# Patient Record
Sex: FEMALE | Race: WHITE | NOT HISPANIC OR LATINO | Employment: OTHER | URBAN - METROPOLITAN AREA
[De-identification: names, ages, dates, MRNs, and addresses within clinical notes are randomized per-mention and may not be internally consistent; named-entity substitution may affect disease eponyms.]

---

## 2017-11-02 ENCOUNTER — CONVERSION ENCOUNTER (OUTPATIENT)
Dept: MAMMOGRAPHY | Facility: HOSPITAL | Age: 82
End: 2017-11-02

## 2018-08-20 ENCOUNTER — CONVERSION ENCOUNTER (OUTPATIENT)
Dept: OTHER | Facility: HOSPITAL | Age: 83
End: 2018-08-20

## 2018-08-20 ENCOUNTER — OFFICE VISIT CONVERTED (OUTPATIENT)
Dept: CARDIOLOGY | Facility: CLINIC | Age: 83
End: 2018-08-20
Attending: INTERNAL MEDICINE

## 2018-08-22 ENCOUNTER — CONVERSION ENCOUNTER (OUTPATIENT)
Dept: CARDIOLOGY | Facility: CLINIC | Age: 83
End: 2018-08-22
Attending: INTERNAL MEDICINE

## 2019-01-24 ENCOUNTER — HOSPITAL ENCOUNTER (OUTPATIENT)
Dept: LAB | Facility: HOSPITAL | Age: 84
Discharge: HOME | End: 2019-01-24
Attending: PHYSICIAN ASSISTANT

## 2019-01-26 LAB
AMOXICILLIN+CLAV SUSC ISLT: >=32
AMPICILLIN SUSC ISLT: >=32
AMPICILLIN+SULBAC SUSC ISLT: >=32
BACTERIA UR CULT: ABNORMAL
CEFAZOLIN SUSC ISLT: 32
CEFEPIME SUSC ISLT: <=1
CEFTAZIDIME SUSC ISLT: <=1
CEFTRIAXONE SUSC ISLT: <=1
CEFUROXIME ORAL SUSC ISLT: 8
CEFUROXIME PARENTER SUSC ISLT: 8
CIPROFLOXACIN SUSC ISLT: >=4
ERTAPENEM SUSC ISLT: <=0.5
GENTAMICIN SUSC ISLT: >=16
LEVOFLOXACIN SUSC ISLT: >=8
NITROFURANTOIN SUSC ISLT: <=16
TETRACYCLINE SUSC ISLT: >=16
TMP SMX SUSC ISLT: >=320
TOBRAMYCIN SUSC ISLT: 8

## 2019-02-20 ENCOUNTER — HOSPITAL ENCOUNTER (OUTPATIENT)
Dept: LAB | Facility: HOSPITAL | Age: 84
Discharge: HOME | End: 2019-02-20
Attending: PHYSICIAN ASSISTANT

## 2019-02-20 LAB
ALBUMIN SERPL-MCNC: 3.9 G/DL (ref 3.5–5)
ALBUMIN/GLOB SERPL: 1 {RATIO} (ref 1.4–2.6)
ALP SERPL-CCNC: 155 U/L (ref 43–160)
ALT SERPL-CCNC: 32 U/L (ref 10–40)
ANION GAP SERPL CALC-SCNC: 15 MMOL/L (ref 8–19)
APPEARANCE UR: CLEAR
AST SERPL-CCNC: 22 U/L (ref 15–50)
BASOPHILS # BLD AUTO: 0.05 10*3/UL (ref 0–0.2)
BASOPHILS NFR BLD AUTO: 0.7 % (ref 0–3)
BILIRUB SERPL-MCNC: 0.71 MG/DL (ref 0.2–1.3)
BILIRUB UR QL: NEGATIVE
BUN SERPL-MCNC: 15 MG/DL (ref 5–25)
BUN/CREAT SERPL: 17 {RATIO} (ref 6–20)
CALCIUM SERPL-MCNC: 9.2 MG/DL (ref 8.7–10.4)
CHLORIDE SERPL-SCNC: 101 MMOL/L (ref 99–111)
COLOR UR: ABNORMAL
CONV ABS IMM GRAN: 0.02 10*3/UL (ref 0–0.2)
CONV BACTERIA: NEGATIVE
CONV CO2: 24 MMOL/L (ref 22–32)
CONV COLLECTION SOURCE (UA): ABNORMAL
CONV HYALINE CASTS IN URINE MICRO: ABNORMAL /[LPF]
CONV IMMATURE GRAN: 0.3 % (ref 0–1.8)
CONV TOTAL PROTEIN: 7.9 G/DL (ref 6.3–8.2)
CONV UROBILINOGEN IN URINE BY AUTOMATED TEST STRIP: 1 {EHRLICHU}/DL (ref 0.1–1)
CREAT UR-MCNC: 0.9 MG/DL (ref 0.5–0.9)
DEPRECATED RDW RBC AUTO: 51.7 FL (ref 36.4–46.3)
EOSINOPHIL # BLD AUTO: 0.09 10*3/UL (ref 0–0.7)
EOSINOPHIL # BLD AUTO: 1.3 % (ref 0–7)
ERYTHROCYTE [DISTWIDTH] IN BLOOD BY AUTOMATED COUNT: 15 % (ref 11.7–14.4)
GFR SERPLBLD BASED ON 1.73 SQ M-ARVRAT: 57 ML/MIN/{1.73_M2}
GLOBULIN UR ELPH-MCNC: 4 G/DL (ref 2–3.5)
GLUCOSE SERPL-MCNC: 88 MG/DL (ref 65–99)
GLUCOSE UR QL: NEGATIVE MG/DL
HBA1C MFR BLD: 12.8 G/DL (ref 12–16)
HCT VFR BLD AUTO: 40.6 % (ref 37–47)
HGB UR QL STRIP: NEGATIVE
KETONES UR QL STRIP: NEGATIVE MG/DL
LEUKOCYTE ESTERASE UR QL STRIP: ABNORMAL
LYMPHOCYTES # BLD AUTO: 1.96 10*3/UL (ref 1–5)
MCH RBC QN AUTO: 29.6 PG (ref 27–31)
MCHC RBC AUTO-ENTMCNC: 31.5 G/DL (ref 33–37)
MCV RBC AUTO: 94 FL (ref 81–99)
MONOCYTES # BLD AUTO: 0.51 10*3/UL (ref 0.2–1.2)
MONOCYTES NFR BLD AUTO: 7.1 % (ref 3–10)
NEUTROPHILS # BLD AUTO: 4.52 10*3/UL (ref 2–8)
NEUTROPHILS NFR BLD AUTO: 63.2 % (ref 30–85)
NITRITE UR QL STRIP: NEGATIVE
NRBC CBCN: 0 % (ref 0–0.7)
OSMOLALITY SERPL CALC.SUM OF ELEC: 284 MOSM/KG (ref 273–304)
PH UR STRIP.AUTO: 6.5 [PH] (ref 5–8)
PLATELET # BLD AUTO: 252 10*3/UL (ref 130–400)
PMV BLD AUTO: 12.9 FL (ref 9.4–12.3)
POTASSIUM SERPL-SCNC: 3.4 MMOL/L (ref 3.5–5.3)
PROT UR QL: NEGATIVE MG/DL
RBC # BLD AUTO: 4.32 10*6/UL (ref 4.2–5.4)
RBC #/AREA URNS HPF: ABNORMAL /[HPF]
SODIUM SERPL-SCNC: 137 MMOL/L (ref 135–147)
SP GR UR: 1.01 (ref 1–1.03)
SQUAMOUS SPT QL MICRO: ABNORMAL /[HPF]
T4 FREE SERPL-MCNC: 1.2 NG/DL (ref 0.9–1.8)
TSH SERPL-ACNC: 4.05 M[IU]/L (ref 0.27–4.2)
VARIANT LYMPHS NFR BLD MANUAL: 27.4 % (ref 20–45)
WBC # BLD AUTO: 7.15 10*3/UL (ref 4.8–10.8)
WBC #/AREA URNS HPF: ABNORMAL /[HPF]

## 2019-02-22 ENCOUNTER — HOSPITAL ENCOUNTER (OUTPATIENT)
Dept: LAB | Facility: HOSPITAL | Age: 84
Discharge: HOME | End: 2019-02-22
Attending: INTERNAL MEDICINE

## 2019-02-22 LAB
ALBUMIN SERPL-MCNC: 3.8 G/DL (ref 3.5–5)
ALBUMIN/GLOB SERPL: 1 {RATIO} (ref 1.4–2.6)
ALP SERPL-CCNC: 142 U/L (ref 43–160)
ALT SERPL-CCNC: 22 U/L (ref 10–40)
ANION GAP SERPL CALC-SCNC: 16 MMOL/L (ref 8–19)
AST SERPL-CCNC: 19 U/L (ref 15–50)
BACTERIA UR CULT: NORMAL
BILIRUB SERPL-MCNC: 0.66 MG/DL (ref 0.2–1.3)
BUN SERPL-MCNC: 14 MG/DL (ref 5–25)
BUN/CREAT SERPL: 16 {RATIO} (ref 6–20)
CALCIUM SERPL-MCNC: 9.4 MG/DL (ref 8.7–10.4)
CHLORIDE SERPL-SCNC: 103 MMOL/L (ref 99–111)
CHOLEST SERPL-MCNC: 190 MG/DL (ref 107–200)
CHOLEST/HDLC SERPL: 2.6 {RATIO} (ref 3–6)
CONV CO2: 26 MMOL/L (ref 22–32)
CONV TOTAL PROTEIN: 7.7 G/DL (ref 6.3–8.2)
CREAT UR-MCNC: 0.89 MG/DL (ref 0.5–0.9)
GFR SERPLBLD BASED ON 1.73 SQ M-ARVRAT: 58 ML/MIN/{1.73_M2}
GLOBULIN UR ELPH-MCNC: 3.9 G/DL (ref 2–3.5)
GLUCOSE SERPL-MCNC: 95 MG/DL (ref 65–99)
HDLC SERPL-MCNC: 73 MG/DL (ref 40–60)
LDLC SERPL CALC-MCNC: 65 MG/DL (ref 70–100)
OSMOLALITY SERPL CALC.SUM OF ELEC: 292 MOSM/KG (ref 273–304)
POTASSIUM SERPL-SCNC: 3.8 MMOL/L (ref 3.5–5.3)
SODIUM SERPL-SCNC: 141 MMOL/L (ref 135–147)
TRIGL SERPL-MCNC: 262 MG/DL (ref 40–150)
VLDLC SERPL-MCNC: 52 MG/DL (ref 5–37)

## 2019-02-27 ENCOUNTER — OFFICE VISIT CONVERTED (OUTPATIENT)
Dept: CARDIOLOGY | Facility: CLINIC | Age: 84
End: 2019-02-27
Attending: INTERNAL MEDICINE

## 2019-02-27 ENCOUNTER — CONVERSION ENCOUNTER (OUTPATIENT)
Dept: CARDIOLOGY | Facility: CLINIC | Age: 84
End: 2019-02-27

## 2019-03-11 ENCOUNTER — HOSPITAL ENCOUNTER (OUTPATIENT)
Dept: LAB | Facility: HOSPITAL | Age: 84
Discharge: HOME | End: 2019-03-11
Attending: INTERNAL MEDICINE

## 2019-03-11 LAB
ANION GAP SERPL CALC-SCNC: 16 MMOL/L (ref 8–19)
BUN SERPL-MCNC: 14 MG/DL (ref 5–25)
BUN/CREAT SERPL: 15 {RATIO} (ref 6–20)
CALCIUM SERPL-MCNC: 8.9 MG/DL (ref 8.7–10.4)
CHLORIDE SERPL-SCNC: 105 MMOL/L (ref 99–111)
CONV CO2: 24 MMOL/L (ref 22–32)
CREAT UR-MCNC: 0.92 MG/DL (ref 0.5–0.9)
GFR SERPLBLD BASED ON 1.73 SQ M-ARVRAT: 56 ML/MIN/{1.73_M2}
GLUCOSE SERPL-MCNC: 100 MG/DL (ref 65–99)
MAGNESIUM SERPL-MCNC: 1.81 MG/DL (ref 1.6–2.3)
OSMOLALITY SERPL CALC.SUM OF ELEC: 293 MOSM/KG (ref 273–304)
POTASSIUM SERPL-SCNC: 3.8 MMOL/L (ref 3.5–5.3)
SODIUM SERPL-SCNC: 141 MMOL/L (ref 135–147)
T4 FREE SERPL-MCNC: 1.1 NG/DL (ref 0.9–1.8)
TSH SERPL-ACNC: 2.9 M[IU]/L (ref 0.27–4.2)

## 2019-03-25 ENCOUNTER — OFFICE VISIT CONVERTED (OUTPATIENT)
Dept: CARDIOLOGY | Facility: CLINIC | Age: 84
End: 2019-03-25
Attending: INTERNAL MEDICINE

## 2019-04-11 ENCOUNTER — HOSPITAL ENCOUNTER (OUTPATIENT)
Dept: LAB | Facility: HOSPITAL | Age: 84
Discharge: HOME | End: 2019-04-11
Attending: PHYSICIAN ASSISTANT

## 2019-04-13 LAB
AMOXICILLIN+CLAV SUSC ISLT: >=32
AMPICILLIN SUSC ISLT: >=32
AMPICILLIN+SULBAC SUSC ISLT: >=32
BACTERIA UR CULT: ABNORMAL
CEFAZOLIN SUSC ISLT: 32
CEFEPIME SUSC ISLT: <=1
CEFTAZIDIME SUSC ISLT: <=1
CEFTRIAXONE SUSC ISLT: <=1
CEFUROXIME ORAL SUSC ISLT: 4
CEFUROXIME PARENTER SUSC ISLT: 4
CIPROFLOXACIN SUSC ISLT: >=4
ERTAPENEM SUSC ISLT: <=0.5
GENTAMICIN SUSC ISLT: >=16
LEVOFLOXACIN SUSC ISLT: >=8
NITROFURANTOIN SUSC ISLT: <=16
TETRACYCLINE SUSC ISLT: >=16
TMP SMX SUSC ISLT: >=320
TOBRAMYCIN SUSC ISLT: 8

## 2019-04-16 ENCOUNTER — HOSPITAL ENCOUNTER (OUTPATIENT)
Dept: PERIOP | Facility: HOSPITAL | Age: 84
Setting detail: HOSPITAL OUTPATIENT SURGERY
Discharge: HOME | End: 2019-04-16
Attending: INTERNAL MEDICINE

## 2019-04-16 LAB
ALBUMIN SERPL-MCNC: 3.9 G/DL (ref 3.5–5)
ALBUMIN/GLOB SERPL: 1.1 {RATIO} (ref 1.4–2.6)
ALP SERPL-CCNC: 74 U/L (ref 43–160)
ALT SERPL-CCNC: 6 U/L (ref 10–40)
ANION GAP SERPL CALC-SCNC: 16 MMOL/L (ref 8–19)
AST SERPL-CCNC: 17 U/L (ref 15–50)
BASOPHILS # BLD AUTO: 0.05 10*3/UL (ref 0–0.2)
BASOPHILS NFR BLD AUTO: 0.7 % (ref 0–3)
BILIRUB SERPL-MCNC: 0.72 MG/DL (ref 0.2–1.3)
BUN SERPL-MCNC: 19 MG/DL (ref 5–25)
BUN/CREAT SERPL: 21 {RATIO} (ref 6–20)
CALCIUM SERPL-MCNC: 9 MG/DL (ref 8.7–10.4)
CHLORIDE SERPL-SCNC: 104 MMOL/L (ref 99–111)
CONV ABS IMM GRAN: 0.01 10*3/UL (ref 0–0.2)
CONV CO2: 25 MMOL/L (ref 22–32)
CONV IMMATURE GRAN: 0.1 % (ref 0–1.8)
CONV TOTAL PROTEIN: 7.4 G/DL (ref 6.3–8.2)
CREAT UR-MCNC: 0.91 MG/DL (ref 0.5–0.9)
DEPRECATED RDW RBC AUTO: 51 FL (ref 36.4–46.3)
EOSINOPHIL # BLD AUTO: 0.1 10*3/UL (ref 0–0.7)
EOSINOPHIL # BLD AUTO: 1.5 % (ref 0–7)
ERYTHROCYTE [DISTWIDTH] IN BLOOD BY AUTOMATED COUNT: 15.3 % (ref 11.7–14.4)
GFR SERPLBLD BASED ON 1.73 SQ M-ARVRAT: 56 ML/MIN/{1.73_M2}
GLOBULIN UR ELPH-MCNC: 3.5 G/DL (ref 2–3.5)
GLUCOSE SERPL-MCNC: 101 MG/DL (ref 65–99)
HBA1C MFR BLD: 11.8 G/DL (ref 12–16)
HCT VFR BLD AUTO: 36.4 % (ref 37–47)
LYMPHOCYTES # BLD AUTO: 1.99 10*3/UL (ref 1–5)
MAGNESIUM SERPL-MCNC: 1.91 MG/DL (ref 1.6–2.3)
MCH RBC QN AUTO: 29.7 PG (ref 27–31)
MCHC RBC AUTO-ENTMCNC: 32.4 G/DL (ref 33–37)
MCV RBC AUTO: 91.7 FL (ref 81–99)
MONOCYTES # BLD AUTO: 0.42 10*3/UL (ref 0.2–1.2)
MONOCYTES NFR BLD AUTO: 6.2 % (ref 3–10)
NEUTROPHILS # BLD AUTO: 4.18 10*3/UL (ref 2–8)
NEUTROPHILS NFR BLD AUTO: 62 % (ref 30–85)
NRBC CBCN: 0 % (ref 0–0.7)
OSMOLALITY SERPL CALC.SUM OF ELEC: 294 MOSM/KG (ref 273–304)
PLATELET # BLD AUTO: 211 10*3/UL (ref 130–400)
PMV BLD AUTO: 11.1 FL (ref 9.4–12.3)
POTASSIUM SERPL-SCNC: 3.8 MMOL/L (ref 3.5–5.3)
RBC # BLD AUTO: 3.97 10*6/UL (ref 4.2–5.4)
SODIUM SERPL-SCNC: 141 MMOL/L (ref 135–147)
VARIANT LYMPHS NFR BLD MANUAL: 29.5 % (ref 20–45)
WBC # BLD AUTO: 6.75 10*3/UL (ref 4.8–10.8)

## 2019-05-09 ENCOUNTER — OFFICE VISIT CONVERTED (OUTPATIENT)
Dept: CARDIOLOGY | Facility: CLINIC | Age: 84
End: 2019-05-09
Attending: INTERNAL MEDICINE

## 2019-05-30 ENCOUNTER — HOSPITAL ENCOUNTER (OUTPATIENT)
Dept: LAB | Facility: HOSPITAL | Age: 84
Discharge: HOME | End: 2019-05-30
Attending: PHYSICIAN ASSISTANT

## 2019-06-01 LAB
AMOXICILLIN+CLAV SUSC ISLT: >=32
AMPICILLIN SUSC ISLT: >=32
AMPICILLIN+SULBAC SUSC ISLT: >=32
BACTERIA UR CULT: ABNORMAL
CEFAZOLIN SUSC ISLT: >=64
CEFEPIME SUSC ISLT: <=1
CEFTAZIDIME SUSC ISLT: <=1
CEFTRIAXONE SUSC ISLT: <=1
CEFUROXIME ORAL SUSC ISLT: 4
CEFUROXIME PARENTER SUSC ISLT: 4
CIPROFLOXACIN SUSC ISLT: >=4
ERTAPENEM SUSC ISLT: <=0.5
GENTAMICIN SUSC ISLT: >=16
LEVOFLOXACIN SUSC ISLT: >=8
NITROFURANTOIN SUSC ISLT: <=16
TETRACYCLINE SUSC ISLT: >=16
TMP SMX SUSC ISLT: >=320
TOBRAMYCIN SUSC ISLT: 8

## 2019-06-03 ENCOUNTER — HOSPITAL ENCOUNTER (OUTPATIENT)
Dept: SURGERY | Facility: CLINIC | Age: 84
Discharge: HOME | End: 2019-06-03
Attending: PHYSICIAN ASSISTANT

## 2019-06-03 ENCOUNTER — OFFICE VISIT CONVERTED (OUTPATIENT)
Dept: SURGERY | Facility: CLINIC | Age: 84
End: 2019-06-03
Attending: PHYSICIAN ASSISTANT

## 2019-06-05 ENCOUNTER — CONVERSION ENCOUNTER (OUTPATIENT)
Dept: SURGERY | Facility: CLINIC | Age: 84
End: 2019-06-05

## 2019-06-07 ENCOUNTER — HOSPITAL ENCOUNTER (OUTPATIENT)
Dept: SURGERY | Facility: CLINIC | Age: 84
Discharge: HOME | End: 2019-06-07
Attending: PHYSICIAN ASSISTANT

## 2019-06-09 LAB — BACTERIA UR CULT: NORMAL

## 2019-07-02 ENCOUNTER — CONVERSION ENCOUNTER (OUTPATIENT)
Dept: SURGERY | Facility: CLINIC | Age: 84
End: 2019-07-02

## 2019-07-02 ENCOUNTER — HOSPITAL ENCOUNTER (OUTPATIENT)
Dept: SURGERY | Facility: CLINIC | Age: 84
Discharge: HOME | End: 2019-07-02
Attending: PHYSICIAN ASSISTANT

## 2019-07-02 ENCOUNTER — OFFICE VISIT CONVERTED (OUTPATIENT)
Dept: SURGERY | Facility: CLINIC | Age: 84
End: 2019-07-02
Attending: PHYSICIAN ASSISTANT

## 2019-07-04 LAB
AMOXICILLIN+CLAV SUSC ISLT: >=32
AMPICILLIN SUSC ISLT: >=32
AMPICILLIN+SULBAC SUSC ISLT: >=32
BACTERIA UR CULT: ABNORMAL
CEFAZOLIN SUSC ISLT: 32
CEFEPIME SUSC ISLT: <=1
CEFTAZIDIME SUSC ISLT: <=1
CEFTRIAXONE SUSC ISLT: <=1
CEFUROXIME ORAL SUSC ISLT: 16
CEFUROXIME PARENTER SUSC ISLT: 16
CIPROFLOXACIN SUSC ISLT: >=4
ERTAPENEM SUSC ISLT: <=0.5
GENTAMICIN SUSC ISLT: >=16
LEVOFLOXACIN SUSC ISLT: >=8
NITROFURANTOIN SUSC ISLT: <=16
TETRACYCLINE SUSC ISLT: >=16
TMP SMX SUSC ISLT: >=320
TOBRAMYCIN SUSC ISLT: 8

## 2019-07-10 ENCOUNTER — HOSPITAL ENCOUNTER (OUTPATIENT)
Dept: LAB | Facility: HOSPITAL | Age: 84
Discharge: HOME | End: 2019-07-10
Attending: INTERNAL MEDICINE

## 2019-07-10 LAB
ALBUMIN SERPL-MCNC: 3.9 G/DL (ref 3.5–5)
ALBUMIN/GLOB SERPL: 1.2 {RATIO} (ref 1.4–2.6)
ALP SERPL-CCNC: 78 U/L (ref 43–160)
ALT SERPL-CCNC: 7 U/L (ref 10–40)
ANION GAP SERPL CALC-SCNC: 17 MMOL/L (ref 8–19)
AST SERPL-CCNC: 15 U/L (ref 15–50)
BASOPHILS # BLD AUTO: 0.08 10*3/UL (ref 0–0.2)
BASOPHILS NFR BLD AUTO: 1.2 % (ref 0–3)
BILIRUB SERPL-MCNC: 0.58 MG/DL (ref 0.2–1.3)
BUN SERPL-MCNC: 15 MG/DL (ref 5–25)
BUN/CREAT SERPL: 19 {RATIO} (ref 6–20)
CALCIUM SERPL-MCNC: 8.8 MG/DL (ref 8.7–10.4)
CHLORIDE SERPL-SCNC: 104 MMOL/L (ref 99–111)
CONV ABS IMM GRAN: 0.01 10*3/UL (ref 0–0.2)
CONV CO2: 24 MMOL/L (ref 22–32)
CONV IMMATURE GRAN: 0.1 % (ref 0–1.8)
CONV TOTAL PROTEIN: 7.2 G/DL (ref 6.3–8.2)
CREAT UR-MCNC: 0.77 MG/DL (ref 0.5–0.9)
DEPRECATED RDW RBC AUTO: 53.8 FL (ref 36.4–46.3)
EOSINOPHIL # BLD AUTO: 0.14 10*3/UL (ref 0–0.7)
EOSINOPHIL # BLD AUTO: 2.1 % (ref 0–7)
ERYTHROCYTE [DISTWIDTH] IN BLOOD BY AUTOMATED COUNT: 15 % (ref 11.7–14.4)
GFR SERPLBLD BASED ON 1.73 SQ M-ARVRAT: >60 ML/MIN/{1.73_M2}
GLOBULIN UR ELPH-MCNC: 3.3 G/DL (ref 2–3.5)
GLUCOSE SERPL-MCNC: 88 MG/DL (ref 65–99)
HBA1C MFR BLD: 11.7 G/DL (ref 12–16)
HCT VFR BLD AUTO: 36.9 % (ref 37–47)
LYMPHOCYTES # BLD AUTO: 2.37 10*3/UL (ref 1–5)
MCH RBC QN AUTO: 30.5 PG (ref 27–31)
MCHC RBC AUTO-ENTMCNC: 31.7 G/DL (ref 33–37)
MCV RBC AUTO: 96.3 FL (ref 81–99)
MONOCYTES # BLD AUTO: 0.51 10*3/UL (ref 0.2–1.2)
MONOCYTES NFR BLD AUTO: 7.6 % (ref 3–10)
NEUTROPHILS # BLD AUTO: 3.64 10*3/UL (ref 2–8)
NEUTROPHILS NFR BLD AUTO: 53.9 % (ref 30–85)
NRBC CBCN: 0 % (ref 0–0.7)
OSMOLALITY SERPL CALC.SUM OF ELEC: 292 MOSM/KG (ref 273–304)
PLATELET # BLD AUTO: 273 10*3/UL (ref 130–400)
PMV BLD AUTO: 12.6 FL (ref 9.4–12.3)
POTASSIUM SERPL-SCNC: 3.7 MMOL/L (ref 3.5–5.3)
RBC # BLD AUTO: 3.83 10*6/UL (ref 4.2–5.4)
SODIUM SERPL-SCNC: 141 MMOL/L (ref 135–147)
VARIANT LYMPHS NFR BLD MANUAL: 35.1 % (ref 20–45)
WBC # BLD AUTO: 6.75 10*3/UL (ref 4.8–10.8)

## 2019-07-18 ENCOUNTER — HOSPITAL ENCOUNTER (OUTPATIENT)
Dept: CT IMAGING | Facility: HOSPITAL | Age: 84
Discharge: HOME | End: 2019-07-18
Attending: PHYSICIAN ASSISTANT

## 2019-07-29 ENCOUNTER — OFFICE VISIT CONVERTED (OUTPATIENT)
Dept: SURGERY | Facility: CLINIC | Age: 84
End: 2019-07-29
Attending: PHYSICIAN ASSISTANT

## 2019-07-29 ENCOUNTER — CONVERSION ENCOUNTER (OUTPATIENT)
Dept: SURGERY | Facility: CLINIC | Age: 84
End: 2019-07-29

## 2019-07-29 ENCOUNTER — HOSPITAL ENCOUNTER (OUTPATIENT)
Dept: SURGERY | Facility: CLINIC | Age: 84
Discharge: HOME | End: 2019-07-29
Attending: PHYSICIAN ASSISTANT

## 2019-07-31 LAB — BACTERIA UR CULT: NORMAL

## 2019-08-08 ENCOUNTER — HOSPITAL ENCOUNTER (OUTPATIENT)
Dept: LAB | Facility: HOSPITAL | Age: 84
Discharge: HOME | End: 2019-08-08
Attending: INTERNAL MEDICINE

## 2019-08-08 LAB
ALBUMIN SERPL-MCNC: 4 G/DL (ref 3.5–5)
ALBUMIN/GLOB SERPL: 1.1 {RATIO} (ref 1.4–2.6)
ALP SERPL-CCNC: 74 U/L (ref 43–160)
ALT SERPL-CCNC: 7 U/L (ref 10–40)
ANION GAP SERPL CALC-SCNC: 23 MMOL/L (ref 8–19)
AST SERPL-CCNC: 17 U/L (ref 15–50)
BILIRUB SERPL-MCNC: 0.59 MG/DL (ref 0.2–1.3)
BUN SERPL-MCNC: 14 MG/DL (ref 5–25)
BUN/CREAT SERPL: 15 {RATIO} (ref 6–20)
CALCIUM SERPL-MCNC: 9.5 MG/DL (ref 8.7–10.4)
CHLORIDE SERPL-SCNC: 101 MMOL/L (ref 99–111)
CONV CO2: 20 MMOL/L (ref 22–32)
CONV TOTAL PROTEIN: 7.6 G/DL (ref 6.3–8.2)
CREAT UR-MCNC: 0.94 MG/DL (ref 0.5–0.9)
GFR SERPLBLD BASED ON 1.73 SQ M-ARVRAT: 54 ML/MIN/{1.73_M2}
GLOBULIN UR ELPH-MCNC: 3.6 G/DL (ref 2–3.5)
GLUCOSE SERPL-MCNC: 101 MG/DL (ref 65–99)
MAGNESIUM SERPL-MCNC: 1.9 MG/DL (ref 1.6–2.3)
OSMOLALITY SERPL CALC.SUM OF ELEC: 291 MOSM/KG (ref 273–304)
POTASSIUM SERPL-SCNC: 4 MMOL/L (ref 3.5–5.3)
SODIUM SERPL-SCNC: 140 MMOL/L (ref 135–147)

## 2019-08-15 ENCOUNTER — OFFICE VISIT CONVERTED (OUTPATIENT)
Dept: CARDIOLOGY | Facility: CLINIC | Age: 84
End: 2019-08-15
Attending: INTERNAL MEDICINE

## 2019-10-14 ENCOUNTER — HOSPITAL ENCOUNTER (OUTPATIENT)
Dept: SURGERY | Facility: CLINIC | Age: 84
Discharge: HOME | End: 2019-10-14
Attending: PHYSICIAN ASSISTANT

## 2019-10-15 ENCOUNTER — CONVERSION ENCOUNTER (OUTPATIENT)
Dept: SURGERY | Facility: CLINIC | Age: 84
End: 2019-10-15

## 2019-10-16 ENCOUNTER — CONVERSION ENCOUNTER (OUTPATIENT)
Dept: SURGERY | Facility: CLINIC | Age: 84
End: 2019-10-16

## 2019-10-16 LAB
AMOXICILLIN+CLAV SUSC ISLT: 16
AMPICILLIN SUSC ISLT: >=32
AMPICILLIN+SULBAC SUSC ISLT: >=32
BACTERIA UR CULT: ABNORMAL
CEFAZOLIN SUSC ISLT: >=64
CEFEPIME SUSC ISLT: <=1
CEFTAZIDIME SUSC ISLT: <=1
CEFTRIAXONE SUSC ISLT: <=1
CEFUROXIME ORAL SUSC ISLT: 4
CEFUROXIME PARENTER SUSC ISLT: 4
CIPROFLOXACIN SUSC ISLT: >=4
ERTAPENEM SUSC ISLT: <=0.5
GENTAMICIN SUSC ISLT: >=16
LEVOFLOXACIN SUSC ISLT: >=8
NITROFURANTOIN SUSC ISLT: <=16
TETRACYCLINE SUSC ISLT: >=16
TMP SMX SUSC ISLT: >=320
TOBRAMYCIN SUSC ISLT: 8

## 2019-10-17 ENCOUNTER — CONVERSION ENCOUNTER (OUTPATIENT)
Dept: SURGERY | Facility: CLINIC | Age: 84
End: 2019-10-17

## 2019-11-11 ENCOUNTER — OFFICE VISIT CONVERTED (OUTPATIENT)
Dept: SURGERY | Facility: CLINIC | Age: 84
End: 2019-11-11
Attending: PHYSICIAN ASSISTANT

## 2019-11-11 ENCOUNTER — HOSPITAL ENCOUNTER (OUTPATIENT)
Dept: LAB | Facility: HOSPITAL | Age: 84
Discharge: HOME | End: 2019-11-11
Attending: PHYSICIAN ASSISTANT

## 2019-11-11 ENCOUNTER — CONVERSION ENCOUNTER (OUTPATIENT)
Dept: SURGERY | Facility: CLINIC | Age: 84
End: 2019-11-11

## 2019-11-11 LAB
ALBUMIN SERPL-MCNC: 3.9 G/DL (ref 3.5–5)
ALBUMIN/GLOB SERPL: 1.1 {RATIO} (ref 1.4–2.6)
ALP SERPL-CCNC: 64 U/L (ref 43–160)
ALT SERPL-CCNC: 7 U/L (ref 10–40)
ANION GAP SERPL CALC-SCNC: 18 MMOL/L (ref 8–19)
AST SERPL-CCNC: 18 U/L (ref 15–50)
BASOPHILS # BLD AUTO: 0.05 10*3/UL (ref 0–0.2)
BASOPHILS # BLD: 0 % (ref 0–3)
BASOPHILS NFR BLD AUTO: 0.6 % (ref 0–3)
BILIRUB SERPL-MCNC: 0.71 MG/DL (ref 0.2–1.3)
BUN SERPL-MCNC: 14 MG/DL (ref 5–25)
BUN/CREAT SERPL: 16 {RATIO} (ref 6–20)
CALCIUM SERPL-MCNC: 9.7 MG/DL (ref 8.7–10.4)
CHLORIDE SERPL-SCNC: 100 MMOL/L (ref 99–111)
CONV ABS BANDS: 0 % (ref 1–5)
CONV ABS IMM GRAN: 0.02 10*3/UL (ref 0–0.2)
CONV CO2: 25 MMOL/L (ref 22–32)
CONV IMMATURE GRAN: 0.3 % (ref 0–1.8)
CONV SEGMENTED NEUTROPHILS: 64 % (ref 45–70)
CONV TOTAL PROTEIN: 7.4 G/DL (ref 6.3–8.2)
CREAT UR-MCNC: 0.89 MG/DL (ref 0.5–0.9)
DEPRECATED RDW RBC AUTO: 48.3 FL (ref 36.4–46.3)
EOSINOPHIL # BLD AUTO: 0.11 10*3/UL (ref 0–0.7)
EOSINOPHIL # BLD AUTO: 1.4 % (ref 0–7)
EOSINOPHIL NFR BLD AUTO: 1 % (ref 0–7)
ERYTHROCYTE [DISTWIDTH] IN BLOOD BY AUTOMATED COUNT: 14.3 % (ref 11.7–14.4)
GFR SERPLBLD BASED ON 1.73 SQ M-ARVRAT: 58 ML/MIN/{1.73_M2}
GLOBULIN UR ELPH-MCNC: 3.5 G/DL (ref 2–3.5)
GLUCOSE SERPL-MCNC: 86 MG/DL (ref 65–99)
HCT VFR BLD AUTO: 35.2 % (ref 37–47)
HGB BLD-MCNC: 11.5 G/DL (ref 12–16)
LYMPHOCYTES # BLD AUTO: 2.78 10*3/UL (ref 1–5)
LYMPHOCYTES NFR BLD AUTO: 35.8 % (ref 20–45)
MAGNESIUM SERPL-MCNC: 1.87 MG/DL (ref 1.6–2.3)
MCH RBC QN AUTO: 30.2 PG (ref 27–31)
MCHC RBC AUTO-ENTMCNC: 32.7 G/DL (ref 33–37)
MCV RBC AUTO: 92.4 FL (ref 81–99)
MONOCYTES # BLD AUTO: 0.61 10*3/UL (ref 0.2–1.2)
MONOCYTES NFR BLD AUTO: 7.9 % (ref 3–10)
MONOCYTES NFR BLD MANUAL: 3 % (ref 3–10)
NEUTROPHILS # BLD AUTO: 4.2 10*3/UL (ref 2–8)
NEUTROPHILS NFR BLD AUTO: 54 % (ref 30–85)
NRBC CBCN: 0 % (ref 0–0.7)
NUC CELL # PRT MANUAL: 0 /100{WBCS}
OSMOLALITY SERPL CALC.SUM OF ELEC: 288 MOSM/KG (ref 273–304)
PLAT MORPH BLD: NORMAL
PLATELET # BLD AUTO: 257 10*3/UL (ref 130–400)
PMV BLD AUTO: 12.6 FL (ref 9.4–12.3)
POTASSIUM SERPL-SCNC: 3.6 MMOL/L (ref 3.5–5.3)
RBC # BLD AUTO: 3.81 10*6/UL (ref 4.2–5.4)
SMALL PLATELETS BLD QL SMEAR: ADEQUATE
SODIUM SERPL-SCNC: 139 MMOL/L (ref 135–147)
T4 FREE SERPL-MCNC: 1.2 NG/DL (ref 0.9–1.8)
TSH SERPL-ACNC: 3.03 M[IU]/L (ref 0.27–4.2)
VARIANT LYMPHS NFR BLD MANUAL: 32 % (ref 20–45)
WBC # BLD AUTO: 7.77 10*3/UL (ref 4.8–10.8)

## 2019-11-18 ENCOUNTER — HOSPITAL ENCOUNTER (OUTPATIENT)
Dept: LAB | Facility: HOSPITAL | Age: 84
Discharge: HOME | End: 2019-11-18
Attending: INTERNAL MEDICINE

## 2019-11-18 LAB
ALBUMIN SERPL-MCNC: 3.9 G/DL (ref 3.5–5)
ALBUMIN/GLOB SERPL: 1 {RATIO} (ref 1.4–2.6)
ALP SERPL-CCNC: 79 U/L (ref 43–160)
ALT SERPL-CCNC: 9 U/L (ref 10–40)
ANION GAP SERPL CALC-SCNC: 17 MMOL/L (ref 8–19)
AST SERPL-CCNC: 21 U/L (ref 15–50)
BASOPHILS # BLD AUTO: 0.05 10*3/UL (ref 0–0.2)
BASOPHILS NFR BLD AUTO: 0.7 % (ref 0–3)
BILIRUB SERPL-MCNC: 0.65 MG/DL (ref 0.2–1.3)
BUN SERPL-MCNC: 12 MG/DL (ref 5–25)
BUN/CREAT SERPL: 10 {RATIO} (ref 6–20)
CALCIUM SERPL-MCNC: 9.4 MG/DL (ref 8.7–10.4)
CHLORIDE SERPL-SCNC: 101 MMOL/L (ref 99–111)
CONV ABS IMM GRAN: 0.03 10*3/UL (ref 0–0.2)
CONV CO2: 21 MMOL/L (ref 22–32)
CONV IMMATURE GRAN: 0.4 % (ref 0–1.8)
CONV TOTAL PROTEIN: 7.7 G/DL (ref 6.3–8.2)
CREAT UR-MCNC: 1.24 MG/DL (ref 0.5–0.9)
DEPRECATED RDW RBC AUTO: 49.5 FL (ref 36.4–46.3)
EOSINOPHIL # BLD AUTO: 0.19 10*3/UL (ref 0–0.7)
EOSINOPHIL # BLD AUTO: 2.7 % (ref 0–7)
ERYTHROCYTE [DISTWIDTH] IN BLOOD BY AUTOMATED COUNT: 14.6 % (ref 11.7–14.4)
FOLATE SERPL-MCNC: 8 NG/ML (ref 4.8–20)
GFR SERPLBLD BASED ON 1.73 SQ M-ARVRAT: 39 ML/MIN/{1.73_M2}
GLOBULIN UR ELPH-MCNC: 3.8 G/DL (ref 2–3.5)
GLUCOSE SERPL-MCNC: 84 MG/DL (ref 65–99)
HCT VFR BLD AUTO: 35.7 % (ref 37–47)
HGB BLD-MCNC: 11.7 G/DL (ref 12–16)
IRON SERPL-MCNC: 71 UG/DL (ref 60–170)
LYMPHOCYTES # BLD AUTO: 1.75 10*3/UL (ref 1–5)
LYMPHOCYTES NFR BLD AUTO: 25.1 % (ref 20–45)
MCH RBC QN AUTO: 30.6 PG (ref 27–31)
MCHC RBC AUTO-ENTMCNC: 32.8 G/DL (ref 33–37)
MCV RBC AUTO: 93.5 FL (ref 81–99)
MONOCYTES # BLD AUTO: 0.77 10*3/UL (ref 0.2–1.2)
MONOCYTES NFR BLD AUTO: 11 % (ref 3–10)
NEUTROPHILS # BLD AUTO: 4.18 10*3/UL (ref 2–8)
NEUTROPHILS NFR BLD AUTO: 60.1 % (ref 30–85)
NRBC CBCN: 0 % (ref 0–0.7)
OSMOLALITY SERPL CALC.SUM OF ELEC: 279 MOSM/KG (ref 273–304)
PLATELET # BLD AUTO: 246 10*3/UL (ref 130–400)
PMV BLD AUTO: 13 FL (ref 9.4–12.3)
POTASSIUM SERPL-SCNC: 4 MMOL/L (ref 3.5–5.3)
RBC # BLD AUTO: 3.82 10*6/UL (ref 4.2–5.4)
SODIUM SERPL-SCNC: 135 MMOL/L (ref 135–147)
T4 FREE SERPL-MCNC: 1.2 NG/DL (ref 0.9–1.8)
TSH SERPL-ACNC: 3.43 M[IU]/L (ref 0.27–4.2)
VIT B12 SERPL-MCNC: 341 PG/ML (ref 211–911)
WBC # BLD AUTO: 6.97 10*3/UL (ref 4.8–10.8)

## 2019-12-02 ENCOUNTER — OFFICE VISIT CONVERTED (OUTPATIENT)
Dept: SURGERY | Facility: CLINIC | Age: 84
End: 2019-12-02
Attending: PHYSICIAN ASSISTANT

## 2019-12-02 ENCOUNTER — HOSPITAL ENCOUNTER (OUTPATIENT)
Dept: SURGERY | Facility: CLINIC | Age: 84
Discharge: HOME | End: 2019-12-02
Attending: PHYSICIAN ASSISTANT

## 2019-12-02 ENCOUNTER — CONVERSION ENCOUNTER (OUTPATIENT)
Dept: SURGERY | Facility: CLINIC | Age: 84
End: 2019-12-02

## 2019-12-04 LAB — BACTERIA UR CULT: NORMAL

## 2019-12-09 ENCOUNTER — HOSPITAL ENCOUNTER (OUTPATIENT)
Dept: LAB | Facility: HOSPITAL | Age: 84
Discharge: HOME | End: 2019-12-09
Attending: PHYSICIAN ASSISTANT

## 2019-12-09 LAB
ANION GAP SERPL CALC-SCNC: 21 MMOL/L (ref 8–19)
BUN SERPL-MCNC: 14 MG/DL (ref 5–25)
BUN/CREAT SERPL: 16 {RATIO} (ref 6–20)
CALCIUM SERPL-MCNC: 9.5 MG/DL (ref 8.7–10.4)
CHLORIDE SERPL-SCNC: 99 MMOL/L (ref 99–111)
CONV CO2: 21 MMOL/L (ref 22–32)
CREAT UR-MCNC: 0.89 MG/DL (ref 0.5–0.9)
GFR SERPLBLD BASED ON 1.73 SQ M-ARVRAT: 58 ML/MIN/{1.73_M2}
GLUCOSE SERPL-MCNC: 102 MG/DL (ref 65–99)
OSMOLALITY SERPL CALC.SUM OF ELEC: 285 MOSM/KG (ref 273–304)
POTASSIUM SERPL-SCNC: 3.9 MMOL/L (ref 3.5–5.3)
SODIUM SERPL-SCNC: 137 MMOL/L (ref 135–147)

## 2019-12-11 LAB — BACTERIA UR CULT: NORMAL

## 2020-03-11 ENCOUNTER — HOSPITAL ENCOUNTER (OUTPATIENT)
Dept: LAB | Facility: HOSPITAL | Age: 85
Discharge: HOME | End: 2020-03-11
Attending: NURSE PRACTITIONER

## 2020-03-11 LAB
ANION GAP SERPL CALC-SCNC: 16 MMOL/L (ref 8–19)
BASOPHILS # BLD AUTO: 0.07 10*3/UL (ref 0–0.2)
BASOPHILS NFR BLD AUTO: 1 % (ref 0–3)
BUN SERPL-MCNC: 13 MG/DL (ref 5–25)
BUN/CREAT SERPL: 17 {RATIO} (ref 6–20)
CALCIUM SERPL-MCNC: 9.1 MG/DL (ref 8.7–10.4)
CHLORIDE SERPL-SCNC: 103 MMOL/L (ref 99–111)
CONV ABS IMM GRAN: 0.02 10*3/UL (ref 0–0.2)
CONV CO2: 24 MMOL/L (ref 22–32)
CONV IMMATURE GRAN: 0.3 % (ref 0–1.8)
CREAT UR-MCNC: 0.77 MG/DL (ref 0.5–0.9)
DEPRECATED RDW RBC AUTO: 46.6 FL (ref 36.4–46.3)
EOSINOPHIL # BLD AUTO: 0.1 10*3/UL (ref 0–0.7)
EOSINOPHIL # BLD AUTO: 1.4 % (ref 0–7)
ERYTHROCYTE [DISTWIDTH] IN BLOOD BY AUTOMATED COUNT: 14.4 % (ref 11.7–14.4)
GFR SERPLBLD BASED ON 1.73 SQ M-ARVRAT: >60 ML/MIN/{1.73_M2}
GLUCOSE SERPL-MCNC: 94 MG/DL (ref 65–99)
HCT VFR BLD AUTO: 31.5 % (ref 37–47)
HGB BLD-MCNC: 9.4 G/DL (ref 12–16)
LYMPHOCYTES # BLD AUTO: 1.97 10*3/UL (ref 1–5)
LYMPHOCYTES NFR BLD AUTO: 27.6 % (ref 20–45)
MCH RBC QN AUTO: 26.9 PG (ref 27–31)
MCHC RBC AUTO-ENTMCNC: 29.8 G/DL (ref 33–37)
MCV RBC AUTO: 90.3 FL (ref 81–99)
MONOCYTES # BLD AUTO: 0.52 10*3/UL (ref 0.2–1.2)
MONOCYTES NFR BLD AUTO: 7.3 % (ref 3–10)
NEUTROPHILS # BLD AUTO: 4.47 10*3/UL (ref 2–8)
NEUTROPHILS NFR BLD AUTO: 62.4 % (ref 30–85)
NRBC CBCN: 0 % (ref 0–0.7)
OSMOLALITY SERPL CALC.SUM OF ELEC: 288 MOSM/KG (ref 273–304)
PLATELET # BLD AUTO: 317 10*3/UL (ref 130–400)
PMV BLD AUTO: 12.3 FL (ref 9.4–12.3)
POTASSIUM SERPL-SCNC: 3.6 MMOL/L (ref 3.5–5.3)
RBC # BLD AUTO: 3.49 10*6/UL (ref 4.2–5.4)
SODIUM SERPL-SCNC: 139 MMOL/L (ref 135–147)
WBC # BLD AUTO: 7.15 10*3/UL (ref 4.8–10.8)

## 2020-03-20 ENCOUNTER — CONVERSION ENCOUNTER (OUTPATIENT)
Dept: CARDIOLOGY | Facility: CLINIC | Age: 85
End: 2020-03-20

## 2020-03-20 ENCOUNTER — OFFICE VISIT CONVERTED (OUTPATIENT)
Dept: CARDIOLOGY | Facility: CLINIC | Age: 85
End: 2020-03-20
Attending: NURSE PRACTITIONER

## 2020-04-07 ENCOUNTER — HOSPITAL ENCOUNTER (OUTPATIENT)
Dept: LAB | Facility: HOSPITAL | Age: 85
Discharge: HOME | End: 2020-04-07
Attending: INTERNAL MEDICINE

## 2020-04-07 LAB
ALBUMIN SERPL-MCNC: 4 G/DL (ref 3.5–5)
ALBUMIN/GLOB SERPL: 1.1 {RATIO} (ref 1.4–2.6)
ALP SERPL-CCNC: 73 U/L (ref 43–160)
ALT SERPL-CCNC: 7 U/L (ref 10–40)
AMPHETAMINES UR QL SCN: NEGATIVE
ANION GAP SERPL CALC-SCNC: 18 MMOL/L (ref 8–19)
AST SERPL-CCNC: 18 U/L (ref 15–50)
BARBITURATES UR QL SCN: NEGATIVE
BASOPHILS # BLD AUTO: 0.06 10*3/UL (ref 0–0.2)
BASOPHILS NFR BLD AUTO: 0.8 % (ref 0–3)
BENZODIAZ UR QL SCN: NEGATIVE
BILIRUB SERPL-MCNC: 0.66 MG/DL (ref 0.2–1.3)
BUN SERPL-MCNC: 18 MG/DL (ref 5–25)
BUN/CREAT SERPL: 19 {RATIO} (ref 6–20)
CALCIUM SERPL-MCNC: 9.5 MG/DL (ref 8.7–10.4)
CHLORIDE SERPL-SCNC: 101 MMOL/L (ref 99–111)
CONV ABS IMM GRAN: 0.03 10*3/UL (ref 0–0.2)
CONV CO2: 23 MMOL/L (ref 22–32)
CONV COCAINE, UR: NEGATIVE
CONV IMMATURE GRAN: 0.4 % (ref 0–1.8)
CONV TOTAL PROTEIN: 7.8 G/DL (ref 6.3–8.2)
CREAT UR-MCNC: 0.94 MG/DL (ref 0.5–0.9)
DEPRECATED RDW RBC AUTO: 56 FL (ref 36.4–46.3)
EOSINOPHIL # BLD AUTO: 0.36 10*3/UL (ref 0–0.7)
EOSINOPHIL # BLD AUTO: 4.6 % (ref 0–7)
ERYTHROCYTE [DISTWIDTH] IN BLOOD BY AUTOMATED COUNT: 16.9 % (ref 11.7–14.4)
GFR SERPLBLD BASED ON 1.73 SQ M-ARVRAT: 54 ML/MIN/{1.73_M2}
GLOBULIN UR ELPH-MCNC: 3.8 G/DL (ref 2–3.5)
GLUCOSE SERPL-MCNC: 104 MG/DL (ref 65–99)
HCT VFR BLD AUTO: 35.2 % (ref 37–47)
HGB BLD-MCNC: 10.7 G/DL (ref 12–16)
LYMPHOCYTES # BLD AUTO: 1.97 10*3/UL (ref 1–5)
LYMPHOCYTES NFR BLD AUTO: 25.4 % (ref 20–45)
MCH RBC QN AUTO: 27.5 PG (ref 27–31)
MCHC RBC AUTO-ENTMCNC: 30.4 G/DL (ref 33–37)
MCV RBC AUTO: 90.5 FL (ref 81–99)
METHADONE UR QL SCN: NEGATIVE
MONOCYTES # BLD AUTO: 0.55 10*3/UL (ref 0.2–1.2)
MONOCYTES NFR BLD AUTO: 7.1 % (ref 3–10)
NEUTROPHILS # BLD AUTO: 4.79 10*3/UL (ref 2–8)
NEUTROPHILS NFR BLD AUTO: 61.7 % (ref 30–85)
NRBC CBCN: 0 % (ref 0–0.7)
OPIATES TESTED UR SCN: NEGATIVE
OSMOLALITY SERPL CALC.SUM OF ELEC: 288 MOSM/KG (ref 273–304)
OXYCODONE UR QL SCN: NEGATIVE
PCP UR QL: NEGATIVE
PLATELET # BLD AUTO: 281 10*3/UL (ref 130–400)
PMV BLD AUTO: 12.3 FL (ref 9.4–12.3)
POTASSIUM SERPL-SCNC: 3.5 MMOL/L (ref 3.5–5.3)
RBC # BLD AUTO: 3.89 10*6/UL (ref 4.2–5.4)
SODIUM SERPL-SCNC: 138 MMOL/L (ref 135–147)
THC SERPLBLD CFM-MCNC: NEGATIVE NG/ML
WBC # BLD AUTO: 7.76 10*3/UL (ref 4.8–10.8)

## 2020-05-16 ENCOUNTER — HOSPITAL ENCOUNTER (OUTPATIENT)
Dept: URGENT CARE | Facility: CLINIC | Age: 85
Discharge: HOME | End: 2020-05-16

## 2020-05-19 ENCOUNTER — HOSPITAL ENCOUNTER (OUTPATIENT)
Dept: LAB | Facility: HOSPITAL | Age: 85
Discharge: HOME | End: 2020-05-19
Attending: PHYSICIAN ASSISTANT

## 2020-05-19 LAB
ANION GAP SERPL CALC-SCNC: 22 MMOL/L (ref 8–19)
BUN SERPL-MCNC: 15 MG/DL (ref 5–25)
BUN/CREAT SERPL: 17 {RATIO} (ref 6–20)
CALCIUM SERPL-MCNC: 9.9 MG/DL (ref 8.7–10.4)
CHLORIDE SERPL-SCNC: 98 MMOL/L (ref 99–111)
CONV CO2: 22 MMOL/L (ref 22–32)
CREAT UR-MCNC: 0.87 MG/DL (ref 0.5–0.9)
GFR SERPLBLD BASED ON 1.73 SQ M-ARVRAT: 59 ML/MIN/{1.73_M2}
GLUCOSE SERPL-MCNC: 107 MG/DL (ref 65–99)
OSMOLALITY SERPL CALC.SUM OF ELEC: 287 MOSM/KG (ref 273–304)
POTASSIUM SERPL-SCNC: 3.5 MMOL/L (ref 3.5–5.3)
SODIUM SERPL-SCNC: 138 MMOL/L (ref 135–147)

## 2020-05-28 ENCOUNTER — TELEPHONE CONVERTED (OUTPATIENT)
Dept: SURGERY | Facility: CLINIC | Age: 85
End: 2020-05-28
Attending: NURSE PRACTITIONER

## 2020-06-01 ENCOUNTER — OFFICE VISIT CONVERTED (OUTPATIENT)
Dept: ONCOLOGY | Facility: HOSPITAL | Age: 85
End: 2020-06-01
Attending: INTERNAL MEDICINE

## 2020-06-01 ENCOUNTER — HOSPITAL ENCOUNTER (OUTPATIENT)
Dept: ONCOLOGY | Facility: HOSPITAL | Age: 85
Discharge: HOME | End: 2020-06-01
Attending: INTERNAL MEDICINE

## 2020-06-01 LAB
ALBUMIN SERPL-MCNC: 4.2 G/DL (ref 3.5–5)
ALBUMIN/GLOB SERPL: 1.1 {RATIO} (ref 1.4–2.6)
ALP SERPL-CCNC: 71 U/L (ref 43–160)
ALT SERPL-CCNC: 7 U/L (ref 10–40)
ANION GAP SERPL CALC-SCNC: 20 MMOL/L (ref 8–19)
APPEARANCE UR: ABNORMAL
AST SERPL-CCNC: 18 U/L (ref 15–50)
BASOPHILS # BLD AUTO: 0.03 10*3/UL (ref 0–0.2)
BASOPHILS # BLD: 1 % (ref 0–3)
BASOPHILS NFR BLD AUTO: 0.3 % (ref 0–3)
BILIRUB SERPL-MCNC: 0.62 MG/DL (ref 0.2–1.3)
BILIRUB UR QL: ABNORMAL
BUN SERPL-MCNC: 17 MG/DL (ref 5–25)
BUN/CREAT SERPL: 20 {RATIO} (ref 6–20)
CALCIUM SERPL-MCNC: 9.7 MG/DL (ref 8.7–10.4)
CHLORIDE SERPL-SCNC: 97 MMOL/L (ref 99–111)
CLUMPED PLATELETS: ABNORMAL
COLOR UR: ABNORMAL
CONV ABS BANDS: 0 % (ref 1–5)
CONV ABS IMM GRAN: 0.02 10*3/UL (ref 0–0.2)
CONV ANISOCYTES: ABNORMAL
CONV ATYPICAL LYMPHOCYTES: 1 % (ref 0–5)
CONV BACTERIA: NEGATIVE
CONV CO2: 20 MMOL/L (ref 22–32)
CONV COLLECTION SOURCE (UA): ABNORMAL
CONV IMMATURE GRAN: 0.2 % (ref 0–1.8)
CONV SEGMENTED NEUTROPHILS: 74 % (ref 45–70)
CONV TOTAL PROTEIN: 7.9 G/DL (ref 6.3–8.2)
CONV UROBILINOGEN IN URINE BY AUTOMATED TEST STRIP: 1 {EHRLICHU}/DL (ref 0.1–1)
CREAT UR-MCNC: 0.86 MG/DL (ref 0.5–0.9)
DEPRECATED RDW RBC AUTO: 58.9 FL (ref 36.4–46.3)
EOSINOPHIL # BLD AUTO: 0.13 10*3/UL (ref 0–0.7)
EOSINOPHIL # BLD AUTO: 1.4 % (ref 0–7)
EOSINOPHIL NFR BLD AUTO: 3 % (ref 0–7)
ERYTHROCYTE [DISTWIDTH] IN BLOOD BY AUTOMATED COUNT: 17.7 % (ref 11.7–14.4)
FERRITIN SERPL-MCNC: 54 NG/ML (ref 10–200)
FOLATE SERPL-MCNC: 12.1 NG/ML (ref 4.8–20)
GFR SERPLBLD BASED ON 1.73 SQ M-ARVRAT: 60 ML/MIN/{1.73_M2}
GLOBULIN UR ELPH-MCNC: 3.7 G/DL (ref 2–3.5)
GLUCOSE SERPL-MCNC: 100 MG/DL (ref 65–99)
GLUCOSE UR QL: NEGATIVE MG/DL
HCT VFR BLD AUTO: 36.1 % (ref 37–47)
HGB BLD-MCNC: 11.7 G/DL (ref 12–16)
HGB UR QL STRIP: NEGATIVE
IRON SATN MFR SERPL: 13 % (ref 20–55)
IRON SERPL-MCNC: 44 UG/DL (ref 60–170)
KETONES UR QL STRIP: 15 MG/DL
LARGE PLATELETS: SLIGHT
LEUKOCYTE ESTERASE UR QL STRIP: ABNORMAL
LYMPHOCYTES # BLD AUTO: 1.61 10*3/UL (ref 1–5)
LYMPHOCYTES NFR BLD AUTO: 17.1 % (ref 20–45)
MCH RBC QN AUTO: 29.1 PG (ref 27–31)
MCHC RBC AUTO-ENTMCNC: 32.4 G/DL (ref 33–37)
MCV RBC AUTO: 89.8 FL (ref 81–99)
MICROCYTES BLD QL: SLIGHT
MONOCYTES # BLD AUTO: 0.7 10*3/UL (ref 0.2–1.2)
MONOCYTES NFR BLD AUTO: 7.5 % (ref 3–10)
MONOCYTES NFR BLD MANUAL: 5 % (ref 3–10)
NEUTROPHILS # BLD AUTO: 6.9 10*3/UL (ref 2–8)
NEUTROPHILS NFR BLD AUTO: 73.5 % (ref 30–85)
NITRITE UR QL STRIP: NEGATIVE
NRBC CBCN: 0 % (ref 0–0.7)
NUC CELL # PRT MANUAL: 0 /100{WBCS}
OSMOLALITY SERPL CALC.SUM OF ELEC: 280 MOSM/KG (ref 273–304)
OVALOCYTES BLD QL SMEAR: SLIGHT
PH UR STRIP.AUTO: 5.5 [PH] (ref 5–8)
PLAT MORPH BLD: NORMAL
PLATELET # BLD AUTO: 313 10*3/UL (ref 130–400)
PMV BLD AUTO: 12.2 FL (ref 9.4–12.3)
POIKILOCYTOSIS BLD QL SMEAR: ABNORMAL
POLYCHROMASIA BLD QL SMEAR: SLIGHT
POTASSIUM SERPL-SCNC: 3.4 MMOL/L (ref 3.5–5.3)
PROT UR QL: 30 MG/DL
RBC # BLD AUTO: 4.02 10*6/UL (ref 4.2–5.4)
RBC #/AREA URNS HPF: ABNORMAL /[HPF]
RETICS # AUTO: 0.05 10*6/UL (ref 0.02–0.08)
RETICS/RBC NFR AUTO: 1.31 % (ref 0.5–1.7)
SMALL PLATELETS BLD QL SMEAR: ADEQUATE
SMUDGE CELLS IN BLOOD BY LIGHT MICROSCOPY: ABNORMAL
SODIUM SERPL-SCNC: 134 MMOL/L (ref 135–147)
SP GR UR: 1.02 (ref 1–1.03)
SQUAMOUS SPT QL MICRO: ABNORMAL /[HPF]
TIBC SERPL-MCNC: 346 UG/DL (ref 245–450)
TRANSFERRIN SERPL-MCNC: 242 MG/DL (ref 250–380)
VARIANT LYMPHS NFR BLD MANUAL: 16 % (ref 20–45)
VIT B12 SERPL-MCNC: 395 PG/ML (ref 211–911)
WBC # BLD AUTO: 9.39 10*3/UL (ref 4.8–10.8)
WBC #/AREA URNS HPF: ABNORMAL /[HPF]

## 2020-06-02 ENCOUNTER — HOSPITAL ENCOUNTER (OUTPATIENT)
Dept: ONCOLOGY | Facility: HOSPITAL | Age: 85
Discharge: HOME | End: 2020-06-02
Attending: INTERNAL MEDICINE

## 2020-06-06 LAB — METHYLMALONATE SERPL-SCNC: 200 NMOL/L (ref 0–378)

## 2020-06-10 ENCOUNTER — TELEMEDICINE CONVERTED (OUTPATIENT)
Dept: GASTROENTEROLOGY | Facility: CLINIC | Age: 85
End: 2020-06-10
Attending: NURSE PRACTITIONER

## 2020-06-18 ENCOUNTER — OFFICE VISIT CONVERTED (OUTPATIENT)
Dept: ONCOLOGY | Facility: HOSPITAL | Age: 85
End: 2020-06-18
Attending: INTERNAL MEDICINE

## 2020-06-18 ENCOUNTER — HOSPITAL ENCOUNTER (OUTPATIENT)
Dept: ONCOLOGY | Facility: HOSPITAL | Age: 85
Discharge: HOME | End: 2020-06-18
Attending: INTERNAL MEDICINE

## 2020-06-19 ENCOUNTER — HOSPITAL ENCOUNTER (OUTPATIENT)
Dept: PREADMISSION TESTING | Facility: HOSPITAL | Age: 85
Discharge: HOME | End: 2020-06-19
Attending: INTERNAL MEDICINE

## 2020-06-21 LAB — SARS-COV-2 RNA SPEC QL NAA+PROBE: NOT DETECTED

## 2020-06-23 ENCOUNTER — HOSPITAL ENCOUNTER (OUTPATIENT)
Dept: GASTROENTEROLOGY | Facility: HOSPITAL | Age: 85
Setting detail: HOSPITAL OUTPATIENT SURGERY
Discharge: HOME | End: 2020-06-23
Attending: INTERNAL MEDICINE

## 2020-06-29 ENCOUNTER — CONVERSION ENCOUNTER (OUTPATIENT)
Dept: SURGERY | Facility: CLINIC | Age: 85
End: 2020-06-29

## 2020-06-29 ENCOUNTER — OFFICE VISIT CONVERTED (OUTPATIENT)
Dept: SURGERY | Facility: CLINIC | Age: 85
End: 2020-06-29
Attending: SURGERY

## 2020-06-30 ENCOUNTER — HOSPITAL ENCOUNTER (OUTPATIENT)
Dept: CT IMAGING | Facility: HOSPITAL | Age: 85
Discharge: HOME | End: 2020-06-30
Attending: INTERNAL MEDICINE

## 2020-06-30 LAB — CEA SERPL-MCNC: 1.4 NG/ML (ref 0–5)

## 2020-07-01 ENCOUNTER — OFFICE VISIT CONVERTED (OUTPATIENT)
Dept: ONCOLOGY | Facility: HOSPITAL | Age: 85
End: 2020-07-01
Attending: INTERNAL MEDICINE

## 2020-07-01 ENCOUNTER — HOSPITAL ENCOUNTER (OUTPATIENT)
Dept: ONCOLOGY | Facility: HOSPITAL | Age: 85
Discharge: HOME | End: 2020-07-01
Attending: INTERNAL MEDICINE

## 2020-07-06 ENCOUNTER — HOSPITAL ENCOUNTER (OUTPATIENT)
Dept: PREADMISSION TESTING | Facility: HOSPITAL | Age: 85
Discharge: HOME | End: 2020-07-06
Attending: SURGERY

## 2020-07-06 LAB
ALBUMIN SERPL-MCNC: 4 G/DL (ref 3.5–5)
ALBUMIN/GLOB SERPL: 1.3 {RATIO} (ref 1.4–2.6)
ALP SERPL-CCNC: 61 U/L (ref 43–160)
ALT SERPL-CCNC: 11 U/L (ref 10–40)
ANION GAP SERPL CALC-SCNC: 19 MMOL/L (ref 8–19)
AST SERPL-CCNC: 19 U/L (ref 15–50)
BASOPHILS # BLD AUTO: 0.05 10*3/UL (ref 0–0.2)
BASOPHILS NFR BLD AUTO: 0.7 % (ref 0–3)
BILIRUB SERPL-MCNC: 0.48 MG/DL (ref 0.2–1.3)
BUN SERPL-MCNC: 16 MG/DL (ref 5–25)
BUN/CREAT SERPL: 20 {RATIO} (ref 6–20)
CALCIUM SERPL-MCNC: 9.3 MG/DL (ref 8.7–10.4)
CHLORIDE SERPL-SCNC: 102 MMOL/L (ref 99–111)
CONV ABS IMM GRAN: 0.02 10*3/UL (ref 0–0.2)
CONV CO2: 20 MMOL/L (ref 22–32)
CONV IMMATURE GRAN: 0.3 % (ref 0–1.8)
CONV TOTAL PROTEIN: 7.1 G/DL (ref 6.3–8.2)
CREAT UR-MCNC: 0.8 MG/DL (ref 0.5–0.9)
DEPRECATED RDW RBC AUTO: 53.8 FL (ref 36.4–46.3)
EOSINOPHIL # BLD AUTO: 0.14 10*3/UL (ref 0–0.7)
EOSINOPHIL # BLD AUTO: 1.9 % (ref 0–7)
ERYTHROCYTE [DISTWIDTH] IN BLOOD BY AUTOMATED COUNT: 15.7 % (ref 11.7–14.4)
GFR SERPLBLD BASED ON 1.73 SQ M-ARVRAT: >60 ML/MIN/{1.73_M2}
GLOBULIN UR ELPH-MCNC: 3.1 G/DL (ref 2–3.5)
GLUCOSE SERPL-MCNC: 91 MG/DL (ref 65–99)
HCT VFR BLD AUTO: 31 % (ref 37–47)
HGB BLD-MCNC: 10 G/DL (ref 12–16)
LYMPHOCYTES # BLD AUTO: 2.46 10*3/UL (ref 1–5)
LYMPHOCYTES NFR BLD AUTO: 33.4 % (ref 20–45)
MCH RBC QN AUTO: 29.9 PG (ref 27–31)
MCHC RBC AUTO-ENTMCNC: 32.3 G/DL (ref 33–37)
MCV RBC AUTO: 92.8 FL (ref 81–99)
MONOCYTES # BLD AUTO: 0.6 10*3/UL (ref 0.2–1.2)
MONOCYTES NFR BLD AUTO: 8.1 % (ref 3–10)
NEUTROPHILS # BLD AUTO: 4.1 10*3/UL (ref 2–8)
NEUTROPHILS NFR BLD AUTO: 55.6 % (ref 30–85)
NRBC CBCN: 0 % (ref 0–0.7)
OSMOLALITY SERPL CALC.SUM OF ELEC: 285 MOSM/KG (ref 273–304)
PLATELET # BLD AUTO: 279 10*3/UL (ref 130–400)
PMV BLD AUTO: 11.8 FL (ref 9.4–12.3)
POTASSIUM SERPL-SCNC: 4.2 MMOL/L (ref 3.5–5.3)
RBC # BLD AUTO: 3.34 10*6/UL (ref 4.2–5.4)
SODIUM SERPL-SCNC: 137 MMOL/L (ref 135–147)
WBC # BLD AUTO: 7.37 10*3/UL (ref 4.8–10.8)

## 2020-07-07 LAB — SARS-COV-2 RNA SPEC QL NAA+PROBE: NOT DETECTED

## 2020-08-11 ENCOUNTER — HOSPITAL ENCOUNTER (OUTPATIENT)
Dept: OTHER | Facility: HOSPITAL | Age: 85
Discharge: HOME | End: 2020-08-11
Attending: INTERNAL MEDICINE

## 2020-08-11 ENCOUNTER — OFFICE VISIT CONVERTED (OUTPATIENT)
Dept: ONCOLOGY | Facility: HOSPITAL | Age: 85
End: 2020-08-11
Attending: INTERNAL MEDICINE

## 2020-08-18 ENCOUNTER — OFFICE VISIT CONVERTED (OUTPATIENT)
Dept: SURGERY | Facility: CLINIC | Age: 85
End: 2020-08-18
Attending: SURGERY

## 2020-08-18 ENCOUNTER — CONVERSION ENCOUNTER (OUTPATIENT)
Dept: SURGERY | Facility: CLINIC | Age: 85
End: 2020-08-18

## 2020-08-19 ENCOUNTER — HOSPITAL ENCOUNTER (OUTPATIENT)
Dept: LAB | Facility: HOSPITAL | Age: 85
Discharge: HOME | End: 2020-08-19
Attending: INTERNAL MEDICINE

## 2020-08-19 LAB
BASOPHILS # BLD AUTO: 0.04 10*3/UL (ref 0–0.2)
BASOPHILS NFR BLD AUTO: 0.6 % (ref 0–3)
CONV ABS IMM GRAN: 0.01 10*3/UL (ref 0–0.2)
CONV ANISOCYTES: SLIGHT
CONV IMMATURE GRAN: 0.1 % (ref 0–1.8)
DEPRECATED RDW RBC AUTO: 65.6 FL (ref 36.4–46.3)
EOSINOPHIL # BLD AUTO: 0.09 10*3/UL (ref 0–0.7)
EOSINOPHIL # BLD AUTO: 1.3 % (ref 0–7)
ERYTHROCYTE [DISTWIDTH] IN BLOOD BY AUTOMATED COUNT: 17.7 % (ref 11.7–14.4)
HCT VFR BLD AUTO: 40.2 % (ref 37–47)
HGB BLD-MCNC: 12.7 G/DL (ref 12–16)
LYMPHOCYTES # BLD AUTO: 2.01 10*3/UL (ref 1–5)
LYMPHOCYTES NFR BLD AUTO: 30.1 % (ref 20–45)
MACROCYTES BLD QL SMEAR: SLIGHT
MCH RBC QN AUTO: 31.8 PG (ref 27–31)
MCHC RBC AUTO-ENTMCNC: 31.6 G/DL (ref 33–37)
MCV RBC AUTO: 100.5 FL (ref 81–99)
MONOCYTES # BLD AUTO: 0.37 10*3/UL (ref 0.2–1.2)
MONOCYTES NFR BLD AUTO: 5.5 % (ref 3–10)
NEUTROPHILS # BLD AUTO: 4.15 10*3/UL (ref 2–8)
NEUTROPHILS NFR BLD AUTO: 62.4 % (ref 30–85)
NRBC CBCN: 0 % (ref 0–0.7)
OVALOCYTES BLD QL SMEAR: SLIGHT
PLATELET # BLD AUTO: 250 10*3/UL (ref 130–400)
PMV BLD AUTO: 13.6 FL (ref 9.4–12.3)
RBC # BLD AUTO: 4 10*6/UL (ref 4.2–5.4)
WBC # BLD AUTO: 6.67 10*3/UL (ref 4.8–10.8)

## 2020-08-20 LAB
ALBUMIN SERPL-MCNC: 4 G/DL (ref 3.5–5)
ALBUMIN/GLOB SERPL: 1.2 {RATIO} (ref 1.4–2.6)
ALP SERPL-CCNC: 64 U/L (ref 43–160)
ALT SERPL-CCNC: 8 U/L (ref 10–40)
ANION GAP SERPL CALC-SCNC: 17 MMOL/L (ref 8–19)
AST SERPL-CCNC: 20 U/L (ref 15–50)
BILIRUB SERPL-MCNC: 0.59 MG/DL (ref 0.2–1.3)
BUN SERPL-MCNC: 10 MG/DL (ref 5–25)
BUN/CREAT SERPL: 13 {RATIO} (ref 6–20)
CALCIUM SERPL-MCNC: 9.8 MG/DL (ref 8.7–10.4)
CHLORIDE SERPL-SCNC: 101 MMOL/L (ref 99–111)
CONV CO2: 25 MMOL/L (ref 22–32)
CONV TOTAL PROTEIN: 7.3 G/DL (ref 6.3–8.2)
CREAT UR-MCNC: 0.75 MG/DL (ref 0.5–0.9)
GFR SERPLBLD BASED ON 1.73 SQ M-ARVRAT: >60 ML/MIN/{1.73_M2}
GLOBULIN UR ELPH-MCNC: 3.3 G/DL (ref 2–3.5)
GLUCOSE SERPL-MCNC: 102 MG/DL (ref 65–99)
IRON SERPL-MCNC: 78 UG/DL (ref 60–170)
MAGNESIUM SERPL-MCNC: 1.89 MG/DL (ref 1.6–2.3)
OSMOLALITY SERPL CALC.SUM OF ELEC: 287 MOSM/KG (ref 273–304)
POTASSIUM SERPL-SCNC: 4 MMOL/L (ref 3.5–5.3)
SODIUM SERPL-SCNC: 139 MMOL/L (ref 135–147)

## 2020-08-24 ENCOUNTER — HOSPITAL ENCOUNTER (OUTPATIENT)
Dept: LAB | Facility: HOSPITAL | Age: 85
Discharge: HOME | End: 2020-08-24
Attending: NURSE PRACTITIONER

## 2020-08-24 LAB
ALBUMIN SERPL-MCNC: 3.9 G/DL (ref 3.5–5)
ANION GAP SERPL CALC-SCNC: 19 MMOL/L (ref 8–19)
BUN SERPL-MCNC: 16 MG/DL (ref 5–25)
BUN/CREAT SERPL: 20 {RATIO} (ref 6–20)
CALCIUM SERPL-MCNC: 9.9 MG/DL (ref 8.7–10.4)
CHLORIDE SERPL-SCNC: 102 MMOL/L (ref 99–111)
CONV CO2: 23 MMOL/L (ref 22–32)
CREAT UR-MCNC: 0.82 MG/DL (ref 0.5–0.9)
GFR SERPLBLD BASED ON 1.73 SQ M-ARVRAT: >60 ML/MIN/{1.73_M2}
GLUCOSE SERPL-MCNC: 88 MG/DL (ref 65–99)
PHOSPHATE SERPL-MCNC: 3.3 MG/DL (ref 2.4–4.5)
POTASSIUM SERPL-SCNC: 4.1 MMOL/L (ref 3.5–5.3)
SODIUM SERPL-SCNC: 140 MMOL/L (ref 135–147)

## 2020-09-09 ENCOUNTER — HOSPITAL ENCOUNTER (OUTPATIENT)
Dept: LAB | Facility: HOSPITAL | Age: 85
Discharge: HOME | End: 2020-09-09
Attending: INTERNAL MEDICINE

## 2020-09-09 LAB
ALBUMIN SERPL-MCNC: 4 G/DL (ref 3.5–5)
ALBUMIN SERPL-MCNC: 4 G/DL (ref 3.5–5)
ALBUMIN/GLOB SERPL: 1.3 {RATIO} (ref 1.4–2.6)
ALP SERPL-CCNC: 73 U/L (ref 43–160)
ALT SERPL-CCNC: 8 U/L (ref 10–40)
ANION GAP SERPL CALC-SCNC: 16 MMOL/L (ref 8–19)
ANION GAP SERPL CALC-SCNC: 17 MMOL/L (ref 8–19)
AST SERPL-CCNC: 17 U/L (ref 15–50)
BASOPHILS # BLD AUTO: 0.04 10*3/UL (ref 0–0.2)
BASOPHILS NFR BLD AUTO: 0.6 % (ref 0–3)
BILIRUB SERPL-MCNC: 0.72 MG/DL (ref 0.2–1.3)
BNP SERPL-MCNC: 5163 PG/ML (ref 0–1800)
BUN SERPL-MCNC: 19 MG/DL (ref 5–25)
BUN SERPL-MCNC: 20 MG/DL (ref 5–25)
BUN/CREAT SERPL: 24 {RATIO} (ref 6–20)
BUN/CREAT SERPL: 28 {RATIO} (ref 6–20)
CALCIUM SERPL-MCNC: 9.1 MG/DL (ref 8.7–10.4)
CALCIUM SERPL-MCNC: 9.2 MG/DL (ref 8.7–10.4)
CHLORIDE SERPL-SCNC: 104 MMOL/L (ref 99–111)
CHLORIDE SERPL-SCNC: 104 MMOL/L (ref 99–111)
CHOLEST SERPL-MCNC: 153 MG/DL (ref 107–200)
CHOLEST/HDLC SERPL: 2.3 {RATIO} (ref 3–6)
CONV ABS IMM GRAN: 0.02 10*3/UL (ref 0–0.2)
CONV CO2: 24 MMOL/L (ref 22–32)
CONV CO2: 24 MMOL/L (ref 22–32)
CONV IMMATURE GRAN: 0.3 % (ref 0–1.8)
CONV TOTAL PROTEIN: 7.2 G/DL (ref 6.3–8.2)
CREAT UR-MCNC: 0.72 MG/DL (ref 0.5–0.9)
CREAT UR-MCNC: 0.8 MG/DL (ref 0.5–0.9)
DEPRECATED RDW RBC AUTO: 58.9 FL (ref 36.4–46.3)
EOSINOPHIL # BLD AUTO: 0.1 10*3/UL (ref 0–0.7)
EOSINOPHIL # BLD AUTO: 1.6 % (ref 0–7)
ERYTHROCYTE [DISTWIDTH] IN BLOOD BY AUTOMATED COUNT: 16.1 % (ref 11.7–14.4)
GFR SERPLBLD BASED ON 1.73 SQ M-ARVRAT: >60 ML/MIN/{1.73_M2}
GFR SERPLBLD BASED ON 1.73 SQ M-ARVRAT: >60 ML/MIN/{1.73_M2}
GLOBULIN UR ELPH-MCNC: 3.2 G/DL (ref 2–3.5)
GLUCOSE SERPL-MCNC: 102 MG/DL (ref 65–99)
GLUCOSE SERPL-MCNC: 102 MG/DL (ref 65–99)
HCT VFR BLD AUTO: 40.7 % (ref 37–47)
HDLC SERPL-MCNC: 66 MG/DL (ref 40–60)
HGB BLD-MCNC: 13 G/DL (ref 12–16)
IRON SERPL-MCNC: 84 UG/DL (ref 60–170)
LDLC SERPL CALC-MCNC: 41 MG/DL (ref 70–100)
LYMPHOCYTES # BLD AUTO: 2.24 10*3/UL (ref 1–5)
LYMPHOCYTES NFR BLD AUTO: 34.9 % (ref 20–45)
MCH RBC QN AUTO: 31.7 PG (ref 27–31)
MCHC RBC AUTO-ENTMCNC: 31.9 G/DL (ref 33–37)
MCV RBC AUTO: 99.3 FL (ref 81–99)
MONOCYTES # BLD AUTO: 0.48 10*3/UL (ref 0.2–1.2)
MONOCYTES NFR BLD AUTO: 7.5 % (ref 3–10)
NEUTROPHILS # BLD AUTO: 3.54 10*3/UL (ref 2–8)
NEUTROPHILS NFR BLD AUTO: 55.1 % (ref 30–85)
NRBC CBCN: 0 % (ref 0–0.7)
OSMOLALITY SERPL CALC.SUM OF ELEC: 292 MOSM/KG (ref 273–304)
PHOSPHATE SERPL-MCNC: 3.5 MG/DL (ref 2.4–4.5)
PLATELET # BLD AUTO: 225 10*3/UL (ref 130–400)
PMV BLD AUTO: 12.4 FL (ref 9.4–12.3)
POTASSIUM SERPL-SCNC: 3.7 MMOL/L (ref 3.5–5.3)
POTASSIUM SERPL-SCNC: 3.8 MMOL/L (ref 3.5–5.3)
RBC # BLD AUTO: 4.1 10*6/UL (ref 4.2–5.4)
SODIUM SERPL-SCNC: 140 MMOL/L (ref 135–147)
SODIUM SERPL-SCNC: 141 MMOL/L (ref 135–147)
TRIGL SERPL-MCNC: 232 MG/DL (ref 40–150)
VLDLC SERPL-MCNC: 46 MG/DL (ref 5–37)
WBC # BLD AUTO: 6.42 10*3/UL (ref 4.8–10.8)

## 2020-10-12 ENCOUNTER — HOSPITAL ENCOUNTER (OUTPATIENT)
Dept: LAB | Facility: HOSPITAL | Age: 85
Discharge: HOME | End: 2020-10-12
Attending: INTERNAL MEDICINE

## 2020-10-12 ENCOUNTER — HOSPITAL ENCOUNTER (OUTPATIENT)
Dept: GENERAL RADIOLOGY | Facility: HOSPITAL | Age: 85
Discharge: HOME | End: 2020-10-12
Attending: PHYSICIAN ASSISTANT

## 2020-10-12 LAB
ANION GAP SERPL CALC-SCNC: 16 MMOL/L (ref 8–19)
BNP SERPL-MCNC: 4183 PG/ML (ref 0–1800)
BUN SERPL-MCNC: 17 MG/DL (ref 5–25)
BUN/CREAT SERPL: 20 {RATIO} (ref 6–20)
CALCIUM SERPL-MCNC: 9.5 MG/DL (ref 8.7–10.4)
CHLORIDE SERPL-SCNC: 103 MMOL/L (ref 99–111)
CONV CO2: 24 MMOL/L (ref 22–32)
CREAT UR-MCNC: 0.87 MG/DL (ref 0.5–0.9)
GFR SERPLBLD BASED ON 1.73 SQ M-ARVRAT: 59 ML/MIN/{1.73_M2}
GLUCOSE SERPL-MCNC: 105 MG/DL (ref 65–99)
OSMOLALITY SERPL CALC.SUM OF ELEC: 290 MOSM/KG (ref 273–304)
POTASSIUM SERPL-SCNC: 4.2 MMOL/L (ref 3.5–5.3)
SODIUM SERPL-SCNC: 139 MMOL/L (ref 135–147)

## 2020-10-22 ENCOUNTER — OFFICE VISIT CONVERTED (OUTPATIENT)
Dept: CARDIOLOGY | Facility: CLINIC | Age: 85
End: 2020-10-22
Attending: INTERNAL MEDICINE

## 2020-10-22 ENCOUNTER — CONVERSION ENCOUNTER (OUTPATIENT)
Dept: CARDIOLOGY | Facility: CLINIC | Age: 85
End: 2020-10-22

## 2020-10-30 ENCOUNTER — HOSPITAL ENCOUNTER (OUTPATIENT)
Dept: PREADMISSION TESTING | Facility: HOSPITAL | Age: 85
Discharge: HOME | End: 2020-10-30
Attending: PHYSICIAN ASSISTANT

## 2020-11-01 LAB — SARS-COV-2 RNA SPEC QL NAA+PROBE: NOT DETECTED

## 2020-11-03 ENCOUNTER — HOSPITAL ENCOUNTER (OUTPATIENT)
Dept: GENERAL RADIOLOGY | Facility: HOSPITAL | Age: 85
Discharge: HOME | End: 2020-11-03
Attending: PHYSICIAN ASSISTANT

## 2020-11-11 ENCOUNTER — HOSPITAL ENCOUNTER (OUTPATIENT)
Dept: ONCOLOGY | Facility: HOSPITAL | Age: 85
Discharge: HOME | End: 2020-11-11
Attending: INTERNAL MEDICINE

## 2020-11-11 LAB
BASOPHILS # BLD AUTO: 0.06 10*3/UL (ref 0–0.2)
BASOPHILS NFR BLD AUTO: 0.9 % (ref 0–3)
CONV ABS IMM GRAN: 0.02 10*3/UL (ref 0–0.2)
CONV IMMATURE GRAN: 0.3 % (ref 0–1.8)
DEPRECATED RDW RBC AUTO: 47 FL (ref 36.4–46.3)
EOSINOPHIL # BLD AUTO: 0.13 10*3/UL (ref 0–0.7)
EOSINOPHIL # BLD AUTO: 1.9 % (ref 0–7)
ERYTHROCYTE [DISTWIDTH] IN BLOOD BY AUTOMATED COUNT: 13.2 % (ref 11.7–14.4)
FERRITIN SERPL-MCNC: 406 NG/ML (ref 10–200)
HCT VFR BLD AUTO: 41.8 % (ref 37–47)
HGB BLD-MCNC: 13.8 G/DL (ref 12–16)
IRON SATN MFR SERPL: 37 % (ref 20–55)
IRON SERPL-MCNC: 95 UG/DL (ref 60–170)
LYMPHOCYTES # BLD AUTO: 2.21 10*3/UL (ref 1–5)
LYMPHOCYTES NFR BLD AUTO: 32.5 % (ref 20–45)
MCH RBC QN AUTO: 32.1 PG (ref 27–31)
MCHC RBC AUTO-ENTMCNC: 33 G/DL (ref 33–37)
MCV RBC AUTO: 97.2 FL (ref 81–99)
MONOCYTES # BLD AUTO: 0.44 10*3/UL (ref 0.2–1.2)
MONOCYTES NFR BLD AUTO: 6.5 % (ref 3–10)
NEUTROPHILS # BLD AUTO: 3.94 10*3/UL (ref 2–8)
NEUTROPHILS NFR BLD AUTO: 57.9 % (ref 30–85)
NRBC CBCN: 0 % (ref 0–0.7)
PLATELET # BLD AUTO: 202 10*3/UL (ref 130–400)
PMV BLD AUTO: 11.8 FL (ref 9.4–12.3)
RBC # BLD AUTO: 4.3 10*6/UL (ref 4.2–5.4)
TIBC SERPL-MCNC: 259 UG/DL (ref 245–450)
TRANSFERRIN SERPL-MCNC: 181 MG/DL (ref 250–380)
WBC # BLD AUTO: 6.8 10*3/UL (ref 4.8–10.8)

## 2020-11-12 ENCOUNTER — OFFICE VISIT CONVERTED (OUTPATIENT)
Dept: ONCOLOGY | Facility: HOSPITAL | Age: 85
End: 2020-11-12
Attending: INTERNAL MEDICINE

## 2020-11-12 ENCOUNTER — HOSPITAL ENCOUNTER (OUTPATIENT)
Dept: ONCOLOGY | Facility: HOSPITAL | Age: 85
Discharge: HOME | End: 2020-11-12
Attending: INTERNAL MEDICINE

## 2020-12-03 ENCOUNTER — OFFICE VISIT CONVERTED (OUTPATIENT)
Dept: GASTROENTEROLOGY | Facility: CLINIC | Age: 85
End: 2020-12-03
Attending: NURSE PRACTITIONER

## 2020-12-05 ENCOUNTER — HOSPITAL ENCOUNTER (OUTPATIENT)
Dept: OTHER | Facility: HOSPITAL | Age: 85
Discharge: HOME | End: 2020-12-05
Attending: INTERNAL MEDICINE

## 2020-12-05 LAB
ALBUMIN SERPL-MCNC: 4 G/DL (ref 3.5–5)
ALBUMIN/GLOB SERPL: 1.1 {RATIO} (ref 1.4–2.6)
ALP SERPL-CCNC: 81 U/L (ref 43–160)
ALT SERPL-CCNC: 11 U/L (ref 10–40)
ANION GAP SERPL CALC-SCNC: 12 MMOL/L (ref 8–19)
AST SERPL-CCNC: 20 U/L (ref 15–50)
BILIRUB SERPL-MCNC: 0.68 MG/DL (ref 0.2–1.3)
BNP SERPL-MCNC: 1385 PG/ML (ref 0–1800)
BUN SERPL-MCNC: 23 MG/DL (ref 5–25)
BUN/CREAT SERPL: 28 {RATIO} (ref 6–20)
CALCIUM SERPL-MCNC: 9.4 MG/DL (ref 8.7–10.4)
CHLORIDE SERPL-SCNC: 104 MMOL/L (ref 99–111)
CHOLEST SERPL-MCNC: 162 MG/DL (ref 107–200)
CHOLEST/HDLC SERPL: 2.3 {RATIO} (ref 3–6)
CONV CO2: 28 MMOL/L (ref 22–32)
CONV TOTAL PROTEIN: 7.6 G/DL (ref 6.3–8.2)
CREAT UR-MCNC: 0.82 MG/DL (ref 0.5–0.9)
GFR SERPLBLD BASED ON 1.73 SQ M-ARVRAT: >60 ML/MIN/{1.73_M2}
GLOBULIN UR ELPH-MCNC: 3.6 G/DL (ref 2–3.5)
GLUCOSE SERPL-MCNC: 92 MG/DL (ref 65–99)
HDLC SERPL-MCNC: 71 MG/DL (ref 40–60)
LDLC SERPL CALC-MCNC: 34 MG/DL (ref 70–100)
OSMOLALITY SERPL CALC.SUM OF ELEC: 293 MOSM/KG (ref 273–304)
POTASSIUM SERPL-SCNC: 3.9 MMOL/L (ref 3.5–5.3)
SODIUM SERPL-SCNC: 140 MMOL/L (ref 135–147)
TRIGL SERPL-MCNC: 285 MG/DL (ref 40–150)
VLDLC SERPL-MCNC: 57 MG/DL (ref 5–37)

## 2020-12-09 ENCOUNTER — CONVERSION ENCOUNTER (OUTPATIENT)
Dept: CARDIOLOGY | Facility: CLINIC | Age: 85
End: 2020-12-09

## 2020-12-09 ENCOUNTER — OFFICE VISIT CONVERTED (OUTPATIENT)
Dept: CARDIOLOGY | Facility: CLINIC | Age: 85
End: 2020-12-09
Attending: INTERNAL MEDICINE

## 2021-01-18 ENCOUNTER — HOSPITAL ENCOUNTER (OUTPATIENT)
Dept: OTHER | Facility: HOSPITAL | Age: 86
Discharge: HOME | End: 2021-01-18
Attending: INTERNAL MEDICINE

## 2021-01-18 LAB
ALBUMIN SERPL-MCNC: 3.9 G/DL (ref 3.5–5)
ALBUMIN/GLOB SERPL: 1.1 {RATIO} (ref 1.4–2.6)
ALP SERPL-CCNC: 77 U/L (ref 43–160)
ALT SERPL-CCNC: 10 U/L (ref 10–40)
ANION GAP SERPL CALC-SCNC: 13 MMOL/L (ref 8–19)
AST SERPL-CCNC: 19 U/L (ref 15–50)
BASOPHILS # BLD AUTO: 0.05 10*3/UL (ref 0–0.2)
BASOPHILS NFR BLD AUTO: 0.5 % (ref 0–3)
BILIRUB SERPL-MCNC: 0.72 MG/DL (ref 0.2–1.3)
BUN SERPL-MCNC: 24 MG/DL (ref 5–25)
BUN/CREAT SERPL: 25 {RATIO} (ref 6–20)
CALCIUM SERPL-MCNC: 9.4 MG/DL (ref 8.7–10.4)
CHLORIDE SERPL-SCNC: 103 MMOL/L (ref 99–111)
CONV ABS IMM GRAN: 0.03 10*3/UL (ref 0–0.2)
CONV CO2: 27 MMOL/L (ref 22–32)
CONV IMMATURE GRAN: 0.3 % (ref 0–1.8)
CONV TOTAL PROTEIN: 7.4 G/DL (ref 6.3–8.2)
CREAT UR-MCNC: 0.95 MG/DL (ref 0.5–0.9)
DEPRECATED RDW RBC AUTO: 46 FL (ref 36.4–46.3)
EOSINOPHIL # BLD AUTO: 0.11 10*3/UL (ref 0–0.7)
EOSINOPHIL # BLD AUTO: 1.1 % (ref 0–7)
ERYTHROCYTE [DISTWIDTH] IN BLOOD BY AUTOMATED COUNT: 13 % (ref 11.7–14.4)
GFR SERPLBLD BASED ON 1.73 SQ M-ARVRAT: 53 ML/MIN/{1.73_M2}
GLOBULIN UR ELPH-MCNC: 3.5 G/DL (ref 2–3.5)
GLUCOSE SERPL-MCNC: 98 MG/DL (ref 65–99)
HCT VFR BLD AUTO: 40.8 % (ref 37–47)
HGB BLD-MCNC: 13.6 G/DL (ref 12–16)
LYMPHOCYTES # BLD AUTO: 2.28 10*3/UL (ref 1–5)
LYMPHOCYTES NFR BLD AUTO: 23.5 % (ref 20–45)
MCH RBC QN AUTO: 31.9 PG (ref 27–31)
MCHC RBC AUTO-ENTMCNC: 33.3 G/DL (ref 33–37)
MCV RBC AUTO: 95.8 FL (ref 81–99)
MONOCYTES # BLD AUTO: 0.53 10*3/UL (ref 0.2–1.2)
MONOCYTES NFR BLD AUTO: 5.5 % (ref 3–10)
NEUTROPHILS # BLD AUTO: 6.7 10*3/UL (ref 2–8)
NEUTROPHILS NFR BLD AUTO: 69.1 % (ref 30–85)
NRBC CBCN: 0 % (ref 0–0.7)
OSMOLALITY SERPL CALC.SUM OF ELEC: 292 MOSM/KG (ref 273–304)
PLATELET # BLD AUTO: 234 10*3/UL (ref 130–400)
PMV BLD AUTO: 11.5 FL (ref 9.4–12.3)
POTASSIUM SERPL-SCNC: 4 MMOL/L (ref 3.5–5.3)
RBC # BLD AUTO: 4.26 10*6/UL (ref 4.2–5.4)
SODIUM SERPL-SCNC: 139 MMOL/L (ref 135–147)
WBC # BLD AUTO: 9.7 10*3/UL (ref 4.8–10.8)

## 2021-02-04 ENCOUNTER — HOSPITAL ENCOUNTER (OUTPATIENT)
Dept: CT IMAGING | Facility: HOSPITAL | Age: 86
Discharge: HOME | End: 2021-02-04
Attending: INTERNAL MEDICINE

## 2021-02-11 ENCOUNTER — OFFICE VISIT CONVERTED (OUTPATIENT)
Dept: ONCOLOGY | Facility: HOSPITAL | Age: 86
End: 2021-02-11
Attending: INTERNAL MEDICINE

## 2021-02-20 ENCOUNTER — HOSPITAL ENCOUNTER (OUTPATIENT)
Dept: PREADMISSION TESTING | Facility: HOSPITAL | Age: 86
Discharge: HOME | End: 2021-02-20
Attending: INTERNAL MEDICINE

## 2021-02-20 LAB — SARS-COV-2 RNA SPEC QL NAA+PROBE: NOT DETECTED

## 2021-02-25 ENCOUNTER — HOSPITAL ENCOUNTER (OUTPATIENT)
Dept: GASTROENTEROLOGY | Facility: HOSPITAL | Age: 86
Setting detail: HOSPITAL OUTPATIENT SURGERY
Discharge: HOME | End: 2021-02-25
Attending: INTERNAL MEDICINE

## 2021-05-06 ENCOUNTER — OFFICE VISIT CONVERTED (OUTPATIENT)
Dept: GASTROENTEROLOGY | Facility: CLINIC | Age: 86
End: 2021-05-06
Attending: NURSE PRACTITIONER

## 2021-05-10 NOTE — H&P
History and Physical      Patient Name: Bing Cruz   Patient ID: [id number]   Sex: Female   YOB: 1931    Primary Care Provider: Jairo Kramer MD   Referring Provider: Jairo Kramer MD    Visit Date: Elina 10, 2020    Provider: GABRIELLA Soto   Location: [facility]   Location Address: [address] [place] [address], [address] [place] [address]  [id number]   Location Phone: [phone]          Chief Complaint  · Anemia      History Of Present Illness  Video Conferencing Visit  Bing Cruz is a 88 year old female who is presenting for evaluation via video conferencing via FORMAT OF VIDEO VISIT. Verbal consent obtained before beginning visit.   The following staff were present during this visit: Annabelle Akins; Stephanie Ventura   The patient is a 88 year old female who presents on referral from Jairo Kramer MD for a gastroenterology evaluation.      Patient began to notice increased fatigue and lack of energy for the last several months.  PCP recently discovered iron deficiency anemia.  Patient has been taking oral iron once daily however states it does upset her stomach.    Having a bowel movement once daily, formed stool.  She does have occasional rectal straining, uses MiraLAX with relief.  Denies any abdominal pain, hematochezia, melena, or family history of colon cancer. Daughters also present via video and state that pt's appetite has significantly decreased in the last few weeks as well. 10lb weight loss this year.      PMH: Takes Eliquis daily for hx of blood clot and A-fib, managed by Dr. Jolly.      CBC 6/1/2020: WBC 9.39, hemoglobin 11.7, hematocrit 36.1, platelets 313.  CMP 6/1/2020: GFR 60, sodium 134, potassium 3.4, AST 18, ALT 7, alkaline phosphatase 71, total bilirubin 0.26.  Iron profile 6/1/2020: Iron 44, total iron-binding capacity 346, percent saturation 13, transferrin 242.  Occult blood 6/2/2020: Positive.         Past Medical  "History  Anemia; Atrial Fibrillation; Bladder Disorder; Blood clot in vein; Hyperlipemia; Hypertension; UTI         Past Surgical History  Bladder Repair; Cardioversion; Colonoscopy; Gallbladder; Hernia; Hysterectomy         Medication List  diltiazem HCl 180 mg oral capsule,extended release 24hr; Eliquis 5 mg oral tablet; hydrochlorothiazide 12.5 mg oral tablet; Klor-Con M20 20 mEq oral tablet,ER particles/crystals; lorazepam 1 mg oral tablet; losartan 50 mg oral tablet; metoprolol succinate 100 mg oral tablet extended release 24 hr; Prozac 20 mg oral capsule         Allergy List  * Other; Levaquin; lisinopril; Macrobid; melatonin       Allergies Reconciled  Family Medical History  Cancer, Unspecified; Family history of colon cancer         Social History  Claustophobic (Unknown); lives alone; Recreational Drug Use (Never); Retired.; Tobacco (Never);          Review of Systems  · Constitutional  o Denies  o : chills, fever  · Eyes  o Denies  o : blurred vision, changes in vision  · Cardiovascular  o Denies  o : chest pain, syncope  · Respiratory  o Denies  o : shortness of breath, dry cough  · Gastrointestinal  o Admits  o : See HPI  · Genitourinary  o Denies  o : dysuria, blood in urine  · Integument  o Denies  o : rash, new skin lesions  · Neurologic  o Denies  o : altered mental status, tingling or numbness  · Musculoskeletal  o Denies  o : joint pain, limitation of motion  · Endocrine  o Admits  o : weight loss  o Denies  o : weight gain  · Psychiatric  o Denies  o : anxiety, depression      Vitals  Date Time BP Position Site L\R Cuff Size HR RR TEMP (F) WT  HT  BMI kg/m2 BSA m2 O2 Sat        06/10/2020 10:41 AM         130lbs 0oz 5'  5\" 21.63 1.64           Physical Examination  · Constitutional  o Appearance  o : well developed, well-nourished, in no acute distress  · Eyes  o Vision  o :   § Visual Fields  § : eyes move symmetrical in all directions  o Sclerae  o : anicteric  o Pupils and Irises  o : " pupils equal and symmetrical  · Respiratory  o Respiratory Effort  o : breathing unlabored  · Skin and Subcutaneous Tissue  o General Inspection  o : no lesions present, no areas of discoloration  · Psychiatric  o General  o : Alert and oriented x3  o Mood and Affect  o : Mood and affect are appropriate to circumstances          Assessment  · Other iron deficiency anemia     280.8/D50.8  · Weight loss     783.21/R63.4  · Decreased appetite     783.0/R63.0    Problems Reconciled  Plan  · Orders  o Consent for Colonoscopy with Possible Biopsy - Possible risks/complications, benefits, and alternatives to surgical or invasive procedure have been explained to patient and/or legal guardian. -Patient has been evaluated and can tolerate anesthesia and/or sedation. Risks, benefits, and alternatives to anesthesia and sedation have been explained to patient and/or legal guardian. (38093) - - 06/10/2020  o Consent for Esophagogastroduodenoscopy (EGD) - Possible risks/complications, benefits, and alternatives to surgical or invasive procedure have been explained to patient and/or legal guardian. - Patient has been evaluated and can tolerate anesthesia and/or sedation. Risks, benefits, and alternatives to anesthesia and sedation have been explained to patient and/or legal guardian. (40774) - - 06/10/2020  · Medications  o Medications have been Reconciled  o Transition of Care or Provider Policy  · Instructions  o PLAN: Proceed with procedure. Patient understands risks and benefits and is willing to proceed. Understands the risks include, but are not limited to, bleeding and/or perforation.  o Information given on current diagnoses.  o Lifestyle modifications discussed.  o Electronically Identified Patient Education Materials Provided Electronically  · Disposition  o Follow up after procedure            Electronically Signed by: GABRIELLA Soto -Author on Elina 10, 2020 11:19:44 AM

## 2021-05-10 NOTE — PROCEDURES
"   Procedure Note      Patient Name: Bing Cruz   Patient ID: [id number]   Sex: Female   YOB: 1931    Primary Care Provider: Jairo Kramer MD   Referring Provider: Jairo Kramer MD    Visit Date: December 9, 2020    Provider: Wily Jolly MD   [facility]   Location Address: [address] [place] [address], [address]   [place]  [id number]   Location Phone: [phone]          FINAL REPORT   TRANSTHORACIC ECHOCARDIOGRAM REPORT    Diagnosis: Atrial Fibrillation   Height: 5'6\" Weight: 127 B/P: 168/79 BSA: 1.7   Tech: JTW   MEASUREMENTS:  RVID (Diastole) : RVID. (NORMAL: 0.7 to 2.4 cm max)   LVID (Systole): 3.4 cm (Diastole): 4.6 cm . (NORMAL: 3.7 - 5.4 cm)   Posterior Wall Thickness (Diastole): 1.0 cm. (NORMAL: 0.8 - 1.1 cm)   Septal Thickness (Diastole): 0.8 cm. (NORMAL: 0.7 - 1.2 cm)   LAID (Systole): 3.9 cm. (NORMAL: 1.9 - 3.8 cm)   Aortic Root Diameter (Diastole): 2.7 cm. (NORMAL: 2.0 - 3.7 cm)   COMMENTS:  The patient underwent 2-D, M-Mode, and Doppler examination, including pulse-wave, continuous-wave, and color-flow analysis; the study is technically adequate.   FINDINGS:  MITRAL VALVE: Mild fibrocalcific changes noted.   AORTIC VALVE: Mild fibrocalcific changes noted of a trileaflet aortic valve with limited opening of the aortic valve leaflets.   TRICUSPID VALVE: Normal.   PULMONIC VALVE: Normal.   LEFT ATRIUM: Enlarged. No masses seen. LA volume is 35 mL/m2.   AORTIC ROOT: Normal in size and motion.   LEFT VENTRICLE: The left ventricular chamber size is normal. The left ventricular wall thickness is normal. The left ventricular systolic function is normal with an estimated EF of 60%. No significant regional wall motion abnormalities are identified.   RIGHT ATRIUM: Enlarged.   RIGHT VENTRICLE: Normal size and function.   PERICARDIUM: No effusion.   INFERIOR VENA CAVA: Diameter is 1.6 cm with greater than 50% reduction with inspiration.   DOPPLER: Pulse-wave, " continuous wave, and color-flow Doppler evaluation was performed. E/A ratio is afib. DT= 211 msec. IVRT is 121 msec. E/E' is 11. There is moderate mitral valve regurgitation present. There is mild increase in velocity across the aortic valve with peak gradient of 9-10 mmHg, mean gradient of 4 mmHg, with a V-max of 1.5 m/sec and an estimated aortic valve area of 1.6 cm2. Dimensionless index is 0.53. There is trace aortic valve regurgitation. There is tricuspid regurgitation with estimated pulmonary artery systolic pressure of 35 mmHg.   Faxed: 12/10/2020      CONCLUSION:  1.  Left ventricular chamber size is normal. The left ventricular wall thickness is normal. The left ventricular        systolic function is normal with an estimated EF of 60%. No significant regional wall motion abnormalities        are identified.   2.  Fibrocalcific changes of the mitral valve with moderate mitral valve regurgitation.   3.  Fibrocalcific changes of the trileaflet aortic valve with trace aortic valve regurgitation and mild aortic valve        stenosis.   4.  Tricuspid regurgitation with estimated pulmonary artery systolic pressure of 35 mmHg.       Wily Jolly MD, FACC  PM/pap    This note was transcribed by Chela Lilly.  pap/pm  The above service was transcribed by Chela Lilly, and I attest to the accuracy of the note.  PM                 Electronically Signed by: Nicole Lilly-, Other -Author on December 10, 2020 10:27:46 AM  Electronically Co-signed by: Wily Jolly MD -Reviewer on December 19, 2020 12:39:07 AM

## 2021-05-10 NOTE — H&P
History and Physical      Patient Name: Bing Cruz   Patient ID: [id number]   Sex: Female   YOB: 1931    Primary Care Provider: Jairo Kramer MD   Referring Provider: Jairo Kramer MD    Visit Date: June 29, 2020    Provider: Edmar Back MD   Location: [facility]   Location Address: [address] [place] [address]  [id number]   Location Phone: [phone]          Chief Complaint  · Inpatient History & Physical / Surgical Orders      History Of Present Illness     Bing came in today for evaluation. She is an 89-year-old female who unfortunately recently had a colonoscopy and was identified as having a colon cancer of the ascending colon. She was referred over for evaluation for resection.       Past Medical History  Anemia; Atrial Fibrillation; Bladder Disorder; Blood clot in vein; Hyperlipemia; Hypertension; UTI         Past Surgical History  Bladder Repair; Cardioversion; Colonoscopy; Gallbladder; Hernia; Hysterectomy         Medication List  diltiazem HCl 180 mg oral capsule,extended release 24hr; Eliquis 5 mg oral tablet; hydrochlorothiazide 12.5 mg oral tablet; Klor-Con M20 20 mEq oral tablet,ER particles/crystals; lorazepam 1 mg oral tablet; losartan 50 mg oral tablet; metoprolol succinate 100 mg oral tablet extended release 24 hr; Prozac 20 mg oral capsule; Suprep Bowel Prep Kit 17.5-3.13-1.6 gram oral recon soln         Allergy List  * Other; Levaquin; lisinopril; Macrobid; melatonin       Allergies Reconciled  Family Medical History  Cancer, Unspecified; Family history of colon cancer         Social History  Claustophobic (Unknown); lives alone; Recreational Drug Use (Never); Retired.; Tobacco (Never);          Review of Systems  · Constitutional  o Denies  o : fever  · Eyes  o Denies  o : yellowish discoloration of eyes  · HENT  o Denies  o : difficulty swallowing  · Cardiovascular  o Denies  o : chest pain on exertion  · Respiratory  o Denies  o :  "shortness of breath  · Gastrointestinal  o Admits  o : constipation  o Denies  o : nausea, vomiting, diarrhea  · Integument  o Denies  o : rash  · Neurologic  o Denies  o : tingling or numbness  · Musculoskeletal  o Denies  o : joint pain  · Endocrine  o Admits  o : weight loss  o Denies  o : weight gain      Vitals  Date Time BP Position Site L\R Cuff Size HR RR TEMP (F) WT  HT  BMI kg/m2 BSA m2 O2 Sat HC       06/29/2020 02:03 PM       16  13lbs 0oz 5'  6\" 2.1 0.52           Physical Examination  · Constitutional  o Appearance  o : well-nourished, well developed, alert, in no acute distress  · Head and Face  o Head  o :   § Inspection  § : atraumatic, normocephalic  · Neck  o Inspection/Palpation  o : supple, normal range of motion  · Respiratory  o Inspection of Chest  o : normal inspection  o Auscultation of Lungs  o : breath sounds normal, no distress, clear to ascultate bilaterally  · Cardiovascular  o Heart  o :   § Auscultation of Heart  § : regular rate and rhythm, no murmur, gallop or rub  · Gastrointestinal  o Abdominal Examination  o : normal bowel sounds, non-tender, soft          Assessment  · Pre-Surgical Orders     V72.84  · Malignant neoplasm of ascending colon     153.6/C18.2       Very nice lady who is 89-years-old. She recently had a colonoscopy that did show colon cancer of the ascending colon.       Plan  · Orders  o CMP (72548) - 153.6/C18.2 - 07/08/2020  o General Surgery Order (GENOR) - 153.6/C18.2 - 07/08/2020  o [facility] Pre-Op Covid-19 Screening (35590) - 153.6/C18.2 - 07/06/2020   Scheduled at [facility] on 7-6-20 at 2:30pm  · Medications  o neomycin 500 mg oral tablet   SIG: take 2 tab (1000 mg) at 1 P.M.; 2 tab (1000 mg) 2 PM and 2 tab (1000 mg) 11 PM the day before surgery   DISP: (6) tablets with 0 refills  Prescribed on 06/29/2020     o metronidazole 500 mg oral tablet   SIG: take 2 tab (1000 mg) 1 PM; 2 tab (1000 mg) 2pm; and 2 tab (1000mg) 11 PM-day before surgery   DISP: (6) tablets with 0 " refills  Prescribed on 06/29/2020     o Medications have been Reconciled  o Transition of Care or Provider Policy  · Instructions  o PLAN:  o Handouts Provided-Pre-Procedure Instructions including date and time and location of procedure.  o ****Surgical Orders****  o ****Patient Status****  o Admit for INPATIENT services; Anticipated length of stay greater than two midnights  o RISK AND BENEFITS:  o Consent for surgery: Given these options, the patient has verbally expressed an understanding of the risks of surgery and finds these risks acceptable. We will proceed with surgery as soon as possible.  o Consult Anesthesia for any post-operative block, or any pain management procedure deemed necessary by the anestesiologist for adequate post-operative pain control.   o O.R. PREP: Per protocol  o SCD's preoperatively  o PLEASE SIGN PERMIT FOR: Right colectomy  o Entereg 12mg PO 30 min prior to PRE-OP.   o Cefazolin 2000mg IV x1 PRE-OP; Repeat Q4H during procedure AND Metronidazole 500 mg IV x 1 PRE-OP.   o *___The above History and Physical Examination has been completed within 30 days of admission.  o Electronically Identified Patient Education Materials Provided Electronically     We are going to set her up for a right colectomy. I have described the procedure to her as well as the risks and benefits and she is agreeable to proceeding.             Electronically Signed by: Lin Cooper-, -Author on June 30, 2020 08:47:38 AM  Electronically Co-signed by: Edmar Back MD -Reviewer on June 30, 2020 01:59:01 PM

## 2021-05-13 ENCOUNTER — HOSPITAL ENCOUNTER (OUTPATIENT)
Dept: LAB | Facility: HOSPITAL | Age: 86
Discharge: HOME | End: 2021-05-13
Attending: NURSE PRACTITIONER

## 2021-05-13 LAB
ALBUMIN SERPL-MCNC: 3.8 G/DL (ref 3.5–5)
ALBUMIN/GLOB SERPL: 1 {RATIO} (ref 1.4–2.6)
ALP SERPL-CCNC: 70 U/L (ref 43–160)
ALT SERPL-CCNC: 27 U/L (ref 10–40)
ANION GAP SERPL CALC-SCNC: 14 MMOL/L (ref 8–19)
AST SERPL-CCNC: 31 U/L (ref 15–50)
BASOPHILS # BLD AUTO: 0.05 10*3/UL (ref 0–0.2)
BASOPHILS NFR BLD AUTO: 0.7 % (ref 0–3)
BILIRUB SERPL-MCNC: 0.94 MG/DL (ref 0.2–1.3)
BUN SERPL-MCNC: 16 MG/DL (ref 5–25)
BUN/CREAT SERPL: 20 {RATIO} (ref 6–20)
CALCIUM SERPL-MCNC: 9.4 MG/DL (ref 8.7–10.4)
CHLORIDE SERPL-SCNC: 105 MMOL/L (ref 99–111)
CONV ABS IMM GRAN: 0.02 10*3/UL (ref 0–0.2)
CONV CO2: 26 MMOL/L (ref 22–32)
CONV IMMATURE GRAN: 0.3 % (ref 0–1.8)
CONV TOTAL PROTEIN: 7.6 G/DL (ref 6.3–8.2)
CREAT UR-MCNC: 0.82 MG/DL (ref 0.5–0.9)
DEPRECATED RDW RBC AUTO: 47.1 FL (ref 36.4–46.3)
EOSINOPHIL # BLD AUTO: 0.15 10*3/UL (ref 0–0.7)
EOSINOPHIL # BLD AUTO: 2.2 % (ref 0–7)
ERYTHROCYTE [DISTWIDTH] IN BLOOD BY AUTOMATED COUNT: 13.1 % (ref 11.7–14.4)
GFR SERPLBLD BASED ON 1.73 SQ M-ARVRAT: >60 ML/MIN/{1.73_M2}
GLOBULIN UR ELPH-MCNC: 3.8 G/DL (ref 2–3.5)
GLUCOSE SERPL-MCNC: 96 MG/DL (ref 65–99)
HCT VFR BLD AUTO: 39.6 % (ref 37–47)
HGB BLD-MCNC: 12.9 G/DL (ref 12–16)
LYMPHOCYTES # BLD AUTO: 2.58 10*3/UL (ref 1–5)
LYMPHOCYTES NFR BLD AUTO: 38.2 % (ref 20–45)
MCH RBC QN AUTO: 32.1 PG (ref 27–31)
MCHC RBC AUTO-ENTMCNC: 32.6 G/DL (ref 33–37)
MCV RBC AUTO: 98.5 FL (ref 81–99)
MONOCYTES # BLD AUTO: 0.46 10*3/UL (ref 0.2–1.2)
MONOCYTES NFR BLD AUTO: 6.8 % (ref 3–10)
NEUTROPHILS # BLD AUTO: 3.5 10*3/UL (ref 2–8)
NEUTROPHILS NFR BLD AUTO: 51.8 % (ref 30–85)
NRBC CBCN: 0 % (ref 0–0.7)
OSMOLALITY SERPL CALC.SUM OF ELEC: 293 MOSM/KG (ref 273–304)
PLATELET # BLD AUTO: 251 10*3/UL (ref 130–400)
PMV BLD AUTO: 11.8 FL (ref 9.4–12.3)
POTASSIUM SERPL-SCNC: 4.1 MMOL/L (ref 3.5–5.3)
RBC # BLD AUTO: 4.02 10*6/UL (ref 4.2–5.4)
SODIUM SERPL-SCNC: 141 MMOL/L (ref 135–147)
TSH SERPL-ACNC: 3.85 M[IU]/L (ref 0.27–4.2)
WBC # BLD AUTO: 6.76 10*3/UL (ref 4.8–10.8)

## 2021-05-13 NOTE — PROGRESS NOTES
Progress Note      Patient Name: Bing Cruz   Patient ID: [id number]   Sex: Female   YOB: 1931    Primary Care Provider: Jairo Kramer MD   Referring Provider: Jairo Kramer MD    Visit Date: December 9, 2020    Provider: Wily Jolly MD   Location: [facility]   Location Address: [address] [place] [address], [address]   [place]  [id number]   Location Phone: [phone]          Chief Complaint  · Hypertension       History Of Present Illness  REFERRING PROVIDER: Jairo Kramer MD   Bing Cruz is an 89-year-old female with chronic persistent atrial fibrillation, hypertension, hyperlipidemia, and aortic valve disease who has done much better with the combination of Metoprolol and Diltiazem. Her palpitations are still present but not as often as before. Her exercise capacity has improved. She has occasional sharp chest pain with her palpitations lasting a few seconds at a time. They are becoming frequent. She denies dizziness or syncope.   PAST MEDICAL HISTORY: Atrial fibrillation converted to sinus through cardioversion (March 2019); hyperlipidemia; hypertension; mitral valve regurgitation.   PSYCHOSOCIAL HISTORY: Denies alcohol use.   CURRENT MEDICATIONS: Diltiazem 120 mg daily; potassium 20 mEq daily; Metoprolol 100 mg b.i.d. Eliquis 5 mg 1/2 tablet b.i.d.; Furosemide 20 mg daily; Fluoxetine 10 mg daily; Lorazepam 0.5 mg daily; Famotidine 20 mg b.i.d.; Losartan 50 mg b.i.d.; vitamin D daily.      ALLERGIES:  Citalopram, Clonazepam, Lisinopril.       Review of Systems  · Cardiovascular  o Admits  o : palpitations (fast, fluttering, or skipping beats), chest pain or angina pectoris   o Denies  o : swelling (feet, ankles, hands), shortness of breath while walking or lying flat  · Respiratory  o Admits  o : chronic or frequent cough      Vitals  Date Time BP Position Site L\R Cuff Size HR RR TEMP (F) WT  HT  BMI kg/m2 BSA m2 O2 Sat FR L/min FiO2 HC       12/09/2020  "03:55 /78 Sitting    78 - R   125lbs 16oz 5'  6\" 20.34 1.63             Physical Examination  · Constitutional  o Appearance  o : Awake, alert, in no acute distress.  · Eyes  o Conjunctivae  o : Conjunctivae normal.  · Ears, Nose, Mouth and Throat  o Oral Cavity  o :   § Oral Mucosa  § : Normal.  · Neck  o Inspection/Palpation/Auscultation  o : No lymphadenopathy. No JVD. No bruit. Good carotid upstroke.  · Respiratory  o Respiratory  o : Kyphosis with good respiratory effort. Negative rales or rhonchi.  · Cardiovascular  o Heart  o : PMI is not well felt. S1 irregular. S2 normal. 1-2/6 systolic murmur at the base and 1-2/6 systolic murmur at the apex conducted to the axilla.  o Peripheral Vascular System  o :   § Extremities  § : Good femoral and pedal pulses. No pedal edema.  · Gastrointestinal  o Abdominal Examination  o : Soft. No masses or tenderness felt. No hepatosplenomegaly. Abdominal aorta is not palpable.  · EKG  o Indications  o : Atrial fibrillation with rapid rate.   o Results  o : Atrial fibrillation with controlled ventricular response of 83 beats per minute.   o Comparison  o : The overall ventricular response is better than before.  · Labs  o Labs  o : Chemistry panel is normal. HDL 71, LDL 34, triglyceride 285. NT-proBNP 1385.     Echocardiogram reveals mild aortic stenosis with moderate mitral valve regurgitation with good LV function.           Assessment     1.  Chronic persistent atrial fibrillation with controlled ventricular response.   2.  Hypertension, controlled based on home blood pressure readings.   3.  Hyperlipidemia, LDL at goal, high triglycerides.  4.  Aortic and mitral valve disease, stable.       Plan     Discussed at length with patient and her daughter indication and risk of radiofrequency ablation procedure, indications, and risks. She prefers not to go that route. I have also mentioned to her about her aortic stenosis which is mild right now and may need procedure if it " gets worse in the near future.  She understands symptoms that would indicate rapid progression of aortic stenosis. In addition, the patient wanted to know if she could go through another electrical cardioversion, but I suggested not since it is unlikely to keep her in sinus rhythm for long. She will continue medical management for now with a blood pressure log prior to her appointment next time in six months. All questions and concerns addressed.      Wily Jolly MD, [facility]  PM/pap    This note was transcribed by Chela Lilly.  pap/pm  The above service was transcribed by Chela Lilly, and I attest to the accuracy of the note.  PM             Electronically Signed by: Nicole Lilly-, Other -Author on December 10, 2020 02:04:00 PM  Electronically Co-signed by: Wily Jolly MD -Reviewer on December 18, 2020 07:30:01 PM

## 2021-05-13 NOTE — PROGRESS NOTES
"   Quick Note      Patient Name: Bing Cruz   Patient ID: [id number]   Sex: Female   YOB: 1931    Primary Care Provider: Jairo Kramer MD   Referring Provider: Jairo Kramer MD    Visit Date: May 28, 2020    Provider: GABRIELLA Dunlap   Location: [facility]   Location Address: [address] [place] [address]  [id number]   Location Phone: [phone]          History Of Present Illness  TELEHEALTH TELEPHONE VISIT  Chief Complaint: Urinary frequency & dysuria   Bing Cruz is a 88 year old female who is presenting for evaluation via telehealth telephone visit. Verbal consent obtained before beginning visit.   Provider spent 17 minutes with the patient during telehealth visit.   The following staff were present during this visit: Alanis Capellan CMA   Past Medical History/Overview of Patient Symptoms     89 y/o female that request appointment for dysuria, urinary frequency & urgency. Patient reports that she was seen at [facility] on 2020 and was diagnosed with a UTI. She reports that she was started on Augmentin and her symptoms have not improved, actually have gotten worse. Denies seeing any blood in her urine.     Previous urinalysis:    2020:    color: dark yellow  clarity: slightly cloudy  Glu: negative  Bili: negative  Ket: negative  S.020  Hgb: negative  pH: 6.0  Pro: negative  Uro: 0.2  Nit: positive  Leuko: small       Vitals  Date Time BP Position Site L\R Cuff Size HR RR TEMP (F) WT  HT  BMI kg/m2 BSA m2 O2 Sat HC       2020 11:46 AM         131lbs 0oz 5'  6\" 21.14 1.66               Assessment  · UTI (urinary tract infection)     599.0/N39.0  · Dysuria     788.1/R30.0      Plan  · Orders  o Physician Telephone Evaluation, 11-20 minutes (66367) - 599.0/N39.0, 788.1/R30.0 - 2020  · Medications  o Medications have been Reconciled  o Transition of Care or Provider Policy  · Instructions  o Plan Of Care: Start Macrobid daily. Increase water " intake.   o Urine culture was performed and all medications that are appropriate patient is allergic too. Patient is unsure what happens when she takes Macrobid. Patient was advised if she starts to have a rash to discontinue the medication and notify the office.   o Alternative plan will be Rocephin injections for 5 days.   o Electronically Identified Patient Education Materials Provided Electronically  · Disposition  o Call or Return if symptoms worsen or persist.            Electronically Signed by: GABRIELLA Dunlap -Author on May 28, 2020 08:31:17 PM

## 2021-05-13 NOTE — PROGRESS NOTES
"   Progress Note      Patient Name: Bing Cruz   Patient ID: [id number]   Sex: Female   YOB: 1931    Primary Care Provider: Jairo Kramer MD   Referring Provider: Jairo Kramer MD    Visit Date: December 3, 2020    Provider: GABRIELLA Soto   Location: [facility]   Location Address: [address] [place] [address], [address]  [place], [place]  [id number]   Location Phone: [phone]          Chief Complaint     Dysphagia       History Of Present Illness     Patient presents since today post status right hemicolectomy from 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 Oncology note reveals that there were negative margins as well as 14 lymph nodes negative. Surgery was performed by Dr. Back.  She states she has a PET scan scheduled in January for a concern of a possible lung mass.    She has recently begin to have dysphagia with liquids and solids.  Oncologist ordered an esophagram which revealed peptic stricture.  She denies food becoming stuck however states it \"hurts\" at times going down.  Her daughter is also present for the visit and states that she has spit out food on several occasions because she was unable to get it down. Occasional heartburn that is relieved with TUMS and/or Pepcid. She denies any nausea or vomiting.  Appetite and weight stable.    Denies any bowel movement trouble, going 1-2x daily, formed stool. Denies any hematochezia, melena, or abdominal pain. No longer taking oral iron.     PMH: Takes Eliquis daily for hx of blood clot and A-fib, managed by Dr. Jolly.        Esophagram 11/3/2020: Laryngeal penetration during swallowing of the barium solution.  Thick smooth narrowing of the distal thoracic esophagus near the GE junction suspect represent a peptic stricture.  13 mm tablet becomes lodged at this position.    EGD 6/26/2020: Normal mucosa of duodenum, erythema in the antrum, and erythema in the GE junction.  " Hiatal hernia.  Colonoscopy 6/26/2020: 12 mm polyp in the cecum.  5 mm polyp in the cecum.  3 cm mass in the ascending colon.  5 mm polyp in sigmoid colon.  Hypertrophied anal papula visualized.  Moderate diverticulosis of the whole colon.  Large intestine cecum polyp-tubulovillous adenoma with high-grade dysplasia.  Ascending colon mass biopsy-invasive moderate differentiated adenocarcinoma.  Large intestine sigmoid colon polyp-tubular adenoma.  Stomach antrum biopsy-reactive chemical gastropathy with chronic inflammation.  GE junction biopsy-reflux esophagitis changes         Past Medical History  Anemia; Atrial Fibrillation; Bladder Disorder; Blood clot in vein; Hyperlipemia; Hypertension; UTI         Past Surgical History  Bladder Repair; Cardioversion; Colectomy, open; Colonoscopy; Gallbladder; Hernia; Hysterectomy         Medication List  diltiazem HCl 180 mg oral capsule,extended release 24hr; Eliquis 5 mg oral tablet; famotidine 20 mg oral tablet; hydrochlorothiazide 12.5 mg oral tablet; Klor-Con M20 20 mEq oral tablet,ER particles/crystals; lorazepam 1 mg oral tablet; losartan 50 mg oral tablet; metoprolol succinate 100 mg oral tablet extended release 24 hr; metronidazole 500 mg oral tablet; neomycin 500 mg oral tablet; Prozac 20 mg oral capsule         Allergy List  * Other; Levaquin; lisinopril; Macrobid; melatonin       Allergies Reconciled  Family Medical History  Cancer, Unspecified; Family history of colon cancer         Social History  Claustophobic (Unknown); lives alone; Recreational Drug Use (Never); Retired.; Tobacco (Never);          Review of Systems  · Constitutional  o Denies  o : chills, fever  · Cardiovascular  o Denies  o : chest pain, dyspnea on exertion  · Respiratory  o Denies  o : cough, shortness of breath  · Gastrointestinal  o Admits  o : see HPI   · Endocrine  o Denies  o : weight gain, weight loss      Vitals  Date Time BP Position Site L\R Cuff Size HR RR TEMP (F) WT  HT   "BMI kg/m2 BSA m2 O2 Sat FR L/min FiO2 HC       12/03/2020 01:20 /79 Sitting    90 - R   126lbs 16oz 5'  6\" 20.5 1.64             Physical Examination  · Constitutional  o Appearance  o : Healthy-appearing, awake and alert in no acute distress  · Head and Face  o Head  o : Normocephalic with no worriesome skin lesions  · Eyes  o Vision  o :   § Visual Fields  § : eyes move symmetrical in all directions  o Sclerae  o : sclerae anicteric  o Pupils and Irises  o : pupils equal and symmetrical  · Neck  o Inspection/Palpation  o : Trachea is midline, no adenopathy  · Respiratory  o Respiratory Effort  o : Breathing is unlabored.  o Inspection of Chest  o : normal appearance  o Auscultation of Lungs  o : Chest is clear to auscultation bilaterally.  · Cardiovascular  o Heart  o :   § Auscultation of Heart  § : no murmurs, rubs, or gallops  o Peripheral Vascular System  o :   § Extremities  § : no cyanosis, clubbing or edema;   · Gastrointestinal  o Abdominal Examination  o : Abdomen is soft, nontender to palpation, with normal active bowel sounds, no appreciable hepatosplenomegaly.  o Digital Rectal Exam  o : deferred  · Skin and Subcutaneous Tissue  o General Inspection  o : without focal lesions; turgor is normal  · Psychiatric  o General  o : Alert and oriented x3  o Mood and Affect  o : Mood and affect are appropriate to circumstances          Assessment  · Abnormal finding on GI tract imaging     793.4/R93.3  · Dysphagia     787.20/R13.10      Plan  · Orders  o Consent for Esophagogastroduodenoscopy (EGD) with dilation - Possible risks/complications, benefits, and alternatives to surgical or invasive procedure have been explained to patient and/or legal guardian. - Patient has been evaluated and can tolerate anesthesia and/or sedation. Risks, benefits, and alternatives to anesthesia and sedation have been explained to patient and/or legal guardian. (84651) - - 12/03/2020  · Medications  o Medications have been " Reconciled  o Transition of Care or Provider Policy  · Instructions  o Handouts provided: Pre-procedure instructions including date and time and location of procedure.  o PLAN: Proceed with procedure. Patient understands risks and benefits and is willing to proceed. Understands the risks include, but are not limited to, bleeding and/or perforation.  o Information given on current diagnoses.  o Ordering EGD however patient states she would not like to schedule at this time. She expressed that she would like to go home and think on this with her daughters and will give us a call back to let us know if she would like to proceed. I educated patient to seek emergency medical attention if unable to swallow food or own saliva. Also let her know that even though food may not be getting stuck currently this could worsen if a stricture is present.  o Electronically Identified Patient Education Materials Provided Electronically  · Disposition  o Follow up after procedure            Electronically Signed by: GABRIELLA Soto -Author on December 3, 2020 04:20:48 PM

## 2021-05-13 NOTE — PROGRESS NOTES
Progress Note      Patient Name: Bing Cruz   Patient ID: [id number]   Sex: Female   YOB: 1931    Primary Care Provider: Jairo Kramer MD   Referring Provider: Jairo Kramer MD    Visit Date: August 18, 2020    Provider: Edmar Back MD   Location: [facility]   Location Address: [address] [place] [address]  [id number]   Location Phone: [phone]          Chief Complaint  · Follow Up Office Visit      History Of Present Illness     Bing came in today for evaluation. She was recently in the hospital and underwent a right colectomy for what fortunately turned out to be an early stage node negative colon cancer. Her postoperative course was complicated a UTI and then subsequent C. diff colitis. She was in the hospital for a couple of weeks after surgery and then went to rehab for a little bit. She is doing okay now. She is back home, doing physical and occupational therapy. Her weight is maintaining pretty steady.       Past Medical History  Anemia; Atrial Fibrillation; Bladder Disorder; Blood clot in vein; Hyperlipemia; Hypertension; UTI         Past Surgical History  Bladder Repair; Cardioversion; Colectomy, open; Colonoscopy; Gallbladder; Hernia; Hysterectomy         Medication List  diltiazem HCl 180 mg oral capsule,extended release 24hr; Eliquis 5 mg oral tablet; hydrochlorothiazide 12.5 mg oral tablet; Klor-Con M20 20 mEq oral tablet,ER particles/crystals; lorazepam 1 mg oral tablet; losartan 50 mg oral tablet; metoprolol succinate 100 mg oral tablet extended release 24 hr; metronidazole 500 mg oral tablet; neomycin 500 mg oral tablet; Prozac 20 mg oral capsule; Suprep Bowel Prep Kit 17.5-3.13-1.6 gram oral recon soln         Allergy List  * Other; Levaquin; lisinopril; Macrobid; melatonin         Family Medical History  Cancer, Unspecified; Family history of colon cancer         Social History  Claustophobic (Unknown); lives alone; Recreational Drug Use  "(Never); Retired.; Tobacco (Never);          Review of Systems  · Cardiovascular  o Denies  o : chest pain, irregular heart beats, rapid heart rate, chest pain on exertion, shortness of breath, lower extremity swelling  · Respiratory  o Denies  o : shortness of breath, wheezing, cough, wheezing, chronic cough, coughing up blood  · Gastrointestinal  o Denies  o : nausea, vomiting, diarrhea, chronic abdominal pain, reflux symptoms      Vitals  Date Time BP Position Site L\R Cuff Size HR RR TEMP (F) WT  HT  BMI kg/m2 BSA m2 O2 Sat HC       08/18/2020 01:03 PM       18  132lbs 0oz 5'  6\" 21.31 1.67           Physical Examination     Today on physical exam, she appears well. Her abdomen is soft. Her incision looks good.           Assessment  · Postoperative Exam Following Surgery     V67.00       Doing well status post right colectomy for an early stage colon cancer. Her postoperative course was complicated by a UTI and the C. diff infection but she is doing better now and is back home.       Plan  · Medications  o Medications have been Reconciled  o Transition of Care or Provider Policy     We are going to see her back on an as needed basis.             Electronically Signed by: Lin Cooper-, -Author on August 19, 2020 08:34:42 AM  Electronically Co-signed by: Edmar Back MD -Reviewer on August 19, 2020 10:58:30 AM  "

## 2021-05-13 NOTE — PROGRESS NOTES
Progress Note      Patient Name: Bing Cruz   Patient ID: [id number]   Sex: Female   YOB: 1931    Primary Care Provider: Jairo Kramer MD   Referring Provider: Jairo Kramer MD    Visit Date: October 22, 2020    Provider: Wily Jolly MD   Location: [facility]   Location Address: [address] [place] [address], [facility]   [place]  [id number]   Location Phone: [phone]          Chief Complaint  · Follow-up of hypertension and hyperlipidemia       History Of Present Illness  REFERRING PROVIDER: Jairo Kramer MD   Bing Cruz is an 89-year-old female with a prior history of paroxysmal atrial fibrillation, who had been in sinus rhythm and underwent colon surgery following evaluation of her new-onset anemia. She has been quite ill and gradually recovering. She has lost over 35 pounds in weight because of poor appetite and poor oral intake. Her HCTZ was stopped, and she was recently started on Furosemide. She denies chest pain, dizziness or syncope.   PAST MEDICAL HISTORY: Atrial fibrillation converted to sinus through cardioversion (March 2019); hyperlipidemia; hypertension; mitral valve regurgitation.   PSYCHOSOCIAL HISTORY: She never used alcohol.   CURRENT MEDICATIONS: include Eliquis 5 mg b.i.d.; Metoprolol  mg b.i.d.; Fluoxetine 10 mg daily; Losartan 50 mg b.i.d; Lorazepam 1 mg daily; Potassium 20 mEq daily; Allergy Relief 10 mg daily; Vitamin C; Vitamin D; iron 65 mg daily; Furosemide 20 mg daily; Famotidine 20 mg b.i.d. The dosage and frequency of the medications were reviewed with the patient.       Review of Systems  · Cardiovascular  o Admits  o : palpitations (fast, fluttering, or skipping beats), chest pain or angina pectoris   o Denies  o : swelling (feet, ankles, hands), shortness of breath while walking or lying flat  · Respiratory  o Admits  o : chronic or frequent cough      Vitals  Date Time BP Position Site L\R Cuff Size HR RR TEMP (F) WT   "HT  BMI kg/m2 BSA m2 O2 Sat FR L/min FiO2 HC       10/22/2020 12:03 /110 Sitting    115 - R   125lbs 16oz 5'  6\" 20.34 1.63       10/22/2020 12:03 /100 Sitting    98 - R                   Physical Examination  · Constitutional  o Appearance  o : Awake, alert, in no acute distress.  · Eyes  o Conjunctivae  o : Conjunctivae normal.  · Ears, Nose, Mouth and Throat  o Oral Cavity  o :   § Oral Mucosa  § : Normal.  · Neck  o Inspection/Palpation/Auscultation  o : No lymphadenopathy. No JVD. No bruit. Good carotid upstroke.  · Respiratory  o Respiratory  o : Kyphosis with good respiratory effort. Negative rales or rhonchi.   · Cardiovascular  o Heart  o : PMI is not well felt. S1 irregular. S2 normal. 1-2/6 systolic murmur at the base and 1-2/6 systolic murmur at the apex conducted to the axilla.   o Peripheral Vascular System  o :   § Extremities  § : Good femoral and pedal pulses. No pedal edema.  · Gastrointestinal  o Abdominal Examination  o : Soft. No masses or tenderness felt. No hepatosplenomegaly. Abdominal aorta is not palpable.     EKG was performed in the office today.  Indication:       her heart rate was irregular and rapid.  Results:           atrial fibrillation/flutter with moderate ventricular response, left ventricular hypertrophy.    Her most recent blood work from September was reviewed.                   Assessment     1.  Recurrent atrial fibrillation with moderate ventricular response.  2.  Hypertensive heart disease.  3.  Mitral valve and aortic valve regurgitation, clinically stable.  4.  Debility.       Plan     1.  Reduce the dose of Eliquis to 2.5 mg b.i.d. in view of her weight and age.  2.  Add Cartia  mg at noon.  3.  Obtain blood work in 2 to 3 weeks and repeat evaluation along with echocardiogram in 6 weeks.    Wily Jolly M.D., [facility]  pmbetsy/coretta           This note was transcribed by Mallory Harrison.  dmd/pmm  The above service was transcribed by Mallory Harrison, and I attest " to the accuracy of the note.  PMM               Electronically Signed by: Mallory Harrison-, -Author on October 27, 2020 08:17:17 AM  Electronically Co-signed by: Wily Jolly MD -Reviewer on October 29, 2020 06:07:40 PM

## 2021-05-14 VITALS
HEART RATE: 115 BPM | WEIGHT: 126 LBS | SYSTOLIC BLOOD PRESSURE: 150 MMHG | DIASTOLIC BLOOD PRESSURE: 110 MMHG | HEIGHT: 66 IN | BODY MASS INDEX: 20.25 KG/M2

## 2021-05-14 VITALS
HEART RATE: 78 BPM | SYSTOLIC BLOOD PRESSURE: 140 MMHG | HEIGHT: 66 IN | DIASTOLIC BLOOD PRESSURE: 78 MMHG | BODY MASS INDEX: 20.25 KG/M2 | WEIGHT: 126 LBS

## 2021-05-14 VITALS
DIASTOLIC BLOOD PRESSURE: 79 MMHG | SYSTOLIC BLOOD PRESSURE: 168 MMHG | WEIGHT: 127 LBS | HEART RATE: 90 BPM | BODY MASS INDEX: 20.41 KG/M2 | HEIGHT: 66 IN

## 2021-05-14 VITALS — HEIGHT: 66 IN | WEIGHT: 132 LBS | BODY MASS INDEX: 21.21 KG/M2 | RESPIRATION RATE: 18 BRPM

## 2021-05-15 VITALS — WEIGHT: 13 LBS | RESPIRATION RATE: 16 BRPM | BODY MASS INDEX: 2.09 KG/M2 | HEIGHT: 66 IN

## 2021-05-15 VITALS
SYSTOLIC BLOOD PRESSURE: 128 MMHG | HEART RATE: 70 BPM | WEIGHT: 143 LBS | DIASTOLIC BLOOD PRESSURE: 90 MMHG | BODY MASS INDEX: 22.98 KG/M2 | HEIGHT: 66 IN

## 2021-05-15 VITALS — HEIGHT: 65 IN | WEIGHT: 130 LBS | BODY MASS INDEX: 21.66 KG/M2

## 2021-05-15 VITALS — HEART RATE: 56 BPM

## 2021-05-15 VITALS — BODY MASS INDEX: 22.44 KG/M2 | HEIGHT: 67 IN | WEIGHT: 143 LBS | RESPIRATION RATE: 14 BRPM

## 2021-05-15 VITALS
SYSTOLIC BLOOD PRESSURE: 156 MMHG | DIASTOLIC BLOOD PRESSURE: 84 MMHG | HEART RATE: 50 BPM | HEIGHT: 67 IN | BODY MASS INDEX: 22.44 KG/M2 | WEIGHT: 143 LBS

## 2021-05-15 VITALS
SYSTOLIC BLOOD PRESSURE: 162 MMHG | DIASTOLIC BLOOD PRESSURE: 78 MMHG | HEART RATE: 55 BPM | RESPIRATION RATE: 14 BRPM | OXYGEN SATURATION: 98 % | TEMPERATURE: 97.1 F

## 2021-05-15 VITALS — WEIGHT: 145.25 LBS | HEIGHT: 67 IN | RESPIRATION RATE: 16 BRPM | BODY MASS INDEX: 22.8 KG/M2

## 2021-05-15 VITALS
HEIGHT: 67 IN | HEART RATE: 55 BPM | OXYGEN SATURATION: 98 % | DIASTOLIC BLOOD PRESSURE: 88 MMHG | TEMPERATURE: 97.1 F | RESPIRATION RATE: 14 BRPM | WEIGHT: 150 LBS | BODY MASS INDEX: 23.54 KG/M2 | SYSTOLIC BLOOD PRESSURE: 168 MMHG

## 2021-05-15 VITALS
SYSTOLIC BLOOD PRESSURE: 144 MMHG | BODY MASS INDEX: 23.14 KG/M2 | WEIGHT: 144 LBS | HEIGHT: 66 IN | DIASTOLIC BLOOD PRESSURE: 74 MMHG | HEART RATE: 60 BPM

## 2021-05-15 VITALS
WEIGHT: 141 LBS | DIASTOLIC BLOOD PRESSURE: 74 MMHG | BODY MASS INDEX: 22.66 KG/M2 | HEIGHT: 66 IN | HEART RATE: 70 BPM | SYSTOLIC BLOOD PRESSURE: 168 MMHG

## 2021-05-15 VITALS
DIASTOLIC BLOOD PRESSURE: 98 MMHG | HEART RATE: 82 BPM | SYSTOLIC BLOOD PRESSURE: 132 MMHG | HEIGHT: 66 IN | WEIGHT: 143 LBS | BODY MASS INDEX: 22.98 KG/M2

## 2021-05-15 VITALS — HEIGHT: 66 IN | WEIGHT: 131 LBS | BODY MASS INDEX: 21.05 KG/M2

## 2021-05-15 VITALS — HEIGHT: 67 IN | RESPIRATION RATE: 15 BRPM | WEIGHT: 146.12 LBS | BODY MASS INDEX: 22.93 KG/M2

## 2021-05-15 VITALS — HEIGHT: 67 IN | BODY MASS INDEX: 22.77 KG/M2 | RESPIRATION RATE: 16 BRPM | WEIGHT: 145.06 LBS

## 2021-05-15 VITALS — BODY MASS INDEX: 22.76 KG/M2 | RESPIRATION RATE: 16 BRPM | HEIGHT: 67 IN | WEIGHT: 145 LBS

## 2021-05-15 VITALS — SYSTOLIC BLOOD PRESSURE: 142 MMHG | RESPIRATION RATE: 16 BRPM | TEMPERATURE: 97.4 F | DIASTOLIC BLOOD PRESSURE: 90 MMHG

## 2021-05-16 VITALS
HEART RATE: 64 BPM | SYSTOLIC BLOOD PRESSURE: 128 MMHG | DIASTOLIC BLOOD PRESSURE: 80 MMHG | BODY MASS INDEX: 23.95 KG/M2 | HEIGHT: 66 IN | WEIGHT: 149 LBS

## 2021-05-18 LAB — ELASTASE PANC STL-MCNT: >500 UG ELAST./G

## 2021-05-28 VITALS
BODY MASS INDEX: 22.21 KG/M2 | OXYGEN SATURATION: 100 % | HEART RATE: 54 BPM | RESPIRATION RATE: 20 BRPM | DIASTOLIC BLOOD PRESSURE: 80 MMHG | RESPIRATION RATE: 16 BRPM | TEMPERATURE: 98.5 F | RESPIRATION RATE: 16 BRPM | BODY MASS INDEX: 20.1 KG/M2 | BODY MASS INDEX: 22.42 KG/M2 | WEIGHT: 117.73 LBS | SYSTOLIC BLOOD PRESSURE: 147 MMHG | DIASTOLIC BLOOD PRESSURE: 77 MMHG | TEMPERATURE: 98.9 F | WEIGHT: 134.7 LBS | TEMPERATURE: 98.5 F | OXYGEN SATURATION: 98 % | BODY MASS INDEX: 21.07 KG/M2 | DIASTOLIC BLOOD PRESSURE: 71 MMHG | WEIGHT: 130.07 LBS | DIASTOLIC BLOOD PRESSURE: 77 MMHG | WEIGHT: 130.51 LBS | HEART RATE: 80 BPM | OXYGEN SATURATION: 100 % | HEART RATE: 62 BPM | SYSTOLIC BLOOD PRESSURE: 154 MMHG | SYSTOLIC BLOOD PRESSURE: 151 MMHG | HEART RATE: 64 BPM | HEIGHT: 64 IN | RESPIRATION RATE: 16 BRPM | HEIGHT: 64 IN | OXYGEN SATURATION: 95 % | SYSTOLIC BLOOD PRESSURE: 181 MMHG

## 2021-05-28 VITALS
DIASTOLIC BLOOD PRESSURE: 77 MMHG | WEIGHT: 123.68 LBS | RESPIRATION RATE: 16 BRPM | TEMPERATURE: 97.8 F | OXYGEN SATURATION: 100 % | HEART RATE: 80 BPM | SYSTOLIC BLOOD PRESSURE: 149 MMHG | BODY MASS INDEX: 19.96 KG/M2

## 2021-05-28 NOTE — PROGRESS NOTES
Patient: MIKAELA SMITH     Acct: [id number]     Report: #[id number]  UNIT #: [id number]     : 1931    Encounter Date:2020  PRIMARY CARE: Juan Pablo Kramer  ***Signed***  --------------------------------------------------------------------------------------------------------------------  NURSE INTAKE      Visit Type      Established Patient Visit            Chief Complaint      ANEMIA F/U            Referring Provider/Copies To      Referring Provider:  Juan Pablo Kramer      Primary Care Provider:  Juan Pablo Kramer      Copies To:   VIDHYA INIGUEZ; Juan Pablo Kramer            History and Present Illness      Past History      Patient referred to the office from Dr. Juan Pablo Kramer's office for anemia.            HPI - Hematology Interim      Patient states that she is feeling a little bit better.  Her energy level has     improved.  She was found to be heme positive on stool check.  She has been seen     by gastroenterology and has upper and lower endoscopy scheduled for next     Tuesday.  She denies any obvious blood in the bowels.  She is eating and     drinking better and is gained 4 pounds.            Most Recent Lab Findings            Item Value  Date Time             White Blood Count 7.76 10*3/uL 20             Red Blood Count 3.89 10*6/uL L 20             Hemoglobin 10.7 g/dL L 20             Hematocrit 35.2 % L 20             Mean Corpuscular Volume 90.5 fL 20             Mean Corpuscular Hemoglobin 27.5 pg 20             Mean Corpuscular Hemoglobin Concent 30.4 L 20             Red Cell Distribution Width 16.9 % H 20             RDW Standard Deviation 56.0 fL H 20             Platelet Count 281 10*3/uL 4/7/20 0929             Mean Platelet Volume 12.3 fL 20 0929             Granulocytes (%) 61.7 % 20 0929             Sodium Level 138 mmol/L 20 1344             Potassium Level 3.5 mmol/L 20 1344              Chloride Level 98 mmol/L L 5/19/20 1344             Carbon Dioxide Level 22 mmol/L 5/19/20 1344             Anion Gap 22 mmol/L H 5/19/20 1344             Blood Urea Nitrogen 15 mg/dL 5/19/20 1344             Creatinine 0.87 mg/dL 5/19/20 1344             Glomerular Filtration Rate Calc 59 mL/min/{1.73_m2} L 5/19/20 1344             BUN/Creatinine Ratio 17 {ratio} 5/19/20 1344             Glucose Level 107 mg/dL H 5/19/20 1344             Serum Osmolality 287 5/19/20 1344             Iron Level 71 ug/dL 11/18/19 1124             Sodium Level 134 mmol/L L 6/1/20 1550             Potassium Level 3.4 mmol/L L 6/1/20 1550             Chloride Level 97 mmol/L L 6/1/20 1550             Carbon Dioxide Level 20 mmol/L L 6/1/20 1550             Anion Gap 20 mmol/L H 6/1/20 1550             Creatinine 0.86 mg/dL 6/1/20 1550             Glomerular Filtration Rate Calc 60 mL/min/{1.73_m2} 6/1/20 1550             BUN/Creatinine Ratio 20 {ratio} 6/1/20 1550             Glucose Level 100 mg/dL H 6/1/20 1550             Serum Osmolality 280 6/1/20 1550             Calcium Level 9.7 mg/dL 6/1/20 1550             Iron Level 44 ug/dL L 6/1/20 1550             Total Iron Binding Capacity 346 ug/dL 6/1/20 1550             Percent Iron Saturation 13 % L 6/1/20 1550             Transferrin 242.00 mg/dL L 6/1/20 1550             Ferritin 54 ng/mL 6/1/20 1550             Vitamin B12 Level 395 pg/mL 6/1/20 1550             Methylmalonic Acid 200 nmol/L 6/1/20 1550             Folate 12.1 ng/mL 6/1/20 1550             Granulocytes # 6.90 10*3/uL 6/1/20 1550             Lymphocytes # (Auto) 1.61 10*3/uL 6/1/20 1550             Monocytes # (Auto) 0.70 10*3/uL 6/1/20 1550             Eosinophils # (Auto) 0.13 10*3/uL 6/1/20 1550             Basophils # (Auto) 0.03 10*3/uL 6/1/20 1550             Hemoglobin 11.7 g/dL L 6/1/20 1550             Hematocrit 36.1 % L 6/1/20 1550             Mean Corpuscular Volume 89.8 fL 6/1/20 1550              Mean Corpuscular Hemoglobin 29.1 pg 6/1/20 1550             Mean Corpuscular Hemoglobin Concent 32.4 L 6/1/20 1550             Red Cell Distribution Width 17.7 % H 6/1/20 1550             RDW Standard Deviation 58.9 fL H 6/1/20 1550             Platelet Count 313 10*3/uL 6/1/20 1550             Mean Platelet Volume 12.2 fL 6/1/20 1550             Granulocytes (%) 73.5 % 6/1/20 1550            PAST, FAMILY   Past Medical History      Past Medical History:  Depression, Hypertension, Osteoporosis      Other PMH:        UTI, hiatal hernia, renal cyst,      Hematology/Oncology (F):  Anemia            Past Surgical History      Biopsy (BREAST), Bladder Surgery, Cataract Surgery (ENRIKE), Cholecystectomy,     Fracture Repair (L ANKLE L ARM), Hysterectomy            Family History      Family History:  Breast Cancer (3 SISTERS), Lung Cancer (3 BROTHERS), Neurologic    Cancer (MOTHER)            FATHER UNK CANCER      sister with some type of blood cancer            Social History      Marital Status:        Lives independently:  No      Number of Children:  3      Occupation:  RETIRED            Tobacco Use      Tobacco status:  Never smoker      Currently Vaping:  No            Alcohol Use      Alcohol intake:  None            Substance Use      Substance use:  Denies use            REVIEW OF SYSTEMS      General:  Admits: Fatigue, Weight Gain      ENT:  Denies Headache      Respiratory:  Denies: Productive Cough      Gastrointestinal:  Denies Diarrhea, Denies Nausea/Vomiting      Musculoskeletal:  Denies Muscle Pain      Neurologic:  Denies Dizziness, Denies Numbness\Tingling      Psychiatric:  Admits Depression            VITAL SIGNS AND SCORES      Vitals      Weight 134 lbs 11.217 oz / 61.1 kg      Pulse 62      Respirations 16      Blood Pressure 154/77 Sitting, Left Arm      Pulse Oximetry 100%, RM AIR            Pain Score      Pain Scale Used:  Numerical      Pain Intensity:  0            Fatigue Score       Fatigue (0-10 scale):  8            EXAM      General Appearance:  Positive for: Alert, Cooperative;          Negative for: Acute Distress      Respiratory:  Positive for: CTAB, Normal Respiratory Effort      Abdomen/Gastro:  Positive for: Normal Active Bowel Sounds, Soft;          Negative for: Hepatosplenomegaly, Tenderness      Cardiovascular:  Positive for: RRR;          Negative for: Gallop, Murmur, Peripheral Edema, Rub      Psychiatric:  Positive for: Appropriate Affect, Intact Judgement      Lymphatic:  Negative for: Cervical, Infraclavicular, Supraclavicular            PREVENTION      Date Influenza Vaccine Given:  Oct 1, 2019      2 or More Falls Past Year?:  No      Fall Past Year with Injury?:  No      Hx Pneumococcal Vaccination:  No      Encouraged to follow-up with:  PCP regarding preventative exams.      Chart initiated by      ARIADNE LUBIN MA            ALLERGY/MEDS      Allergies      Coded Allergies:             LEVOFLOXACIN (Verified  Allergy, Severe, SEVERE NAUSEA, 6/18/20)           CLONAZEPAM (Verified  Allergy, Unknown, feels strange, 6/18/20)           LISINOPRIL (Verified  Allergy, Unknown, cough, 6/18/20)           CARVEDILOL (Verified  Adverse Reaction, Unknown, 6/18/20)                  very sick           CITALOPRAM (Verified  Adverse Reaction, Unknown, feel strange, 6/18/20)           MELATONIN (Verified  Adverse Reaction, Unknown, NAUSEA DIArrhea, 6/18/20)           NITROFURANTOIN (Verified  Adverse Reaction, Unknown, RASH, 6/18/20)            Medications      Last Reconciled on 6/18/20 14:10 by ALLEGRA SORTO      Nitrofurantoin Mono Macrocryst (Macrobid*) 100 Mg Capsule      100 MG PO BID, CAP         Reported         6/1/20       Ferrous Sulfate (Iron Sulfate*) 325 Mg Tablet      65 MG PO QDAY, #30 TAB 0 Refills         Reported         5/16/20       (vitamen c)   No Conflict Check      1000 MG QDAY         Reported         5/16/20       Losartan Potassium (Losartan*) 25 Mg  Tablet      50 MG PO BID, #120 TAB 0 Refills         Reported         5/16/20       FLUoxetine HCl (FLUoxetine HCl) 20 Mg Capsule      10 MG PO HS, CAP         Reported         5/16/20       Metoprolol Succinate (Metoprolol Succinate) 100 Mg Tab.er.24h      100 MG PO BID, #60 TAB.SR.24H         Reported         5/16/20       Potassium Chloride (Klor-Con) 20 Meq Tab.er.prt      20 MEQ PO BID, TAB         Reported         4/16/19       Hctz (hydroCHLOROthiazide) 12.5 Mg Capsule      12.5 MG PO Q2D, #30 CAP 0 Refills         Reported         4/16/19       Apixaban (Eliquis) 5 Mg Tablet      5 MG PO BID for 30 Days, #60 TAB         Reported         4/16/19      Medications Reviewed:  No Changes made to meds            IMPRESSION/PLAN      Diagnosis      Anemia         Anemia, unspecified type         Anemia type: unspecified type      Patient's hemoglobin was low but improved over previous.  She was found to be     heme positive.  GI evaluation planned for next week which is appropriate.  Her     other labs all looked okay.  I will see her back in 3 months to reassess with     labs prior            Notes      New Diagnostics      * CBC With Auto Diff, 3 Months         Dx: Anemia - D64.9      * Iron Profile, 3 Months         Dx: Anemia - D64.9      * Ferritin Level, 3 Months         Dx: Anemia - D64.9            Patient Education            Eat Well, Exercise Well, Be Well: Dietary and Fitness Guidelines      Patient Education Provided:  Yes            Electronically signed by ALLEGRA SORTO  06/18/2020 14:10       Disclaimer: Converted document may not contain table formatting or lab diagrams. Please see [employer] [facility] for the authenticated document.

## 2021-05-28 NOTE — PROGRESS NOTES
Patient: MIKAELA SMITH     Acct: [id number]     Report: #[id number]  UNIT #: [id number]     : 1931    Encounter Date:2021  PRIMARY CARE: Juan Pablo Kramer  ***Signed***  --------------------------------------------------------------------------------------------------------------------  TELEHEALTH NOTE      Past Oncology Illness History      Patient referred to the office for follow up on her anemia. Referred to Dr. Raza for scope 20 pathology revealed invasive, moderated     differentiated adenocarcinoma in the ascending colon. 2020 Right     hemicholectomy with negative margins and 14 lymph nodes negative performed by     Dr. Back. 20 patient presents to the clinic accompanied by her     daughter to evaluate her anemia.             History of Present Illness            Chief Complaint: (colon ca)            Mikaela Smith is presenting for evaluation via Telehealth visit by phone.    Verbal consent obtained before beginning visit.            Provider spent (11) minutes with the patient during telehealth visit.            The following staff were present during the visit: (None)                         Past Med History      Cancer Details            20 Ascending colon pathology revealed invasive, moderated      differentiated adenocarcinoma.  3.5 cm.  T1, N0, M0 = stage I            Treatments      Surgeries      2020 Right hemicholectomy with negative margins and 14 lymph nodes negative     performed by Dr. Back.      Overview of Symptoms      Patient presents for telehealth for follow-up of iron deficiency secondary to     colon cancer which has been resected.  She also had borderline thoracic     adenopathy which has been monitored.  She states she is feeling well.  She     denies any cough, shortness of breath or hemoptysis.  She notes adequate     appetite and her weight is up several pounds.  Her energy level remains low but     adequate for  her ADLs.  She is not exercising.  Since her colon surgery, she     reports that her bowels have been more loose but no pain with bowel movement,     blood or melena.  She denies masses or adenopathy.            Most Recent Lab Findings            Item Value  Date Time             Sodium Level 139 mmol/L 1/18/21 0953             Potassium Level 4.0 mmol/L 1/18/21 0953             Chloride Level 103 mmol/L 1/18/21 0953             Carbon Dioxide Level 27 mmol/L 1/18/21 0953             Anion Gap 13 mmol/L 1/18/21 0953             Creatinine 0.95 mg/dL H 1/18/21 0953             Glucose Level 98 mg/dL 1/18/21 0953             Serum Osmolality 292 1/18/21 0953             Total Bilirubin 0.72 mg/dL 1/18/21 0953             Aspartate Amino Transf (AST/SGOT) 19 U/L 1/18/21 0953             Alanine Aminotransferase (ALT/SGPT) 10 U/L 1/18/21 0953             Alkaline Phosphatase 77 U/L 1/18/21 0953             Albumin 3.9 g/dL 1/18/21 0953             Globulin 3.5 g/dL 1/18/21 0953             Total Protein 7.4 g/dL 1/18/21 0953             White Blood Count 9.70 10*3/uL 1/18/21 0953             Hemoglobin 13.6 g/dL 1/18/21 0953             Hematocrit 40.8 % 1/18/21 0953             Mean Corpuscular Volume 95.8 fL 1/18/21 0953             Mean Corpuscular Hemoglobin 31.9 pg H 1/18/21 0953             Platelet Count 234 10*3/uL 1/18/21 0953             Granulocytes # 6.70 10*3/uL 1/18/21 0953            Allergies/Medications      Allergies:        Coded Allergies:             LEVOFLOXACIN (Verified  Allergy, Severe, SEVERE NAUSEA, 2/10/21)           CLONAZEPAM (Verified  Allergy, Unknown, feels strange, 2/10/21)           LISINOPRIL (Verified  Allergy, Unknown, cough, 2/10/21)           PNEUMOCOCCAL VACCINE (Verified  Allergy, Unknown, ARM EDEMA, 2/10/21)           CARVEDILOL (Verified  Adverse Reaction, Unknown, 2/10/21)                  very sick           CITALOPRAM (Verified  Adverse Reaction, Unknown, feel strange,  2/10/21)           MELATONIN (Verified  Adverse Reaction, Unknown, NAUSEA DIArrhea, 2/10/21)           NITROFURANTOIN (Verified  Adverse Reaction, Unknown, RASH, 2/10/21)      Medications    Last Reconciled on 2/11/21 09:28 by ALLEGRA SORTO      dilTIAZem 24Hr ER (dilTIAZem 24Hr ER) 120 Mg Cap.er.24h      120 MG PO QDAY for 30 Days, #30 CAP.ER         Reported         11/12/20       Magnesium Oxide (Magnesium Oxide*) 400 Mg Tablet      400 MG PO BID for 30 Days, #60 TAB 0 Refills         Prov: ARGENIS MARIE         8/4/20       Potassium Phos/Sodium Phos (Phos-Nak Pwd Pkg) 1 Each Powd.pack      1 PKT PO BID for 30 Days, #60 PACKET         Prov: ARGENIS MARIE         8/4/20       Losartan Potassium (Losartan*) 50 Mg Tablet      50 MG PO BID for 30 Days, #60 TAB         Prov: ARGENIS MARIE         7/22/20       FLUoxetine HCl (PROzac) 10 Mg Capsule      10 MG PO QDAY for 30 Days, #30 CAP         Prov: ARGENIS MARIE         7/22/20       Metoprolol Succinate (Metoprolol Succinate) 100 Mg Tab.er.24h      100 MG PO BID, #60 TAB.SR.24H         Reported         7/15/20       Acetaminophen Es (Tylenol Extra Strength) 500 Mg Tablet      500 MG PO HS PRN for PAIN, #100 TAB 0 Refills         Reported         7/6/20       Ascorbic Acid (Vitamin C*) 1,000 Mg Tablet      1000 MG PO QDAY, TAB         Reported         6/22/20       Ferrous Sulfate (Iron Sulfate*) 325 Mg Tablet      65 MG PO QDAY, #30 TAB 0 Refills         Reported         5/16/20       Apixaban (Eliquis) 5 Mg Tablet      5 MG PO BID for 30 Days, #60 TAB         Reported         4/16/19            Plan/Instructions      Ambulatory Assessment/Plan:        Ascending colon malignant neoplasm - C18.2      Stage I.  Status post resection.  MARIOLA.            Iron deficiency anemia         Iron deficiency anemia, unspecified iron deficiency anemia type         Iron deficiency anemia type: unspecified iron deficiency      Status post iron replacement.  Hemoglobin, MCV  remain normal.  Recheck in 6     months to ensure stability            Thoracic lymphadenopathy - R59.0      Recent CT scan reviewed with patient and daughter.  This shows stable small     pulmonary nodules and borderline adenopathy.  1 year follow-up recommended      Plan/Instructions            * Plan Of Care: (Colon cancer, anemia, thoracic adenopathy)            * Chronic conditions reviewed and taken into consideration for today's treatment      plan.      * Patient instructed to seek medical attention urgently for new or worsening       symptoms.      * Patient was educated/instructed on their diagnosis, treatment and medications       prior to discharge from the clinic today.      Codes:  Phone Eval 11-20 mi [id number]            Electronically signed by ALLEGRA SORTO  02/11/2021 09:28       Disclaimer: Converted document may not contain table formatting or lab diagrams. Please see [employer] [facility] for the authenticated document.

## 2021-05-28 NOTE — PROGRESS NOTES
Patient: MIKAELA SMITH     Acct: [id number]     Report: #[id number]  UNIT #: [id number]     : 1931    Encounter Date:2020  PRIMARY CARE: Juan Pablo Kramer  ***Signed***  --------------------------------------------------------------------------------------------------------------------  NURSE INTAKE      Visit Type      Established Patient Visit            Chief Complaint      LAB RESULTS      Intent of Therapy:  Curative            History and Present Illness      Past Oncology Illness History      Patient referred to the office for follow up on her anemia. Referred to Dr. Raza for scope 20 pathology revealed invasive, moderated     differentiated adenocarcinoma in the ascending colon. 2020 Right hemichole    ctomy with negative margins and 14 lymph nodes negative performed by Dr. Back. 20 patient presents to the clinic accompanied by her daughter to    evaluate her anemia.             HPI - Oncology Interim      Patient returns for follow-up ofIron deficiency anemia secondary to stage I co    pat cancer which has now been resected.  She is taking oral iron supplement     without difficulty.  She does note that she has had some trouble with swallowing    and found to have esophageal stricture.  Dilation is planned.  She notes good     appetite but her weight is not increasing, although it has not fallen either.      She reports that the bowels are working well.  She denies obvious blood loss.            Cancer Details            20 Ascending colon pathology revealed invasive, moderated      differentiated adenocarcinoma.            Treatments      Surgeries      2020 Right hemicholectomy with negative margins and 14 lymph nodes negative     performed by Dr. Back.            Most Recent Lab Findings      Laboratory Tests      20 16:07            Laboratory Tests            Test       20      16:07             Ferritin       406 ng/mL       ()            PAST, FAMILY   Past Medical History      Past Medical History:  Depression, Hypertension, Osteoporosis      Other PMH:        hiatal hernia, renal cyst      Hematology/Oncology (F):  Anemia, Colorectal Cancer            Past Surgical History      Biopsy, Bladder Surgery, Cataract Surgery, Cholecystectomy, Fracture Repair,     Hysterectomy            Family History      Family History:  Breast Cancer, Lung Cancer, Neurologic Cancer            FATHER UNK CANCER      sister with some type of blood cancer            Social History      Lives independently:  No      Number of Children:  3      Occupation:  RETIRED            Tobacco Use      Tobacco status:  Never smoker            Substance Use      Substance use:  Denies use            REVIEW OF SYSTEMS      General:  Admits: Fatigue      Respiratory:  Denies: Shortness of Air, Wheezing      Gastrointestinal:  Admits Problem Swallowing; Denies Unable to Control Bowels      Genitourinary:  Denies Painful Urination      Musculoskeletal:  Denies Muscle Pain, Denies Painful Joints      Neurologic:  Denies Numbness\Tingling      Psychiatric:  Admits Depression            VITAL SIGNS AND SCORES      Vitals      Weight 123 lbs 10.849 oz / 56.1 kg      Temperature 97.8 F / 36.56 C - Temporal      Pulse 80      Respirations 16      Blood Pressure 149/77 Sitting, Left Arm      Pulse Oximetry 100%, RM AIR            Pain Score      Pain Scale Used:  Numerical      Pain Intensity:  0            Fatigue Score      Fatigue (0-10 scale):  8            EXAM      General Appearance:  Positive for: Alert, Cooperative;          Negative for: Acute Distress      Respiratory:  Positive for: CTAB, Normal Respiratory Effort      Abdomen/Gastro:  Positive for: Normal Active Bowel Sounds, Soft;          Negative for: Tenderness      Cardiovascular:  Positive for: RRR;          Negative for: Gallop, Murmur, Peripheral Edema, Rub      Psychiatric:  Positive for: Appropriate  Affect, Intact Judgement            PREVENTION      Date Influenza Vaccine Given:  Oct 1, 2019      2 or More Falls in Past Year?:  No      Fall Past Year with Injury?:  No      Encouraged to follow-up with:  PCP regarding preventative exams.      Chart initiated by      ARIADNE LUBIN MA            ALLERGY/MEDS      Allergies      Coded Allergies:             LEVOFLOXACIN (Verified  Allergy, Severe, SEVERE NAUSEA, 11/12/20)           CLONAZEPAM (Verified  Allergy, Unknown, feels strange, 11/12/20)           LISINOPRIL (Verified  Allergy, Unknown, cough, 11/12/20)           PNEUMOCOCCAL VACCINE (Verified  Allergy, Unknown, ARM EDEMA, 11/12/20)           CARVEDILOL (Verified  Adverse Reaction, Unknown, 11/12/20)                  very sick           CITALOPRAM (Verified  Adverse Reaction, Unknown, feel strange, 11/12/20)           MELATONIN (Verified  Adverse Reaction, Unknown, NAUSEA DIArrhea, 11/12/20)           NITROFURANTOIN (Verified  Adverse Reaction, Unknown, RASH, 11/12/20)            Medications      Last Reconciled on 11/12/20 16:07 by ALLEGRA SORTO      dilTIAZem 24Hr ER (dilTIAZem 24Hr ER) 120 Mg Cap.er.24h      120 MG PO QDAY for 30 Days, #30 CAP.ER         Reported         11/12/20       Magnesium Oxide (Magnesium Oxide*) 400 Mg Tablet      400 MG PO BID for 30 Days, #60 TAB 0 Refills         Prov: ARGENIS MARIE         8/4/20       Potassium Phos/Sodium Phos (Phos-Nak Pwd Pkg) 1 Each Powd.pack      1 PKT PO BID for 30 Days, #60 PACKET         Prov: ARGENIS MARIE         8/4/20       Losartan Potassium (Losartan*) 50 Mg Tablet      50 MG PO BID for 30 Days, #60 TAB         Prov: ARGENIS MARIE         7/22/20       FLUoxetine HCl (PROzac) 10 Mg Capsule      10 MG PO QDAY for 30 Days, #30 CAP         Prov: ARGENIS MARIE         7/22/20       Metoprolol Succinate (Metoprolol Succinate) 100 Mg Tab.er.24h      100 MG PO BID, #60 TAB.SR.24H         Reported         7/15/20       Acetaminophen Es (Tylenol  Extra Strength) 500 Mg Tablet      500 MG PO HS PRN for PAIN, #100 TAB 0 Refills         Reported         7/6/20       Ascorbic Acid (Vitamin C*) 1,000 Mg Tablet      1000 MG PO QDAY, TAB         Reported         6/22/20       Ferrous Sulfate (Iron Sulfate*) 325 Mg Tablet      65 MG PO QDAY, #30 TAB 0 Refills         Reported         5/16/20       Apixaban (Eliquis) 5 Mg Tablet      5 MG PO BID for 30 Days, #60 TAB         Reported         4/16/19      Medications Reviewed:  Changes made to meds            IMPRESSION/PLAN      Diagnosis      Iron deficiency anemia         Iron deficiency anemia due to chronic blood loss         Iron deficiency anemia type: chronic blood loss      Patient has been on oral iron supplement.  Her hemoglobin, MCV, serum iron and     ferritin are all normal.  She can finish her current bottle of oral iron and     then stop.  Recheck next visit.            Ascending colon malignant neoplasm - C18.2      Stage I.  Status post resection.  MARIOLA.            Thoracic lymphadenopathy - R59.0      Minimally enlarged.  Radiology recommended 6-month follow-up scan which will be     ordered            Notes      New Medications      * dilTIAZem 24Hr  MG CAP.ER.24H: 120 MG PO QDAY 30 Days #30      New Diagnostics      * CBC With Auto Diff, 3 Months         Dx: Iron deficiency anemia - D50.9            Pain      Pain Zero Today            Advanced Care Plan Discussion      Declines Discussion [id number]            Patient Education      Patient Education Provided:  Yes            Electronically signed by ALLEGRA SORTO  11/12/2020 16:07       Disclaimer: Converted document may not contain table formatting or lab diagrams. Please see [employer] [facility] for the authenticated document.

## 2021-05-28 NOTE — PROGRESS NOTES
Patient: MIKAELA SMITH     Acct: [id number]     Report: #[id number]  UNIT #: [id number]     : 1931    Encounter Date:2020  PRIMARY CARE: Juan Pablo Kramer  ***Signed***  --------------------------------------------------------------------------------------------------------------------  NURSE INTAKE      Visit Type      Established Patient Visit            Chief Complaint      ANEMIA, S/P COLECTOMY HERE FOR CHECK UP AFTER SURGERY            Referring Provider/Copies To      Referring Provider:  Juan Pablo Kramer      Primary Care Provider:  Juan Pablo Kramer      Copies To:   Edmar Back; John Bobo            History and Present Illness      Past History      Patient referred to the office from Dr. Juan Pablo Kramer's office for anemia.     Referred to Dr. Raza for scope 20 pathology revealed invasive,     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 2020 Right     hemicholectomy with negative margins and 14 lymph nodes negative performed by     Dr. Back.            HPI - Hematology Interim      Patient returns for follow-up of anemia.  Endoscopy identified adenocarcinoma     the colon.  Since her last visit, she has undergone colon resection (T1, N0, M0)    stage I colon cancer.  She required a stay in rehab to recover from the surgery.     She has only recently returned home.  She states that she is not eating very     well which she attributes in part to the food in the hospital.  She is taking     her iron supplement.  She reports that the bowels are loose but no blood or     melena.  She denies any other obvious bleeding.  Her energy level still lower     than she would like but slowly improving.            Most Recent Lab Findings      Laboratory Tests      8/3/20 05:21            Laboratory Tests            Test       8/3/20      05:21             Magnesium Level       1.42 mg/dL      (1.60-2.30)            PAST, FAMILY   Past Medical History      Past Medical  History:  Depression, Hypertension, Osteoporosis      Other PMH:        hiatal hernia, renal cyst      Hematology/Oncology (F):  Anemia, Colorectal Cancer            Past Surgical History      Biopsy (BREAST), Bladder Surgery, Cataract Surgery (ENRIKE), Cholecystectomy,     Fracture Repair (L ANKLE L ARM), Hysterectomy            Family History      Family History:  Breast Cancer (3 SISTERS), Lung Cancer (3 BROTHERS), Neurologic    Cancer (MOTHER)            FATHER UNK CANCER      sister with some type of blood cancer            Social History      Marital Status:        Lives independently:  No      Number of Children:  3      Occupation:  RETIRED            Tobacco Use      Tobacco status:  Never smoker      Currently Vaping:  No            Alcohol Use      Alcohol intake:  None            Substance Use      Substance use:  Denies use            REVIEW OF SYSTEMS      General:  Admits: Fatigue      Eye:  Admits Corrective Lenses      ENT:  Denies Headache      Cardiovascular:  Denies Chest Pain      Respiratory:  Denies: Shortness of Air      Gastrointestinal:  Denies Nausea/Vomiting      Genitourinary:  Admits Painful Urination      Musculoskeletal:  Admits Back Pain      Integumentary:  Admits Rash (COMES AND GOES)      Neurologic:  Admits Dizziness            VITAL SIGNS AND SCORES      Vitals      Height 5 ft 4.29 in / 163.3 cm      Weight 117 lbs 11.610 oz / 53.4 kg      BSA 1.57 m2      BMI 20.0 kg/m2      Temperature 98.5 F / 36.94 C - Temporal      Pulse 80      Respirations 20      Blood Pressure 147/80 Sitting, Right Arm      Pulse Oximetry 98%, ROOM AIR            Pain Score      Pain Scale Used:  Numerical      Pain Intensity:  0            Fatigue Score      Fatigue (0-10 scale):  8            EXAM      General Appearance:  Positive for: Alert, Cooperative;          Negative for: Acute Distress      Other      Elderly      Neck:  Positive for: Supple;          Negative for: JVD, Masses       Respiratory:  Positive for: CTAB, Normal Respiratory Effort      Abdomen/Gastro:  Positive for: Normal Active Bowel Sounds, Soft;          Negative for: Distention, Hepatosplenomegaly, Tenderness      Other      Well-healed midline incision      Cardiovascular:  Positive for: RRR;          Negative for: Gallop, Murmur, Peripheral Edema, Rub      Psychiatric:  Positive for: Appropriate Affect, Intact Judgement            PREVENTION      Hx Influenza Vaccination:  Yes      Date Influenza Vaccine Given:  Oct 1, 2019      2 or More Falls in Past Year?:  No      Fall Past Year with Injury?:  No      Hx Pneumococcal Vaccination:  No      Encouraged to follow-up with:  PCP regarding preventative exams.      Chart initiated by      INDIA OSEGUERA            ALLERGY/MEDS      Allergies      Coded Allergies:             LEVOFLOXACIN (Verified  Allergy, Severe, SEVERE NAUSEA, 7/22/20)           CLONAZEPAM (Verified  Allergy, Unknown, feels strange, 7/22/20)           LISINOPRIL (Verified  Allergy, Unknown, cough, 7/22/20)           PNEUMOCOCCAL VACCINE (Verified  Allergy, Unknown, ARM EDEMA, 7/22/20)           CARVEDILOL (Verified  Adverse Reaction, Unknown, 7/22/20)                  very sick           CITALOPRAM (Verified  Adverse Reaction, Unknown, feel strange, 7/22/20)           MELATONIN (Verified  Adverse Reaction, Unknown, NAUSEA DIArrhea, 7/22/20)           NITROFURANTOIN (Verified  Adverse Reaction, Unknown, RASH, 7/22/20)            Medications      Last Reconciled on 8/11/20 16:10 by ALLEGRA SORTO      Magnesium Oxide (Magnesium Oxide*) 400 Mg Tablet      400 MG PO BID for 30 Days, #60 TAB 0 Refills         Prov: ARGENIS MARIE         8/4/20       Potassium Phos/Sodium Phos (Phos-Nak Pwd Pkg) 1 Each Powd.pack      1 PKT PO BID for 30 Days, #60 PACKET         Prov: ARGENIS MARIE         8/4/20       Losartan Potassium (Losartan*) 50 Mg Tablet      50 MG PO BID for 30 Days, #60 TAB         Prov:  ARGENIS MARIE         7/22/20       FLUoxetine HCl (PROzac) 10 Mg Capsule      10 MG PO QDAY for 30 Days, #30 CAP         Prov: ARGENIS MARIE         7/22/20       Metoprolol Succinate (Metoprolol Succinate) 100 Mg Tab.er.24h      100 MG PO BID, #60 TAB.SR.24H         Reported         7/15/20       Acetaminophen Es (Tylenol Extra Strength) 500 Mg Tablet      500 MG PO HS PRN for PAIN, #100 TAB 0 Refills         Reported         7/6/20       Ascorbic Acid (Vitamin C*) 1,000 Mg Tablet      1000 MG PO QDAY, TAB         Reported         6/22/20       Ferrous Sulfate (Iron Sulfate*) 325 Mg Tablet      65 MG PO QDAY, #30 TAB 0 Refills         Reported         5/16/20       Apixaban (Eliquis) 5 Mg Tablet      5 MG PO BID for 30 Days, #60 TAB         Reported         4/16/19      Medications Reviewed:  No Changes made to meds            IMPRESSION/PLAN      Diagnosis      Ascending colon malignant neoplasm - C18.2      Stage I.  Status post resection which is curative.  No need for adjuvant     therapy.  Based on NCCN guidelines, surveillance would be endoscopy only.            Iron deficiency anemia         Iron deficiency anemia, unspecified iron deficiency anemia type - D50.9         Iron deficiency anemia type: unspecified iron deficiency      H/H is slowly improving. Cont oral iron therapy.  This will need to be continued    for a minimum of 3 to 6 months to replenish depleted bone marrow stores.  I will    plan to recheck the CBC and iron profile in 3 months            Pulmonary nodule - R91.1      Identified on staging.  Too small to characterize.  Repeat scan recommended.  I     will plan to repeat the CT of the chest in 6 months            Notes      Discontinued Medications      * Vancomycin HCl (Firvanq) 50 MG/1 ML SOLN.RECON: 125 MG PO QID 2 Days #20      * metroNIDAZOLE (Flagyl) 500 MG TABLET: 500 MG PO TID 2 Days #6      New Diagnostics      * CCC CBC With Auto Diff, 3 Months         Dx: Iron deficiency  anemia - D50.9      * Iron Profile, 3 Months         Dx: Iron deficiency anemia - D50.9      * Ferritin Level, 3 Months         Dx: Iron deficiency anemia - D50.9      * Chest W/ Cont CT, 6 Months         Dx: Pulmonary nodule - R91.1            Patient Education      Patient Education Provided:  Yes            Electronically signed by ALLEGRA SORTO  08/11/2020 16:10       Disclaimer: Converted document may not contain table formatting or lab diagrams. Please see [email or web address] [facility] for the authenticated document.

## 2021-05-28 NOTE — PROGRESS NOTES
Patient: MIKAELA SMITH     Acct: [id number]     Report: #[id number]  UNIT #: [id number]     : 1931    Encounter Date:2020  PRIMARY CARE: Juan Pablo Kramer  ***Signed***  --------------------------------------------------------------------------------------------------------------------  NURSE INTAKE      Visit Type      New Patient Visit            Chief Complaint      ANEMIA            Referring Provider/Copies To      Referring Provider:  Juan Pablo Kramer      Primary Care Provider:  Juan Pablo Kramer      Copies To:   Juan Pablo Kramer            History and Present Illness      Past History      Patient referred to the office from Dr. Juan Pablo Kramer's office for anemia.            HPI - Hematology Interim      Patient presents today for evaluation of anemia.  It was identified approxima    hina 6 months ago on routine lab work.  Patient states that she is just been     very fatigued.  She is not able to work in her yard like she would like.  She is    also had recurrent and persistent urinary tract infections for which she is     currently taking Macrobid.  She denies any obvious blood loss such as blood per     rectum, melena, hematuria, hematemesis, hemoptysis etc.  She notes a reasonable     appetite with a varied diet.  She has lost 20 to 30 pounds over the last 6     months or so.  She denies recurrent infections, fever, chills, weight loss,     night sweats or lymphadenopathy.  She denies excessive bruising.  She does take     blood thinner.  Because of her ongoing fatigue, she has had lab work several     times over the last few months.  This demonstrates a hemoglobin in the 10-11     range.  Her other blood counts have been stable.  Prior lab work from last year     did show a borderline B12 level.  Because of the ongoing issues with the     hemoglobin, I am asked to see the patient.            Most Recent Lab Findings      Laboratory Tests      20 13:44            PAST, FAMILY   Past  Medical History      Past Medical History:  Depression, Hypertension, Osteoporosis      Other PMH:        UTI, hiatal hernia, renal cyst,      Hematology/Oncology (F):  Anemia            Past Surgical History      Biopsy (BREAST), Bladder Surgery, Cataract Surgery (ENRIKE), Cholecystectomy,     Fracture Repair (L ANKLE L ARM), Hysterectomy            Family History      Family History:  Breast Cancer (3 SISTERS), Lung Cancer (3 BROTHERS), Neurologic    Cancer (MOTHER)            FATHER UNK CANCER            Social History      Marital Status:        Lives independently:  No      Number of Children:  3      Occupation:  RETIRED            Tobacco Use      Tobacco status:  Never smoker      Currently Vaping:  No            Alcohol Use      Alcohol intake:  None            Substance Use      Substance use:  Denies use            REVIEW OF SYSTEMS      General:  Admits: Fatigue, Weight Loss;          Denies: Fever, Night Sweats      ENT:  Admits Hearing Loss      Cardiovascular:  Denies Chest Pain, Denies Palpitations      Respiratory:  Denies: Coughing Blood, Shortness of Air      Gastrointestinal:  Denies Bloody Stools, Denies Diarrhea, Denies Nausea/Vomiting      Genitourinary:  Denies Blood in Urine, Denies Incontinence (Dr. Castro)      Musculoskeletal:  Admits Muscle Pain, Admits Painful Joints      Neurologic:  Denies Dizziness, Denies Numbness\Tingling      Psychiatric:  Denies Anxiety, Denies Depression      Hematologic/Lymphatic:  Denies Bruising, Denies Bleeding            VITAL SIGNS AND SCORES      Vitals      Height 5 ft 4.29 in / 163.3 cm      Weight 130 lbs 1.142 oz / 59 kg      BSA 1.63 m2      BMI 22.1 kg/m2      Temperature 98.5 F / 36.94 C - Temporal      Pulse 54      Respirations 16      Blood Pressure 151/77 Sitting, Left Arm      Pulse Oximetry 100%, RM AIR            Pain Score      Pain Scale Used:  Numerical      Pain Intensity:  7            Fatigue Score      Fatigue (0-10 scale):  10             EXAM      General Appearance:  Positive for: Alert, Cooperative;          Negative for: Acute Distress      Other      Elderly      Eye:  Positive for: Anicteric Sclerae, Moist Conjunctiva      Neck:  Positive for: Supple;          Negative for: JVD, Masses      Respiratory:  Positive for: CTAB, Normal Respiratory Effort      Abdomen/Gastro:  Positive for: Normal Active Bowel Sounds, Soft;          Negative for: Distention, Hepatosplenomegaly, Tenderness      Cardiovascular:  Positive for: RRR;          Negative for: Gallop, Murmur, Peripheral Edema, Rub      Psychiatric:  Positive for: Appropriate Affect, Intact Judgement      Lymphatic:  Negative for: Cervical, Infraclavicular, Supraclavicular            PREVENTION      Date Influenza Vaccine Given:  Oct 1, 2019      2 or More Falls Past Year?:  No      Fall Past Year with Injury?:  No      Hx Pneumococcal Vaccination:  No      Encouraged to follow-up with:  PCP regarding preventative exams.      Chart initiated by      ARIADNE LUBIN MA            ALLERGY/MEDS      Allergies      Coded Allergies:             LEVOFLOXACIN (Verified  Allergy, Severe, SEVERE NAUSEA, 6/1/20)           CLONAZEPAM (Verified  Allergy, Unknown, feels strange, 6/1/20)           LISINOPRIL (Verified  Allergy, Unknown, cough, 6/1/20)           CARVEDILOL (Verified  Adverse Reaction, Unknown, 6/1/20)                  very sick           CITALOPRAM (Verified  Adverse Reaction, Unknown, feel strange, 6/1/20)           MELATONIN (Verified  Adverse Reaction, Unknown, NAUSEA DIArrhea, 6/1/20)           NITROFURANTOIN (Verified  Adverse Reaction, Unknown, RASH, 6/1/20)            Medications      Last Reconciled on 6/1/20 16:19 by ALLEGRA SORTO      Nitrofurantoin Mono Macrocryst (Macrobid*) 100 Mg Capsule      100 MG PO BID, CAP         Reported         6/1/20       Ferrous Sulfate (Iron Sulfate*) 325 Mg Tablet      65 MG PO QDAY, #30 TAB 0 Refills         Reported         5/16/20        (vitamen c)   No Conflict Check      1000 MG QDAY         Reported         5/16/20       Losartan Potassium (Losartan*) 25 Mg Tablet      50 MG PO BID, #120 TAB 0 Refills         Reported         5/16/20       FLUoxetine HCl (FLUoxetine HCl) 20 Mg Capsule      10 MG PO HS, CAP         Reported         5/16/20       Metoprolol Succinate (Metoprolol Succinate) 100 Mg Tab.er.24h      100 MG PO BID, #60 TAB.SR.24H         Reported         5/16/20       Potassium Chloride (Klor-Con) 20 Meq Tab.er.prt      20 MEQ PO BID, TAB         Reported         4/16/19       Hctz (hydroCHLOROthiazide) 12.5 Mg Capsule      12.5 MG PO Q2D, #30 CAP 0 Refills         Reported         4/16/19       Apixaban (Eliquis) 5 Mg Tablet      5 MG PO BID for 30 Days, #60 TAB         Reported         4/16/19      Medications Reviewed:  Changes made to meds            IMPRESSION/PLAN      Diagnosis      Anemia         Anemia, unspecified type - D64.9         Anemia type: unspecified type      Normocytic.  This has been ongoing for the last several months.  She generally     ranges in the 10-11 g/dL level.  No obvious bleeding although she is on blood     thinner.  Prior work-up demonstrated a borderline B12 level.  I will repeat lab     work including B12 and methylmalonic acid along with folate, iron studies,     repeat CBC, CMP, reticulocyte count as well as a peripheral smear to assess     morphology.  Given her anticoagulation use, I will check stool for occult blood     as bleeding would be in the differential.  If stool is positive, consider GI     evaluation.  I did discuss bone marrow aspiration and biopsy if no other obvious    cause could be identified.  I will see her back in a couple of weeks to review     the work-up and plan additional testing or treatment as warranted.      New Diagnostics      * Methylmalonic Acid, Routine            Notes      New Medications      * Nitrofurantoin Mono Macrocryst (Macrobid*) 100 MG CAPSULE: 100 MG  PO BID      Discontinued Medications      * Metoprolol Succinate 100 MG TAB.ER.24H: 100 MG PO BID #60      * MDI-Albuterol (Proair HFA) 8.5 GM HFA.AER.AD: 1-2 PUFFS INH TID PRN SHORTNESS       OF BREATH #1         Instructions: please provide spacer      * CEFDINIR 300 MG CAP: 300 MG PO BID 7 Days #14      New Diagnostics      * Stool Occult Blood, Routine         Dx: Anemia - D64.9      * CBC With Auto Diff, Routine         Dx: Anemia - D64.9      * CMP Comp Metabolic Panel, Routine         Dx: Anemia - D64.9      * Reticulocyte Count, Routine         Dx: Anemia - D64.9      * B12      Dx: Anemia - D64.9      * Path Review Peripheral Smear, Routine         Dx: Anemia - D64.9      * Urinalyis W/Micro, Routine         Dx: Painful urination - R30.9      * Ferritin Level, Routine         Dx: Anemia - D64.9      * Iron Profile, Routine         Dx: Anemia - D64.9            Patient Education            Anemia      Patient Education Provided:  Yes            Electronically signed by ALLEGRA SORTO  06/01/2020 16:19       Disclaimer: Converted document may not contain table formatting or lab diagrams. Please see [employer] for the authenticated document.

## 2021-05-28 NOTE — PROGRESS NOTES
Patient: MIKAELA SMITH     Acct: [id number]     Report: #[id number]  UNIT #: [id number]     : 1931    Encounter Date:2020  PRIMARY CARE: Juan Pablo Kramer  ***Signed***  --------------------------------------------------------------------------------------------------------------------  NURSE INTAKE      Visit Type      Established Patient Visit            Chief Complaint      COLON ADENOCARCINOMA            Referring Provider/Copies To      Referring Provider:  Juan Pablo Kramer      Primary Care Provider:  Juan Pablo Kramer      Copies To:   VIDHYA INIGUEZ; Edmar Back; Juan Palbo Kramer            History and Present Illness      Past Oncology Illness History      Patient referred to the office from Dr. Juan Pablo Kramer's office for anemia.     Referred to Dr. Raza for scope 20 pathology revealed invasive,     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 Surgery     scheduled with  next week.            HPI - Oncology Interim      Patient was seen recently by me for evaluation of anemia.  This was felt to be     iron deficiency.  GI evaluation was scheduled and patient underwent upper and     lower endoscopy which demonstrated a mass in the ascending colon.  Biopsy     positive for adenocarcinoma.  She has undergone staging CT scans which did not     demonstrate any obvious metastatic disease.  She has been seen by Dr. Back     with surgery and plans for resection next week.  She denies any obvious blood     per rectum or melena.  She notes that she is eating and drinking fairly well.      She is able to perform all of her ADLs.  She denies any unusual aches or pains.            Cancer Details            20 Ascending colon pathology revealed invasive, moderated      differentiated adenocarcinoma.            Most Recent Lab Findings            Item Value  Date Time             Carcinoembryonic Antigen 1.4 ng/mL 20 0901            Most Recent Imaging  Findings      Patient: MIKAELA SMITH   Acct: #[id number]   Report: #[id number]            UNIT #: [id number]    DOS: 20 0832      INSURANCE:MEDICARE PART A   LOCATION:CT     : 1931            PROVIDERS      ADMITTING:     ATTENDING: VIDHYA INIGUEZ      FAMILY:  Juan Pablo Kramer   ORDERING:  VIDHYA INIGUEZ         OTHER:    DICTATING:  Rashaad Naidu MD            REQ #:[id number]   EXAM:CTACPWC - CT ABD CHEST PEL w CONTR      REASON FOR EXAM:  COLON ADINOCARCINOMA      REASON FOR VISIT:  COLON ADINOCARCINOMA            *******Signed******         PROCEDURE:   CT ABDOMEN; CT CHEST; CT PELVIS WITH CONTRAST             COMPARISON:   [facility], CR, RIBS-PA CHEST- UNIL RT, 2020,     13:45.  [facility], CT, ABDOMEN PELVIS W/WO CONTRAST, 2019, 14:37.             INDICATIONS:   COLON ADINOCARCINOMA             TECHNIQUE:   After obtaining the patient's consent, CT images were obtained with    intravenous contrast       material.             FINDINGS:         Chest:             The central tracheobronchial tree is clear.  There is mild scarring at the lung     apices.  A few       scattered calcified granulomas are noted.  There is a 5 mm left lower lobe     nodule on image 41.       There is no focal airspace consolidation.  There is no pleural effusion.             The heart appears mildly enlarged.  The great vessels are normal in caliber.      There is a prominent       right hilar lymph node on image 39 measuring 1.8 x 1.1 cm.  No abnormally     enlarged mediastinal,       left hilar, or axillary lymph nodes are identified.             No aggressive osseous lesions are identified.  There are several right rib     fractures that appears       subacute.  Surgical hardware is partially visualized within the left humerus.      There is a mild       anterior wedge compression deformity at T7 that appears chronic.             Abdomen/pelvis:              There is a stable cyst within the liver.  The gallbladder is surgically absent.     The adrenal       glands, spleen, and pancreas are unremarkable.  There is bilateral renal     cortical scarring.             There is a small hiatal hernia.  The small bowel appears normal in caliber and     configuration.        There is sigmoid diverticulosis.  There are surgical clips within the cecum.      The appendix is not       well visualized.  There is no ascites or loculated collection.  No abnormally     enlarged lymph nodes       are identified.  There is a 4 mm lymph node within the right lower quadrant     adjacent to the cecum       on image 76. There is atherosclerotic plaque in the abdominal aorta.             The rectum and urinary bladder are unremarkable.  The uterus is surgically     absent.             No aggressive osseous lesions are identified.               CONCLUSION:         1. 5 mm left lower lobe nodule.  There is also a prominent right hilar lymph     node.  Recommend       attention on follow-up imaging.      2. Surgical clips within cecum.  There is a 4 mm lymph node in the right lower     quadrant adjacent to       the cecum.  If the known colon cancer is within the cecum, this could represent     an early metastatic       lymph node.      3. No acute process identified on this exam.      4. Several right rib fractures, which appear subacute.      5. Small hiatal hernia.      6. Sigmoid diverticulosis.              LESLEE ROLDAN MD             Electronically Signed and Approved By: LESLEE ROLDAN MD on 6/30/2020 at 9:56                           Until signed, this is an unconfirmed preliminary report that may contain      errors and is subject to change.                                              RODD1:      D:06/30/20 0957            PAST, FAMILY   Past Medical History      Past Medical History:  Depression, Hypertension, Osteoporosis      Other PMH:        hiatal hernia, renal cyst       Hematology/Oncology (F):  Anemia, Colorectal Cancer            Past Surgical History      Biopsy (BREAST), Bladder Surgery, Cataract Surgery (ENRIKE), Cholecystectomy,     Fracture Repair (L ANKLE L ARM), Hysterectomy            Family History      Family History:  Breast Cancer (3 SISTERS), Lung Cancer (3 BROTHERS), Neurologic    Cancer (MOTHER)            FATHER UNK CANCER      sister with some type of blood cancer            Social History      Marital Status:        Lives independently:  No      Number of Children:  3      Occupation:  RETIRED            Tobacco Use      Tobacco status:  Never smoker      Currently Vaping:  No            Alcohol Use      Alcohol intake:  None            Substance Use      Substance use:  Denies use            REVIEW OF SYSTEMS      General:  Admits: Fatigue, Weight Loss      Eye:  Denies Blurred Vision, Denies Vision Changes      ENT:  Admits Hearing Loss      Cardiovascular:  Denies Chest Pain      Respiratory:  Admits: Shortness of Air      Gastrointestinal:  Admits Diarrhea; Denies Bloody Stools      Neurologic:  Denies Dizziness      Psychiatric:  Admits Depression            VITAL SIGNS AND SCORES      Vitals      Weight 130 lbs 8.197 oz / 59.2 kg      Temperature 98.9 F / 37.17 C - Temporal      Pulse 64      Respirations 16      Blood Pressure 181/71 Sitting, Left Arm      Pulse Oximetry 95%, RM AIR            Pain Score      Pain Scale Used:  Numerical      Pain Intensity:  0            Fatigue Score      Fatigue (0-10 scale):  8            Rehab to be Ordered      Type of Referral to be Ordered:  No needs identified            EXAM      General Appearance:  Positive for: Alert, Cooperative;          Negative for: Acute Distress      Other      Elderly      Neck:  Positive for: Supple;          Negative for: JVD, Masses      Respiratory:  Positive for: CTAB, Normal Respiratory Effort      Abdomen/Gastro:  Positive for: Normal Active Bowel Sounds, Soft;           Negative for: Distention, Hepatosplenomegaly, Tenderness      Cardiovascular:  Positive for: RRR;          Negative for: Gallop, Murmur, Peripheral Edema, Rub      Psychiatric:  Positive for: Appropriate Affect, Intact Judgement      Lymphatic:  Negative for: Cervical, Infraclavicular, Supraclavicular            PREVENTION      Hx Influenza Vaccination:  Yes      Date Influenza Vaccine Given:  Oct 1, 2019      2 or More Falls Past Year?:  No      Fall Past Year with Injury?:  No      Hx Pneumococcal Vaccination:  No      Encouraged to follow-up with:  PCP regarding preventative exams.      Chart initiated by      ARIADNE LUBIN MA            ALLERGY/MEDS      Allergies      Coded Allergies:             LEVOFLOXACIN (Verified  Allergy, Severe, SEVERE NAUSEA, 7/1/20)           CLONAZEPAM (Verified  Allergy, Unknown, feels strange, 7/1/20)           LISINOPRIL (Verified  Allergy, Unknown, cough, 7/1/20)           CARVEDILOL (Verified  Adverse Reaction, Unknown, 7/1/20)                  very sick           CITALOPRAM (Verified  Adverse Reaction, Unknown, feel strange, 7/1/20)           MELATONIN (Verified  Adverse Reaction, Unknown, NAUSEA DIArrhea, 7/1/20)           NITROFURANTOIN (Verified  Adverse Reaction, Unknown, RASH, 7/1/20)            Medications      Last Reconciled on 7/1/20 15:40 by ALLEGRA SORTO      LORazepam (LORazepam) 1 Mg Tablet      1 MG PO HS, TAB         Reported         7/1/20       Ascorbic Acid (Vitamin C*) 1,000 Mg Tablet      1000 MG PO QDAY, TAB         Reported         6/22/20       Ferrous Sulfate (Iron Sulfate*) 325 Mg Tablet      65 MG PO QDAY, #30 TAB 0 Refills         Reported         5/16/20       Losartan Potassium (Losartan*) 25 Mg Tablet      50 MG PO BID, #120 TAB 0 Refills         Reported         5/16/20       FLUoxetine HCl (FLUoxetine HCl) 20 Mg Capsule      10 MG PO HS, CAP         Reported         5/16/20       Metoprolol Succinate (Metoprolol Succinate) 100 Mg Tab.er.24h       100 MG PO BID, #60 TAB.SR.24H         Reported         5/16/20       Potassium Chloride (Klor-Con) 20 Meq Tab.er.prt      20 MEQ PO BID, TAB         Reported         4/16/19       Hctz (hydroCHLOROthiazide) 12.5 Mg Capsule      12.5 MG PO Q2D, #30 CAP 0 Refills         Reported         4/16/19       Apixaban (Eliquis) 5 Mg Tablet      5 MG PO BID for 30 Days, #60 TAB         Reported         4/16/19      Medications Reviewed:  Changes made to meds            IMPRESSION/PLAN      Diagnosis      Ascending colon malignant neoplasm - C18.2      Patient is biopsy-proven adenocarcinoma of the ascending colon.  Staging scans     did not demonstrate any obvious metastatic disease.  CEA is normal.  She has     been seen by surgery and resection is planned for next week which is     appropriate.  I will see her back in the postoperative setting to review her     final pathology.            Iron deficiency anemia         Iron deficiency anemia due to chronic blood loss         Iron deficiency anemia type: chronic blood loss      Likely secondary to her colon cancer.  Plan to recheck in the postoperative     setting.            Notes      New Medications      * LORazepam 1 MG TABLET: 1 MG PO HS      New Diagnostics      * CCC CBC With Auto Diff, 3 Week         Dx: Colon cancer - C18.9      * CCC Comp Metabolic Panel, 3 Week         Dx: Colon cancer - C18.9            Patient Education            DI for Colorectal Cancer      Patient Education Provided:  Yes            Electronically signed by ALLEGRA SORTO  07/01/2020 15:40       Disclaimer: Converted document may not contain table formatting or lab diagrams. Please see [employer] for the authenticated document.

## 2021-06-05 NOTE — PROGRESS NOTES
Progress Note      Patient Name: Bing Cruz   Patient ID: [id number]   Sex: Female   YOB: 1931    Primary Care Provider: Jairo Kramer MD   Referring Provider: Jairo Kramer MD    Visit Date: May 6, 2021    Provider: GABRIELLA Soto   Location: [facility]   Location Address: [address] [place] [address], [address]  [id number]   Location Phone: [phone]          Chief Complaint  · Follow-up of EGD      History Of Present Illness     Ms. Cruz reports that she is still having dysphagia with solids and liquids. Feels better if she avoids drinking with meals. Increased dyspepsia during meals as well.  She did not notice any relief from dysphagia after having esophageal dilatation from last EGD.  Occasionally she will experience heartburn despite taking Pepcid 20 mg twice daily.  She denies any nausea, vomiting, change in appetite, or weight loss.    Reports she is also now also having diarrhea since her EGD. Having a bowel movement anywhere from 1-3x daily. Describes stool to be a #5 on the [place] stool chart. Has had a few accidents of fecal incontinence since last visit. Lower abdominal pain when laying down for the night. Denies any hematochieza or melena.     Continues to follow Dr. Villafuerte for oncologist and states that her recent PET scan was negative for the possible concern of a lung mass.    PMH: Takes Eliquis daily for hx of blood clot and A-fib, managed by Dr. Jolly.     EGD 2/25/2021: Normal mucosa of duodenum.  Erythema in the antrum.  Small size hiatal hernia.  Ring in the GE junction that was dilated with a 12 to 15 mm balloon.  Antrum biopsy-chronic inactive gastritis.  GE junction biopsy-squamocolumnar mucosa with chronic inflammation.       Esophagram 11/3/2020: Laryngeal penetration during swallowing of the barium solution.  Thick smooth narrowing of the distal thoracic esophagus near the GE junction suspect represent  a peptic stricture.  13 mm tablet becomes lodged at this position.    EGD 6/26/2020: Normal mucosa of duodenum, erythema in the antrum, and erythema in the GE junction.  Hiatal hernia.  Colonoscopy 6/26/2020: 12 mm polyp in the cecum.  5 mm polyp in the cecum.  3 cm mass in the ascending colon.  5 mm polyp in sigmoid colon.  Hypertrophied anal papula visualized.  Moderate diverticulosis of the whole colon.  Large intestine cecum polyp-tubulovillous adenoma with high-grade dysplasia.  Ascending colon mass biopsy-invasive moderate differentiated adenocarcinoma.  Large intestine sigmoid colon polyp-tubular adenoma.  Stomach antrum biopsy-reactive chemical gastropathy with chronic inflammation.  GE junction biopsy-reflux esophagitis changes       Past Medical History  Anemia; Atrial Fibrillation; Bladder disorder; Blood clot in vein; Hyperlipemia; Hypertension; UTI         Past Surgical History  Bladder Repair; Cardioversion; Colectomy, open; Colonoscopy; Gallbladder; Hernia; Hysterectomy         Medication List  diltiazem HCl 120 mg oral capsule,extended release 24 hr; Eliquis 2.5 mg oral tablet; famotidine 20 mg oral tablet; furosemide 20 mg oral tablet; hydrochlorothiazide 12.5 mg oral tablet; Klor-Con M20 20 mEq oral tablet,ER particles/crystals; lorazepam 1 mg oral tablet; losartan 50 mg oral tablet; metoprolol succinate 100 mg oral tablet extended release 24 hr; metronidazole 500 mg oral tablet; neomycin 500 mg oral tablet; Prozac 20 mg oral capsule         Allergy List  * Other; Levaquin; lisinopril; Macrobid; melatonin       Allergies Reconciled  Family Medical History  Cancer, Unspecified; Family history of colon cancer         Social History  Claustophobic (Unknown); lives alone; Recreational Drug Use (Never); Retired.; Tobacco (Never);          Review of Systems  · Constitutional  o Denies  o : chills, fever  · Cardiovascular  o Denies  o : chest pain, dyspnea on exertion  · Respiratory  o Denies  o :  "cough, shortness of breath  · Gastrointestinal  o Admits  o : see HPI   · Endocrine  o Denies  o : weight gain, weight loss      Vitals  Date Time BP Position Site L\R Cuff Size HR RR TEMP (F) WT  HT  BMI kg/m2 BSA m2 O2 Sat FR L/min FiO2 HC       05/06/2021 02:35 /88 Sitting    68 - R   134lbs 0oz 5'  5\" 22.3 1.67             Physical Examination  · Constitutional  o Appearance  o : Healthy-appearing, awake and alert in no acute distress  · Head and Face  o Head  o : Normocephalic with no worriesome skin lesions  · Eyes  o Vision  o :   § Visual Fields  § : eyes move symmetrical in all directions  o Sclerae  o : sclerae anicteric  o Pupils and Irises  o : pupils equal and symmetrical  · Neck  o Inspection/Palpation  o : Trachea is midline, no adenopathy  · Respiratory  o Respiratory Effort  o : Breathing is unlabored.  o Inspection of Chest  o : normal appearance  o Auscultation of Lungs  o : Chest is clear to auscultation bilaterally.  · Cardiovascular  o Heart  o :   § Auscultation of Heart  § : no murmurs, rubs, or gallops  o Peripheral Vascular System  o :   § Extremities  § : no cyanosis, clubbing or edema;   · Gastrointestinal  o Abdominal Examination  o : Abdomen is soft, nontender to palpation, with normal active bowel sounds, no appreciable hepatosplenomegaly.  o Digital Rectal Exam  o : deferred  · Skin and Subcutaneous Tissue  o General Inspection  o : without focal lesions; turgor is normal  · Psychiatric  o General  o : Alert and oriented x3  o Mood and Affect  o : Mood and affect are appropriate to circumstances          Assessment  · Diarrhea     787.91/R19.7  · Dysphagia     787.20/R13.10      Plan  · Orders  o C difficile Toxigenic Assay (PCR) [facility] (65303) - - 05/06/2021  o Lactoferrin (Fecal) Qualitative (71753) - - 05/06/2021  o Giardia and Cryptosporidium Enzyme Immunoassay [facility] (87104, 48591) - - 05/06/2021  o Stool culture and sensitivity (00654) - - 05/06/2021  o Pancreatic elastase stool " (57641) - - 05/06/2021  · Medications  o Florajen3 460 mg (7.5-6- 1.5 bill. cell) oral capsule   SIG: take 1 capsule by oral route daily for 30 days   DISP: (30) Capsule with 3 refills  Prescribed on 05/06/2021     o Colestid 1 gram oral tablet   SIG: take 1 tablet by oral route 3 times a day as needed for 30 days diarrhea   DISP: (90) Tablet with 3 refills  Prescribed on 05/06/2021     o Protonix 20 mg oral tablet,delayed release (DR/EC)   SIG: take 1 tablet (20 mg) by oral route once daily for 30 days   DISP: (30) Tablet with 3 refills  Prescribed on 05/06/2021     o Medications have been Reconciled  · Instructions  o Information given on current diagnoses.  o Lifestyle modifications discussed.  o Discussed risks of long-term PPI use.  o We will need to order colonoscopy screening at follow-up visit given patient's history of colon mass  o Offer patient an esophageal manometry to further evaluate dysphagia however she declines at this time states she would like to see if there is any change with Protonix 20 mg once daily and if not she may proceed.   o Electronically Identified Patient Education Materials Provided Electronically  · Disposition  o 2 month f/u            Electronically Signed by: GABRIELLA Soto -Author on May 6, 2021 03:25:15 PM

## 2021-07-08 DIAGNOSIS — D64.9 ANEMIA, UNSPECIFIED TYPE: Primary | ICD-10-CM

## 2021-07-08 RX ORDER — POTASSIUM CHLORIDE 20 MEQ/1
20 TABLET, EXTENDED RELEASE ORAL 2 TIMES DAILY
COMMUNITY
End: 2021-07-08 | Stop reason: SDUPTHER

## 2021-07-08 RX ORDER — POTASSIUM CHLORIDE 20 MEQ/1
20 TABLET, EXTENDED RELEASE ORAL 2 TIMES DAILY
Qty: 180 TABLET | Refills: 4 | Status: SHIPPED | OUTPATIENT
Start: 2021-07-08 | End: 2021-08-02

## 2021-07-12 ENCOUNTER — TELEMEDICINE (OUTPATIENT)
Dept: INTERNAL MEDICINE | Facility: CLINIC | Age: 86
End: 2021-07-12

## 2021-07-12 ENCOUNTER — TELEPHONE (OUTPATIENT)
Dept: INTERNAL MEDICINE | Facility: CLINIC | Age: 86
End: 2021-07-12

## 2021-07-12 DIAGNOSIS — L24.7 CONTACT DERMATITIS AND ECZEMA DUE TO PLANT: Primary | ICD-10-CM

## 2021-07-12 RX ORDER — DEXAMETHASONE 2 MG/1
2 TABLET ORAL 3 TIMES DAILY
Qty: 18 TABLET | Refills: 0 | Status: SHIPPED | OUTPATIENT
Start: 2021-07-12 | End: 2021-07-23 | Stop reason: HOSPADM

## 2021-07-12 RX ORDER — LORATADINE 10 MG/1
10 TABLET ORAL DAILY
Qty: 20 TABLET | Refills: 0 | Status: ON HOLD | OUTPATIENT
Start: 2021-07-12 | End: 2021-10-29

## 2021-07-12 RX ORDER — FAMOTIDINE 20 MG/1
20 TABLET, FILM COATED ORAL 2 TIMES DAILY
Qty: 40 TABLET | Refills: 0 | Status: ON HOLD | OUTPATIENT
Start: 2021-07-12 | End: 2021-10-29

## 2021-07-12 NOTE — TELEPHONE ENCOUNTER
I spoke with patient about poison ivy going from her neck to her face and chin and she is worried about it spreading up to her eyes and ears, she says she just got new hearing aids from when to call in medication for her to include prednisone or Decadron, Pepcid and some Claritin daily for the next 5 to 6 days to see if we can liang this off,  She did not have a cell phone or iPhone and was not able to hear well and could not understand how to do a telehealth visit

## 2021-07-12 NOTE — TELEPHONE ENCOUNTER
PATIENT HAS POISION IVY ON HER LEGS AND ARMS. SHE IS WANTING TO KNOW IF SHE CAN HAVE SOMETHING CALLED INTO [facility]

## 2021-07-12 NOTE — PROGRESS NOTES
Chief Complaint  No chief complaint on file.    Subjective          Bing Cruz presents to [facility]      Objective   Vital Signs  There were no vitals filed for this visit.   Review of Systems   Physical Exam   Result Review :   No results found for: PROBNP, BNP          No results found for: TSH   No results found for: FREET4                       Assessment and Plan            Follow Up   No follow-ups on file.  Patient was given instructions and counseling regarding her condition or for health maintenance advice. Please see specific information pulled into the AVS if appropriate.

## 2021-07-14 DIAGNOSIS — D50.9 IRON DEFICIENCY ANEMIA, UNSPECIFIED IRON DEFICIENCY ANEMIA TYPE: ICD-10-CM

## 2021-07-14 DIAGNOSIS — C18.2 MALIGNANT NEOPLASM OF ASCENDING COLON (HCC): Primary | ICD-10-CM

## 2021-07-15 ENCOUNTER — TRANSCRIBE ORDERS (OUTPATIENT)
Dept: ADMINISTRATIVE | Facility: HOSPITAL | Age: 86
End: 2021-07-15

## 2021-07-15 ENCOUNTER — LAB (OUTPATIENT)
Dept: LAB | Facility: HOSPITAL | Age: 86
End: 2021-07-15

## 2021-07-15 VITALS
BODY MASS INDEX: 22.33 KG/M2 | HEIGHT: 65 IN | DIASTOLIC BLOOD PRESSURE: 88 MMHG | WEIGHT: 134 LBS | HEART RATE: 68 BPM | SYSTOLIC BLOOD PRESSURE: 139 MMHG

## 2021-07-15 DIAGNOSIS — I48.91 ATRIAL FIBRILLATION, UNSPECIFIED TYPE (HCC): Primary | ICD-10-CM

## 2021-07-15 DIAGNOSIS — I50.40 CHF (CONGESTIVE HEART FAILURE), NYHA CLASS I, UNSPECIFIED FAILURE CHRONICITY, COMBINED (HCC): ICD-10-CM

## 2021-07-15 DIAGNOSIS — Z79.899 ENCOUNTER FOR LONG-TERM (CURRENT) USE OF OTHER MEDICATIONS: ICD-10-CM

## 2021-07-15 DIAGNOSIS — I48.91 ATRIAL FIBRILLATION, UNSPECIFIED TYPE (HCC): ICD-10-CM

## 2021-07-15 LAB
ALBUMIN SERPL-MCNC: 4 G/DL (ref 3.5–5.2)
ALBUMIN/GLOB SERPL: 1.2 G/DL
ALP SERPL-CCNC: 72 U/L (ref 39–117)
ALT SERPL W P-5'-P-CCNC: 22 U/L (ref 1–33)
ANION GAP SERPL CALCULATED.3IONS-SCNC: 10.4 MMOL/L (ref 5–15)
AST SERPL-CCNC: 24 U/L (ref 1–32)
BILIRUB SERPL-MCNC: 0.7 MG/DL (ref 0–1.2)
BUN SERPL-MCNC: 19 MG/DL (ref 8–23)
BUN/CREAT SERPL: 20.2 (ref 7–25)
CALCIUM SPEC-SCNC: 9.4 MG/DL (ref 8.2–9.6)
CHLORIDE SERPL-SCNC: 102 MMOL/L (ref 98–107)
CHOLEST SERPL-MCNC: 187 MG/DL (ref 0–200)
CO2 SERPL-SCNC: 25.6 MMOL/L (ref 22–29)
CREAT SERPL-MCNC: 0.94 MG/DL (ref 0.57–1)
GFR SERPL CREATININE-BSD FRML MDRD: 56 ML/MIN/1.73
GLOBULIN UR ELPH-MCNC: 3.4 GM/DL
GLUCOSE SERPL-MCNC: 112 MG/DL (ref 65–99)
HDLC SERPL-MCNC: 79 MG/DL (ref 40–60)
LDLC SERPL CALC-MCNC: 85 MG/DL (ref 0–100)
LDLC/HDLC SERPL: 1.02 {RATIO}
POTASSIUM SERPL-SCNC: 4.9 MMOL/L (ref 3.5–5.2)
PROT SERPL-MCNC: 7.4 G/DL (ref 6–8.5)
SODIUM SERPL-SCNC: 138 MMOL/L (ref 136–145)
T4 FREE SERPL-MCNC: 1.02 NG/DL (ref 0.93–1.7)
TRIGL SERPL-MCNC: 136 MG/DL (ref 0–150)
TSH SERPL DL<=0.05 MIU/L-ACNC: 0.75 UIU/ML (ref 0.27–4.2)
VLDLC SERPL-MCNC: 23 MG/DL (ref 5–40)

## 2021-07-15 PROCEDURE — 84439 ASSAY OF FREE THYROXINE: CPT

## 2021-07-15 PROCEDURE — 80053 COMPREHEN METABOLIC PANEL: CPT

## 2021-07-15 PROCEDURE — 80061 LIPID PANEL: CPT

## 2021-07-15 PROCEDURE — 84443 ASSAY THYROID STIM HORMONE: CPT

## 2021-07-15 PROCEDURE — 36415 COLL VENOUS BLD VENIPUNCTURE: CPT

## 2021-07-19 ENCOUNTER — TELEPHONE (OUTPATIENT)
Dept: ORTHOPEDIC SURGERY | Facility: CLINIC | Age: 86
End: 2021-07-19

## 2021-07-19 ENCOUNTER — TELEPHONE (OUTPATIENT)
Dept: INTERNAL MEDICINE | Facility: CLINIC | Age: 86
End: 2021-07-19

## 2021-07-19 NOTE — TELEPHONE ENCOUNTER
PT NEEDS A URGENT APPOINTMENT TO BE REFERRED TO A ORHTO,SHE FELL AND BROKE HER HIP OVER THE WEEKEND. [phone]

## 2021-07-20 ENCOUNTER — PREP FOR SURGERY (OUTPATIENT)
Dept: OTHER | Facility: HOSPITAL | Age: 86
End: 2021-07-20

## 2021-07-20 ENCOUNTER — HOSPITAL ENCOUNTER (OUTPATIENT)
Facility: HOSPITAL | Age: 86
Setting detail: HOSPITAL OUTPATIENT SURGERY
End: 2021-07-20
Attending: ORTHOPAEDIC SURGERY | Admitting: ORTHOPAEDIC SURGERY

## 2021-07-20 ENCOUNTER — OFFICE VISIT (OUTPATIENT)
Dept: ORTHOPEDIC SURGERY | Facility: CLINIC | Age: 86
End: 2021-07-20

## 2021-07-20 ENCOUNTER — HOSPITAL ENCOUNTER (INPATIENT)
Facility: HOSPITAL | Age: 86
LOS: 3 days | Discharge: HOME HEALTH | End: 2021-07-23
Attending: INTERNAL MEDICINE | Admitting: INTERNAL MEDICINE

## 2021-07-20 VITALS — BODY MASS INDEX: 22.42 KG/M2 | HEART RATE: 80 BPM | HEIGHT: 67 IN | OXYGEN SATURATION: 98 %

## 2021-07-20 DIAGNOSIS — S72.009A CLOSED FRACTURE OF HIP, UNSPECIFIED LATERALITY, INITIAL ENCOUNTER (HCC): ICD-10-CM

## 2021-07-20 DIAGNOSIS — Z78.9 DECREASED ACTIVITIES OF DAILY LIVING (ADL): ICD-10-CM

## 2021-07-20 DIAGNOSIS — S72.002S CLOSED FRACTURE OF NECK OF LEFT FEMUR, SEQUELA: ICD-10-CM

## 2021-07-20 DIAGNOSIS — S72.002A CLOSED FRACTURE OF NECK OF LEFT FEMUR, INITIAL ENCOUNTER (HCC): ICD-10-CM

## 2021-07-20 DIAGNOSIS — S72.002A HIP FRACTURE REQUIRING OPERATIVE REPAIR, LEFT, CLOSED, INITIAL ENCOUNTER (HCC): ICD-10-CM

## 2021-07-20 DIAGNOSIS — R26.2 DIFFICULTY WALKING: Primary | ICD-10-CM

## 2021-07-20 DIAGNOSIS — S72.002A CLOSED LEFT HIP FRACTURE (HCC): Primary | ICD-10-CM

## 2021-07-20 DIAGNOSIS — D50.8 IRON DEFICIENCY ANEMIA SECONDARY TO INADEQUATE DIETARY IRON INTAKE: ICD-10-CM

## 2021-07-20 DIAGNOSIS — S72.002A CLOSED LEFT HIP FRACTURE, INITIAL ENCOUNTER (HCC): ICD-10-CM

## 2021-07-20 DIAGNOSIS — S72.009A CLOSED FRACTURE OF HIP, UNSPECIFIED LATERALITY, INITIAL ENCOUNTER (HCC): Primary | ICD-10-CM

## 2021-07-20 DIAGNOSIS — S72.002S CLOSED FRACTURE OF NECK OF LEFT FEMUR, SEQUELA: Primary | ICD-10-CM

## 2021-07-20 DIAGNOSIS — S72.002D CLOSED FRACTURE OF LEFT HIP WITH ROUTINE HEALING, SUBSEQUENT ENCOUNTER: ICD-10-CM

## 2021-07-20 LAB
ABO GROUP BLD: NORMAL
ALBUMIN SERPL-MCNC: 3.9 G/DL (ref 3.5–5.2)
ALBUMIN/GLOB SERPL: 1.1 G/DL
ALP SERPL-CCNC: 83 U/L (ref 39–117)
ALT SERPL W P-5'-P-CCNC: 16 U/L (ref 1–33)
ANION GAP SERPL CALCULATED.3IONS-SCNC: 14.8 MMOL/L (ref 5–15)
ANISOCYTOSIS BLD QL: ABNORMAL
AST SERPL-CCNC: 17 U/L (ref 1–32)
BILIRUB SERPL-MCNC: 1.3 MG/DL (ref 0–1.2)
BLD GP AB SCN SERPL QL: NEGATIVE
BUN SERPL-MCNC: 17 MG/DL (ref 8–23)
BUN/CREAT SERPL: 23 (ref 7–25)
CALCIUM SPEC-SCNC: 8.7 MG/DL (ref 8.2–9.6)
CHLORIDE SERPL-SCNC: 99 MMOL/L (ref 98–107)
CO2 SERPL-SCNC: 26.2 MMOL/L (ref 22–29)
CREAT SERPL-MCNC: 0.74 MG/DL (ref 0.57–1)
DEPRECATED RDW RBC AUTO: 47.2 FL (ref 37–54)
EOSINOPHIL # BLD MANUAL: 0.33 10*3/MM3 (ref 0–0.4)
EOSINOPHIL NFR BLD MANUAL: 3 % (ref 0.3–6.2)
ERYTHROCYTE [DISTWIDTH] IN BLOOD BY AUTOMATED COUNT: 13.1 % (ref 12.3–15.4)
GFR SERPL CREATININE-BSD FRML MDRD: 74 ML/MIN/1.73
GLOBULIN UR ELPH-MCNC: 3.4 GM/DL
GLUCOSE SERPL-MCNC: 144 MG/DL (ref 65–99)
HCT VFR BLD AUTO: 42.5 % (ref 34–46.6)
HGB BLD-MCNC: 14.5 G/DL (ref 12–15.9)
INR PPP: 0.89 (ref 2–3)
LARGE PLATELETS: ABNORMAL
LYMPHOCYTES # BLD MANUAL: 3.64 10*3/MM3 (ref 0.7–3.1)
LYMPHOCYTES NFR BLD MANUAL: 3 % (ref 5–12)
LYMPHOCYTES NFR BLD MANUAL: 33 % (ref 19.6–45.3)
MCH RBC QN AUTO: 33.1 PG (ref 26.6–33)
MCHC RBC AUTO-ENTMCNC: 34.1 G/DL (ref 31.5–35.7)
MCV RBC AUTO: 97 FL (ref 79–97)
MONOCYTES # BLD AUTO: 0.33 10*3/MM3 (ref 0.1–0.9)
NEUTROPHILS # BLD AUTO: 6.73 10*3/MM3 (ref 1.7–7)
NEUTROPHILS NFR BLD MANUAL: 61 % (ref 42.7–76)
NT-PROBNP SERPL-MCNC: 1648 PG/ML (ref 0–1800)
PLATELET # BLD AUTO: 212 10*3/MM3 (ref 140–450)
PMV BLD AUTO: 11.9 FL (ref 6–12)
POTASSIUM SERPL-SCNC: 3.8 MMOL/L (ref 3.5–5.2)
PROT SERPL-MCNC: 7.3 G/DL (ref 6–8.5)
PROTHROMBIN TIME: 10 SECONDS (ref 9.4–12)
RBC # BLD AUTO: 4.38 10*6/MM3 (ref 3.77–5.28)
RH BLD: POSITIVE
SMALL PLATELETS BLD QL SMEAR: ADEQUATE
SODIUM SERPL-SCNC: 140 MMOL/L (ref 136–145)
STOMATOCYTES BLD QL SMEAR: ABNORMAL
T&S EXPIRATION DATE: NORMAL
WBC # BLD AUTO: 11.03 10*3/MM3 (ref 3.4–10.8)
WBC MORPH BLD: NORMAL

## 2021-07-20 PROCEDURE — 86850 RBC ANTIBODY SCREEN: CPT | Performed by: INTERNAL MEDICINE

## 2021-07-20 PROCEDURE — 94799 UNLISTED PULMONARY SVC/PX: CPT

## 2021-07-20 PROCEDURE — 86900 BLOOD TYPING SEROLOGIC ABO: CPT | Performed by: INTERNAL MEDICINE

## 2021-07-20 PROCEDURE — 93010 ELECTROCARDIOGRAM REPORT: CPT | Performed by: INTERNAL MEDICINE

## 2021-07-20 PROCEDURE — 99204 OFFICE O/P NEW MOD 45 MIN: CPT | Performed by: ORTHOPAEDIC SURGERY

## 2021-07-20 PROCEDURE — 93005 ELECTROCARDIOGRAM TRACING: CPT | Performed by: INTERNAL MEDICINE

## 2021-07-20 PROCEDURE — 85027 COMPLETE CBC AUTOMATED: CPT | Performed by: INTERNAL MEDICINE

## 2021-07-20 PROCEDURE — 83880 ASSAY OF NATRIURETIC PEPTIDE: CPT | Performed by: INTERNAL MEDICINE

## 2021-07-20 PROCEDURE — 99223 1ST HOSP IP/OBS HIGH 75: CPT | Performed by: INTERNAL MEDICINE

## 2021-07-20 PROCEDURE — 85610 PROTHROMBIN TIME: CPT | Performed by: INTERNAL MEDICINE

## 2021-07-20 PROCEDURE — 85007 BL SMEAR W/DIFF WBC COUNT: CPT | Performed by: INTERNAL MEDICINE

## 2021-07-20 PROCEDURE — 86901 BLOOD TYPING SEROLOGIC RH(D): CPT | Performed by: INTERNAL MEDICINE

## 2021-07-20 PROCEDURE — 80053 COMPREHEN METABOLIC PANEL: CPT | Performed by: INTERNAL MEDICINE

## 2021-07-20 RX ORDER — MORPHINE SULFATE 2 MG/ML
1 INJECTION, SOLUTION INTRAMUSCULAR; INTRAVENOUS EVERY 4 HOURS PRN
Status: DISCONTINUED | OUTPATIENT
Start: 2021-07-20 | End: 2021-07-23 | Stop reason: HOSPADM

## 2021-07-20 RX ORDER — ACETAMINOPHEN 325 MG/1
650 TABLET ORAL EVERY 4 HOURS PRN
Status: DISCONTINUED | OUTPATIENT
Start: 2021-07-20 | End: 2021-07-23 | Stop reason: HOSPADM

## 2021-07-20 RX ORDER — SODIUM CHLORIDE, SODIUM LACTATE, POTASSIUM CHLORIDE, CALCIUM CHLORIDE 600; 310; 30; 20 MG/100ML; MG/100ML; MG/100ML; MG/100ML
75 INJECTION, SOLUTION INTRAVENOUS CONTINUOUS
Status: DISCONTINUED | OUTPATIENT
Start: 2021-07-20 | End: 2021-07-22

## 2021-07-20 RX ORDER — METOPROLOL SUCCINATE 50 MG/1
100 TABLET, EXTENDED RELEASE ORAL EVERY 12 HOURS SCHEDULED
Status: DISCONTINUED | OUTPATIENT
Start: 2021-07-20 | End: 2021-07-23 | Stop reason: HOSPADM

## 2021-07-20 RX ORDER — HYDROCODONE BITARTRATE AND ACETAMINOPHEN 5; 325 MG/1; MG/1
1 TABLET ORAL EVERY 4 HOURS PRN
Status: DISCONTINUED | OUTPATIENT
Start: 2021-07-20 | End: 2021-07-23 | Stop reason: HOSPADM

## 2021-07-20 RX ORDER — LOSARTAN POTASSIUM 50 MG/1
TABLET ORAL EVERY 12 HOURS SCHEDULED
COMMUNITY
End: 2022-01-17

## 2021-07-20 RX ORDER — FLUOXETINE HYDROCHLORIDE 20 MG/1
20 CAPSULE ORAL DAILY
Status: DISCONTINUED | OUTPATIENT
Start: 2021-07-20 | End: 2021-07-23 | Stop reason: HOSPADM

## 2021-07-20 RX ORDER — NALOXONE HCL 0.4 MG/ML
0.4 VIAL (ML) INJECTION
Status: CANCELLED | OUTPATIENT
Start: 2021-07-20

## 2021-07-20 RX ORDER — DILTIAZEM HYDROCHLORIDE 120 MG/1
120 CAPSULE, COATED, EXTENDED RELEASE ORAL
Status: DISCONTINUED | OUTPATIENT
Start: 2021-07-20 | End: 2021-07-23 | Stop reason: HOSPADM

## 2021-07-20 RX ORDER — ACETAMINOPHEN 325 MG/1
650 TABLET ORAL EVERY 4 HOURS PRN
Status: CANCELLED | OUTPATIENT
Start: 2021-07-20

## 2021-07-20 RX ORDER — FAMOTIDINE 10 MG/ML
20 INJECTION, SOLUTION INTRAVENOUS EVERY 12 HOURS SCHEDULED
Status: DISCONTINUED | OUTPATIENT
Start: 2021-07-20 | End: 2021-07-23 | Stop reason: HOSPADM

## 2021-07-20 RX ORDER — BISACODYL 10 MG
10 SUPPOSITORY, RECTAL RECTAL DAILY PRN
Status: DISCONTINUED | OUTPATIENT
Start: 2021-07-20 | End: 2021-07-23 | Stop reason: HOSPADM

## 2021-07-20 RX ORDER — LORAZEPAM 0.5 MG/1
TABLET ORAL
COMMUNITY
End: 2021-09-09

## 2021-07-20 RX ORDER — LORAZEPAM 0.5 MG/1
0.5 TABLET ORAL NIGHTLY
Status: DISCONTINUED | OUTPATIENT
Start: 2021-07-20 | End: 2021-07-23 | Stop reason: HOSPADM

## 2021-07-20 RX ORDER — SODIUM CHLORIDE 0.9 % (FLUSH) 0.9 %
10 SYRINGE (ML) INJECTION AS NEEDED
Status: DISCONTINUED | OUTPATIENT
Start: 2021-07-20 | End: 2021-07-23 | Stop reason: HOSPADM

## 2021-07-20 RX ORDER — SODIUM CHLORIDE 0.9 % (FLUSH) 0.9 %
10 SYRINGE (ML) INJECTION AS NEEDED
Status: CANCELLED | OUTPATIENT
Start: 2021-07-20

## 2021-07-20 RX ORDER — ACETAMINOPHEN 650 MG/1
650 SUPPOSITORY RECTAL EVERY 4 HOURS PRN
Status: CANCELLED | OUTPATIENT
Start: 2021-07-20

## 2021-07-20 RX ORDER — AMOXICILLIN 250 MG
2 CAPSULE ORAL 2 TIMES DAILY
Status: CANCELLED | OUTPATIENT
Start: 2021-07-20

## 2021-07-20 RX ORDER — ONDANSETRON 2 MG/ML
4 INJECTION INTRAMUSCULAR; INTRAVENOUS EVERY 6 HOURS PRN
Status: CANCELLED | OUTPATIENT
Start: 2021-07-20

## 2021-07-20 RX ORDER — BISACODYL 10 MG
10 SUPPOSITORY, RECTAL RECTAL DAILY PRN
Status: CANCELLED | OUTPATIENT
Start: 2021-07-20

## 2021-07-20 RX ORDER — SODIUM CHLORIDE 0.9 % (FLUSH) 0.9 %
10 SYRINGE (ML) INJECTION EVERY 12 HOURS SCHEDULED
Status: DISCONTINUED | OUTPATIENT
Start: 2021-07-20 | End: 2021-07-23 | Stop reason: HOSPADM

## 2021-07-20 RX ORDER — SODIUM CHLORIDE 0.9 % (FLUSH) 0.9 %
10 SYRINGE (ML) INJECTION EVERY 12 HOURS SCHEDULED
Status: CANCELLED | OUTPATIENT
Start: 2021-07-20

## 2021-07-20 RX ORDER — METOPROLOL SUCCINATE 100 MG/1
TABLET, EXTENDED RELEASE ORAL
COMMUNITY
End: 2021-08-02 | Stop reason: SDUPTHER

## 2021-07-20 RX ORDER — ONDANSETRON 2 MG/ML
4 INJECTION INTRAMUSCULAR; INTRAVENOUS EVERY 6 HOURS PRN
Status: DISCONTINUED | OUTPATIENT
Start: 2021-07-20 | End: 2021-07-23 | Stop reason: HOSPADM

## 2021-07-20 RX ORDER — ACETAMINOPHEN 160 MG/5ML
650 SOLUTION ORAL EVERY 4 HOURS PRN
Status: CANCELLED | OUTPATIENT
Start: 2021-07-20

## 2021-07-20 RX ORDER — NALOXONE HCL 0.4 MG/ML
0.4 VIAL (ML) INJECTION
Status: DISCONTINUED | OUTPATIENT
Start: 2021-07-20 | End: 2021-07-23 | Stop reason: HOSPADM

## 2021-07-20 RX ORDER — BISACODYL 5 MG/1
5 TABLET, DELAYED RELEASE ORAL DAILY PRN
Status: DISCONTINUED | OUTPATIENT
Start: 2021-07-20 | End: 2021-07-23 | Stop reason: HOSPADM

## 2021-07-20 RX ORDER — HYDROCODONE BITARTRATE AND ACETAMINOPHEN 5; 325 MG/1; MG/1
1 TABLET ORAL EVERY 4 HOURS PRN
Status: CANCELLED | OUTPATIENT
Start: 2021-07-20 | End: 2021-07-27

## 2021-07-20 RX ORDER — AMOXICILLIN 250 MG
2 CAPSULE ORAL 2 TIMES DAILY
Status: DISCONTINUED | OUTPATIENT
Start: 2021-07-20 | End: 2021-07-23 | Stop reason: HOSPADM

## 2021-07-20 RX ORDER — POLYETHYLENE GLYCOL 3350 17 G/17G
17 POWDER, FOR SOLUTION ORAL DAILY PRN
Status: CANCELLED | OUTPATIENT
Start: 2021-07-20

## 2021-07-20 RX ORDER — MORPHINE SULFATE 1 MG/ML
1 INJECTION, SOLUTION EPIDURAL; INTRATHECAL; INTRAVENOUS EVERY 4 HOURS PRN
Status: CANCELLED | OUTPATIENT
Start: 2021-07-20 | End: 2021-07-27

## 2021-07-20 RX ORDER — BISACODYL 5 MG/1
5 TABLET, DELAYED RELEASE ORAL DAILY PRN
Status: CANCELLED | OUTPATIENT
Start: 2021-07-20

## 2021-07-20 RX ORDER — POLYETHYLENE GLYCOL 3350 17 G/17G
17 POWDER, FOR SOLUTION ORAL DAILY PRN
Status: DISCONTINUED | OUTPATIENT
Start: 2021-07-20 | End: 2021-07-23 | Stop reason: HOSPADM

## 2021-07-20 RX ORDER — ACETAMINOPHEN 650 MG/1
650 SUPPOSITORY RECTAL EVERY 4 HOURS PRN
Status: DISCONTINUED | OUTPATIENT
Start: 2021-07-20 | End: 2021-07-23 | Stop reason: HOSPADM

## 2021-07-20 RX ORDER — SODIUM CHLORIDE, SODIUM LACTATE, POTASSIUM CHLORIDE, CALCIUM CHLORIDE 600; 310; 30; 20 MG/100ML; MG/100ML; MG/100ML; MG/100ML
75 INJECTION, SOLUTION INTRAVENOUS CONTINUOUS
Status: CANCELLED | OUTPATIENT
Start: 2021-07-20

## 2021-07-20 RX ORDER — ACETAMINOPHEN 160 MG/5ML
650 SOLUTION ORAL EVERY 4 HOURS PRN
Status: DISCONTINUED | OUTPATIENT
Start: 2021-07-20 | End: 2021-07-23 | Stop reason: HOSPADM

## 2021-07-20 RX ADMIN — SODIUM CHLORIDE, POTASSIUM CHLORIDE, SODIUM LACTATE AND CALCIUM CHLORIDE 75 ML/HR: 600; 310; 30; 20 INJECTION, SOLUTION INTRAVENOUS at 20:18

## 2021-07-20 RX ADMIN — METOPROLOL SUCCINATE 100 MG: 50 TABLET, EXTENDED RELEASE ORAL at 20:47

## 2021-07-20 RX ADMIN — LORAZEPAM 0.5 MG: 0.5 TABLET ORAL at 21:33

## 2021-07-20 RX ADMIN — FAMOTIDINE 20 MG: 10 INJECTION INTRAVENOUS at 20:35

## 2021-07-20 RX ADMIN — SODIUM CHLORIDE, PRESERVATIVE FREE 10 ML: 5 INJECTION INTRAVENOUS at 21:35

## 2021-07-20 NOTE — PLAN OF CARE
Goal Outcome Evaluation:  Plan of Care Reviewed With: patient        Progress: no change  Outcome Summary: new patient presented with left hip fracture; patient is stable sitting in the chair with no complaints of pa

## 2021-07-20 NOTE — H&P
[facility]   HISTORY AND PHYSICAL    Patient Name: Bing Cruz  : 1931  MRN: [id number]  Primary Care Physician: Jairo Kramer MD  Date of admission: 2021      Subjective   Subjective     Chief Complaint/HPI:    Bing Cruz is a 89 y.o. female presents from an outside orthopedic office for admission for possible surgical intervention on the left hip fracture that she sustained after a fall on Friday night Saturday morning, she went to acute care and x-rays were performed showing the fracture and she was referred to orthopedic surgery whom she saw today, was contacted by orthopedic surgery for admission, the patient says she is frustrated because she is had to wait a long time for admission she is hungry, and she is also on blood thinners which we discussed, recent lab work July 15 noted    Review of Systems   All systems were reviewed and negative except for: Pain in the left leg and hip area from a recent fall,      Personal History     Past Medical History:   Diagnosis Date   • Abdominal pain, generalized    • Acquired cyst of kidney    • Aortic regurgitation    • Aortic stenosis    • Aortic valve disease    • Cardiomegaly    • Chronic fatigue    • Diaphragmatic hernia without obstruction and without gangrene    • Diarrhea    • Disease of tricuspid valve    • Diverticulosis of colon    • Dysphagia    • Essential (primary) hypertension    • Hepatic cyst     LEFT   • Hiatal hernia    • Hyperlipidemia    • Insomnia, unspecified    • LVH (left ventricular hypertrophy)    • Major depressive disorder, single episode    • Malaise and fatigue    • Mitral regurgitation    • Mitral valve disorder    • Mitral valve insufficiency and aortic valve insufficiency    • Osteopenia    • Other specified disorders of bone density and structure, unspecified site    • Pain in joint, pelvic region and thigh    • Renal cyst    • Sprain and strain of hip     Sprain and strain of unspecified  site of hip and thigh   • TR (tricuspid regurgitation)    • Unspecified cataract    • Urinary tract infection    • Vitamin D deficiency        Past Surgical History:   Procedure Laterality Date   • ABDOMINAL HYSTERECTOMY      WITH BSO    • ANKLE SURGERY      (L) ankle fracture   • BLADDER REPAIR     • BREAST BIOPSY     • BREAST BIOPSY      (L) breast biopsy   • CATARACT EXTRACTION      OU   • CHOLECYSTECTOMY      OPEN   • COLONOSCOPY  2020   • INGUINAL HERNIA REPAIR Left 09/29/2011       Family History: family history is not on file. Otherwise pertinent FHx was reviewed and not pertinent to current issue.    Social History:  reports that she has never smoked. She does not have any smokeless tobacco history on file. She reports previous alcohol use.    Home Medications:  FLUoxetine, LORazepam, apixaban, cephalexin, dexamethasone, dilTIAZem, famotidine, furosemide, loratadine, metoprolol succinate XL, ondansetron, potassium chloride, and raNITIdine      Allergies:  Allergies   Allergen Reactions   • Citalopram Unknown - Low Severity   • Clonazepam Unknown - Low Severity   • Levofloxacin Nausea And Vomiting   • Lisinopril Unknown - Low Severity   • Melatonin Unknown - High Severity       Objective   Objective     Vitals:  Temp:  [97.8 °F (36.6 °C)] 97.8 °F (36.6 °C)  Heart Rate:  [78-80] 78  Resp:  [18] 18  BP: (159)/(82) 159/82    Physical Exam   Patient's alert pleasant weak appearing in a wheelchair, she is in no distress, her neck is supple her lung fields are clear anteriorly and posteriorly cardiac exam regular rhythm with 1/6 to 2/6 systolic murmur abdomen soft nontender her lower legs showed no edema she has a 1+ PT pulse bilaterally, she can move her left leg to command but it is somewhat weaker appearing and she is more hesitant guarding of that leg than the right,    Result Review    Result Review:  I have personally reviewed the results from the time of this admission to 07/20/21 6:12 PM EDT and agree with  these findings:  [x]  Laboratory  []  Microbiology  [x]  Radiology  []  EKG/Telemetry   []  Cardiology/Vascular   []  Pathology  [x]  Old records  []  Other:      Assessment/Plan   Assessment / Plan     Brief Patient Summary:  Bing Cruz is a 89 y.o. female who fell at home, sustained a nondisplaced fracture left femoral neck, I discussed case with orthopedic surgery and admission has been placed, she is scheduled for potential repair tomorrow with screws in the afternoon to allow Eliquis to come out of the system as much as possible before surgery, case discussed with patient daughter and orthopedic surgery today,  We will continue and plan for IV fluids tonight, holding anticoagulation, GI prophylaxis will be ordered, home medications will be reviewed, rate control medications including Cardizem and beta-blockers will be restarted as appropriate, will get an EKG,      Other medical history:  History of dysphagia with previous work-up October 2020 showing narrowing of the distal esophagus status post EGD February 2021, on proton pump inhibitors as of May 2021  Colon cancer diagnosed June 2020 after being found anemic, underwent exploratory laparotomy and partial colectomy 14 out of 14 lymph nodes - June 2020 by Dr. Valentin, prolonged hospital course with rehab,  Hyponatremia during that admission June 2020,  C. difficile colitis during admission June 2020 resolved  Failure to thrive weight loss, went into rehab following hospital stay June 2020 with gait dysfunction generalized weakness, has been improving over the past year,  Lung nodule found June 2025 mm left lower lobe on initial CT scans, he is being followed up by hematology oncology for history of colon cancer  Hyponatremia resolved, was seeing nephrology in the hospital last year July and August 2020, off hydrochlorothiazide now doing better  Atrial fibrillation with rapid ventricular response remotely March 23, 2017 on anticoagulation,  beta-blockers, Cardizem, cardioverted April 2019 Dr. Jolly, paroxysmal A. fib currently  Previous left humeral fracture December 2016 status post ORIF with josseline placement  Extensive DVT left lower leg November 2015 currently on Eliquis 2.5 twice a day  Moderate mitral valve regurgitation on echo April 2017 is seen Dr. Jolly in the past  Reactive depression with loss of son's stomach cancer November 2018 and daughter status post aortic valve replacement 2018  Gastroesophageal reflux off PPIs, restarted PPIs again January 27, 2021  Hyperlipidemia elevated triglycerides off statins  Osteopenia  Vitamin D deficiency  Osteoarthritis of the left hip    Active Hospital Problems:  There are no active hospital problems to display for this patient.      Assessment/Plan:     As above       CODE STATUS:    There are no questions and answers to display.         Electronically signed by Jairo Kramer MD, 07/20/21, 6:12 PM EDT.

## 2021-07-20 NOTE — PROGRESS NOTES
"Chief Complaint  Pain of the Left Hip     Subjective      Douglas Macy Cruz presents to [facility] for evaluation of her left hip. The patient fell at home when she got dizzy on 7/16/2021, and injured her left hip. She is ambulating in a wheelchair, but usually ambulating with a cane. She was recently diagnosed with a UTI. She was seen at urgent care and had x-rays and that revealed a fracture. She is here with her daughter. She has no other complaints. She has been weight bearing. She takes Eliquis.    Allergies   Allergen Reactions   • Citalopram Unknown - Low Severity   • Clonazepam Unknown - Low Severity   • Levofloxacin Nausea And Vomiting   • Lisinopril Unknown - Low Severity   • Melatonin Unknown - High Severity        Social History     Socioeconomic History   • Marital status:      Spouse name: Not on file   • Number of children: Not on file   • Years of education: Not on file   • Highest education level: Not on file   Tobacco Use   • Smoking status: Never Smoker   Substance and Sexual Activity   • Alcohol use: Not Currently        Review of Systems     Objective   Vital Signs:   Pulse 80   Ht 168.9 cm (66.5\")   SpO2 98%   BMI 22.42 kg/m²       Physical Exam  Constitutional:       Appearance: Normal appearance. He is well-developed and normal weight.   HENT:      Head: Normocephalic.      Right Ear: Hearing and external ear normal.      Left Ear: Hearing and external ear normal.      Nose: Nose normal.   Eyes:      Conjunctiva/sclera: Conjunctivae normal.   Cardiovascular:      Rate and Rhythm: Normal rate.   Pulmonary:      Effort: Pulmonary effort is normal.      Breath sounds: No wheezing or rales.   Abdominal:      Palpations: Abdomen is soft.      Tenderness: There is no abdominal tenderness.   Musculoskeletal:      Cervical back: Normal range of motion.   Skin:     Findings: No rash.   Neurological:      Mental Status: He is alert and oriented to person, " place, and time.   Psychiatric:         Mood and Affect: Mood and affect normal.         Judgment: Judgment normal.       Ortho Exam      Left hip- Leg lengths appear equal. No shorting or malrotation of the hip. Pain with hip ROM. No swelling or bruising. Neurovascularly intact. Positive EHL, FHL, GS and TA. Sensation intact to all 5 nerves of the foot. Positive pulses.     Procedures    X-Ray Report:  Left hip(s) X-Ray  Indication: Evaluation of left hip pain  AP and Lateral view(s)  Findings: nondisplaced valgus impacted femoral neck fracture  Prior studies available for comparison: yes         Imaging Results (Most Recent)     Procedure Component Value Units Date/Time    XR Hip With or Without Pelvis 2 - 3 View Left [[id number]] Resulted: 07/22/21 1116     Updated: 07/22/21 1117    Narrative:      X-Ray Report:  Left hip(s) X-Ray  Indication: Evaluation of left hip pain  AP and Lateral view(s)  Findings: nondisplaced valgus impacted femoral neck fracture  Prior studies available for comparison: yes            Result Review :       FL < 1 Hour    Result Date: 7/21/2021  Narrative: This procedure was auto-finalized with no dictation required.    XR Pelvis 1 or 2 View    Result Date: 7/17/2021  Narrative: PROCEDURE: XR PELVIS 1 OR 2 VW  COMPARISON: [facility], CR, LEFT HIP/PELVIS, 4/10/2013, 13:35.  [facility], CR, LEFT HIP/PELVIS, 7/17/2020, 10:14.  INDICATIONS: POST FALL YESTERDAY. PAIN IN LEFT HIP.  FINDINGS:   Mild degenerative changes are present in the hips.  There are degenerative changes in the visualized lower lumbar spine.  There is subtle linear density in the left femoral neck.  No lytic lesions are identified.  IMPRESSION: Subtle linear density in the left femoral neck may represent nondisplaced fracture.  Suggest a follow-up CT scan or MRI examination of the left hip.   HUY LOPEZ MD       Electronically Signed and Approved By: HUY LOPEZ MD on 7/17/2021 at 17:30              XR Hip With or Without Pelvis 2 - 3 View Left    Result Date: 7/22/2021  Narrative: X-Ray Report: Left hip(s) X-Ray Indication: Evaluation of left hip pain AP and Lateral view(s) Findings: nondisplaced valgus impacted femoral neck fracture Prior studies available for comparison: yes     XR Hip With or Without Pelvis 2 - 3 View Left    Result Date: 7/21/2021  Narrative: PROCEDURE: XR HIP W OR WO PELVIS 2-3 VIEW LEFT  COMPARISON: [facility], CR, XR PELVIS 1 OR 2 VW, 7/17/2021, 16:39.  [facility] , CR, XR HIP W OR WO PELVIS 2-3 VIEW LEFT, 7/20/2021, 14:44.  INDICATIONS: LEFT HIP FRACTURE/FLUORO TIME 36.1 SECONDS/4.790 mGy/3 IMAGES  FINDINGS:  Intraop imaging status post open reduction internal fixation of left femoral neck fracture.  Three screws stabilize the left femoral neck.  The screws are in expected position with near anatomic alignment.  CONCLUSION:  1. Intraoperative imaging status post open reduction internal fixation of left femoral neck fracture.  Three screws stabilize the left femoral neck fracture with near anatomic alignment.      NIMA PALAFOX MD       Electronically Signed and Approved By: NIMA PALAFOX MD on 7/21/2021 at 14:49                      Assessment and Plan     DX: Left femoral neck fracture    Discussed the treatment plan with the patient and her daughter. The plan is for Dr. Kramer to admit her to the hospital. Discussed the risks and benefits of operative treatment. The patient expressed understanding and wished to proceed. Plan for internal fixation of left femoral neck fracture.    Discussed surgery., Risks/benefits discussed with patient including, but not limited to: infection, bleeding, neurovascular damage, malunion, nonunion, aesthetic deformity, need for further surgery, and death., Discussed with patient the implant type being used during surgery and patient understands and desires to proceed., Surgery pamphlet given. and  Call or return if worsening symptoms.    Follow Up     Follow up 2 weeks postoperatively.       Patient was given instructions and counseling regarding her condition or for health maintenance advice. Please see specific information pulled into the AVS if appropriate.     Scribed for Bam Warner MD by Neeta Maldonado.  07/20/21   13:58 EDT    I have personally performed the services described in this document as scribed by the above individual and it is both accurate and complete.  Bam Warner MD 07/20/21  13:58 EDT

## 2021-07-21 ENCOUNTER — ANESTHESIA EVENT (OUTPATIENT)
Dept: PERIOP | Facility: HOSPITAL | Age: 86
End: 2021-07-21

## 2021-07-21 ENCOUNTER — APPOINTMENT (OUTPATIENT)
Dept: GENERAL RADIOLOGY | Facility: HOSPITAL | Age: 86
End: 2021-07-21

## 2021-07-21 ENCOUNTER — ANESTHESIA (OUTPATIENT)
Dept: PERIOP | Facility: HOSPITAL | Age: 86
End: 2021-07-21

## 2021-07-21 LAB
25(OH)D3 SERPL-MCNC: 25.9 NG/ML (ref 30–100)
ABO GROUP BLD: NORMAL
ALBUMIN SERPL-MCNC: 2.9 G/DL (ref 3.5–5.2)
ALBUMIN/GLOB SERPL: 1 G/DL
ALP SERPL-CCNC: 68 U/L (ref 39–117)
ALT SERPL W P-5'-P-CCNC: 12 U/L (ref 1–33)
ANION GAP SERPL CALCULATED.3IONS-SCNC: 11.4 MMOL/L (ref 5–15)
AST SERPL-CCNC: 12 U/L (ref 1–32)
BACTERIA UR QL AUTO: ABNORMAL /HPF
BASOPHILS # BLD AUTO: 0.01 10*3/MM3 (ref 0–0.2)
BASOPHILS NFR BLD AUTO: 0.1 % (ref 0–1.5)
BILIRUB SERPL-MCNC: 1.1 MG/DL (ref 0–1.2)
BILIRUB UR QL STRIP: NEGATIVE
BUN SERPL-MCNC: 16 MG/DL (ref 8–23)
BUN/CREAT SERPL: 23.2 (ref 7–25)
CALCIUM SPEC-SCNC: 8.5 MG/DL (ref 8.2–9.6)
CHLORIDE SERPL-SCNC: 102 MMOL/L (ref 98–107)
CLARITY UR: CLEAR
CO2 SERPL-SCNC: 25.6 MMOL/L (ref 22–29)
COLOR UR: YELLOW
CREAT SERPL-MCNC: 0.69 MG/DL (ref 0.57–1)
DEPRECATED RDW RBC AUTO: 46.9 FL (ref 37–54)
EOSINOPHIL # BLD AUTO: 0.49 10*3/MM3 (ref 0–0.4)
EOSINOPHIL NFR BLD AUTO: 5.7 % (ref 0.3–6.2)
ERYTHROCYTE [DISTWIDTH] IN BLOOD BY AUTOMATED COUNT: 13.1 % (ref 12.3–15.4)
GFR SERPL CREATININE-BSD FRML MDRD: 80 ML/MIN/1.73
GLOBULIN UR ELPH-MCNC: 2.9 GM/DL
GLUCOSE SERPL-MCNC: 93 MG/DL (ref 65–99)
GLUCOSE UR STRIP-MCNC: NEGATIVE MG/DL
HCT VFR BLD AUTO: 36.1 % (ref 34–46.6)
HGB BLD-MCNC: 12.2 G/DL (ref 12–15.9)
HGB UR QL STRIP.AUTO: NEGATIVE
HYALINE CASTS UR QL AUTO: ABNORMAL /LPF
IMM GRANULOCYTES # BLD AUTO: 0.12 10*3/MM3 (ref 0–0.05)
IMM GRANULOCYTES NFR BLD AUTO: 1.4 % (ref 0–0.5)
KETONES UR QL STRIP: ABNORMAL
LEUKOCYTE ESTERASE UR QL STRIP.AUTO: ABNORMAL
LYMPHOCYTES # BLD AUTO: 2.09 10*3/MM3 (ref 0.7–3.1)
LYMPHOCYTES NFR BLD AUTO: 24.2 % (ref 19.6–45.3)
MAGNESIUM SERPL-MCNC: 1.8 MG/DL (ref 1.6–2.4)
MCH RBC QN AUTO: 33 PG (ref 26.6–33)
MCHC RBC AUTO-ENTMCNC: 33.8 G/DL (ref 31.5–35.7)
MCV RBC AUTO: 97.6 FL (ref 79–97)
MONOCYTES # BLD AUTO: 0.7 10*3/MM3 (ref 0.1–0.9)
MONOCYTES NFR BLD AUTO: 8.1 % (ref 5–12)
NEUTROPHILS NFR BLD AUTO: 5.22 10*3/MM3 (ref 1.7–7)
NEUTROPHILS NFR BLD AUTO: 60.5 % (ref 42.7–76)
NITRITE UR QL STRIP: NEGATIVE
NRBC BLD AUTO-RTO: 0 /100 WBC (ref 0–0.2)
PH UR STRIP.AUTO: 7.5 [PH] (ref 5–8)
PLATELET # BLD AUTO: 185 10*3/MM3 (ref 140–450)
PMV BLD AUTO: 12 FL (ref 6–12)
POTASSIUM SERPL-SCNC: 3.6 MMOL/L (ref 3.5–5.2)
PROT SERPL-MCNC: 5.8 G/DL (ref 6–8.5)
PROT UR QL STRIP: NEGATIVE
QT INTERVAL: 432 MS
RBC # BLD AUTO: 3.7 10*6/MM3 (ref 3.77–5.28)
RBC # UR: ABNORMAL /HPF
REF LAB TEST METHOD: ABNORMAL
RH BLD: POSITIVE
SODIUM SERPL-SCNC: 139 MMOL/L (ref 136–145)
SP GR UR STRIP: 1.01 (ref 1–1.03)
SQUAMOUS #/AREA URNS HPF: ABNORMAL /HPF
TSH SERPL DL<=0.05 MIU/L-ACNC: 2.79 UIU/ML (ref 0.27–4.2)
UROBILINOGEN UR QL STRIP: ABNORMAL
VIT B12 BLD-MCNC: 236 PG/ML (ref 211–946)
WBC # BLD AUTO: 8.63 10*3/MM3 (ref 3.4–10.8)
WBC UR QL AUTO: ABNORMAL /HPF

## 2021-07-21 PROCEDURE — 27236 TREAT THIGH FRACTURE: CPT | Performed by: ORTHOPAEDIC SURGERY

## 2021-07-21 PROCEDURE — 25010000003 CEFAZOLIN IN DEXTROSE 2-4 GM/100ML-% SOLUTION: Performed by: ORTHOPAEDIC SURGERY

## 2021-07-21 PROCEDURE — 84443 ASSAY THYROID STIM HORMONE: CPT | Performed by: INTERNAL MEDICINE

## 2021-07-21 PROCEDURE — 76000 FLUOROSCOPY <1 HR PHYS/QHP: CPT

## 2021-07-21 PROCEDURE — 25010000002 PROPOFOL 10 MG/ML EMULSION: Performed by: NURSE ANESTHETIST, CERTIFIED REGISTERED

## 2021-07-21 PROCEDURE — 94799 UNLISTED PULMONARY SVC/PX: CPT

## 2021-07-21 PROCEDURE — 86901 BLOOD TYPING SEROLOGIC RH(D): CPT

## 2021-07-21 PROCEDURE — 82607 VITAMIN B-12: CPT | Performed by: INTERNAL MEDICINE

## 2021-07-21 PROCEDURE — 25010000002 DEXAMETHASONE PER 1 MG: Performed by: NURSE ANESTHETIST, CERTIFIED REGISTERED

## 2021-07-21 PROCEDURE — C1713 ANCHOR/SCREW BN/BN,TIS/BN: HCPCS | Performed by: ORTHOPAEDIC SURGERY

## 2021-07-21 PROCEDURE — 25010000002 ONDANSETRON PER 1 MG: Performed by: NURSE ANESTHETIST, CERTIFIED REGISTERED

## 2021-07-21 PROCEDURE — 73502 X-RAY EXAM HIP UNI 2-3 VIEWS: CPT | Performed by: RADIOLOGY

## 2021-07-21 PROCEDURE — 99232 SBSQ HOSP IP/OBS MODERATE 35: CPT | Performed by: INTERNAL MEDICINE

## 2021-07-21 PROCEDURE — 80053 COMPREHEN METABOLIC PANEL: CPT | Performed by: INTERNAL MEDICINE

## 2021-07-21 PROCEDURE — 83735 ASSAY OF MAGNESIUM: CPT | Performed by: INTERNAL MEDICINE

## 2021-07-21 PROCEDURE — 85025 COMPLETE CBC W/AUTO DIFF WBC: CPT | Performed by: INTERNAL MEDICINE

## 2021-07-21 PROCEDURE — 25010000002 HYDROMORPHONE 1 MG/ML SOLUTION: Performed by: NURSE ANESTHETIST, CERTIFIED REGISTERED

## 2021-07-21 PROCEDURE — 81001 URINALYSIS AUTO W/SCOPE: CPT | Performed by: INTERNAL MEDICINE

## 2021-07-21 PROCEDURE — 25010000002 FENTANYL CITRATE (PF) 50 MCG/ML SOLUTION: Performed by: NURSE ANESTHETIST, CERTIFIED REGISTERED

## 2021-07-21 PROCEDURE — 86900 BLOOD TYPING SEROLOGIC ABO: CPT

## 2021-07-21 PROCEDURE — 0QS704Z REPOSITION LEFT UPPER FEMUR WITH INTERNAL FIXATION DEVICE, OPEN APPROACH: ICD-10-PCS | Performed by: ORTHOPAEDIC SURGERY

## 2021-07-21 PROCEDURE — 73502 X-RAY EXAM HIP UNI 2-3 VIEWS: CPT

## 2021-07-21 PROCEDURE — 82306 VITAMIN D 25 HYDROXY: CPT | Performed by: INTERNAL MEDICINE

## 2021-07-21 DEVICE — IMPLANTABLE DEVICE: Type: IMPLANTABLE DEVICE | Site: FEMUR | Status: FUNCTIONAL

## 2021-07-21 RX ORDER — EPHEDRINE SULFATE 50 MG/ML
INJECTION, SOLUTION INTRAVENOUS AS NEEDED
Status: DISCONTINUED | OUTPATIENT
Start: 2021-07-21 | End: 2021-07-21 | Stop reason: SURG

## 2021-07-21 RX ORDER — ACETAMINOPHEN 325 MG/1
325 TABLET ORAL EVERY 4 HOURS PRN
Status: DISCONTINUED | OUTPATIENT
Start: 2021-07-21 | End: 2021-07-23 | Stop reason: HOSPADM

## 2021-07-21 RX ORDER — PROMETHAZINE HYDROCHLORIDE 12.5 MG/1
25 TABLET ORAL ONCE AS NEEDED
Status: DISCONTINUED | OUTPATIENT
Start: 2021-07-21 | End: 2021-07-21 | Stop reason: HOSPADM

## 2021-07-21 RX ORDER — SODIUM CHLORIDE 0.9 % (FLUSH) 0.9 %
10 SYRINGE (ML) INJECTION AS NEEDED
Status: DISCONTINUED | OUTPATIENT
Start: 2021-07-21 | End: 2021-07-23 | Stop reason: HOSPADM

## 2021-07-21 RX ORDER — SODIUM CHLORIDE 0.9 % (FLUSH) 0.9 %
3 SYRINGE (ML) INJECTION EVERY 12 HOURS SCHEDULED
Status: DISCONTINUED | OUTPATIENT
Start: 2021-07-21 | End: 2021-07-23 | Stop reason: HOSPADM

## 2021-07-21 RX ORDER — ONDANSETRON 2 MG/ML
4 INJECTION INTRAMUSCULAR; INTRAVENOUS ONCE AS NEEDED
Status: DISCONTINUED | OUTPATIENT
Start: 2021-07-21 | End: 2021-07-21 | Stop reason: HOSPADM

## 2021-07-21 RX ORDER — TRAMADOL HYDROCHLORIDE 50 MG/1
50 TABLET ORAL EVERY 8 HOURS PRN
Status: DISCONTINUED | OUTPATIENT
Start: 2021-07-21 | End: 2021-07-23 | Stop reason: HOSPADM

## 2021-07-21 RX ORDER — KETAMINE HYDROCHLORIDE 50 MG/ML
INJECTION, SOLUTION, CONCENTRATE INTRAMUSCULAR; INTRAVENOUS AS NEEDED
Status: DISCONTINUED | OUTPATIENT
Start: 2021-07-21 | End: 2021-07-21 | Stop reason: SURG

## 2021-07-21 RX ORDER — PROMETHAZINE HYDROCHLORIDE 25 MG/1
25 SUPPOSITORY RECTAL ONCE AS NEEDED
Status: DISCONTINUED | OUTPATIENT
Start: 2021-07-21 | End: 2021-07-21 | Stop reason: HOSPADM

## 2021-07-21 RX ORDER — CEFAZOLIN SODIUM 2 G/100ML
2 INJECTION, SOLUTION INTRAVENOUS
Status: COMPLETED | OUTPATIENT
Start: 2021-07-21 | End: 2021-07-21

## 2021-07-21 RX ORDER — LIDOCAINE HYDROCHLORIDE 20 MG/ML
INJECTION, SOLUTION INFILTRATION; PERINEURAL AS NEEDED
Status: DISCONTINUED | OUTPATIENT
Start: 2021-07-21 | End: 2021-07-21 | Stop reason: SURG

## 2021-07-21 RX ORDER — SODIUM CHLORIDE, SODIUM LACTATE, POTASSIUM CHLORIDE, CALCIUM CHLORIDE 600; 310; 30; 20 MG/100ML; MG/100ML; MG/100ML; MG/100ML
100 INJECTION, SOLUTION INTRAVENOUS CONTINUOUS
Status: DISCONTINUED | OUTPATIENT
Start: 2021-07-21 | End: 2021-07-22

## 2021-07-21 RX ORDER — PROMETHAZINE HYDROCHLORIDE 12.5 MG/1
12.5 TABLET ORAL EVERY 6 HOURS PRN
Status: DISCONTINUED | OUTPATIENT
Start: 2021-07-21 | End: 2021-07-23 | Stop reason: HOSPADM

## 2021-07-21 RX ORDER — FENTANYL CITRATE 50 UG/ML
INJECTION, SOLUTION INTRAMUSCULAR; INTRAVENOUS AS NEEDED
Status: DISCONTINUED | OUTPATIENT
Start: 2021-07-21 | End: 2021-07-21 | Stop reason: SURG

## 2021-07-21 RX ORDER — MEPERIDINE HYDROCHLORIDE 25 MG/ML
12.5 INJECTION INTRAMUSCULAR; INTRAVENOUS; SUBCUTANEOUS
Status: DISCONTINUED | OUTPATIENT
Start: 2021-07-21 | End: 2021-07-21 | Stop reason: HOSPADM

## 2021-07-21 RX ORDER — HYDROCODONE BITARTRATE AND ACETAMINOPHEN 5; 325 MG/1; MG/1
2 TABLET ORAL EVERY 4 HOURS PRN
Status: DISCONTINUED | OUTPATIENT
Start: 2021-07-21 | End: 2021-07-23 | Stop reason: HOSPADM

## 2021-07-21 RX ORDER — POTASSIUM CHLORIDE 750 MG/1
40 CAPSULE, EXTENDED RELEASE ORAL ONCE
Status: COMPLETED | OUTPATIENT
Start: 2021-07-21 | End: 2021-07-21

## 2021-07-21 RX ORDER — DEXMEDETOMIDINE HYDROCHLORIDE 100 UG/ML
INJECTION, SOLUTION INTRAVENOUS AS NEEDED
Status: DISCONTINUED | OUTPATIENT
Start: 2021-07-21 | End: 2021-07-21 | Stop reason: SURG

## 2021-07-21 RX ORDER — DEXAMETHASONE SODIUM PHOSPHATE 4 MG/ML
INJECTION, SOLUTION INTRA-ARTICULAR; INTRALESIONAL; INTRAMUSCULAR; INTRAVENOUS; SOFT TISSUE AS NEEDED
Status: DISCONTINUED | OUTPATIENT
Start: 2021-07-21 | End: 2021-07-21 | Stop reason: SURG

## 2021-07-21 RX ORDER — CEFAZOLIN SODIUM 2 G/100ML
2 INJECTION, SOLUTION INTRAVENOUS EVERY 8 HOURS
Status: COMPLETED | OUTPATIENT
Start: 2021-07-21 | End: 2021-07-22

## 2021-07-21 RX ORDER — OXYCODONE HYDROCHLORIDE 5 MG/1
5 TABLET ORAL
Status: DISCONTINUED | OUTPATIENT
Start: 2021-07-21 | End: 2021-07-21 | Stop reason: HOSPADM

## 2021-07-21 RX ORDER — ONDANSETRON 2 MG/ML
INJECTION INTRAMUSCULAR; INTRAVENOUS AS NEEDED
Status: DISCONTINUED | OUTPATIENT
Start: 2021-07-21 | End: 2021-07-21 | Stop reason: SURG

## 2021-07-21 RX ORDER — DOCUSATE SODIUM 100 MG/1
100 CAPSULE, LIQUID FILLED ORAL 2 TIMES DAILY
Status: DISCONTINUED | OUTPATIENT
Start: 2021-07-21 | End: 2021-07-23 | Stop reason: HOSPADM

## 2021-07-21 RX ORDER — PROMETHAZINE HYDROCHLORIDE 12.5 MG/1
12.5 SUPPOSITORY RECTAL EVERY 6 HOURS PRN
Status: DISCONTINUED | OUTPATIENT
Start: 2021-07-21 | End: 2021-07-23 | Stop reason: HOSPADM

## 2021-07-21 RX ORDER — MAGNESIUM HYDROXIDE 1200 MG/15ML
LIQUID ORAL AS NEEDED
Status: DISCONTINUED | OUTPATIENT
Start: 2021-07-21 | End: 2021-07-21 | Stop reason: HOSPADM

## 2021-07-21 RX ORDER — ROCURONIUM BROMIDE 10 MG/ML
INJECTION, SOLUTION INTRAVENOUS AS NEEDED
Status: DISCONTINUED | OUTPATIENT
Start: 2021-07-21 | End: 2021-07-21 | Stop reason: SURG

## 2021-07-21 RX ORDER — PROPOFOL 10 MG/ML
VIAL (ML) INTRAVENOUS AS NEEDED
Status: DISCONTINUED | OUTPATIENT
Start: 2021-07-21 | End: 2021-07-21 | Stop reason: SURG

## 2021-07-21 RX ORDER — CEFAZOLIN SODIUM 2 G/100ML
2 INJECTION, SOLUTION INTRAVENOUS EVERY 8 HOURS
Status: DISCONTINUED | OUTPATIENT
Start: 2021-07-21 | End: 2021-07-21

## 2021-07-21 RX ORDER — SODIUM CHLORIDE, SODIUM LACTATE, POTASSIUM CHLORIDE, CALCIUM CHLORIDE 600; 310; 30; 20 MG/100ML; MG/100ML; MG/100ML; MG/100ML
INJECTION, SOLUTION INTRAVENOUS CONTINUOUS PRN
Status: DISCONTINUED | OUTPATIENT
Start: 2021-07-21 | End: 2021-07-21 | Stop reason: SURG

## 2021-07-21 RX ADMIN — HYDROMORPHONE HYDROCHLORIDE 0.5 MG: 1 INJECTION, SOLUTION INTRAMUSCULAR; INTRAVENOUS; SUBCUTANEOUS at 15:17

## 2021-07-21 RX ADMIN — SODIUM CHLORIDE, PRESERVATIVE FREE 10 ML: 5 INJECTION INTRAVENOUS at 08:25

## 2021-07-21 RX ADMIN — ONDANSETRON 4 MG: 2 INJECTION INTRAMUSCULAR; INTRAVENOUS at 14:05

## 2021-07-21 RX ADMIN — SODIUM CHLORIDE, POTASSIUM CHLORIDE, SODIUM LACTATE AND CALCIUM CHLORIDE 100 ML/HR: 600; 310; 30; 20 INJECTION, SOLUTION INTRAVENOUS at 16:44

## 2021-07-21 RX ADMIN — EPHEDRINE SULFATE 10 MG: 50 INJECTION INTRAVENOUS at 13:48

## 2021-07-21 RX ADMIN — DILTIAZEM HYDROCHLORIDE 120 MG: 120 CAPSULE, EXTENDED RELEASE ORAL at 11:21

## 2021-07-21 RX ADMIN — FENTANYL CITRATE 100 MCG: 50 INJECTION INTRAMUSCULAR; INTRAVENOUS at 13:39

## 2021-07-21 RX ADMIN — FAMOTIDINE 20 MG: 10 INJECTION INTRAVENOUS at 08:25

## 2021-07-21 RX ADMIN — EPHEDRINE SULFATE 10 MG: 50 INJECTION INTRAVENOUS at 13:53

## 2021-07-21 RX ADMIN — FAMOTIDINE 20 MG: 10 INJECTION INTRAVENOUS at 20:44

## 2021-07-21 RX ADMIN — KETAMINE HYDROCHLORIDE 25 MG: 50 INJECTION, SOLUTION INTRAMUSCULAR; INTRAVENOUS at 13:58

## 2021-07-21 RX ADMIN — KETAMINE HYDROCHLORIDE 25 MG: 50 INJECTION, SOLUTION INTRAMUSCULAR; INTRAVENOUS at 13:40

## 2021-07-21 RX ADMIN — METOPROLOL SUCCINATE 100 MG: 50 TABLET, EXTENDED RELEASE ORAL at 20:44

## 2021-07-21 RX ADMIN — LIDOCAINE HYDROCHLORIDE 100 MG: 20 INJECTION, SOLUTION INFILTRATION; PERINEURAL at 13:39

## 2021-07-21 RX ADMIN — DEXMEDETOMIDINE HYDROCHLORIDE 25 MCG: 100 INJECTION, SOLUTION INTRAVENOUS at 13:39

## 2021-07-21 RX ADMIN — HYDROMORPHONE HYDROCHLORIDE 0.5 MG: 1 INJECTION, SOLUTION INTRAMUSCULAR; INTRAVENOUS; SUBCUTANEOUS at 15:31

## 2021-07-21 RX ADMIN — SODIUM CHLORIDE, POTASSIUM CHLORIDE, SODIUM LACTATE AND CALCIUM CHLORIDE 75 ML/HR: 600; 310; 30; 20 INJECTION, SOLUTION INTRAVENOUS at 07:42

## 2021-07-21 RX ADMIN — SODIUM CHLORIDE, POTASSIUM CHLORIDE, SODIUM LACTATE AND CALCIUM CHLORIDE: 600; 310; 30; 20 INJECTION, SOLUTION INTRAVENOUS at 13:37

## 2021-07-21 RX ADMIN — METOPROLOL SUCCINATE 100 MG: 50 TABLET, EXTENDED RELEASE ORAL at 08:25

## 2021-07-21 RX ADMIN — LORAZEPAM 0.5 MG: 0.5 TABLET ORAL at 20:43

## 2021-07-21 RX ADMIN — DEXAMETHASONE SODIUM PHOSPHATE 4 MG: 4 INJECTION INTRA-ARTICULAR; INTRALESIONAL; INTRAMUSCULAR; INTRAVENOUS; SOFT TISSUE at 13:40

## 2021-07-21 RX ADMIN — CEFAZOLIN SODIUM 2 G: 2 INJECTION, SOLUTION INTRAVENOUS at 13:39

## 2021-07-21 RX ADMIN — POTASSIUM CHLORIDE 40 MEQ: 10 CAPSULE, COATED, EXTENDED RELEASE ORAL at 08:26

## 2021-07-21 RX ADMIN — CEFAZOLIN SODIUM 2 G: 2 INJECTION, SOLUTION INTRAVENOUS at 20:44

## 2021-07-21 RX ADMIN — ROCURONIUM BROMIDE 50 MG: 10 INJECTION INTRAVENOUS at 13:41

## 2021-07-21 RX ADMIN — PROPOFOL 100 MG: 10 INJECTION, EMULSION INTRAVENOUS at 13:41

## 2021-07-21 RX ADMIN — SUGAMMADEX 132 MG: 100 INJECTION, SOLUTION INTRAVENOUS at 14:27

## 2021-07-21 NOTE — PLAN OF CARE
Goal Outcome Evaluation:  Plan of Care Reviewed With: patient        Progress: no change  Outcome Summary: PT FOR POSS HIP SURGERY TODAY.

## 2021-07-21 NOTE — ANESTHESIA PROCEDURE NOTES
Airway  Date/Time: 7/21/2021 1:43 PM  End Time:7/21/2021 1:43 PM  Airway not difficult    General Information and Staff    Patient location during procedure: OR  CRNA: Dawson Buck CRNA    Indications and Patient Condition  Indications for airway management: airway protection    Preoxygenated: yes  MILS maintained throughout  Mask difficulty assessment: 1 - vent by mask    Final Airway Details  Final airway type: endotracheal airway      Successful airway: ETT  Cuffed: yes   Successful intubation technique: direct laryngoscopy  Endotracheal tube insertion site: oral  Blade: Zari  Blade size: 3  ETT size (mm): 7.5  Cormack-Lehane Classification: grade I - full view of glottis  Placement verified by: chest auscultation, capnometry and palpation of cuff   Measured from: lips  ETT/EBT  to lips (cm): 22  Number of attempts at approach: 1  Assessment: lips, teeth, and gum same as pre-op and atraumatic intubation

## 2021-07-21 NOTE — ANESTHESIA POSTPROCEDURE EVALUATION
Patient: Bing Cruz    Procedure Summary     Date: 07/21/21 Room / Location: [facility] OR 01 / [facility] MAIN OR    Anesthesia Start: 1337 Anesthesia Stop: 1446    Procedure: FEMORAL NECK OPEN REDUCTION INTERNAL FIXATION (Left Thigh) Diagnosis:       Closed left hip fracture (CMS/HCC)      (Closed left hip fracture (CMS/HCC) [S72.002A])    Surgeons: Bam Warner MD Provider: Antonio Nava MD    Anesthesia Type: general ASA Status: 3          Anesthesia Type: general    Vitals  Vitals Value Taken Time   /69 07/21/21 1506   Temp     Pulse 80 07/21/21 1508   Resp     SpO2 95 % 07/21/21 1508   Vitals shown include unvalidated device data.        Post Anesthesia Care and Evaluation    Patient location during evaluation: bedside  Patient participation: complete - patient participated  Level of consciousness: awake  Pain management: adequate  Airway patency: patent  Anesthetic complications: No anesthetic complications  PONV Status: none  Cardiovascular status: acceptable and stable  Respiratory status: acceptable  Hydration status: acceptable    Comments: An Anesthesiologist personally participated in the most demanding procedures (including induction and emergence if applicable) in the anesthesia plan, monitored the course of anesthesia administration at frequent intervals and remained physically present and available for immediate diagnosis and treatment of emergencies.

## 2021-07-21 NOTE — PROGRESS NOTES
"   [facility]   Progress Note    Patient Name: Bing Cruz  : 1931  MRN: [id number]  Primary Care Physician: Jairo Kramer MD  Date of admission: 2021    Subjective   Subjective   HPI:  Patient was seen today she was standing up at the bedside with the aid and nurse, she is not having a lot of hip pain at the present time, she is hungry and wants something to eat but she knows she cannot, she has had no falls while here, she is scheduled for surgery later today, we discussed possible rehab at home or here after surgery      Review of Systems   All systems were reviewed and negative except for: Weakness      Objective   Objective     Vitals:  Patient Vitals for the past 24 hrs:   BP Temp Temp src Pulse Resp SpO2 Height Weight   21 0600 -- -- -- -- -- -- -- 65.8 kg (145 lb 1 oz)   21 0300 142/75 97.8 °F (36.6 °C) Oral 70 16 98 % -- --   21 2300 133/68 97.8 °F (36.6 °C) Oral 58 16 98 % -- --   21 2202 -- -- -- -- -- 98 % -- --   21 1900 151/79 98.1 °F (36.7 °C) Oral 81 18 99 % -- --   21 1700 159/82 97.8 °F (36.6 °C) Oral 78 18 99 % 168.9 cm (66.5\") 64 kg (141 lb 1.5 oz)      Physical Exam   Lower extremities no edema, she is able to stand up with the use of a rolling walker she is alert and oriented x3 her lungs are clear anteriorly and posteriorly cardiac exam reveals an irregular rhythm controlled rate distant heart tones her neck is supple her throat is clear sclera nonicteric      Result Review    Result Review:  I have personally reviewed the results from the time of this admission to 21 7:37 AM EDT and agree with these findings:  [x]  Laboratory  []  Microbiology  [x]  Radiology  []  EKG/Telemetry   []  Cardiology/Vascular   []  Pathology  [x]  Old records  []  Other:      Active Hospital Meds:  Scheduled Meds:ceFAZolin, 2 g, Intravenous, 30 Min Pre-Op  dilTIAZem CD, 120 mg, Oral, Q24H  famotidine, 20 mg, Intravenous, Q12H  FLUoxetine, " 20 mg, Oral, Daily  LORazepam, 0.5 mg, Oral, Nightly  metoprolol succinate XL, 100 mg, Oral, Q12H  senna-docusate sodium, 2 tablet, Oral, BID  sodium chloride, 10 mL, Intravenous, Q12H      Continuous Infusions:lactated ringers, 75 mL/hr, Last Rate: 75 mL/hr (07/20/21 2018)      PRN Meds:.•  acetaminophen **OR** acetaminophen **OR** acetaminophen  •  senna-docusate sodium **AND** polyethylene glycol **AND** bisacodyl **AND** bisacodyl  •  HYDROcodone-acetaminophen  •  Morphine **AND** naloxone  •  ondansetron  •  ondansetron  •  sodium chloride    Assessment/Plan   Assessment / Plan     Active Hospital Problems:  Active Hospital Problems    Diagnosis    • **Closed left hip fracture (CMS/[facility])      Added automatically from request for surgery [id number]     • Hip fracture requiring operative repair, left, closed, initial encounter (CMS/[facility])        Assessment/Plan:     Nondisplaced left femoral neck fracture, for ORIF today July 21 by Dr. Warner possible rehab at home or hospital afterwards, fall was last Friday, labs reviewed stable will replace potassium orally x1 today    Anticoagulation monitoring and issues with her underlying A. fib, holding Eliquis from yesterday through today,    Chronic atrial fibrillation rate controlled on current medications, EKG noted July 20, 2021    Aortic valve replacement 2018    History of DVT 2015 on chronic anticoagulation    Moderate mitral regurgitation by previous echo several years ago      Other medical history:  History of dysphagia with previous work-up October 2020 showing narrowing of the distal esophagus status post EGD February 2021, on proton pump inhibitors as of May 2021  Colon cancer diagnosed June 2020 after being found anemic, underwent exploratory laparotomy and partial colectomy 14 out of 14 lymph nodes - June 2020 by Dr. Valentin, prolonged hospital course with rehab,  Hyponatremia during that admission June 2020,  C. difficile colitis during admission June 2020  resolved  Failure to thrive weight loss, went into rehab following hospital stay June 2020 with gait dysfunction generalized weakness, has been improving over the past year,  Lung nodule found June 2025 mm left lower lobe on initial CT scans, he is being followed up by hematology oncology for history of colon cancer  Hyponatremia resolved, was seeing nephrology in the hospital last year July and August 2020, off hydrochlorothiazide now doing better  Atrial fibrillation with rapid ventricular response remotely March 23, 2017 on anticoagulation, beta-blockers, Cardizem, cardioverted April 2019 Dr. Jolly, paroxysmal A. fib currently  Previous left humeral fracture December 2016 status post ORIF with josseline placement  Extensive DVT left lower leg November 2015 currently on Eliquis 2.5 twice a day  Moderate mitral valve regurgitation on echo April 2017 is seen Dr. Jolly in the past  Reactive depression with loss of son's stomach cancer November 2018 and daughter status post aortic valve replacement 2018  Gastroesophageal reflux off PPIs, restarted PPIs again January 27, 2021  Hyperlipidemia elevated triglycerides off statins  Osteopenia  Vitamin D deficiency  Osteoarthritis of the left hip           CODE STATUS:    Code Status and Medical Interventions:   Ordered at: 07/20/21 1828     Level Of Support Discussed With:    Patient     Code Status:    CPR     Medical Interventions (Level of Support Prior to Arrest):    Full       Electronically signed by Jairo Kramer MD, 07/21/21, 7:37 AM EDT.

## 2021-07-21 NOTE — CONSULTS
[facility]   Consult Note    Patient Name: Bing Cruz  : 1931  MRN: [id number]  Primary Care Physician:  Jairo Kramer MD  Referring Physician: Jairo Kramer, *  Date of admission: 2021    Subjective   Subjective     Reason for Consult/ Chief Complaint: Left hip fracture    HPI:  Bing Cruz is a 89 y.o. female who presented to my office yesterday after a weekend visit to the urgent care.  An x-ray taken at urgent care and in my office yesterday revealed a valgus impacted nondisplaced femoral neck fracture.  The patient reports left hip pain.  She was admitted to the hospital by Dr. Kramer and has been n.p.o. after midnight for surgical fixation today.    Review of Systems   All systems were reviewed and negative except for those mentioned in HPI    Personal History     Past Medical History:   Diagnosis Date   • Abdominal pain, generalized    • Acquired cyst of kidney    • Aortic regurgitation    • Aortic stenosis    • Aortic valve disease    • Cardiomegaly    • Chronic fatigue    • Diaphragmatic hernia without obstruction and without gangrene    • Diarrhea    • Disease of tricuspid valve    • Diverticulosis of colon    • Dysphagia    • Essential (primary) hypertension    • Hepatic cyst     LEFT   • Hiatal hernia    • Hyperlipidemia    • Insomnia, unspecified    • LVH (left ventricular hypertrophy)    • Major depressive disorder, single episode    • Malaise and fatigue    • Mitral regurgitation    • Mitral valve disorder    • Mitral valve insufficiency and aortic valve insufficiency    • Osteopenia    • Other specified disorders of bone density and structure, unspecified site    • Pain in joint, pelvic region and thigh    • Renal cyst    • Sprain and strain of hip     Sprain and strain of unspecified site of hip and thigh   • TR (tricuspid regurgitation)    • Unspecified cataract    • Urinary tract infection    • Vitamin D deficiency        Past Surgical  History:   Procedure Laterality Date   • ABDOMINAL HYSTERECTOMY      WITH BSO    • ANKLE SURGERY      (L) ankle fracture   • BLADDER REPAIR     • BREAST BIOPSY     • BREAST BIOPSY      (L) breast biopsy   • CATARACT EXTRACTION      OU   • CHOLECYSTECTOMY      OPEN   • COLONOSCOPY  2020   • INGUINAL HERNIA REPAIR Left 09/29/2011       Family History: family history is not on file. Otherwise pertinent FHx was reviewed and not pertinent to current issue.    Social History:  reports that she has never smoked. She does not have any smokeless tobacco history on file. She reports previous alcohol use.    Home Medications:  FLUoxetine, LORazepam, apixaban, cephalexin, dexamethasone, dilTIAZem, famotidine, furosemide, loratadine, losartan, metoprolol succinate XL, ondansetron, potassium chloride, and raNITIdine    Allergies:  Allergies   Allergen Reactions   • Citalopram Unknown - Low Severity   • Clonazepam Unknown - Low Severity   • Levofloxacin Nausea And Vomiting   • Lisinopril Unknown - Low Severity   • Melatonin Unknown - High Severity       Objective    Objective     Vitals:   Temp:  [97.8 °F (36.6 °C)-98.1 °F (36.7 °C)] 97.8 °F (36.6 °C)  Heart Rate:  [58-81] 70  Resp:  [16-18] 16  BP: (133-159)/(68-82) 142/75    Physical Exam:   Constitutional: Awake, alert   Eyes: PERRLA, sclerae anicteric, no conjunctival injection   HENT: NCAT, mucous membranes moist   Neck: Supple, no thyromegaly, no lymphadenopathy, trachea midline   Respiratory: Clear to auscultation bilaterally, nonlabored respirations    Cardiovascular: RRR, no murmurs, rubs, or gallops, palpable pedal pulses bilaterally   Gastrointestinal: Positive bowel sounds, soft, nontender, nondistended   Musculoskeletal: Left lower extremity is not significantly shortened or rotated.  Neurovascularly intact.  Pain with hip palpation and range of motion.   Psychiatric: Appropriate affect, cooperative   Neurologic: Oriented x 3, strength symmetric in all extremities,  Cranial Nerves grossly intact to confrontation, speech clear   Skin: No rashes     Result Review    Result Review:  I have personally reviewed the results from the time of this admission to 7/21/2021 07:03 EDT and agree with these findings:  [x]  Laboratory  []  Microbiology  [x]  Radiology  []  EKG/Telemetry   []  Cardiology/Vascular   []  Pathology  []  Old records  []  Other:    Most notable findings include: Nondisplaced left femoral neck fracture  Assessment/Plan   Assessment / Plan     Brief Patient Summary:  Bing Cruz is a 89 y.o. female who has a nondisplaced left femoral neck fracture    Active Hospital Problems:  Active Hospital Problems    Diagnosis    • Hip fracture requiring operative repair, left, closed, initial encounter (CMS/[facility])        Plan: Discussed treatment options with the patient and her family.  Informed consent was obtained and will plan for left hip internal fixation.  Risks and benefits of this were described.  Plan for n.p.o. for surgery later today.  Thank you for the consultation.        Electronically signed by Bam Warner MD, 07/21/21, 7:03 AM EDT.

## 2021-07-21 NOTE — ANESTHESIA PREPROCEDURE EVALUATION
Anesthesia Evaluation     Patient summary reviewed and Nursing notes reviewed   NPO Solid Status: > 8 hours  NPO Liquid Status: > 8 hours           Airway   Mallampati: I  TM distance: >3 FB  Neck ROM: full  No difficulty expected  Dental - normal exam     Pulmonary - normal exam   Cardiovascular     Rhythm: irregular  Rate: normal    (+) hypertension well controlled, dysrhythmias Atrial Fib, hyperlipidemia,       Neuro/Psych  GI/Hepatic/Renal/Endo    (+)   renal disease CRI,     Musculoskeletal     Abdominal    Substance History      OB/GYN          Other                        Anesthesia Plan    ASA 3     general   (No able to do regional anesthetic do to blood thinners)  intravenous induction     Anesthetic plan, all risks, benefits, and alternatives have been provided, discussed and informed consent has been obtained with: patient.

## 2021-07-21 NOTE — CASE MANAGEMENT/SOCIAL WORK
Discharge Planning Assessment   [place]     Patient Name: Bing Cruz  MRN: [id number]  Today's Date: 7/21/2021    Admit Date: 7/20/2021    Discharge Needs Assessment     Row Name 07/21/21 0950       Living Environment    Lives With  alone    Current Living Arrangements  home/apartment/condo    Primary Care Provided by  self    Family Caregiver if Needed  child(sada), adult    Quality of Family Relationships  helpful;involved;supportive    Able to Return to Prior Arrangements  yes    Living Arrangement Comments  Patient reported that she lives alone. Patient reported that her one of her daughters lives close and one lives in [place]. Patient reported that her daughter in [place] will be coming to stay with her for few days.       Resource/Environmental Concerns    Resource/Environmental Concerns  none       Transition Planning    Patient/Family Anticipates Transition to  home    Patient/Family Anticipated Services at Transition  none    Transportation Anticipated  family or friend will provide       Discharge Needs Assessment    Readmission Within the Last 30 Days  no previous admission in last 30 days    Equipment Currently Used at Home  cane, straight;commode;walker, rolling    Anticipated Changes Related to Illness  none    Equipment Needed After Discharge  none    Discharge Coordination/Progress  Patient with left hip fracture. Patient lives alone at home Patient reported that she had a fall at home.Patient is having surgery today. SW will follo up with patient after surgery.        Discharge Plan    No documentation.       Continued Care and Services - Admitted Since 7/20/2021    Coordination has not been started for this encounter.         Demographic Summary     Row Name 07/21/21 0946       General Information    Admission Type  inpatient    Arrived From  home    Referral Source  admission list    Reason for Consult  discharge planning    Preferred Language  English     Used During This  Interaction  no       Contact Information    Permission Granted to Share Info With          Functional Status     Row Name 07/21/21 0949       Functional Status    Usual Activity Tolerance  excellent    Current Activity Tolerance  excellent       Functional Status, IADL    Medications  independent    Meal Preparation  independent    Housekeeping  independent    Laundry  independent    Shopping  independent       Mental Status    General Appearance WDL  WDL       Mental Status Summary    Recent Changes in Mental Status/Cognitive Functioning  no changes        Psychosocial    No documentation.       Abuse/Neglect    No documentation.       Legal     Row Name 07/21/21 0949       Legal    Criminal Activity/Legal Involvement  none        Substance Abuse    No documentation.       Patient Forms    No documentation.           ERIK Trejo

## 2021-07-21 NOTE — PLAN OF CARE
Goal Outcome Evaluation:  Plan of Care Reviewed With: patient, daughter        Progress: no change  Surgery went well. Pt having some urgency with voiding. MD made aware. U/A ordered. No c/o pain. No other issues.    Problem: Adjustment to Injury (Hip Fracture)  Goal: Optimal Coping with Change in Health Status  Outcome: Ongoing, Progressing  Intervention: Support Psychosocial Response to Injury and Mobility Change  Recent Flowsheet Documentation  Taken 7/21/2021 0741 by Abiodun Leahy RN  Supportive Measures:   active listening utilized   positive reinforcement provided   verbalization of feelings encouraged  Family/Support System Care: involvement promoted     Problem: Bleeding (Hip Fracture)  Goal: Absence of Bleeding  Outcome: Ongoing, Progressing     Problem: Bowel Elimination Impaired (Hip Fracture)  Goal: Effective Bowel Elimination  Outcome: Ongoing, Progressing  Intervention: Promote Effective Bowel Elimination  Recent Flowsheet Documentation  Taken 7/21/2021 0741 by Abiodun Leahy RN  Bowel Elimination Promotion: adequate fluid intake promoted     Problem: Delayed Union/Nonunion (Hip Fracture)  Goal: Fracture Stability  Outcome: Ongoing, Progressing     Problem: Embolism (Hip Fracture)  Goal: Absence of Embolism  Outcome: Ongoing, Progressing     Problem: Functional Ability Impaired (Hip Fracture)  Goal: Optimal Functional Performance  Outcome: Ongoing, Progressing  Intervention: Promote Optimal Functional Status  Recent Flowsheet Documentation  Taken 7/21/2021 1703 by Abiodun Leahy RN  Activity Management: up to bedside commode  Taken 7/21/2021 1051 by Abiodun Leahy, RN  Activity Management: ambulated in room  Taken 7/21/2021 0842 by Abiodun Leahy, RN  Activity Management: ambulated to bathroom  Taken 7/21/2021 0741 by Abiodun Leahy, RN  Activity Management: activity adjusted per tolerance  Taken 7/21/2021 0736 by Abiodun Leahy, RN  Activity Management: ambulated to bathroom      Problem: Pain (Hip Fracture)  Goal: Acceptable Pain Level  Outcome: Ongoing, Progressing  Intervention: Manage Acute Orthopaedic-Related Pain  Recent Flowsheet Documentation  Taken 7/21/2021 0741 by Abiodun Leahy RN  Pain Management Interventions:   care clustered   quiet environment facilitated   relaxation techniques promoted     Problem: Urinary Elimination Impaired (Hip Fracture)  Goal: Effective Urinary Elimination  Outcome: Ongoing, Progressing  Intervention: Support Effective Urinary Elimination  Recent Flowsheet Documentation  Taken 7/21/2021 0741 by Abiodun Leahy RN  Urinary Elimination Promotion: toileting offered     Problem: Adult Inpatient Plan of Care  Goal: Plan of Care Review  Outcome: Ongoing, Progressing  Flowsheets (Taken 7/21/2021 1914)  Progress: no change  Plan of Care Reviewed With:   patient   daughter  Goal: Patient-Specific Goal (Individualized)  Outcome: Ongoing, Progressing  Goal: Absence of Hospital-Acquired Illness or Injury  Outcome: Ongoing, Progressing  Intervention: Identify and Manage Fall Risk  Recent Flowsheet Documentation  Taken 7/21/2021 0741 by Abiodun Leahy RN  Safety Promotion/Fall Prevention:   activity supervised   fall prevention program maintained   nonskid shoes/slippers when out of bed   safety round/check completed  Intervention: Prevent Skin Injury  Recent Flowsheet Documentation  Taken 7/21/2021 0741 by Abiodun Leahy RN  Body Position: supine  Skin Protection:   adhesive use limited   incontinence pads utilized  Intervention: Prevent Infection  Recent Flowsheet Documentation  Taken 7/21/2021 0741 by Abiodun Leahy RN  Infection Prevention:   environmental surveillance performed   rest/sleep promoted   single patient room provided  Goal: Optimal Comfort and Wellbeing  Outcome: Ongoing, Progressing  Intervention: Provide Person-Centered Care  Recent Flowsheet Documentation  Taken 7/21/2021 0741 by Abiodun Leahy RN  Trust  Relationship/Rapport:   care explained   empathic listening provided   questions answered   questions encouraged   thoughts/feelings acknowledged  Goal: Readiness for Transition of Care  Outcome: Ongoing, Progressing     Problem: Hypertension Comorbidity  Goal: Blood Pressure in Desired Range  Outcome: Ongoing, Progressing  Intervention: Maintain Hypertension-Management Strategies  Recent Flowsheet Documentation  Taken 7/21/2021 0741 by Abiodun Leahy RN  Medication Review/Management:   medications reviewed   high-risk medications identified     Problem: Skin Injury Risk Increased  Goal: Skin Health and Integrity  Outcome: Ongoing, Progressing  Intervention: Optimize Skin Protection  Recent Flowsheet Documentation  Taken 7/21/2021 0741 by Abiodun Leahy RN  Pressure Reduction Techniques: frequent weight shift encouraged  Head of Bed (HOB): HOB at 30 degrees  Pressure Reduction Devices: pressure-redistributing mattress utilized  Skin Protection:   adhesive use limited   incontinence pads utilized     Problem: Fall Injury Risk  Goal: Absence of Fall and Fall-Related Injury  Outcome: Ongoing, Progressing  Intervention: Identify and Manage Contributors to Fall Injury Risk  Recent Flowsheet Documentation  Taken 7/21/2021 0741 by Abiodun Leahy RN  Medication Review/Management:   medications reviewed   high-risk medications identified  Intervention: Promote Injury-Free Environment  Recent Flowsheet Documentation  Taken 7/21/2021 0741 by Abiodun Leahy RN  Safety Promotion/Fall Prevention:   activity supervised   fall prevention program maintained   nonskid shoes/slippers when out of bed   safety round/check completed

## 2021-07-22 LAB
ANION GAP SERPL CALCULATED.3IONS-SCNC: 10.1 MMOL/L (ref 5–15)
BASOPHILS # BLD AUTO: 0.01 10*3/MM3 (ref 0–0.2)
BASOPHILS NFR BLD AUTO: 0.1 % (ref 0–1.5)
BUN SERPL-MCNC: 11 MG/DL (ref 8–23)
BUN/CREAT SERPL: 17.2 (ref 7–25)
CALCIUM SPEC-SCNC: 8.7 MG/DL (ref 8.2–9.6)
CHLORIDE SERPL-SCNC: 101 MMOL/L (ref 98–107)
CO2 SERPL-SCNC: 24.9 MMOL/L (ref 22–29)
CREAT SERPL-MCNC: 0.64 MG/DL (ref 0.57–1)
DEPRECATED RDW RBC AUTO: 44.5 FL (ref 37–54)
EOSINOPHIL # BLD AUTO: 0 10*3/MM3 (ref 0–0.4)
EOSINOPHIL NFR BLD AUTO: 0 % (ref 0.3–6.2)
ERYTHROCYTE [DISTWIDTH] IN BLOOD BY AUTOMATED COUNT: 12.5 % (ref 12.3–15.4)
GFR SERPL CREATININE-BSD FRML MDRD: 87 ML/MIN/1.73
GLUCOSE SERPL-MCNC: 138 MG/DL (ref 65–99)
HCT VFR BLD AUTO: 37.6 % (ref 34–46.6)
HGB BLD-MCNC: 12.7 G/DL (ref 12–15.9)
IMM GRANULOCYTES # BLD AUTO: 0.12 10*3/MM3 (ref 0–0.05)
IMM GRANULOCYTES NFR BLD AUTO: 1.5 % (ref 0–0.5)
LYMPHOCYTES # BLD AUTO: 0.8 10*3/MM3 (ref 0.7–3.1)
LYMPHOCYTES NFR BLD AUTO: 9.7 % (ref 19.6–45.3)
MCH RBC QN AUTO: 32.9 PG (ref 26.6–33)
MCHC RBC AUTO-ENTMCNC: 33.8 G/DL (ref 31.5–35.7)
MCV RBC AUTO: 97.4 FL (ref 79–97)
MONOCYTES # BLD AUTO: 0.27 10*3/MM3 (ref 0.1–0.9)
MONOCYTES NFR BLD AUTO: 3.3 % (ref 5–12)
NEUTROPHILS NFR BLD AUTO: 7.01 10*3/MM3 (ref 1.7–7)
NEUTROPHILS NFR BLD AUTO: 85.4 % (ref 42.7–76)
NRBC BLD AUTO-RTO: 0 /100 WBC (ref 0–0.2)
PLATELET # BLD AUTO: 192 10*3/MM3 (ref 140–450)
PMV BLD AUTO: 12.1 FL (ref 6–12)
POTASSIUM SERPL-SCNC: 4.5 MMOL/L (ref 3.5–5.2)
RBC # BLD AUTO: 3.86 10*6/MM3 (ref 3.77–5.28)
SODIUM SERPL-SCNC: 136 MMOL/L (ref 136–145)
WBC # BLD AUTO: 8.21 10*3/MM3 (ref 3.4–10.8)

## 2021-07-22 PROCEDURE — 97161 PT EVAL LOW COMPLEX 20 MIN: CPT

## 2021-07-22 PROCEDURE — 80048 BASIC METABOLIC PNL TOTAL CA: CPT | Performed by: ORTHOPAEDIC SURGERY

## 2021-07-22 PROCEDURE — 85025 COMPLETE CBC W/AUTO DIFF WBC: CPT | Performed by: ORTHOPAEDIC SURGERY

## 2021-07-22 PROCEDURE — 97165 OT EVAL LOW COMPLEX 30 MIN: CPT

## 2021-07-22 PROCEDURE — 99232 SBSQ HOSP IP/OBS MODERATE 35: CPT | Performed by: INTERNAL MEDICINE

## 2021-07-22 PROCEDURE — 25010000003 CEFAZOLIN IN DEXTROSE 2-4 GM/100ML-% SOLUTION: Performed by: ORTHOPAEDIC SURGERY

## 2021-07-22 PROCEDURE — 97116 GAIT TRAINING THERAPY: CPT

## 2021-07-22 RX ADMIN — APIXABAN 5 MG: 5 TABLET, FILM COATED ORAL at 08:46

## 2021-07-22 RX ADMIN — APIXABAN 5 MG: 5 TABLET, FILM COATED ORAL at 20:51

## 2021-07-22 RX ADMIN — METOPROLOL SUCCINATE 100 MG: 50 TABLET, EXTENDED RELEASE ORAL at 20:51

## 2021-07-22 RX ADMIN — DOCUSATE SODIUM 50MG AND SENNOSIDES 8.6MG 2 TABLET: 8.6; 5 TABLET, FILM COATED ORAL at 20:52

## 2021-07-22 RX ADMIN — CEFAZOLIN SODIUM 2 G: 2 INJECTION, SOLUTION INTRAVENOUS at 05:42

## 2021-07-22 RX ADMIN — LORAZEPAM 0.5 MG: 0.5 TABLET ORAL at 22:32

## 2021-07-22 RX ADMIN — FAMOTIDINE 20 MG: 10 INJECTION INTRAVENOUS at 08:46

## 2021-07-22 RX ADMIN — SODIUM CHLORIDE, PRESERVATIVE FREE 3 ML: 5 INJECTION INTRAVENOUS at 20:52

## 2021-07-22 RX ADMIN — SODIUM CHLORIDE, PRESERVATIVE FREE 10 ML: 5 INJECTION INTRAVENOUS at 21:27

## 2021-07-22 RX ADMIN — SODIUM CHLORIDE, POTASSIUM CHLORIDE, SODIUM LACTATE AND CALCIUM CHLORIDE 100 ML/HR: 600; 310; 30; 20 INJECTION, SOLUTION INTRAVENOUS at 05:42

## 2021-07-22 RX ADMIN — DOCUSATE SODIUM 50MG AND SENNOSIDES 8.6MG 2 TABLET: 8.6; 5 TABLET, FILM COATED ORAL at 08:46

## 2021-07-22 RX ADMIN — FAMOTIDINE 20 MG: 10 INJECTION INTRAVENOUS at 21:26

## 2021-07-22 RX ADMIN — HYDROCODONE BITARTRATE AND ACETAMINOPHEN 1 TABLET: 5; 325 TABLET ORAL at 05:41

## 2021-07-22 RX ADMIN — DILTIAZEM HYDROCHLORIDE 120 MG: 120 CAPSULE, EXTENDED RELEASE ORAL at 12:26

## 2021-07-22 RX ADMIN — METOPROLOL SUCCINATE 100 MG: 50 TABLET, EXTENDED RELEASE ORAL at 08:46

## 2021-07-22 NOTE — THERAPY EVALUATION
Patient Name: Bing Cruz  : 1931    MRN: [id number]                              Today's Date: 2021       Admit Date: 2021    Visit Dx:     ICD-10-CM ICD-9-CM   1. Difficulty walking  R26.2 719.7   2. Closed fracture of hip, unspecified laterality, initial encounter (CMS/HCC)  S72.009A 820.8   3. Left femoral neck fracture  S72.002S 905.3   4. Closed left hip fracture, initial encounter (CMS/HCC)  S72.002A 820.8   5. Decreased activities of daily living (ADL)  Z78.9 V49.89     Patient Active Problem List   Diagnosis   • Anemia   • Closed left hip fracture, initial encounter (CMS/[facility])   • Closed fracture of neck of left femur (CMS/HCC)   • Closed left hip fracture (CMS/HCC)   • Hip fracture requiring operative repair, left, closed, initial encounter (CMS/[facility])     Past Medical History:   Diagnosis Date   • Abdominal pain, generalized    • Acquired cyst of kidney    • Aortic regurgitation    • Aortic stenosis    • Aortic valve disease    • Cardiomegaly    • Chronic fatigue    • Diaphragmatic hernia without obstruction and without gangrene    • Diarrhea    • Disease of tricuspid valve    • Diverticulosis of colon    • Dysphagia    • Essential (primary) hypertension    • Hepatic cyst     LEFT   • Hiatal hernia    • Hyperlipidemia    • Insomnia, unspecified    • LVH (left ventricular hypertrophy)    • Major depressive disorder, single episode    • Malaise and fatigue    • Mitral regurgitation    • Mitral valve disorder    • Mitral valve insufficiency and aortic valve insufficiency    • Osteopenia    • Other specified disorders of bone density and structure, unspecified site    • Pain in joint, pelvic region and thigh    • Renal cyst    • Sprain and strain of hip     Sprain and strain of unspecified site of hip and thigh   • TR (tricuspid regurgitation)    • Unspecified cataract    • Urinary tract infection    • Vitamin D deficiency      Past Surgical History:   Procedure Laterality Date   •  ABDOMINAL HYSTERECTOMY      WITH BSO    • ANKLE SURGERY      (L) ankle fracture   • BLADDER REPAIR     • BREAST BIOPSY     • BREAST BIOPSY      (L) breast biopsy   • CATARACT EXTRACTION      OU   • CHOLECYSTECTOMY      OPEN   • COLON SURGERY      STATES SHE HAD 12 INCHES OF COLON REMOVED IN JULY 2020 FOR COLON CANCER   • COLONOSCOPY  2020   • INGUINAL HERNIA REPAIR Left 09/29/2011     General Information     Row Name 07/22/21 1502          OT Time and Intention    Document Type  evaluation  -     Mode of Treatment  individual therapy;occupational therapy  -     Row Name 07/22/21 1502          General Information    Patient Profile Reviewed  yes  -     Prior Level of Function  independent:;all household mobility;community mobility;ADL's;home management  -     Existing Precautions/Restrictions  fall  -     Barriers to Rehab  none identified  -     Row Name 07/22/21 1502          Occupational Profile    Reason for Services/Referral (Occupational Profile)  Patient was admitted status post fall with left hip fracture. She is currently post op day 1 for ORIF. OT evaluation was referred due to functional status change in performance of ADLs, functional mobility, and/or transfers. No prior services for the current diagnosis.  -     Row Name 07/22/21 1502          Living Environment    Lives With  alone daughter lives nearby  -     Row Name 07/22/21 1502          Cognition    Orientation Status (Cognition)  oriented x 3  -     Row Name 07/22/21 1502          Safety Issues, Functional Mobility    Impairments Affecting Function (Mobility)  balance;endurance/activity tolerance;pain;range of motion (ROM)  -       User Key  (r) = Recorded By, (t) = Taken By, (c) = Cosigned By    Initials Name Provider Type     Faviola Camara, OT Occupational Therapist          Mobility/ADL's     Row Name 07/22/21 1504          Bed Mobility    Bed Mobility  bed mobility (all) activities  -     All Activities, [place]  (Bed Mobility)  standby assist  -     Assistive Device (Bed Mobility)  bed rails  -[facility] Name 07/22/21 1504          Transfers    Transfers  sit-stand transfer  -     Sit-Stand [place] (Transfers)  standby assist  -KP     Row Name 07/22/21 1504          Activities of Daily Living    BADL Assessment/Intervention  bathing;upper body dressing;lower body dressing;toileting  -KP     Row Name 07/22/21 1509          Mobility    Extremity Weight-bearing Status  left lower extremity  -     Left Lower Extremity (Weight-bearing Status)  weight-bearing as tolerated (WBAT)  -[facility] Name 07/22/21 1504          Bathing Assessment/Intervention    [place] Level (Bathing)  bathing skills;upper body;standby assist;lower body;minimum assist (75% patient effort)  -     Position (Bathing)  edge of bed sitting  -KP     Row Name 07/22/21 1504          Upper Body Dressing Assessment/Training    [place] Level (Upper Body Dressing)  upper body dressing skills;standby assist  -     Position (Upper Body Dressing)  edge of bed sitting  -KP     Row Name 07/22/21 1504          Lower Body Dressing Assessment/Training    [place] Level (Lower Body Dressing)  lower body dressing skills;minimum assist (75% patient effort)  -     Position (Lower Body Dressing)  edge of bed sitting  -[facility] Name 07/22/21 1504          Toileting Assessment/Training    [place] Level (Toileting)  toileting skills;minimum assist (75% patient effort)  -       User Key  (r) = Recorded By, (t) = Taken By, (c) = Cosigned By    Initials Name Provider Type     Faviola Camara OT Occupational Therapist        Obj/Interventions     [facility] Name 07/22/21 1505          Sensory Assessment (Somatosensory)    Sensory Assessment (Somatosensory)  UE sensation intact  -[facility] Name 07/22/21 1505          Vision Assessment/Intervention    Visual Impairment/Limitations  WFL  -[facility] Name 07/22/21 1505          Range of Motion  Comprehensive    General Range of Motion  bilateral upper extremity ROM L  -[facility] Name 07/22/21 1505          Strength Comprehensive (MMT)    Comment, General Manual Muscle Testing (MMT) Assessment  functional for self care - bilateral upper extremities  -[facility] Name 07/22/21 1505          Motor Skills    Motor Skills  coordination;functional endurance  -     Coordination  WFL  -     Functional Endurance  fair plus, room air  -[facility] Name 07/22/21 1505          Balance    Balance Assessment  standing static balance  -     Static Standing Balance  mild impairment  -       User Key  (r) = Recorded By, (t) = Taken By, (c) = Cosigned By    Initials Name Provider Type     Faviola Camara OT Occupational Therapist        Goals/Plan     Row Name 07/22/21 1507          Bed Mobility Goal 1 (OT)    Activity/Assistive Device (Bed Mobility Goal 1, OT)  bed mobility activities, all  -KP     [place] Level/Cues Needed (Bed Mobility Goal 1, OT)  modified independence  -KP     Time Frame (Bed Mobility Goal 1, OT)  long term goal (LTG);10 days  -[facility] Name 07/22/21 1507          Transfer Goal 1 (OT)    Activity/Assistive Device (Transfer Goal 1, OT)  transfers, all  -KP     [place] Level/Cues Needed (Transfer Goal 1, OT)  modified independence  -KP     Time Frame (Transfer Goal 1, OT)  long term goal (LTG);10 days  -KP     Row Name 07/22/21 1507          Bathing Goal 1 (OT)    Activity/Device (Bathing Goal 1, OT)  bathing skills, all  -KP     [place] Level/Cues Needed (Bathing Goal 1, OT)  modified independence  -KP     Time Frame (Bathing Goal 1, OT)  long term goal (LTG);10 days  -[facility] Name 07/22/21 1507          Dressing Goal 1 (OT)    Activity/Device (Dressing Goal 1, OT)  dressing skills, all  -KP     [place]/Cues Needed (Dressing Goal 1, OT)  modified independence  -KP     Time Frame (Dressing Goal 1, OT)  long term goal (LTG);10 days  -[facility] Name 07/22/21 1507           Toileting Goal 1 (OT)    Activity/Device (Toileting Goal 1, OT)  toileting skills, all  -     [place] Level/Cues Needed (Toileting Goal 1, OT)  modified independence  -KP     Time Frame (Toileting Goal 1, OT)  10 days;long term goal (LTG)  -     Row Name 07/22/21 1507          Grooming Goal 1 (OT)    Activity/Device (Grooming Goal 1, OT)  grooming skills, all  -KP     [place] (Grooming Goal 1, OT)  modified independence  -KP     Time Frame (Grooming Goal 1, OT)  long term goal (LTG);10 days  -     Row Name 07/22/21 1507          Therapy Assessment/Plan (OT)    Planned Therapy Interventions (OT)  activity tolerance training;BADL retraining;functional balance retraining;occupation/activity based interventions;patient/caregiver education/training;transfer/mobility retraining;ROM/therapeutic exercise  -       User Key  (r) = Recorded By, (t) = Taken By, (c) = Cosigned By    Initials Name Provider Type    Faviola Senior, OT Occupational Therapist        Clinical Impression     Row Name 07/22/21 1505          Pain Assessment    Additional Documentation  Pain Scale: FACES Pre/Post-Treatment (Group)  -[facility] Name 07/22/21 1505          Pain Scale: FACES Pre/Post-Treatment    Pain: FACES Scale, Pretreatment  0-->no hurt  -     Posttreatment Pain Rating  0-->no hurt  -[facility] Name 07/22/21 1505          Plan of Care Review    Progress  no change  -     Outcome Summary  Patient presents with the following functional limitations which impact independence and safety with daily occupations: decreased independence with ADLs, decreased independence with functional mobility/transfers, impaired balance to support ADLs, and impaired activity tolerance/functional endurance.  -     Row Name 07/22/21 1505          Therapy Assessment/Plan (OT)    Patient/Family Therapy Goal Statement (OT)  Patient wants to be able to return to prior level of function.  -     Rehab Potential (OT)  good, to achieve  stated therapy goals  -     Criteria for Skilled Therapeutic Interventions Met (OT)  yes;meets criteria;skilled treatment is necessary  -     Row Name 07/22/21 1505          Therapy Plan Review/Discharge Plan (OT)    Equipment Needs Upon Discharge (OT)  -- has DME in place  -     Anticipated Discharge Disposition (OT)  home with 24/7 care;home with assist;home with home health  -       User Key  (r) = Recorded By, (t) = Taken By, (c) = Cosigned By    Initials Name Provider Type     Faviola Camara, GREGORIO Occupational Therapist        Outcome Measures     Row Name 07/22/21 1508          How much help from another is currently needed...    Putting on and taking off regular lower body clothing?  2  -KP     Bathing (including washing, rinsing, and drying)  2  -KP     Toileting (which includes using toilet bed pan or urinal)  3  -KP     Putting on and taking off regular upper body clothing  4  -KP     Taking care of personal grooming (such as brushing teeth)  4  -KP     Eating meals  4  -     AM-PAC 6 Clicks Score (OT)  19  -     Row Name 07/22/21 0900 07/22/21 0720       How much help from another person do you currently need...    Turning from your back to your side while in flat bed without using bedrails?  4  -DP  3  -CG    Moving from lying on back to sitting on the side of a flat bed without bedrails?  4  -DP  3  -CG    Moving to and from a bed to a chair (including a wheelchair)?  3  -DP  3  -CG    Standing up from a chair using your arms (e.g., wheelchair, bedside chair)?  3  -DP  3  -CG    Climbing 3-5 steps with a railing?  3  -DP  2  -CG    To walk in hospital room?  3  -DP  3  -CG    AM-PAC 6 Clicks Score (PT)  20  -DP  17  -CG    Row Name 07/22/21 1508 07/22/21 0900       Functional Assessment    Outcome Measure Options  AM-PAC 6 Clicks Daily Activity (OT);Optimal Instrument  -  AM-PAC 6 Clicks Basic Mobility (PT)  -DP    Row Name 07/22/21 1508          Optimal Instrument    Optimal Instrument   Optimal - 3  -KP     Bending/Stooping  3  -KP     Standing  2  -KP     Reaching  1  -KP     From the list, choose the 3 activities you would most like to be able to do without any difficulty  Standing;Bending/stooping;Reaching  -KP     Total Score Optimal - 3  6  -KP       User Key  (r) = Recorded By, (t) = Taken By, (c) = Cosigned By    Initials Name Provider Type     Abiodun Leahy RN Registered Nurse    Faviola Senior OT Occupational Therapist    Radha Merlos, PT Physical Therapist          Occupational Therapy Education                 Title: PT OT SLP Therapies (In Progress)     Topic: Occupational Therapy (Done)     Point: ADL training (Done)     Description:   Instruct learner(s) on proper safety adaptation and remediation techniques during self care or transfers.   Instruct in proper use of assistive devices.              Learning Progress Summary           Patient Acceptance, E, VU by  at 7/22/2021 1508                   Point: Home exercise program (Done)     Description:   Instruct learner(s) on appropriate technique for monitoring, assisting and/or progressing therapeutic exercises/activities.              Learning Progress Summary           Patient Acceptance, E, VU by  at 7/22/2021 1508                   Point: Precautions (Done)     Description:   Instruct learner(s) on prescribed precautions during self-care and functional transfers.              Learning Progress Summary           Patient Acceptance, E, VU by  at 7/22/2021 1508                   Point: Body mechanics (Done)     Description:   Instruct learner(s) on proper positioning and spine alignment during self-care, functional mobility activities and/or exercises.              Learning Progress Summary           Patient Acceptance, E, VU by  at 7/22/2021 1508                               User Key     Initials Effective Dates Name Provider Type [facility] 06/16/21 -  Faviola Camara OT Occupational Therapist OT               OT Recommendation and Plan  Planned Therapy Interventions (OT): activity tolerance training, BADL retraining, functional balance retraining, occupation/activity based interventions, patient/caregiver education/training, transfer/mobility retraining, ROM/therapeutic exercise  Plan of Care Review  Progress: no change  Outcome Summary: Patient presents with the following functional limitations which impact independence and safety with daily occupations: decreased independence with ADLs, decreased independence with functional mobility/transfers, impaired balance to support ADLs, and impaired activity tolerance/functional endurance.     Time Calculation:   Time Calculation- OT     Row Name 07/22/21 1509 07/22/21 0906          Time Calculation- OT    OT Received On  07/22/21 -  --     OT Goal Re-Cert Due Date  07/31/21  -  --        Timed Charges    05366 - Gait Training Minutes   --  10  -DP        Untimed Charges    OT Eval/Re-eval Minutes  34  -KP  --        Total Minutes    Timed Charges Total Minutes  --  10  -DP     Untimed Charges Total Minutes  34  -KP  --      Total Minutes  34  -KP  10  -DP       User Key  (r) = Recorded By, (t) = Taken By, (c) = Cosigned By    Initials Name Provider Type    Faviola Senior OT Occupational Therapist    Radha Merlos, PT Physical Therapist        Therapy Charges for Today     Code Description Service Date Service Provider Modifiers Qty    [id number] HC OT EVAL LOW COMPLEXITY 3 7/22/2021 Faviola Camara OT GO 1               Faviola Camara OT  7/22/2021

## 2021-07-22 NOTE — PLAN OF CARE
Goal Outcome Evaluation:  Plan of Care Reviewed With: patient        Progress: no change  Outcome Summary: No complaints of pain. Patient resting comfortably with family at bedside.

## 2021-07-22 NOTE — PLAN OF CARE
Goal Outcome Evaluation:  Plan of Care Reviewed With: patient, daughter  Progress: improving   Pt doing well today, minimal pain when OOB. Pt discharging home with home health tomorrow with daughter. No other issues.  Problem: Adjustment to Injury (Hip Fracture)  Goal: Optimal Coping with Change in Health Status  Outcome: Ongoing, Progressing  Intervention: Support Psychosocial Response to Injury and Mobility Change  Recent Flowsheet Documentation  Taken 7/22/2021 0720 by Abiodun Leahy RN  Supportive Measures:   active listening utilized   positive reinforcement provided   verbalization of feelings encouraged  Family/Support System Care:   involvement promoted   support provided     Problem: Bleeding (Hip Fracture)  Goal: Absence of Bleeding  Outcome: Ongoing, Progressing  Intervention: Manage Signs of Bleeding  Recent Flowsheet Documentation  Taken 7/22/2021 0720 by Abiodun Leahy RN  Bleeding Management: dressing monitored     Problem: Bowel Elimination Impaired (Hip Fracture)  Goal: Effective Bowel Elimination  Outcome: Ongoing, Progressing  Intervention: Promote Effective Bowel Elimination  Recent Flowsheet Documentation  Taken 7/22/2021 0720 by Abiodun Leahy RN  Bowel Elimination Promotion:   adequate fluid intake promoted   ambulation promoted     Problem: Delayed Union/Nonunion (Hip Fracture)  Goal: Fracture Stability  Outcome: Ongoing, Progressing     Problem: Embolism (Hip Fracture)  Goal: Absence of Embolism  Outcome: Ongoing, Progressing  Intervention: Prevent and Manage Embolism Risk  Recent Flowsheet Documentation  Taken 7/22/2021 0720 by Abiodun Leahy RN  VTE Prevention/Management:   bilateral   sequential compression devices on     Problem: Functional Ability Impaired (Hip Fracture)  Goal: Optimal Functional Performance  Outcome: Ongoing, Progressing  Intervention: Promote Optimal Functional Status  Recent Flowsheet Documentation  Taken 7/22/2021 1227 by Abioudn Leahy  RN  Activity Management: up to bedside commode  Taken 7/22/2021 0835 by Abiodun Leahy RN  Activity Management: up to bedside commode  Taken 7/22/2021 0720 by Abiodun Leahy RN  Activity Management: activity adjusted per tolerance  Range of Motion: active ROM (range of motion) encouraged  Taken 7/22/2021 0658 by Abiodun Leahy RN  Activity Management: ambulated to bathroom     Problem: Pain (Hip Fracture)  Goal: Acceptable Pain Level  Outcome: Ongoing, Progressing  Intervention: Manage Acute Orthopaedic-Related Pain  Recent Flowsheet Documentation  Taken 7/22/2021 0720 by Abiodun Leahy RN  Pain Management Interventions:   care clustered   pillow support provided   position adjusted   quiet environment facilitated   relaxation techniques promoted     Problem: Urinary Elimination Impaired (Hip Fracture)  Goal: Effective Urinary Elimination  Outcome: Ongoing, Progressing     Problem: Adult Inpatient Plan of Care  Goal: Plan of Care Review  Outcome: Ongoing, Progressing  Flowsheets (Taken 7/22/2021 1803)  Progress: improving  Plan of Care Reviewed With:   patient   daughter  Goal: Patient-Specific Goal (Individualized)  Outcome: Ongoing, Progressing  Goal: Absence of Hospital-Acquired Illness or Injury  Outcome: Ongoing, Progressing  Intervention: Identify and Manage Fall Risk  Recent Flowsheet Documentation  Taken 7/22/2021 0720 by Abiodun Leahy RN  Safety Promotion/Fall Prevention:   activity supervised   fall prevention program maintained   nonskid shoes/slippers when out of bed   safety round/check completed  Intervention: Prevent Skin Injury  Recent Flowsheet Documentation  Taken 7/22/2021 0720 by Abiodun Leahy RN  Body Position: supine, legs elevated  Skin Protection:   adhesive use limited   incontinence pads utilized  Intervention: Prevent and Manage VTE (venous thromboembolism) Risk  Recent Flowsheet Documentation  Taken 7/22/2021 0720 by Abiodun Leahy RN  VTE  Prevention/Management:   bilateral   sequential compression devices on  Intervention: Prevent Infection  Recent Flowsheet Documentation  Taken 7/22/2021 0720 by Abiodun Leahy RN  Infection Prevention:   environmental surveillance performed   rest/sleep promoted   single patient room provided  Goal: Optimal Comfort and Wellbeing  Outcome: Ongoing, Progressing  Intervention: Provide Person-Centered Care  Recent Flowsheet Documentation  Taken 7/22/2021 0720 by Abiodun Leahy RN  Trust Relationship/Rapport:   care explained   emotional support provided   empathic listening provided   questions answered   questions encouraged   thoughts/feelings acknowledged  Goal: Readiness for Transition of Care  Outcome: Ongoing, Progressing     Problem: Hypertension Comorbidity  Goal: Blood Pressure in Desired Range  Outcome: Ongoing, Progressing  Intervention: Maintain Hypertension-Management Strategies  Recent Flowsheet Documentation  Taken 7/22/2021 0720 by Abiodun Leahy RN  Medication Review/Management:   medications reviewed   high-risk medications identified     Problem: Skin Injury Risk Increased  Goal: Skin Health and Integrity  Outcome: Ongoing, Progressing  Intervention: Optimize Skin Protection  Recent Flowsheet Documentation  Taken 7/22/2021 0720 by Abiodun Leahy RN  Pressure Reduction Techniques:   frequent weight shift encouraged   heels elevated off bed  Head of Bed (HOB): HOB at 30-45 degrees  Pressure Reduction Devices: pressure-redistributing mattress utilized  Skin Protection:   adhesive use limited   incontinence pads utilized     Problem: Fall Injury Risk  Goal: Absence of Fall and Fall-Related Injury  Outcome: Ongoing, Progressing  Intervention: Identify and Manage Contributors to Fall Injury Risk  Recent Flowsheet Documentation  Taken 7/22/2021 0720 by Abiodun Leahy RN  Medication Review/Management:   medications reviewed   high-risk medications identified  Intervention: Promote  Injury-Free Environment  Recent Flowsheet Documentation  Taken 7/22/2021 2395 by Abiodun Leahy, RN  Safety Promotion/Fall Prevention:   activity supervised   fall prevention program maintained   nonskid shoes/slippers when out of bed   safety round/check completed

## 2021-07-22 NOTE — THERAPY EVALUATION
Acute Care - Physical Therapy Initial Evaluation   [place]     Patient Name: Bing Cruz  : 1931  MRN: [id number]  Today's Date: 2021   Onset of Illness/Injury or Date of Surgery: 21  Visit Dx:   Admit date: 2021     Referring Physician: Jairo Kramer, *     SurgeryDate:2021 - 2021   Procedure(s) (LRB):  FEMORAL NECK OPEN REDUCTION INTERNAL FIXATION (Left)         ICD-10-CM ICD-9-CM   1. Difficulty walking  R26.2 719.7   2. Closed fracture of hip, unspecified laterality, initial encounter (CMS/[facility])  S72.009A 820.8   3. Left femoral neck fracture  S72.002S 905.3   4. Closed left hip fracture, initial encounter (CMS/[facility])  S72.002A 820.8     Patient Active Problem List   Diagnosis   • Anemia   • Closed left hip fracture, initial encounter (CMS/[facility])   • Closed fracture of neck of left femur (CMS/[facility])   • Closed left hip fracture (CMS/[facility])   • Hip fracture requiring operative repair, left, closed, initial encounter (CMS/[facility])     Past Medical History:   Diagnosis Date   • Abdominal pain, generalized    • Acquired cyst of kidney    • Aortic regurgitation    • Aortic stenosis    • Aortic valve disease    • Cardiomegaly    • Chronic fatigue    • Diaphragmatic hernia without obstruction and without gangrene    • Diarrhea    • Disease of tricuspid valve    • Diverticulosis of colon    • Dysphagia    • Essential (primary) hypertension    • Hepatic cyst     LEFT   • Hiatal hernia    • Hyperlipidemia    • Insomnia, unspecified    • LVH (left ventricular hypertrophy)    • Major depressive disorder, single episode    • Malaise and fatigue    • Mitral regurgitation    • Mitral valve disorder    • Mitral valve insufficiency and aortic valve insufficiency    • Osteopenia    • Other specified disorders of bone density and structure, unspecified site    • Pain in joint, pelvic region and thigh    • Renal cyst    • Sprain and strain of hip     Sprain and strain of unspecified site of  hip and thigh   • TR (tricuspid regurgitation)    • Unspecified cataract    • Urinary tract infection    • Vitamin D deficiency      Past Surgical History:   Procedure Laterality Date   • ABDOMINAL HYSTERECTOMY      WITH BSO    • ANKLE SURGERY      (L) ankle fracture   • BLADDER REPAIR     • BREAST BIOPSY     • BREAST BIOPSY      (L) breast biopsy   • CATARACT EXTRACTION      OU   • CHOLECYSTECTOMY      OPEN   • COLON SURGERY      STATES SHE HAD 12 INCHES OF COLON REMOVED IN JULY 2020 FOR COLON CANCER   • COLONOSCOPY  2020   • INGUINAL HERNIA REPAIR Left 09/29/2011        PT Assessment (last 12 hours)      PT Evaluation and Treatment     Row Name 07/22/21 0800          Physical Therapy Time and Intention    Subjective Information  no complaints;pain  -DP     Document Type  evaluation  -DP     Mode of Treatment  individual therapy;physical therapy  -DP     Patient Effort  good  -DP     Symptoms Noted During/After Treatment  other (see comments) pain  -DP     Row Name 07/22/21 0800          General Information    Patient Profile Reviewed  yes  -DP     Onset of Illness/Injury or Date of Surgery  07/20/21  -DP     Referring Physician  Jairo Kramer  -DP     Patient Observations  alert;cooperative;agree to therapy  -DP     Prior Level of Function  independent:;all household mobility;community mobility;gait;transfer  -DP     Equipment Currently Used at Home  cane, straight  -DP     Barriers to Rehab  none identified  -DP     Row Name 07/22/21 0800          Living Environment    Current Living Arrangements  home/apartment/condo  -DP     Home Accessibility  stairs to enter home  -DP     Lives With  alone;other (see comments) sisters will be staying with her the next few weeks  -DP     Row Name 07/22/21 0800          Home Main Entrance    Number of Stairs, Main Entrance  three  -DP     Row Name 07/22/21 0800          Home Use of Assistive/Adaptive Equipment    Equipment Currently Used at Home  cane, straight  -DP      Row Name 07/22/21 0800          Range of Motion (ROM)    Range of Motion  left lower extremity ROM;other (see comments);right lower extremity;ROM is WFL limited due to pain  -DP     Row Name 07/22/21 0800          Strength (Manual Muscle Testing)    Strength (Manual Muscle Testing)  strength is WFL  -DP     Row Name 07/22/21 0800          Bed Mobility    Bed Mobility  bed mobility (all) activities  -DP     All Activities, [place] (Bed Mobility)  standby assist  -DP     Assistive Device (Bed Mobility)  bed rails  -DP     Row Name 07/22/21 0800          Transfers    Transfers  sit-stand transfer  -DP     Sit-Stand [place] (Transfers)  standby assist  -DP     Row Name 07/22/21 0800          Gait/Stairs (Locomotion)    Gait/Stairs Locomotion  gait/ambulation independence  -DP     [place] Level (Gait)  contact guard  -DP     Assistive Device (Gait)  walker, front-wheeled  -DP     Distance in Feet (Gait)  120  -DP     Pattern (Gait)  step-to  -DP     Row Name 07/22/21 0800          Balance    Balance Assessment  standing dynamic balance  -DP     Dynamic Standing Balance  supported  -DP     Row Name             Wound 07/20/21 1834 Left posterior elbow Skin Tear    Wound - Properties Group Placement Date: 07/20/21  -RJ Placement Time: 1834  -RJ Present on Hospital Admission: Y  -RJ Side: Left  -RJ Orientation: posterior  -RJ Location: elbow  -RJ Primary Wound Type: Skin tear  -RJ    Retired Wound - Properties Group Date first assessed: 07/20/21  -RJ Time first assessed: 1834  -RJ Present on Hospital Admission: Y  -RJ Side: Left  -RJ Location: elbow  -RJ Primary Wound Type: Skin tear  -RJ    Row Name             Wound 07/21/21 1413 Left anterior hip Incision    Wound - Properties Group Placement Date: 07/21/21  -HB Placement Time: 1413  -HB Present on Hospital Admission: N  -HB Side: Left  -HB Orientation: anterior  -HB Location: hip  -HB Primary Wound Type: Incision  -HB Number of Staples Placed: --  -HB,  STAPLE CLOSURE     Retired Wound - Properties Group Date first assessed: 07/21/21  -HB Time first assessed: 1413  -HB Present on Hospital Admission: N  -HB Side: Left  -HB Location: hip  -HB Primary Wound Type: Incision  -HB Number of Staples Placed: --  -HB, STAPLE CLOSURE     Row Name 07/22/21 0800          Plan of Care Review    Plan of Care Reviewed With  patient;family  -DP     Outcome Summary  Patient is a 89 year old female who presents with minimal mobility deficits. She will benefit from skilled PT to address these deficits to help with gait training.  -DP     Row Name 07/22/21 0800          Physical Therapy Goals    Gait Training Goal Selection (PT)  gait training, PT goal 1  -DP     Row Name 07/22/21 0800          Gait Training Goal 1 (PT)    Activity/Assistive Device (Gait Training Goal 1, PT)  walker, rolling  -DP     [place] Level (Gait Training Goal 1, PT)  standby assist  -DP     Distance (Gait Training Goal 1, PT)  150  -DP     Time Frame (Gait Training Goal 1, PT)  10 days  -DP     Row Name 07/22/21 0800          Therapy Assessment/Plan (PT)    PT Diagnosis (PT)  diffficulty walking  -DP     Rehab Potential (PT)  good, to achieve stated therapy goals  -DP     Criteria for Skilled Interventions Met (PT)  yes  -DP     Predicted Duration of Therapy Intervention (PT)  10 days  -DP     Row Name 07/22/21 0800          PT Evaluation Complexity    History, PT Evaluation Complexity  no personal factors and/or comorbidities  -DP     Examination of Body Systems (PT Eval Complexity)  total of 3 or more elements  -DP     Clinical Presentation (PT Evaluation Complexity)  stable  -DP     Clinical Decision Making (PT Evaluation Complexity)  low complexity  -DP     Overall Complexity (PT Evaluation Complexity)  low complexity  -DP     Row Name 07/22/21 0800          Therapy Plan Review/Discharge Plan (PT)    Therapy Plan Review (PT)  evaluation/treatment results reviewed;patient family  -DP       User Key  (r)  = Recorded By, (t) = Taken By, (c) = Cosigned By    Initials Name Provider Type    HB Wendy Banks, RN Registered Nurse    Keren Bustamante, RN Registered Nurse    Radha Merlos, PT Physical Therapist        Physical Therapy Education                 Title: PT OT SLP Therapies (In Progress)     Topic: Physical Therapy (In Progress)     Point: Mobility training (Done)     Learning Progress Summary           Patient Acceptance, E,TB, VU by RYAN at 7/22/2021 0904                   Point: Home exercise program (Not Started)     Learner Progress:  Not documented in this visit.          Point: Body mechanics (Done)     Learning Progress Summary           Patient Acceptance, E,TB, VU by RYAN at 7/22/2021 0904                   Point: Precautions (Done)     Learning Progress Summary           Patient Acceptance, E,TB, VU by RYAN at 7/22/2021 0904                               User Key     Initials Effective Dates Name Provider Type Discipline    DP 06/03/21 -  Radha Burgos PT Physical Therapist PT              PT Recommendation and Plan  Anticipated Discharge Disposition (PT): home with home health, home with outpatient therapy services, home with assist  Planned Therapy Interventions (PT): balance training, gait training, stretching, transfer training  Therapy Frequency (PT): daily  Plan of Care Reviewed With: patient, family  Outcome Summary: Patient is a 89 year old female who presents with minimal mobility deficits. She will benefit from skilled PT to address these deficits to help with gait training.  Outcome Measures     Row Name 07/22/21 0900             How much help from another person do you currently need...    Turning from your back to your side while in flat bed without using bedrails?  4  -DP      Moving from lying on back to sitting on the side of a flat bed without bedrails?  4  -DP      Moving to and from a bed to a chair (including a wheelchair)?  3  -DP      Standing up from a chair using your arms  (e.g., wheelchair, bedside chair)?  3  -DP      Climbing 3-5 steps with a railing?  3  -DP      To walk in hospital room?  3  -DP      AM-PAC 6 Clicks Score (PT)  20  -DP         Functional Assessment    Outcome Measure Options  AM-PAC 6 Clicks Basic Mobility (PT)  -DP        User Key  (r) = Recorded By, (t) = Taken By, (c) = Cosigned By    Initials Name Provider Type    Radha Merlos, PT Physical Therapist           Time Calculation:   PT Charges     Row Name 07/22/21 0906             Time Calculation    PT Received On  07/22/21  -DP      PT Goal Re-Cert Due Date  07/31/21  -DP         Timed Charges    53725 - Gait Training Minutes   10  -DP         Untimed Charges    PT Eval/Re-eval Minutes  30  -DP         Total Minutes    Timed Charges Total Minutes  10  -DP      Untimed Charges Total Minutes  30  -DP       Total Minutes  40  -DP        User Key  (r) = Recorded By, (t) = Taken By, (c) = Cosigned By    Initials Name Provider Type    Radha Merlos, PT Physical Therapist        Therapy Charges for Today     Code Description Service Date Service Provider Modifiers Qty    67728664910 HC GAIT TRAINING EA 15 MIN 7/22/2021 Radha Burgos, PT GP 1    61399497910 HC PT EVAL LOW COMPLEXITY 2 7/22/2021 Radha Burgos, PT GP 1          PT G-Codes  Outcome Measure Options: AM-PAC 6 Clicks Basic Mobility (PT)  AM-PAC 6 Clicks Score (PT): 20    Radha Burgos, PT  7/22/2021

## 2021-07-22 NOTE — SIGNIFICANT NOTE
07/22/21 1025   Plan   Plan Comments RONIT spoke with patien and daughter Ms. Rivera at bedside about discharge plan. Patient and daughter reported that patient is doing well and they plan to return home and do home health. Patient daughter will like to use [facility] health. SW sent home health referral. Patient will discharge home tomorrow. Patient daughter will be assisting her at home.

## 2021-07-22 NOTE — PROGRESS NOTES
[facility]   Progress Note    Patient Name: Bing Cruz  : 1931  MRN: [id number]  Primary Care Physician: Jairo Kramer MD  Date of admission: 2021    Subjective   Subjective   HPI:    Patient seen today seems to be in good spirits minimal pain, has been up and down already to the bathroom, daughter at bedside today, we discussed going home possibly today or tomorrow, after discussion with Ortho will make it tomorrow morning, she would rather do that than go to rehab at this point since she is ambulatory with minimal pain and her daughters go to be staying with her,    Review of Systems   All systems were reviewed and negative except for: Weakness and some pain in the left hip with movement,      Objective   Objective     Vitals:  Patient Vitals for the past 24 hrs:   BP Temp Temp src Pulse Resp SpO2   21 0327 112/71 97.2 °F (36.2 °C) Oral 74 18 --   21 2300 118/74 97.4 °F (36.3 °C) Oral 82 18 98 %   21 1930 135/82 97.2 °F (36.2 °C) Oral 76 18 96 %   21 1835 133/82 97.9 °F (36.6 °C) Oral 84 18 96 %   21 1735 134/77 97.9 °F (36.6 °C) Oral 71 18 99 %   21 1635 141/71 97.2 °F (36.2 °C) Oral 60 18 98 %   21 1555 146/78 -- -- 71 -- 96 %   21 1550 137/68 97.8 °F (36.6 °C) -- 76 -- 97 %   21 1545 151/93 -- -- 67 -- 97 %   21 1540 144/86 -- -- 76 -- 90 %   21 1535 126/76 -- -- 79 -- 91 %   21 1530 138/85 -- -- 96 -- 90 %   21 1525 144/77 -- -- 73 -- 91 %   21 1520 133/82 -- -- 76 -- 93 %   21 1515 126/76 -- -- 81 -- 93 %   21 1510 150/80 -- -- 77 -- 93 %   21 1505 143/69 -- -- 80 -- 94 %   21 1500 137/68 -- -- 86 -- 92 %   21 1455 135/69 -- -- 78 20 94 %   21 1450 127/68 98.1 °F (36.7 °C) Temporal 81 -- 93 %   21 1258 -- -- -- -- -- 97 %   21 1227 148/84 97.2 °F (36.2 °C) Oral 114 16 98 %   21 1058 -- -- -- -- -- 95 %   21 0823 131/77 97.4 °F  (36.3 °C) Oral 71 18 97 %      Physical Exam     Lungs are clear anteriorly and laterally cardiac exam reveals a slightly irregular rhythm controlled rate, abdomen is soft nontender lower extremities no edema she has good movement of the left foot, she is alert and oriented sitting up in the bed no rashes face arms or legs,    Result Review    Result Review:  I have personally reviewed the results from the time of this admission to 07/22/21 7:36 AM EDT and agree with these findings:  [x]  Laboratory  []  Microbiology  [x]  Radiology  []  EKG/Telemetry   []  Cardiology/Vascular   []  Pathology  []  Old records  []  Other:      Active Hospital Meds:  Scheduled Meds:dilTIAZem CD, 120 mg, Oral, Q24H  docusate sodium, 100 mg, Oral, BID  famotidine, 20 mg, Intravenous, Q12H  FLUoxetine, 20 mg, Oral, Daily  LORazepam, 0.5 mg, Oral, Nightly  metoprolol succinate XL, 100 mg, Oral, Q12H  senna-docusate sodium, 2 tablet, Oral, BID  sodium chloride, 10 mL, Intravenous, Q12H  sodium chloride, 3 mL, Intravenous, Q12H      Continuous Infusions:lactated ringers, 75 mL/hr, Last Rate: 75 mL/hr (07/21/21 0742)  lactated ringers, 100 mL/hr, Last Rate: 100 mL/hr (07/22/21 0542)      PRN Meds:.•  acetaminophen **OR** acetaminophen **OR** acetaminophen  •  acetaminophen  •  senna-docusate sodium **AND** polyethylene glycol **AND** bisacodyl **AND** bisacodyl  •  HYDROcodone-acetaminophen  •  HYDROcodone-acetaminophen  •  magnesium hydroxide  •  Morphine **AND** naloxone  •  ondansetron  •  ondansetron  •  promethazine **OR** promethazine  •  sodium chloride  •  sodium chloride  •  traMADol    Assessment/Plan   Assessment / Plan     Active Hospital Problems:  Active Hospital Problems    Diagnosis    • **Closed left hip fracture (CMS/[facility])      Added automatically from request for surgery [id number]     • Hip fracture requiring operative repair, left, closed, initial encounter (CMS/[facility])        Assessment/Plan:     Nondisplaced left femoral neck  fracture,, postop day #1 ORIF with 3 screws, x-ray reviewed, doing well, continue therapy today and possible discharge tomorrow July 23--- discussed with Dr. Warner possible rehab at home or hospital afterwards, fall was last Friday, labs reviewed stable, will stop IV fluids today, patient's currently tolerating oral intake well,     Anticoagulation monitoring and issues with her underlying A. fib, holding Eliquis from yesterday through today,, restart Eliquis July 22     Chronic atrial fibrillation rate controlled on current medications, EKG noted July 20, 2021     Aortic valve replacement 2018     History of DVT 2015 on chronic anticoagulation     Moderate mitral regurgitation by previous echo several years ago        Other medical history:  History of dysphagia with previous work-up October 2020 showing narrowing of the distal esophagus status post EGD February 2021, on proton pump inhibitors as of May 2021  Colon cancer diagnosed June 2020 after being found anemic, underwent exploratory laparotomy and partial colectomy 14 out of 14 lymph nodes - June 2020 by Dr. Valentin, prolonged hospital course with rehab,  Hyponatremia during that admission June 2020,  C. difficile colitis during admission June 2020 resolved  Failure to thrive weight loss, went into rehab following hospital stay June 2020 with gait dysfunction generalized weakness, has been improving over the past year,  Lung nodule found June 2025 mm left lower lobe on initial CT scans, he is being followed up by hematology oncology for history of colon cancer  Hyponatremia resolved, was seeing nephrology in the hospital last year July and August 2020, off hydrochlorothiazide now doing better  Atrial fibrillation with rapid ventricular response remotely March 23, 2017 on anticoagulation, beta-blockers, Cardizem, cardioverted April 2019 Dr. Jolly, paroxysmal A. fib currently  Previous left humeral fracture December 2016 status post ORIF with josseline  placement  Extensive DVT left lower leg November 2015 currently on Eliquis 2.5 twice a day  Moderate mitral valve regurgitation on echo April 2017 is seen Dr. Jolly in the past  Reactive depression with loss of son's stomach cancer November 2018 and daughter status post aortic valve replacement 2018  Gastroesophageal reflux off PPIs, restarted PPIs again January 27, 2021  Hyperlipidemia elevated triglycerides off statins  Osteopenia  Vitamin D deficiency  Osteoarthritis of the left hip            CODE STATUS:    Code Status and Medical Interventions:   Ordered at: 07/20/21 1828     Level Of Support Discussed With:    Patient     Code Status:    CPR     Medical Interventions (Level of Support Prior to Arrest):    Full       Electronically signed by Jairo Kramer MD, 07/22/21, 7:36 AM EDT.

## 2021-07-22 NOTE — PROGRESS NOTES
[facility]     Progress Note    Patient Name: Bing Cruz  : 1931  MRN: [id number]  Primary Care Physician:  Jairo Kramer MD  Date of admission: 2021    Subjective   Subjective     HPI:  Patient Reports doing well today.  Her pain is controlled.  No acute events overnight.    Review of Systems   See HPI    Objective   Objective     Vitals:   Temp:  [97.2 °F (36.2 °C)-98.1 °F (36.7 °C)] 97.2 °F (36.2 °C)  Heart Rate:  [] 74  Resp:  [16-20] 18  BP: (112-151)/(68-93) 112/71  Flow (L/min):  [2] 2  Physical Exam    General: Alert, no acute distress   Musculoskeletal: Dressing intact.  Neurovascular intact.  Minimal pain with hip range of motion    Result Review      WBC   Date Value Ref Range Status   2021 8.21 3.40 - 10.80 10*3/mm3 Final     RBC   Date Value Ref Range Status   2021 3.86 3.77 - 5.28 10*6/mm3 Final     Hemoglobin   Date Value Ref Range Status   2021 12.7 12.0 - 15.9 g/dL Final     Hematocrit   Date Value Ref Range Status   2021 37.6 34.0 - 46.6 % Final     MCV   Date Value Ref Range Status   2021 97.4 (H) 79.0 - 97.0 fL Final     MCH   Date Value Ref Range Status   2021 32.9 26.6 - 33.0 pg Final     MCHC   Date Value Ref Range Status   2021 33.8 31.5 - 35.7 g/dL Final     RDW   Date Value Ref Range Status   2021 12.5 12.3 - 15.4 % Final     RDW-SD   Date Value Ref Range Status   2021 44.5 37.0 - 54.0 fl Final     MPV   Date Value Ref Range Status   2021 12.1 (H) 6.0 - 12.0 fL Final     Platelets   Date Value Ref Range Status   2021 192 140 - 450 10*3/mm3 Final     Neutrophil %   Date Value Ref Range Status   2021 85.4 (H) 42.7 - 76.0 % Final     Lymphocyte %   Date Value Ref Range Status   2021 9.7 (L) 19.6 - 45.3 % Final     Monocyte %   Date Value Ref Range Status   2021 3.3 (L) 5.0 - 12.0 % Final     Eosinophil %   Date Value Ref Range Status   2021 0.0 (L) 0.3 - 6.2 %  Final     Basophil %   Date Value Ref Range Status   07/22/2021 0.1 0.0 - 1.5 % Final     Immature Grans %   Date Value Ref Range Status   07/22/2021 1.5 (H) 0.0 - 0.5 % Final     Neutrophils, Absolute   Date Value Ref Range Status   07/22/2021 7.01 (H) 1.70 - 7.00 10*3/mm3 Final     Lymphocytes, Absolute   Date Value Ref Range Status   07/22/2021 0.80 0.70 - 3.10 10*3/mm3 Final     Monocytes, Absolute   Date Value Ref Range Status   07/22/2021 0.27 0.10 - 0.90 10*3/mm3 Final     Eosinophils, Absolute   Date Value Ref Range Status   07/22/2021 0.00 0.00 - 0.40 10*3/mm3 Final     Basophils, Absolute   Date Value Ref Range Status   07/22/2021 0.01 0.00 - 0.20 10*3/mm3 Final     Immature Grans, Absolute   Date Value Ref Range Status   07/22/2021 0.12 (H) 0.00 - 0.05 10*3/mm3 Final     nRBC   Date Value Ref Range Status   07/22/2021 0.0 0.0 - 0.2 /100 WBC Final        Result Review:  I have personally reviewed the results from the time of this admission to 7/22/2021 07:48 EDT and agree with these findings:  []  Laboratory  []  Microbiology  []  Radiology  []  EKG/Telemetry   []  Cardiology/Vascular   []  Pathology  []  Old records  []  Other:      Assessment/Plan   Assessment / Plan     Brief Patient Summary:  Bing Cruz is a 89 y.o. female who is postoperative day #1 status post left hip internal fixation    Active Hospital Problems:  Active Hospital Problems    Diagnosis    • **Closed left hip fracture (CMS/[facility])      Added automatically from request for surgery [id number]     • Hip fracture requiring operative repair, left, closed, initial encounter (CMS/[facility])      Plan: Weightbearing as tolerated with walker.  Pain control.  DVT prophylaxis.  Plan for discharge home with home health services tomorrow       DVT prophylaxis:  Medical and mechanical DVT prophylaxis orders are present.    CODE STATUS:   Level Of Support Discussed With: Patient  Code Status: CPR  Medical Interventions (Level of Support Prior to  Arrest): Full    Disposition:  I expect patient to be discharged likely tomorrow with home health services.    Electronically signed by Bam Warner MD, 07/22/21, 7:48 AM EDT.

## 2021-07-22 NOTE — PLAN OF CARE
Goal Outcome Evaluation:           Progress: no change  Outcome Summary: Patient presents with the following functional limitations which impact independence and safety with daily occupations: decreased independence with ADLs, decreased independence with functional mobility/transfers, impaired balance to support ADLs, and impaired activity tolerance/functional endurance.

## 2021-07-23 ENCOUNTER — TELEPHONE (OUTPATIENT)
Dept: INTERNAL MEDICINE | Facility: CLINIC | Age: 86
End: 2021-07-23

## 2021-07-23 VITALS
TEMPERATURE: 97.7 F | RESPIRATION RATE: 18 BRPM | WEIGHT: 145.06 LBS | BODY MASS INDEX: 23.31 KG/M2 | SYSTOLIC BLOOD PRESSURE: 143 MMHG | HEIGHT: 66 IN | OXYGEN SATURATION: 100 % | DIASTOLIC BLOOD PRESSURE: 86 MMHG | HEART RATE: 65 BPM

## 2021-07-23 LAB
ANION GAP SERPL CALCULATED.3IONS-SCNC: 7.3 MMOL/L (ref 5–15)
BASOPHILS # BLD AUTO: 0.01 10*3/MM3 (ref 0–0.2)
BASOPHILS NFR BLD AUTO: 0.1 % (ref 0–1.5)
BUN SERPL-MCNC: 17 MG/DL (ref 8–23)
BUN/CREAT SERPL: 21.5 (ref 7–25)
CALCIUM SPEC-SCNC: 8.7 MG/DL (ref 8.2–9.6)
CHLORIDE SERPL-SCNC: 104 MMOL/L (ref 98–107)
CO2 SERPL-SCNC: 24.7 MMOL/L (ref 22–29)
CREAT SERPL-MCNC: 0.79 MG/DL (ref 0.57–1)
DEPRECATED RDW RBC AUTO: 45.1 FL (ref 37–54)
EOSINOPHIL # BLD AUTO: 0.02 10*3/MM3 (ref 0–0.4)
EOSINOPHIL NFR BLD AUTO: 0.2 % (ref 0.3–6.2)
ERYTHROCYTE [DISTWIDTH] IN BLOOD BY AUTOMATED COUNT: 12.6 % (ref 12.3–15.4)
GFR SERPL CREATININE-BSD FRML MDRD: 68 ML/MIN/1.73
GLUCOSE SERPL-MCNC: 101 MG/DL (ref 65–99)
HCT VFR BLD AUTO: 35 % (ref 34–46.6)
HGB BLD-MCNC: 11.6 G/DL (ref 12–15.9)
IMM GRANULOCYTES # BLD AUTO: 0.08 10*3/MM3 (ref 0–0.05)
IMM GRANULOCYTES NFR BLD AUTO: 0.8 % (ref 0–0.5)
LYMPHOCYTES # BLD AUTO: 1.64 10*3/MM3 (ref 0.7–3.1)
LYMPHOCYTES NFR BLD AUTO: 16.7 % (ref 19.6–45.3)
MCH RBC QN AUTO: 32.3 PG (ref 26.6–33)
MCHC RBC AUTO-ENTMCNC: 33.1 G/DL (ref 31.5–35.7)
MCV RBC AUTO: 97.5 FL (ref 79–97)
MONOCYTES # BLD AUTO: 0.74 10*3/MM3 (ref 0.1–0.9)
MONOCYTES NFR BLD AUTO: 7.6 % (ref 5–12)
NEUTROPHILS NFR BLD AUTO: 7.31 10*3/MM3 (ref 1.7–7)
NEUTROPHILS NFR BLD AUTO: 74.6 % (ref 42.7–76)
NRBC BLD AUTO-RTO: 0 /100 WBC (ref 0–0.2)
PLATELET # BLD AUTO: 195 10*3/MM3 (ref 140–450)
PMV BLD AUTO: 12.6 FL (ref 6–12)
POTASSIUM SERPL-SCNC: 3.7 MMOL/L (ref 3.5–5.2)
RBC # BLD AUTO: 3.59 10*6/MM3 (ref 3.77–5.28)
SODIUM SERPL-SCNC: 136 MMOL/L (ref 136–145)
WBC # BLD AUTO: 9.8 10*3/MM3 (ref 3.4–10.8)

## 2021-07-23 PROCEDURE — 99239 HOSP IP/OBS DSCHRG MGMT >30: CPT | Performed by: INTERNAL MEDICINE

## 2021-07-23 PROCEDURE — 97116 GAIT TRAINING THERAPY: CPT

## 2021-07-23 PROCEDURE — 85025 COMPLETE CBC W/AUTO DIFF WBC: CPT | Performed by: INTERNAL MEDICINE

## 2021-07-23 PROCEDURE — 80048 BASIC METABOLIC PNL TOTAL CA: CPT | Performed by: INTERNAL MEDICINE

## 2021-07-23 PROCEDURE — 97110 THERAPEUTIC EXERCISES: CPT

## 2021-07-23 RX ORDER — PSEUDOEPHEDRINE HCL 30 MG
100 TABLET ORAL 2 TIMES DAILY
Qty: 30 EACH | Refills: 0 | Status: ON HOLD | OUTPATIENT
Start: 2021-07-23 | End: 2021-10-29

## 2021-07-23 RX ORDER — HYDROCODONE BITARTRATE AND ACETAMINOPHEN 5; 325 MG/1; MG/1
1 TABLET ORAL EVERY 4 HOURS PRN
Qty: 30 TABLET | Refills: 0 | Status: SHIPPED | OUTPATIENT
Start: 2021-07-23 | End: 2021-07-27

## 2021-07-23 RX ADMIN — SODIUM CHLORIDE, PRESERVATIVE FREE 3 ML: 5 INJECTION INTRAVENOUS at 09:06

## 2021-07-23 RX ADMIN — FAMOTIDINE 20 MG: 10 INJECTION INTRAVENOUS at 09:05

## 2021-07-23 RX ADMIN — APIXABAN 2.5 MG: 5 TABLET, FILM COATED ORAL at 08:49

## 2021-07-23 RX ADMIN — METOPROLOL SUCCINATE 100 MG: 50 TABLET, EXTENDED RELEASE ORAL at 08:50

## 2021-07-23 RX ADMIN — SODIUM CHLORIDE, PRESERVATIVE FREE 10 ML: 5 INJECTION INTRAVENOUS at 09:06

## 2021-07-23 NOTE — DISCHARGE INSTRUCTIONS
CHANGE DRESSING DAILY. CLEAN AREA WITH ALCOHOL SWAB, APPLY GAUZE AND TAPE.    MAY SHOWER 7/25/21.

## 2021-07-23 NOTE — PROGRESS NOTES
[facility]     Progress Note    Patient Name: Bing Cruz  : 1931  MRN: [id number]  Primary Care Physician:  Jairo Kramer MD  Date of admission: 2021    Subjective   Subjective     HPI:  Patient Reports doing well this morning.  Her pain is controlled with medication.  Has been up moving some with assistance.    Review of Systems   See HPI    Objective   Objective     Vitals:   Temp:  [97.4 °F (36.3 °C)-98.1 °F (36.7 °C)] 98.1 °F (36.7 °C)  Heart Rate:  [] 78  Resp:  [17-20] 20  BP: (118-148)/(61-77) 136/77  Physical Exam    General: Alert, no acute distress   Chest: Unlabored breathing, cardiovascular: Regular heart rate   Skill skeletal: Dressing intact to left hip.  No drainage.  Neurovascular intact to extremity.    Result Review      WBC   Date Value Ref Range Status   2021 9.80 3.40 - 10.80 10*3/mm3 Final     RBC   Date Value Ref Range Status   2021 3.59 (L) 3.77 - 5.28 10*6/mm3 Final     Hemoglobin   Date Value Ref Range Status   2021 11.6 (L) 12.0 - 15.9 g/dL Final     Hematocrit   Date Value Ref Range Status   2021 35.0 34.0 - 46.6 % Final     MCV   Date Value Ref Range Status   2021 97.5 (H) 79.0 - 97.0 fL Final     MCH   Date Value Ref Range Status   2021 32.3 26.6 - 33.0 pg Final     MCHC   Date Value Ref Range Status   2021 33.1 31.5 - 35.7 g/dL Final     RDW   Date Value Ref Range Status   2021 12.6 12.3 - 15.4 % Final     RDW-SD   Date Value Ref Range Status   2021 45.1 37.0 - 54.0 fl Final     MPV   Date Value Ref Range Status   2021 12.6 (H) 6.0 - 12.0 fL Final     Platelets   Date Value Ref Range Status   2021 195 140 - 450 10*3/mm3 Final     Neutrophil %   Date Value Ref Range Status   2021 74.6 42.7 - 76.0 % Final     Lymphocyte %   Date Value Ref Range Status   2021 16.7 (L) 19.6 - 45.3 % Final     Monocyte %   Date Value Ref Range Status   2021 7.6 5.0 - 12.0 % Final      Eosinophil %   Date Value Ref Range Status   07/23/2021 0.2 (L) 0.3 - 6.2 % Final     Basophil %   Date Value Ref Range Status   07/23/2021 0.1 0.0 - 1.5 % Final     Immature Grans %   Date Value Ref Range Status   07/23/2021 0.8 (H) 0.0 - 0.5 % Final     Neutrophils, Absolute   Date Value Ref Range Status   07/23/2021 7.31 (H) 1.70 - 7.00 10*3/mm3 Final     Lymphocytes, Absolute   Date Value Ref Range Status   07/23/2021 1.64 0.70 - 3.10 10*3/mm3 Final     Monocytes, Absolute   Date Value Ref Range Status   07/23/2021 0.74 0.10 - 0.90 10*3/mm3 Final     Eosinophils, Absolute   Date Value Ref Range Status   07/23/2021 0.02 0.00 - 0.40 10*3/mm3 Final     Basophils, Absolute   Date Value Ref Range Status   07/23/2021 0.01 0.00 - 0.20 10*3/mm3 Final     Immature Grans, Absolute   Date Value Ref Range Status   07/23/2021 0.08 (H) 0.00 - 0.05 10*3/mm3 Final     nRBC   Date Value Ref Range Status   07/23/2021 0.0 0.0 - 0.2 /100 WBC Final        Result Review:  I have personally reviewed the results from the time of this admission to 7/23/2021 07:24 EDT and agree with these findings:  [x]  Laboratory  []  Microbiology  []  Radiology  []  EKG/Telemetry   []  Cardiology/Vascular   []  Pathology  []  Old records  []  Other:      Assessment/Plan   Assessment / Plan     Brief Patient Summary:  Bing Cruz is a 89 y.o. female who is postoperative day  #2 status post left hip internal fixation  Active Hospital Problems:  Active Hospital Problems    Diagnosis    • **Closed left hip fracture (CMS/Formerly [facility])      Added automatically from request for surgery [id number]     • Hip fracture requiring operative repair, left, closed, initial encounter (CMS/[facility]ly [facility])      Plan: Weightbearing as tolerated with walker.  Physical and Occupational Therapy.  Pain control.  Plan for discharge home with home health services later today if able.       DVT prophylaxis:  Medical and mechanical DVT prophylaxis orders are present.    CODE STATUS:    Level Of Support Discussed With: Patient  Code Status: CPR  Medical Interventions (Level of Support Prior to Arrest): Full    Disposition:  I expect patient to be discharged later today with home health services.    Electronically signed by Bam Warner MD, 07/23/21, 7:24 AM EDT.

## 2021-07-23 NOTE — DISCHARGE SUMMARY
[facility]         DISCHARGE SUMMARY    Patient Name: Bing Cruz  : 1931  MRN: [id number]    Date of Admission: 2021  Date of Discharge:  2021  Primary Care Physician: Jairo Kramer MD    Consults     Date and Time Order Name Status Description    2021  6:28 PM Inpatient Orthopedic Surgery Consult Completed           Presenting Problem:   Hip fracture requiring operative repair, left, closed, initial encounter (CMS/Formerly [facility]) [S72.002A]    Active and Resolved Hospital Problems:  Active Hospital Problems    Diagnosis POA   • **Closed left hip fracture (CMS/[facility]) [S72.002A] Unknown   • Hip fracture requiring operative repair, left, closed, initial encounter (CMS/[facility]ly [facility]) [S72.002A] Yes      Resolved Hospital Problems   No resolved problems to display.         Hospital Course     Hospital Course:  Bing Cruz is a 89 y.o. female     Nondisplaced left femoral neck fracture,, postop day #2 ORIF with 3 screws, x-ray reviewed, doing well, continue therapy today and possible discharge tomorrow --- discussed with Dr. Warner possible rehab at home or hospital afterwards, fall was last Friday, labs reviewed stable 2021, IV fluids stopped , patient seems to be doing okay pain control was fairly good at time of discharge---- currently tolerating oral intake well, discussion with patient daughter about home care they want home health, they have a daughter who is good to be staying at the house with her and she was encouraged to use her rolling walker at all times,    Acute blood loss anemia we will follow up as an outpatient,     Anticoagulation monitoring and issues with her underlying A. fib, holding Eliquis from yesterday through today,, restart Eliquis , she will restart Eliquis at home at 2.5 mg daily as she was doing before for A. fib,     Chronic atrial fibrillation rate controlled on current medications, EKG noted ,  2021     Aortic valve replacement 2018     History of DVT 2015 on chronic anticoagulation     Moderate mitral regurgitation by previous echo several years ago        Other medical history:  History of dysphagia with previous work-up October 2020 showing narrowing of the distal esophagus status post EGD February 2021, on proton pump inhibitors as of May 2021  Colon cancer diagnosed June 2020 after being found anemic, underwent exploratory laparotomy and partial colectomy 14 out of 14 lymph nodes - June 2020 by Dr. Valentin, prolonged hospital course with rehab,  Hyponatremia during that admission June 2020,  C. difficile colitis during admission June 2020 resolved  Failure to thrive weight loss, went into rehab following hospital stay June 2020 with gait dysfunction generalized weakness, has been improving over the past year,  Lung nodule found June 2025 mm left lower lobe on initial CT scans, he is being followed up by hematology oncology for history of colon cancer  Hyponatremia resolved, was seeing nephrology in the hospital last year July and August 2020, off hydrochlorothiazide now doing better  Atrial fibrillation with rapid ventricular response remotely March 23, 2017 on anticoagulation, beta-blockers, Cardizem, cardioverted April 2019 Dr. Jolly, paroxysmal A. fib currently  Previous left humeral fracture December 2016 status post ORIF with josseline placement  Extensive DVT left lower leg November 2015 currently on Eliquis 2.5 twice a day  Moderate mitral valve regurgitation on echo April 2017 is seen Dr. Jolly in the past  Reactive depression with loss of son's stomach cancer November 2018 and daughter status post aortic valve replacement 2018  Gastroesophageal reflux off PPIs, restarted PPIs again January 27, 2021  Hyperlipidemia elevated triglycerides off statins  Osteopenia  Vitamin D deficiency  Osteoarthritis of the left hip         Day of Discharge     Patient Vitals for the past 24 hrs:   BP Temp Temp src  Pulse Resp SpO2   07/23/21 0310 136/77 -- -- 78 20 97 %   07/22/21 2228 148/68 98.1 °F (36.7 °C) -- 72 20 98 %   07/22/21 1915 141/76 97.7 °F (36.5 °C) -- 101 20 96 %   07/22/21 1500 144/70 97.6 °F (36.4 °C) Oral 71 18 99 %   07/22/21 1100 118/61 97.4 °F (36.3 °C) Oral 74 17 99 %   07/22/21 0700 128/77 97.4 °F (36.3 °C) Oral 77 19 99 %        Physical Exam:    On exam today her cardiac exam revealed an irregular rhythm controlled rate distant heart tones lungs were clear anterior and laterally, her abdomen was soft her lower legs showed no edema she was alert pleasant talkative, she aroused easily from a sleep, she can move her legs to command  Pertinent  and/or Most Recent Results     LAB RESULTS:      Lab 07/23/21 0407 07/22/21 0445 07/21/21 0442 07/20/21 1918   WBC 9.80 8.21 8.63 11.03*   HEMOGLOBIN 11.6* 12.7 12.2 14.5   HEMATOCRIT 35.0 37.6 36.1 42.5   PLATELETS 195 192 185 212   NEUTROS ABS 7.31* 7.01* 5.22 6.73   IMMATURE GRANS (ABS) 0.08* 0.12* 0.12*  --    LYMPHS ABS 1.64 0.80 2.09  --    MONOS ABS 0.74 0.27 0.70  --    EOS ABS 0.02 0.00 0.49* 0.33   MCV 97.5* 97.4* 97.6* 97.0   PROTIME  --   --   --  10.0         Lab 07/23/21 0407 07/22/21 0445 07/21/21 0442 07/20/21 1918   SODIUM 136 136 139 140   POTASSIUM 3.7 4.5 3.6 3.8   CHLORIDE 104 101 102 99   CO2 24.7 24.9 25.6 26.2   ANION GAP 7.3 10.1 11.4 14.8   BUN 17 11 16 17   CREATININE 0.79 0.64 0.69 0.74   GLUCOSE 101* 138* 93 144*   CALCIUM 8.7 8.7 8.5 8.7   MAGNESIUM  --   --  1.8  --    TSH  --   --  2.790  --          Lab 07/21/21 0442 07/20/21 1918   TOTAL PROTEIN 5.8* 7.3   ALBUMIN 2.90* 3.90   GLOBULIN 2.9 3.4   ALT (SGPT) 12 16   AST (SGOT) 12 17   BILIRUBIN 1.1 1.3*   ALK PHOS 68 83         Lab 07/20/21 1918   PROBNP 1,648.0   PROTIME 10.0   INR 0.89*             Lab 07/21/21 0442 07/20/21 1918 07/20/21 1918   VITAMIN B 12 236  --   --    ABO TYPING A   < > A   RH TYPING Positive   < > Positive   ANTIBODY SCREEN  --   --  Negative     < > = values in this interval not displayed.         Brief Urine Lab Results  (Last result in the past 365 days)      Color   Clarity   Blood   Leuk Est   Nitrite   Protein   CREAT   Urine HCG        07/21/21 1959 Yellow Clear Negative Trace Negative Negative             Microbiology Results (last 10 days)     ** No results found for the last 240 hours. **                           Labs Pending at Discharge:      Imaging Results (All)     Procedure Component Value Units Date/Time    XR Hip With or Without Pelvis 2 - 3 View Left [[id number]] Collected: 07/21/21 1449     Updated: 07/21/21 1453    Narrative:      PROCEDURE: XR HIP W OR WO PELVIS 2-3 VIEW LEFT     COMPARISON: [facility], CR, XR PELVIS 1 OR 2 VW, 7/17/2021, 16:39.  [facility]   Orthopedics , CR, XR HIP W OR WO PELVIS 2-3 VIEW LEFT, 7/20/2021, 14:44.     INDICATIONS: LEFT HIP FRACTURE/FLUORO TIME 36.1 SECONDS/4.790 mGy/3 IMAGES     FINDINGS:   Intraop imaging status post open reduction internal fixation of left femoral neck fracture.  Three   screws stabilize the left femoral neck.  The screws are in expected position with near anatomic   alignment.     CONCLUSION:   1. Intraoperative imaging status post open reduction internal fixation of left femoral neck   fracture.  Three screws stabilize the left femoral neck fracture with near anatomic alignment.               NIMA PALAFOX MD         Electronically Signed and Approved By: NIMA PALFAOX MD on 7/21/2021 at 14:49                     FL < 1 Hour [[id number]] Resulted: 07/21/21 1442     Updated: 07/21/21 1442    Narrative:      This procedure was auto-finalized with no dictation required.         Discharge Details        Discharge Medications      New Medications      Instructions Start Date   docusate sodium 100 MG capsule   100 mg, Oral, 2 Times Daily      HYDROcodone-acetaminophen 5-325 MG per tablet  Commonly known as: NORCO   1 tablet, Oral, Every 4 Hours PRN          Changes to Medications      Instructions Start Date   Eliquis 2.5 MG tablet tablet  Generic drug: apixaban  What changed: Another medication with the same name was removed. Continue taking this medication, and follow the directions you see here.   Eliquis 2.5 mg oral tablet take 1 tablet (2.5 mg) by oral route 2 times per day   Active      LORazepam 0.5 MG tablet  Commonly known as: ATIVAN  What changed: Another medication with the same name was removed. Continue taking this medication, and follow the directions you see here.   1 tablet at bedtime as needed         Continue These Medications      Instructions Start Date   dilTIAZem 120 MG 24 hr capsule  Commonly known as: TIAZAC   diltiazem HCl 120 mg oral capsule,extended release 24 hr take 1 capsule (120 mg) by oral route once daily   Active      famotidine 20 MG tablet  Commonly known as: Pepcid   20 mg, Oral, 2 Times Daily      furosemide 20 MG tablet  Commonly known as: LASIX   furosemide 20 mg oral tablet take 1 tablet (20 mg) by oral route once daily   Active      loratadine 10 MG tablet  Commonly known as: Claritin   10 mg, Oral, Daily      losartan 50 MG tablet  Commonly known as: COZAAR   Every 12 Hours Scheduled      metoprolol succinate  MG 24 hr tablet  Commonly known as: TOPROL-XL   metoprolol succinate 100 mg oral tablet extended release 24 hr take 2 tablets (200 mg) by oral route once daily   Active      metoprolol succinate  MG 24 hr tablet  Commonly known as: TOPROL-XL   TAKE 1 TABLET BY MOUTH TWICE A DAY      potassium chloride 20 MEQ CR tablet  Commonly known as: K-DUR,KLOR-CON   20 mEq, Oral, 2 Times Daily      PROzac 20 MG capsule  Generic drug: FLUoxetine   Prozac 20 mg oral capsule take 1 capsule (20 mg) by oral route once daily in the evening   Active      Zofran 4 MG tablet  Generic drug: ondansetron   Zofran 4 mg oral tablet take 1 tablet by oral route 3 times a day   Suspended         Stop These Medications    cephalexin 500  MG capsule  Commonly known as: KEFLEX     dexamethasone 2 MG tablet  Commonly known as: DECADRON     raNITIdine 150 MG capsule  Commonly known as: ZANTAC            Allergies   Allergen Reactions   • Citalopram Unknown - Low Severity   • Clonazepam Unknown - Low Severity   • Levofloxacin Nausea And Vomiting   • Lisinopril Unknown - Low Severity   • Melatonin Unknown - High Severity         Discharge Disposition:  Home-Health Care [facility]    Diet:  Diet Instructions     Diet: Regular      Discharge Diet: Regular            Discharge Activity:   Activity Instructions     Gradually Increase Activity Until at Pre-Hospitalization Level              CODE STATUS:  Code Status and Medical Interventions:   Ordered at: 07/20/21 1828     Level Of Support Discussed With:    Patient     Code Status:    CPR     Medical Interventions (Level of Support Prior to Arrest):    Full         Future Appointments   Date Time Provider Department Center   8/17/2021 10:45 AM NURSE[place] [facility] [place] [address]   8/17/2021 11:30 AM Matthew Lewis MD [facility] [address]   10/7/2021  2:45 PM Annabelle Powell APRN [facility] [place] [facility]   11/8/2021 12:15 PM Jairo Kramer MD [facility] PC MATTHEW [facility]       Additional Instructions for the Follow-ups that You Need to Schedule     Ambulatory Referral to Home Health   As directed      Face to Face Visit Date: 7/23/2021    Follow-up provider for Plan of Care?: I will be treating the patient on an ongoing basis.  Please send me the Plan of Care for signature.    Follow-up provider: JAIRO KRAMER [[id number]]    Reason/Clinical Findings: Hip fracture ORIF    Describe mobility limitations that make leaving home difficult: Hip fracture ORIF    Nursing/Therapeutic Services Requested: Skilled Nursing Physical Therapy Occupational Therapy Home Monitoring    Skilled nursing orders: Pain management Wound care dressing/changes Medication education    PT orders: Strengthening    Occupational orders: Activities of daily  living    Home Monitoring Type: Continuous Monitoring via [employer] ([facility] Only)    Frequency: 1 Week 1         Ambulatory Referral to Physical Therapy   As directed      Discharge Follow-up with PCP   As directed       Currently Documented PCP:    Jairo Kramer MD    PCP Phone Number:    [phone]     Follow Up Details: followup in 7-10 days         Discharge Follow-up with Specified Provider: Timmy; 2 Weeks   As directed      To: Timmy    Follow Up: 2 Weeks         Referral to Occupational Therapy   As directed            Time spent on Discharge including face to face service:  38    minutes    Electronically signed by Jairo Kramer MD, 07/23/21, 6:02 AM EDT.

## 2021-07-23 NOTE — SIGNIFICANT NOTE
07/23/21 0752   Plan   Plan Comments Patient will be going home today. RONIT alerted [facility] to follow up with patient.   Final Discharge Disposition Code 06 - home with home health care   Final Note [facility]

## 2021-07-23 NOTE — THERAPY TREATMENT NOTE
Acute Care - Physical Therapy Treatment Note  JUNO [facility]     Patient Name: Bing Cruz  : 1931  MRN: [id number]  Today's Date: 2021   Onset of Illness/Injury or Date of Surgery: 21  Visit Dx:     ICD-10-CM ICD-9-CM   1. Difficulty walking  R26.2 719.7   2. Closed fracture of hip, unspecified laterality, initial encounter (CMS/HCC)  S72.009A 820.8   3. Left femoral neck fracture  S72.002S 905.3   4. Closed left hip fracture, initial encounter (CMS/[facility])  S72.002A 820.8   5. Decreased activities of daily living (ADL)  Z78.9 V49.89   6. Hip fracture requiring operative repair, left, closed, initial encounter (CMS/[facility])  S72.002A 820.8   7. Closed fracture of left hip with routine healing, subsequent encounter  S72.002D V54.13   8. Closed fracture of neck of left femur, initial encounter (CMS/HCC)  S72.002A 820.8   9. Iron deficiency anemia secondary to inadequate dietary iron intake  D50.8 280.1     Patient Active Problem List   Diagnosis   • Anemia   • Closed left hip fracture, initial encounter (CMS/[facility])   • Closed fracture of neck of left femur (CMS/[facility])   • Closed left hip fracture (CMS/[facility])   • Hip fracture requiring operative repair, left, closed, initial encounter (CMS/[facility])     Past Medical History:   Diagnosis Date   • Abdominal pain, generalized    • Acquired cyst of kidney    • Aortic regurgitation    • Aortic stenosis    • Aortic valve disease    • Cardiomegaly    • Chronic fatigue    • Diaphragmatic hernia without obstruction and without gangrene    • Diarrhea    • Disease of tricuspid valve    • Diverticulosis of colon    • Dysphagia    • Essential (primary) hypertension    • Hepatic cyst     LEFT   • Hiatal hernia    • Hyperlipidemia    • Insomnia, unspecified    • LVH (left ventricular hypertrophy)    • Major depressive disorder, single episode    • Malaise and fatigue    • Mitral regurgitation    • Mitral valve disorder    • Mitral valve insufficiency and aortic valve  insufficiency    • Osteopenia    • Other specified disorders of bone density and structure, unspecified site    • Pain in joint, pelvic region and thigh    • Renal cyst    • Sprain and strain of hip     Sprain and strain of unspecified site of hip and thigh   • TR (tricuspid regurgitation)    • Unspecified cataract    • Urinary tract infection    • Vitamin D deficiency      Past Surgical History:   Procedure Laterality Date   • ABDOMINAL HYSTERECTOMY      WITH BSO    • ANKLE SURGERY      (L) ankle fracture   • BLADDER REPAIR     • BREAST BIOPSY     • BREAST BIOPSY      (L) breast biopsy   • CATARACT EXTRACTION      OU   • CHOLECYSTECTOMY      OPEN   • COLON SURGERY      STATES SHE HAD 12 INCHES OF COLON REMOVED IN JULY 2020 FOR COLON CANCER   • COLONOSCOPY  2020   • INGUINAL HERNIA REPAIR Left 09/29/2011        PT Assessment (last 12 hours)      PT Evaluation and Treatment     Row Name 07/23/21 1006          Physical Therapy Time and Intention    Subjective Information  complains of;weakness;pain  -RH     Document Type  therapy note (daily note)  -     Mode of Treatment  physical therapy  -RH     Patient Effort  fair  -[facility] Name 07/23/21 1006          Pain Scale: FACES Pre/Post-Treatment    Pain: FACES Scale, Pretreatment  2-->hurts little bit  -RH     Posttreatment Pain Rating  2-->hurts little bit  -[facility] Name 07/23/21 1006          Range of Motion (ROM)    Range of Motion  -- Pt L knee AAROM at 90 degrees.  -[facility] Name 07/23/21 1006          Strength Comprehensive (MMT)    General Manual Muscle Testing (MMT) Assessment  -- Pt L hip flex strength at 3/5.  -[facility] Name 07/23/21 1006          Transfers    Transfers  sit-stand transfer;stand-sit transfer  -     Sit-Stand [facility] (Transfers)  contact guard  -     Stand-Sit [facility] (Transfers)  contact guard  -[facility] Name 07/23/21 1006          Sit-Stand Transfer    Assistive Device (Sit-Stand Transfers)  walker, front-wheeled   -RH     Row Name 07/23/21 1006          Stand-Sit Transfer    Assistive Device (Stand-Sit Transfers)  walker, front-wheeled  -RH     Row Name 07/23/21 1006          Gait/Stairs (Locomotion)    Gait/Stairs Locomotion  gait/ambulation independence;gait/ambulation assistive device;distance ambulated  -RH     Assistive Device (Gait)  walker, front-wheeled  -RH     Distance in Feet (Gait)  120  -RH     Pattern (Gait)  2-point;step-through  -RH     Deviations/Abnormal Patterns (Gait)  gait speed decreased;stride length decreased;base of support, narrow  -RH     Row Name             Wound 07/20/21 1834 Left posterior elbow Skin Tear    Wound - Properties Group Placement Date: 07/20/21  -RJ Placement Time: 1834  -RJ Present on Hospital Admission: Y  -RJ Side: Left  -RJ Orientation: posterior  -RJ Location: elbow  -RJ Primary Wound Type: Skin tear  -RJ    Retired Wound - Properties Group Date first assessed: 07/20/21  -RJ Time first assessed: 1834  -RJ Present on Hospital Admission: Y  -RJ Side: Left  -RJ Location: elbow  -RJ Primary Wound Type: Skin tear  -RJ    Row Name             Wound 07/21/21 1413 Left anterior hip Incision    Wound - Properties Group Placement Date: 07/21/21  -HB Placement Time: 1413  -HB Present on Hospital Admission: N  -HB Side: Left  -HB Orientation: anterior  -HB Location: hip  -HB Primary Wound Type: Incision  -HB Number of Staples Placed: --  -HB, STAPLE CLOSURE     Retired Wound - Properties Group Date first assessed: 07/21/21  -HB Time first assessed: 1413  -HB Present on Hospital Admission: N  -HB Side: Left  -HB Location: hip  -HB Primary Wound Type: Incision  -HB Number of Staples Placed: --  -HB, STAPLE CLOSURE     Row Name 07/23/21 1006          Progress Summary (PT)    Progress Toward Functional Goals (PT)  progress toward functional goals is fair  -RH       User Key  (r) = Recorded By, (t) = Taken By, (c) = Cosigned By    Initials Name Provider Type    Wendy Vargas RN  Registered Nurse    Keren Bustamante, RN Registered Nurse    RH John Rudolph, PTA Physical Therapy Assistant       Left Hip Ther -ex   Exercise  Reps  Sets    Long arc quads   10 2   Short arc quads  10 2   Heel slides  10 2   Ankle pumps  10 2   Quad sets  10 2   Glut sets  10 2   Abduction/ Adduction  10 2        Physical Therapy Education                 Title: PT OT SLP Therapies (In Progress)     Topic: Physical Therapy (In Progress)     Point: Mobility training (Done)     Learning Progress Summary           Patient Acceptance, E,TB, VU by DP at 7/22/2021 0904                   Point: Home exercise program (Not Started)     Learner Progress:  Not documented in this visit.          Point: Body mechanics (Done)     Learning Progress Summary           Patient Acceptance, E,TB, VU by DP at 7/22/2021 0904                   Point: Precautions (Done)     Learning Progress Summary           Patient Acceptance, E,TB, VU by DP at 7/22/2021 0904                               User Key     Initials Effective Dates Name Provider Type Discipline    DP 06/03/21 -  Radha Burgos, PT Physical Therapist PT              PT Recommendation and Plan     Progress Summary (PT)  Progress Toward Functional Goals (PT): progress toward functional goals is fair  Outcome Measures     Row Name 07/23/21 1000 07/22/21 0900          How much help from another person do you currently need...    Turning from your back to your side while in flat bed without using bedrails?  4  -RH  4  -DP     Moving from lying on back to sitting on the side of a flat bed without bedrails?  4  -RH  4  -DP     Moving to and from a bed to a chair (including a wheelchair)?  3  -RH  3  -DP     Standing up from a chair using your arms (e.g., wheelchair, bedside chair)?  3  -RH  3  -DP     Climbing 3-5 steps with a railing?  3  -RH  3  -DP     To walk in hospital room?  4  -RH  3  -DP     AM-PAC 6 Clicks Score (PT)  21  -RH  20  -DP        Functional Assessment     Outcome Measure Options  --  AM-PAC 6 Clicks Basic Mobility (PT)  -DP       User Key  (r) = Recorded By, (t) = Taken By, (c) = Cosigned By    Initials Name Provider Type    RH John Rudolph PTA Physical Therapy Assistant    Radha Merlos, PT Physical Therapist           Time Calculation:   PT Charges     Row Name 07/23/21 1005             Time Calculation    PT Received On  07/23/21  -RH         Timed Charges    45144 - PT Therapeutic Exercise Minutes  17  -RH      67453 - Gait Training Minutes   4  -RH      98196 - PT Therapeutic Activity Minutes  3  -RH         Total Minutes    Timed Charges Total Minutes  24  -RH       Total Minutes  24  -RH        User Key  (r) = Recorded By, (t) = Taken By, (c) = Cosigned By    Initials Name Provider Type     John Rudolph PTA Physical Therapy Assistant        Therapy Charges for Today     Code Description Service Date Service Provider Modifiers Qty    91794225033 HC PT THER PROC EA 15 MIN 7/23/2021 John Rudolph PTA GP 1    63299630737 HC GAIT TRAINING EA 15 MIN 7/23/2021 John Rudolph PTA GP 1          PT G-Codes  Outcome Measure Options: AM-PAC 6 Clicks Daily Activity (OT), Optimal Instrument  AM-PAC 6 Clicks Score (PT): 21  AM-PAC 6 Clicks Score (OT): 19    John Rudolph PTA  7/23/2021

## 2021-07-24 ENCOUNTER — READMISSION MANAGEMENT (OUTPATIENT)
Dept: CALL CENTER | Facility: HOSPITAL | Age: 86
End: 2021-07-24

## 2021-07-24 NOTE — OUTREACH NOTE
Prep Survey      Responses   [facility] patient discharged from?  [facility]   Is LACE score < 7 ?  Yes   Emergency Room discharge w/ pulse ox?  No   Eligibility  [facility]   Date of Admission  07/20/21   Date of Discharge  07/23/21   Discharge Disposition  Home-Health Care [facility] [place]   Discharge diagnosis  Closed left hip fracture Hip fracture requiring operative repair, left, closed,   Does the patient have one of the following disease processes/diagnoses(primary or secondary)?  General Surgery   Does the patient have Home health ordered?  Yes   What is the Home health agency?   [facility]    Is there a DME ordered?  No   Prep survey completed?  Yes          Mallory Conway RN

## 2021-07-26 ENCOUNTER — TELEPHONE (OUTPATIENT)
Dept: ORTHOPEDIC SURGERY | Facility: CLINIC | Age: 86
End: 2021-07-26

## 2021-07-26 ENCOUNTER — TRANSITIONAL CARE MANAGEMENT TELEPHONE ENCOUNTER (OUTPATIENT)
Dept: CALL CENTER | Facility: HOSPITAL | Age: 86
End: 2021-07-26

## 2021-07-26 ENCOUNTER — TELEPHONE (OUTPATIENT)
Dept: INTERNAL MEDICINE | Facility: CLINIC | Age: 86
End: 2021-07-26

## 2021-07-26 RX ORDER — CEPHALEXIN 500 MG/1
500 CAPSULE ORAL 4 TIMES DAILY
Qty: 28 CAPSULE | Refills: 0 | Status: SHIPPED | OUTPATIENT
Start: 2021-07-26 | End: 2021-08-02

## 2021-07-26 RX ORDER — NITROFURANTOIN 25; 75 MG/1; MG/1
100 CAPSULE ORAL 2 TIMES DAILY
Qty: 14 CAPSULE | Refills: 0 | Status: SHIPPED | OUTPATIENT
Start: 2021-07-26 | End: 2021-08-02

## 2021-07-26 NOTE — TELEPHONE ENCOUNTER
Patient's daughter called and states that the wound is red and draining.  No fever.  Picture was seen into office of the wound and forwarded to JUNAID Barth.  Froylan advised he would send in antibiotics. Patient's daughter informed of the above and instructed on wound care.  Voiced understanding and to call office with other issues.  Patient will keep 8-3-21 follow up appointment.         Patient medication was e-scribed and she should take as directed. Follow up on 8/3/21. If worsening symptoms they were advised to follow up sooner.

## 2021-07-26 NOTE — TELEPHONE ENCOUNTER
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 Her daughter states that Ortho put her on keflex today for an infection on her leg, they want to know if that would help the UTI as well.

## 2021-07-26 NOTE — TELEPHONE ENCOUNTER
Call patient, tell her I just sent in Macrobid, already did the order myself in 7-10 nothing else to do except tell her

## 2021-07-26 NOTE — TELEPHONE ENCOUNTER
Yes, tell her the Keflex should cover the urinary tract infection just fine and I would continue that for now

## 2021-07-27 PROBLEM — R13.19 ESOPHAGEAL DYSPHAGIA: Status: ACTIVE | Noted: 2021-07-27

## 2021-07-27 PROBLEM — E55.9 VITAMIN D DEFICIENCY: Status: ACTIVE | Noted: 2021-07-27

## 2021-07-27 PROBLEM — I48.11 LONGSTANDING PERSISTENT ATRIAL FIBRILLATION: Status: ACTIVE | Noted: 2021-07-27

## 2021-07-27 PROBLEM — F33.0 MAJOR DEPRESSIVE DISORDER, RECURRENT, MILD: Status: ACTIVE | Noted: 2021-07-27

## 2021-07-27 PROBLEM — IMO0001 LUNG NODULE < 6CM ON CT: Status: ACTIVE | Noted: 2021-07-27

## 2021-07-27 PROBLEM — I82.5Y2 CHRONIC DEEP VEIN THROMBOSIS (DVT) OF PROXIMAL VEIN OF LEFT LOWER EXTREMITY: Status: ACTIVE | Noted: 2021-07-27

## 2021-07-27 PROBLEM — C18.9 MALIGNANT NEOPLASM OF COLON: Status: ACTIVE | Noted: 2021-07-27

## 2021-07-27 PROBLEM — K21.9 GASTROESOPHAGEAL REFLUX DISEASE WITHOUT ESOPHAGITIS: Status: ACTIVE | Noted: 2021-07-27

## 2021-07-28 ENCOUNTER — OFFICE VISIT (OUTPATIENT)
Dept: INTERNAL MEDICINE | Facility: CLINIC | Age: 86
End: 2021-07-28

## 2021-07-28 ENCOUNTER — LAB (OUTPATIENT)
Dept: LAB | Facility: HOSPITAL | Age: 86
End: 2021-07-28

## 2021-07-28 VITALS
OXYGEN SATURATION: 97 % | WEIGHT: 141 LBS | BODY MASS INDEX: 22.13 KG/M2 | HEIGHT: 67 IN | SYSTOLIC BLOOD PRESSURE: 141 MMHG | DIASTOLIC BLOOD PRESSURE: 81 MMHG | HEART RATE: 88 BPM | TEMPERATURE: 97.5 F

## 2021-07-28 DIAGNOSIS — C18.9 MALIGNANT NEOPLASM OF COLON, UNSPECIFIED PART OF COLON (HCC): ICD-10-CM

## 2021-07-28 DIAGNOSIS — K21.9 GASTROESOPHAGEAL REFLUX DISEASE WITHOUT ESOPHAGITIS: ICD-10-CM

## 2021-07-28 DIAGNOSIS — F33.0 MAJOR DEPRESSIVE DISORDER, RECURRENT, MILD (HCC): ICD-10-CM

## 2021-07-28 DIAGNOSIS — R13.19 ESOPHAGEAL DYSPHAGIA: ICD-10-CM

## 2021-07-28 DIAGNOSIS — E55.9 VITAMIN D DEFICIENCY: ICD-10-CM

## 2021-07-28 DIAGNOSIS — IMO0001 LUNG NODULE < 6CM ON CT: ICD-10-CM

## 2021-07-28 DIAGNOSIS — I82.5Y2 CHRONIC DEEP VEIN THROMBOSIS (DVT) OF PROXIMAL VEIN OF LEFT LOWER EXTREMITY (HCC): ICD-10-CM

## 2021-07-28 DIAGNOSIS — I48.11 LONGSTANDING PERSISTENT ATRIAL FIBRILLATION (HCC): ICD-10-CM

## 2021-07-28 DIAGNOSIS — S72.002A CLOSED LEFT HIP FRACTURE, INITIAL ENCOUNTER (HCC): ICD-10-CM

## 2021-07-28 DIAGNOSIS — D50.9 IRON DEFICIENCY ANEMIA, UNSPECIFIED IRON DEFICIENCY ANEMIA TYPE: ICD-10-CM

## 2021-07-28 DIAGNOSIS — D50.9 IRON DEFICIENCY ANEMIA, UNSPECIFIED IRON DEFICIENCY ANEMIA TYPE: Primary | ICD-10-CM

## 2021-07-28 LAB
BILIRUB UR QL STRIP: NEGATIVE
CLARITY UR: CLEAR
COLOR UR: YELLOW
GLUCOSE UR STRIP-MCNC: NEGATIVE MG/DL
HGB UR QL STRIP.AUTO: NEGATIVE
KETONES UR QL STRIP: NEGATIVE
LEUKOCYTE ESTERASE UR QL STRIP.AUTO: ABNORMAL
NITRITE UR QL STRIP: NEGATIVE
PH UR STRIP.AUTO: 6 [PH] (ref 5–8)
PROT UR QL STRIP: NEGATIVE
SP GR UR STRIP: 1.02 (ref 1–1.03)
UROBILINOGEN UR QL STRIP: ABNORMAL

## 2021-07-28 PROCEDURE — 99496 TRANSJ CARE MGMT HIGH F2F 7D: CPT | Performed by: INTERNAL MEDICINE

## 2021-07-28 PROCEDURE — 81001 URINALYSIS AUTO W/SCOPE: CPT

## 2021-07-28 PROCEDURE — 1111F DSCHRG MED/CURRENT MED MERGE: CPT | Performed by: INTERNAL MEDICINE

## 2021-07-28 RX ORDER — MONTELUKAST SODIUM 4 MG/1
TABLET, CHEWABLE ORAL
Qty: 270 TABLET | Refills: 1 | Status: SHIPPED | OUTPATIENT
Start: 2021-07-28

## 2021-07-28 RX ORDER — PANTOPRAZOLE SODIUM 20 MG/1
TABLET, DELAYED RELEASE ORAL
Qty: 90 TABLET | Refills: 1 | Status: SHIPPED | OUTPATIENT
Start: 2021-07-28 | End: 2022-08-30 | Stop reason: SDUPTHER

## 2021-07-29 ENCOUNTER — TELEPHONE (OUTPATIENT)
Dept: INTERNAL MEDICINE | Facility: CLINIC | Age: 86
End: 2021-07-29

## 2021-07-29 LAB
BACTERIA UR QL AUTO: ABNORMAL /HPF
HYALINE CASTS UR QL AUTO: ABNORMAL /LPF
RBC # UR: ABNORMAL /HPF
REF LAB TEST METHOD: ABNORMAL
SQUAMOUS #/AREA URNS HPF: ABNORMAL /HPF
WBC UR QL AUTO: ABNORMAL /HPF

## 2021-08-01 PROBLEM — I35.1 NONRHEUMATIC AORTIC VALVE INSUFFICIENCY: Chronic | Status: ACTIVE | Noted: 2020-12-09

## 2021-08-01 PROBLEM — E78.5 HYPERLIPIDEMIA: Status: ACTIVE | Noted: 2021-08-01

## 2021-08-01 PROBLEM — I35.1 NONRHEUMATIC AORTIC VALVE INSUFFICIENCY: Status: ACTIVE | Noted: 2021-08-01

## 2021-08-01 PROBLEM — I10 HYPERTENSION, ESSENTIAL: Status: ACTIVE | Noted: 2021-08-01

## 2021-08-01 PROBLEM — I48.11 LONGSTANDING PERSISTENT ATRIAL FIBRILLATION (HCC): Chronic | Status: ACTIVE | Noted: 2019-01-01

## 2021-08-01 PROBLEM — I34.0 NONRHEUMATIC MITRAL VALVE REGURGITATION: Status: ACTIVE | Noted: 2020-12-09

## 2021-08-01 PROBLEM — I34.0 NONRHEUMATIC MITRAL VALVE REGURGITATION: Status: ACTIVE | Noted: 2021-08-01

## 2021-08-01 PROBLEM — I34.0 NONRHEUMATIC MITRAL VALVE REGURGITATION: Chronic | Status: ACTIVE | Noted: 2020-12-09

## 2021-08-02 ENCOUNTER — OFFICE VISIT (OUTPATIENT)
Dept: CARDIOLOGY | Facility: CLINIC | Age: 86
End: 2021-08-02

## 2021-08-02 VITALS
SYSTOLIC BLOOD PRESSURE: 153 MMHG | HEIGHT: 69 IN | DIASTOLIC BLOOD PRESSURE: 75 MMHG | HEART RATE: 82 BPM | BODY MASS INDEX: 21.33 KG/M2 | WEIGHT: 144 LBS

## 2021-08-02 DIAGNOSIS — I34.0 NONRHEUMATIC MITRAL VALVE REGURGITATION: ICD-10-CM

## 2021-08-02 DIAGNOSIS — I35.1 NONRHEUMATIC AORTIC VALVE INSUFFICIENCY: ICD-10-CM

## 2021-08-02 DIAGNOSIS — I48.20 ATRIAL FIBRILLATION, CHRONIC (HCC): Primary | ICD-10-CM

## 2021-08-02 DIAGNOSIS — I48.20 CHRONIC ATRIAL FIBRILLATION (HCC): ICD-10-CM

## 2021-08-02 DIAGNOSIS — I10 HYPERTENSION, ESSENTIAL: ICD-10-CM

## 2021-08-02 PROCEDURE — 99214 OFFICE O/P EST MOD 30 MIN: CPT | Performed by: NURSE PRACTITIONER

## 2021-08-02 RX ORDER — PANTOPRAZOLE SODIUM 40 MG/1
40 TABLET, DELAYED RELEASE ORAL DAILY
COMMUNITY
End: 2021-11-11 | Stop reason: SDUPTHER

## 2021-08-02 RX ORDER — DILTIAZEM HYDROCHLORIDE 120 MG/1
CAPSULE, COATED, EXTENDED RELEASE ORAL
COMMUNITY
Start: 2021-07-17 | End: 2021-10-12

## 2021-08-02 NOTE — PROGRESS NOTES
Chief Complaint  Fall and Hypertension    Subjective            [place] Macy Cruz presents to [facility] CARDIOLOGY  89-year-old white female who had a recent fall resulting in a hip fracture.  It was noted she had a UTI at the time and when she gets them she is dizzy and off balance.  He is now on Keflex for UTI. Denies any chest pains, shortness of breath, palpitations, dizziness, syncope, swelling, PND, or orthopnea.  Cardiac wise she has no complaints.  Has had both Covid vaccines.              Past History:    Past Medical History:   Diagnosis Date   • Abdominal pain, generalized    • Acquired cyst of kidney    • Aortic regurgitation    • Aortic stenosis    • Aortic valve disease    • Cardiomegaly    • Chronic atrial fibrillation (CMS/HCC) 1/1/2019    Atrial fibrillation converted to sinus through cardioversion (March 2019   • Chronic fatigue    • Diaphragmatic hernia without obstruction and without gangrene    • Diarrhea    • Disease of tricuspid valve    • Diverticulosis of colon    • Dysphagia    • Essential (primary) hypertension    • Hepatic cyst     LEFT   • Hiatal hernia    • Hyperlipidemia    • Insomnia, unspecified    • Longstanding persistent atrial fibrillation (CMS/HCC) 1/1/2019    Atrial fibrillation converted to sinus through cardioversion (March 2019   • LVH (left ventricular hypertrophy)    • Major depressive disorder, single episode    • Malaise and fatigue    • Mitral regurgitation    • Mitral valve disorder    • Mitral valve insufficiency and aortic valve insufficiency    • Nonrheumatic aortic valve insufficiency 12/9/2020    Fibrocalcific changes of the trileaflet aortic valve with trace aortic valve regurgitation and mild aortic valve       stenosis.  An echo December 9, 2020   • Nonrheumatic mitral valve regurgitation 12/9/2020     Fibrocalcific changes of the mitral valve with moderate mitral valve regurgitation.  Echo December 9, 2020   • Osteopenia    • Other  specified disorders of bone density and structure, unspecified site    • Pain in joint, pelvic region and thigh    • Renal cyst    • Sprain and strain of hip     Sprain and strain of unspecified site of hip and thigh   • TR (tricuspid regurgitation)    • Unspecified cataract    • Urinary tract infection    • Vitamin D deficiency         Family History: family history is not on file.     Social History: reports that she has never smoked. She has never used smokeless tobacco. She reports previous alcohol use. She reports that she does not use drugs.    Allergies: Citalopram, Clonazepam, Levofloxacin, Lisinopril, Melatonin, and Macrobid [nitrofurantoin macrocrystal]      Past Surgical History:   Procedure Laterality Date   • ABDOMINAL HYSTERECTOMY      WITH BSO    • ANKLE SURGERY      (L) ankle fracture   • BLADDER REPAIR     • BREAST BIOPSY     • BREAST BIOPSY      (L) breast biopsy   • CATARACT EXTRACTION      OU   • CHOLECYSTECTOMY      OPEN   • COLON SURGERY      STATES SHE HAD 12 INCHES OF COLON REMOVED IN JULY 2020 FOR COLON CANCER   • COLONOSCOPY  2020   • HIP SURGERY  07/2021   • INGUINAL HERNIA REPAIR Left 09/29/2011        Prior to Admission medications    Medication Sig Start Date End Date Taking? Authorizing Provider   apixaban (Eliquis) 2.5 MG tablet tablet Eliquis 2.5 mg oral tablet take 1 tablet (2.5 mg) by oral route 2 times per day   Active   Yes Emergency, Nurse Epic, RN   dilTIAZem CD (CARDIZEM CD) 120 MG 24 hr capsule TAKE 1 CAPSULE BY MOUTH AT NOON 7/17/21  Yes ProviderKarina MD   LORazepam (ATIVAN) 0.5 MG tablet 1 tablet at bedtime as needed   Yes ProviderKarina MD   losartan (COZAAR) 50 MG tablet Every 12 (Twelve) Hours.   Yes ProviderKarina MD           metoprolol succinate XL (TOPROL-XL) 100 MG 24 hr tablet TAKE 1 TABLET BY MOUTH TWICE A DAY   Yes ProviderKarina MD   ondansetron (Zofran) 4 MG tablet Zofran 4 mg oral tablet take 1 tablet by oral route 3 times a day    Suspended   Yes Emergency, Nurse Tj, RN           pantoprazole (PROTONIX) 40 MG EC tablet Take 40 mg by mouth Daily. bid   Yes Provider, MD Karina   cephalexin (KEFLEX) 500 MG capsule Take 1 capsule by mouth 4 (Four) Times a Day for 7 days. 7/26/21 8/2/21  Weston Bender PA   colestipol (COLESTID) 1 g tablet TAKE 1 TABLET BY ORAL ROUTE 3 TIMES A DAY AS NEEDED FOR 30 DAYS DIARRHEA 7/28/21   Jeanne Colunga APRN           docusate sodium 100 MG capsule Take 1 capsule by mouth 2 (Two) Times a Day. 7/23/21   Jairo Kramer MD   famotidine (Pepcid) 20 MG tablet Take 1 tablet by mouth 2 (Two) Times a Day. 7/12/21   Jairo Kramer MD   FLUoxetine (PROzac) 20 MG capsule Prozac 20 mg oral capsule take 1 capsule (20 mg) by oral route once daily in the evening   Active    Emergency, Nurse Epic, RN   furosemide (LASIX) 20 MG tablet furosemide 20 mg oral tablet take 1 tablet (20 mg) by oral route once daily   Active    Emergency, Nurse MARA Spencer   loratadine (Claritin) 10 MG tablet Take 1 tablet by mouth Daily. 7/12/21   Jairo Kramer MD Godfrey, Christopher J, MD                Review of Systems   Respiratory: Negative for shortness of breath.    Cardiovascular: Positive for chest pain. Negative for palpitations.   All other systems reviewed and are negative.       Objective     Physical Exam  Constitutional:       Appearance: Normal appearance. She is normal weight.      Comments: Ambulates with a walker accompanied by her daughter   Eyes:      Pupils: Pupils are equal, round, and reactive to light.   Neck:      Vascular: No carotid bruit.   Cardiovascular:      Rate and Rhythm: Normal rate. Rhythm irregularly irregular.      Pulses: Normal pulses.      Heart sounds: S2 normal. Heart sounds are distant. Murmur (The base and the apex) heard.   Systolic murmur is present with a grade of 1/6.   No diastolic murmur is present.   No S3 or S4 sounds.    Pulmonary:       "Effort: Pulmonary effort is normal.      Breath sounds: Normal breath sounds.   Abdominal:      General: Bowel sounds are normal.      Palpations: Abdomen is soft.   Musculoskeletal:      Right lower leg: No edema.      Left lower leg: No edema.   Neurological:      Mental Status: She is alert.   Psychiatric:         Mood and Affect: Mood normal.         Behavior: Behavior normal.       /75 (BP Location: Left arm, Patient Position: Sitting, Cuff Size: Adult)   Pulse 82   Ht 175.3 cm (69\")   Wt 65.3 kg (144 lb)   BMI 21.27 kg/m²       Vitals:    08/02/21 1456   BP: 153/75   Pulse: 82       Result Review :         The following data was reviewed by: GABRIELLA Ackerman on 08/02/2021:      Lab Results   Component Value Date    PROBNP 1,648.0 07/20/2021    BNP 1385 12/05/2020    BNP 4183 (H) 10/12/2020    BNP 5163 (H) 09/09/2020     CMP    CMP 7/21/21 7/22/21 7/23/21   Glucose 93 138 (A) 101 (A)   BUN 16 11 17   Creatinine 0.69 0.64 0.79   eGFR Non African Am 80 87 68   Sodium 139 136 136   Potassium 3.6 4.5 3.7   Chloride 102 101 104   Calcium 8.5 8.7 8.7   Albumin 2.90 (A)     Total Bilirubin 1.1     Alkaline Phosphatase 68     AST (SGOT) 12     ALT (SGPT) 12     (A) Abnormal value            CBC w/diff    CBC w/Diff 7/21/21 7/22/21 7/23/21   WBC 8.63 8.21 9.80   RBC 3.70 (A) 3.86 3.59 (A)   Hemoglobin 12.2 12.7 11.6 (A)   Hematocrit 36.1 37.6 35.0   MCV 97.6 (A) 97.4 (A) 97.5 (A)   MCH 33.0 32.9 32.3   MCHC 33.8 33.8 33.1   RDW 13.1 12.5 12.6   Platelets 185 192 195   Neutrophil Rel % 60.5 85.4 (A) 74.6   Immature Granulocyte Rel % 1.4 (A) 1.5 (A) 0.8 (A)   Lymphocyte Rel % 24.2 9.7 (A) 16.7 (A)   Monocyte Rel % 8.1 3.3 (A) 7.6   Eosinophil Rel % 5.7 0.0 (A) 0.2 (A)   Basophil Rel % 0.1 0.1 0.1   (A) Abnormal value             Lipid Panel    Lipid Panel 9/9/20 12/5/20 7/15/21   Total Cholesterol   187   Total Cholesterol 153 162    Triglycerides 232 (A) 285 (A) 136   HDL Cholesterol 66 (A) 71 (A) 79 (A) "   VLDL Cholesterol 46 (A) 57 (A) 23   LDL Cholesterol  41 (A) 34 (A) 85   LDL/HDL Ratio   1.02   (A) Abnormal value       Comments are available for some flowsheets but are not being displayed.            Lab Results   Component Value Date    TSH 2.790 07/21/2021    TSH 0.749 07/15/2021    TSH 3.850 05/13/2021      Lab Results   Component Value Date    FREET4 1.02 07/15/2021    FREET4 1.2 07/18/2020    FREET4 1.2 11/18/2019      No results found for: DDIMERQUANT  Magnesium   Date Value Ref Range Status   07/21/2021 1.8 1.6 - 2.4 mg/dL Final      No results found for: DIGOXIN                          Assessment and Plan        Diagnoses and all orders for this visit:    1. Atrial fibrillation, chronic (CMS/HCC) (Primary)  Assessment & Plan:  Rate controlled.  Continue metoprolol and Eliquis.    Orders:  -     CBC & Differential; Future    2. Hypertension, essential  Assessment & Plan:  History of whitecoat syndrome.  Good blood pressures in the past but she forgot her readings today.  She continue metoprolol and losartan.  She will send in her blood pressure log and we will adjust as needed    Orders:  -     Comprehensive Metabolic Panel; Future    3. Nonrheumatic mitral valve regurgitation  Assessment & Plan:  Stable without any shortness of breath.      4. Nonrheumatic aortic valve insufficiency  Assessment & Plan:  Stable without any shortness of breath.      5. Chronic atrial fibrillation (CMS/HCC)  Assessment & Plan:  Rate controlled.  Continue metoprolol and Eliquis.              Follow Up     Return in about 6 months (around 2/2/2022) for with Dr. Jolly, EKG on next visit.    Patient was given instructions and counseling regarding her condition or for health maintenance advice. Please see specific information pulled into the AVS if appropriate.       GABRIELLA Ferrari  08/02/21 15:04 EDT

## 2021-08-02 NOTE — ASSESSMENT & PLAN NOTE
History of whitecoat syndrome.  Good blood pressures in the past but she forgot her readings today.  She continue metoprolol and losartan.  She will send in her blood pressure log and we will adjust as needed

## 2021-08-03 ENCOUNTER — LAB (OUTPATIENT)
Dept: LAB | Facility: HOSPITAL | Age: 86
End: 2021-08-03

## 2021-08-03 ENCOUNTER — OFFICE VISIT (OUTPATIENT)
Dept: ORTHOPEDIC SURGERY | Facility: CLINIC | Age: 86
End: 2021-08-03

## 2021-08-03 VITALS — WEIGHT: 141 LBS | BODY MASS INDEX: 22.13 KG/M2 | HEIGHT: 67 IN | OXYGEN SATURATION: 99 % | HEART RATE: 67 BPM

## 2021-08-03 DIAGNOSIS — E55.9 VITAMIN D DEFICIENCY: ICD-10-CM

## 2021-08-03 DIAGNOSIS — M25.552 LEFT HIP PAIN: Primary | ICD-10-CM

## 2021-08-03 DIAGNOSIS — I82.5Y2 CHRONIC DEEP VEIN THROMBOSIS (DVT) OF PROXIMAL VEIN OF LEFT LOWER EXTREMITY (HCC): ICD-10-CM

## 2021-08-03 DIAGNOSIS — C18.9 MALIGNANT NEOPLASM OF COLON, UNSPECIFIED PART OF COLON (HCC): ICD-10-CM

## 2021-08-03 DIAGNOSIS — Z98.890 HISTORY OF HIP SURGERY: ICD-10-CM

## 2021-08-03 DIAGNOSIS — D50.9 IRON DEFICIENCY ANEMIA, UNSPECIFIED IRON DEFICIENCY ANEMIA TYPE: ICD-10-CM

## 2021-08-03 DIAGNOSIS — I48.11 LONGSTANDING PERSISTENT ATRIAL FIBRILLATION (HCC): ICD-10-CM

## 2021-08-03 DIAGNOSIS — IMO0001 LUNG NODULE < 6CM ON CT: ICD-10-CM

## 2021-08-03 DIAGNOSIS — K21.9 GASTROESOPHAGEAL REFLUX DISEASE WITHOUT ESOPHAGITIS: ICD-10-CM

## 2021-08-03 DIAGNOSIS — S72.002A CLOSED LEFT HIP FRACTURE, INITIAL ENCOUNTER (HCC): ICD-10-CM

## 2021-08-03 DIAGNOSIS — R13.19 ESOPHAGEAL DYSPHAGIA: ICD-10-CM

## 2021-08-03 DIAGNOSIS — F33.0 MAJOR DEPRESSIVE DISORDER, RECURRENT, MILD (HCC): ICD-10-CM

## 2021-08-03 LAB
BASOPHILS # BLD AUTO: 0.04 10*3/MM3 (ref 0–0.2)
BASOPHILS NFR BLD AUTO: 0.6 % (ref 0–1.5)
DEPRECATED RDW RBC AUTO: 42.1 FL (ref 37–54)
EOSINOPHIL # BLD AUTO: 0.23 10*3/MM3 (ref 0–0.4)
EOSINOPHIL NFR BLD AUTO: 3.6 % (ref 0.3–6.2)
ERYTHROCYTE [DISTWIDTH] IN BLOOD BY AUTOMATED COUNT: 12 % (ref 12.3–15.4)
HCT VFR BLD AUTO: 34.3 % (ref 34–46.6)
HGB BLD-MCNC: 11.7 G/DL (ref 12–15.9)
IMM GRANULOCYTES # BLD AUTO: 0.02 10*3/MM3 (ref 0–0.05)
IMM GRANULOCYTES NFR BLD AUTO: 0.3 % (ref 0–0.5)
LYMPHOCYTES # BLD AUTO: 1.34 10*3/MM3 (ref 0.7–3.1)
LYMPHOCYTES NFR BLD AUTO: 20.8 % (ref 19.6–45.3)
MCH RBC QN AUTO: 32.8 PG (ref 26.6–33)
MCHC RBC AUTO-ENTMCNC: 34.1 G/DL (ref 31.5–35.7)
MCV RBC AUTO: 96.1 FL (ref 79–97)
MONOCYTES # BLD AUTO: 0.49 10*3/MM3 (ref 0.1–0.9)
MONOCYTES NFR BLD AUTO: 7.6 % (ref 5–12)
NEUTROPHILS NFR BLD AUTO: 4.33 10*3/MM3 (ref 1.7–7)
NEUTROPHILS NFR BLD AUTO: 67.1 % (ref 42.7–76)
NRBC BLD AUTO-RTO: 0 /100 WBC (ref 0–0.2)
PLATELET # BLD AUTO: 278 10*3/MM3 (ref 140–450)
PMV BLD AUTO: 12.3 FL (ref 6–12)
RBC # BLD AUTO: 3.57 10*6/MM3 (ref 3.77–5.28)
WBC # BLD AUTO: 6.45 10*3/MM3 (ref 3.4–10.8)

## 2021-08-03 PROCEDURE — 36415 COLL VENOUS BLD VENIPUNCTURE: CPT

## 2021-08-03 PROCEDURE — 85025 COMPLETE CBC W/AUTO DIFF WBC: CPT

## 2021-08-03 PROCEDURE — 99024 POSTOP FOLLOW-UP VISIT: CPT | Performed by: ORTHOPAEDIC SURGERY

## 2021-08-03 NOTE — PROGRESS NOTES
"Chief Complaint  Follow-up of the Left Hip     Subjective      Haskell Macy Cruz presents to [facility] for follow up evaluation of the left hip. She is S/P Femoral Neck Open Reduction Internal Fixation 7/21/2021. She has been having home health. She was placed on keflex for some drainage to her hip. Her staples were removed today. She is ambulating on a walker. She has no other complaints.     Allergies   Allergen Reactions   • Citalopram Unknown - Low Severity   • Clonazepam Unknown - Low Severity   • Levofloxacin Nausea And Vomiting   • Lisinopril Unknown - Low Severity   • Melatonin Unknown - High Severity   • Macrobid [Nitrofurantoin Macrocrystal] Rash        Social History     Socioeconomic History   • Marital status:      Spouse name: Not on file   • Number of children: Not on file   • Years of education: Not on file   • Highest education level: Not on file   Tobacco Use   • Smoking status: Never Smoker   • Smokeless tobacco: Never Used   Vaping Use   • Vaping Use: Former   Substance and Sexual Activity   • Alcohol use: Not Currently   • Drug use: Never   • Sexual activity: Defer        Review of Systems     Objective   Vital Signs:   Pulse 67   Ht 170.2 cm (67\")   Wt 64 kg (141 lb)   SpO2 99%   BMI 22.08 kg/m²       Physical Exam  Constitutional:       Appearance: Normal appearance. He is well-developed and normal weight.   HENT:      Head: Normocephalic.      Right Ear: Hearing and external ear normal.      Left Ear: Hearing and external ear normal.      Nose: Nose normal.   Eyes:      Conjunctiva/sclera: Conjunctivae normal.   Cardiovascular:      Rate and Rhythm: Normal rate.   Pulmonary:      Effort: Pulmonary effort is normal.      Breath sounds: No wheezing or rales.   Abdominal:      Palpations: Abdomen is soft.      Tenderness: There is no abdominal tenderness.   Musculoskeletal:      Cervical back: Normal range of motion.   Skin:     Findings: No rash. "   Neurological:      Mental Status: He is alert and oriented to person, place, and time.   Psychiatric:         Mood and Affect: Mood and affect normal.         Judgment: Judgment normal.       Ortho Exam      Left hip- incision well healing. No signs of drainage or infection. Mild redness. Neurovascularly intact. Hip ROM limited secondary to pain.     Procedures    X-Ray Report:  Left hip(s) X-Ray  Indication: Evaluation of left hip pain  AP and Lateral view(s)  Findings: well aligned femoral neck fracture with intact hardware with no signs of hardware failure.   Prior studies available for comparison: yes             Imaging Results (Most Recent)     Procedure Component Value Units Date/Time    XR Hip With or Without Pelvis 2 - 3 View Left [[id number]] Resulted: 08/03/21 0938     Updated: 08/03/21 0940           Result Review :       FL < 1 Hour    Result Date: 7/21/2021  Narrative: This procedure was auto-finalized with no dictation required.    XR Pelvis 1 or 2 View    Result Date: 7/17/2021  Narrative: PROCEDURE: XR PELVIS 1 OR 2 VW  COMPARISON: [facility], CR, LEFT HIP/PELVIS, 4/10/2013, 13:35.  [facility], CR, LEFT HIP/PELVIS, 7/17/2020, 10:14.  INDICATIONS: POST FALL YESTERDAY. PAIN IN LEFT HIP.  FINDINGS:   Mild degenerative changes are present in the hips.  There are degenerative changes in the visualized lower lumbar spine.  There is subtle linear density in the left femoral neck.  No lytic lesions are identified.  IMPRESSION: Subtle linear density in the left femoral neck may represent nondisplaced fracture.  Suggest a follow-up CT scan or MRI examination of the left hip.   HUY LOPEZ MD       Electronically Signed and Approved By: HUY LOPEZ MD on 7/17/2021 at 17:30             XR Hip With or Without Pelvis 2 - 3 View Left    Result Date: 7/22/2021  Narrative: X-Ray Report: Left hip(s) X-Ray Indication: Evaluation of left hip pain AP and Lateral view(s) Findings:  nondisplaced valgus impacted femoral neck fracture Prior studies available for comparison: yes     XR Hip With or Without Pelvis 2 - 3 View Left    Result Date: 7/21/2021  Narrative: PROCEDURE: XR HIP W OR WO PELVIS 2-3 VIEW LEFT  COMPARISON: [facility], SHANTEL, XR PELVIS 1 OR 2 VW, 7/17/2021, 16:39.  [facility] , CR, XR HIP W OR WO PELVIS 2-3 VIEW LEFT, 7/20/2021, 14:44.  INDICATIONS: LEFT HIP FRACTURE/FLUORO TIME 36.1 SECONDS/4.790 mGy/3 IMAGES  FINDINGS:  Intraop imaging status post open reduction internal fixation of left femoral neck fracture.  Three screws stabilize the left femoral neck.  The screws are in expected position with near anatomic alignment.  CONCLUSION:  1. Intraoperative imaging status post open reduction internal fixation of left femoral neck fracture.  Three screws stabilize the left femoral neck fracture with near anatomic alignment.      NIMA PALAFOX MD       Electronically Signed and Approved By: NIMA PALAFOX MD on 7/21/2021 at 14:49                      Assessment and Plan     DX: Left Femoral Neck Open Reduction Internal Fixation     Discussed the treatment plan with the patient.  Plan to continue home health.     Call or return if worsening symptoms.    Follow Up     Follow up in 6 weeks.       Patient was given instructions and counseling regarding her condition or for health maintenance advice. Please see specific information pulled into the AVS if appropriate.     Scribed for Bam Warner MD by Neeta Maldonado.  08/03/21   09:48 EDT  I have personally performed the services described in this document as scribed by the above individual and it is both accurate and complete. Bam Warner MD 08/03/21

## 2021-08-09 ENCOUNTER — TELEPHONE (OUTPATIENT)
Dept: INTERNAL MEDICINE | Facility: CLINIC | Age: 86
End: 2021-08-09

## 2021-08-09 NOTE — TELEPHONE ENCOUNTER
Lorrie called stating that for 3 days of taking the Prozac 20 mg she has noticed some changes in her memory, and she is sleeping less or not sleeping at all. She states that yesterday she was very confused and this morning she was having a hard time getting her words out, last dose of Prozac was yesterday morning. Lorrie says that she has been eating good and not complaining of any symptoms of an UTI. Lorrie is wanting to know if it's the Prozac and how long does it take to get out of her system. Call back number [phone] Please advise

## 2021-08-11 PROBLEM — G93.32 CHRONIC FATIGUE SYNDROME: Status: ACTIVE | Noted: 2021-08-11

## 2021-08-11 PROBLEM — N32.9 BLADDER DISORDER: Status: ACTIVE | Noted: 2021-08-11

## 2021-08-11 PROBLEM — I82.90 BLOOD CLOT IN VEIN: Status: ACTIVE | Noted: 2021-08-11

## 2021-08-11 PROBLEM — N39.0 URINARY TRACT INFECTION: Status: ACTIVE | Noted: 2021-08-11

## 2021-08-11 PROBLEM — E78.5 HYPERLIPEMIA: Status: ACTIVE | Noted: 2021-08-11

## 2021-08-11 RX ORDER — L.ACIDOPH/B.ANIMALIS/B.LONGUM 15B CELL
1 CAPSULE ORAL DAILY
COMMUNITY
Start: 2021-05-06 | End: 2021-11-11

## 2021-08-11 RX ORDER — LATANOPROST 50 UG/ML
SOLUTION/ DROPS OPHTHALMIC
Status: ON HOLD | COMMUNITY
Start: 2021-07-08 | End: 2021-10-29

## 2021-08-17 ENCOUNTER — OFFICE VISIT (OUTPATIENT)
Dept: ONCOLOGY | Facility: HOSPITAL | Age: 86
End: 2021-08-17

## 2021-08-17 ENCOUNTER — LAB (OUTPATIENT)
Dept: ONCOLOGY | Facility: HOSPITAL | Age: 86
End: 2021-08-17

## 2021-08-17 VITALS
OXYGEN SATURATION: 96 % | WEIGHT: 141.98 LBS | SYSTOLIC BLOOD PRESSURE: 151 MMHG | DIASTOLIC BLOOD PRESSURE: 87 MMHG | HEART RATE: 85 BPM | BODY MASS INDEX: 22.24 KG/M2 | TEMPERATURE: 95.1 F | RESPIRATION RATE: 16 BRPM

## 2021-08-17 DIAGNOSIS — C18.2 MALIGNANT NEOPLASM OF ASCENDING COLON (HCC): ICD-10-CM

## 2021-08-17 DIAGNOSIS — C18.9 MALIGNANT NEOPLASM OF COLON, UNSPECIFIED PART OF COLON (HCC): ICD-10-CM

## 2021-08-17 DIAGNOSIS — D50.9 IRON DEFICIENCY ANEMIA, UNSPECIFIED IRON DEFICIENCY ANEMIA TYPE: Primary | ICD-10-CM

## 2021-08-17 DIAGNOSIS — D50.9 IRON DEFICIENCY ANEMIA, UNSPECIFIED IRON DEFICIENCY ANEMIA TYPE: ICD-10-CM

## 2021-08-17 DIAGNOSIS — I48.20 ATRIAL FIBRILLATION, CHRONIC (HCC): ICD-10-CM

## 2021-08-17 LAB
ALBUMIN SERPL-MCNC: 4.17 G/DL (ref 3.5–5.2)
ALBUMIN/GLOB SERPL: 1.3 G/DL
ALP SERPL-CCNC: 122 U/L (ref 39–117)
ALT SERPL W P-5'-P-CCNC: 5 U/L (ref 1–33)
ANION GAP SERPL CALCULATED.3IONS-SCNC: 11.7 MMOL/L (ref 5–15)
AST SERPL-CCNC: 15 U/L (ref 1–32)
BASOPHILS # BLD AUTO: 0.04 10*3/MM3 (ref 0–0.2)
BASOPHILS NFR BLD AUTO: 0.5 % (ref 0–1.5)
BILIRUB SERPL-MCNC: 0.9 MG/DL (ref 0–1.2)
BUN SERPL-MCNC: 14 MG/DL (ref 8–23)
BUN/CREAT SERPL: 15.7 (ref 7–25)
CALCIUM SPEC-SCNC: 9.6 MG/DL (ref 8.2–9.6)
CEA SERPL-MCNC: 1.94 NG/ML
CHLORIDE SERPL-SCNC: 103 MMOL/L (ref 98–107)
CO2 SERPL-SCNC: 25.3 MMOL/L (ref 22–29)
CREAT SERPL-MCNC: 0.89 MG/DL (ref 0.57–1)
DEPRECATED RDW RBC AUTO: 48.1 FL (ref 37–54)
EOSINOPHIL # BLD AUTO: 0.11 10*3/MM3 (ref 0–0.4)
EOSINOPHIL NFR BLD AUTO: 1.4 % (ref 0.3–6.2)
ERYTHROCYTE [DISTWIDTH] IN BLOOD BY AUTOMATED COUNT: 13.5 % (ref 12.3–15.4)
GFR SERPL CREATININE-BSD FRML MDRD: 60 ML/MIN/1.73
GLOBULIN UR ELPH-MCNC: 3.1 GM/DL
GLUCOSE SERPL-MCNC: 100 MG/DL (ref 65–99)
HCT VFR BLD AUTO: 40 % (ref 34–46.6)
HGB BLD-MCNC: 13.1 G/DL (ref 12–15.9)
IMM GRANULOCYTES # BLD AUTO: 0.06 10*3/MM3 (ref 0–0.05)
IMM GRANULOCYTES NFR BLD AUTO: 0.8 % (ref 0–0.5)
LYMPHOCYTES # BLD AUTO: 1.83 10*3/MM3 (ref 0.7–3.1)
LYMPHOCYTES NFR BLD AUTO: 23.2 % (ref 19.6–45.3)
MCH RBC QN AUTO: 31.7 PG (ref 26.6–33)
MCHC RBC AUTO-ENTMCNC: 32.8 G/DL (ref 31.5–35.7)
MCV RBC AUTO: 96.9 FL (ref 79–97)
MONOCYTES # BLD AUTO: 0.58 10*3/MM3 (ref 0.1–0.9)
MONOCYTES NFR BLD AUTO: 7.4 % (ref 5–12)
NEUTROPHILS NFR BLD AUTO: 5.26 10*3/MM3 (ref 1.7–7)
NEUTROPHILS NFR BLD AUTO: 66.7 % (ref 42.7–76)
PLATELET # BLD AUTO: 320 10*3/MM3 (ref 140–450)
PMV BLD AUTO: 10.6 FL (ref 6–12)
POTASSIUM SERPL-SCNC: 3.8 MMOL/L (ref 3.5–5.2)
PROT SERPL-MCNC: 7.3 G/DL (ref 6–8.5)
RBC # BLD AUTO: 4.13 10*6/MM3 (ref 3.77–5.28)
SODIUM SERPL-SCNC: 140 MMOL/L (ref 136–145)
WBC # BLD AUTO: 7.88 10*3/MM3 (ref 3.4–10.8)

## 2021-08-17 PROCEDURE — G0463 HOSPITAL OUTPT CLINIC VISIT: HCPCS | Performed by: INTERNAL MEDICINE

## 2021-08-17 PROCEDURE — 36415 COLL VENOUS BLD VENIPUNCTURE: CPT

## 2021-08-17 PROCEDURE — 99213 OFFICE O/P EST LOW 20 MIN: CPT | Performed by: INTERNAL MEDICINE

## 2021-08-17 PROCEDURE — 82378 CARCINOEMBRYONIC ANTIGEN: CPT

## 2021-08-17 PROCEDURE — 80053 COMPREHEN METABOLIC PANEL: CPT

## 2021-08-17 PROCEDURE — 85025 COMPLETE CBC W/AUTO DIFF WBC: CPT

## 2021-08-17 NOTE — ASSESSMENT & PLAN NOTE
Stage I. Status post resection. No adjuvant therapy required. Patient is now a year out from her surgery. National guidelines would recommend a colonoscopy. She will be referred back to her surgeon for that discussion.

## 2021-08-17 NOTE — ASSESSMENT & PLAN NOTE
Blood loss anemia related to her resected colon cancer. CBC demonstrates a normal hemoglobin, hematocrit, MCV. Recheck in 6 months to ensure stability.

## 2021-08-17 NOTE — PROGRESS NOTES
Chief Complaint  iron def anemia and Follow-up    Jairo Kramer,*  Jairo Kramer MD Subjective Geneva Macy Cruz presents to [facility] for follow-up of iron deficiency anemia and early stage colon cancer, stage I status post resection. Since her last visit, she had a fall sustaining a hip fracture on the left requiring ORIF. She notes that the surgery went well. She is still using a walker and doing physical therapy but she notes that she is feeling better. Her energy is improving. She denies obvious blood loss. She does state that she has loose bowels since her colon surgery last summer. She denies blood per rectum, pain or melena. She reports good appetite. No new masses or adenopathy.    Review of Systems   Constitutional: Positive for fatigue. Negative for appetite change, diaphoresis, fever, unexpected weight gain and unexpected weight loss.   HENT: Negative for hearing loss, mouth sores, sore throat, swollen glands, trouble swallowing and voice change.    Eyes: Negative for blurred vision.   Respiratory: Negative for cough, shortness of breath and wheezing.    Cardiovascular: Negative for chest pain and palpitations.   Gastrointestinal: Positive for abdominal pain, constipation and diarrhea. Negative for blood in stool, nausea and vomiting.   Endocrine: Negative for cold intolerance and heat intolerance.   Genitourinary: Negative for difficulty urinating, dysuria, frequency, hematuria and urinary incontinence.   Musculoskeletal: Negative for arthralgias, back pain and myalgias.   Skin: Negative for rash, skin lesions and bruise.   Neurological: Positive for weakness. Negative for dizziness, seizures, numbness and headache.   Hematological: Does not bruise/bleed easily.   Psychiatric/Behavioral: Negative for depressed mood. The patient is not nervous/anxious.      Current Outpatient Medications on File Prior to Visit   Medication Sig  Dispense Refill   • apixaban (Eliquis) 2.5 MG tablet tablet Eliquis 2.5 mg oral tablet take 1 tablet (2.5 mg) by oral route 2 times per day   Active     • colestipol (COLESTID) 1 g tablet TAKE 1 TABLET BY ORAL ROUTE 3 TIMES A DAY AS NEEDED FOR 30 DAYS DIARRHEA 270 tablet 1   • dilTIAZem CD (CARDIZEM CD) 120 MG 24 hr capsule TAKE 1 CAPSULE BY MOUTH AT NOON     • docusate sodium 100 MG capsule Take 1 capsule by mouth 2 (Two) Times a Day. 30 each 0   • famotidine (Pepcid) 20 MG tablet Take 1 tablet by mouth 2 (Two) Times a Day. 40 tablet 0   • FLUoxetine (PROzac) 20 MG capsule Prozac 20 mg oral capsule take 1 capsule (20 mg) by oral route once daily in the evening   Active     • latanoprost (XALATAN) 0.005 % ophthalmic solution INSTILL 1 DROP IN BOTH EYES AT BEDTIME 90 DAY SUPPLY     • loratadine (Claritin) 10 MG tablet Take 1 tablet by mouth Daily. 20 tablet 0   • LORazepam (ATIVAN) 0.5 MG tablet 1 tablet at bedtime as needed     • losartan (COZAAR) 50 MG tablet Every 12 (Twelve) Hours.     • metoprolol succinate XL (TOPROL-XL) 100 MG 24 hr tablet metoprolol succinate 100 mg oral tablet extended release 24 hr take 2 tablets (200 mg) by oral route once daily   Active     • ondansetron (Zofran) 4 MG tablet Zofran 4 mg oral tablet take 1 tablet by oral route 3 times a day   Suspended     • pantoprazole (PROTONIX) 40 MG EC tablet Take 40 mg by mouth Daily. bid     • Probiotic Product (Florajen Digestion) capsule Take 1 capsule by mouth Daily.       No current facility-administered medications on file prior to visit.       Allergies   Allergen Reactions   • Citalopram Unknown - Low Severity   • Clonazepam Unknown - Low Severity   • Levofloxacin Nausea And Vomiting   • Lisinopril Unknown - Low Severity   • Melatonin Unknown - High Severity   • Macrobid [Nitrofurantoin Macrocrystal] Rash     Past Medical History:   Diagnosis Date   • Abdominal pain, generalized    • Acquired cyst of kidney    • Anemia    • Aortic  regurgitation    • Aortic stenosis    • Aortic valve disease    • Cardiomegaly    • Chronic atrial fibrillation (CMS/HCC) 1/1/2019    Atrial fibrillation converted to sinus through cardioversion (March 2019   • Chronic fatigue    • Diaphragmatic hernia without obstruction and without gangrene    • Diarrhea    • Disease of tricuspid valve    • Diverticulosis of colon    • Dysphagia    • Essential (primary) hypertension    • Hepatic cyst     LEFT   • Hiatal hernia    • Hyperlipidemia    • Insomnia, unspecified    • Longstanding persistent atrial fibrillation (CMS/HCC) 1/1/2019    Atrial fibrillation converted to sinus through cardioversion (March 2019   • LVH (left ventricular hypertrophy)    • Major depressive disorder, single episode    • Malaise and fatigue    • Mitral regurgitation    • Mitral valve disorder    • Mitral valve insufficiency and aortic valve insufficiency    • Nonrheumatic aortic valve insufficiency 12/9/2020    Fibrocalcific changes of the trileaflet aortic valve with trace aortic valve regurgitation and mild aortic valve       stenosis.  An echo December 9, 2020   • Nonrheumatic mitral valve regurgitation 12/9/2020     Fibrocalcific changes of the mitral valve with moderate mitral valve regurgitation.  Echo December 9, 2020   • Osteopenia    • Other specified disorders of bone density and structure, unspecified site    • Pain in joint, pelvic region and thigh    • Renal cyst    • Sprain and strain of hip     Sprain and strain of unspecified site of hip and thigh   • TR (tricuspid regurgitation)    • Unspecified cataract    • Urinary tract infection    • Vitamin D deficiency      Past Surgical History:   Procedure Laterality Date   • ABDOMINAL HYSTERECTOMY      WITH BSO    • ANKLE SURGERY      (L) ankle fracture   • BLADDER REPAIR     • BREAST BIOPSY     • BREAST BIOPSY      (L) breast biopsy   • CATARACT EXTRACTION      OU   • CHOLECYSTECTOMY      OPEN   • COLON SURGERY      STATES SHE HAD 12  INCHES OF COLON REMOVED IN JULY 2020 FOR COLON CANCER   • COLONOSCOPY  2020   • FEMUR OPEN REDUCTION INTERNAL FIXATION Left 7/21/2021    Procedure: FEMORAL NECK OPEN REDUCTION INTERNAL FIXATION;  Surgeon: Bam Warner MD;  Location: [facility] MAIN OR;  Service: Orthopedics;  Laterality: Left;   • HIP SURGERY  07/2021   • INGUINAL HERNIA REPAIR Left 09/29/2011     Social History     Socioeconomic History   • Marital status:      Spouse name: Not on file   • Number of children: Not on file   • Years of education: Not on file   • Highest education level: Not on file   Tobacco Use   • Smoking status: Never Smoker   • Smokeless tobacco: Never Used   Vaping Use   • Vaping Use: Former   Substance and Sexual Activity   • Alcohol use: Not Currently   • Drug use: Never   • Sexual activity: Defer     History reviewed. No pertinent family history.    Objective   Physical Exam  Vitals reviewed. Exam conducted with a chaperone present.   Constitutional:       General: She is not in acute distress.     Appearance: Normal appearance.   Cardiovascular:      Rate and Rhythm: Normal rate and regular rhythm.      Heart sounds: Normal heart sounds. No murmur heard.   No gallop.    Pulmonary:      Effort: Pulmonary effort is normal.      Breath sounds: Normal breath sounds.   Abdominal:      General: Abdomen is flat. Bowel sounds are normal. There is no distension.      Palpations: Abdomen is soft.      Tenderness: There is no abdominal tenderness.   Musculoskeletal:      Cervical back: Neck supple.      Right lower leg: No edema.      Left lower leg: No edema.   Lymphadenopathy:      Cervical: No cervical adenopathy.   Neurological:      Mental Status: She is alert and oriented to person, place, and time.   Psychiatric:         Mood and Affect: Mood normal.         Behavior: Behavior normal.         Vitals:    08/17/21 1109   BP: 151/87   Pulse: 85   Resp: 16   Temp: 95.1 °F (35.1 °C)   SpO2: 96%   Weight: 64.4 kg (141 lb  15.6 oz)   PainSc:   5   PainLoc: Abdomen     ECOG score: 1         PHQ-9 Total Score: 4                    Result Review :   The following data was reviewed by: Matthew Lewis MD on 08/17/2021:  Lab Results   Component Value Date    HGB 13.1 08/17/2021    HCT 40.0 08/17/2021    MCV 96.9 08/17/2021     08/17/2021    WBC 7.88 08/17/2021    NEUTROABS 5.26 08/17/2021    LYMPHSABS 1.83 08/17/2021    MONOSABS 0.58 08/17/2021    EOSABS 0.11 08/17/2021    BASOSABS 0.04 08/17/2021     Lab Results   Component Value Date    GLUCOSE 100 (H) 08/17/2021    BUN 14 08/17/2021    CREATININE 0.89 08/17/2021     08/17/2021    K 3.8 08/17/2021     08/17/2021    CO2 25.3 08/17/2021    CALCIUM 9.6 08/17/2021    PROTEINTOT 7.3 08/17/2021    ALBUMIN 4.17 08/17/2021    BILITOT 0.9 08/17/2021    ALKPHOS 122 (H) 08/17/2021    AST 15 08/17/2021    ALT 5 08/17/2021             Assessment and Plan    Diagnoses and all orders for this visit:    1. Iron deficiency anemia, unspecified iron deficiency anemia type (Primary)  Assessment & Plan:  Blood loss anemia related to her resected colon cancer. CBC demonstrates a normal hemoglobin, hematocrit, MCV. Recheck in 6 months to ensure stability.    Orders:  -     CBC & Differential; Future  -     Comprehensive Metabolic Panel; Future    2. Malignant neoplasm of colon, unspecified part of colon (CMS/HCC)  Assessment & Plan:  Stage I. Status post resection. No adjuvant therapy required. Patient is now a year out from her surgery. National guidelines would recommend a colonoscopy. She will be referred back to her surgeon for that discussion.    Orders:  -     Ambulatory Referral to General Surgery          Patient Follow Up: 6 months    patient was given instructions and counseling regarding her condition or for health maintenance advice. Please see specific information pulled into the AVS if appropriate.     Matthew Lewis MD    8/17/2021

## 2021-08-23 ENCOUNTER — TELEPHONE (OUTPATIENT)
Dept: INTERNAL MEDICINE | Facility: CLINIC | Age: 86
End: 2021-08-23

## 2021-08-23 RX ORDER — CEPHALEXIN 250 MG/1
250 CAPSULE ORAL 3 TIMES DAILY
Qty: 21 CAPSULE | Refills: 0 | Status: ON HOLD | OUTPATIENT
Start: 2021-08-23 | End: 2021-10-29

## 2021-08-27 ENCOUNTER — OFFICE VISIT (OUTPATIENT)
Dept: SURGERY | Facility: CLINIC | Age: 86
End: 2021-08-27

## 2021-08-27 ENCOUNTER — PREP FOR SURGERY (OUTPATIENT)
Dept: OTHER | Facility: HOSPITAL | Age: 86
End: 2021-08-27

## 2021-08-27 VITALS — RESPIRATION RATE: 16 BRPM | HEIGHT: 67 IN | WEIGHT: 142.8 LBS | BODY MASS INDEX: 22.41 KG/M2

## 2021-08-27 DIAGNOSIS — Z12.11 COLON CANCER SCREENING: Primary | ICD-10-CM

## 2021-08-27 PROCEDURE — S0260 H&P FOR SURGERY: HCPCS | Performed by: SURGERY

## 2021-08-27 NOTE — PROGRESS NOTES
Inpatient History and Physical Surgical Orders    Preadmission Location:   Preadmission Time:  Facility:  Surgery Date:  Surgery Time:  Preadmission Test date:     Chief Complaint  Outpatient History and Physical / Surgical Orders    Primary Care Provider: Jairo Kramer MD    Referring Provider: Matthew Lewis MD    Subjective      Patient Name: Bing Cruz : 1931    HPI  The patient is a 89-year-old female who about a year ago had a right colectomy for colon cancer.  She is doing fine but was referred over for repeat screening.    Past History:  Medical History: has a past medical history of Abdominal pain, generalized, Acquired cyst of kidney, Anemia, Aortic regurgitation, Aortic stenosis, Aortic valve disease, Cardiomegaly, Chronic atrial fibrillation (CMS/HCC) (2019), Chronic fatigue, Diaphragmatic hernia without obstruction and without gangrene, Diarrhea, Disease of tricuspid valve, Diverticulosis of colon, Dysphagia, Essential (primary) hypertension, Hepatic cyst, Hiatal hernia, Hyperlipidemia, Insomnia, unspecified, Longstanding persistent atrial fibrillation (CMS/HCC) (2019), LVH (left ventricular hypertrophy), Major depressive disorder, single episode, Malaise and fatigue, Mitral regurgitation, Mitral valve disorder, Mitral valve insufficiency and aortic valve insufficiency, Nonrheumatic aortic valve insufficiency (2020), Nonrheumatic mitral valve regurgitation (2020), Osteopenia, Other specified disorders of bone density and structure, unspecified site, Pain in joint, pelvic region and thigh, Renal cyst, Sprain and strain of hip, TR (tricuspid regurgitation), Unspecified cataract, Urinary tract infection, and Vitamin D deficiency.   Surgical History: has a past surgical history that includes Ankle surgery; Breast biopsy; Breast biopsy; Cataract extraction; Bladder repair; Cholecystectomy; Abdominal hysterectomy; Inguinal hernia repair (Left, 2011);  Colonoscopy (2020); Colon surgery; Hip surgery (07/2021); and Femur Open Reduction Internal Fixation (Left, 7/21/2021).   Family History: family history is not on file.   Social History: reports that she has never smoked. She has never used smokeless tobacco. She reports previous alcohol use. She reports that she does not use drugs.  Allergies: Citalopram, Clonazepam, Levofloxacin, Lisinopril, Melatonin, and Macrobid [nitrofurantoin macrocrystal]       Current Outpatient Medications:   •  apixaban (Eliquis) 2.5 MG tablet tablet, Eliquis 2.5 mg oral tablet take 1 tablet (2.5 mg) by oral route 2 times per day   Active, Disp: , Rfl:   •  cephalexin (Keflex) 250 MG capsule, Take 1 capsule by mouth 3 (Three) Times a Day., Disp: 21 capsule, Rfl: 0  •  colestipol (COLESTID) 1 g tablet, TAKE 1 TABLET BY ORAL ROUTE 3 TIMES A DAY AS NEEDED FOR 30 DAYS DIARRHEA, Disp: 270 tablet, Rfl: 1  •  dilTIAZem CD (CARDIZEM CD) 120 MG 24 hr capsule, TAKE 1 CAPSULE BY MOUTH AT NOON, Disp: , Rfl:   •  docusate sodium 100 MG capsule, Take 1 capsule by mouth 2 (Two) Times a Day., Disp: 30 each, Rfl: 0  •  famotidine (Pepcid) 20 MG tablet, Take 1 tablet by mouth 2 (Two) Times a Day., Disp: 40 tablet, Rfl: 0  •  FLUoxetine (PROzac) 20 MG capsule, Prozac 20 mg oral capsule take 1 capsule (20 mg) by oral route once daily in the evening   Active, Disp: , Rfl:   •  latanoprost (XALATAN) 0.005 % ophthalmic solution, INSTILL 1 DROP IN BOTH EYES AT BEDTIME 90 DAY SUPPLY, Disp: , Rfl:   •  loratadine (Claritin) 10 MG tablet, Take 1 tablet by mouth Daily., Disp: 20 tablet, Rfl: 0  •  LORazepam (ATIVAN) 0.5 MG tablet, 1 tablet at bedtime as needed, Disp: , Rfl:   •  losartan (COZAAR) 50 MG tablet, Every 12 (Twelve) Hours., Disp: , Rfl:   •  metoprolol succinate XL (TOPROL-XL) 100 MG 24 hr tablet, metoprolol succinate 100 mg oral tablet extended release 24 hr take 2 tablets (200 mg) by oral route once daily   Active, Disp: , Rfl:   •  ondansetron  "(Zofran) 4 MG tablet, Zofran 4 mg oral tablet take 1 tablet by oral route 3 times a day   Suspended, Disp: , Rfl:   •  pantoprazole (PROTONIX) 40 MG EC tablet, Take 40 mg by mouth Daily. bid, Disp: , Rfl:   •  Probiotic Product (Florajen Digestion) capsule, Take 1 capsule by mouth Daily., Disp: , Rfl:        Objective   Vital Signs:   Resp 16   Ht 168.9 cm (66.5\")   Wt 64.8 kg (142 lb 12.8 oz)   BMI 22.70 kg/m²       Physical Exam  Vitals and nursing note reviewed.   Constitutional:       Appearance: Normal appearance.   Cardiovascular:      Rate and Rhythm: Normal rate and regular rhythm.   Pulmonary:      Effort: Pulmonary effort is normal.      Breath sounds: Normal air entry.   Abdominal:      General: Bowel sounds are normal.      Palpations: Abdomen is soft.      Skin:     General: Skin is warm and dry.   Neurological:      Mental Status: He is alert and oriented to person, place, and time.      Motor: Motor function is intact.   Psychiatric:         Mood and Affect: Mood normal.       Result Review :               Assessment and Plan   Diagnoses and all orders for this visit:    1. Colon cancer screening (Primary)    We will set her up for a colonoscopy.  Risk benefits alternatives were explained.    I  Edmar Back MD  08/27/2021    "

## 2021-09-09 DIAGNOSIS — F41.9 ANXIETY: Primary | ICD-10-CM

## 2021-09-09 RX ORDER — LORAZEPAM 0.5 MG/1
TABLET ORAL
Qty: 30 TABLET | Refills: 2 | Status: SHIPPED | OUTPATIENT
Start: 2021-09-09 | End: 2021-12-09

## 2021-09-13 PROBLEM — Z47.89 AFTERCARE FOLLOWING SURGERY OF THE MUSCULOSKELETAL SYSTEM: Status: ACTIVE | Noted: 2021-09-13

## 2021-09-16 ENCOUNTER — OFFICE VISIT (OUTPATIENT)
Dept: ORTHOPEDIC SURGERY | Facility: CLINIC | Age: 86
End: 2021-09-16

## 2021-09-16 VITALS — HEART RATE: 86 BPM | OXYGEN SATURATION: 98 % | HEIGHT: 66 IN | WEIGHT: 144.6 LBS | BODY MASS INDEX: 23.24 KG/M2

## 2021-09-16 DIAGNOSIS — Z47.89 AFTERCARE FOLLOWING SURGERY OF THE MUSCULOSKELETAL SYSTEM: ICD-10-CM

## 2021-09-16 DIAGNOSIS — Z47.89 AFTERCARE FOLLOWING SURGERY OF THE MUSCULOSKELETAL SYSTEM: Primary | ICD-10-CM

## 2021-09-16 PROCEDURE — 99024 POSTOP FOLLOW-UP VISIT: CPT | Performed by: PHYSICIAN ASSISTANT

## 2021-09-16 NOTE — PROGRESS NOTES
"Chief Complaint  Pain and Follow-up of the Left Hip    Subjective          Bing Cruz is a 89 y.o. female  presents to [facility] for   History of Present Illness    Patient presents for follow-up evaluation of left femoral neck ORIF, 7/21/2021.  PatienT presents with her daughter, patient and daughter states she has been doing well she presents using her walker for ambulation she states she was told to continue walker use until she was told to stop it.  Patient has been having home health physical therapy come to her home she states they are helping her with gait and strength and range of motion.  Patient denies pain in the hip, denies pain with walking, denies numbness and tingling.  No new complaints of the left hip today.  Allergies   Allergen Reactions   • Citalopram Unknown - Low Severity   • Clonazepam Unknown - Low Severity   • Levofloxacin Nausea And Vomiting   • Lisinopril Unknown - Low Severity   • Melatonin Unknown - High Severity   • Macrobid [Nitrofurantoin Macrocrystal] Rash        Social History     Socioeconomic History   • Marital status:      Spouse name: Not on file   • Number of children: Not on file   • Years of education: Not on file   • Highest education level: Not on file   Tobacco Use   • Smoking status: Never Smoker   • Smokeless tobacco: Never Used   Vaping Use   • Vaping Use: Former   Substance and Sexual Activity   • Alcohol use: Not Currently   • Drug use: Never   • Sexual activity: Defer        REVIEW OF SYSTEMS    Constitutional: Denies fevers, chills, weight loss  Cardiovascular: Denies chest pain, shortness of breath  Skin: Denies rashes, acute skin changes  Neurologic: Denies headache, loss of consciousness  MSK: Left hip pain      Objective   Vital Signs:   Pulse 86   Ht 167.6 cm (66\")   Wt 65.6 kg (144 lb 9.6 oz)   SpO2 98%   BMI 23.34 kg/m²     Body mass index is 23.34 kg/m².    Physical Exam    Left hip: Nontender to " palpation, active flexion 130, no pain with rotation, 5 out of 5 strength, neurovascularly intact.    Procedures    Imaging Results (Most Recent)     Procedure Component Value Units Date/Time    XR Hip With or Without Pelvis 2 - 3 View Left [[id number]] Resulted: 09/16/21 1136     Updated: 09/16/21 1136    Narrative:      • View:AP and Lateral view(s)  • Site: Left hip  • Indication: Left hip pain  • Study: X-rays ordered, taken in the office, and reviewed today  • X-ray: Good healing of femur fracture, intact screws, no signs of screw   failure loosening, no acute osseous abnormality.  • Comparative data: No comparative data found             Result Review :   The following data was reviewed by: JUNAID Henderson on 09/16/2021:  Data reviewed: Radiologic studies Reviewed by me with the patient             Assessment and Plan    Diagnoses and all orders for this visit:    1. Aftercare following surgery of left femoral neck ORIF 7/21/2021 (Primary)  -     XR Hip With or Without Pelvis 2 - 3 View Left  -     Ambulatory Referral to Home Health    2. Aftercare following surgery of the musculoskeletal system  -     XR Hip With or Without Pelvis 2 - 3 View Left  -     Ambulatory Referral to Home Health        Reviewed x-rays with the patient and her family, advised them that patient may continue home health physical therapy, new orders written we advised her she may wean off of the walker use under guidance of physical therapy, may graduate to a cane.  Follow-up in 6 weeks with x-rays.    Call or return if worsening symptoms.    Follow Up   Return in about 6 weeks (around 10/28/2021) for Recheck.  Patient was given instructions and counseling regarding her condition or for health maintenance advice. Please see specific information pulled into the AVS if appropriate.

## 2021-10-12 RX ORDER — DILTIAZEM HYDROCHLORIDE 120 MG/1
CAPSULE, COATED, EXTENDED RELEASE ORAL
Qty: 90 CAPSULE | Refills: 3 | Status: SHIPPED | OUTPATIENT
Start: 2021-10-12 | End: 2022-03-18 | Stop reason: HOSPADM

## 2021-10-28 ENCOUNTER — OFFICE VISIT (OUTPATIENT)
Dept: ORTHOPEDIC SURGERY | Facility: CLINIC | Age: 86
End: 2021-10-28

## 2021-10-28 VITALS — HEART RATE: 84 BPM | WEIGHT: 148.6 LBS | HEIGHT: 66 IN | BODY MASS INDEX: 23.88 KG/M2 | OXYGEN SATURATION: 96 %

## 2021-10-28 DIAGNOSIS — Z47.89 AFTERCARE FOLLOWING SURGERY OF THE MUSCULOSKELETAL SYSTEM: Primary | ICD-10-CM

## 2021-10-28 DIAGNOSIS — Z47.89 AFTERCARE FOLLOWING SURGERY OF THE MUSCULOSKELETAL SYSTEM: ICD-10-CM

## 2021-10-28 PROCEDURE — 99213 OFFICE O/P EST LOW 20 MIN: CPT | Performed by: PHYSICIAN ASSISTANT

## 2021-10-28 NOTE — PROGRESS NOTES
"Chief Complaint  Pain and Follow-up of the Left Hip    Subjective          Bing Cruz is a 89 y.o. female  presents to [facility] for   History of Present Illness    Patient presents with her daughter for follow-up evaluation of left femoral neck ORIF, 7/21/2021.  Patient states that she has no pain in her hip she states that she has been doing home health physical therapy they are coming 1 time per week and she does home exercise when they are not there.  Patient was on a walker at last visit she presents using a cane today.  Patient states she has no pain in the hip but she still feels like she is not strong enough she states that she has a limp when she walks and she would like this to be improved.  She denies new injury, denies new symptoms of pain, denies numbness and tingling.  Allergies   Allergen Reactions   • Citalopram Unknown - Low Severity   • Clonazepam Unknown - Low Severity   • Levofloxacin Nausea And Vomiting   • Lisinopril Unknown - Low Severity   • Melatonin Unknown - High Severity   • Macrobid [Nitrofurantoin Macrocrystal] Rash        Social History     Socioeconomic History   • Marital status:    Tobacco Use   • Smoking status: Never Smoker   • Smokeless tobacco: Never Used   Vaping Use   • Vaping Use: Former   Substance and Sexual Activity   • Alcohol use: Not Currently   • Drug use: Never   • Sexual activity: Defer        REVIEW OF SYSTEMS    Constitutional: Denies fevers, chills, weight loss  Cardiovascular: Denies chest pain, shortness of breath  Skin: Denies rashes, acute skin changes  Neurologic: Denies headache, loss of consciousness  MSK: Left hip pain      Objective   Vital Signs:   Pulse 84   Ht 167.6 cm (66\")   Wt 67.4 kg (148 lb 9.6 oz)   SpO2 96%   BMI 23.98 kg/m²     Body mass index is 23.98 kg/m².    Physical Exam    Left hip: Nontender to palpation, no pain with range of motion, patient ambulates with antalgic gait, active flexion, " flexion 120, no pain with rotation, 5 out of 5 strength.  Neurovascular intact.    Procedures    Imaging Results (Most Recent)     Procedure Component Value Units Date/Time    XR Hip With or Without Pelvis 2 - 3 View Left [[id number]] Resulted: 10/28/21 1133     Updated: 10/28/21 1134    Narrative:      • View:AP, Lateral and Sunrise view(s)  • Site: Left hip  • Indication: Left hip pain  • Study: X-rays ordered, taken in the office, and reviewed today  • X-ray: Good healing of femoral neck fracture with intact screws, no   signs of hardware failure or loosening.  • Comparative data: No comparative data found             Result Review :   The following data was reviewed by: JUNAID Henderson on 10/28/2021:  Data reviewed: Radiologic studies Reviewed by me with the patient             Assessment and Plan    Diagnoses and all orders for this visit:    1. Aftercare following surgery of left femoral neck ORIF 7/21/2021 (Primary)  -     XR Hip With or Without Pelvis 2 - 3 View Left  -     Ambulatory Referral to Physical Therapy Evaluate and treat (2-3x/week for 6-8 weeks)    2. Aftercare following surgery of the musculoskeletal system  -     XR Hip With or Without Pelvis 2 - 3 View Left  -     Ambulatory Referral to Physical Therapy Evaluate and treat (2-3x/week for 6-8 weeks)        Reviewed x-rays with the patient and her family, advised them patient should begin outpatient physical therapy, discontinue home health physical therapy.  Continue working on strength and range of motion, follow-up in 6 to 8 weeks for reevaluation and x-rays.  Patient and daughter agreed to plan.    Call or return if worsening symptoms.    Follow Up   Return for Follow up 6-8 weeks.  Patient was given instructions and counseling regarding her condition or for health maintenance advice. Please see specific information pulled into the AVS if appropriate.

## 2021-10-29 ENCOUNTER — ANESTHESIA EVENT (OUTPATIENT)
Dept: GASTROENTEROLOGY | Facility: HOSPITAL | Age: 86
End: 2021-10-29

## 2021-10-29 ENCOUNTER — HOSPITAL ENCOUNTER (OUTPATIENT)
Facility: HOSPITAL | Age: 86
Setting detail: HOSPITAL OUTPATIENT SURGERY
Discharge: HOME | End: 2021-10-29
Attending: SURGERY | Admitting: SURGERY

## 2021-10-29 ENCOUNTER — ANESTHESIA (OUTPATIENT)
Dept: GASTROENTEROLOGY | Facility: HOSPITAL | Age: 86
End: 2021-10-29

## 2021-10-29 VITALS
TEMPERATURE: 97.8 F | HEART RATE: 78 BPM | RESPIRATION RATE: 16 BRPM | SYSTOLIC BLOOD PRESSURE: 112 MMHG | BODY MASS INDEX: 22.92 KG/M2 | WEIGHT: 141.98 LBS | OXYGEN SATURATION: 97 % | DIASTOLIC BLOOD PRESSURE: 75 MMHG

## 2021-10-29 PROCEDURE — 25010000002 PROPOFOL 10 MG/ML EMULSION: Performed by: NURSE ANESTHETIST, CERTIFIED REGISTERED

## 2021-10-29 RX ORDER — ONDANSETRON 4 MG/1
4 TABLET, FILM COATED ORAL ONCE AS NEEDED
Status: DISCONTINUED | OUTPATIENT
Start: 2021-10-29 | End: 2021-10-29 | Stop reason: HOSPADM

## 2021-10-29 RX ORDER — PROPOFOL 10 MG/ML
VIAL (ML) INTRAVENOUS AS NEEDED
Status: DISCONTINUED | OUTPATIENT
Start: 2021-10-29 | End: 2021-10-29 | Stop reason: SURG

## 2021-10-29 RX ORDER — LIDOCAINE HYDROCHLORIDE 20 MG/ML
INJECTION, SOLUTION INFILTRATION; PERINEURAL AS NEEDED
Status: DISCONTINUED | OUTPATIENT
Start: 2021-10-29 | End: 2021-10-29 | Stop reason: SURG

## 2021-10-29 RX ORDER — ASCORBIC ACID 500 MG
500 TABLET ORAL DAILY
COMMUNITY

## 2021-10-29 RX ORDER — SODIUM CHLORIDE, SODIUM LACTATE, POTASSIUM CHLORIDE, CALCIUM CHLORIDE 600; 310; 30; 20 MG/100ML; MG/100ML; MG/100ML; MG/100ML
30 INJECTION, SOLUTION INTRAVENOUS CONTINUOUS
Status: DISCONTINUED | OUTPATIENT
Start: 2021-10-29 | End: 2021-10-29 | Stop reason: HOSPADM

## 2021-10-29 RX ORDER — ONDANSETRON 2 MG/ML
4 INJECTION INTRAMUSCULAR; INTRAVENOUS ONCE AS NEEDED
Status: DISCONTINUED | OUTPATIENT
Start: 2021-10-29 | End: 2021-10-29 | Stop reason: HOSPADM

## 2021-10-29 RX ADMIN — SODIUM CHLORIDE, POTASSIUM CHLORIDE, SODIUM LACTATE AND CALCIUM CHLORIDE 30 ML/HR: 600; 310; 30; 20 INJECTION, SOLUTION INTRAVENOUS at 07:07

## 2021-10-29 RX ADMIN — PROPOFOL 75 MCG/KG/MIN: 10 INJECTION, EMULSION INTRAVENOUS at 07:39

## 2021-10-29 RX ADMIN — LIDOCAINE HYDROCHLORIDE 60 MG: 20 INJECTION, SOLUTION INFILTRATION; PERINEURAL at 07:40

## 2021-10-29 RX ADMIN — PROPOFOL 20 MG: 10 INJECTION, EMULSION INTRAVENOUS at 07:39

## 2021-10-29 NOTE — ANESTHESIA POSTPROCEDURE EVALUATION
Patient: Bing Cruz    Procedure Summary     Date: 10/29/21 Room / Location: [facility] ENDOSCOPY 3 / [facility] ENDOSCOPY    Anesthesia Start: 0734 Anesthesia Stop: 0756    Procedure: COLONOSCOPY (N/A ) Diagnosis:       Colon cancer screening      (Colon cancer screening [Z12.11])    Surgeons: Edmar Back MD Provider: Pernell Velazquez MD    Anesthesia Type: general ASA Status: 4          Anesthesia Type: general    Vitals  Vitals Value Taken Time   /75 10/29/21 0820   Temp 36.6 °C (97.8 °F) 10/29/21 0755   Pulse 79 10/29/21 0821   Resp 16 10/29/21 0820   SpO2 97 % 10/29/21 0821   Vitals shown include unvalidated device data.        Post Anesthesia Care and Evaluation    Patient location during evaluation: bedside  Patient participation: complete - patient participated  Level of consciousness: awake  Pain management: adequate  Airway patency: patent  Anesthetic complications: No anesthetic complications  PONV Status: none  Cardiovascular status: acceptable and stable  Respiratory status: acceptable  Hydration status: acceptable    Comments: An Anesthesiologist personally participated in the most demanding procedures (including induction and emergence if applicable) in the anesthesia plan, monitored the course of anesthesia administration at frequent intervals and remained physically present and available for immediate diagnosis and treatment of emergencies.

## 2021-10-29 NOTE — ANESTHESIA PREPROCEDURE EVALUATION
Anesthesia Evaluation     Patient summary reviewed and Nursing notes reviewed   no history of anesthetic complications:  NPO Solid Status: > 8 hours  NPO Liquid Status: > 2 hours           Airway   Mallampati: II  TM distance: >3 FB  Neck ROM: full  No difficulty expected  Dental    (+) upper dentures and lower dentures    Pulmonary - negative pulmonary ROS and normal exam    breath sounds clear to auscultation  Cardiovascular - normal exam  Exercise tolerance: good (4-7 METS)    Rhythm: regular    (+) hypertension, valvular problems/murmurs, dysrhythmias, DVT, hyperlipidemia,       Neuro/Psych- negative ROS  GI/Hepatic/Renal/Endo    (+)  hiatal hernia, GERD,  liver disease, renal disease,     Musculoskeletal (-) negative ROS    Abdominal    Substance History - negative use     OB/GYN negative ob/gyn ROS         Other      history of cancer                    Anesthesia Plan    ASA 4     general   total IV anesthesia(Total IV Anesthesia    Patient understands anesthesia not responsible for dental damage.  )    Anesthetic plan, all risks, benefits, and alternatives have been provided, discussed and informed consent has been obtained with: patient.

## 2021-11-01 NOTE — PROGRESS NOTES
"Chief Complaint ------------------ follow-up with hip pain hip fracture and weakness,--also still depressed, but functioning and coping better overall,- hip area weak, doing outpt rx now  Using cane more  No more falls     Objective   Vital Signs  Vitals:    11/08/21 1221   BP: 153/89   Pulse: 56   Temp: 97.4 °F (36.3 °C)   SpO2: 97%   Weight: 65.6 kg (144 lb 9.6 oz)   Height: 167.6 cm (65.98\")      Review of Systems   Constitutional: Negative.    HENT: Negative.    Eyes: Negative.    Respiratory: Negative.    Cardiovascular: Negative.    Gastrointestinal: Negative.    Endocrine: Negative.    Genitourinary: Negative.    Musculoskeletal: Negative.    Allergic/Immunologic: Negative.    Neurological: Negative.    Hematological: Negative.    Psychiatric/Behavioral: Negative.    Using a cane more, some depression,  Patient has trace edema to 1+ around the ankle on the left lower leg only,  Physical Exam  Constitutional:       General: She is not in acute distress.     Appearance: Normal appearance.   HENT:      Head: Normocephalic.      Mouth/Throat:      Mouth: Mucous membranes are moist.   Eyes:      Conjunctiva/sclera: Conjunctivae normal.      Pupils: Pupils are equal, round, and reactive to light.   Cardiovascular:      Rate and Rhythm: Normal rate and regular rhythm.      Pulses: Normal pulses.      Heart sounds: Normal heart sounds.   Pulmonary:      Effort: Pulmonary effort is normal.      Breath sounds: Normal breath sounds.   Abdominal:      General: Abdomen is flat. Bowel sounds are normal.      Palpations: Abdomen is soft.   Musculoskeletal:         General: No swelling. Normal range of motion.      Cervical back: Neck supple.   Skin:     General: Skin is warm and dry.      Coloration: Skin is not jaundiced.   Neurological:      General: No focal deficit present.      Mental Status: She is alert and oriented to person, place, and time. Mental status is at baseline.   Psychiatric:         Mood and Affect: Mood " normal.         Behavior: Behavior normal.         Thought Content: Thought content normal.         Judgment: Judgment normal.      Result Review :   Lab Results   Component Value Date    PROBNP 1,648.0 07/20/2021    BNP 1385 12/05/2020    BNP 4183 (H) 10/12/2020    BNP 5163 (H) 09/09/2020     CMP    CMP 7/23/21 8/17/21 11/3/21   Glucose 101 (A) 100 (A) 84   BUN 17 14 13   Creatinine 0.79 0.89 0.76   eGFR Non  Am 68 60 (A) 71   Sodium 136 140 143   Potassium 3.7 3.8 3.9   Chloride 104 103 108 (A)   Calcium 8.7 9.6 9.3   Albumin  4.17 4.10   Total Bilirubin  0.9 0.7   Alkaline Phosphatase  122 (A) 88   AST (SGOT)  15 17   ALT (SGPT)  5 10   (A) Abnormal value            CBC w/diff    CBC w/Diff 7/21/21 7/22/21 7/23/21   WBC 8.63 8.21 9.80   RBC 3.70 (A) 3.86 3.59 (A)   Hemoglobin 12.2 12.7 11.6 (A)   Hematocrit 36.1 37.6 35.0   MCV 97.6 (A) 97.4 (A) 97.5 (A)   MCH 33.0 32.9 32.3   MCHC 33.8 33.8 33.1   RDW 13.1 12.5 12.6   Platelets 185 192 195   Neutrophil Rel % 60.5 85.4 (A) 74.6   Immature Granulocyte Rel % 1.4 (A) 1.5 (A) 0.8 (A)   Lymphocyte Rel % 24.2 9.7 (A) 16.7 (A)   Monocyte Rel % 8.1 3.3 (A) 7.6   Eosinophil Rel % 5.7 0.0 (A) 0.2 (A)   Basophil Rel % 0.1 0.1 0.1   (A) Abnormal value             Lipid Panel    Lipid Panel 12/5/20 7/15/21   Total Cholesterol  187   Total Cholesterol 162    Triglycerides 285 (A) 136   HDL Cholesterol 71 (A) 79 (A)   VLDL Cholesterol 57 (A) 23   LDL Cholesterol  34 (A) 85   LDL/HDL Ratio  1.02   (A) Abnormal value       Comments are available for some flowsheets but are not being displayed.            Lab Results   Component Value Date    TSH 2.790 07/21/2021    TSH 0.749 07/15/2021    TSH 3.850 05/13/2021      Lab Results   Component Value Date    FREET4 1.02 07/15/2021    FREET4 1.2 07/18/2020    FREET4 1.2 11/18/2019                            Assessment and Plan    Transitional care visit for closed l hip fx and orif, July 20, 2021, --- still having some left hip  proximal leg weakness, doing outpatient therapy currently, finished home health, November 2021    Dysphagia, Status post barium swallow October 2020, showing narrowing at the distal esophagus, for EGD by Dr. Blount February February 25, 2021,--CONT PROTONIX daily    Colon cancer diagnosed June 2020 initially found to be Anemia, was referred to hematology oncology,--- sent to gastroenterology---Dr. Blount--FOUND COLON CA JUNE 2020, REFERRED TO DR QUISPE--subsequent exploratory laparotomy and partial colectomy with 14 out of 14 lymph nodes negative, -------patient had prolonged hospital course, subsequent sent to rehab, now home--HAD LOW NA , DEVELOPED C DIFF COLITIS, IN HOSP, FTT WITH WGT LOSS IN REHAB --GAIT DYS. AND GEN WEAKNESS --Slowly improving, current medications reviewed----Patient had delirium in the hospital also, improved---Patient has follow-up for a 5 mm lung nodule found on CT scan in the left lower lobe and surgical changes -----With repeat CT scan in February 2021, no change mild to moderate cardiomegaly stable nodular density lung fields bilateral follow-up CT 1 year recommended stable right hilar lymph node,--- Dr. Solorio------ continues on colestipol half a pill every other day, November 2021  Status post colonoscopy October 2021 Dr. Quispe    LUNG NODULE --Found June 2020, 5 millimeter left lower lobe--- on CT scan for abdominal pains---- FOUND PER HEM/ONC AND HAS F/U CT scan of the chest February 4, 2021, and CT abdomen and pelvis for follow-up of colon cancer,    HYPONATREMIA, -- NOT SEEING DR ELLIOTT--- IN HOSP. JULY AND AUG 2020, ---- restart Lasix 20 mg daily and potassium 20 mEq twice a day, stay off hydrochlorothiazide for now due to hyponatremia, etc. SEPT 2020     htn, continues, losartan 50 mg twice daily, metoprolol 100 mg twice daily    Chronic atrial fibrillation-----with history of rapid ventricular response, MARCH 23, 2017, --continues ON ELIQUIS 2.5 mg twice a day ,Metoprolol  100 mg twice a day,--, Cardizem extended release 120 mg daily,---------Cardioverted April 2019 Dr. Kay , NOW BACK IN AFIB     L HUMERAL FX DEC 2016, ORIF WITH BENSON --at [facility]    adverse reaction with rash to nitrofurantoin,, SEPT. 2016.    EXTENSIVE LLE DVT 11/15, , Continues on Eliquis 2.5 mg twice a day discussed potentially decreasing dose given age GFR, April 2020    MVR-MOD-, ON ECHO,--APRIL 2017, -- PER DR KAY.     Depression- lost son to stomach ca, nov 2018, and daughter had AVR nov 2018, - NO RX     Gastroesophageal reflux--Off PPIs due to C. difficile colitis July 2020 August 2020---Restart Protonix 40 mg once a day until procedure is complete, January 27, 2021    Hyperlipidemia -/ Elevated triglycerides-----oFF LIPITOR     Osteopenia    Vitamin D deficiency- NL NOW, ON REPLACEMENT  Mild osteoarthrits of left hip      Follow Up   Return in about 6 months (around 5/8/2022).  Patient was given instructions and counseling regarding her condition or for health maintenance advice. Please see specific information pulled into the AVS if appropriate.

## 2021-11-03 ENCOUNTER — LAB (OUTPATIENT)
Dept: LAB | Facility: HOSPITAL | Age: 86
End: 2021-11-03

## 2021-11-03 ENCOUNTER — TREATMENT (OUTPATIENT)
Dept: PHYSICAL THERAPY | Facility: CLINIC | Age: 86
End: 2021-11-03

## 2021-11-03 DIAGNOSIS — I48.11 LONGSTANDING PERSISTENT ATRIAL FIBRILLATION (HCC): ICD-10-CM

## 2021-11-03 DIAGNOSIS — F33.0 MAJOR DEPRESSIVE DISORDER, RECURRENT, MILD (HCC): ICD-10-CM

## 2021-11-03 DIAGNOSIS — S72.002A CLOSED LEFT HIP FRACTURE, INITIAL ENCOUNTER (HCC): ICD-10-CM

## 2021-11-03 DIAGNOSIS — IMO0001 LUNG NODULE < 6CM ON CT: ICD-10-CM

## 2021-11-03 DIAGNOSIS — D50.9 IRON DEFICIENCY ANEMIA, UNSPECIFIED IRON DEFICIENCY ANEMIA TYPE: ICD-10-CM

## 2021-11-03 DIAGNOSIS — I82.5Y2 CHRONIC DEEP VEIN THROMBOSIS (DVT) OF PROXIMAL VEIN OF LEFT LOWER EXTREMITY (HCC): ICD-10-CM

## 2021-11-03 DIAGNOSIS — K21.9 GASTROESOPHAGEAL REFLUX DISEASE WITHOUT ESOPHAGITIS: ICD-10-CM

## 2021-11-03 DIAGNOSIS — R13.19 ESOPHAGEAL DYSPHAGIA: ICD-10-CM

## 2021-11-03 DIAGNOSIS — M25.552 LEFT HIP PAIN: Primary | ICD-10-CM

## 2021-11-03 DIAGNOSIS — E55.9 VITAMIN D DEFICIENCY: ICD-10-CM

## 2021-11-03 DIAGNOSIS — R29.898 WEAKNESS OF BOTH LEGS: ICD-10-CM

## 2021-11-03 DIAGNOSIS — C18.9 MALIGNANT NEOPLASM OF COLON, UNSPECIFIED PART OF COLON (HCC): ICD-10-CM

## 2021-11-03 DIAGNOSIS — R26.9 IMPAIRED GAIT: ICD-10-CM

## 2021-11-03 LAB
ALBUMIN SERPL-MCNC: 4.1 G/DL (ref 3.5–5.2)
ALBUMIN/GLOB SERPL: 1.5 G/DL
ALP SERPL-CCNC: 88 U/L (ref 39–117)
ALT SERPL W P-5'-P-CCNC: 10 U/L (ref 1–33)
ANION GAP SERPL CALCULATED.3IONS-SCNC: 8.9 MMOL/L (ref 5–15)
AST SERPL-CCNC: 17 U/L (ref 1–32)
BASOPHILS # BLD AUTO: 0.05 10*3/MM3 (ref 0–0.2)
BASOPHILS NFR BLD AUTO: 0.7 % (ref 0–1.5)
BILIRUB SERPL-MCNC: 0.7 MG/DL (ref 0–1.2)
BUN SERPL-MCNC: 13 MG/DL (ref 8–23)
BUN/CREAT SERPL: 17.1 (ref 7–25)
CALCIUM SPEC-SCNC: 9.3 MG/DL (ref 8.2–9.6)
CHLORIDE SERPL-SCNC: 108 MMOL/L (ref 98–107)
CO2 SERPL-SCNC: 26.1 MMOL/L (ref 22–29)
CREAT SERPL-MCNC: 0.76 MG/DL (ref 0.57–1)
DEPRECATED RDW RBC AUTO: 41.4 FL (ref 37–54)
EOSINOPHIL # BLD AUTO: 0.14 10*3/MM3 (ref 0–0.4)
EOSINOPHIL NFR BLD AUTO: 2.1 % (ref 0.3–6.2)
ERYTHROCYTE [DISTWIDTH] IN BLOOD BY AUTOMATED COUNT: 12.4 % (ref 12.3–15.4)
GFR SERPL CREATININE-BSD FRML MDRD: 71 ML/MIN/1.73
GLOBULIN UR ELPH-MCNC: 2.7 GM/DL
GLUCOSE SERPL-MCNC: 84 MG/DL (ref 65–99)
HCT VFR BLD AUTO: 36.9 % (ref 34–46.6)
HGB BLD-MCNC: 12.9 G/DL (ref 12–15.9)
IMM GRANULOCYTES # BLD AUTO: 0.03 10*3/MM3 (ref 0–0.05)
IMM GRANULOCYTES NFR BLD AUTO: 0.4 % (ref 0–0.5)
LYMPHOCYTES # BLD AUTO: 2.34 10*3/MM3 (ref 0.7–3.1)
LYMPHOCYTES NFR BLD AUTO: 34.5 % (ref 19.6–45.3)
MCH RBC QN AUTO: 32.4 PG (ref 26.6–33)
MCHC RBC AUTO-ENTMCNC: 35 G/DL (ref 31.5–35.7)
MCV RBC AUTO: 92.7 FL (ref 79–97)
MONOCYTES # BLD AUTO: 0.54 10*3/MM3 (ref 0.1–0.9)
MONOCYTES NFR BLD AUTO: 8 % (ref 5–12)
NEUTROPHILS NFR BLD AUTO: 3.69 10*3/MM3 (ref 1.7–7)
NEUTROPHILS NFR BLD AUTO: 54.3 % (ref 42.7–76)
NRBC BLD AUTO-RTO: 0 /100 WBC (ref 0–0.2)
PLATELET # BLD AUTO: 247 10*3/MM3 (ref 140–450)
PMV BLD AUTO: 12.4 FL (ref 6–12)
POTASSIUM SERPL-SCNC: 3.9 MMOL/L (ref 3.5–5.2)
PROT SERPL-MCNC: 6.8 G/DL (ref 6–8.5)
RBC # BLD AUTO: 3.98 10*6/MM3 (ref 3.77–5.28)
SODIUM SERPL-SCNC: 143 MMOL/L (ref 136–145)
WBC # BLD AUTO: 6.79 10*3/MM3 (ref 3.4–10.8)

## 2021-11-03 PROCEDURE — 97110 THERAPEUTIC EXERCISES: CPT | Performed by: PHYSICAL THERAPIST

## 2021-11-03 PROCEDURE — 85025 COMPLETE CBC W/AUTO DIFF WBC: CPT

## 2021-11-03 PROCEDURE — 97161 PT EVAL LOW COMPLEX 20 MIN: CPT | Performed by: PHYSICAL THERAPIST

## 2021-11-03 PROCEDURE — 80053 COMPREHEN METABOLIC PANEL: CPT

## 2021-11-03 PROCEDURE — 36415 COLL VENOUS BLD VENIPUNCTURE: CPT

## 2021-11-03 NOTE — PROGRESS NOTES
" Physical Therapy Initial Evaluation and Plan of Care      Patient: Bing Cruz   : 1931  Diagnosis/ICD-10 Code:  Left hip pain [M25.552]  Referring practitioner: Weston Lyles  Date of Initial Visit: 11/3/2021  Today's Date: 11/3/2021  Patient seen for 1 sessions         Subjective Questionnaire: Optimal- 50-79% limitation    Subjective Evaluation    History of Present Illness  Mechanism of injury: Pt is a 89 year old female who reports to physical therapy secondary to a Left Femoral Neck Open Reduction Internal Fixation (8/3/21) after sustaining a fall. She reports that \"my balance is just off.\" Pt had home health for 3 months and will be discharged today. She reports that she feels that she has plateaud in progress. Pt ambulates with a straight cane indoor and outdoor.    Pain  Current pain ratin    Treatments  Previous treatment: physical therapy  Patient Goals  Patient goals for therapy: increased strength         Objective          Active Range of Motion   Left Hip   Normal active range of motion    Right Hip   Normal active range of motion    Strength/Myotome Testing     Left Hip   Planes of Motion   Flexion: 4-  Abduction: 2+  Adduction: 4    Right Hip   Planes of Motion   Flexion: 4-  Abduction: 4  Adduction: 4    Left Knee   Flexion: 4+  Extension: 4+    Right Knee   Flexion: 4+  Extension: 4+    Ambulation     Comments   Decreased gait speed.    Functional Assessment     Comments  Instructions: Please select the level of difficulty you have for each activity below.      Able to do without      Able to do with      Able to do with         Able to do with      Unable to do      N/A    any difficulty         little difficulty         moderate difficulty    much difficulty    9.  Standing  1  (*)          2  (*)               3  (x)                        4  (*)                     5  (*)              9  (*)  10. Walking-  1  (*)          2  (*)               3  (x)                  "       4  (*)                     5  (*)              9  (*)  short distances   13. Climbing  1  (*)          2  (*)               3  (*)                        4  (x)                     5  (*)              9  (*)  stairs    Total Score: (*) (all items) (10) (3 items) (*)  (1 item)    % Limitation   All Items  3 Items  1 Item    (*) 0%  (Score of 22)   (Score of 3)   (Score of 1)  (*) 1-19%   (Score of 23-40)  (Score of 4-5)   (Score of 2)  (*) 20-39%  (Score of 41-58)  (Score of 6-7)   (Score of 2)  (*) 40-59%  (Score of 59-76)  (Score of 8-9)   (Score of 3)  (x) 60-79%  (Score of 77-94)  (Score of 10-11)  (Score of 3)  (*) 80-99%  (Score of )  (Score of 12-14)  (Score of 4)  (*) 100%  (Score of 110)   (Score of 15)   (Score of 5)      Assessment & Plan     Assessment  Impairments: abnormal gait, abnormal or restricted ROM, impaired balance, pain with function and safety issue  Functional Limitations: walking and standing  Goals  Plan Goals: HIP PROBLEMS    1. The patient complains of L hip pain.   LTG 1: 12 weeks:  The patient will report a pain rating of 3/10 or better in order to improve  tolerance to activities of daily living and improve sleep quality.    STATUS:  New   STG 1a: 6 weeks:  The patient will report a pain rating of 4/10 or better.    STATUS:  New   TREATMENT:  Therapeutic exercises, manual therapy, aquatic therapy, home exercise   instruction, and modalities as needed for pain to include:  electrical stimulation, moist heat, ice,   ultrasound, and diathermy.    2. The patient demonstrates weakness of the L hip.   LTG 2: 12 weeks:  The patient will demonstrate 4+/5 strength for L hip flexion, abduction,  and extension in order to improve hip stability.    STATUS:  New   STG 2a: 6 weeks:  The patient will demonstrate 4/5 strength for L hip flexion, abduction,  and extension.    STATUS:  New   TREATMENT: Therapeutic exercises, manual therapy, aquatic therapy, home exercise instruction,  and  modalities as needed for pain to include:  electrical stimulation, moist heat, ice, and ultrasound    3. The patient has gait dysfunction.  LTG 3: 12 weeks:  The patient will ambulate without assistive device, independently, for community distances with minimal limp to the L lower extremity in order to improve mobility and allow patient to perform activities such as grocery shopping with greater ease.  STATUS:  New  STG 3a: The patient will be independent in HEP.  STATUS:  New  TREATMENT: Gait training, aquatic therapy, therapeutic exercise, and home exercise instruction.  TREATMENT:  Manual therapy, therapeutic exercise, home exercise instruction, and modalities as needed to include: moist heat, electrical stimulation, and ultrasound.    Plan  Therapy options: will be seen for skilled physical therapy services  Planned therapy interventions: manual therapy, soft tissue mobilization, stretching, therapeutic activities, neuromuscular re-education, gait training, flexibility and home exercise program  Frequency: 2x week  Duration in visits: 20  Duration in weeks: 10  Plan details: Therapeutic exercises, HEP, gait training.      Pt presents with limitations, that impede her ability to ambulate and perform functional tasks. The skills of a therapist will be required to safely and effectively implement the following treatment plan to restore maximal level of function.      Visit Diagnoses:    ICD-10-CM ICD-9-CM   1. Left hip pain  M25.552 719.45   2. Impaired gait  R26.9 781.2   3. Weakness of both legs  R29.898 729.89       Timed:         Manual Therapy:    0     mins  21728;     Therapeutic Exercise:    23     mins  96995;     Neuromuscular Daylin:    0    mins  59541;    Therapeutic Activity:     0     mins  35782;     Gait Trainin    mins  22887;     Ultrasound:     0     mins  04261;      Un-Timed:  Electrical Stimulation:    0     mins  54404 ( );  Traction     0     mins 34252  Low Eval     30      Mins  07865  Mod Eval     0     Mins  41501  High Eval                       0     Mins  91049    Timed Treatment:   23  mins   Total Treatment:     53   mins    PT SIGNATURE: Trish Huerta PT     Electronically Signed 11/3/2021    [place] License: [id number]    Initial Certification  Certification Period: 11/3/2021 thru 1/31/2022  I certify that the therapy services are furnished while this patient is under my care.  The services outlined above are required by this patient, and will be reviewed every 90 days.     PHYSICIAN: Weston Bender PA      DATE:     Please sign and return via fax to [phone]. Thank you, [facility].

## 2021-11-08 ENCOUNTER — OFFICE VISIT (OUTPATIENT)
Dept: INTERNAL MEDICINE | Facility: CLINIC | Age: 86
End: 2021-11-08

## 2021-11-08 VITALS
BODY MASS INDEX: 23.24 KG/M2 | SYSTOLIC BLOOD PRESSURE: 153 MMHG | DIASTOLIC BLOOD PRESSURE: 89 MMHG | WEIGHT: 144.6 LBS | HEIGHT: 66 IN | OXYGEN SATURATION: 97 % | HEART RATE: 56 BPM | TEMPERATURE: 97.4 F

## 2021-11-08 DIAGNOSIS — F33.0 MAJOR DEPRESSIVE DISORDER, RECURRENT, MILD (HCC): ICD-10-CM

## 2021-11-08 DIAGNOSIS — R13.19 ESOPHAGEAL DYSPHAGIA: ICD-10-CM

## 2021-11-08 DIAGNOSIS — S72.002A CLOSED LEFT HIP FRACTURE, INITIAL ENCOUNTER (HCC): ICD-10-CM

## 2021-11-08 DIAGNOSIS — IMO0001 LUNG NODULE < 6CM ON CT: ICD-10-CM

## 2021-11-08 DIAGNOSIS — S72.002G CLOSED FRACTURE OF LEFT HIP WITH DELAYED HEALING, SUBSEQUENT ENCOUNTER: ICD-10-CM

## 2021-11-08 DIAGNOSIS — C18.9 MALIGNANT NEOPLASM OF COLON, UNSPECIFIED PART OF COLON (HCC): ICD-10-CM

## 2021-11-08 DIAGNOSIS — Z23 FLU VACCINE NEED: Primary | ICD-10-CM

## 2021-11-08 DIAGNOSIS — I48.20 ATRIAL FIBRILLATION, CHRONIC (HCC): ICD-10-CM

## 2021-11-08 PROCEDURE — 99214 OFFICE O/P EST MOD 30 MIN: CPT | Performed by: INTERNAL MEDICINE

## 2021-11-08 PROCEDURE — G0008 ADMIN INFLUENZA VIRUS VAC: HCPCS | Performed by: INTERNAL MEDICINE

## 2021-11-08 PROCEDURE — 90662 IIV NO PRSV INCREASED AG IM: CPT | Performed by: INTERNAL MEDICINE

## 2021-11-08 RX ORDER — PANTOPRAZOLE SODIUM 20 MG/1
TABLET, DELAYED RELEASE ORAL
COMMUNITY
Start: 2021-09-22 | End: 2021-11-11

## 2021-11-09 ENCOUNTER — TREATMENT (OUTPATIENT)
Dept: PHYSICAL THERAPY | Facility: CLINIC | Age: 86
End: 2021-11-09

## 2021-11-09 DIAGNOSIS — M25.552 LEFT HIP PAIN: Primary | ICD-10-CM

## 2021-11-09 DIAGNOSIS — R26.9 IMPAIRED GAIT: ICD-10-CM

## 2021-11-09 DIAGNOSIS — R29.898 WEAKNESS OF BOTH LEGS: ICD-10-CM

## 2021-11-09 PROCEDURE — 97110 THERAPEUTIC EXERCISES: CPT | Performed by: PHYSICAL THERAPIST

## 2021-11-09 NOTE — PROGRESS NOTES
Physical Therapy Daily Treatment Note      Patient: Bing Cruz   : 1931  Referring practitioner: Weston Richter*  Date of Initial Visit: Type: THERAPY  Noted: 11/3/2021  Today's Date: 2021  Patient seen for 2 sessions         Subjective   Bing Cruz reports: No new subjective reports.    Objective   See Exercise, Manual, and Modality Logs for complete treatment.     Assessment & Plan     Assessment  Impairments: pain with function  Assessment details: Pt tolerated session well overall without complaining of increased pain. She required cues for exercises.  Functional Limitations: standing  Plan  Therapy options: will be seen for skilled physical therapy services  Plan details: Continue with POC.        Visit Diagnoses:    ICD-10-CM ICD-9-CM   1. Left hip pain  M25.552 719.45   2. Weakness of both legs  R29.898 729.89   3. Impaired gait  R26.9 781.2     Progress per Plan of Care         Timed:  Manual Therapy:    0     mins  06396;  Therapeutic Exercise:    28     mins  93164;     Neuromuscular Daylin:    0    mins  97894;    Therapeutic Activity:     0     mins  72624;     Gait Trainin     mins  48876;     Aquatic Therapy     0     mins  34254;     Ultrasound:     0     mins  72384;    Electrical Stimulation:    0     mins  94462 ( );    Untimed:  Electrical Stimulation:    0     mins  04840 ( );  Mechanical Traction:    0     mins  01188;     Timed Treatment:   28   mins   Total Treatment:     28   mins  LACEY Allan License: [id number]

## 2021-11-10 NOTE — PROGRESS NOTES
"Chief Complaint  No chief complaint on file.    Subjective          Bing Cruz presents to [facility]  History of Present Illness    Objective   Vital Signs:   /89   Pulse 56   Temp 97.4 °F (36.3 °C)   Ht 167.6 cm (65.98\")   Wt 65.6 kg (144 lb 9.6 oz)   SpO2 97%   BMI 23.35 kg/m²     Physical Exam   Result Review :                 Assessment and Plan    Diagnoses and all orders for this visit:    1. Flu vaccine need (Primary)  -     Fluzone High-Dose 65+yrs (8705-6851)    2. Esophageal dysphagia  -     Comprehensive Metabolic Panel; Future  -     CBC & Differential; Future    3. Closed fracture of left hip with delayed healing, subsequent encounter  -     Comprehensive Metabolic Panel; Future  -     CBC & Differential; Future    4. Major depressive disorder, recurrent, mild (HCC)  -     Comprehensive Metabolic Panel; Future  -     CBC & Differential; Future    5. Atrial fibrillation, chronic (HCC)  -     Comprehensive Metabolic Panel; Future  -     CBC & Differential; Future    6. Closed left hip fracture, initial encounter (HCC)  -     Comprehensive Metabolic Panel; Future  -     CBC & Differential; Future    7. Lung nodule < 6cm on CT  -     Comprehensive Metabolic Panel; Future  -     CBC & Differential; Future    8. Malignant neoplasm of colon, unspecified part of colon (HCC)  -     Comprehensive Metabolic Panel; Future  -     CBC & Differential; Future        Follow Up   Return in about 6 months (around 5/8/2022).  Patient was given instructions and counseling regarding her condition or for health maintenance advice. Please see specific information pulled into the AVS if appropriate.       "

## 2021-11-11 ENCOUNTER — OFFICE VISIT (OUTPATIENT)
Dept: GASTROENTEROLOGY | Facility: CLINIC | Age: 86
End: 2021-11-11

## 2021-11-11 VITALS
SYSTOLIC BLOOD PRESSURE: 173 MMHG | HEART RATE: 86 BPM | HEIGHT: 67 IN | DIASTOLIC BLOOD PRESSURE: 102 MMHG | WEIGHT: 148.37 LBS | BODY MASS INDEX: 23.29 KG/M2

## 2021-11-11 DIAGNOSIS — K58.0 IRRITABLE BOWEL SYNDROME WITH DIARRHEA: ICD-10-CM

## 2021-11-11 DIAGNOSIS — R13.10 DYSPHAGIA, UNSPECIFIED TYPE: Primary | ICD-10-CM

## 2021-11-11 PROCEDURE — 99214 OFFICE O/P EST MOD 30 MIN: CPT | Performed by: NURSE PRACTITIONER

## 2021-11-11 RX ORDER — PANTOPRAZOLE SODIUM 40 MG/1
40 TABLET, DELAYED RELEASE ORAL DAILY
Qty: 90 TABLET | Refills: 1 | Status: SHIPPED | OUTPATIENT
Start: 2021-11-11 | End: 2022-02-09

## 2021-11-11 NOTE — PROGRESS NOTES
"  Chief Complaint  Follow-up (heartburn)    History of Present Illness  Bing Cruz is a 89 y.o. who presents to [facility] for follow up of Follow-up (heartburn).    Ms. Cruz reports that she has noticed a significant decrease in dysphagia since increasing Protonix to 40 mg once daily. Expresses no complaints of food \"sticking\" in esophagus. Denies heartburn, nausea, vomiting, change in appetite, or weight loss.    Taking a half tablet of colestid every day now and having a bowel movement at least once daily, formed stool. Denies any hematochezia, melena, or abdominal pain. She does have flatulence on and off.     Labs Result Review Imaging    Past Medical History:   Diagnosis Date   • Abdominal pain, generalized    • Acquired cyst of kidney    • Anemia    • Aortic regurgitation    • Aortic stenosis    • Cancer (HCC)     colon    • Cardiomegaly    • Chronic atrial fibrillation (HCC) 1/1/2019    Atrial fibrillation converted to sinus through cardioversion (March 2019   • Chronic fatigue    • Diaphragmatic hernia without obstruction and without gangrene    • Diarrhea    • Disease of tricuspid valve    • Diverticulosis of colon    • Dysphagia    • Essential (primary) hypertension    • Hepatic cyst     LEFT   • Hiatal hernia    • Hyperlipidemia    • Insomnia, unspecified    • LVH (left ventricular hypertrophy)    • Major depressive disorder, single episode    • Malaise and fatigue    • Mitral regurgitation    • Mitral valve disorder    • Mitral valve insufficiency and aortic valve insufficiency    • Osteopenia    • Other specified disorders of bone density and structure, unspecified site    • Pain in joint, pelvic region and thigh    • Renal cyst    • Sprain and strain of hip     Sprain and strain of unspecified site of hip and thigh   • TR (tricuspid regurgitation)    • Unspecified cataract    • Urinary tract infection    • Vitamin D deficiency        Past Surgical History: "   Procedure Laterality Date   • ABDOMINAL HYSTERECTOMY      WITH BSO    • ANKLE SURGERY      (L) ankle fracture   • BLADDER REPAIR     • BREAST BIOPSY     • BREAST BIOPSY      (L) breast biopsy   • CATARACT EXTRACTION      OU   • CHOLECYSTECTOMY      OPEN   • COLON SURGERY      STATES SHE HAD 12 INCHES OF COLON REMOVED IN JULY 2020 FOR COLON CANCER   • COLONOSCOPY  2020   • COLONOSCOPY N/A 10/29/2021    Procedure: COLONOSCOPY;  Surgeon: Edmar Back MD;  Location: [facility] ENDOSCOPY;  Service: General;  Laterality: N/A;  DIVERTICULOSIS/ANASTOMOSIS   • FEMUR OPEN REDUCTION INTERNAL FIXATION Left 7/21/2021    Procedure: FEMORAL NECK OPEN REDUCTION INTERNAL FIXATION;  Surgeon: Bam Warner MD;  Location: [facility] MAIN OR;  Service: Orthopedics;  Laterality: Left;   • HIP SURGERY  07/2021   • INGUINAL HERNIA REPAIR Left 09/29/2011   • UPPER GASTROINTESTINAL ENDOSCOPY         Current Outpatient Medications on File Prior to Visit   Medication Sig Dispense Refill   • apixaban (Eliquis) 2.5 MG tablet tablet Take 1.25 mg by mouth Every 12 (Twelve) Hours.     • ascorbic acid (VITAMIN C) 500 MG tablet Take 500 mg by mouth Daily.     • colestipol (COLESTID) 1 g tablet TAKE 1 TABLET BY ORAL ROUTE 3 TIMES A DAY AS NEEDED FOR 30 DAYS DIARRHEA 270 tablet 1   • dilTIAZem CD (CARDIZEM CD) 120 MG 24 hr capsule TAKE 1 CAPSULE BY MOUTH AT NOON 90 capsule 3   • LORazepam (ATIVAN) 0.5 MG tablet TAKE 1 TABLET BY MOUTH AT BEDTIME AS NEEDED 30 tablet 2   • losartan (COZAAR) 50 MG tablet Every 12 (Twelve) Hours.     • metoprolol succinate XL (TOPROL-XL) 100 MG 24 hr tablet metoprolol succinate 100 mg oral tablet extended release 24 hr take 2 tablets (200 mg) by oral route once daily   Active     • [DISCONTINUED] pantoprazole (PROTONIX) 40 MG EC tablet Take 40 mg by mouth Daily. bid     • [DISCONTINUED] ondansetron (Zofran) 4 MG tablet Zofran 4 mg oral tablet take 1 tablet by oral route 3 times a day   Suspended     • [DISCONTINUED]  "pantoprazole (PROTONIX) 20 MG EC tablet      • [DISCONTINUED] Probiotic Product (Florajen Digestion) capsule Take 1 capsule by mouth Daily.       No current facility-administered medications on file prior to visit.       Social History     Social History Narrative   • Not on file         Objective     Review of Systems   Constitutional: Negative for appetite change and unexpected weight loss.   Gastrointestinal: Positive for diarrhea and GERD.        Vital Signs:   BP (!) 173/102 (BP Location: Left arm, Patient Position: Sitting, Cuff Size: Adult)   Pulse 86   Ht 170.2 cm (67\")   Wt 67.3 kg (148 lb 5.9 oz)   BMI 23.24 kg/m²       Physical Exam  Constitutional:       General: She is not in acute distress.     Appearance: Normal appearance. She is well-developed and normal weight.   HENT:      Head: Normocephalic and atraumatic.   Eyes:      Conjunctiva/sclera: Conjunctivae normal.      Pupils: Pupils are equal, round, and reactive to light.      Visual Fields: Right eye visual fields normal and left eye visual fields normal.   Cardiovascular:      Rate and Rhythm: Normal rate and regular rhythm.      Heart sounds: Normal heart sounds.   Pulmonary:      Effort: Pulmonary effort is normal. No retractions.      Breath sounds: Normal breath sounds and air entry.   Abdominal:      General: Bowel sounds are normal.      Palpations: Abdomen is soft.      Tenderness: There is no abdominal tenderness.      Comments: No appreciable hepatosplenomegaly or ascites   Musculoskeletal:         General: Normal range of motion.      Cervical back: Neck supple.      Right lower leg: No edema.      Left lower leg: No edema.   Lymphadenopathy:      Cervical: No cervical adenopathy.   Skin:     General: Skin is warm and dry.      Findings: No lesion.   Neurological:      General: No focal deficit present.      Mental Status: She is alert and oriented to person, place, and time.   Psychiatric:         Mood and Affect: Mood and affect " normal.         Behavior: Behavior normal.         Result Review :   The following data was reviewed by: GABRIELLA Soto on 11/11/2021:  CMP    CMP 7/23/21 8/17/21 11/3/21   Glucose 101 (A) 100 (A) 84   BUN 17 14 13   Creatinine 0.79 0.89 0.76   eGFR Non  Am 68 60 (A) 71   Sodium 136 140 143   Potassium 3.7 3.8 3.9   Chloride 104 103 108 (A)   Calcium 8.7 9.6 9.3   Albumin  4.17 4.10   Total Bilirubin  0.9 0.7   Alkaline Phosphatase  122 (A) 88   AST (SGOT)  15 17   ALT (SGPT)  5 10   (A) Abnormal value            CBC w/diff    CBC w/Diff 8/3/21 8/17/21 11/3/21   WBC 6.45 7.88 6.79   RBC 3.57 (A) 4.13 3.98   Hemoglobin 11.7 (A) 13.1 12.9   Hematocrit 34.3 40.0 36.9   MCV 96.1 96.9 92.7   MCH 32.8 31.7 32.4   MCHC 34.1 32.8 35.0   RDW 12.0 (A) 13.5 12.4   Platelets 278 320 247   Neutrophil Rel % 67.1 66.7 54.3   Immature Granulocyte Rel % 0.3 0.8 (A) 0.4   Lymphocyte Rel % 20.8 23.2 34.5   Monocyte Rel % 7.6 7.4 8.0   Eosinophil Rel % 3.6 1.4 2.1   Basophil Rel % 0.6 0.5 0.7   (A) Abnormal value            Lab Results   Component Value Date    IRON 95 11/11/2020    TIBC 259 11/11/2020    FERRITIN 406 (H) 11/11/2020    LABIRON 37 11/11/2020            Colonoscopy 10/29/2021: Diverticulosis of sigmoid colon.    EGD 2/25/2021: Normal mucosa of duodenum.  Erythema in the antrum.  Small size hiatal hernia.  Ring in the GE junction that was dilated with a 12 to 15 mm balloon.  Antrum biopsy-chronic inactive gastritis.  GE junction biopsy-squamocolumnar mucosa with chronic inflammation.       Esophagram 11/3/2020: Laryngeal penetration during swallowing of the barium solution.  Thick smooth narrowing of the distal thoracic esophagus near the GE junction suspect represent a peptic stricture.  13 mm tablet becomes lodged at this position.    EGD 6/26/2020: Normal mucosa of duodenum, erythema in the antrum, and erythema in the GE junction.  Hiatal hernia.  Colonoscopy 6/26/2020: 12 mm polyp in the cecum.  5 mm  polyp in the cecum.  3 cm mass in the ascending colon.  5 mm polyp in sigmoid colon.  Hypertrophied anal papula visualized.  Moderate diverticulosis of the whole colon.  Large intestine cecum polyp-tubulovillous adenoma with high-grade dysplasia.  Ascending colon mass biopsy-invasive moderate differentiated adenocarcinoma.  Large intestine sigmoid colon polyp-tubular adenoma.  Stomach antrum biopsy-reactive chemical gastropathy with chronic inflammation.  GE junction biopsy-reflux esophagitis changes     Assessment and Plan    Diagnoses and all orders for this visit:    1. Dysphagia, unspecified type (Primary)    2. Irritable bowel syndrome with diarrhea    Other orders  -     pantoprazole (PROTONIX) 40 MG EC tablet; Take 1 tablet by mouth Daily for 90 days. bid  Dispense: 90 tablet; Refill: 1      * Surgery not found *     Educated patient on long-term PPI usage. She states she understands however would like to continue due to controlling symptoms of dysphagia.    Follow Up   Return if symptoms worsen or fail to improve.  Patient was given instructions and counseling regarding her condition or for health maintenance advice. Please see specific information pulled into the AVS if appropriate.

## 2021-11-16 ENCOUNTER — TREATMENT (OUTPATIENT)
Dept: PHYSICAL THERAPY | Facility: CLINIC | Age: 86
End: 2021-11-16

## 2021-11-16 DIAGNOSIS — M25.552 LEFT HIP PAIN: Primary | ICD-10-CM

## 2021-11-16 DIAGNOSIS — R26.9 IMPAIRED GAIT: ICD-10-CM

## 2021-11-16 DIAGNOSIS — R29.898 WEAKNESS OF BOTH LEGS: ICD-10-CM

## 2021-11-16 PROCEDURE — 97530 THERAPEUTIC ACTIVITIES: CPT | Performed by: PHYSICAL THERAPIST

## 2021-11-16 PROCEDURE — 97110 THERAPEUTIC EXERCISES: CPT | Performed by: PHYSICAL THERAPIST

## 2021-11-16 NOTE — PROGRESS NOTES
Physical Therapy Daily Progress Note    Patient: Bing Cruz   : 1931  Diagnosis/ICD-10 Code:  Left hip pain [M25.552]  Referring practitioner: Weston Richter*  Date of Initial Visit: Type: THERAPY  Noted: 11/3/2021  Today's Date: 2021  Patient seen for 3 sessions           Subjective Evaluation    History of Present Illness    Subjective comment: Pt reporting having more pain today, unsure of why. Pain  Current pain ratin           Objective   See Exercise, Manual, and Modality Logs for complete treatment.       Assessment & Plan     Assessment  Assessment details: Pt tolerated today's session well with no increased pain of the hip. Pt would benefit from continued skilled therapy to address deficits of weakness and limited functional mobility. Progress per plan of care.                 Manual Therapy:    0     mins  04289;  Therapeutic Exercise:    15     mins  99255;     Neuromuscular Daylin:    0    mins  97007;    Therapeutic Activity:     10     mins  69134;     Gait Trainin     mins  96997;     Ultrasound:     0     mins  41952;    Electrical Stimulation:    0     mins  47421 ( );  Dry Needling     0     mins self-pay;  Aquatic Therapy    0     mins  36588;  Mechanical Traction    0     mins  10189  Moist Heat     0     mins  No charge    Timed Treatment:   25   mins   Total Treatment:     25   mins    Anastacio Tierney PT  Physical Therapist    Electronically signed 2021    [place] License: PT - [id number]

## 2021-11-19 ENCOUNTER — TREATMENT (OUTPATIENT)
Dept: PHYSICAL THERAPY | Facility: CLINIC | Age: 86
End: 2021-11-19

## 2021-11-19 DIAGNOSIS — R29.898 WEAKNESS OF BOTH LEGS: ICD-10-CM

## 2021-11-19 DIAGNOSIS — M25.552 LEFT HIP PAIN: Primary | ICD-10-CM

## 2021-11-19 DIAGNOSIS — R26.9 IMPAIRED GAIT: ICD-10-CM

## 2021-11-19 PROCEDURE — 97110 THERAPEUTIC EXERCISES: CPT | Performed by: PHYSICAL THERAPIST

## 2021-11-19 NOTE — PROGRESS NOTES
Physical Therapy Daily Treatment Note      Patient: Bing Cruz   : 1931  Referring practitioner: Weston Richter*  Date of Initial Visit: Type: THERAPY  Noted: 11/3/2021  Today's Date: 2021  Patient seen for 4 sessions         Subjective   Bing Cruz reports: No new subjective reports. She is doing well.     Objective   See Exercise, Manual, and Modality Logs for complete treatment.     Assessment & Plan     Assessment  Assessment details: Pt tolerated session well overall and is progressing well.    Plan  Plan details: Continue with POC.        Visit Diagnoses:    ICD-10-CM ICD-9-CM   1. Left hip pain  M25.552 719.45   2. Weakness of both legs  R29.898 729.89   3. Impaired gait  R26.9 781.2       Progress per Plan of Care           Timed:  Manual Therapy:    0     mins  51001;  Therapeutic Exercise:    25     mins  99627;     Neuromuscular Daylin:    0    mins  86321;    Therapeutic Activity:     0     mins  36216;     Gait Trainin     mins  57909;     Aquatic Therapy     0     mins  16959;     Ultrasound:     0     mins  60182;    Electrical Stimulation:    0     mins  10191 ( );    Untimed:  Electrical Stimulation:    0     mins  29143 ( );  Mechanical Traction:    0     mins  70671;     Timed Treatment:   25   mins   Total Treatment:     25   mins  LACEY Allan License: [id number]

## 2021-11-23 ENCOUNTER — TREATMENT (OUTPATIENT)
Dept: PHYSICAL THERAPY | Facility: CLINIC | Age: 86
End: 2021-11-23

## 2021-11-23 DIAGNOSIS — M25.552 LEFT HIP PAIN: Primary | ICD-10-CM

## 2021-11-23 DIAGNOSIS — R26.9 IMPAIRED GAIT: ICD-10-CM

## 2021-11-23 DIAGNOSIS — R29.898 WEAKNESS OF BOTH LEGS: ICD-10-CM

## 2021-11-23 PROCEDURE — 97110 THERAPEUTIC EXERCISES: CPT | Performed by: PHYSICAL THERAPIST

## 2021-11-23 PROCEDURE — 97112 NEUROMUSCULAR REEDUCATION: CPT | Performed by: PHYSICAL THERAPIST

## 2021-11-23 NOTE — PROGRESS NOTES
Physical Therapy Daily Progress Note        Patient: Bing Cruz   : 1931  Diagnosis/ICD-10 Code:  Left hip pain [M25.552]  Referring practitioner: Weston Richter*  Date of Initial Visit: Type: THERAPY  Noted: 11/3/2021  Today's Date: 2021  Patient seen for 5 sessions             Subjective   Bing Cruz reports: that her hip bothers her every once in a while, but doesn't feel like she is getting enough out of therapy.     Objective   Good tolerance with balance activities.     See Exercise, Manual, and Modality Logs for complete treatment.       Assessment/Plan  Pt progressing as evident by decreased L hip pain and falls and increased tolerance of PT session. Pt would benefit from skilled PT to address strength and endurance deficits, transfer and gait training and any concerns with ADLs.     Progress per Plan of Care           Timed:  Manual Therapy:         mins  07978;  Therapeutic Exercise:    15     mins  83618;     Neuromuscular Daylin:    15    mins  27686;    Therapeutic Activity:          mins  71567;     Gait Training:           mins  24462;    Aquatic Therapy:          mins  85974;       Untimed:  Electrical Stimulation:         mins  52972 ( );  Mechanical Traction:         mins  72044;       Timed Treatment:   30   mins   Total Treatment:     30   mins      Electronically signed:   Mercedes Tierney PTA  Physical Therapist Assistant  [facility] License #: [id number]

## 2021-12-01 ENCOUNTER — TREATMENT (OUTPATIENT)
Dept: PHYSICAL THERAPY | Facility: CLINIC | Age: 86
End: 2021-12-01

## 2021-12-01 DIAGNOSIS — R29.898 WEAKNESS OF BOTH LEGS: ICD-10-CM

## 2021-12-01 DIAGNOSIS — M25.552 LEFT HIP PAIN: Primary | ICD-10-CM

## 2021-12-01 DIAGNOSIS — R26.9 IMPAIRED GAIT: ICD-10-CM

## 2021-12-01 PROCEDURE — 97110 THERAPEUTIC EXERCISES: CPT | Performed by: PHYSICAL THERAPIST

## 2021-12-01 PROCEDURE — 97530 THERAPEUTIC ACTIVITIES: CPT | Performed by: PHYSICAL THERAPIST

## 2021-12-01 NOTE — PROGRESS NOTES
Physical Therapy Daily Progress Note        Patient: Bing Cruz   : 1931  Diagnosis/ICD-10 Code:  Left hip pain [M25.552]  Referring practitioner: Weston Richter*  Date of Initial Visit: Type: THERAPY  Noted: 11/3/2021  Today's Date: 2021  Patient seen for 6 sessions             Subjective   Bing Cruz reports no improvements since the start of therapy. Pt states daughter has mentioned her gait is worsening.     Objective   Pt presents hip drop on the right side. Pt fatigued quickly during gait training.  See Exercise, Manual, and Modality Logs for complete treatment.       Assessment/Plan  Bing still experiencing increased weakness in  hip. Pt had some increased difficulty with gait. Pt would benefit from skilled PT to address Range of Motion  and Strength deficits, pain management and any concerns with ADLs.     Progress per Plan of Care           Timed:  Manual Therapy:    0     mins  91039;  Therapeutic Exercise:    15     mins  50787;     Neuromuscular Daylin:    0    mins  90104;    Therapeutic Activity:     15     mins  19469;     Gait Trainin     mins  02987;    Aquatic Therapy:     0     mins  97246;       Untimed:  Electrical Stimulation:    0     mins  39804 ( );  Mechanical Traction:    0     mins  87444;       Timed Treatment:   30   mins   Total Treatment:     30   mins      Electronically signed:   Mercedes Tierney PTA  Physical Therapist Assistant  [facility] License #: [id number]

## 2021-12-03 ENCOUNTER — TREATMENT (OUTPATIENT)
Dept: PHYSICAL THERAPY | Facility: CLINIC | Age: 86
End: 2021-12-03

## 2021-12-03 DIAGNOSIS — R26.9 IMPAIRED GAIT: ICD-10-CM

## 2021-12-03 DIAGNOSIS — R29.898 WEAKNESS OF BOTH LEGS: Primary | ICD-10-CM

## 2021-12-03 DIAGNOSIS — M25.552 LEFT HIP PAIN: ICD-10-CM

## 2021-12-03 PROCEDURE — 97110 THERAPEUTIC EXERCISES: CPT | Performed by: PHYSICAL THERAPIST

## 2021-12-03 NOTE — PROGRESS NOTES
PROGRESS NOTE    Patient: Bing Cruz   : 1931  Diagnosis/ICD-10 Code:  Weakness of both legs [R29.898]  Referring practitioner: Weston Lyles  Date of Initial Visit: Type: THERAPY  Noted: 11/3/2021  Today's Date: 12/3/2021  Patient seen for 7 sessions    Subjective:   Bing Cruz reports: Pt reports that she is not experiencing any improvements with physical therapy. Her pain continues the same as well. Pt reports that she has a large recline that she has been siting in every day and she leans to the right, causing the chair to deform. She wonders if the chair can hinder her process due to the position she is sitting in.   Clinical Progress: improved  Home Program Compliance: No  Treatment has included: therapeutic exercise, neuromuscular re-education, manual therapy and therapeutic activity    Subjective Evaluation    Pain  Current pain ratin         Objective          Active Range of Motion   Left Hip   Normal active range of motion    Right Hip   Normal active range of motion    Strength/Myotome Testing     Left Hip   Planes of Motion   Flexion: 4  Abduction: 4-  Adduction: 4    Right Hip   Planes of Motion   Flexion: 4  Abduction: 4  Adduction: 4    Left Knee   Flexion: 4+  Extension: 4    Right Knee   Flexion: 4+  Extension: 4+    Ambulation     Comments   Decreased gait speed.    Functional Assessment     Comments  Instructions: Please select the level of difficulty you have for each activity below.      Able to do without      Able to do with      Able to do with         Able to do with      Unable to do      N/A    any difficulty         little difficulty         moderate difficulty    much difficulty    9.  Standing  1  (*)          2  (*)               3  (x)                        4  (*)                     5  (*)              9  (*)  10. Walking-  1  (*)          2  (*)               3  (x)                        4  (*)                     5  (*)              9  (*)  short  distances   13. Climbing  1  (*)          2  (*)               3  (*)                        4  (x)                     5  (*)              9  (*)  stairs    Total Score: (*) (all items) (10) (3 items) (*)  (1 item)    % Limitation   All Items  3 Items  1 Item    (*) 0%  (Score of 22)   (Score of 3)   (Score of 1)  (*) 1-19%   (Score of 23-40)  (Score of 4-5)   (Score of 2)  (*) 20-39%  (Score of 41-58)  (Score of 6-7)   (Score of 2)  (*) 40-59%  (Score of 59-76)  (Score of 8-9)   (Score of 3)  (x) 60-79%  (Score of 77-94)  (Score of 10-11)  (Score of 3)  (*) 80-99%  (Score of )  (Score of 12-14)  (Score of 4)  (*) 100%  (Score of 110)   (Score of 15)   (Score of 5)      Assessment & Plan     Assessment  Impairments: abnormal gait, abnormal or restricted ROM, impaired balance, pain with function and safety issue  Functional Limitations: walking and standing  Goals  Plan Goals: HIP PROBLEMS    1. The patient complains of L hip pain.   LTG 1: 12 weeks:  The patient will report a pain rating of 3/10 or better in order to improve  tolerance to activities of daily living and improve sleep quality.    STATUS: not met, progressing   STG 1a: 6 weeks:  The patient will report a pain rating of 4/10 or better.    STATUS:  not met, progressing   TREATMENT:  Therapeutic exercises, manual therapy, aquatic therapy, home exercise   instruction, and modalities as needed for pain to include:  electrical stimulation, moist heat, ice,   ultrasound, and diathermy.    2. The patient demonstrates weakness of the L hip.   LTG 2: 12 weeks:  The patient will demonstrate 4+/5 strength for L hip flexion, abduction,  and extension in order to improve hip stability.    STATUS:  not met, progressing   STG 2a: 6 weeks:  The patient will demonstrate 4/5 strength for L hip flexion, abduction,  and extension.    STATUS:  not met, progressing   TREATMENT: Therapeutic exercises, manual therapy, aquatic therapy, home exercise instruction,  and  modalities as needed for pain to include:  electrical stimulation, moist heat, ice, and ultrasound    3. The patient has gait dysfunction.  LTG 3: 12 weeks:  The patient will ambulate without assistive device, independently, for community distances with minimal limp to the L lower extremity in order to improve mobility and allow patient to perform activities such as grocery shopping with greater ease.  STATUS:  progressing  STG 3a: The patient will be independent in HEP.  STATUS:  progressing  TREATMENT: Gait training, aquatic therapy, therapeutic exercise, and home exercise instruction.  TREATMENT:  Manual therapy, therapeutic exercise, home exercise instruction, and modalities as needed to include: moist heat, electrical stimulation, and ultrasound.    Plan  Therapy options: will be seen for skilled therapy services  Planned therapy interventions: manual therapy, soft tissue mobilization, stretching, therapeutic activities, neuromuscular re-education, gait training, flexibility and home exercise program  Frequency: 2x week  Duration in visits: 20  Duration in weeks: 10  Plan details: Therapeutic exercises, HEP, gait training.      Progress toward previous goals: Partially Met    Recommendations: Continue as planned  Prognosis to achieve goals: good    PT Signature: Trish Huerta PT    Electronically Signed 12/3/2021    [place] License: [id number]    Based upon review of the patient's progress and continued therapy plan, it is my medical opinion that Bing Cruz should continue physical therapy treatment at [facility]  [address] [place] [address] [phone].    Timed:         Manual Therapy:    0     mins  78581;     Therapeutic Exercise:    23     mins  00736;     Neuromuscular Daylin:    0    mins  26782;    Therapeutic Activity:     0     mins  86729;     Gait Trainin     mins  58943;     Ultrasound:     0     mins  62099;    Ionto                                0    mins   16007  Aquatic                          0     mins 09970      Un-Timed:  Electrical Stimulation:    0     mins  55759 ( );  Dry Needling     0     mins self-pay  Traction     0     mins 18633      Timed Treatment:   23   mins   Total Treatment:     23   mins

## 2021-12-06 RX ORDER — APIXABAN 5 MG/1
TABLET, FILM COATED ORAL
Qty: 60 TABLET | Refills: 5 | Status: SHIPPED | OUTPATIENT
Start: 2021-12-06 | End: 2022-03-18 | Stop reason: HOSPADM

## 2021-12-09 DIAGNOSIS — F41.9 ANXIETY: ICD-10-CM

## 2021-12-09 RX ORDER — LORAZEPAM 0.5 MG/1
TABLET ORAL
Qty: 30 TABLET | Refills: 5 | Status: SHIPPED | OUTPATIENT
Start: 2021-12-09 | End: 2022-05-18 | Stop reason: SDUPTHER

## 2021-12-09 RX ORDER — LORATADINE 10 MG/1
TABLET ORAL
Qty: 20 TABLET | Refills: 0 | OUTPATIENT
Start: 2021-12-09

## 2021-12-09 RX ORDER — DOCUSATE SODIUM 100 MG/1
CAPSULE, LIQUID FILLED ORAL
Qty: 30 CAPSULE | OUTPATIENT
Start: 2021-12-09

## 2021-12-10 ENCOUNTER — TREATMENT (OUTPATIENT)
Dept: PHYSICAL THERAPY | Facility: CLINIC | Age: 86
End: 2021-12-10

## 2021-12-10 DIAGNOSIS — R26.9 IMPAIRED GAIT: ICD-10-CM

## 2021-12-10 DIAGNOSIS — R29.898 WEAKNESS OF BOTH LEGS: Primary | ICD-10-CM

## 2021-12-10 DIAGNOSIS — M25.552 LEFT HIP PAIN: ICD-10-CM

## 2021-12-10 PROCEDURE — 97110 THERAPEUTIC EXERCISES: CPT | Performed by: PHYSICAL THERAPIST

## 2021-12-10 NOTE — PROGRESS NOTES
Physical Therapy Daily Treatment Note      Patient: Bing Cruz   : 1931  Referring practitioner: Weston Richter*  Date of Initial Visit: Type: THERAPY  Noted: 11/3/2021  Today's Date: 12/10/2021  Patient seen for 8 sessions           Subjective Questionnaire:       Subjective Evaluation    History of Present Illness    Subjective comment: Pt reported to clinic ambulating with ST.  While performing seated therapeutic exercise pt reported she was about to give up secondary to not getting better after home health for three months.  Pt stated her  MD wanted her to come here and use the machines.       Objective   See Exercise, Manual, and Modality Logs for complete treatment.       Assessment & Plan     Assessment    Assessment details: Pt tolerated Cybex hip ABD and adduction at 25lb well and was able to perform Cybex hamstring curls with BLE.  Pt experienced acid reflux at end of tx and sat for a minute with a drink of water and felt better.  Pt stated she was good with the machines secondary to wanting to get better.  Pt was advised we will do other therapeutic exercise that are not machines such as side stepping with band and she was fine with that.        Visit Diagnoses:    ICD-10-CM ICD-9-CM   1. Weakness of both legs  R29.898 729.89   2. Left hip pain  M25.552 719.45   3. Impaired gait  R26.9 781.2       Progress per Plan of Care and Progress strengthening /stabilization /functional activity           Timed:  Manual Therapy:         mins  98973;  Therapeutic Exercise:    24     mins  81250;     Neuromuscular Daylin:        mins  91828;    Therapeutic Activity:          mins  04936;     Gait Training:           mins  72061;     Ultrasound:          mins  92384;    Electrical Stimulation:         mins  70341 ( );  Aquatic Therapy          mins  38240    Untimed:  Electrical Stimulation:         mins  49135 ( );  Mechanical Traction:         mins  47062;     Timed Treatment:   24    mins   Total Treatment:     24     mins    Electronically signed    Lavonne Barrera PTA  Physical Therapist Assistant    [place] license: [id number]

## 2021-12-14 ENCOUNTER — TREATMENT (OUTPATIENT)
Dept: PHYSICAL THERAPY | Facility: CLINIC | Age: 86
End: 2021-12-14

## 2021-12-14 DIAGNOSIS — M25.552 LEFT HIP PAIN: ICD-10-CM

## 2021-12-14 DIAGNOSIS — R26.9 IMPAIRED GAIT: ICD-10-CM

## 2021-12-14 DIAGNOSIS — R29.898 WEAKNESS OF BOTH LEGS: Primary | ICD-10-CM

## 2021-12-14 PROCEDURE — 97110 THERAPEUTIC EXERCISES: CPT | Performed by: PHYSICAL THERAPIST

## 2021-12-14 NOTE — PROGRESS NOTES
Physical Therapy Daily Treatment Note      Patient: Bing Cruz   : 1931  Referring practitioner: Weston Richter*  Date of Initial Visit: Type: THERAPY  Noted: 11/3/2021  Today's Date: 2021  Patient seen for 9 sessions           Subjective   Bing Cruz reports: No new subjective reports.    Objective   See Exercise, Manual, and Modality Logs for complete treatment.     Assessment & Plan       Plan  Plan details: Continue with POC.        Visit Diagnoses:    ICD-10-CM ICD-9-CM   1. Weakness of both legs  R29.898 729.89   2. Impaired gait  R26.9 781.2   3. Left hip pain  M25.552 719.45       Progress per Plan of Care           Timed:  Manual Therapy:    0     mins  71986;  Therapeutic Exercise:    25     mins  44965;     Neuromuscular Daylin:    0    mins  45332;    Therapeutic Activity:     0     mins  07379;     Gait Trainin     mins  19076;     Aquatic Therapy     0     mins  83475;     Ultrasound:     0     mins  59913;    Electrical Stimulation:    0     mins  01442 ( );    Untimed:  Electrical Stimulation:    0     mins  03321 ( );  Mechanical Traction:    0     mins  78413;     Timed Treatment:   25   mins   Total Treatment:     25   mins  LACEY Allan License: [id number]

## 2021-12-16 ENCOUNTER — OFFICE VISIT (OUTPATIENT)
Dept: ORTHOPEDIC SURGERY | Facility: CLINIC | Age: 86
End: 2021-12-16

## 2021-12-16 ENCOUNTER — TREATMENT (OUTPATIENT)
Dept: PHYSICAL THERAPY | Facility: CLINIC | Age: 86
End: 2021-12-16

## 2021-12-16 VITALS — HEART RATE: 57 BPM | BODY MASS INDEX: 23.23 KG/M2 | OXYGEN SATURATION: 98 % | WEIGHT: 148 LBS | HEIGHT: 67 IN

## 2021-12-16 DIAGNOSIS — M25.552 LEFT HIP PAIN: ICD-10-CM

## 2021-12-16 DIAGNOSIS — Z47.89 AFTERCARE FOLLOWING SURGERY OF THE MUSCULOSKELETAL SYSTEM: Primary | ICD-10-CM

## 2021-12-16 DIAGNOSIS — R29.898 WEAKNESS OF BOTH LEGS: Primary | ICD-10-CM

## 2021-12-16 DIAGNOSIS — R26.9 IMPAIRED GAIT: ICD-10-CM

## 2021-12-16 PROCEDURE — 97530 THERAPEUTIC ACTIVITIES: CPT | Performed by: PHYSICAL THERAPIST

## 2021-12-16 PROCEDURE — 99213 OFFICE O/P EST LOW 20 MIN: CPT | Performed by: PHYSICIAN ASSISTANT

## 2021-12-16 PROCEDURE — 97110 THERAPEUTIC EXERCISES: CPT | Performed by: PHYSICAL THERAPIST

## 2021-12-16 NOTE — PROGRESS NOTES
"Physical Therapy Daily Treatment Note      Patient: Bing Cruz   : 1931  Referring practitioner: Weston Richter*  Date of Initial Visit: Type: THERAPY  Noted: 11/3/2021  Today's Date: 2021  Patient seen for 10 sessions           Subjective  Bing Cruz reports: \"I just saw the doctor and he said I will keep coming to therapy for another 6 weeks. I just don't seem to be getting stronger.\"    She stated she goes by Carmen.     Objective   See Exercise, Manual, and Modality Logs for complete treatment.       Assessment/Plan  Pt to attend to postural lengthening and gluteal tightening to aid alignment and strength. Pt is to call and schedule additional visits. Carmen commented that she felt she had a good work out.   Visit Diagnoses:    ICD-10-CM ICD-9-CM   1. Weakness of both legs  R29.898 729.89   2. Impaired gait  R26.9 781.2   3. Left hip pain  M25.552 719.45       Progress per Plan of Care and Progress strengthening /stabilization /functional activity           Timed:  Manual Therapy:         mins  26891;  Therapeutic Exercise:    15     mins  80839;     Neuromuscular Daylin:        mins  63850;    Therapeutic Activity:     15     mins  79807;     Gait Training:           mins  62462;     Ultrasound:          mins  17421;    Electrical Stimulation:         mins  33484 ( );  Aquatics  __   mins   34000    Untimed:  Electrical Stimulation:         mins  08527 ( );  Mechanical Traction:         mins  14974;     Timed Treatment:   30   mins   Total Treatment:     30   mins    Electronically Signed:  Margo Thao PTA  Physical Therapist Assistant    [place] [facility] license [id number]            "

## 2021-12-16 NOTE — PROGRESS NOTES
"Chief Complaint  Follow-up of the Left Hip    Subjective          Bing Cruz is a 89 y.o. female  presents to [facility] for   History of Present Illness    Patient presents for follow-up evaluation of left femoral neck ORIF, 7/21/2021.  At last visit patient was given order to start outpatient physical therapy she states she has been attending therapy about twice a week she states with Thanksgiving and some other things she has missed some appointments.  She states she does not feel like she has been very diligent about doing home exercise like she should she states she has not been improving with strength and movement of her hip she states she sometimes has pain in the lateral hip, denies new injury, states she continues to have to walk with assistance using a cane, denies numbness and tingling.  Patient denies need for pain medication or NSAIDs.  Allergies   Allergen Reactions   • Citalopram Unknown - Low Severity   • Clonazepam Unknown - Low Severity   • Levofloxacin Nausea And Vomiting   • Lisinopril Unknown - Low Severity   • Melatonin Unknown - High Severity   • Macrobid [Nitrofurantoin Macrocrystal] Rash        Social History     Socioeconomic History   • Marital status:    Tobacco Use   • Smoking status: Never Smoker   • Smokeless tobacco: Never Used   Vaping Use   • Vaping Use: Former   Substance and Sexual Activity   • Alcohol use: Not Currently   • Drug use: Never   • Sexual activity: Defer        REVIEW OF SYSTEMS    Constitutional: Denies fevers, chills, weight loss  Cardiovascular: Denies chest pain, shortness of breath  Skin: Denies rashes, acute skin changes  Neurologic: Denies headache, loss of consciousness  MSK: Left hip pain      Objective   Vital Signs:   Pulse 57   Ht 170.2 cm (67\")   Wt 67.1 kg (148 lb)   SpO2 98%   BMI 23.18 kg/m²     Body mass index is 23.18 kg/m².    Physical Exam    Left hip: Incisions are well-healed, nontender to palpation, " no pain with range of motion, active flexion 120, no pain with rotation, 5 out of 5 strength, neurovascularly intact, patient ambulates with antalgic gait, needs cane for assistance.    Procedures    Imaging Results (Most Recent)     Procedure Component Value Units Date/Time    XR Hip With or Without Pelvis 2 - 3 View Left [[id number]] Resulted: 12/16/21 1146     Updated: 12/16/21 1147    Narrative:      • View:AP and Lateral view(s)  • Site: Left hip  • Indication: Left hip pain  • Study: X-rays ordered, taken in the office, and reviewed today  • X-ray: Well-healed femoral neck fracture with intact screws, no signs of   hardware failure or loosening.  • Comparative data: No comparative data found             Result Review :   The following data was reviewed by: JUNAID Henderson on 12/16/2021:  Data reviewed: Radiologic studies Reviewed by me with the patient             Assessment and Plan    Diagnoses and all orders for this visit:    1. Aftercare following surgery of left femoral neck ORIF 7/21/2021 (Primary)  -     Ambulatory Referral to Physical Therapy Evaluate and treat (2-3xweek for 6-8weeks)    2. Left hip pain  -     XR Hip With or Without Pelvis 2 - 3 View Left  -     Ambulatory Referral to Physical Therapy Evaluate and treat (2-3xweek for 6-8weeks)        Reviewed x-rays with the patient, patient was advised we recommend continuing physical therapy for strengthening, she was given new order and advised to be aggressive with strengthening and range of motion under therapy guidance, she was strongly advised to work on home exercises when not at therapy for hip strength, continue activity and weightbearing as tolerated, follow-up in 6 weeks.    Call or return if worsening symptoms.    Follow Up   Return in about 6 weeks (around 1/27/2022) for Recheck.  Patient was given instructions and counseling regarding her condition or for health maintenance advice. Please see specific information pulled  into the AVS if appropriate.

## 2021-12-21 ENCOUNTER — TREATMENT (OUTPATIENT)
Dept: PHYSICAL THERAPY | Facility: CLINIC | Age: 86
End: 2021-12-21

## 2021-12-21 DIAGNOSIS — R26.9 IMPAIRED GAIT: ICD-10-CM

## 2021-12-21 DIAGNOSIS — R29.898 WEAKNESS OF BOTH LEGS: Primary | ICD-10-CM

## 2021-12-21 DIAGNOSIS — M25.552 LEFT HIP PAIN: ICD-10-CM

## 2021-12-21 PROCEDURE — 97530 THERAPEUTIC ACTIVITIES: CPT | Performed by: PHYSICAL THERAPIST

## 2021-12-21 PROCEDURE — 97110 THERAPEUTIC EXERCISES: CPT | Performed by: PHYSICAL THERAPIST

## 2021-12-21 NOTE — PROGRESS NOTES
"Physical Therapy Daily Treatment Note      Patient: Bing Cruz   : 1931  Referring practitioner: Weston Richter*  Date of Initial Visit: Type: THERAPY  Noted: 11/3/2021  Today's Date: 2021  Patient seen for 11 sessions           Subjective  Bing Cruz reports: that she feels she is getting stronger with the exercises.    \"Trying to attend to my posture like you said, it does help.\"    Objective   See Exercise, Manual, and Modality Logs for complete treatment.       Assessment/Plan  Pt has remaining hip weakness B, but with lifting of her trunk she does exhibit improved control. Further therapist directed program remains necessary to attend to strength and functional ability.    Visit Diagnoses:    ICD-10-CM ICD-9-CM   1. Weakness of both legs  R29.898 729.89   2. Impaired gait  R26.9 781.2   3. Left hip pain  M25.552 719.45       Progress per Plan of Care and Progress strengthening /stabilization /functional activity           Timed:  Manual Therapy:         mins  17645;  Therapeutic Exercise:    14     mins  33854;     Neuromuscular Daylin:        mins  40154;    Therapeutic Activity:     14     mins  93884;     Gait Training:           mins  40372;     Ultrasound:          mins  60902;    Electrical Stimulation:         mins  48883 ( );  Aquatics  __   mins   38999    Untimed:  Electrical Stimulation:         mins  08879 ( );  Mechanical Traction:         mins  60578;     Timed Treatment:   28   mins   Total Treatment:     28   mins    Electronically Signed:  Margo Thao PTA  Physical Therapist Assistant    [place] [employer] license [id number]            "

## 2021-12-23 ENCOUNTER — TREATMENT (OUTPATIENT)
Dept: PHYSICAL THERAPY | Facility: CLINIC | Age: 86
End: 2021-12-23

## 2021-12-23 DIAGNOSIS — M25.552 LEFT HIP PAIN: ICD-10-CM

## 2021-12-23 DIAGNOSIS — R26.9 IMPAIRED GAIT: Primary | ICD-10-CM

## 2021-12-23 DIAGNOSIS — R29.898 WEAKNESS OF BOTH LEGS: ICD-10-CM

## 2021-12-23 PROCEDURE — 97110 THERAPEUTIC EXERCISES: CPT | Performed by: PHYSICAL THERAPIST

## 2021-12-23 NOTE — PROGRESS NOTES
Physical Therapy Daily Treatment Note      Patient: Bing Cruz   : 1931  Referring practitioner: Weston Richter*  Date of Initial Visit: Type: THERAPY  Noted: 11/3/2021  Today's Date: 2021  Patient seen for 12 sessions           Subjective   Bing Cruz reports: No new subjective reports. She is doing fine.     Objective   See Exercise, Manual, and Modality Logs for complete treatment.     Assessment & Plan     Assessment    Assessment details: Pt tolerated session well overall. Progressed some exercises.     Plan  Plan details: Continue with POC.        Visit Diagnoses:    ICD-10-CM ICD-9-CM   1. Impaired gait  R26.9 781.2   2. Weakness of both legs  R29.898 729.89   3. Left hip pain  M25.552 719.45       Progress per Plan of Care           Timed:  Manual Therapy:    0     mins  19258;  Therapeutic Exercise:    25     mins  66844;     Neuromuscular Daylin:    0    mins  95935;    Therapeutic Activity:     0     mins  91748;     Gait Trainin     mins  81782;     Aquatic Therapy     0     mins  97399;     Ultrasound:     0     mins  90148;    Electrical Stimulation:    0     mins  65096 ( );    Untimed:  Electrical Stimulation:    0     mins  02259 ( );  Mechanical Traction:    0     mins  82277;     Timed Treatment:   25   mins   Total Treatment:     25   mins  LACEY Allan License: [id number]

## 2021-12-28 ENCOUNTER — TREATMENT (OUTPATIENT)
Dept: PHYSICAL THERAPY | Facility: CLINIC | Age: 86
End: 2021-12-28

## 2021-12-28 DIAGNOSIS — R29.898 WEAKNESS OF BOTH LEGS: ICD-10-CM

## 2021-12-28 DIAGNOSIS — R26.9 IMPAIRED GAIT: Primary | ICD-10-CM

## 2021-12-28 DIAGNOSIS — M25.552 LEFT HIP PAIN: ICD-10-CM

## 2021-12-28 PROCEDURE — 97110 THERAPEUTIC EXERCISES: CPT | Performed by: PHYSICAL THERAPIST

## 2021-12-28 PROCEDURE — 97140 MANUAL THERAPY 1/> REGIONS: CPT | Performed by: PHYSICAL THERAPIST

## 2021-12-28 NOTE — PROGRESS NOTES
Physical Therapy Daily Treatment Note      Patient: Bing Cruz   : 1931  Referring practitioner: Weston Richter*  Date of Initial Visit: Type: THERAPY  Noted: 11/3/2021  Today's Date: 2021  Patient seen for 13 sessions           Subjective   Bing Cruz reports: She has significant pain in her hip all weekend. She states that she was sore from last session's exercises, then she did a lot of yard work (including picking up leaves).    Objective   See Exercise, Manual, and Modality Logs for complete treatment.     Assessment & Plan     Assessment    Assessment details: Pt tolerated session well overall. She had relief with manual therapy and exercises were reduced due to significant soreness today.    Plan  Plan details: Continue with POC.        Visit Diagnoses:    ICD-10-CM ICD-9-CM   1. Impaired gait  R26.9 781.2   2. Weakness of both legs  R29.898 729.89   3. Left hip pain  M25.552 719.45       Progress per Plan of Care           Timed:  Manual Therapy:    15     mins  84942;  Therapeutic Exercise:    12     mins  77526;     Neuromuscular Daylin:    0    mins  53311;    Therapeutic Activity:     0     mins  10524;     Gait Trainin     mins  04311;     Aquatic Therapy     0     mins  66756;     Ultrasound:     0     mins  67668;    Electrical Stimulation:    0     mins  06467 ( );    Untimed:  Electrical Stimulation:    0     mins  84306 ( );  Mechanical Traction:    0     mins  93751;     Timed Treatment:   27   mins   Total Treatment:     27  mins  Trish Huerta PT    [place] License: [id number]

## 2021-12-30 ENCOUNTER — TELEPHONE (OUTPATIENT)
Dept: PHYSICAL THERAPY | Facility: CLINIC | Age: 86
End: 2021-12-30

## 2022-01-04 ENCOUNTER — TREATMENT (OUTPATIENT)
Dept: PHYSICAL THERAPY | Facility: CLINIC | Age: 87
End: 2022-01-04

## 2022-01-04 DIAGNOSIS — R26.9 IMPAIRED GAIT: Primary | ICD-10-CM

## 2022-01-04 DIAGNOSIS — M25.552 LEFT HIP PAIN: ICD-10-CM

## 2022-01-04 DIAGNOSIS — R29.898 WEAKNESS OF BOTH LEGS: ICD-10-CM

## 2022-01-04 PROCEDURE — 97110 THERAPEUTIC EXERCISES: CPT | Performed by: PHYSICAL THERAPIST

## 2022-01-04 NOTE — PROGRESS NOTES
PROGRESS NOTE    Patient: Bing Cruz   : 1931  Diagnosis/ICD-10 Code:  Impaired gait [R26.9]  Referring practitioner: Weston Richter*  Date of Initial Visit: Type: THERAPY  Noted: 11/3/2021  Today's Date: 2022  Patient seen for 14 sessions    Subjective:   Bing Cruz reports: Pt does not feel that she is getting better. She is doing her HEP 1x daily and feels that she does not see any changes in her strength overall. Pt continues to have pain. Pt reports 4/10 pain today. She states that she is getting around okay with her cane and is slower at doing her usual activities. She wants to eventually be able to walk without the cane. Pt has a MD follow up on 22.    Clinical Progress: unchanged  Home Program Compliance: Yes  Treatment has included: therapeutic exercise, neuromuscular re-education, manual therapy, therapeutic activity and gait training    Subjective   Objective          Active Range of Motion   Left Hip   Normal active range of motion    Right Hip   Normal active range of motion    Strength/Myotome Testing     Left Hip   Planes of Motion   Flexion: 4  Abduction: 3+  Adduction: 4    Right Hip   Planes of Motion   Flexion: 4+  Abduction: 4+  Adduction: 4    Left Knee   Flexion: 4+  Extension: 4    Right Knee   Flexion: 4+  Extension: 4+    Ambulation     Comments   Decreased gait speed.    Functional Assessment     Comments  Instructions: Please select the level of difficulty you have for each activity below.      Able to do without      Able to do with      Able to do with         Able to do with      Unable to do      N/A    any difficulty         little difficulty         moderate difficulty    much difficulty    9.  Standing  1  (*)          2  (*)               3  (x)                        4  (*)                     5  (*)              9  (*)  10. Walking-  1  (*)          2  (*)               3  (x)                        4  (*)                     5  (*)               9  (*)  short distances   13. Climbing  1  (*)          2  (*)               3  (*)                        4  (x)                     5  (*)              9  (*)  stairs    Total Score: (*) (all items) (10) (3 items) (*)  (1 item)    % Limitation   All Items  3 Items  1 Item    (*) 0%  (Score of 22)   (Score of 3)   (Score of 1)  (*) 1-19%   (Score of 23-40)  (Score of 4-5)   (Score of 2)  (*) 20-39%  (Score of 41-58)  (Score of 6-7)   (Score of 2)  (*) 40-59%  (Score of 59-76)  (Score of 8-9)   (Score of 3)  (x) 60-79%  (Score of 77-94)  (Score of 10-11)  (Score of 3)  (*) 80-99%  (Score of )  (Score of 12-14)  (Score of 4)  (*) 100%  (Score of 110)   (Score of 15)   (Score of 5)      Assessment & Plan     Assessment  Impairments: abnormal gait, abnormal or restricted ROM, impaired balance, pain with function and safety issue  Functional Limitations: walking and standing  Assessment details: Pt has plateau in functional progress and continues to have significant weakness in bilateral hips despite skilled physical therapy interventions. She  Recommend that pt follows up with her MD and perhaps have further testing. Also recommend that pt continues with her HEP.     Goals  Plan Goals: HIP PROBLEMS    1. The patient complains of L hip pain.   LTG 1: 12 weeks:  The patient will report a pain rating of 3/10 or better in order to improve  tolerance to activities of daily living and improve sleep quality.    STATUS: not met, progressing   STG 1a: 6 weeks:  The patient will report a pain rating of 4/10 or better.    STATUS:  not met, pt's pain has not exceeded 4-5/10 recently    TREATMENT:  Therapeutic exercises, manual therapy, aquatic therapy, home exercise   instruction, and modalities as needed for pain to include:  electrical stimulation, moist heat, ice,   ultrasound, and diathermy.    2. The patient demonstrates weakness of the L hip.   LTG 2: 12 weeks:  The patient will demonstrate 4+/5 strength for L hip  flexion, abduction,  and extension in order to improve hip stability.    STATUS:  not met, progressing   STG 2a: 6 weeks:  The patient will demonstrate 4/5 strength for L hip flexion, abduction,  and extension.    STATUS:  not met, progressing   TREATMENT: Therapeutic exercises, manual therapy, aquatic therapy, home exercise instruction,  and modalities as needed for pain to include:  electrical stimulation, moist heat, ice, and ultrasound    3. The patient has gait dysfunction.  LTG 3: 12 weeks:  The patient will ambulate without assistive device, independently, for community distances with minimal limp to the L lower extremity in order to improve mobility and allow patient to perform activities such as grocery shopping with greater ease.  STATUS:  progressing  STG 3a: The patient will be independent in [facility].  STATUS:  progressing  TREATMENT: Gait training, aquatic therapy, therapeutic exercise, and home exercise instruction.  TREATMENT:  Manual therapy, therapeutic exercise, home exercise instruction, and modalities as needed to include: moist heat, electrical stimulation, and ultrasound.    Plan  Therapy options: will be seen for skilled therapy services  Planned therapy interventions: manual therapy, soft tissue mobilization, stretching, therapeutic activities, neuromuscular re-education, gait training, flexibility and home exercise program  Plan details: Recommend follow up with MD for MRI on lumbar spine and hips as pt has plateau and is not making progress as expected. Plan to hold physical therapy until MD follow up this month.       Progress toward previous goals: Partially Met    Recommendations: Continue as planned  Prognosis to achieve goals: good    PT Signature: Trish Huerta PT    Electronically Signed 1/4/2022    [place] License: [id number]    Based upon review of the patient's progress and continued therapy plan, it is my medical opinion that Bing Cruz should continue physical therapy treatment at [facility]  [facility]  [address] [facility] [address] [place] [address] [phone].    Timed:         Manual Therapy:    0     mins  97586;     Therapeutic Exercise:    23    mins  20210;     Neuromuscular Daylin:    0    mins  09081;    Therapeutic Activity:     0     mins  04136;     Gait Trainin     mins  94407;     Ultrasound:     0     mins  39754;    Ionto                               0    mins   90322  Aquatic                          0     mins 06497      Un-Timed:  Electrical Stimulation:    0     mins  23596 ( );  Dry Needling     0     mins self-pay  Traction     0     mins [id number]      Timed Treatment:   23   mins   Total Treatment:     23   mins

## 2022-01-05 ENCOUNTER — TELEPHONE (OUTPATIENT)
Dept: ORTHOPEDIC SURGERY | Facility: CLINIC | Age: 87
End: 2022-01-05

## 2022-01-05 NOTE — TELEPHONE ENCOUNTER
Provider: MARLO MATAMOROS    Caller: STEPHANIE BENITES    Relationship to Patient: SELF    Phone Number: [phone]    Reason for Call:  PATIENT'S DAUGHTER STEPHANIE ADVISED PATIENT'S PHYSICAL THERAPIST ADVISED SHE IS NOT MAKING ANY PROGRESS AND THEY RECOMMEND FOR PATIENT TO GET MRI. STEPHANIE WANTS TO IF DR. MATAMOROS WANTS  PATIENT TO GET MRI BEFORE HER APPOINTMENT ON 1/27/21.     IF SO, PATIENT ALSO NEEDS MRI ORDER.     PLEASE CONTACT PATIENT'S DAUGHTER TO INFORM.

## 2022-01-11 ENCOUNTER — TELEPHONE (OUTPATIENT)
Dept: ORTHOPEDIC SURGERY | Facility: CLINIC | Age: 87
End: 2022-01-11

## 2022-01-11 NOTE — TELEPHONE ENCOUNTER
Left message for patient's daughter to call me back regarding MRI. Dr. Warner would like to know where she is having pain so we can make sure we order the correct MRI.

## 2022-01-11 NOTE — TELEPHONE ENCOUNTER
Spoke to patient's daughter regarding an MRI. She states she is doing better since she stopped going to PT and would like to wait on her MRI. Her follow up is on 11/27/21. She will call before hand if she wishes to proceed with an MRI prior to her appointment.

## 2022-01-17 RX ORDER — LOSARTAN POTASSIUM 50 MG/1
TABLET ORAL
Qty: 180 TABLET | Refills: 3 | Status: SHIPPED | OUTPATIENT
Start: 2022-01-17 | End: 2022-03-18 | Stop reason: HOSPADM

## 2022-01-18 DIAGNOSIS — M89.8X5 PAIN OF LEFT FEMUR: Primary | ICD-10-CM

## 2022-01-27 ENCOUNTER — HOSPITAL ENCOUNTER (OUTPATIENT)
Dept: CARDIOLOGY | Facility: HOSPITAL | Age: 87
Discharge: HOME | End: 2022-01-27
Admitting: PHYSICIAN ASSISTANT

## 2022-01-27 ENCOUNTER — OFFICE VISIT (OUTPATIENT)
Dept: ORTHOPEDIC SURGERY | Facility: CLINIC | Age: 87
End: 2022-01-27

## 2022-01-27 VITALS — BODY MASS INDEX: 32.86 KG/M2 | OXYGEN SATURATION: 97 % | HEIGHT: 55 IN | WEIGHT: 142 LBS | HEART RATE: 76 BPM

## 2022-01-27 DIAGNOSIS — M79.605 ACUTE PAIN OF LEFT LOWER EXTREMITY: ICD-10-CM

## 2022-01-27 DIAGNOSIS — M25.552 LEFT HIP PAIN: ICD-10-CM

## 2022-01-27 DIAGNOSIS — Z47.89 AFTERCARE FOLLOWING SURGERY OF THE MUSCULOSKELETAL SYSTEM: ICD-10-CM

## 2022-01-27 DIAGNOSIS — Z47.89 AFTERCARE FOLLOWING SURGERY OF THE MUSCULOSKELETAL SYSTEM: Primary | ICD-10-CM

## 2022-01-27 LAB
BH CV LOWER VASCULAR LEFT COMMON FEMORAL AUGMENT: NORMAL
BH CV LOWER VASCULAR LEFT COMMON FEMORAL COMPETENT: NORMAL
BH CV LOWER VASCULAR LEFT COMMON FEMORAL COMPRESS: NORMAL
BH CV LOWER VASCULAR LEFT COMMON FEMORAL PHASIC: NORMAL
BH CV LOWER VASCULAR LEFT COMMON FEMORAL SPONT: NORMAL
BH CV LOWER VASCULAR LEFT DISTAL FEMORAL COMPRESS: NORMAL
BH CV LOWER VASCULAR LEFT GASTRONEMIUS COMPRESS: NORMAL
BH CV LOWER VASCULAR LEFT GREATER SAPH AK COMPRESS: NORMAL
BH CV LOWER VASCULAR LEFT GREATER SAPH BK COMPRESS: NORMAL
BH CV LOWER VASCULAR LEFT LESSER SAPH COMPRESS: NORMAL
BH CV LOWER VASCULAR LEFT MID FEMORAL AUGMENT: NORMAL
BH CV LOWER VASCULAR LEFT MID FEMORAL COMPETENT: NORMAL
BH CV LOWER VASCULAR LEFT MID FEMORAL COMPRESS: NORMAL
BH CV LOWER VASCULAR LEFT MID FEMORAL PHASIC: NORMAL
BH CV LOWER VASCULAR LEFT MID FEMORAL SPONT: NORMAL
BH CV LOWER VASCULAR LEFT PERONEAL COMPRESS: NORMAL
BH CV LOWER VASCULAR LEFT POPLITEAL AUGMENT: NORMAL
BH CV LOWER VASCULAR LEFT POPLITEAL COMPETENT: NORMAL
BH CV LOWER VASCULAR LEFT POPLITEAL COMPRESS: NORMAL
BH CV LOWER VASCULAR LEFT POPLITEAL PHASIC: NORMAL
BH CV LOWER VASCULAR LEFT POPLITEAL SPONT: NORMAL
BH CV LOWER VASCULAR LEFT POSTERIOR TIBIAL COMPRESS: NORMAL
BH CV LOWER VASCULAR LEFT PROXIMAL FEMORAL COMPRESS: NORMAL
BH CV LOWER VASCULAR LEFT SOLEAL COMPRESS: NORMAL
BH CV LOWER VASCULAR RIGHT COMMON FEMORAL AUGMENT: NORMAL
BH CV LOWER VASCULAR RIGHT COMMON FEMORAL COMPETENT: NORMAL
BH CV LOWER VASCULAR RIGHT COMMON FEMORAL COMPRESS: NORMAL
BH CV LOWER VASCULAR RIGHT COMMON FEMORAL PHASIC: NORMAL
BH CV LOWER VASCULAR RIGHT COMMON FEMORAL SPONT: NORMAL
MAXIMAL PREDICTED HEART RATE: 130 BPM
STRESS TARGET HR: 111 BPM

## 2022-01-27 PROCEDURE — 93971 EXTREMITY STUDY: CPT

## 2022-01-27 PROCEDURE — 93971 EXTREMITY STUDY: CPT | Performed by: SURGERY

## 2022-01-27 PROCEDURE — 99213 OFFICE O/P EST LOW 20 MIN: CPT | Performed by: PHYSICIAN ASSISTANT

## 2022-01-27 RX ORDER — HYDROCODONE BITARTRATE AND ACETAMINOPHEN 5; 325 MG/1; MG/1
1 TABLET ORAL EVERY 6 HOURS PRN
Qty: 24 TABLET | Refills: 0 | Status: SHIPPED | OUTPATIENT
Start: 2022-01-27 | End: 2022-02-11 | Stop reason: DRUGHIGH

## 2022-01-27 NOTE — PROGRESS NOTES
"Chief Complaint  Follow-up of the Left Hip    Subjective          Bing Cruz is a 89 y.o. female  presents to [facility] for   History of Present Illness    Patient presents with her daughter for follow-up evaluation of left femoral neck ORIF, 7/21/2021.  Patient was last seen on 12/16/2021 she states that her hip is \"not getting better \".  She states she cannot lift her left leg at times she states the pain that she has is deep in her groin, radiates to the front of her leg down her femur into her knee.  Patient stopped therapy about 3 weeks ago she states that therapy since she was not getting any stronger.  Patient states the pain is worse at night when she is trying to sleep/get comfortable.  Patient denies numbness and tingling, denies back pain.  Patient daughter states patient has a history of DVT she is currently taking blood thinner medication, DVT was in the left lower extremity.  Patient daughter states and shows a picture that about a week ago the patient leg had a color change which appeared to have bruising and swelling.  They state this has resolved.  Patient and daughter state they called recently and Dr. Warner patient order for MRI of the patient left hip for further evaluation, they state that the MRI is scheduled for February 8.  Allergies   Allergen Reactions   • Citalopram Unknown - Low Severity   • Clonazepam Unknown - Low Severity   • Levofloxacin Nausea And Vomiting   • Lisinopril Unknown - Low Severity   • Melatonin Unknown - High Severity   • Macrobid [Nitrofurantoin Macrocrystal] Rash        Social History     Socioeconomic History   • Marital status:    Tobacco Use   • Smoking status: Never Smoker   • Smokeless tobacco: Never Used   Vaping Use   • Vaping Use: Former   Substance and Sexual Activity   • Alcohol use: Not Currently   • Drug use: Never   • Sexual activity: Defer        REVIEW OF SYSTEMS    Constitutional: Denies fevers, chills, " "weight loss  Cardiovascular: Denies chest pain, shortness of breath  Skin: Denies rashes, acute skin changes  Neurologic: Denies headache, loss of consciousness  MSK: Left lower extremity pain      Objective   Vital Signs:   Pulse 76   Ht 67 cm (26.38\")   Wt 64.4 kg (142 lb)   SpO2 97%   .49 kg/m²     Body mass index is 143.49 kg/m².    Physical Exam    Left lower extremity: Well-healed incision of the lateral hip,  patient is able to actively flex her hip, active flexion 120, mild pain with rotation, patient states the pain is in her groin area, mild tenderness to palpation of lateral hip,  nontender calf, negative Homans' sigN.  Patient ambulates with cane, ambulates with antalgic gait, 4 out of 5 hip strength.    Procedures    Imaging Results (Most Recent)     Procedure Component Value Units Date/Time    XR Hip With or Without Pelvis 2 - 3 View Left [[id number]] Resulted: 01/27/22 1147     Updated: 01/27/22 1153    Narrative:      • View:AP and Lateral view(s)  • Site: Left hip  • Indication: Left hip pain  • Study: X-rays ordered, taken in the office, and reviewed today  • X-ray: Well-healed femoral neck fracture with intact screws, no signs of   hardware failure or loosening  • Comparative data: No comparative data found           Duplex Venous Lower Extremity - Left CAR    Result Date: 1/27/2022  Narrative: · Normal left lower extremity venous duplex scan.      XR Hip With or Without Pelvis 2 - 3 View Left    Result Date: 1/27/2022  Narrative: • View:AP and Lateral view(s) • Site: Left hip • Indication: Left hip pain • Study: X-rays ordered, taken in the office, and reviewed today • X-ray: Well-healed femoral neck fracture with intact screws, no signs of hardware failure or loosening • Comparative data: No comparative data found       Result Review :   The following data was reviewed by: JUNAID Henderson on 01/27/2022:  Data reviewed: Radiologic studies Reviewed by me with the patient and " her daughter             Assessment and Plan    Diagnoses and all orders for this visit:    1. Aftercare following surgery of left femoral neck ORIF 7/21/2021 (Primary)  -     Duplex Venous Lower Extremity - Left CAR; Future    2. Left hip pain  -     XR Hip With or Without Pelvis 2 - 3 View Left  -     Duplex Venous Lower Extremity - Left CAR; Future    3. Acute pain of left lower extremity  -     Duplex Venous Lower Extremity - Left CAR; Future        Reviewed x-rays with the patient and her daughter, given patient history of left lower extremity DVT and episode of swelling and color change to the leg within the last 2 weeks, patient daughter advised we will order ultrasound of left lower extremity for further evaluation, this is a stat order, patient and daughter went to the hospital from our office.  Continue plan for MRI on February 8, I will call them with ultrasound results when I receive them today, otherwise continue plan for MRI and follow-up after MRI    Call or return if worsening symptoms.    Follow Up   Return for After MRI.  Patient was given instructions and counseling regarding her condition or for health maintenance advice. Please see specific information pulled into the AVS if appropriate.     1/27/2022, 5:11 PM I just spoke with the patient's daughter, they had the ultrasound performed and I had left a callback number for the vascular lab to call me with the results, no one has called, I was working on afternoon but I called the patient to let them know that I had not received a call in the patient daughter states that they gave them the result when they were onsite stating that there was no blood clot in the patient left lower extremity.  Patient daughter was advised that we will continue plan then with follow-up after MRI and she states that her to have an appointment on February 10 to review the MRI.  She also states they picked up the pain medication that was prescribed the patient.   Follow-up after MRI.  Patient daughter agreed.

## 2022-02-02 ENCOUNTER — LAB (OUTPATIENT)
Dept: LAB | Facility: HOSPITAL | Age: 87
End: 2022-02-02

## 2022-02-02 DIAGNOSIS — I10 HYPERTENSION, ESSENTIAL: ICD-10-CM

## 2022-02-02 DIAGNOSIS — D50.9 IRON DEFICIENCY ANEMIA, UNSPECIFIED IRON DEFICIENCY ANEMIA TYPE: ICD-10-CM

## 2022-02-02 LAB
ALBUMIN SERPL-MCNC: 4.1 G/DL (ref 3.5–5.2)
ALBUMIN/GLOB SERPL: 1.3 G/DL
ALP SERPL-CCNC: 100 U/L (ref 39–117)
ALT SERPL W P-5'-P-CCNC: 9 U/L (ref 1–33)
ANION GAP SERPL CALCULATED.3IONS-SCNC: 9 MMOL/L (ref 5–15)
AST SERPL-CCNC: 17 U/L (ref 1–32)
BASOPHILS # BLD AUTO: 0.05 10*3/MM3 (ref 0–0.2)
BASOPHILS NFR BLD AUTO: 0.7 % (ref 0–1.5)
BILIRUB SERPL-MCNC: 0.9 MG/DL (ref 0–1.2)
BUN SERPL-MCNC: 17 MG/DL (ref 8–23)
BUN/CREAT SERPL: 21.5 (ref 7–25)
CALCIUM SPEC-SCNC: 9.3 MG/DL (ref 8.2–9.6)
CHLORIDE SERPL-SCNC: 104 MMOL/L (ref 98–107)
CO2 SERPL-SCNC: 27 MMOL/L (ref 22–29)
CREAT SERPL-MCNC: 0.79 MG/DL (ref 0.57–1)
DEPRECATED RDW RBC AUTO: 48.7 FL (ref 37–54)
EOSINOPHIL # BLD AUTO: 0.11 10*3/MM3 (ref 0–0.4)
EOSINOPHIL NFR BLD AUTO: 1.5 % (ref 0.3–6.2)
ERYTHROCYTE [DISTWIDTH] IN BLOOD BY AUTOMATED COUNT: 13.5 % (ref 12.3–15.4)
GFR SERPL CREATININE-BSD FRML MDRD: 68 ML/MIN/1.73
GLOBULIN UR ELPH-MCNC: 3.2 GM/DL
GLUCOSE SERPL-MCNC: 93 MG/DL (ref 65–99)
HCT VFR BLD AUTO: 38.3 % (ref 34–46.6)
HGB BLD-MCNC: 12.9 G/DL (ref 12–15.9)
IMM GRANULOCYTES # BLD AUTO: 0.02 10*3/MM3 (ref 0–0.05)
IMM GRANULOCYTES NFR BLD AUTO: 0.3 % (ref 0–0.5)
LYMPHOCYTES # BLD AUTO: 2.22 10*3/MM3 (ref 0.7–3.1)
LYMPHOCYTES NFR BLD AUTO: 31 % (ref 19.6–45.3)
MCH RBC QN AUTO: 32.8 PG (ref 26.6–33)
MCHC RBC AUTO-ENTMCNC: 33.7 G/DL (ref 31.5–35.7)
MCV RBC AUTO: 97.5 FL (ref 79–97)
MONOCYTES # BLD AUTO: 0.47 10*3/MM3 (ref 0.1–0.9)
MONOCYTES NFR BLD AUTO: 6.6 % (ref 5–12)
NEUTROPHILS NFR BLD AUTO: 4.3 10*3/MM3 (ref 1.7–7)
NEUTROPHILS NFR BLD AUTO: 59.9 % (ref 42.7–76)
NRBC BLD AUTO-RTO: 0 /100 WBC (ref 0–0.2)
PLATELET # BLD AUTO: 264 10*3/MM3 (ref 140–450)
PMV BLD AUTO: 12.2 FL (ref 6–12)
POTASSIUM SERPL-SCNC: 3.8 MMOL/L (ref 3.5–5.2)
PROT SERPL-MCNC: 7.3 G/DL (ref 6–8.5)
RBC # BLD AUTO: 3.93 10*6/MM3 (ref 3.77–5.28)
SODIUM SERPL-SCNC: 140 MMOL/L (ref 136–145)
WBC NRBC COR # BLD: 7.17 10*3/MM3 (ref 3.4–10.8)

## 2022-02-02 PROCEDURE — 85025 COMPLETE CBC W/AUTO DIFF WBC: CPT

## 2022-02-02 PROCEDURE — 36415 COLL VENOUS BLD VENIPUNCTURE: CPT

## 2022-02-02 PROCEDURE — 80053 COMPREHEN METABOLIC PANEL: CPT

## 2022-02-08 ENCOUNTER — HOSPITAL ENCOUNTER (OUTPATIENT)
Dept: MRI IMAGING | Facility: HOSPITAL | Age: 87
Discharge: HOME | End: 2022-02-08
Admitting: ORTHOPAEDIC SURGERY

## 2022-02-08 DIAGNOSIS — M89.8X5 PAIN OF LEFT FEMUR: ICD-10-CM

## 2022-02-08 PROCEDURE — 73718 MRI LOWER EXTREMITY W/O DYE: CPT

## 2022-02-09 ENCOUNTER — OFFICE VISIT (OUTPATIENT)
Dept: CARDIOLOGY | Facility: CLINIC | Age: 87
End: 2022-02-09

## 2022-02-09 VITALS
HEIGHT: 67 IN | BODY MASS INDEX: 22.76 KG/M2 | WEIGHT: 145 LBS | SYSTOLIC BLOOD PRESSURE: 122 MMHG | HEART RATE: 98 BPM | DIASTOLIC BLOOD PRESSURE: 80 MMHG

## 2022-02-09 DIAGNOSIS — I82.5Y2 CHRONIC DEEP VEIN THROMBOSIS (DVT) OF PROXIMAL VEIN OF LEFT LOWER EXTREMITY: ICD-10-CM

## 2022-02-09 DIAGNOSIS — I48.20 ATRIAL FIBRILLATION, CHRONIC: Primary | ICD-10-CM

## 2022-02-09 DIAGNOSIS — I34.0 NONRHEUMATIC MITRAL VALVE REGURGITATION: Chronic | ICD-10-CM

## 2022-02-09 DIAGNOSIS — I35.1 NONRHEUMATIC AORTIC VALVE INSUFFICIENCY: Chronic | ICD-10-CM

## 2022-02-09 DIAGNOSIS — E78.5 HYPERLIPIDEMIA, UNSPECIFIED HYPERLIPIDEMIA TYPE: ICD-10-CM

## 2022-02-09 DIAGNOSIS — I10 HYPERTENSION, ESSENTIAL: ICD-10-CM

## 2022-02-09 PROCEDURE — 93000 ELECTROCARDIOGRAM COMPLETE: CPT | Performed by: INTERNAL MEDICINE

## 2022-02-09 PROCEDURE — 99214 OFFICE O/P EST MOD 30 MIN: CPT | Performed by: INTERNAL MEDICINE

## 2022-02-09 RX ORDER — LATANOPROST 50 UG/ML
1 SOLUTION/ DROPS OPHTHALMIC DAILY
COMMUNITY
Start: 2021-11-11

## 2022-02-09 RX ORDER — METOPROLOL SUCCINATE 100 MG/1
100 TABLET, EXTENDED RELEASE ORAL 2 TIMES DAILY
Qty: 180 TABLET | Refills: 3 | Status: SHIPPED | OUTPATIENT
Start: 2022-02-09 | End: 2022-06-07

## 2022-02-09 NOTE — PROGRESS NOTES
Office Visit    Chief Complaint  Atrial Fibrillation    Subjective            Bing Cruz presents to [facility] CARDI[facility]  Bing is a 89 years old female with hypertension chronic atrial fibrillation hyperlipidemia was doing well.  She is ambulatory in her home without much trouble.  She denies chest pain shortness of breath palpitations dizziness syncope.      Past Medical History:   Diagnosis Date   • Abdominal pain, generalized    • Acquired cyst of kidney    • Anemia    • Aortic regurgitation    • Aortic stenosis    • Cancer (HCC)     colon    • Cardiomegaly    • Chronic atrial fibrillation (HCC) 1/1/2019    Atrial fibrillation converted to sinus through cardioversion (March 2019   • Chronic fatigue    • Diaphragmatic hernia without obstruction and without gangrene    • Diarrhea    • Disease of tricuspid valve    • Diverticulosis of colon    • Dysphagia    • Essential (primary) hypertension    • Hepatic cyst     LEFT   • Hiatal hernia    • Hyperlipidemia    • Insomnia, unspecified    • LVH (left ventricular hypertrophy)    • Major depressive disorder, single episode    • Malaise and fatigue    • Mitral regurgitation    • Mitral valve disorder    • Mitral valve insufficiency and aortic valve insufficiency    • Osteopenia    • Other specified disorders of bone density and structure, unspecified site    • Pain in joint, pelvic region and thigh    • Renal cyst    • Sprain and strain of hip     Sprain and strain of unspecified site of hip and thigh   • TR (tricuspid regurgitation)    • Unspecified cataract    • Urinary tract infection    • Vitamin D deficiency        Allergies   Allergen Reactions   • Citalopram Unknown - Low Severity   • Clonazepam Unknown - Low Severity   • Levofloxacin Nausea And Vomiting   • Lisinopril Unknown - Low Severity   • Melatonin Unknown - High Severity   • Macrobid [Nitrofurantoin Macrocrystal] Rash        Past Surgical History:   Procedure Laterality Date   •  ABDOMINAL HYSTERECTOMY      WITH BSO    • ANKLE SURGERY      (L) ankle fracture   • BLADDER REPAIR     • BREAST BIOPSY     • BREAST BIOPSY      (L) breast biopsy   • CATARACT EXTRACTION      OU   • CHOLECYSTECTOMY      OPEN   • COLON SURGERY      STATES SHE HAD 12 INCHES OF COLON REMOVED IN JULY 2020 FOR COLON CANCER   • COLONOSCOPY  2020   • COLONOSCOPY N/A 10/29/2021    Procedure: COLONOSCOPY;  Surgeon: Edmar Back MD;  Location: [facility] ENDOSCOPY;  Service: General;  Laterality: N/A;  DIVERTICULOSIS/ANASTOMOSIS   • FEMUR OPEN REDUCTION INTERNAL FIXATION Left 7/21/2021    Procedure: FEMORAL NECK OPEN REDUCTION INTERNAL FIXATION;  Surgeon: Bam Warner MD;  Location: [facility] MAIN OR;  Service: Orthopedics;  Laterality: Left;   • HIP SURGERY  07/2021   • INGUINAL HERNIA REPAIR Left 09/29/2011   • UPPER GASTROINTESTINAL ENDOSCOPY          Social History     Tobacco Use   • Smoking status: Never Smoker   • Smokeless tobacco: Never Used   Vaping Use   • Vaping Use: Former   Substance Use Topics   • Alcohol use: Not Currently   • Drug use: Never       Family History   Problem Relation Age of Onset   • Malig Hyperthermia Neg Hx         Prior to Admission medications    Medication Sig Start Date End Date Taking? Authorizing Provider   ascorbic acid (VITAMIN C) 500 MG tablet Take 500 mg by mouth Daily.   Yes Provider, MD Karina   colestipol (COLESTID) 1 g tablet TAKE 1 TABLET BY ORAL ROUTE 3 TIMES A DAY AS NEEDED FOR 30 DAYS DIARRHEA 7/28/21  Yes Jeanne Colunga APRN   dilTIAZem CD (CARDIZEM CD) 120 MG 24 hr capsule TAKE 1 CAPSULE BY MOUTH AT NOON 10/12/21  Yes Teresita Short APRN   Eliquis 5 MG tablet tablet TAKE 1 TABLET BY MOUTH TWICE A DAY 12/6/21  Yes Jairo Kramer MD   HYDROcodone-acetaminophen (NORCO) 5-325 MG per tablet Take 1 tablet by mouth Every 6 (Six) Hours As Needed (AS NEEDED FOR PAIN). 1/27/22  Yes Kirk Truong MD   latanoprost (XALATAN) 0.005 % ophthalmic solution   "11/11/21  Yes Provider, MD Karina   LORazepam (ATIVAN) 0.5 MG tablet TAKE 1 TABLET BY MOUTH EVERY DAY AT BEDTIME AS NEEDED 12/9/21  Yes Jairo Kramer MD   losartan (COZAAR) 50 MG tablet TAKE ONE TABLET TWICE A DAY 1/17/22  Yes Wily Jolly MD   metoprolol succinate XL (TOPROL-XL) 100 MG 24 hr tablet Take 100 mg by mouth 2 (Two) Times a Day.   Yes Emergency, Nurse Tj, RN   pantoprazole (PROTONIX) 40 MG EC tablet Take 1 tablet by mouth Daily for 90 days. bid 11/11/21 2/9/22 Yes Annabelle Powell APRN   apixaban (Eliquis) 2.5 MG tablet tablet Take 1.25 mg by mouth Every 12 (Twelve) Hours.  2/9/22  Emergency, Nurse Tj, RN        Review of Systems   Respiratory: Negative for cough and shortness of breath.    Cardiovascular: Positive for leg swelling. Negative for chest pain and palpitations.        Objective     /80 (Patient Position: Sitting)   Pulse 98   Ht 170.2 cm (67\")   Wt 65.8 kg (145 lb)   BMI 22.71 kg/m²       Physical Exam  Constitutional:       General: She is awake.      Appearance: Normal appearance.   Neck:      Thyroid: No thyromegaly.      Vascular: No carotid bruit or JVD.   Cardiovascular:      Rate and Rhythm: Normal rate. Rhythm irregular.      Chest Wall: PMI is not displaced.      Pulses: Normal pulses.      Heart sounds: S1 normal and S2 normal. Murmur heard.    Systolic murmur is present.  No friction rub. No gallop. No S3 or S4 sounds.    Pulmonary:      Effort: Pulmonary effort is normal.      Breath sounds: Normal breath sounds and air entry. No wheezing, rhonchi or rales.   Abdominal:      General: Bowel sounds are normal.      Palpations: Abdomen is soft. There is no mass.      Tenderness: There is no abdominal tenderness.   Musculoskeletal:      Cervical back: Neck supple.      Right lower leg: Edema present.      Left lower leg: Edema present.   Neurological:      Mental Status: She is alert and oriented to person, place, and time.   Psychiatric:         " Mood and Affect: Mood normal.         Behavior: Behavior is cooperative.     Trace pedal edema    Lab Results   Component Value Date    PROBNP 1,648.0 07/20/2021    BNP 1385 12/05/2020    BNP 4183 (H) 10/12/2020    BNP 5163 (H) 09/09/2020     CMP    CMP 8/17/21 11/3/21 2/2/22   Glucose 100 (A) 84 93   BUN 14 13 17   Creatinine 0.89 0.76 0.79   eGFR Non  Am 60 (A) 71 68   Sodium 140 143 140   Potassium 3.8 3.9 3.8   Chloride 103 108 (A) 104   Calcium 9.6 9.3 9.3   Albumin 4.17 4.10 4.10   Total Bilirubin 0.9 0.7 0.9   Alkaline Phosphatase 122 (A) 88 100   AST (SGOT) 15 17 17   ALT (SGPT) 5 10 9   (A) Abnormal value            CBC w/diff    CBC w/Diff 8/17/21 11/3/21 2/2/22   WBC 7.88 6.79 7.17   RBC 4.13 3.98 3.93   Hemoglobin 13.1 12.9 12.9   Hematocrit 40.0 36.9 38.3   MCV 96.9 92.7 97.5 (A)   MCH 31.7 32.4 32.8   MCHC 32.8 35.0 33.7   RDW 13.5 12.4 13.5   Platelets 320 247 264   Neutrophil Rel % 66.7 54.3 59.9   Immature Granulocyte Rel % 0.8 (A) 0.4 0.3   Lymphocyte Rel % 23.2 34.5 31.0   Monocyte Rel % 7.4 8.0 6.6   Eosinophil Rel % 1.4 2.1 1.5   Basophil Rel % 0.5 0.7 0.7   (A) Abnormal value             Lipid Panel    Lipid Panel 7/15/21   Total Cholesterol 187   Triglycerides 136   HDL Cholesterol 79 (A)   VLDL Cholesterol 23   LDL Cholesterol  85   LDL/HDL Ratio 1.02   (A) Abnormal value             Lab Results   Component Value Date    TSH 2.790 07/21/2021    TSH 0.749 07/15/2021    TSH 3.850 05/13/2021      Lab Results   Component Value Date    FREET4 1.02 07/15/2021    FREET4 1.2 07/18/2020    FREET4 1.2 11/18/2019      No results found for: DDIMERQUANT  Magnesium   Date Value Ref Range Status   07/21/2021 1.8 1.6 - 2.4 mg/dL Final      No results found for: DIGOXIN            Result Review :                    ECG 12 Lead    Date/Time: 2/9/2022 4:35 PM  Performed by: Wily Jolly MD  Authorized by: Wily Jolly MD   Comments: Atrial fibrillation with controlled ventricular response.  No  other abnormality noted.  No change compared to EKG of 7/20/2021                Assessment and Plan        Diagnoses and all orders for this visit:    1. Nonrheumatic mitral valve regurgitation (Primary)  -     Lipid Panel; Future  -     CBC & Differential; Future  -     Comprehensive Metabolic Panel; Future  -     T4, Free; Future  -     TSH; Future    2. Nonrheumatic aortic valve insufficiency  -     Lipid Panel; Future  -     CBC & Differential; Future  -     Comprehensive Metabolic Panel; Future  -     T4, Free; Future  -     TSH; Future    3. Atrial fibrillation, chronic (HCC)  Assessment & Plan:  Patient now has recurrent atrial fibrillation with controlled ventricular response.  She will continue the Toprol-XL at 100 twice daily dose along with diltiazem 120 mg at noon.  She is on Eliquis 5 mg twice daily for thromboembolic prevention    Orders:  -     Lipid Panel; Future  -     CBC & Differential; Future  -     Comprehensive Metabolic Panel; Future  -     T4, Free; Future  -     TSH; Future    4. Chronic deep vein thrombosis (DVT) of proximal vein of left lower extremity (HCC)  -     Lipid Panel; Future  -     CBC & Differential; Future  -     Comprehensive Metabolic Panel; Future  -     T4, Free; Future  -     TSH; Future    5. Hypertension, essential  -     Lipid Panel; Future  -     CBC & Differential; Future  -     Comprehensive Metabolic Panel; Future  -     T4, Free; Future  -     TSH; Future    6. Hyperlipidemia, unspecified hyperlipidemia type  -     Lipid Panel; Future  -     CBC & Differential; Future  -     Comprehensive Metabolic Panel; Future  -     T4, Free; Future  -     TSH; Future    Other orders  -     metoprolol succinate XL (TOPROL-XL) 100 MG 24 hr tablet; Take 1 tablet by mouth 2 (Two) Times a Day.  Dispense: 180 tablet; Refill: 3    Patient will continue her current medications and the current dosage.  She will follow-up in 6 months.      Follow Up     Return in about 6 months (around  8/9/2022) for next ov with June.    Patient was given instructions and counseling regarding her condition or for health maintenance advice. Please see specific information pulled into the AVS if appropriate.     Wily Jolly MD  02/09/22 10:15 EST

## 2022-02-09 NOTE — ASSESSMENT & PLAN NOTE
Patient now has recurrent atrial fibrillation with controlled ventricular response.  She will continue the Toprol-XL at 100 twice daily dose along with diltiazem 120 mg at noon.  She is on Eliquis 5 mg twice daily for thromboembolic prevention

## 2022-02-10 ENCOUNTER — OFFICE VISIT (OUTPATIENT)
Dept: ORTHOPEDIC SURGERY | Facility: CLINIC | Age: 87
End: 2022-02-10

## 2022-02-10 ENCOUNTER — TELEPHONE (OUTPATIENT)
Dept: ONCOLOGY | Facility: HOSPITAL | Age: 87
End: 2022-02-10

## 2022-02-10 VITALS — BODY MASS INDEX: 22.76 KG/M2 | HEIGHT: 67 IN | HEART RATE: 79 BPM | WEIGHT: 145 LBS | OXYGEN SATURATION: 99 %

## 2022-02-10 DIAGNOSIS — Z47.89 AFTERCARE FOLLOWING SURGERY OF THE MUSCULOSKELETAL SYSTEM: Primary | ICD-10-CM

## 2022-02-10 DIAGNOSIS — M87.052 AVASCULAR NECROSIS OF BONE OF LEFT HIP: ICD-10-CM

## 2022-02-10 PROCEDURE — 99214 OFFICE O/P EST MOD 30 MIN: CPT | Performed by: PHYSICIAN ASSISTANT

## 2022-02-10 NOTE — PROGRESS NOTES
"Chief Complaint  Follow-up of the Left Femur    Subjective          Bing Cruz is a 89 y.o. female  presents to [facility] for   History of Present Illness    Patient presents with her daughter who is from [place] she is named Blanca for follow-up evaluation of left femoral neck ORIF, 7/21/2021.  Patient was last seen on 1/27/2022 complaining of increased hip pain and decreased range of motion.  Patient states that her pain has persisted she points to pain in her groin, pain radiates down the front of her leg into her femur into her knee.  Patient states she has trouble lifting her leg at times.  Patient has been in therapy but therapy was not helping her pain or increasing her strength so they discontinued this.  Patient had MRI and ultrasound for ruling out DVT ordered recently she is here to review the MRI, her ultrasound came back negative.  Allergies   Allergen Reactions   • Citalopram Unknown - Low Severity   • Clonazepam Unknown - Low Severity   • Levofloxacin Nausea And Vomiting   • Lisinopril Unknown - Low Severity   • Melatonin Unknown - High Severity   • Macrobid [Nitrofurantoin Macrocrystal] Rash        Social History     Socioeconomic History   • Marital status:    Tobacco Use   • Smoking status: Never Smoker   • Smokeless tobacco: Never Used   Vaping Use   • Vaping Use: Former   Substance and Sexual Activity   • Alcohol use: Not Currently   • Drug use: Never   • Sexual activity: Defer        REVIEW OF SYSTEMS    Constitutional: Denies fevers, chills, weight loss  Cardiovascular: Denies chest pain, shortness of breath  Skin: Denies rashes, acute skin changes  Neurologic: Denies headache, loss of consciousness  MSK: Left hip pain      Objective   Vital Signs:   Pulse 79   Ht 170.2 cm (67\")   Wt 65.8 kg (145 lb)   SpO2 99%   BMI 22.71 kg/m²     Body mass index is 22.71 kg/m².    Physical Exam    Left hip: Well-healed incision to the lateral hip, active " flexion of the hip, active flexion 120, pain with rotation, tenderness palpation of the lateral hip, nontender calf, negative Homans' sign.  Patient ambulates with cane, ambulates with an antalgic gait, 4 out of 5 hip strength    Procedures    Imaging Results (Most Recent)     None         MRI Femur Left Without Contrast    Result Date: 2/8/2022  Narrative: PROCEDURE: MRI FEMUR LEFT WO CONTRAST  COMPARISON: [facility] , CR, XR HIP W OR WO PELVIS 2-3 VIEW LEFT, 1/27/2022, 10:47.  INDICATIONS: LEFT HIP, GROIN PAIN.  PREVIOUS SURGERY JULY 2021.  TECHNIQUE: A variety of imaging planes and parameters were utilized for visualization of suspected pathology.  Images were performed without contrast. FINDINGS:  Cannulated screws are noted in the left femoral head and neck, the artifact from which obscuring surrounding tissues.  There is mild flattening of the left femoral head superior articular surface.  No malalignment  is seen.  There is apparent T2 high signal consistent with edema noted in the femoral head and neck a small to moderate left hip joint effusion is noted.  Trochanteric bursitis is noted bilaterally.  Musculature and other soft tissues in the left 5 more inferiorly appear unremarkable.  Mild to moderate left and mild right hip osteoarthritic changes with spurring are noted.      Impression:   1. Previous placement of cannulated screws in the left femoral head/neck.  The resulting artifact obscures surrounding tissues. 2. Moderate marrow edema in the femoral head and neck may represent postoperative changes versus posttraumatic, stress related or AVN related changes 3. Bilateral trochanteric bursitis 4. Mild flattening of the left femoral head superior a ticket ower surface could be secondary to underlying a vascular necrosis or previous trauma. 5. Mild-to-moderate left and mild right hip osteoarthritis      Sergei Cisneros M.D.       Electronically Signed and Approved By: Sergei Cisneros M.D. on 2/08/2022  at 15:15             Duplex Venous Lower Extremity - Left CAR    Result Date: 1/27/2022  Narrative: · Normal left lower extremity venous duplex scan.      XR Hip With or Without Pelvis 2 - 3 View Left    Result Date: 2/1/2022  Narrative: • View:AP and Lateral view(s) • Site: Left hip • Indication: Left hip pain • Study: X-rays ordered, taken in the office, and reviewed today • X-ray: Well-healed femoral neck fracture with intact screws, no signs of hardware failure or loosening • Comparative data: No comparative data found       Result Review :   The following data was reviewed by: JUNAID Henderson on 02/10/2022:  Data reviewed: Radiologic studies Reviewed by me with the patient and her daughter             Assessment and Plan    Diagnoses and all orders for this visit:    1. Aftercare following surgery of left femoral neck ORIF 7/21/2021 (Primary)    2. 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 Warner, discussed MRI findings with patient and her daughter regarding treatment for her current condition, we discussed left hip total replacement, risk, benefits, procedure and recovery were discussed, patient and daughter state they would like to think about this and will call back at a later date, be seen at a later date for further evaluation and possible surgery discussion.  They state they will call back if they decide they would like to pursue surgical intervention.    Discussed surgery., Risks/benefits discussed with patient including, but not limited to: infection, bleeding, neurovascular damage, malunion, nonunion, aesthetic deformity, need for further surgery, and death., Discussed with patient the implant type being used during surgery and patient understands and desires to proceed. and Call or return if worsening symptoms.    Follow Up   Return if symptoms worsen or fail to improve.  Patient was given instructions and counseling regarding her condition or for  health maintenance advice. Please see specific information pulled into the AVS if appropriate.

## 2022-02-10 NOTE — TELEPHONE ENCOUNTER
Spoke with Nicole and informed her that we can draw the labs, that were ordered by Dr. Chu, they just need to let the ladies know when they register her that she will also need the labs ordered by Dr. Chu. Daughter verbalized understanding.

## 2022-02-10 NOTE — TELEPHONE ENCOUNTER
Caller: STEPHANIE BENITES    Relationship: Emergency Contact    Best call back number: [phone]    What is the best time to reach you: ANY    Who are you requesting to speak with (clinical staff, provider,  specific staff member): CLINICAL    What was the call regarding: PATIENTS DAUGHTER WOULD LIKE TO KNOW IF THEY CAN CHECK PATIENTS CHOLESTEROL AS WELL WITH HER UPCOMING LABS  PATIENTS LAB APPOINTMENT IS Monday      Do you require a callback: YES, PLEASE CALL TO ADVISE

## 2022-02-11 ENCOUNTER — OFFICE VISIT (OUTPATIENT)
Dept: ORTHOPEDIC SURGERY | Facility: CLINIC | Age: 87
End: 2022-02-11

## 2022-02-11 ENCOUNTER — PREP FOR SURGERY (OUTPATIENT)
Dept: OTHER | Facility: HOSPITAL | Age: 87
End: 2022-02-11

## 2022-02-11 VITALS — BODY MASS INDEX: 22.76 KG/M2 | WEIGHT: 145 LBS | HEIGHT: 67 IN

## 2022-02-11 DIAGNOSIS — Z96.642 AFTERCARE FOLLOWING LEFT HIP JOINT REPLACEMENT SURGERY: Primary | ICD-10-CM

## 2022-02-11 DIAGNOSIS — Z47.1 AFTERCARE FOLLOWING LEFT HIP JOINT REPLACEMENT SURGERY: Primary | ICD-10-CM

## 2022-02-11 DIAGNOSIS — M87.052 AVASCULAR NECROSIS OF BONE OF LEFT HIP: ICD-10-CM

## 2022-02-11 DIAGNOSIS — R79.1 ABNORMAL COAGULATION PROFILE: ICD-10-CM

## 2022-02-11 DIAGNOSIS — M87.052 AVASCULAR NECROSIS OF BONE OF LEFT HIP: Primary | ICD-10-CM

## 2022-02-11 DIAGNOSIS — Z98.890 HISTORY OF HIP SURGERY: Primary | ICD-10-CM

## 2022-02-11 PROBLEM — M16.12 ARTHRITIS OF LEFT HIP: Status: ACTIVE | Noted: 2022-02-11

## 2022-02-11 PROCEDURE — 99214 OFFICE O/P EST MOD 30 MIN: CPT | Performed by: ORTHOPAEDIC SURGERY

## 2022-02-11 RX ORDER — CEFAZOLIN SODIUM 2 G/100ML
2 INJECTION, SOLUTION INTRAVENOUS ONCE
Status: CANCELLED | OUTPATIENT
Start: 2022-02-11 | End: 2022-02-11

## 2022-02-11 RX ORDER — TRANEXAMIC ACID 10 MG/ML
2000 INJECTION, SOLUTION INTRAVENOUS ONCE
Status: CANCELLED | OUTPATIENT
Start: 2022-02-11 | End: 2022-02-11

## 2022-02-11 RX ORDER — TRANEXAMIC ACID 10 MG/ML
1000 INJECTION, SOLUTION INTRAVENOUS ONCE
Status: CANCELLED | OUTPATIENT
Start: 2022-02-11 | End: 2022-02-11

## 2022-02-11 NOTE — PROGRESS NOTES
"Chief Complaint  Pain of the Left Hip     Subjective      Bing Cruz presents to [facility] for evaluation of the left hip. Patient presents with her daughter who is from [place] she is named Blanca for follow-up evaluation of left femoral neck ORIF, 7/21/2021. Patient states that her pain has persisted she points to pain in her groin, pain radiates down the front of her leg into her femur into her knee.  Patient states she has trouble lifting her leg at times.  Patient has been in therapy but therapy was not helping her pain or increasing her strength so they discontinued this. 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     Allergies   Allergen Reactions   • Citalopram Unknown - Low Severity   • Clonazepam Unknown - Low Severity   • Levofloxacin Nausea And Vomiting   • Lisinopril Unknown - Low Severity   • Melatonin Unknown - High Severity   • Macrobid [Nitrofurantoin Macrocrystal] Rash        Social History     Socioeconomic History   • Marital status:    Tobacco Use   • Smoking status: Never Smoker   • Smokeless tobacco: Never Used   Vaping Use   • Vaping Use: Former   Substance and Sexual Activity   • Alcohol use: Not Currently   • Drug use: Never   • Sexual activity: Defer        Review of Systems     Objective   Vital Signs:   Ht 170.2 cm (67\")   Wt 65.8 kg (145 lb)   BMI 22.71 kg/m²       Physical Exam  Constitutional:       Appearance: Normal appearance. The patient is well-developed and normal weight.   HENT:      Head: Normocephalic.      Right Ear: Hearing and external ear normal.      Left Ear: Hearing and external ear normal.      Nose: Nose normal.   Eyes:      Conjunctiva/sclera: Conjunctivae normal.   Cardiovascular:      Rate and Rhythm: Normal rate.   Pulmonary:      Effort: Pulmonary effort is normal.      Breath sounds: No wheezing or rales.   Abdominal:      Palpations: Abdomen is soft.      Tenderness: There is no abdominal " tenderness.   Musculoskeletal:      Cervical back: Normal range of motion.   Skin:     Findings: No rash.   Neurological:      Mental Status: The patient is alert and oriented to person, place, and time.   Psychiatric:         Mood and Affect: Mood and affect normal.         Judgment: Judgment normal.       Ortho Exam      Left hip: Well-healed incision to the lateral hip, active flexion of the hip, active flexion 120, pain with rotation, tenderness palpation of the lateral hip, nontender calf, negative Homans' sign.  Patient ambulates with cane, ambulates with an antalgic gait, 4 out of 5 hip strength    Procedures      Imaging Results (Most Recent)     None           Result Review :       MRI Femur Left Without Contrast    Result Date: 2/8/2022  Narrative: PROCEDURE: MRI FEMUR LEFT WO CONTRAST  COMPARISON: [facility] , CR, XR HIP W OR WO PELVIS 2-3 VIEW LEFT, 1/27/2022, 10:47.  INDICATIONS: LEFT HIP, GROIN PAIN.  PREVIOUS SURGERY JULY 2021.  TECHNIQUE: A variety of imaging planes and parameters were utilized for visualization of suspected pathology.  Images were performed without contrast. FINDINGS:  Cannulated screws are noted in the left femoral head and neck, the artifact from which obscuring surrounding tissues.  There is mild flattening of the left femoral head superior articular surface.  No malalignment  is seen.  There is apparent T2 high signal consistent with edema noted in the femoral head and neck a small to moderate left hip joint effusion is noted.  Trochanteric bursitis is noted bilaterally.  Musculature and other soft tissues in the left 5 more inferiorly appear unremarkable.  Mild to moderate left and mild right hip osteoarthritic changes with spurring are noted.      Impression:   1. Previous placement of cannulated screws in the left femoral head/neck.  The resulting artifact obscures surrounding tissues. 2. Moderate marrow edema in the femoral head and neck may represent postoperative  changes versus posttraumatic, stress related or AVN related changes 3. Bilateral trochanteric bursitis 4. Mild flattening of the left femoral head superior a ticket ower surface could be secondary to underlying a vascular necrosis or previous trauma. 5. Mild-to-moderate left and mild right hip osteoarthritis      Sergei Cisneros M.D.       Electronically Signed and Approved By: Sergei Cisneros M.D. on 2/08/2022 at 15:15             Duplex Venous Lower Extremity - Left CAR    Result Date: 1/27/2022  Narrative: · Normal left lower extremity venous duplex scan.      XR Hip With or Without Pelvis 2 - 3 View Left    Result Date: 2/1/2022  Narrative: • View:AP and Lateral view(s) • Site: Left hip • Indication: Left hip pain • Study: X-rays ordered, taken in the office, and reviewed today • X-ray: Well-healed femoral neck fracture with intact screws, no signs of hardware failure or loosening • Comparative data: No comparative data found              Assessment and Plan     DX: AVN of the left hip. S/P ORIF Left Femoral Neck Fracture    Discussed the treatment options with the patient and her daughter, operative vs non-operative. The patient takes blood thinner for A-fib. Discussed the risks and benefits of a Left Total Hip Arthroplasty vs conservative treatment with medication. The patient expressed understanding and wished to proceed. Prescription for Norco 7.5/325 given today.     Discussed surgery., Risks/benefits discussed with patient including, but not limited to: infection, bleeding, neurovascular damage, malunion, nonunion, aesthetic deformity, need for further surgery, and death., Discussed with patient the implant type being used during surgery and patient understands and desires to proceed., Surgery pamphlet given. and Call or return if worsening symptoms.    Follow Up     2 weeks postoperatively.        Patient was given instructions and counseling regarding her condition or for health maintenance advice. Please  see specific information pulled into the AVS if appropriate.     Scribed for Bam Warner MD by Neeta Maldonado.  02/11/22   10:15 EST    I have personally performed the services described in this document as scribed by the above individual and it is both accurate and complete. Bam Warner MD 02/13/22

## 2022-02-12 RX ORDER — HYDROCODONE BITARTRATE AND ACETAMINOPHEN 7.5; 325 MG/1; MG/1
1 TABLET ORAL EVERY 6 HOURS PRN
Qty: 28 TABLET | Refills: 0 | Status: SHIPPED | OUTPATIENT
Start: 2022-02-12 | End: 2022-03-18 | Stop reason: HOSPADM

## 2022-02-14 ENCOUNTER — LAB (OUTPATIENT)
Dept: ONCOLOGY | Facility: HOSPITAL | Age: 87
End: 2022-02-14

## 2022-02-14 ENCOUNTER — TELEPHONE (OUTPATIENT)
Dept: ONCOLOGY | Facility: HOSPITAL | Age: 87
End: 2022-02-14

## 2022-02-14 DIAGNOSIS — D50.9 IRON DEFICIENCY ANEMIA, UNSPECIFIED IRON DEFICIENCY ANEMIA TYPE: Primary | ICD-10-CM

## 2022-02-14 DIAGNOSIS — I48.20 ATRIAL FIBRILLATION, CHRONIC: ICD-10-CM

## 2022-02-14 DIAGNOSIS — I82.5Y2 CHRONIC DEEP VEIN THROMBOSIS (DVT) OF PROXIMAL VEIN OF LEFT LOWER EXTREMITY: ICD-10-CM

## 2022-02-14 DIAGNOSIS — I10 HYPERTENSION, ESSENTIAL: ICD-10-CM

## 2022-02-14 DIAGNOSIS — E78.5 HYPERLIPIDEMIA, UNSPECIFIED HYPERLIPIDEMIA TYPE: ICD-10-CM

## 2022-02-14 DIAGNOSIS — D50.9 IRON DEFICIENCY ANEMIA, UNSPECIFIED IRON DEFICIENCY ANEMIA TYPE: ICD-10-CM

## 2022-02-14 DIAGNOSIS — I34.0 NONRHEUMATIC MITRAL VALVE REGURGITATION: Chronic | ICD-10-CM

## 2022-02-14 DIAGNOSIS — I35.1 NONRHEUMATIC AORTIC VALVE INSUFFICIENCY: Chronic | ICD-10-CM

## 2022-02-14 LAB
ALBUMIN SERPL-MCNC: 4.08 G/DL (ref 3.5–5.2)
ALBUMIN/GLOB SERPL: 1.4 G/DL
ALP SERPL-CCNC: 88 U/L (ref 39–117)
ALT SERPL W P-5'-P-CCNC: 9 U/L (ref 1–33)
ANION GAP SERPL CALCULATED.3IONS-SCNC: 9.7 MMOL/L (ref 5–15)
AST SERPL-CCNC: 19 U/L (ref 1–32)
BASOPHILS # BLD AUTO: 0.04 10*3/MM3 (ref 0–0.2)
BASOPHILS NFR BLD AUTO: 0.5 % (ref 0–1.5)
BILIRUB SERPL-MCNC: 1.1 MG/DL (ref 0–1.2)
BUN SERPL-MCNC: 15 MG/DL (ref 8–23)
BUN/CREAT SERPL: 21.4 (ref 7–25)
CALCIUM SPEC-SCNC: 9.4 MG/DL (ref 8.2–9.6)
CHLORIDE SERPL-SCNC: 105 MMOL/L (ref 98–107)
CHOLEST SERPL-MCNC: 170 MG/DL (ref 0–200)
CO2 SERPL-SCNC: 25.3 MMOL/L (ref 22–29)
CREAT SERPL-MCNC: 0.7 MG/DL (ref 0.57–1)
DEPRECATED RDW RBC AUTO: 48.5 FL (ref 37–54)
EOSINOPHIL # BLD AUTO: 0.09 10*3/MM3 (ref 0–0.4)
EOSINOPHIL NFR BLD AUTO: 1.2 % (ref 0.3–6.2)
ERYTHROCYTE [DISTWIDTH] IN BLOOD BY AUTOMATED COUNT: 13.7 % (ref 12.3–15.4)
GFR SERPL CREATININE-BSD FRML MDRD: 79 ML/MIN/1.73
GLOBULIN UR ELPH-MCNC: 3 GM/DL
GLUCOSE SERPL-MCNC: 97 MG/DL (ref 65–99)
HCT VFR BLD AUTO: 37.7 % (ref 34–46.6)
HDLC SERPL-MCNC: 78 MG/DL (ref 40–60)
HGB BLD-MCNC: 12.6 G/DL (ref 12–15.9)
IMM GRANULOCYTES # BLD AUTO: 0.01 10*3/MM3 (ref 0–0.05)
IMM GRANULOCYTES NFR BLD AUTO: 0.1 % (ref 0–0.5)
LDLC SERPL CALC-MCNC: 65 MG/DL (ref 0–100)
LDLC/HDLC SERPL: 0.76 {RATIO}
LYMPHOCYTES # BLD AUTO: 1.74 10*3/MM3 (ref 0.7–3.1)
LYMPHOCYTES NFR BLD AUTO: 23.7 % (ref 19.6–45.3)
MCH RBC QN AUTO: 31.9 PG (ref 26.6–33)
MCHC RBC AUTO-ENTMCNC: 33.4 G/DL (ref 31.5–35.7)
MCV RBC AUTO: 95.4 FL (ref 79–97)
MONOCYTES # BLD AUTO: 0.42 10*3/MM3 (ref 0.1–0.9)
MONOCYTES NFR BLD AUTO: 5.7 % (ref 5–12)
NEUTROPHILS NFR BLD AUTO: 5.04 10*3/MM3 (ref 1.7–7)
NEUTROPHILS NFR BLD AUTO: 68.8 % (ref 42.7–76)
PLATELET # BLD AUTO: 250 10*3/MM3 (ref 140–450)
PMV BLD AUTO: 11.2 FL (ref 6–12)
POTASSIUM SERPL-SCNC: 3.7 MMOL/L (ref 3.5–5.2)
PROT SERPL-MCNC: 7.1 G/DL (ref 6–8.5)
RBC # BLD AUTO: 3.95 10*6/MM3 (ref 3.77–5.28)
SODIUM SERPL-SCNC: 140 MMOL/L (ref 136–145)
TRIGL SERPL-MCNC: 164 MG/DL (ref 0–150)
VLDLC SERPL-MCNC: 27 MG/DL (ref 5–40)
WBC NRBC COR # BLD: 7.34 10*3/MM3 (ref 3.4–10.8)

## 2022-02-14 PROCEDURE — 80053 COMPREHEN METABOLIC PANEL: CPT

## 2022-02-14 PROCEDURE — 85025 COMPLETE CBC W/AUTO DIFF WBC: CPT

## 2022-02-14 PROCEDURE — 80061 LIPID PANEL: CPT

## 2022-02-14 PROCEDURE — 36415 COLL VENOUS BLD VENIPUNCTURE: CPT

## 2022-02-15 ENCOUNTER — TELEPHONE (OUTPATIENT)
Dept: CARDIOLOGY | Facility: CLINIC | Age: 87
End: 2022-02-15

## 2022-02-15 ENCOUNTER — OFFICE VISIT (OUTPATIENT)
Dept: ONCOLOGY | Facility: HOSPITAL | Age: 87
End: 2022-02-15

## 2022-02-15 VITALS
WEIGHT: 147.05 LBS | SYSTOLIC BLOOD PRESSURE: 152 MMHG | BODY MASS INDEX: 23.03 KG/M2 | RESPIRATION RATE: 18 BRPM | HEART RATE: 81 BPM | TEMPERATURE: 97.8 F | OXYGEN SATURATION: 98 % | DIASTOLIC BLOOD PRESSURE: 83 MMHG

## 2022-02-15 DIAGNOSIS — C18.2 MALIGNANT NEOPLASM OF ASCENDING COLON: ICD-10-CM

## 2022-02-15 DIAGNOSIS — K59.00 CONSTIPATION, UNSPECIFIED CONSTIPATION TYPE: ICD-10-CM

## 2022-02-15 DIAGNOSIS — D50.9 IRON DEFICIENCY ANEMIA, UNSPECIFIED IRON DEFICIENCY ANEMIA TYPE: Primary | ICD-10-CM

## 2022-02-15 PROCEDURE — 99213 OFFICE O/P EST LOW 20 MIN: CPT | Performed by: NURSE PRACTITIONER

## 2022-02-15 PROCEDURE — G0463 HOSPITAL OUTPT CLINIC VISIT: HCPCS | Performed by: NURSE PRACTITIONER

## 2022-02-15 NOTE — TELEPHONE ENCOUNTER
----- Message from GABRIELLA Ackerman sent at 2/15/2022 10:56 AM EST -----  Cholesterols are mixed with triglycerides slightly elevated but LDL is better.  Continue current medications.

## 2022-02-15 NOTE — PROGRESS NOTES
Chief Complaint  SALAS / colon cancer    Jairo Kramer,*  Jairo Kramer MD      Subjective          Bing Cruz presents to [facility] for colon cancer and iron deficiency anemia.     History of Present Illness     Ms. Bing Cruz presents for 6 month follow up for stage 1 colon cancer with the proximal ascending colon and status post right colectomy in 7/7/2020 and history of SALAS. She presents with her daughter for follow up.     She reports she will be having an upcoming right hip replacement for AVN in March. She is on pain meds now.     Reports she has noted some continued tenderness after the colon cancer surgery. Has alternating constipation with diarrhea. More constipation. She reports she is still taking the Colestipol. We discussed she may need to back off of this or even discontinue the med since she is having more constipation issues.     We discussed adding a stool softener as well such as Senokot S and titrate if needed.     We reviewed her labs. CBC is normal with no evidence for anemia. CMP is normal as well.     Cancer Staging  No matching staging information was found for the patient.     Treatment intent: curative    Oncology/Hematology History Overview Note   6/23/20 Ascending colon pathology revealed invasive, moderated      differentiated adenocarcinoma.  3.5 cm.  T1, N0, M0 = stage I                 Surgeries      7/8/2020 Right hemicholectomy with negative margins and 14 lymph nodes negative performed by Dr. Back.         Malignant neoplasm of colon (HCC)   7/27/2021 Initial Diagnosis    Malignant neoplasm of colon (CMS/HCC)         Review of Systems   Constitutional: Positive for fatigue.   HENT: Negative.    Eyes: Negative.    Respiratory: Negative.    Cardiovascular: Negative.    Gastrointestinal: Positive for abdominal pain (lower).   Endocrine: Negative.    Genitourinary: Negative.    Musculoskeletal: Positive for  arthralgias and back pain (left hip bone).   Allergic/Immunologic: Negative.    Neurological: Negative.    Hematological: Negative.    Psychiatric/Behavioral: Negative.    All other systems reviewed and are negative.      Current Outpatient Medications on File Prior to Visit   Medication Sig Dispense Refill   • ascorbic acid (VITAMIN C) 500 MG tablet Take 500 mg by mouth Daily.     • colestipol (COLESTID) 1 g tablet TAKE 1 TABLET BY ORAL ROUTE 3 TIMES A DAY AS NEEDED FOR 30 DAYS DIARRHEA 270 tablet 1   • dilTIAZem CD (CARDIZEM CD) 120 MG 24 hr capsule TAKE 1 CAPSULE BY MOUTH AT NOON 90 capsule 3   • Eliquis 5 MG tablet tablet TAKE 1 TABLET BY MOUTH TWICE A DAY 60 tablet 5   • HYDROcodone-acetaminophen (Norco) 7.5-325 MG per tablet Take 1 tablet by mouth Every 6 (Six) Hours As Needed for Moderate Pain . 28 tablet 0   • latanoprost (XALATAN) 0.005 % ophthalmic solution      • LORazepam (ATIVAN) 0.5 MG tablet TAKE 1 TABLET BY MOUTH EVERY DAY AT BEDTIME AS NEEDED 30 tablet 5   • losartan (COZAAR) 50 MG tablet TAKE ONE TABLET TWICE A  tablet 3   • metoprolol succinate XL (TOPROL-XL) 100 MG 24 hr tablet Take 1 tablet by mouth 2 (Two) Times a Day. 180 tablet 3     No current facility-administered medications on file prior to visit.       Allergies   Allergen Reactions   • Citalopram Unknown - Low Severity   • Clonazepam Unknown - Low Severity   • Levofloxacin Nausea And Vomiting   • Lisinopril Unknown - Low Severity   • Melatonin Unknown - High Severity   • Macrobid [Nitrofurantoin Macrocrystal] Rash     Past Medical History:   Diagnosis Date   • Abdominal pain, generalized    • Acquired cyst of kidney    • Anemia    • Aortic regurgitation    • Aortic stenosis    • Cancer (HCC)     colon    • Cardiomegaly    • Chronic atrial fibrillation (HCC) 1/1/2019    Atrial fibrillation converted to sinus through cardioversion (March 2019   • Chronic fatigue    • Diaphragmatic hernia without obstruction and without gangrene     • Diarrhea    • Disease of tricuspid valve    • Diverticulosis of colon    • Dysphagia    • Essential (primary) hypertension    • Hepatic cyst     LEFT   • Hiatal hernia    • Hyperlipidemia    • Insomnia, unspecified    • LVH (left ventricular hypertrophy)    • Major depressive disorder, single episode    • Malaise and fatigue    • Mitral regurgitation    • Mitral valve disorder    • Mitral valve insufficiency and aortic valve insufficiency    • Osteopenia    • Other specified disorders of bone density and structure, unspecified site    • Pain in joint, pelvic region and thigh    • Renal cyst    • Sprain and strain of hip     Sprain and strain of unspecified site of hip and thigh   • TR (tricuspid regurgitation)    • Unspecified cataract    • Urinary tract infection    • Vitamin D deficiency      Past Surgical History:   Procedure Laterality Date   • ABDOMINAL HYSTERECTOMY      WITH BSO    • ANKLE SURGERY      (L) ankle fracture   • BLADDER REPAIR     • BREAST BIOPSY     • BREAST BIOPSY      (L) breast biopsy   • CATARACT EXTRACTION      OU   • CHOLECYSTECTOMY      OPEN   • COLON SURGERY      STATES SHE HAD 12 INCHES OF COLON REMOVED IN JULY 2020 FOR COLON CANCER   • COLONOSCOPY  2020   • COLONOSCOPY N/A 10/29/2021    Procedure: COLONOSCOPY;  Surgeon: Edmar Back MD;  Location: [facility] ENDOSCOPY;  Service: General;  Laterality: N/A;  DIVERTICULOSIS/ANASTOMOSIS   • FEMUR OPEN REDUCTION INTERNAL FIXATION Left 7/21/2021    Procedure: FEMORAL NECK OPEN REDUCTION INTERNAL FIXATION;  Surgeon: Bam Warner MD;  Location: [facility] MAIN OR;  Service: Orthopedics;  Laterality: Left;   • HIP SURGERY  07/2021   • INGUINAL HERNIA REPAIR Left 09/29/2011   • UPPER GASTROINTESTINAL ENDOSCOPY       Social History     Socioeconomic History   • Marital status:    Tobacco Use   • Smoking status: Never Smoker   • Smokeless tobacco: Never Used   Vaping Use   • Vaping Use: Former   Substance and Sexual Activity   •  Alcohol use: Not Currently   • Drug use: Never   • Sexual activity: Defer     Family History   Problem Relation Age of Onset   • Malig Hyperthermia Neg Hx      Immunization History   Administered Date(s) Administered   • COVID-19 (MODERNA) 1st, 2nd, 3rd Dose Only 03/10/2021, 04/07/2021   • Fluzone High-Dose 65+yrs 11/12/2020, 11/08/2021   • Influenza, Unspecified 11/12/2020       Objective   Physical Exam  Vitals and nursing note reviewed.   Constitutional:       Appearance: Normal appearance. She is normal weight.   HENT:      Head: Normocephalic.      Nose: Nose normal.      Mouth/Throat:      Mouth: Mucous membranes are moist.      Pharynx: Oropharynx is clear.   Eyes:      Conjunctiva/sclera: Conjunctivae normal.      Pupils: Pupils are equal, round, and reactive to light.   Cardiovascular:      Rate and Rhythm: Regular rhythm.      Pulses: Normal pulses.      Heart sounds: Normal heart sounds.   Pulmonary:      Effort: Pulmonary effort is normal.      Breath sounds: Normal breath sounds.   Abdominal:      General: Bowel sounds are normal.      Palpations: Abdomen is soft.      Tenderness: There is abdominal tenderness.   Musculoskeletal:         General: Normal range of motion.      Cervical back: Normal range of motion and neck supple.   Skin:     General: Skin is warm and dry.   Neurological:      General: No focal deficit present.      Mental Status: She is alert and oriented to person, place, and time.   Psychiatric:         Mood and Affect: Mood normal.         Behavior: Behavior normal.         Thought Content: Thought content normal.         Judgment: Judgment normal.         Vitals:    02/15/22 1053   BP: 152/83   Pulse: 81   Resp: 18   Temp: 97.8 °F (36.6 °C)   SpO2: 98%   Weight: 66.7 kg (147 lb 0.8 oz)   PainSc:   9   PainLoc: Hip  Comment: left     ECOG score: 2 (wheelchair/cane)         ECOG: (2) Ambulatory & Capable of Self Care, Unable to Carry Out Work Activity, Up & About Greater Than 50% of  Waking Hours  Fall Risk Assessment was completed, and patient is at high risk for falls.  PHQ-9 Total Score: 3       The patient is  experiencing fatigue. Fatigue score: 5    PT/OT Functional Screening: PT fx screen: Difficulty Walking  Speech Functional Screening: Speech fx screen: No needs identified  Rehab to be ordered: Rehab to be ordered: No needs identified        Result Review :   The following data was reviewed by: GABRIELLA Lance on 02/15/2022:  Lab Results   Component Value Date    HGB 12.6 02/14/2022    HCT 37.7 02/14/2022    MCV 95.4 02/14/2022     02/14/2022    WBC 7.34 02/14/2022    NEUTROABS 5.04 02/14/2022    LYMPHSABS 1.74 02/14/2022    MONOSABS 0.42 02/14/2022    EOSABS 0.09 02/14/2022    BASOSABS 0.04 02/14/2022     Lab Results   Component Value Date    GLUCOSE 97 02/14/2022    BUN 15 02/14/2022    CREATININE 0.70 02/14/2022     02/14/2022    K 3.7 02/14/2022     02/14/2022    CO2 25.3 02/14/2022    CALCIUM 9.4 02/14/2022    PROTEINTOT 7.1 02/14/2022    ALBUMIN 4.08 02/14/2022    BILITOT 1.1 02/14/2022    ALKPHOS 88 02/14/2022    AST 19 02/14/2022    ALT 9 02/14/2022          Assessment and Plan    Diagnoses and all orders for this visit:    1. Iron deficiency anemia, unspecified iron deficiency anemia type (Primary)  -     CBC & Differential; Future  -     Comprehensive Metabolic Panel; Future    2. Constipation, unspecified constipation type  -     CBC & Differential; Future  -     Comprehensive Metabolic Panel; Future    3. Malignant neoplasm of ascending colon (HCC)    In regards to the colon cancer diagnosed in 7/7/2020. She is status post colon resection.  Reports intermittent loose stools / constipation. Reports more constipation is the problem. I see where she is still on the Colestipol. We discussed this may be worsening the constipation and asked her to take over other day or to even discontinue. She is also on opiate pain meds now which may further  complicate the constipation. We discussed using stool softeners as well such as Senokot S and titrate to a comfortable BM. Denies any recent change in colon habits. Weight is stable.     Labs are stable. There is no anemia noted.     Will follow up in 6 months with NP.     Patient Follow Up: 6 months.     Patient was given instructions and counseling regarding her condition or for health maintenance advice. Please see specific information pulled into the AVS if appropriate.     Rose Boykin, APRN    2/15/2022

## 2022-02-18 ENCOUNTER — TELEPHONE (OUTPATIENT)
Dept: CARDIOLOGY | Facility: CLINIC | Age: 87
End: 2022-02-18

## 2022-02-18 NOTE — TELEPHONE ENCOUNTER
Procedure: (L) Total Hip Replacement    Med Directive: Eliquis    PMH: Afib; DVT; HTN; HLD    Last Seen: 02/09/2022    Labs (2/14/22): cr 0.70

## 2022-02-21 ENCOUNTER — TELEPHONE (OUTPATIENT)
Dept: ORTHOPEDIC SURGERY | Facility: CLINIC | Age: 87
End: 2022-02-21

## 2022-02-21 NOTE — TELEPHONE ENCOUNTER
CALLED AND SPOKE WITH DAUGHTER AND INFORMED HER THAT HER MOTHER COULD BE OFF ELIQUIS FOR 2 DAYS PRIOR TO SURGERY. VOICES UNDERSTANDING.

## 2022-02-25 ENCOUNTER — DOCUMENTATION (OUTPATIENT)
Dept: PHYSICAL THERAPY | Facility: CLINIC | Age: 87
End: 2022-02-25

## 2022-02-25 ENCOUNTER — OFFICE VISIT (OUTPATIENT)
Dept: INTERNAL MEDICINE | Facility: CLINIC | Age: 87
End: 2022-02-25

## 2022-02-25 VITALS
TEMPERATURE: 96.4 F | HEIGHT: 67 IN | WEIGHT: 145.8 LBS | SYSTOLIC BLOOD PRESSURE: 141 MMHG | BODY MASS INDEX: 22.88 KG/M2 | DIASTOLIC BLOOD PRESSURE: 83 MMHG | HEART RATE: 63 BPM | OXYGEN SATURATION: 97 %

## 2022-02-25 DIAGNOSIS — R26.9 IMPAIRED GAIT: ICD-10-CM

## 2022-02-25 DIAGNOSIS — M87.052 AVASCULAR NECROSIS OF BONE OF LEFT HIP: ICD-10-CM

## 2022-02-25 DIAGNOSIS — M25.552 LEFT HIP PAIN: Primary | ICD-10-CM

## 2022-02-25 DIAGNOSIS — R31.9 URINARY TRACT INFECTION WITH HEMATURIA, SITE UNSPECIFIED: Primary | ICD-10-CM

## 2022-02-25 DIAGNOSIS — N39.0 URINARY TRACT INFECTION WITH HEMATURIA, SITE UNSPECIFIED: Primary | ICD-10-CM

## 2022-02-25 LAB
BILIRUB BLD-MCNC: NEGATIVE MG/DL
CLARITY, POC: ABNORMAL
COLOR UR: ABNORMAL
EXPIRATION DATE: ABNORMAL
GLUCOSE UR STRIP-MCNC: NEGATIVE MG/DL
KETONES UR QL: NEGATIVE
LEUKOCYTE EST, POC: ABNORMAL
Lab: ABNORMAL
NITRITE UR-MCNC: NEGATIVE MG/ML
PH UR: 6 [PH] (ref 5–8)
PROT UR STRIP-MCNC: ABNORMAL MG/DL
RBC # UR STRIP: ABNORMAL /UL
SP GR UR: 1.02 (ref 1–1.03)
UROBILINOGEN UR QL: NORMAL

## 2022-02-25 PROCEDURE — 87086 URINE CULTURE/COLONY COUNT: CPT | Performed by: NURSE PRACTITIONER

## 2022-02-25 PROCEDURE — 87077 CULTURE AEROBIC IDENTIFY: CPT | Performed by: NURSE PRACTITIONER

## 2022-02-25 PROCEDURE — 87186 SC STD MICRODIL/AGAR DIL: CPT | Performed by: NURSE PRACTITIONER

## 2022-02-25 PROCEDURE — 81001 URINALYSIS AUTO W/SCOPE: CPT | Performed by: ORTHOPAEDIC SURGERY

## 2022-02-25 PROCEDURE — 81003 URINALYSIS AUTO W/O SCOPE: CPT | Performed by: NURSE PRACTITIONER

## 2022-02-25 PROCEDURE — 99213 OFFICE O/P EST LOW 20 MIN: CPT | Performed by: NURSE PRACTITIONER

## 2022-02-25 PROCEDURE — 87077 CULTURE AEROBIC IDENTIFY: CPT | Performed by: ORTHOPAEDIC SURGERY

## 2022-02-25 PROCEDURE — 87086 URINE CULTURE/COLONY COUNT: CPT | Performed by: ORTHOPAEDIC SURGERY

## 2022-02-25 RX ORDER — CEPHALEXIN 250 MG/1
250 CAPSULE ORAL 2 TIMES DAILY
Qty: 14 CAPSULE | Refills: 0 | Status: SHIPPED | OUTPATIENT
Start: 2022-02-25 | End: 2022-03-04

## 2022-02-25 NOTE — PATIENT INSTRUCTIONS
Urinary Tract Infection, Adult    A urinary tract infection (UTI) is an infection of any part of the urinary tract. The urinary tract includes the kidneys, ureters, bladder, and urethra. These organs make, store, and get rid of urine in the body.  Your health care provider may use other names to describe the infection. An upper UTI affects the ureters and kidneys (pyelonephritis). A lower UTI affects the bladder (cystitis) and urethra (urethritis).  What are the causes?  Most urinary tract infections are caused by bacteria in your genital area, around the entrance to your urinary tract (urethra). These bacteria grow and cause inflammation of your urinary tract.  What increases the risk?  You are more likely to develop this condition if:  · You have a urinary catheter that stays in place (indwelling).  · You are not able to control when you urinate or have a bowel movement (you have incontinence).  · You are female and you:  ? Use a spermicide or diaphragm for birth control.  ? Have low estrogen levels.  ? Are pregnant.  · You have certain genes that increase your risk (genetics).  · You are sexually active.  · You take antibiotic medicines.  · You have a condition that causes your flow of urine to slow down, such as:  ? An enlarged prostate, if you are male.  ? Blockage in your urethra (stricture).  ? A kidney stone.  ? A nerve condition that affects your bladder control (neurogenic bladder).  ? Not getting enough to drink, or not urinating often.  · You have certain medical conditions, such as:  ? Diabetes.  ? A weak disease-fighting system (immunesystem).  ? Sickle cell disease.  ? Gout.  ? Spinal cord injury.  What are the signs or symptoms?  Symptoms of this condition include:  · Needing to urinate right away (urgently).  · Frequent urination or passing small amounts of urine frequently.  · Pain or burning with urination.  · Blood in the urine.  · Urine that smells bad or unusual.  · Trouble urinating.  · Cloudy  urine.  · Vaginal discharge, if you are female.  · Pain in the abdomen or the lower back.  You may also have:  · Vomiting or a decreased appetite.  · Confusion.  · Irritability or tiredness.  · A fever.  · Diarrhea.  The first symptom in older adults may be confusion. In some cases, they may not have any symptoms until the infection has worsened.  How is this diagnosed?  This condition is diagnosed based on your medical history and a physical exam. You may also have other tests, including:  · Urine tests.  · Blood tests.  · Tests for sexually transmitted infections (STIs).  If you have had more than one UTI, a cystoscopy or imaging studies may be done to determine the cause of the infections.  How is this treated?  Treatment for this condition includes:  · Antibiotic medicine.  · Over-the-counter medicines to treat discomfort.  · Drinking enough water to stay hydrated.  If you have frequent infections or have other conditions such as a kidney stone, you may need to see a health care provider who specializes in the urinary tract (urologist).  In rare cases, urinary tract infections can cause sepsis. Sepsis is a life-threatening condition that occurs when the body responds to an infection. Sepsis is treated in the hospital with IV antibiotics, fluids, and other medicines.  Follow these instructions at home:    Medicines  · Take over-the-counter and prescription medicines only as told by your health care provider.  · If you were prescribed an antibiotic medicine, take it as told by your health care provider. Do not stop using the antibiotic even if you start to feel better.  General instructions  · Make sure you:  ? Empty your bladder often and completely. Do not hold urine for long periods of time.  ? Empty your bladder after sex.  ? Wipe from front to back after a bowel movement if you are female. Use each tissue one time when you wipe.  · Drink enough fluid to keep your urine pale yellow.  · Keep all follow-up  visits as told by your health care provider. This is important.  Contact a health care provider if:  · Your symptoms do not get better after 1-2 days.  · Your symptoms go away and then return.  Get help right away if you have:  · Severe pain in your back or your lower abdomen.  · A fever.  · Nausea or vomiting.  Summary  · A urinary tract infection (UTI) is an infection of any part of the urinary tract, which includes the kidneys, ureters, bladder, and urethra.  · Most urinary tract infections are caused by bacteria in your genital area, around the entrance to your urinary tract (urethra).  · Treatment for this condition often includes antibiotic medicines.  · If you were prescribed an antibiotic medicine, take it as told by your health care provider. Do not stop using the antibiotic even if you start to feel better.  · Keep all follow-up visits as told by your health care provider. This is important.  This information is not intended to replace advice given to you by your health care provider. Make sure you discuss any questions you have with your health care provider.  Document Revised: 12/05/2019 Document Reviewed: 06/27/2019  [employer] Patient Education © 2021 [employer]

## 2022-02-25 NOTE — PROGRESS NOTES
"Discharge Summary  Discharge Summary from Physical Therapy Report    Patient Information  Bing Cruz  6/22/1931    Number of Visits: 14    Comment: Pt was evaluated on 11/3/21 after a Left formal Neck ORIF on 8/3/21. She had home health for 3 months then was treated in our outpatient clinic for 2 months. Pt made progress at first, but then plateaud in functional progress and continued to have significant weakness in bilateral hips despite skilled physical therapy interventions. PT recommended that she follow up with her MD and perhaps have further testing. Also recommended that pt continues with her HEP. Pt was placed on hold after last appointment on 1/4/22 to await MD follow up. PT contacted pt's daughter who states that MD will be doing another surgery as pt's \"bone in her hip is dying.\"  She will be evaluated for physical therapy again after her next surgery.    Goals: Partially Met    Visit Diagnoses:    ICD-10-CM ICD-9-CM   1. Left hip pain  M25.552 719.45   2. Impaired gait  R26.9 781.2       Discharge Plan: Continue with current home exercise program as instructed    Date of Discharge 2/04/22        Trish Huerta, PT  Physical Therapist    Electronically Signed 2/25/2022    [place] License: [id number]              "

## 2022-02-25 NOTE — PROGRESS NOTES
Chief Complaint  Urinary Tract Infection (frequency, burning with urination. frequent UTI's. pt is going to have a hip re[lacement on the 14th of next montrh. )  Subjective        History of Present Illness  Bing Cruz is a 89 y.o. female who presents to [facility] for complaint of dysuria and foul-smelling urine for 2 days. She had an episode of dizziness 3 days ago which she reports is typically is a sign that she has a urinary infection and that she has these frequently.  Negative for vomiting, back pain, fever, and chills. Positive for loss of appetite.    Past Medical History:   Diagnosis Date   • Abdominal pain, generalized    • Acquired cyst of kidney    • Anemia    • Aortic regurgitation    • Aortic stenosis    • Cancer (HCC)     colon    • Cardiomegaly    • Chronic atrial fibrillation (HCC) 1/1/2019    Atrial fibrillation converted to sinus through cardioversion (March 2019   • Chronic fatigue    • Diaphragmatic hernia without obstruction and without gangrene    • Diarrhea    • Disease of tricuspid valve    • Diverticulosis of colon    • Dysphagia    • Essential (primary) hypertension    • Hepatic cyst     LEFT   • Hiatal hernia    • Hyperlipidemia    • Insomnia, unspecified    • LVH (left ventricular hypertrophy)    • Major depressive disorder, single episode    • Malaise and fatigue    • Mitral regurgitation    • Mitral valve disorder    • Mitral valve insufficiency and aortic valve insufficiency    • Osteopenia    • Other specified disorders of bone density and structure, unspecified site    • Pain in joint, pelvic region and thigh    • Renal cyst    • Sprain and strain of hip     Sprain and strain of unspecified site of hip and thigh   • TR (tricuspid regurgitation)    • Unspecified cataract    • Urinary tract infection    • Vitamin D deficiency         Past Surgical History:   Procedure Laterality Date   • ABDOMINAL HYSTERECTOMY      WITH BSO    • ANKLE SURGERY       (L) ankle fracture   • BLADDER REPAIR     • BREAST BIOPSY     • BREAST BIOPSY      (L) breast biopsy   • CATARACT EXTRACTION      OU   • CHOLECYSTECTOMY      OPEN   • COLON SURGERY      STATES SHE HAD 12 INCHES OF COLON REMOVED IN JULY 2020 FOR COLON CANCER   • COLONOSCOPY  2020   • COLONOSCOPY N/A 10/29/2021    Procedure: COLONOSCOPY;  Surgeon: Edmar Back MD;  Location: [facility] ENDOSCOPY;  Service: General;  Laterality: N/A;  DIVERTICULOSIS/ANASTOMOSIS   • FEMUR OPEN REDUCTION INTERNAL FIXATION Left 7/21/2021    Procedure: FEMORAL NECK OPEN REDUCTION INTERNAL FIXATION;  Surgeon: Bam Warner MD;  Location: [facility] MAIN OR;  Service: Orthopedics;  Laterality: Left;   • HIP SURGERY  07/2021   • INGUINAL HERNIA REPAIR Left 09/29/2011   • UPPER GASTROINTESTINAL ENDOSCOPY          Allergies   Allergen Reactions   • Citalopram Unknown - Low Severity   • Clonazepam Unknown - Low Severity   • Levofloxacin Nausea And Vomiting   • Lisinopril Unknown - Low Severity   • Melatonin Unknown - High Severity   • Macrobid [Nitrofurantoin Macrocrystal] Rash          Current Outpatient Medications:   •  ascorbic acid (VITAMIN C) 500 MG tablet, Take 500 mg by mouth Daily., Disp: , Rfl:   •  colestipol (COLESTID) 1 g tablet, TAKE 1 TABLET BY ORAL ROUTE 3 TIMES A DAY AS NEEDED FOR 30 DAYS DIARRHEA, Disp: 270 tablet, Rfl: 1  •  dilTIAZem CD (CARDIZEM CD) 120 MG 24 hr capsule, TAKE 1 CAPSULE BY MOUTH AT NOON, Disp: 90 capsule, Rfl: 3  •  Eliquis 5 MG tablet tablet, TAKE 1 TABLET BY MOUTH TWICE A DAY, Disp: 60 tablet, Rfl: 5  •  HYDROcodone-acetaminophen (Norco) 7.5-325 MG per tablet, Take 1 tablet by mouth Every 6 (Six) Hours As Needed for Moderate Pain ., Disp: 28 tablet, Rfl: 0  •  latanoprost (XALATAN) 0.005 % ophthalmic solution, , Disp: , Rfl:   •  LORazepam (ATIVAN) 0.5 MG tablet, TAKE 1 TABLET BY MOUTH EVERY DAY AT BEDTIME AS NEEDED, Disp: 30 tablet, Rfl: 5  •  losartan (COZAAR) 50 MG tablet, TAKE ONE TABLET TWICE A  "DAY, Disp: 180 tablet, Rfl: 3  •  metoprolol succinate XL (TOPROL-XL) 100 MG 24 hr tablet, Take 1 tablet by mouth 2 (Two) Times a Day., Disp: 180 tablet, Rfl: 3  •  cephalexin (Keflex) 250 MG capsule, Take 1 capsule by mouth 2 (Two) Times a Day for 7 days., Disp: 14 capsule, Rfl: 0    Objective   /83 (BP Location: Left arm, Patient Position: Sitting, Cuff Size: Adult)   Pulse 63   Temp 96.4 °F (35.8 °C) (Temporal)   Ht 170.2 cm (67\")   Wt 66.1 kg (145 lb 12.8 oz)   SpO2 97%   BMI 22.84 kg/m²    Estimated body mass index is 22.84 kg/m² as calculated from the following:    Height as of this encounter: 170.2 cm (67\").    Weight as of this encounter: 66.1 kg (145 lb 12.8 oz).   Physical Exam  Vitals reviewed.   Constitutional:       General: She is not in acute distress.  HENT:      Head: Normocephalic and atraumatic.   Abdominal:      General: Abdomen is flat. There is no distension.      Palpations: Abdomen is soft. There is no mass.      Tenderness: There is abdominal tenderness. There is no right CVA tenderness or left CVA tenderness.      Comments: Suprapubic tenderness   Skin:     General: Skin is warm and dry.   Neurological:      General: No focal deficit present.      Mental Status: She is alert.      Motor: No weakness.   Psychiatric:         Mood and Affect: Mood normal.         Behavior: Behavior normal.         Thought Content: Thought content normal.         Judgment: Judgment normal.           Assessment and Plan    Diagnoses and all orders for this visit:    1. Urinary tract infection with hematuria, site unspecified (Primary)  Comments:  Urinalysis in office with positive leukocytes and blood; sent for culture.  Education on medication and treatment plan.  Orders:  -     cephalexin (Keflex) 250 MG capsule; Take 1 capsule by mouth 2 (Two) Times a Day for 7 days.  Dispense: 14 capsule; Refill: 0  -     Urine Culture - Urine, Urine, Clean Catch; Future      Follow Up     Patient was given " instructions and counseling regarding her condition. Please see specific information pulled into the AVS if appropriate.   Return if symptoms worsen or fail to improve, for Next scheduled follow up.    GABRIELLA Elizondo

## 2022-02-27 LAB
BACTERIA SPEC AEROBE CULT: ABNORMAL
BACTERIA UR QL AUTO: ABNORMAL /HPF
BILIRUB UR QL STRIP: NEGATIVE
CLARITY UR: ABNORMAL
COLOR UR: ABNORMAL
GLUCOSE UR STRIP-MCNC: NEGATIVE MG/DL
HGB UR QL STRIP.AUTO: NEGATIVE
HYALINE CASTS UR QL AUTO: ABNORMAL /LPF
KETONES UR QL STRIP: NEGATIVE
LEUKOCYTE ESTERASE UR QL STRIP.AUTO: ABNORMAL
NITRITE UR QL STRIP: NEGATIVE
PH UR STRIP.AUTO: >=9 [PH] (ref 5–8)
PROT UR QL STRIP: ABNORMAL
RBC # UR STRIP: ABNORMAL /HPF
REF LAB TEST METHOD: ABNORMAL
SP GR UR STRIP: 1.02 (ref 1–1.03)
SQUAMOUS #/AREA URNS HPF: ABNORMAL /HPF
TRI-PHOS CRY URNS QL MICRO: ABNORMAL /HPF
UROBILINOGEN UR QL STRIP: ABNORMAL
WBC # UR STRIP: ABNORMAL /HPF

## 2022-03-01 LAB — BACTERIA SPEC AEROBE CULT: ABNORMAL

## 2022-03-07 ENCOUNTER — TELEPHONE (OUTPATIENT)
Dept: INTERNAL MEDICINE | Facility: CLINIC | Age: 87
End: 2022-03-07

## 2022-03-07 ENCOUNTER — OFFICE VISIT (OUTPATIENT)
Dept: INTERNAL MEDICINE | Facility: CLINIC | Age: 87
End: 2022-03-07

## 2022-03-07 VITALS
HEIGHT: 67 IN | HEART RATE: 142 BPM | TEMPERATURE: 97.4 F | DIASTOLIC BLOOD PRESSURE: 85 MMHG | OXYGEN SATURATION: 96 % | SYSTOLIC BLOOD PRESSURE: 161 MMHG | WEIGHT: 147 LBS | BODY MASS INDEX: 23.07 KG/M2

## 2022-03-07 DIAGNOSIS — R30.0 BURNING WITH URINATION: ICD-10-CM

## 2022-03-07 DIAGNOSIS — I48.20 ATRIAL FIBRILLATION, CHRONIC: ICD-10-CM

## 2022-03-07 DIAGNOSIS — N30.00 ACUTE CYSTITIS WITHOUT HEMATURIA: Primary | ICD-10-CM

## 2022-03-07 LAB
BILIRUB BLD-MCNC: ABNORMAL MG/DL
CLARITY, POC: ABNORMAL
COLOR UR: ABNORMAL
EXPIRATION DATE: ABNORMAL
GLUCOSE UR STRIP-MCNC: ABNORMAL MG/DL
KETONES UR QL: ABNORMAL
Lab: ABNORMAL
NITRITE UR-MCNC: ABNORMAL MG/ML
PH UR: ABNORMAL [PH]
PROT UR STRIP-MCNC: ABNORMAL MG/DL
RBC # UR STRIP: ABNORMAL /UL
SPECIFIC GRAVITY: ABNORMAL
UROBILINOGEN UR QL: ABNORMAL

## 2022-03-07 PROCEDURE — 93000 ELECTROCARDIOGRAM COMPLETE: CPT | Performed by: INTERNAL MEDICINE

## 2022-03-07 PROCEDURE — 81003 URINALYSIS AUTO W/O SCOPE: CPT | Performed by: INTERNAL MEDICINE

## 2022-03-07 PROCEDURE — 99213 OFFICE O/P EST LOW 20 MIN: CPT | Performed by: INTERNAL MEDICINE

## 2022-03-07 RX ORDER — CEPHALEXIN 250 MG/1
250 CAPSULE ORAL 2 TIMES DAILY
Qty: 10 CAPSULE | Refills: 0 | Status: SHIPPED | OUTPATIENT
Start: 2022-03-07 | End: 2022-03-08

## 2022-03-07 NOTE — PROGRESS NOTES
"CHIEF COMPLAINT  Bing Cruz presents to [facility] with complaint of Urinary Tract Infection (Burning )     HPI  C/o continued burning and discomfort-last took cephalexin 4 days ago for 7 day course-pt noted to have  while in clinic and EKG done-pt states anxious    OBJECTIVE  Vital Signs  Vitals:    03/07/22 1605   BP: 161/85   Pulse: (!) 142   Temp: 97.4 °F (36.3 °C)   SpO2: 96%   Weight: 66.7 kg (147 lb)   Height: 170.2 cm (67\")         Office Visit on 03/07/2022   Component Date Value Ref Range Status   • Color 03/07/2022    Final-Edited    yellow   • Clarity, UA 03/07/2022    Final-Edited    clear   • SPECIFIC GRAVITY 03/07/2022    Final-Edited    1.005   • pH, Urine 03/07/2022    Final-Edited    6.0   • Nitrite, UA 03/07/2022    Final-Edited    neg   • Protein, POC 03/07/2022    Final-Edited    neg   • Glucose, UA 03/07/2022    Final-Edited    neg   • Ketones, UA 03/07/2022    Final-Edited    neg   • Urobilinogen, UA 03/07/2022    Final-Edited    0.2   • Bilirubin 03/07/2022    Final-Edited    neg   • Blood, UA 03/07/2022    Final-Edited    neg   • Lot Number 03/07/2022 [id number]   Corrected   • Expiration Date 03/07/2022 10/21/2023   Corrected         Physical Exam  Vitals and nursing note reviewed.   Constitutional:       Appearance: Normal appearance.   Cardiovascular:      Rate and Rhythm: Rhythm irregular.   Pulmonary:      Effort: Pulmonary effort is normal.      Breath sounds: Normal breath sounds.   Abdominal:      General: Abdomen is flat.      Palpations: Abdomen is soft.   Neurological:      Mental Status: She is alert.            ECG 12 Lead    Date/Time: 3/7/2022 5:17 PM  Performed by: Lora Rudd MD  Authorized by: Lora Rudd MD   Rhythm: atrial fibrillation  BPM: 102    Clinical impression: abnormal EKG              ASSESSMENT & PLAN  Visit Diagnoses:    ICD-10-CM ICD-9-CM   1. Acute cystitis without " hematuria  N30.00 595.0   2. Burning with urination  R30.0 788.1   3. Atrial fibrillation, chronic (HCC)  I48.20 427.31       Assessment and Plan   Diagnoses and all orders for this visit:    1. Acute cystitis without hematuria (Primary)  Comments:  -still with symptoms-will give another courses of cephalexin x 5 days-push fluids-urine Cx with Proteus mirabilis sensitive to cephalexin    2. Burning with urination  -     POCT urinalysis dipstick, automated  -     Urine Culture - Urine, Urine, Clean Catch; Future  -     Urine Culture - Urine, Urine, Clean Catch  -     cephalexin (Keflex) 250 MG capsule; Take 1 capsule by mouth 2 (Two) Times a Day for 5 days.  Dispense: 10 capsule; Refill: 0    3. Atrial fibrillation, chronic (HCC)  Comments:  EKG rechecked with TL=482-sm states was anxious-junior and shanelle waitng for pt--for THR in few weeks--monitor HR-taking meds    Other orders  -     Cancel: Pregnancy, Urine - Urine, Clean Catch  -     ECG 12 Lead          FOLLOW UP  Return if symptoms worsen or fail to improve or if HR increases, for Next scheduled follow up.    Patient was given instructions and counseling regarding her condition or for health maintenance advice. Please see specific information pulled into the AVS if appropriate.

## 2022-03-07 NOTE — TELEPHONE ENCOUNTER
Call the patient's daughter back, ask her what specific question she would like to address, we can call in some Diflucan if that is what she is wanting, let me know today if possible thank you

## 2022-03-07 NOTE — TELEPHONE ENCOUNTER
Patient was given an appointment to see Dr Kristen Lopez today in our office.  This has been addressed

## 2022-03-07 NOTE — TELEPHONE ENCOUNTER
Caller: STEPHANIE BENITES    Relationship: Emergency Contact    Best call back number: [phone]    What medication are you requesting: PCP RECOMMENDATION    What are your current symptoms: BURNING GENITALS     How long have you been experiencing symptoms: 03/03/22    Have you had these symptoms before:    [x] Yes  [] No    Have you been treated for these symptoms before:   [x] Yes  [] No    If a prescription is needed, what is your preferred pharmacy and phone number: [facility] #[id number] - [place] - [address] [facility] [phone] [facility] [phone] FX     Additional notes:  THE PATIENT'S DAUGHTER STATED THE PATIENT HAS TAKEN HER LAST ANTIBIOTIC AS OF 03/03/22 FOR HER UTI AND NOW SHE HAS BURNING BUT NOT ITCHING. SHE WOULD LIKE A CALL BACK TO DISCUSS ASAP

## 2022-03-08 ENCOUNTER — PRE-ADMISSION TESTING (OUTPATIENT)
Dept: PREADMISSION TESTING | Facility: HOSPITAL | Age: 87
End: 2022-03-08

## 2022-03-08 VITALS
OXYGEN SATURATION: 95 % | SYSTOLIC BLOOD PRESSURE: 148 MMHG | HEIGHT: 67 IN | DIASTOLIC BLOOD PRESSURE: 74 MMHG | RESPIRATION RATE: 18 BRPM | TEMPERATURE: 97.8 F | HEART RATE: 79 BPM | BODY MASS INDEX: 22.94 KG/M2 | WEIGHT: 146.16 LBS

## 2022-03-08 DIAGNOSIS — R79.1 ABNORMAL COAGULATION PROFILE: ICD-10-CM

## 2022-03-08 DIAGNOSIS — S72.002A CLOSED LEFT HIP FRACTURE, INITIAL ENCOUNTER: Primary | ICD-10-CM

## 2022-03-08 DIAGNOSIS — M87.052 AVASCULAR NECROSIS OF BONE OF LEFT HIP: ICD-10-CM

## 2022-03-08 LAB
ALBUMIN SERPL-MCNC: 4 G/DL (ref 3.5–5.2)
ALBUMIN/GLOB SERPL: 1.3 G/DL
ALP SERPL-CCNC: 85 U/L (ref 39–117)
ALT SERPL W P-5'-P-CCNC: 11 U/L (ref 1–33)
ANION GAP SERPL CALCULATED.3IONS-SCNC: 11.7 MMOL/L (ref 5–15)
AST SERPL-CCNC: 17 U/L (ref 1–32)
BACTERIA SPEC AEROBE CULT: NORMAL
BASOPHILS # BLD AUTO: 0.04 10*3/MM3 (ref 0–0.2)
BASOPHILS NFR BLD AUTO: 0.5 % (ref 0–1.5)
BILIRUB SERPL-MCNC: 1 MG/DL (ref 0–1.2)
BUN SERPL-MCNC: 14 MG/DL (ref 8–23)
BUN/CREAT SERPL: 14.9 (ref 7–25)
CALCIUM SPEC-SCNC: 9.5 MG/DL (ref 8.2–9.6)
CHLORIDE SERPL-SCNC: 104 MMOL/L (ref 98–107)
CO2 SERPL-SCNC: 24.3 MMOL/L (ref 22–29)
CREAT SERPL-MCNC: 0.94 MG/DL (ref 0.57–1)
DEPRECATED RDW RBC AUTO: 45.7 FL (ref 37–54)
EGFRCR SERPLBLD CKD-EPI 2021: 57.8 ML/MIN/1.73
EOSINOPHIL # BLD AUTO: 0.07 10*3/MM3 (ref 0–0.4)
EOSINOPHIL NFR BLD AUTO: 1 % (ref 0.3–6.2)
ERYTHROCYTE [DISTWIDTH] IN BLOOD BY AUTOMATED COUNT: 13.4 % (ref 12.3–15.4)
GLOBULIN UR ELPH-MCNC: 3 GM/DL
GLUCOSE SERPL-MCNC: 95 MG/DL (ref 65–99)
HBA1C MFR BLD: 5.3 % (ref 4.8–5.6)
HCT VFR BLD AUTO: 36.4 % (ref 34–46.6)
HGB BLD-MCNC: 12.6 G/DL (ref 12–15.9)
IMM GRANULOCYTES # BLD AUTO: 0.02 10*3/MM3 (ref 0–0.05)
IMM GRANULOCYTES NFR BLD AUTO: 0.3 % (ref 0–0.5)
INR PPP: 1.1 (ref 2–3)
LYMPHOCYTES # BLD AUTO: 2.03 10*3/MM3 (ref 0.7–3.1)
LYMPHOCYTES NFR BLD AUTO: 27.7 % (ref 19.6–45.3)
MCH RBC QN AUTO: 32.3 PG (ref 26.6–33)
MCHC RBC AUTO-ENTMCNC: 34.6 G/DL (ref 31.5–35.7)
MCV RBC AUTO: 93.3 FL (ref 79–97)
MONOCYTES # BLD AUTO: 0.48 10*3/MM3 (ref 0.1–0.9)
MONOCYTES NFR BLD AUTO: 6.5 % (ref 5–12)
NEUTROPHILS NFR BLD AUTO: 4.7 10*3/MM3 (ref 1.7–7)
NEUTROPHILS NFR BLD AUTO: 64 % (ref 42.7–76)
NRBC BLD AUTO-RTO: 0 /100 WBC (ref 0–0.2)
PLATELET # BLD AUTO: 227 10*3/MM3 (ref 140–450)
PMV BLD AUTO: 11.3 FL (ref 6–12)
POTASSIUM SERPL-SCNC: 3.8 MMOL/L (ref 3.5–5.2)
PROT SERPL-MCNC: 7 G/DL (ref 6–8.5)
PROTHROMBIN TIME: 11.4 SECONDS (ref 9.4–12)
RBC # BLD AUTO: 3.9 10*6/MM3 (ref 3.77–5.28)
SODIUM SERPL-SCNC: 140 MMOL/L (ref 136–145)
WBC NRBC COR # BLD: 7.34 10*3/MM3 (ref 3.4–10.8)

## 2022-03-08 PROCEDURE — 80053 COMPREHEN METABOLIC PANEL: CPT

## 2022-03-08 PROCEDURE — 83036 HEMOGLOBIN GLYCOSYLATED A1C: CPT

## 2022-03-08 PROCEDURE — 36415 COLL VENOUS BLD VENIPUNCTURE: CPT

## 2022-03-08 PROCEDURE — 85610 PROTHROMBIN TIME: CPT

## 2022-03-08 PROCEDURE — 85025 COMPLETE CBC W/AUTO DIFF WBC: CPT

## 2022-03-08 RX ORDER — PANTOPRAZOLE SODIUM 40 MG/1
40 TABLET, DELAYED RELEASE ORAL DAILY
COMMUNITY
End: 2022-05-05

## 2022-03-08 ASSESSMENT — HOOS JR
HOOS JR SCORE: 15
HOOS JR SCORE: 43.335

## 2022-03-08 NOTE — DISCHARGE INSTRUCTIONS
IMPORTANT INSTRUCTIONS - PRE-ADMISSION TESTING  DO NOT EAT OR CHEW anything after midnight the night before your procedure.    You may have CLEAR liquids up to _3hours prior to ARRIVAL time.   Take the following medications the morning of your procedure with JUST A SIP OF WATER: METOPROLOL, PANTOPRAZOLE, HYDROCODONE IF NEEDED    DO NOT BRING your medications to the hospital with you, UNLESS something has changed since your PRE-Admission Testing appointment.  Hold all vitamins, supplements, and NSAIDS (Non- steroidal anti-inflammatory meds) for one week prior to surgery (you MAY take Tylenol or Acetaminophen).  If you are diabetic, check your blood sugar the morning of your procedure. If it is less than 70 or if you are feeling symptomatic, call the following number for further instructions: [phone]  Use your inhalers/nebulizers as usual, the morning of your procedure. BRING YOUR INHALERS with you.   Bring your CPAP or BIPAP to hospital, ONLY IF YOU WILL BE SPENDING THE NIGHT.   Make sure you have a ride home and have someone who will stay with you the day of your procedure after you go home.  If you have any questions, please call your Pre-Admission Testing NurseCIPRIANO at [phone].   Per anesthesia request, do not smoke for 24 hours before your procedure or as instructed by your surgeon.

## 2022-03-09 ENCOUNTER — TELEPHONE (OUTPATIENT)
Dept: INTERNAL MEDICINE | Facility: CLINIC | Age: 87
End: 2022-03-09

## 2022-03-09 ENCOUNTER — TELEPHONE (OUTPATIENT)
Dept: ORTHOPEDIC SURGERY | Facility: CLINIC | Age: 87
End: 2022-03-09

## 2022-03-09 DIAGNOSIS — N13.9 URINARY (TRACT) OBSTRUCTION: Primary | ICD-10-CM

## 2022-03-09 NOTE — TELEPHONE ENCOUNTER
I CALLED AND SPOKE WITH DAUGHTER ABOUT PATIENT'S CURRENT UTI AND SURGERY.  PATIENT'S ANTIBIOTICS WERE REFILLED ON Monday AFTER HAVING HER URINE RECHECKED.  PCP TOLD DAUGHTER THE INFECTION WAS BETTER BUT GOING TO GIVE HER  5 DAYS MORE OF MEDS.  DAUGHTER IS GOING TO CALL PCP AND SEE IF URINE TEST CAN BE RAN ON Monday THE 14TH TO SEE IF INFECTION IS CLEARED TO GO AHEAD WITH SURGERY ON THE 16TH.  PATIENT'S DAUGHTER, STEPHANIE VOICES UNDERSTANDING AND WILL UPDATE THIS OFFICE.

## 2022-03-09 NOTE — TELEPHONE ENCOUNTER
Caller: STEPHANIE BENITES    Relationship: Emergency Contact    Best call back number: [phone]    What orders are you requesting (i.e. lab or imaging): URINALYSIS    In what timeframe would the patient need to come in: PATIENT IS HAVING TOTAL HIP REPLACEMENT SURGERY NEXT Wednesday 03/16/22 AND THEY WOULD LIKE PATIENT TO BE RECHECKED BY DR STUART ON Monday 03/14/22. PATIENT HAS BEEN ON SOME ANTIBIOTICS FROM [facility] FOR AN INFECTION    Where will you receive your lab/imaging services: [facility] DO[facility]    Additional notes: PLEASE CALL STEPHANIE

## 2022-03-09 NOTE — TELEPHONE ENCOUNTER
TALKED WITH PT DAUGHTER AND SHE IS JUST WONDERING IF SHE CAN JUST GET A LAB ORDER FOR A UTI TO MAKE SURE ITS CLEARED UP BEFORE SURGERY. SHE WILL HAVE 1 DOSE Saturday.

## 2022-03-10 ENCOUNTER — ANESTHESIA EVENT (OUTPATIENT)
Dept: PERIOP | Facility: HOSPITAL | Age: 87
End: 2022-03-10

## 2022-03-14 ENCOUNTER — LAB (OUTPATIENT)
Dept: LAB | Facility: HOSPITAL | Age: 87
End: 2022-03-14

## 2022-03-14 DIAGNOSIS — I10 HYPERTENSION, ESSENTIAL: ICD-10-CM

## 2022-03-14 DIAGNOSIS — I34.0 NONRHEUMATIC MITRAL VALVE REGURGITATION: ICD-10-CM

## 2022-03-14 DIAGNOSIS — I48.20 ATRIAL FIBRILLATION, CHRONIC: ICD-10-CM

## 2022-03-14 DIAGNOSIS — I35.1 NONRHEUMATIC AORTIC VALVE INSUFFICIENCY: ICD-10-CM

## 2022-03-14 DIAGNOSIS — I82.5Y2 CHRONIC DEEP VEIN THROMBOSIS (DVT) OF PROXIMAL VEIN OF LEFT LOWER EXTREMITY: ICD-10-CM

## 2022-03-14 DIAGNOSIS — N13.9 URINARY (TRACT) OBSTRUCTION: ICD-10-CM

## 2022-03-14 DIAGNOSIS — E78.5 HYPERLIPIDEMIA, UNSPECIFIED HYPERLIPIDEMIA TYPE: ICD-10-CM

## 2022-03-14 LAB
ALBUMIN SERPL-MCNC: 4.4 G/DL (ref 3.5–5.2)
ALBUMIN/GLOB SERPL: 1.5 G/DL
ALP SERPL-CCNC: 92 U/L (ref 39–117)
ALT SERPL W P-5'-P-CCNC: 13 U/L (ref 1–33)
ANION GAP SERPL CALCULATED.3IONS-SCNC: 12.4 MMOL/L (ref 5–15)
AST SERPL-CCNC: 20 U/L (ref 1–32)
BACTERIA UR QL AUTO: ABNORMAL /HPF
BASOPHILS # BLD AUTO: 0.05 10*3/MM3 (ref 0–0.2)
BASOPHILS NFR BLD AUTO: 0.7 % (ref 0–1.5)
BILIRUB SERPL-MCNC: 1.1 MG/DL (ref 0–1.2)
BILIRUB UR QL STRIP: NEGATIVE
BUN SERPL-MCNC: 11 MG/DL (ref 8–23)
BUN/CREAT SERPL: 11.7 (ref 7–25)
CALCIUM SPEC-SCNC: 9.8 MG/DL (ref 8.2–9.6)
CHLORIDE SERPL-SCNC: 99 MMOL/L (ref 98–107)
CLARITY UR: CLEAR
CO2 SERPL-SCNC: 24.6 MMOL/L (ref 22–29)
COD CRY URNS QL: ABNORMAL /HPF
COLOR UR: ABNORMAL
CREAT SERPL-MCNC: 0.94 MG/DL (ref 0.57–1)
DEPRECATED RDW RBC AUTO: 41.2 FL (ref 37–54)
EGFRCR SERPLBLD CKD-EPI 2021: 57.8 ML/MIN/1.73
EOSINOPHIL # BLD AUTO: 0.05 10*3/MM3 (ref 0–0.4)
EOSINOPHIL NFR BLD AUTO: 0.7 % (ref 0.3–6.2)
ERYTHROCYTE [DISTWIDTH] IN BLOOD BY AUTOMATED COUNT: 12.2 % (ref 12.3–15.4)
GLOBULIN UR ELPH-MCNC: 3 GM/DL
GLUCOSE SERPL-MCNC: 91 MG/DL (ref 65–99)
GLUCOSE UR STRIP-MCNC: NEGATIVE MG/DL
HCT VFR BLD AUTO: 39.9 % (ref 34–46.6)
HGB BLD-MCNC: 13.6 G/DL (ref 12–15.9)
HGB UR QL STRIP.AUTO: NEGATIVE
HYALINE CASTS UR QL AUTO: ABNORMAL /LPF
IMM GRANULOCYTES # BLD AUTO: 0.03 10*3/MM3 (ref 0–0.05)
IMM GRANULOCYTES NFR BLD AUTO: 0.4 % (ref 0–0.5)
KETONES UR QL STRIP: NEGATIVE
LEUKOCYTE ESTERASE UR QL STRIP.AUTO: ABNORMAL
LYMPHOCYTES # BLD AUTO: 2.27 10*3/MM3 (ref 0.7–3.1)
LYMPHOCYTES NFR BLD AUTO: 30.4 % (ref 19.6–45.3)
MCH RBC QN AUTO: 31.7 PG (ref 26.6–33)
MCHC RBC AUTO-ENTMCNC: 34.1 G/DL (ref 31.5–35.7)
MCV RBC AUTO: 93 FL (ref 79–97)
MONOCYTES # BLD AUTO: 0.5 10*3/MM3 (ref 0.1–0.9)
MONOCYTES NFR BLD AUTO: 6.7 % (ref 5–12)
NEUTROPHILS NFR BLD AUTO: 4.56 10*3/MM3 (ref 1.7–7)
NEUTROPHILS NFR BLD AUTO: 61.1 % (ref 42.7–76)
NITRITE UR QL STRIP: NEGATIVE
NRBC BLD AUTO-RTO: 0 /100 WBC (ref 0–0.2)
PH UR STRIP.AUTO: 6.5 [PH] (ref 5–8)
PLATELET # BLD AUTO: 275 10*3/MM3 (ref 140–450)
PMV BLD AUTO: 12.3 FL (ref 6–12)
POTASSIUM SERPL-SCNC: 3.6 MMOL/L (ref 3.5–5.2)
PROT SERPL-MCNC: 7.4 G/DL (ref 6–8.5)
PROT UR QL STRIP: ABNORMAL
RBC # BLD AUTO: 4.29 10*6/MM3 (ref 3.77–5.28)
RBC # UR STRIP: ABNORMAL /HPF
REF LAB TEST METHOD: ABNORMAL
SODIUM SERPL-SCNC: 136 MMOL/L (ref 136–145)
SP GR UR STRIP: 1.01 (ref 1–1.03)
SQUAMOUS #/AREA URNS HPF: ABNORMAL /HPF
T4 FREE SERPL-MCNC: 1.42 NG/DL (ref 0.93–1.7)
TSH SERPL DL<=0.05 MIU/L-ACNC: 3.71 UIU/ML (ref 0.27–4.2)
UROBILINOGEN UR QL STRIP: ABNORMAL
WBC # UR STRIP: ABNORMAL /HPF
WBC NRBC COR # BLD: 7.46 10*3/MM3 (ref 3.4–10.8)

## 2022-03-14 PROCEDURE — 84439 ASSAY OF FREE THYROXINE: CPT

## 2022-03-14 PROCEDURE — 36415 COLL VENOUS BLD VENIPUNCTURE: CPT

## 2022-03-14 PROCEDURE — 85025 COMPLETE CBC W/AUTO DIFF WBC: CPT

## 2022-03-14 PROCEDURE — 81001 URINALYSIS AUTO W/SCOPE: CPT

## 2022-03-14 PROCEDURE — 80053 COMPREHEN METABOLIC PANEL: CPT

## 2022-03-14 PROCEDURE — 84443 ASSAY THYROID STIM HORMONE: CPT

## 2022-03-15 NOTE — H&P
[facility]   HISTORY AND PHYSICAL    Patient Name: Bing Cruz  : 1931  MRN: [id number]  Primary Care Physician:  Jairo Kramer MD  Date of admission: (Not on file)    Subjective   Subjective     Chief Complaint: Left hip pain    History of Present Illness: The patient is a 89-year-old female with left hip pain.  She had a previous nondisplaced femoral neck fracture treated with cannulated screw fixation.  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  There has been some flattening collapse of the femoral head.  This has been associated with severe pain and disability to the hip for the patient.  So much so that she is interested in left hip replacement surgery.    Review of Systems : Negative except for those mentioned in history of present illness    Personal History     Past Medical History:   Diagnosis Date   • Abdominal pain, generalized    • Anemia    • Aortic regurgitation    • Aortic stenosis    • Cancer (HCC)     colon    • Cardiomegaly    • Chronic atrial fibrillation (HCC) 2019    Atrial fibrillation converted to sinus through cardioversion (2019   • Chronic fatigue    • Diarrhea    • Disease of tricuspid valve    • Diverticulosis of colon    • DVT (deep venous thrombosis) (HCC)     LEFT LEG GREATER THAN 5 YRS AGO   • Dysphagia    • Essential (primary) hypertension    • Hiatal hernia    • Insomnia, unspecified    • LVH (left ventricular hypertrophy)    • Major depressive disorder, single episode    • Mitral regurgitation    • Mitral valve insufficiency and aortic valve insufficiency    • Osteopenia    • Pain in joint, pelvic region and thigh    • TR (tricuspid regurgitation)    • UTI (urinary tract infection)     antibx to be completed prior to procedure. ABIOLA Carlos RN (Warner) notified.       Past Surgical History:   Procedure Laterality Date   • ABDOMINAL HYSTERECTOMY       WITH BSO    • ANKLE SURGERY      (L) ankle fracture   • BLADDER REPAIR     • BREAST BIOPSY      (L) breast biopsy   • CARDIOVERSION  2019   • CATARACT EXTRACTION      OU   • CHOLECYSTECTOMY      OPEN   • COLON SURGERY      STATES SHE HAD 12 INCHES OF COLON REMOVED IN JULY 2020 FOR COLON CANCER   • COLONOSCOPY  2020   • COLONOSCOPY N/A 10/29/2021    Procedure: COLONOSCOPY;  Surgeon: Edmar Back MD;  Location: [facility] ENDOSCOPY;  Service: General;  Laterality: N/A;  DIVERTICULOSIS/ANASTOMOSIS   • FEMUR OPEN REDUCTION INTERNAL FIXATION Left 07/21/2021    Procedure: FEMORAL NECK OPEN REDUCTION INTERNAL FIXATION;  Surgeon: Bam Warner MD;  Location: [facility] MAIN OR;  Service: Orthopedics;  Laterality: Left;   • INGUINAL HERNIA REPAIR Left 09/29/2011   • UPPER GASTROINTESTINAL ENDOSCOPY      WITH DILATATION       Family History: family history is not on file. Otherwise pertinent FHx was reviewed and not pertinent to current issue.    Social History:  reports that she has never smoked. She has never used smokeless tobacco. She reports previous alcohol use. She reports that she does not use drugs.    Home Medications:  Cranberry, HYDROcodone-acetaminophen, LORazepam, Lactobacillus, apixaban, ascorbic acid, colestipol, dilTIAZem CD, latanoprost, losartan, metoprolol succinate XL, and pantoprazole    Allergies:  Allergies   Allergen Reactions   • Citalopram Unknown - High Severity   • Clonazepam Unknown - High Severity   • Levofloxacin Nausea And Vomiting   • Lisinopril Unknown - High Severity   • Macrobid [Nitrofurantoin Macrocrystal] Rash   • Melatonin Unknown - High Severity       Objective    Objective     Vitals:        Physical Exam   General: No apparent distress, alert and oriented x3  HEENT: Normocephalic/atraumatic  Neck: Supple  Cardiovascular: Regular heart rate  Chest: Unlabored breathing  Abdomen: Soft, nontender nondistended  Musculoskeletal: Tender to palpation to the hip.  Well-healed scar to the  lateral hip.  Decreased range of motion of the hip with pain with range of motion.  Ambulates with antalgic gait.  Neurovascular intact to extremity  Neurological: Grossly intact    Result Review    Result Review:  I have personally reviewed the results from the time of this admission to 3/15/2022 18:07 EDT and agree with these findings:  []  Laboratory  []  Microbiology  [x]  Radiology  []  EKG/Telemetry   []  Cardiology/Vascular   []  Pathology  []  Old records  []  Other:  Most notable findings include: Left hip avascular necrosis, intact hardware with healed femoral neck fracture    Assessment/Plan   Assessment / Plan     Brief Patient Summary:  Bing Cruz is a 89 y.o. 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  We discussed operative versus nonoperative treatment.  Informed consent was obtained and she wishes to proceed with removal of hardware of the left hip which left hip hemiarthroplasty versus total hip arthroplasty.  Risks and benefits of the procedure were discussed and the patient wishes to proceed.    DVT prophylaxis:  No DVT prophylaxis order currently exists.    CODE STATUS:       Admission Status:  I believe this patient meets outpatient status.    Bam Warner MD

## 2022-03-16 ENCOUNTER — HOSPITAL ENCOUNTER (OUTPATIENT)
Facility: HOSPITAL | Age: 87
Discharge: NURSING, REHAB OR LONG-TERM CARE | End: 2022-03-18
Attending: ORTHOPAEDIC SURGERY | Admitting: ORTHOPAEDIC SURGERY

## 2022-03-16 ENCOUNTER — APPOINTMENT (OUTPATIENT)
Dept: GENERAL RADIOLOGY | Facility: HOSPITAL | Age: 87
End: 2022-03-16

## 2022-03-16 ENCOUNTER — ANESTHESIA (OUTPATIENT)
Dept: PERIOP | Facility: HOSPITAL | Age: 87
End: 2022-03-16

## 2022-03-16 DIAGNOSIS — Z47.89 AFTERCARE FOLLOWING SURGERY OF THE MUSCULOSKELETAL SYSTEM: ICD-10-CM

## 2022-03-16 DIAGNOSIS — M16.12 ARTHRITIS OF LEFT HIP: ICD-10-CM

## 2022-03-16 DIAGNOSIS — Z78.9 DECREASED ACTIVITIES OF DAILY LIVING (ADL): ICD-10-CM

## 2022-03-16 DIAGNOSIS — M87.052 AVASCULAR NECROSIS OF BONE OF LEFT HIP: ICD-10-CM

## 2022-03-16 DIAGNOSIS — R26.2 DIFFICULTY IN WALKING: Primary | ICD-10-CM

## 2022-03-16 LAB
HCT VFR BLD AUTO: 35.6 % (ref 34–46.6)
HGB BLD-MCNC: 12.2 G/DL (ref 12–15.9)

## 2022-03-16 PROCEDURE — 63710000001 FAMOTIDINE 20 MG TABLET: Performed by: ORTHOPAEDIC SURGERY

## 2022-03-16 PROCEDURE — 25010000002 CEFAZOLIN IN DEXTROSE 2-4 GM/100ML-% SOLUTION: Performed by: ORTHOPAEDIC SURGERY

## 2022-03-16 PROCEDURE — 25010000002 MIDAZOLAM PER 1 MG: Performed by: ANESTHESIOLOGY

## 2022-03-16 PROCEDURE — 73502 X-RAY EXAM HIP UNI 2-3 VIEWS: CPT

## 2022-03-16 PROCEDURE — 63710000001 OXYCODONE 5 MG TABLET: Performed by: NURSE ANESTHETIST, CERTIFIED REGISTERED

## 2022-03-16 PROCEDURE — 25010000002 KETOROLAC TROMETHAMINE PER 15 MG: Performed by: ORTHOPAEDIC SURGERY

## 2022-03-16 PROCEDURE — C1776 JOINT DEVICE (IMPLANTABLE): HCPCS | Performed by: ORTHOPAEDIC SURGERY

## 2022-03-16 PROCEDURE — 25010000002 PROPOFOL 10 MG/ML EMULSION: Performed by: NURSE ANESTHETIST, CERTIFIED REGISTERED

## 2022-03-16 PROCEDURE — 25010000002 MORPHINE PER 10 MG: Performed by: ORTHOPAEDIC SURGERY

## 2022-03-16 PROCEDURE — 94799 UNLISTED PULMONARY SVC/PX: CPT

## 2022-03-16 PROCEDURE — A9270 NON-COVERED ITEM OR SERVICE: HCPCS | Performed by: ANESTHESIOLOGY

## 2022-03-16 PROCEDURE — A9270 NON-COVERED ITEM OR SERVICE: HCPCS | Performed by: ORTHOPAEDIC SURGERY

## 2022-03-16 PROCEDURE — 27130 TOTAL HIP ARTHROPLASTY: CPT | Performed by: ORTHOPAEDIC SURGERY

## 2022-03-16 PROCEDURE — 25010000002 EPINEPHRINE 1 MG/ML SOLUTION: Performed by: ORTHOPAEDIC SURGERY

## 2022-03-16 PROCEDURE — 25010000002 ROPIVACAINE PER 1 MG: Performed by: ORTHOPAEDIC SURGERY

## 2022-03-16 PROCEDURE — 85014 HEMATOCRIT: CPT | Performed by: ORTHOPAEDIC SURGERY

## 2022-03-16 PROCEDURE — 99214 OFFICE O/P EST MOD 30 MIN: CPT | Performed by: INTERNAL MEDICINE

## 2022-03-16 PROCEDURE — 63710000001 CELECOXIB 100 MG CAPSULE: Performed by: ANESTHESIOLOGY

## 2022-03-16 PROCEDURE — 76000 FLUOROSCOPY <1 HR PHYS/QHP: CPT

## 2022-03-16 PROCEDURE — C1713 ANCHOR/SCREW BN/BN,TIS/BN: HCPCS | Performed by: ORTHOPAEDIC SURGERY

## 2022-03-16 PROCEDURE — 63710000001 HYDROCODONE-ACETAMINOPHEN 5-325 MG TABLET: Performed by: ORTHOPAEDIC SURGERY

## 2022-03-16 PROCEDURE — 85018 HEMOGLOBIN: CPT | Performed by: ORTHOPAEDIC SURGERY

## 2022-03-16 PROCEDURE — 88311 DECALCIFY TISSUE: CPT | Performed by: ORTHOPAEDIC SURGERY

## 2022-03-16 PROCEDURE — A9270 NON-COVERED ITEM OR SERVICE: HCPCS | Performed by: NURSE ANESTHETIST, CERTIFIED REGISTERED

## 2022-03-16 PROCEDURE — 25010000002 MORPHINE (PF) 10 MG/ML SOLUTION: Performed by: ORTHOPAEDIC SURGERY

## 2022-03-16 PROCEDURE — 88304 TISSUE EXAM BY PATHOLOGIST: CPT | Performed by: ORTHOPAEDIC SURGERY

## 2022-03-16 PROCEDURE — 97161 PT EVAL LOW COMPLEX 20 MIN: CPT

## 2022-03-16 PROCEDURE — 63710000001 ACETAMINOPHEN 500 MG TABLET: Performed by: ANESTHESIOLOGY

## 2022-03-16 DEVICE — SHLL ACET G7 PPS LTD/HL TI SZE 52MM: Type: IMPLANTABLE DEVICE | Site: HIP | Status: FUNCTIONAL

## 2022-03-16 DEVICE — SCRW ACET CORT TRILOGY S/TAP 6.5X30: Type: IMPLANTABLE DEVICE | Site: HIP | Status: FUNCTIONAL

## 2022-03-16 DEVICE — LINER ACET G7 NTRL E1 SZE 36MM: Type: IMPLANTABLE DEVICE | Site: HIP | Status: FUNCTIONAL

## 2022-03-16 DEVICE — TOTAL HIP PRIMARY: Type: IMPLANTABLE DEVICE | Site: HIP | Status: FUNCTIONAL

## 2022-03-16 DEVICE — BONE PREPARATION KIT
Type: IMPLANTABLE DEVICE | Site: HIP | Status: FUNCTIONAL
Brand: BIOPREP

## 2022-03-16 DEVICE — BIOLOX® DELTA, CERAMIC FEMORAL HEAD, S, Ø 36/-3.5, TAPER 12/14
Type: IMPLANTABLE DEVICE | Site: HIP | Status: FUNCTIONAL
Brand: BIOLOX® DELTA

## 2022-03-16 DEVICE — CMT BONE PALACOS R HI/VISC 1X40: Type: IMPLANTABLE DEVICE | Site: HIP | Status: FUNCTIONAL

## 2022-03-16 DEVICE — AVENIR® STEM LATERAL CEMENTED 3
Type: IMPLANTABLE DEVICE | Site: HIP | Status: FUNCTIONAL
Brand: AVENIR®

## 2022-03-16 RX ORDER — MIDAZOLAM HYDROCHLORIDE 1 MG/ML
1 INJECTION INTRAMUSCULAR; INTRAVENOUS ONCE
Status: COMPLETED | OUTPATIENT
Start: 2022-03-16 | End: 2022-03-16

## 2022-03-16 RX ORDER — PROMETHAZINE HYDROCHLORIDE 25 MG/1
25 SUPPOSITORY RECTAL ONCE AS NEEDED
Status: DISCONTINUED | OUTPATIENT
Start: 2022-03-16 | End: 2022-03-16 | Stop reason: HOSPADM

## 2022-03-16 RX ORDER — CELECOXIB 100 MG/1
200 CAPSULE ORAL ONCE
Status: COMPLETED | OUTPATIENT
Start: 2022-03-16 | End: 2022-03-16

## 2022-03-16 RX ORDER — ACETAMINOPHEN 500 MG
1000 TABLET ORAL EVERY 8 HOURS
Status: DISPENSED | OUTPATIENT
Start: 2022-03-16 | End: 2022-03-18

## 2022-03-16 RX ORDER — ACETAMINOPHEN 325 MG/1
325 TABLET ORAL EVERY 4 HOURS PRN
Status: DISCONTINUED | OUTPATIENT
Start: 2022-03-16 | End: 2022-03-18 | Stop reason: HOSPADM

## 2022-03-16 RX ORDER — SODIUM CHLORIDE, SODIUM LACTATE, POTASSIUM CHLORIDE, CALCIUM CHLORIDE 600; 310; 30; 20 MG/100ML; MG/100ML; MG/100ML; MG/100ML
9 INJECTION, SOLUTION INTRAVENOUS CONTINUOUS PRN
Status: DISCONTINUED | OUTPATIENT
Start: 2022-03-16 | End: 2022-03-17

## 2022-03-16 RX ORDER — MAGNESIUM HYDROXIDE 1200 MG/15ML
LIQUID ORAL AS NEEDED
Status: DISCONTINUED | OUTPATIENT
Start: 2022-03-16 | End: 2022-03-16 | Stop reason: HOSPADM

## 2022-03-16 RX ORDER — TRANEXAMIC ACID 10 MG/ML
2000 INJECTION, SOLUTION INTRAVENOUS ONCE
Status: DISCONTINUED | OUTPATIENT
Start: 2022-03-16 | End: 2022-03-16

## 2022-03-16 RX ORDER — SODIUM CHLORIDE, SODIUM LACTATE, POTASSIUM CHLORIDE, CALCIUM CHLORIDE 600; 310; 30; 20 MG/100ML; MG/100ML; MG/100ML; MG/100ML
100 INJECTION, SOLUTION INTRAVENOUS CONTINUOUS
Status: DISCONTINUED | OUTPATIENT
Start: 2022-03-16 | End: 2022-03-17

## 2022-03-16 RX ORDER — ONDANSETRON 2 MG/ML
4 INJECTION INTRAMUSCULAR; INTRAVENOUS ONCE AS NEEDED
Status: DISCONTINUED | OUTPATIENT
Start: 2022-03-16 | End: 2022-03-16 | Stop reason: HOSPADM

## 2022-03-16 RX ORDER — DOCUSATE SODIUM 100 MG/1
100 CAPSULE, LIQUID FILLED ORAL 2 TIMES DAILY PRN
Status: DISCONTINUED | OUTPATIENT
Start: 2022-03-16 | End: 2022-03-18 | Stop reason: HOSPADM

## 2022-03-16 RX ORDER — ONDANSETRON 4 MG/1
4 TABLET, FILM COATED ORAL EVERY 6 HOURS PRN
Status: DISCONTINUED | OUTPATIENT
Start: 2022-03-16 | End: 2022-03-18 | Stop reason: HOSPADM

## 2022-03-16 RX ORDER — PROMETHAZINE HYDROCHLORIDE 12.5 MG/1
25 TABLET ORAL ONCE AS NEEDED
Status: DISCONTINUED | OUTPATIENT
Start: 2022-03-16 | End: 2022-03-16 | Stop reason: HOSPADM

## 2022-03-16 RX ORDER — MEPERIDINE HYDROCHLORIDE 25 MG/ML
12.5 INJECTION INTRAMUSCULAR; INTRAVENOUS; SUBCUTANEOUS
Status: DISCONTINUED | OUTPATIENT
Start: 2022-03-16 | End: 2022-03-16 | Stop reason: HOSPADM

## 2022-03-16 RX ORDER — ONDANSETRON 2 MG/ML
4 INJECTION INTRAMUSCULAR; INTRAVENOUS EVERY 6 HOURS PRN
Status: DISCONTINUED | OUTPATIENT
Start: 2022-03-16 | End: 2022-03-18 | Stop reason: HOSPADM

## 2022-03-16 RX ORDER — KETOROLAC TROMETHAMINE 30 MG/ML
15 INJECTION, SOLUTION INTRAMUSCULAR; INTRAVENOUS EVERY 6 HOURS
Status: COMPLETED | OUTPATIENT
Start: 2022-03-16 | End: 2022-03-17

## 2022-03-16 RX ORDER — PROMETHAZINE HYDROCHLORIDE 12.5 MG/1
12.5 SUPPOSITORY RECTAL EVERY 6 HOURS PRN
Status: DISCONTINUED | OUTPATIENT
Start: 2022-03-16 | End: 2022-03-18 | Stop reason: HOSPADM

## 2022-03-16 RX ORDER — SODIUM CHLORIDE 0.9 % (FLUSH) 0.9 %
10 SYRINGE (ML) INJECTION AS NEEDED
Status: DISCONTINUED | OUTPATIENT
Start: 2022-03-16 | End: 2022-03-18 | Stop reason: HOSPADM

## 2022-03-16 RX ORDER — HYDROCODONE BITARTRATE AND ACETAMINOPHEN 5; 325 MG/1; MG/1
2 TABLET ORAL EVERY 4 HOURS PRN
Status: DISCONTINUED | OUTPATIENT
Start: 2022-03-16 | End: 2022-03-18 | Stop reason: HOSPADM

## 2022-03-16 RX ORDER — CEFAZOLIN SODIUM 2 G/100ML
2 INJECTION, SOLUTION INTRAVENOUS ONCE
Status: COMPLETED | OUTPATIENT
Start: 2022-03-16 | End: 2022-03-16

## 2022-03-16 RX ORDER — NALOXONE HCL 0.4 MG/ML
0.4 VIAL (ML) INJECTION
Status: DISCONTINUED | OUTPATIENT
Start: 2022-03-16 | End: 2022-03-18 | Stop reason: HOSPADM

## 2022-03-16 RX ORDER — GLYCOPYRROLATE 0.2 MG/ML
0.2 INJECTION INTRAMUSCULAR; INTRAVENOUS
Status: COMPLETED | OUTPATIENT
Start: 2022-03-16 | End: 2022-03-16

## 2022-03-16 RX ORDER — ACETAMINOPHEN 500 MG
1000 TABLET ORAL ONCE
Status: COMPLETED | OUTPATIENT
Start: 2022-03-16 | End: 2022-03-16

## 2022-03-16 RX ORDER — SODIUM CHLORIDE 0.9 % (FLUSH) 0.9 %
3 SYRINGE (ML) INJECTION EVERY 12 HOURS SCHEDULED
Status: DISCONTINUED | OUTPATIENT
Start: 2022-03-16 | End: 2022-03-18 | Stop reason: HOSPADM

## 2022-03-16 RX ORDER — SODIUM CHLORIDE 0.9 % (FLUSH) 0.9 %
10 SYRINGE (ML) INJECTION AS NEEDED
Status: DISCONTINUED | OUTPATIENT
Start: 2022-03-16 | End: 2022-03-16 | Stop reason: HOSPADM

## 2022-03-16 RX ORDER — HYDROCODONE BITARTRATE AND ACETAMINOPHEN 5; 325 MG/1; MG/1
1 TABLET ORAL EVERY 4 HOURS PRN
Status: DISCONTINUED | OUTPATIENT
Start: 2022-03-16 | End: 2022-03-18 | Stop reason: HOSPADM

## 2022-03-16 RX ORDER — FAMOTIDINE 20 MG/1
40 TABLET, FILM COATED ORAL DAILY
Status: DISCONTINUED | OUTPATIENT
Start: 2022-03-16 | End: 2022-03-17

## 2022-03-16 RX ORDER — CEFAZOLIN SODIUM 2 G/100ML
2 INJECTION, SOLUTION INTRAVENOUS EVERY 8 HOURS
Status: COMPLETED | OUTPATIENT
Start: 2022-03-16 | End: 2022-03-17

## 2022-03-16 RX ORDER — PROMETHAZINE HYDROCHLORIDE 12.5 MG/1
12.5 TABLET ORAL EVERY 6 HOURS PRN
Status: DISCONTINUED | OUTPATIENT
Start: 2022-03-16 | End: 2022-03-18 | Stop reason: HOSPADM

## 2022-03-16 RX ORDER — OXYCODONE HYDROCHLORIDE 5 MG/1
5 TABLET ORAL
Status: DISCONTINUED | OUTPATIENT
Start: 2022-03-16 | End: 2022-03-16 | Stop reason: HOSPADM

## 2022-03-16 RX ADMIN — MORPHINE SULFATE 4 MG: 4 INJECTION, SOLUTION INTRAMUSCULAR; INTRAVENOUS at 14:57

## 2022-03-16 RX ADMIN — MORPHINE SULFATE 4 MG: 4 INJECTION, SOLUTION INTRAMUSCULAR; INTRAVENOUS at 21:50

## 2022-03-16 RX ADMIN — OXYCODONE HYDROCHLORIDE 5 MG: 5 TABLET ORAL at 12:26

## 2022-03-16 RX ADMIN — SODIUM CHLORIDE, POTASSIUM CHLORIDE, SODIUM LACTATE AND CALCIUM CHLORIDE 100 ML/HR: 600; 310; 30; 20 INJECTION, SOLUTION INTRAVENOUS at 23:11

## 2022-03-16 RX ADMIN — CEFAZOLIN SODIUM 2 G: 2 INJECTION, SOLUTION INTRAVENOUS at 09:46

## 2022-03-16 RX ADMIN — CEFAZOLIN SODIUM 2 G: 2 INJECTION, SOLUTION INTRAVENOUS at 17:25

## 2022-03-16 RX ADMIN — KETOROLAC TROMETHAMINE 15 MG: 30 INJECTION, SOLUTION INTRAMUSCULAR; INTRAVENOUS at 17:25

## 2022-03-16 RX ADMIN — SODIUM CHLORIDE, POTASSIUM CHLORIDE, SODIUM LACTATE AND CALCIUM CHLORIDE 9 ML/HR: 600; 310; 30; 20 INJECTION, SOLUTION INTRAVENOUS at 12:33

## 2022-03-16 RX ADMIN — MIDAZOLAM HYDROCHLORIDE 1 MG: 1 INJECTION, SOLUTION INTRAMUSCULAR; INTRAVENOUS at 09:42

## 2022-03-16 RX ADMIN — ACETAMINOPHEN 1000 MG: 500 TABLET ORAL at 08:39

## 2022-03-16 RX ADMIN — CELECOXIB 200 MG: 100 CAPSULE ORAL at 08:39

## 2022-03-16 RX ADMIN — SODIUM CHLORIDE, POTASSIUM CHLORIDE, SODIUM LACTATE AND CALCIUM CHLORIDE 9 ML/HR: 600; 310; 30; 20 INJECTION, SOLUTION INTRAVENOUS at 08:36

## 2022-03-16 RX ADMIN — KETOROLAC TROMETHAMINE 15 MG: 30 INJECTION, SOLUTION INTRAMUSCULAR; INTRAVENOUS at 23:12

## 2022-03-16 RX ADMIN — FAMOTIDINE 40 MG: 20 TABLET ORAL at 13:26

## 2022-03-16 RX ADMIN — HYDROCODONE BITARTRATE AND ACETAMINOPHEN 2 TABLET: 5; 325 TABLET ORAL at 17:25

## 2022-03-16 RX ADMIN — PROPOFOL 90 MCG/KG/MIN: 10 INJECTION, EMULSION INTRAVENOUS at 09:53

## 2022-03-16 RX ADMIN — GLYCOPYRROLATE 0.2 MG: 0.2 INJECTION, SOLUTION INTRAMUSCULAR; INTRAVENOUS at 09:42

## 2022-03-16 RX ADMIN — HYDROCODONE BITARTRATE AND ACETAMINOPHEN 1 TABLET: 5; 325 TABLET ORAL at 13:26

## 2022-03-16 NOTE — PLAN OF CARE
Goal Outcome Evaluation:   Pt has had no new changes throughout shift and continues to remain stable. Pt had left total hip arthroplasty performed this morning. Pt has pain that is 10 of 10. Pain is controlled with PRN pain medication. Pt plans to return home. Her daughter will be staying with her for 5 weeks to help and then the daughter who is local will help her.

## 2022-03-16 NOTE — THERAPY EVALUATION
Acute Care - Physical Therapy Initial Evaluation  JUNO Mohamud     Patient Name: Bing Cruz  : 1931  MRN: [id number]  Today's Date: 3/16/2022     Admit date: 3/16/2022     Referring Physician: Jairo Kramer, *     Surgery Date:3/16/2022   Procedure(s) (LRB):  LEFT TOTAL HIP ARTHROPLASTY AND HARDWARE REMOVAL (Left)          Visit Dx:     ICD-10-CM ICD-9-CM   1. Difficulty in walking  R26.2 719.7   2. Avascular necrosis of bone of left hip (HCC)  M87.052 733.42     Patient Active Problem List   Diagnosis   • Anemia   • Closed left hip fracture, initial encounter (HCC)   • Closed fracture of neck of left femur (HCC)   • Closed left hip fracture (HCC)   • Hip fracture requiring operative repair, left, closed, initial encounter (HCC)   • Lung nodule < 6cm on CT   • Esophageal dysphagia   • Atrial fibrillation, chronic (HCC)   • Malignant neoplasm of colon (HCC)   • Chronic deep vein thrombosis (DVT) of proximal vein of left lower extremity (HCC)   • Major depressive disorder, recurrent, mild (HCC)   • Gastroesophageal reflux disease without esophagitis   • Vitamin D deficiency   • Hypertension, essential   • Nonrheumatic mitral valve regurgitation   • Nonrheumatic aortic valve insufficiency   • S/P  Left Femoral Neck Open Reduction Internal Fixation   • Left hip pain   • Acquired cystic kidney disease   • Bladder disorder   • Diaphragmatic hernia   • Cardiomegaly   • Cataract   • Chronic fatigue syndrome   • Diarrhea   • Diverticular disease of colon   • Hyperlipemia   • Disorder of bone   • Generalized abdominal pain   • Insomnia   • Major depression, single episode   • Malaise and fatigue   • Pain in joint involving pelvic region and thigh   • Sprain of hip   • Urinary tract infection   • Blood clot in vein   • Colon cancer screening   • Aftercare following surgery of left femoral neck ORIF 2021   • Avascular necrosis of bone of left hip (HCC)   • Arthritis of left hip     Past Medical History:    Diagnosis Date   • Abdominal pain, generalized    • Anemia    • Aortic regurgitation    • Aortic stenosis    • Cancer (HCC)     colon    • Cardiomegaly    • Chronic atrial fibrillation (HCC) 01/01/2019    Atrial fibrillation converted to sinus through cardioversion (March 2019   • Chronic fatigue    • Diarrhea    • Disease of tricuspid valve    • Diverticulosis of colon    • DVT (deep venous thrombosis) (HCC)     LEFT LEG GREATER THAN 5 YRS AGO   • Dysphagia    • Essential (primary) hypertension    • Hiatal hernia    • Insomnia, unspecified    • LVH (left ventricular hypertrophy)    • Major depressive disorder, single episode    • Mitral regurgitation    • Mitral valve insufficiency and aortic valve insufficiency    • Osteopenia    • Pain in joint, pelvic region and thigh    • TR (tricuspid regurgitation)    • UTI (urinary tract infection)     antibx to be completed prior to procedure. ABIOLA Carlos RN (Timmy) notified.     Past Surgical History:   Procedure Laterality Date   • ABDOMINAL HYSTERECTOMY      WITH BSO    • ANKLE SURGERY      (L) ankle fracture   • BLADDER REPAIR     • BREAST BIOPSY      (L) breast biopsy   • CARDIOVERSION  2019   • CATARACT EXTRACTION      OU   • CHOLECYSTECTOMY      OPEN   • COLON SURGERY      STATES SHE HAD 12 INCHES OF COLON REMOVED IN JULY 2020 FOR COLON CANCER   • COLONOSCOPY  2020   • COLONOSCOPY N/A 10/29/2021    Procedure: COLONOSCOPY;  Surgeon: Edmar Back MD;  Location: [facility] ENDOSCOPY;  Service: General;  Laterality: N/A;  DIVERTICULOSIS/ANASTOMOSIS   • FEMUR OPEN REDUCTION INTERNAL FIXATION Left 07/21/2021    Procedure: FEMORAL NECK OPEN REDUCTION INTERNAL FIXATION;  Surgeon: Bam Warner MD;  Location: [facility] MAIN OR;  Service: Orthopedics;  Laterality: Left;   • INGUINAL HERNIA REPAIR Left 09/29/2011   • UPPER GASTROINTESTINAL ENDOSCOPY      WITH DILATATION     PT Assessment (last 12 hours)     PT Evaluation and Treatment     Row Name 03/16/22 1400           Physical Therapy Time and Intention    Subjective Information no complaints  -SELENE     Document Type evaluation  -SELENE     Mode of Treatment individual therapy;physical therapy  -SELENE     Patient Effort good  -SELENE     Row Name 03/16/22 1400          General Information    Patient Observations alert;cooperative;agree to therapy  -SELENE     Prior Level of Function independent:;all household mobility;community mobility  -SELENE     Equipment Currently Used at Home cane, straight  -SELENE     Existing Precautions/Restrictions fall;weight bearing  -SELENE     Barriers to Rehab none identified  -SELENE     Row Name 03/16/22 1400          Living Environment    Current Living Arrangements home  -SELENE     Row Name 03/16/22 1400          Range of Motion (ROM)    Range of Motion ROM is WFL  -SELENE     Row Name 03/16/22 1400          Strength (Manual Muscle Testing)    Strength (Manual Muscle Testing) bilateral lower extremities  3+/5  -SELENE     Row Name 03/16/22 1400          Mobility    Extremity Weight-bearing Status left lower extremity  -SELENE     Left Lower Extremity (Weight-bearing Status) weight-bearing as tolerated (WBAT)  -SELENE     Row Name 03/16/22 1400          Bed Mobility    Bed Mobility bed mobility (all) activities;supine-sit  -SELENE     All Activities, [place] (Bed Mobility) contact guard  -SELENE     Supine-Sit [place] (Bed Mobility) contact guard  -SELENE     Row Name 03/16/22 1400          Transfers    Transfers bed-chair transfer;sit-stand transfer  -SELENE     Bed-Chair [place] (Transfers) moderate assist (50% patient effort)  -SELENE     Assistive Device (Bed-Chair Transfers) walker, front-wheeled  -SELENE     Sit-Stand [place] (Transfers) moderate assist (50% patient effort)  -SELENE     Row Name 03/16/22 1400          Sit-Stand Transfer    Assistive Device (Sit-Stand Transfers) walker, front-wheeled  -SELENE     Row Name 03/16/22 1400          Gait/Stairs (Locomotion)    Gait/Stairs Locomotion gait/ambulation assistive device  -SELENE     [place]  Level (Gait) moderate assist (50% patient effort)  -SELENE     Assistive Device (Gait) walker, front-wheeled  -SELENE     Distance in Feet (Gait) 20  -SELENE     Pattern (Gait) step-to  -SELENE     Row Name 03/16/22 1400          Safety Issues, Functional Mobility    Impairments Affecting Function (Mobility) balance;endurance/activity tolerance;pain;strength  -SELENE     Row Name 03/16/22 1400          Balance    Balance Assessment standing dynamic balance  -SELENE     Dynamic Standing Balance moderate assist  -SELENE     Position/Device Used, Standing Balance walker, front-wheeled  -SELENE     Row Name             Wound 03/16/22 1021 Left anterior hip Incision    Wound - Properties Group Placement Date: 03/16/22  -SC Placement Time: 1021  -SC Present on Hospital Admission: N  -SC Side: Left  -SC Orientation: anterior  -SC Location: hip  -SC Primary Wound Type: Incision  -SC     Retired Wound - Properties Group Placement Date: 03/16/22  -SC Placement Time: 1021  -SC Present on Hospital Admission: N  -SC Side: Left  -SC Orientation: anterior  -SC Location: hip  -SC Primary Wound Type: Incision  -SC     Retired Wound - Properties Group Date first assessed: 03/16/22  -SC Time first assessed: 1021  -SC Present on Hospital Admission: N  -SC Side: Left  -SC Location: hip  -SC Primary Wound Type: Incision  -SC     Row Name             Wound 03/16/22 1028 Left anterior hip Incision    Wound - Properties Group Placement Date: 03/16/22  -SC Placement Time: 1028  -SC Present on Hospital Admission: N  -SC Side: Left  -SC Orientation: anterior  -SC Location: hip  -SC Primary Wound Type: Incision  -SC     Retired Wound - Properties Group Placement Date: 03/16/22  -SC Placement Time: 1028  -SC Present on Hospital Admission: N  -SC Side: Left  -SC Orientation: anterior  -SC Location: hip  -SC Primary Wound Type: Incision  -SC     Retired Wound - Properties Group Date first assessed: 03/16/22  -SC Time first assessed: 1028  -SC Present on Hospital Admission: N   -SC Side: Left  -SC Location: hip  -SC Primary Wound Type: Incision  -SC     Row Name 03/16/22 1400          Plan of Care Review    Plan of Care Reviewed With patient  -SELENE     Outcome Evaluation Patient presents with decreased strength, transfers and ambulation.  Skilled physical therapy services will be required to address these mobility deficits.  Recommend follow-up with outpatient physical therapy services to address her decreased hip strength and range of motion postoperatively once discharged from the hospital  -SELENE     Row Name 03/16/22 1400          Therapy Assessment/Plan (PT)    Patient/Family Therapy Goals Statement (PT) Patient wants to be able to do yard work  -SELENE     Rehab Potential (PT) good, to achieve stated therapy goals  -SELENE     Criteria for Skilled Interventions Met (PT) skilled treatment is necessary  -SELENE     Predicted Duration of Therapy Intervention (PT) 10 days  -SELENE     Problem List (PT) problems related to;balance;mobility;strength;pain;range of motion (ROM)  -SELENE     Activity Limitations Related to Problem List (PT) unable to transfer safely;unable to ambulate safely  -SELENE     Row Name 03/16/22 1400          PT Evaluation Complexity    History, PT Evaluation Complexity no personal factors and/or comorbidities  -SELENE     Examination of Body Systems (PT Eval Complexity) total of 4 or more elements  -SELENE     Clinical Presentation (PT Evaluation Complexity) stable  -SELENE     Clinical Decision Making (PT Evaluation Complexity) low complexity  -SELENE     Overall Complexity (PT Evaluation Complexity) low complexity  -SELENE     Row Name 03/16/22 1400          Therapy Plan Review/Discharge Plan (PT)    Therapy Plan Review (PT) evaluation/treatment results reviewed;participants voiced agreement with care plan;participants included;patient  -SELENE     Row Name 03/16/22 1400          Physical Therapy Goals    Transfer Goal Selection (PT) transfer, PT goal 1  -SELENE     Gait Training Goal Selection (PT) gait training, PT  goal 1  -SELENE     Row Name 03/16/22 1400          Transfer Goal 1 (PT)    Activity/Assistive Device (Transfer Goal 1, PT) transfers, all  -SELENE     [place] Level/Cues Needed (Transfer Goal 1, PT) independent  -SELENE     Time Frame (Transfer Goal 1, PT) long term goal (LTG);10 days  -SELENE     Row Name 03/16/22 1400          Gait Training Goal 1 (PT)    Activity/Assistive Device (Gait Training Goal 1, PT) gait (walking locomotion);assistive device use;walker, rolling  -SELENE     [place] Level (Gait Training Goal 1, PT) independent  -SELENE     Distance (Gait Training Goal 1, PT) 300  -SELENE     Time Frame (Gait Training Goal 1, PT) long term goal (LTG);10 days  -SELENE           User Key  (r) = Recorded By, (t) = Taken By, (c) = Cosigned By    Initials Name Provider Type    Sophie Bowie, RN Registered Nurse    Caio Ribeiro PT Physical Therapist                Physical Therapy Education                 Title: PT OT SLP Therapies (In Progress)     Topic: Physical Therapy (Done)     Point: Mobility training (Done)     Learning Progress Summary           Patient Acceptance, E,TB, VU by SELENE at 3/16/2022 1428                   Point: Home exercise program (Done)     Learning Progress Summary           Patient Acceptance, E,TB, VU by SELENE at 3/16/2022 1428                   Point: Body mechanics (Done)     Learning Progress Summary           Patient Acceptance, E,TB, VU by SELENE at 3/16/2022 1428                   Point: Precautions (Done)     Learning Progress Summary           Patient Acceptance, E,TB, VU by SELENE at 3/16/2022 1428                               User Key     Initials Effective Dates Name Provider Type Louis MORTON 06/03/21 -  Caio Raymundo PT Physical Therapist PT              PT Recommendation and Plan  Anticipated Discharge Disposition (PT): home with outpatient therapy services  Planned Therapy Interventions (PT): balance training, gait training, bed mobility training, home exercise program, stair  training, strengthening, transfer training  Therapy Frequency (PT): 2 times/day  Plan of Care Reviewed With: patient  Outcome Evaluation: Patient presents with decreased strength, transfers and ambulation.  Skilled physical therapy services will be required to address these mobility deficits.  Recommend follow-up with outpatient physical therapy services to address her decreased hip strength and range of motion postoperatively once discharged from the hospital   Outcome Measures     Row Name 03/16/22 1400             How much help from another person do you currently need...    Turning from your back to your side while in flat bed without using bedrails? 3  -SELENE      Moving from lying on back to sitting on the side of a flat bed without bedrails? 3  -SELENE      Moving to and from a bed to a chair (including a wheelchair)? 2  -SELENE      Standing up from a chair using your arms (e.g., wheelchair, bedside chair)? 2  -SELENE      Climbing 3-5 steps with a railing? 1  -SELENE      To walk in hospital room? 2  -SELENE      AM-PAC 6 Clicks Score (PT) 13  -SELENE              Functional Assessment    Outcome Measure Options AM-PAC 6 Clicks Basic Mobility (PT)  -SELENE            User Key  (r) = Recorded By, (t) = Taken By, (c) = Cosigned By    Initials Name Provider Type    Caio Ribeiro PT Physical Therapist                 Time Calculation:    PT Charges     Row Name 03/16/22 1421             Time Calculation    PT Received On 03/16/22  -SELENE      PT Goal Re-Cert Due Date 03/25/22  -SELENE              Untimed Charges    PT Eval/Re-eval Minutes 17  -SELENE              Total Minutes    Untimed Charges Total Minutes 17  -SELENE       Total Minutes 17  -SELENE            User Key  (r) = Recorded By, (t) = Taken By, (c) = Cosigned By    Initials Name Provider Type    Caio Ribeiro PT Physical Therapist              Therapy Charges for Today     Code Description Service Date Service Provider Modifiers Qty    [id number] HC PT EVAL LOW COMPLEXITY 2  3/16/2022 Caio Raymundo, PT GP 1          PT G-Codes  Outcome Measure Options: AM-PAC 6 Clicks Basic Mobility (PT)  AM-PAC 6 Clicks Score (PT): 13    Caio Raymundo, PT  3/16/2022

## 2022-03-16 NOTE — ANESTHESIA POSTPROCEDURE EVALUATION
Patient: Bing Cruz    Procedure Summary     Date: 03/16/22 Room / Location: [facility] OR 03 / [facility] MAIN OR    Anesthesia Start: 0946 Anesthesia Stop: 1122    Procedure: TOTAL HIP ARTHROPLASTY VS SHAZIA ARTHROPLASTY, left (Left Hip) Diagnosis:       Avascular necrosis of bone of left hip (HCC)      (Avascular necrosis of bone of left hip (HCC) [M87.052])    Surgeons: Bam Warner MD Provider: Richar Summers MD    Anesthesia Type: ERAS Protocol, spinal ASA Status: 3          Anesthesia Type: ERAS Protocol, spinal    Vitals  Vitals Value Taken Time   BP     Temp     Pulse 93 03/16/22 1130   Resp     SpO2 94 % 03/16/22 1130   Vitals shown include unvalidated device data.        Post Anesthesia Care and Evaluation    Patient location during evaluation: bedside  Patient participation: complete - patient participated  Level of consciousness: responsive to light touch, responsive to noxious stimuli, responsive to painful stimuli, responsive to physical stimuli, responsive to verbal stimuli and sleepy but conscious  Pain score: 0  Pain management: adequate  Airway patency: patent  Anesthetic complications: No anesthetic complications  PONV Status: none  Cardiovascular status: acceptable and stable  Respiratory status: acceptable and nasal cannula  Hydration status: acceptable  Post Neuraxial Block status: No signs or symptoms of PDPH  Comments: An Anesthesiologist personally participated in the most demanding procedures (including induction and emergence if applicable) in the anesthesia plan, monitored the course of anesthesia administration at frequent intervals and remained physically present and available for immediate diagnosis and treatment of emergencies.

## 2022-03-16 NOTE — OP NOTE
TOTAL HIP ARTHROPLASTY ANTERIOR  Procedure Report    Patient Name:  Bing Cruz  YOB: 1931    Date of Surgery:  3/16/2022     Indications: The patient is a 89-year-old female who had previous internal fixation of the femoral neck fracture.  She has failed conservative measures and developed avascular necrosis of the femoral head.  She wishes to undergo removal of hardware and total hip replacement.  Risk of surgery was discussed.  Risk of bleeding, infection, damage to neurovascular structures, heart attack, stroke, DVT/PE, anesthesia complications including death, continued pain disability, leg length discrepancy, periprosthetic fracture, among others were discussed.  Informed consent was obtained and she wished to proceed.    Pre-op Diagnosis:   Avascular necrosis of bone of left hip (HCC) [M87.052]       Post-Op Diagnosis Codes:     * Avascular necrosis of bone of left hip (HCC) [M87.052]    Procedure/CPT® Codes:      Procedure(s):  LEFT TOTAL HIP ARTHROPLASTY, REMOVAL OF HARDWARE    Staff:  Surgeon(s):  Bam Warner MD    Assistant: Lisy Padilla RN; Weston Bender PA    Anesthesia: Monitored Anesthesia Care with [facility]    Estimated Blood Loss: 200 mL    Implants:    Implant Name Type Inv. Item Serial No.  Lot No. LRB No. Used Action   CMT BONE PALACOS R HI/VISC 1X40 - [id number] Implant CMT BONE PALACOS R HI/VISC 1X40  [facility] [id number] Left 2 Implanted   KT BONE BIO PREP 6EA C/S - [id number] Implant KT BONE BIO PREP 6EA C/S  [employer] [id number] Left 1 Implanted   SCRW ACET ZONIA TRILOGY S/TAP 6.5X30 - [id number] Implant SCRW ACET ZONIA TRILOGY S/TAP 6.5X30  [employer] [id number] Left 1 Implanted   SHLL ACET G7 PPS LTD/HL VISHAL 52MM - [id number] Implant SHLL ACET G7 PPS LTD/HL VISHAL 52MM  [employer] [id number] Left 1 Implanted   LINER ACET G7 NTRL E1 VISHAL 36MM - [id number] Implant LINER ACET G7 NTRL E1 VISHAL 36MM  [employer] [id number] Left 1 Implanted    STEM FEM/HIP AVENIR [facility] LAT SZ3 - [id number] Implant STEM FEM/HIP AVENIR [facility] LAT SZ3  [employer] [id number] Left 1 Implanted   HD FEM/HIP BIOLOX/DELTA CERAM 12/[id number] MIN3.5MM - [id number] Implant HD FEM/HIP BIOLOX/DELTA CERAM 12/23I29GA MIN3.5MM  [employer] [id number] Left 1 Implanted       Specimen:          Specimens     ID Source Type Tests Collected By Collected At Frozen?    A Hip, Left Bone · TISSUE PATHOLOGY EXAM   Bam Warner MD 3/16/22 1020     Description: LEFT FEMORAL HEAD              Findings:  left hip avascular necrosis    Complications: None    Description of Procedure: Operative site was marked in the preoperative holding area.  The patient was brought to the operating room and placed supine on the [place] operative table.  General anesthesia was given.  All bony prominences were padded. Both legs were placed into padded traction boots. A padded peroneal post was placed. Preoperative antibiotic and tranexamic acid was given. Formal time-out was held.     The previous lateral hip incision was opened with a scalpel and the fascia was divided.  A guidewire was placed into the cannulated screw and this was removed with a screwdriver under fluoroscopy.  This was repeated for the 2 superior screws.  All 3 screws were removed.  At this point the wound was irrigated.  The fascia was closed in the subcutaneous tissues were closed with 2-0 Vicryl and staples on the skin.    At this point, an incision was made over the anterior hip just lateral and distal to the ASIS. The fascia was sharply divided and the TFL muscle was retracted laterally. The circumflex vessels were treated with the bipolar sealer. The capsule was incised in an inverted T capsulotomy and tagged with nonabsorbable suture. The femoral neck osteotomy was made and the femoral head was removed. The labrum was excised. Acetabular reaming was performed with fluoroscopic guidance. An appropriately sized acetabular component was inserted  in the appropriate position. A screw was inserted, and the polyethylene liner was placed.  At this point the appropriate femoral releases were performed and the femur was exposed. We began preparing the femur with the broach. Sequential broaching was performed until an appropriately sized broach was placed in neutral anteversion and the hip was trialed. Fluoroscopic views of the hip were taken and confirmed adequate size and appropriate leg length and offset. The hip was dislocated, and the trials removed. The femoral implant was placed and was axially and rotationally stable.  A cemented femoral stem was then placed.  A high offset size 3 stem was cemented into place.  The canal cement restrictor was placed.  The canal was irrigated and dried.  The cement was mixed and was inserted into the canal.  The cemented stem was then inserted and impacted into position.  The cement was allowed to harden.  This point the -3.5 ceramic head was placed.  The hip was reduced and final images were taken. The hip was irrigated with irrisept solution and bacitracin saline. Local anesthetic was injected around the capsule, and the capsule was closed with #1 vicryl. The fascia was closed with #1 vicryl and the subcutaneous tissues with 2-0 vicryl. The skin was closed with staples and an aquacel dressing was placed. The patient awoke form anesthesia in stable condition. All counts were correct. There were no complications.    Assistant: Lisy Padilla RN; Weston Bender PA  was responsible for performing the following activities: Retraction, Suction, Irrigation and Placing Dressing and their skilled assistance was necessary for the success of this case.    Bam Warner MD     Date: 3/16/2022  Time: 11:33 EDT

## 2022-03-16 NOTE — ANESTHESIA PROCEDURE NOTES
Spinal Block      Patient reassessed immediately prior to procedure    Patient location during procedure: OR  Start Time: 3/16/2022 9:52 AM  Indication:at surgeon's request and post-op pain management  Performed By  CRNA: Sheryl Dunne CRNA  Preanesthetic Checklist  Completed: patient identified, IV checked, risks and benefits discussed, surgical consent, monitors and equipment checked, pre-op evaluation and timeout performed  Spinal Block Prep:  Sterile Tech:cap, gloves, mask and sterile barriers  Prep:Chloraprep  Patient Monitoring:blood pressure monitoring, continuous pulse oximetry and EKG  Spinal Block Procedure  Approach:midline  Guidance:landmark technique and palpation technique  Location:L4-L5  Needle Type:Pencan  Needle Gauge:25 G  Placement of Spinal needle event:cerebrospinal fluid aspirated  Paresthesia: no  Fluid Appearance:clear     Post Assessment  Patient Tolerance:patient tolerated the procedure well with no apparent complications  Complications no  Additional Notes  Skin infiltration with Lidocaine 1%. Introducer inserted, followed by spinal needle. + CSF return. Intrathecal marcaine injected. Spinal needle/introducer removed. Site clean/dry/intact.

## 2022-03-16 NOTE — ANESTHESIA PREPROCEDURE EVALUATION
Anesthesia Evaluation     Patient summary reviewed and Nursing notes reviewed   NPO Solid Status: > 8 hours  NPO Liquid Status: > 8 hours           Airway   Mallampati: II  TM distance: >3 FB  Dental      Pulmonary - negative pulmonary ROS and normal exam   Cardiovascular   Exercise tolerance: poor (<4 METS)    Rhythm: irregular  Rate: normal    (+) hypertension, dysrhythmias Atrial Fib, DVT resolved, hyperlipidemia,       Neuro/Psych  GI/Hepatic/Renal/Endo    (+)  GERD,  renal disease CRI,     Musculoskeletal     Abdominal    Substance History      OB/GYN          Other   arthritis,                      Anesthesia Plan    ASA 3     ERAS Protocol and spinal     intravenous induction     Anesthetic plan, all risks, benefits, and alternatives have been provided, discussed and informed consent has been obtained with: patient.        CODE STATUS:

## 2022-03-16 NOTE — PLAN OF CARE
Goal Outcome Evaluation:  Plan of Care Reviewed With: patient           Outcome Evaluation: Patient presents with decreased strength, transfers and ambulation.  Skilled physical therapy services will be required to address these mobility deficits.  Recommend follow-up with outpatient physical therapy services to address her decreased hip strength and range of motion postoperatively once discharged from the hospital

## 2022-03-17 ENCOUNTER — TELEPHONE (OUTPATIENT)
Dept: CARDIOLOGY | Facility: CLINIC | Age: 87
End: 2022-03-17

## 2022-03-17 LAB
ANION GAP SERPL CALCULATED.3IONS-SCNC: 9.5 MMOL/L (ref 5–15)
BACTERIA UR QL AUTO: ABNORMAL /HPF
BILIRUB UR QL STRIP: ABNORMAL
BUN SERPL-MCNC: 13 MG/DL (ref 8–23)
BUN/CREAT SERPL: 16.9 (ref 7–25)
CALCIUM SPEC-SCNC: 8.4 MG/DL (ref 8.2–9.6)
CHLORIDE SERPL-SCNC: 103 MMOL/L (ref 98–107)
CLARITY UR: CLEAR
CO2 SERPL-SCNC: 26.5 MMOL/L (ref 22–29)
COLOR UR: ABNORMAL
CREAT SERPL-MCNC: 0.77 MG/DL (ref 0.57–1)
EGFRCR SERPLBLD CKD-EPI 2021: 73.4 ML/MIN/1.73
GLUCOSE SERPL-MCNC: 119 MG/DL (ref 65–99)
GLUCOSE UR STRIP-MCNC: NEGATIVE MG/DL
HCT VFR BLD AUTO: 32.4 % (ref 34–46.6)
HGB BLD-MCNC: 11.1 G/DL (ref 12–15.9)
HGB UR QL STRIP.AUTO: NEGATIVE
HYALINE CASTS UR QL AUTO: ABNORMAL /LPF
KETONES UR QL STRIP: ABNORMAL
LEUKOCYTE ESTERASE UR QL STRIP.AUTO: ABNORMAL
NITRITE UR QL STRIP: NEGATIVE
PH UR STRIP.AUTO: 6 [PH] (ref 5–8)
POTASSIUM SERPL-SCNC: 3.2 MMOL/L (ref 3.5–5.2)
PROT UR QL STRIP: ABNORMAL
RBC # UR STRIP: ABNORMAL /HPF
REF LAB TEST METHOD: ABNORMAL
SODIUM SERPL-SCNC: 139 MMOL/L (ref 136–145)
SP GR UR STRIP: 1.03 (ref 1–1.03)
SQUAMOUS #/AREA URNS HPF: ABNORMAL /HPF
UROBILINOGEN UR QL STRIP: ABNORMAL
WBC # UR STRIP: ABNORMAL /HPF

## 2022-03-17 PROCEDURE — 97530 THERAPEUTIC ACTIVITIES: CPT

## 2022-03-17 PROCEDURE — A9270 NON-COVERED ITEM OR SERVICE: HCPCS | Performed by: INTERNAL MEDICINE

## 2022-03-17 PROCEDURE — 85018 HEMOGLOBIN: CPT | Performed by: ORTHOPAEDIC SURGERY

## 2022-03-17 PROCEDURE — 25010000002 KETOROLAC TROMETHAMINE PER 15 MG: Performed by: ORTHOPAEDIC SURGERY

## 2022-03-17 PROCEDURE — 25010000002 ONDANSETRON PER 1 MG: Performed by: ORTHOPAEDIC SURGERY

## 2022-03-17 PROCEDURE — 97110 THERAPEUTIC EXERCISES: CPT

## 2022-03-17 PROCEDURE — 85014 HEMATOCRIT: CPT | Performed by: ORTHOPAEDIC SURGERY

## 2022-03-17 PROCEDURE — 63710000001 METOPROLOL SUCCINATE XL 50 MG TABLET SUSTAINED-RELEASE 24 HOUR: Performed by: INTERNAL MEDICINE

## 2022-03-17 PROCEDURE — 63710000001 HYDROCODONE-ACETAMINOPHEN 5-325 MG TABLET: Performed by: ORTHOPAEDIC SURGERY

## 2022-03-17 PROCEDURE — 25010000002 CEFEPIME PER 500 MG: Performed by: INTERNAL MEDICINE

## 2022-03-17 PROCEDURE — 63710000001 MAGNESIUM OXIDE 400 (241.3 MG) MG TABLET: Performed by: INTERNAL MEDICINE

## 2022-03-17 PROCEDURE — 81001 URINALYSIS AUTO W/SCOPE: CPT | Performed by: INTERNAL MEDICINE

## 2022-03-17 PROCEDURE — 87086 URINE CULTURE/COLONY COUNT: CPT | Performed by: INTERNAL MEDICINE

## 2022-03-17 PROCEDURE — 63710000001 APIXABAN 5 MG TABLET: Performed by: INTERNAL MEDICINE

## 2022-03-17 PROCEDURE — 99213 OFFICE O/P EST LOW 20 MIN: CPT | Performed by: INTERNAL MEDICINE

## 2022-03-17 PROCEDURE — 63710000001 PANTOPRAZOLE 40 MG TABLET DELAYED-RELEASE: Performed by: INTERNAL MEDICINE

## 2022-03-17 PROCEDURE — A9270 NON-COVERED ITEM OR SERVICE: HCPCS | Performed by: ORTHOPAEDIC SURGERY

## 2022-03-17 PROCEDURE — 63710000001 DOCUSATE SODIUM 100 MG CAPSULE: Performed by: ORTHOPAEDIC SURGERY

## 2022-03-17 PROCEDURE — 25010000002 CEFAZOLIN IN DEXTROSE 2-4 GM/100ML-% SOLUTION: Performed by: ORTHOPAEDIC SURGERY

## 2022-03-17 PROCEDURE — 80048 BASIC METABOLIC PNL TOTAL CA: CPT | Performed by: ORTHOPAEDIC SURGERY

## 2022-03-17 PROCEDURE — 63710000001 POTASSIUM CHLORIDE 10 MEQ CAPSULE CONTROLLED-RELEASE: Performed by: INTERNAL MEDICINE

## 2022-03-17 PROCEDURE — 63710000001 ACETAMINOPHEN 500 MG TABLET: Performed by: ORTHOPAEDIC SURGERY

## 2022-03-17 PROCEDURE — 97535 SELF CARE MNGMENT TRAINING: CPT

## 2022-03-17 PROCEDURE — 97165 OT EVAL LOW COMPLEX 30 MIN: CPT

## 2022-03-17 RX ORDER — POTASSIUM CHLORIDE 750 MG/1
10 CAPSULE, EXTENDED RELEASE ORAL 2 TIMES DAILY WITH MEALS
Status: DISCONTINUED | OUTPATIENT
Start: 2022-03-17 | End: 2022-03-18 | Stop reason: HOSPADM

## 2022-03-17 RX ORDER — PANTOPRAZOLE SODIUM 40 MG/1
40 TABLET, DELAYED RELEASE ORAL
Status: DISCONTINUED | OUTPATIENT
Start: 2022-03-17 | End: 2022-03-18 | Stop reason: HOSPADM

## 2022-03-17 RX ORDER — POTASSIUM CHLORIDE 750 MG/1
40 CAPSULE, EXTENDED RELEASE ORAL ONCE
Status: COMPLETED | OUTPATIENT
Start: 2022-03-17 | End: 2022-03-17

## 2022-03-17 RX ORDER — METOPROLOL SUCCINATE 50 MG/1
100 TABLET, EXTENDED RELEASE ORAL EVERY 12 HOURS SCHEDULED
Status: DISCONTINUED | OUTPATIENT
Start: 2022-03-17 | End: 2022-03-18 | Stop reason: HOSPADM

## 2022-03-17 RX ADMIN — APIXABAN 5 MG: 5 TABLET, FILM COATED ORAL at 10:31

## 2022-03-17 RX ADMIN — CEFEPIME HYDROCHLORIDE 1 G: 1 INJECTION, POWDER, FOR SOLUTION INTRAMUSCULAR; INTRAVENOUS at 22:21

## 2022-03-17 RX ADMIN — POTASSIUM CHLORIDE 10 MEQ: 750 CAPSULE, EXTENDED RELEASE ORAL at 17:31

## 2022-03-17 RX ADMIN — DOCUSATE SODIUM 100 MG: 100 CAPSULE, LIQUID FILLED ORAL at 22:20

## 2022-03-17 RX ADMIN — METOPROLOL SUCCINATE 100 MG: 50 TABLET, EXTENDED RELEASE ORAL at 10:31

## 2022-03-17 RX ADMIN — METOPROLOL SUCCINATE 100 MG: 50 TABLET, EXTENDED RELEASE ORAL at 20:37

## 2022-03-17 RX ADMIN — KETOROLAC TROMETHAMINE 15 MG: 30 INJECTION, SOLUTION INTRAMUSCULAR; INTRAVENOUS at 05:28

## 2022-03-17 RX ADMIN — MAGNESIUM OXIDE TAB 400 MG (241.3 MG ELEMENTAL MG) 400 MG: 400 (241.3 MG) TAB at 20:37

## 2022-03-17 RX ADMIN — POTASSIUM CHLORIDE 40 MEQ: 750 CAPSULE, EXTENDED RELEASE ORAL at 09:24

## 2022-03-17 RX ADMIN — ONDANSETRON 4 MG: 2 INJECTION INTRAMUSCULAR; INTRAVENOUS at 09:23

## 2022-03-17 RX ADMIN — Medication 3 ML: at 09:27

## 2022-03-17 RX ADMIN — POTASSIUM CHLORIDE 10 MEQ: 750 CAPSULE, EXTENDED RELEASE ORAL at 10:31

## 2022-03-17 RX ADMIN — CEFEPIME HYDROCHLORIDE 1 G: 1 INJECTION, POWDER, FOR SOLUTION INTRAMUSCULAR; INTRAVENOUS at 12:08

## 2022-03-17 RX ADMIN — HYDROCODONE BITARTRATE AND ACETAMINOPHEN 2 TABLET: 5; 325 TABLET ORAL at 22:20

## 2022-03-17 RX ADMIN — ACETAMINOPHEN 1000 MG: 500 TABLET ORAL at 09:25

## 2022-03-17 RX ADMIN — ACETAMINOPHEN 1000 MG: 500 TABLET ORAL at 01:27

## 2022-03-17 RX ADMIN — APIXABAN 5 MG: 5 TABLET, FILM COATED ORAL at 20:37

## 2022-03-17 RX ADMIN — PANTOPRAZOLE SODIUM 40 MG: 40 TABLET, DELAYED RELEASE ORAL at 10:31

## 2022-03-17 RX ADMIN — KETOROLAC TROMETHAMINE 15 MG: 30 INJECTION, SOLUTION INTRAMUSCULAR; INTRAVENOUS at 11:00

## 2022-03-17 RX ADMIN — CEFAZOLIN SODIUM 2 G: 2 INJECTION, SOLUTION INTRAVENOUS at 01:31

## 2022-03-17 RX ADMIN — ONDANSETRON 4 MG: 2 INJECTION INTRAMUSCULAR; INTRAVENOUS at 02:33

## 2022-03-17 RX ADMIN — ACETAMINOPHEN 1000 MG: 500 TABLET ORAL at 17:30

## 2022-03-17 RX ADMIN — MAGNESIUM OXIDE TAB 400 MG (241.3 MG ELEMENTAL MG) 400 MG: 400 (241.3 MG) TAB at 10:30

## 2022-03-17 NOTE — PLAN OF CARE
Goal Outcome Evaluation:  Plan of Care Reviewed With: patient        Progress: no change  Outcome Evaluation: pt. is a two person max assist with walker.  pt. medicated with scheduled and prn pain medication with relief noted.  pt. medicated x one for c/o nausea with relief noted. pt. with some intermittent confusion, reorients easily.  daughter at bedside.

## 2022-03-17 NOTE — PROGRESS NOTES
[facility]   Progress Note    Patient Name: Bing Cruz  : 1931  MRN: [id number]  Primary Care Physician: Jairo Kramer MD  Date of admission: 3/16/2022    Subjective   Subjective   HPI: Patient is more confused according to daughter at bedside, here with follow-up for left total hip replacement, she is confused this morning a little bit but she sitting up in the chair she is not sure she can go home daughter thinks she has a recurrent UTI which she has had before and has similar cognitive issues when she has had them.  She also is not sure she can go home and live by herself or with her daughter, she is weak she is worried about therapy, we discussed staying another night or going into rehab, patient denies chest pain or shortness of breath,        Review of Systems   All systems were reviewed and negative except for: Confusion weakness left hip pain,      Objective   Objective     Vitals:  Patient Vitals for the past 24 hrs:   BP Temp Temp src Pulse Resp SpO2   22 0654 102/58 97.8 °F (36.6 °C) Oral 68 18 --   22 0319 122/77 97.9 °F (36.6 °C) Oral 92 18 95 %   22 2231 124/70 98.1 °F (36.7 °C) Oral 100 18 95 %   22 -- -- -- -- -- 96 %   22 2001 145/66 98.7 °F (37.1 °C) Oral 82 16 95 %   22 1739 131/68 97.6 °F (36.4 °C) Oral 63 18 97 %   22 1530 121/67 97.4 °F (36.3 °C) Oral 73 18 99 %   22 1411 129/70 94.6 °F (34.8 °C) Axillary 73 18 99 %   22 1300 141/92 93.3 °F (34.1 °C) Axillary 72 18 100 %   22 1240 133/76 97.3 °F (36.3 °C) Temporal 80 18 99 %   22 1235 132/78 -- -- 72 18 99 %   22 1230 129/71 -- -- 81 18 99 %   22 1225 123/55 97.3 °F (36.3 °C) Temporal 79 18 99 %   22 1220 139/75 -- -- 87 18 100 %   22 1215 155/82 -- -- 83 18 99 %   22 1210 134/80 -- -- 86 18 100 %   22 1205 128/69 -- -- 81 16 100 %   22 1200 115/75 -- -- 82 16 98 %   22 1155 140/74 97.2 °F (36.2  °C) Temporal 86 16 98 %   03/16/22 1150 121/76 -- -- 86 16 98 %   03/16/22 1145 117/75 -- -- 74 16 98 %   03/16/22 1140 121/67 -- -- 85 16 96 %   03/16/22 1135 108/62 -- -- 82 16 96 %   03/16/22 1130 102/55 -- -- 93 16 94 %   03/16/22 1125 107/69 96.8 °F (36 °C) Temporal 80 16 99 %      Physical Exam   Lungs clear anteriorly and laterally cardiac exam reveals an irregular rhythm slightly tachycardic at about 110 her abdomen soft nontender neck supple skin is warm and dry sclera nonicteric lower extremities show trace edema to the lower legs bilaterally she can move the foot fairly well with dorsi and plantar flexion on the left, she is a little bit of swelling of the left thigh, she sitting up in the chair she is pleasant but confused a little bit with what is going on with her as far as rehab and disposition,      Result Review    Result Review:  I have personally reviewed the results from the time of this admission to 03/17/22 8:25 AM EDT and agree with these findings:  [x]  Laboratory  []  Microbiology  [x]  Radiology  []  EKG/Telemetry   []  Cardiology/Vascular   []  Pathology  []  Old records  []  Other:      Active Hospital Meds:  Scheduled Meds:acetaminophen, 1,000 mg, Oral, Q8H  apixaban, 2.5 mg, Oral, Q12H  famotidine, 40 mg, Oral, Daily  ketorolac, 15 mg, Intravenous, Q6H  sodium chloride, 3 mL, Intravenous, Q12H  tranexamic acid, 2,000 mg, Topical, Once      Continuous Infusions:lactated ringers, 9 mL/hr, Last Rate: 9 mL/hr (03/16/22 1233)  lactated ringers, 100 mL/hr, Last Rate: Stopped (03/17/22 0131)      PRN Meds:.acetaminophen  •  docusate sodium  •  HYDROcodone-acetaminophen  •  HYDROcodone-acetaminophen  •  lactated ringers  •  magnesium hydroxide  •  Morphine **AND** naloxone  •  ondansetron **OR** ondansetron  •  promethazine **OR** promethazine  •  sodium chloride    Assessment/Plan   Assessment / Plan     Active Hospital Problems:  Active Hospital Problems    Diagnosis    • **Avascular necrosis  of bone of left hip (HCC)    • Arthritis of left hip        Assessment/Plan:     Postop day #1 left total hip arthroplasty stable, will follow-up labs, will restart home medications as appropriate    Hypokalemia will need to correct    Acute blood loss postop anemia we will follow trend    Confusion, possible delirium, will check UA and culture rule out UTI, review labs, continue therapy evaluations today, patient may need another night in the hospital    Recurrent urinary tract infections,    Previous left hip fracture ORIF July 20, 2021,    Depression, anxiety clinically stable watch for worsening cognitive issues delirium etc.    Chronic atrial fibrillation, restarting  rate control medication with metoprolol, and Eliquis, and I believe Cardizem  mg daily, may have to hold Cardizem due to low blood pressures today,    Dysphagia previous work-up with GI service, continues on Protonix daily    Colon cancer diagnosed June 2020 with anemia,, status post exploratory laparotomy partial colectomy all lymph nodes negative, prolonged hospital course with rehab, hyponatremia and C. difficile colitis, failure to thrive and weight loss, she is clinically improved over that stay subsequent colonoscopy October 2021 Dr. Back    Lung nodule found June 2025 mm left lower lobe    Hypertension continues on losartan 50 mg twice a day metoprolol 100 mg twice a day    Remote fall 2016 left humeral fracture ORIF with josseline    DVT left lower leg November 2015 has been on anticoagulation indefinitely    Mitral valve regurgitation moderate degree on previous echoes    Hyperlipidemia off statin therapy    Vitamin D deficiency    Osteopenia  CODE STATUS:    There are no questions and answers to display.       Electronically signed by Jairo Kramer MD, 03/17/22, 8:25 AM EDT.

## 2022-03-17 NOTE — THERAPY EVALUATION
Patient Name: Bing Cruz  : 1931    MRN: [id number]                              Today's Date: 3/17/2022       Admit Date: 3/16/2022    Visit Dx:     ICD-10-CM ICD-9-CM   1. Difficulty in walking  R26.2 719.7   2. Avascular necrosis of bone of left hip (HCC)  M87.052 733.42   3. Decreased activities of daily living (ADL)  Z78.9 V49.89     Patient Active Problem List   Diagnosis   • Anemia   • Closed left hip fracture, initial encounter ([facility])   • Closed fracture of neck of left femur ([facility])   • Closed left hip fracture ([facility])   • Hip fracture requiring operative repair, left, closed, initial encounter ([facility])   • Lung nodule < 6cm on CT   • Esophageal dysphagia   • Atrial fibrillation, chronic ([facility])   • Malignant neoplasm of colon ([facility])   • Chronic deep vein thrombosis (DVT) of proximal vein of left lower extremity ([facility])   • Major depressive disorder, recurrent, 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[facility])   • Gastroesophageal reflux disease without esophagitis   • Vitamin D deficiency   • Hypertension, essential   • Nonrheumatic mitral valve regurgitation   • Nonrheumatic aortic valve insufficiency   • S/P  Left Femoral Neck Open Reduction Internal Fixation   • Left hip pain   • Acquired cystic kidney disease   • Bladder disorder   • Diaphragmatic hernia   • Cardiomegaly   • Cataract   • Chronic fatigue syndrome   • Diarrhea   • Diverticular disease of colon   • Hyperlipemia   • Disorder of bone   • Generalized abdominal pain   • Insomnia   • Major depression, single episode   • Malaise and fatigue   • Pain in joint involving pelvic region and thigh   • Sprain of hip   • Urinary tract infection   • Blood clot in vein   • Colon cancer screening   • Aftercare following surgery of left femoral neck ORIF 2021   • Avascular necrosis of bone of left hip (HCC)   • Arthritis of left hip     Past Medical History:   Diagnosis Date   • Abdominal pain, generalized    • Anemia    • Aortic regurgitation    • Aortic stenosis    • Cancer (HCC)      colon    • Cardiomegaly    • Chronic atrial fibrillation (HCC) 01/01/2019    Atrial fibrillation converted to sinus through cardioversion (March 2019   • Chronic fatigue    • Diarrhea    • Disease of tricuspid valve    • Diverticulosis of colon    • DVT (deep venous thrombosis) (HCC)     LEFT LEG GREATER THAN 5 YRS AGO   • Dysphagia    • Essential (primary) hypertension    • Hiatal hernia    • Insomnia, unspecified    • LVH (left ventricular hypertrophy)    • Major depressive disorder, single episode    • Mitral regurgitation    • Mitral valve insufficiency and aortic valve insufficiency    • Osteopenia    • Pain in joint, pelvic region and thigh    • TR (tricuspid regurgitation)    • UTI (urinary tract infection)     antibx to be completed prior to procedure. ABIOLA Carlos RN ([employer]) notified.     Past Surgical History:   Procedure Laterality Date   • ABDOMINAL HYSTERECTOMY      WITH BSO    • ANKLE SURGERY      (L) ankle fracture   • BLADDER REPAIR     • BREAST BIOPSY      (L) breast biopsy   • CARDIOVERSION  2019   • CATARACT EXTRACTION      OU   • CHOLECYSTECTOMY      OPEN   • COLON SURGERY      STATES SHE HAD 12 INCHES OF COLON REMOVED IN JULY 2020 FOR COLON CANCER   • COLONOSCOPY  2020   • COLONOSCOPY N/A 10/29/2021    Procedure: COLONOSCOPY;  Surgeon: Edmar Back MD;  Location: [facility] ENDOSCOPY;  Service: General;  Laterality: N/A;  DIVERTICULOSIS/ANASTOMOSIS   • FEMUR OPEN REDUCTION INTERNAL FIXATION Left 07/21/2021    Procedure: FEMORAL NECK OPEN REDUCTION INTERNAL FIXATION;  Surgeon: Bam Warner MD;  Location: [facility] MAIN OR;  Service: Orthopedics;  Laterality: Left;   • INGUINAL HERNIA REPAIR Left 09/29/2011   • TOTAL HIP ARTHROPLASTY Left 3/16/2022    Procedure: LEFT TOTAL HIP ARTHROPLASTY AND HARDWARE REMOVAL;  Surgeon: Bam Warner MD;  Location: [facility] MAIN OR;  Service: Orthopedics;  Laterality: Left;   • UPPER GASTROINTESTINAL ENDOSCOPY      WITH DILATATION      General  Information     Row Name 03/17/22 1019 03/17/22 1012       OT Time and Intention    Document Type therapy note (daily note)  -PG evaluation  -PG    Mode of Treatment individual therapy;occupational therapy  -PG individual therapy;occupational therapy  -PG    Row Name 03/17/22 1012          General Information    Patient Profile Reviewed yes  -PG     Prior Level of Function independent:;transfer;ADL's  -PG     Existing Precautions/Restrictions fall  -PG     Barriers to Rehab none identified  -PG     Row Name 03/17/22 1012          Occupational Profile    Reason for Services/Referral (Occupational Profile) Patient is a pleasant 89-year-old female admitted for an elective left total hip replacement.  She has not been receiving any OT services previously and is being evaluated to determine ADL status and discharge needs.  -PG     Row Name 03/17/22 1012          Living Environment    People in Home alone  Daughter will be staying with patient for 5 weeks when she returns home  -PG     Row Name 03/17/22 1012          Home Main Entrance    Number of Stairs, Main Entrance five  -PG     Row Name 03/17/22 1012          Cognition    Orientation Status (Cognition) oriented x 3  -PG     Row Name 03/17/22 1012          Safety Issues, Functional Mobility    Safety Issues Affecting Function (Mobility) ability to follow commands;awareness of need for assistance  -PG     Impairments Affecting Function (Mobility) balance;endurance/activity tolerance;pain;strength  -PG           User Key  (r) = Recorded By, (t) = Taken By, (c) = Cosigned By    Initials Name Provider Type    PG Sharan Kramer, OT Occupational Therapist                 Mobility/ADL's     Row Name 03/17/22 1019 03/17/22 1013       Transfers    Transfers -- bed-chair transfer;sit-stand transfer  -PG    Bed-Chair [place] (Transfers) -- minimum assist (75% patient effort)  -PG    Assistive Device (Bed-Chair Transfers) -- walker, front-wheeled  -PG    Sit-Stand  [place] (Transfers) minimum assist (75% patient effort);verbal cues  -PG minimum assist (75% patient effort);verbal cues  -PG    Row Name 03/17/22 1019 03/17/22 1013       Sit-Stand Transfer    Assistive Device (Sit-Stand Transfers) walker, front-wheeled  -PG walker, front-wheeled  -PG    Row Name 03/17/22 1013          Activities of Daily Living    BADL Assessment/Intervention bathing;upper body dressing;lower body dressing  -PG     Row Name 03/17/22 1019 03/17/22 1013       Bathing Assessment/Intervention    [place] Level (Bathing) bathing skills;lower body;moderate assist (50% patient effort)  -PG bathing skills;lower body;moderate assist (50% patient effort)  -PG    Row Name 03/17/22 1019 03/17/22 1013       Upper Body Dressing Assessment/Training    [place] Level (Upper Body Dressing) upper body dressing skills;set up  -PG upper body dressing skills;set up  -PG    Row Name 03/17/22 1019 03/17/22 1013       Lower Body Dressing Assessment/Training    [place] Level (Lower Body Dressing) lower body dressing skills;maximum assist (25% patient effort);verbal cues  -PG lower body dressing skills;maximum assist (25% patient effort);verbal cues  -PG    Position (Lower Body Dressing) supported standing  -PG supported standing  -PG          User Key  (r) = Recorded By, (t) = Taken By, (c) = Cosigned By    Initials Name Provider Type    PG Sharan Kramer OT Occupational Therapist               Obj/Interventions     Row Name 03/17/22 1014          Sensory Assessment (Somatosensory)    Sensory Assessment (Somatosensory) sensation intact  -PG     Row Name 03/17/22 1014          Vision Assessment/Intervention    Visual Impairment/Limitations corrective lenses for reading  -PG     Row Name 03/17/22 1014          Range of Motion Comprehensive    General Range of Motion no range of motion deficits identified  -PG     Row Name 03/17/22 1014          Strength Comprehensive (MMT)    Comment, General Manual Muscle  Testing (MMT) Assessment 4 - to 4/5 bilateral upper extremity strength  -PG     Row Name 03/17/22 1014          Motor Skills    Motor Skills coordination;functional endurance  -PG     Coordination WFL  -PG     Functional Endurance Fair minus  -PG           User Key  (r) = Recorded By, (t) = Taken By, (c) = Cosigned By    Initials Name Provider Type    PG Sharan Kramer, OT Occupational Therapist               Goals/Plan     Row Name 03/17/22 1016          Transfer Goal 1 (OT)    Activity/Assistive Device (Transfer Goal 1, OT) transfers, all  -PG     Gray Level/Cues Needed (Transfer Goal 1, OT) contact guard required  -PG     Time Frame (Transfer Goal 1, OT) long term goal (LTG);10 days  -PG     Row Name 03/17/22 1016          Bathing Goal 1 (OT)    Activity/Device (Bathing Goal 1, OT) bathing skills, all  -PG     Gray Level/Cues Needed (Bathing Goal 1, OT) minimum assist (75% or more patient effort)  -PG     Time Frame (Bathing Goal 1, OT) long term goal (LTG);10 days  -PG     Row Name 03/17/22 1016          Dressing Goal 1 (OT)    Activity/Device (Dressing Goal 1, OT) dressing skills, all;lower body dressing  -PG     Gray/Cues Needed (Dressing Goal 1, OT) minimum assist (75% or more patient effort)  -PG     Time Frame (Dressing Goal 1, OT) 10 days;long term goal (LTG)  -PG     Row Name 03/17/22 1016          Toileting Goal 1 (OT)    Activity/Device (Toileting Goal 1, OT) toileting skills, all  -PG     Gray Level/Cues Needed (Toileting Goal 1, OT) minimum assist (75% or more patient effort)  -PG     Time Frame (Toileting Goal 1, OT) long term goal (LTG);10 days  -PG     Row Name 03/17/22 1016          Grooming Goal 1 (OT)    Activity/Device (Grooming Goal 1, OT) grooming skills, all  -PG     Gray (Grooming Goal 1, OT) contact guard required  -PG     Time Frame (Grooming Goal 1, OT) long term goal (LTG);10 days  -PG     Row Name 03/17/22 1016          Therapy Assessment/Plan  (OT)    Planned Therapy Interventions (OT) activity tolerance training;BADL retraining;occupation/activity based interventions;transfer/mobility retraining;strengthening exercise;patient/caregiver education/training  -PG           User Key  (r) = Recorded By, (t) = Taken By, (c) = Cosigned By    Initials Name Provider Type    PG Sharan Kramer OT Occupational Therapist               Clinical Impression     Row Name 03/17/22 1015          Pain Assessment    Pretreatment Pain Rating 4/10  -PG     Posttreatment Pain Rating 4/10  -PG     Pain Location - Side/Orientation Left  -PG     Pain Location - hip  -PG     Pain Intervention(s) Nursing Notified  -PG     Row Name 03/17/22 1015          Plan of Care Review    Plan of Care Reviewed With patient  -PG     Progress no change  -PG     Outcome Evaluation Patient presents with limitations affecting strength, activity tolerance, and balance impacting patient's ability to return home safely and independently.  The skills of a therapist will be required to safely and effectively implement the following treatment plan to restore maximal level of function  -     Row Name 03/17/22 1015          Therapy Assessment/Plan (OT)    Patient/Family Therapy Goal Statement (OT) Patient would like to return home and eventually be able to work in her yard  -     Rehab Potential (OT) good, to achieve stated therapy goals  -     Criteria for Skilled Therapeutic Interventions Met (OT) yes;meets criteria;skilled treatment is necessary  -PG     Therapy Frequency (OT) 5 times/wk  -PG     Row Name 03/17/22 1015          Therapy Plan Review/Discharge Plan (OT)    Anticipated Discharge Disposition (OT) home with home health;skilled nursing facility;inpatient rehabilitation facility  -PG           User Key  (r) = Recorded By, (t) = Taken By, (c) = Cosigned By    Initials Name Provider Type    PG Sharan Kramer OT Occupational Therapist               Outcome Measures     Row Name 03/17/22 1018           How much help from another is currently needed...    Putting on and taking off regular lower body clothing? 2  -PG     Bathing (including washing, rinsing, and drying) 2  -PG     Toileting (which includes using toilet bed pan or urinal) 2  -PG     Putting on and taking off regular upper body clothing 2  -PG     Taking care of personal grooming (such as brushing teeth) 3  -PG     Eating meals 3  -PG     AM-PAC 6 Clicks Score (OT) 14  -PG     Row Name 03/17/22 1018          Functional Assessment    Outcome Measure Options AM-PAC 6 Clicks Daily Activity (OT);Optimal Instrument  -PG     Row Name 03/17/22 1018          Optimal Instrument    Optimal Instrument Optimal - 3  -PG     Bending/Stooping 4  -PG     Standing 3  -PG     Reaching 2  -PG     From the list, choose the 3 activities you would most like to be able to do without any difficulty Bending/stooping;Standing;Reaching  -PG     Total Score Optimal - 3 9  -PG           User Key  (r) = Recorded By, (t) = Taken By, (c) = Cosigned By    Initials Name Provider Type    PG Sharan Kramer OT Occupational Therapist                Occupational Therapy Education                 Title: PT OT SLP Therapies (Done)     Topic: Occupational Therapy (Done)     Point: ADL training (Done)     Description:   Instruct learner(s) on proper safety adaptation and remediation techniques during self care or transfers.   Instruct in proper use of assistive devices.              Learning Progress Summary           Patient Acceptance, E,D, DU,VU by PG at 3/17/2022 1018                   Point: Home exercise program (Done)     Description:   Instruct learner(s) on appropriate technique for monitoring, assisting and/or progressing therapeutic exercises/activities.              Learning Progress Summary           Patient Acceptance, E,D, DU,VU by PG at 3/17/2022 1018                   Point: Precautions (Done)     Description:   Instruct learner(s) on prescribed precautions during  self-care and functional transfers.              Learning Progress Summary           Patient Acceptance, E,D, DU,VU by PG at 3/17/2022 1018                   Point: Body mechanics (Done)     Description:   Instruct learner(s) on proper positioning and spine alignment during self-care, functional mobility activities and/or exercises.              Learning Progress Summary           Patient Acceptance, E,D, DU,VU by PG at 3/17/2022 1018                               User Key     Initials Effective Dates Name Provider Type Discipline    PG 06/16/21 -  Sharan Kramer OT Occupational Therapist OT              OT Recommendation and Plan  Planned Therapy Interventions (OT): activity tolerance training, BADL retraining, occupation/activity based interventions, transfer/mobility retraining, strengthening exercise, patient/caregiver education/training  Therapy Frequency (OT): 5 times/wk  Plan of Care Review  Plan of Care Reviewed With: patient  Progress: no change  Outcome Evaluation: Patient presents with limitations affecting strength, activity tolerance, and balance impacting patient's ability to return home safely and independently.  The skills of a therapist will be required to safely and effectively implement the following treatment plan to restore maximal level of function     Time Calculation:    Time Calculation- OT     Row Name 03/17/22 1020             Time Calculation- OT    OT Received On 03/17/22  -PG      OT Goal Re-Cert Due Date 03/26/22  -PG              Timed Charges    91576 - OT Therapeutic Activity Minutes 10  -PG      88706 - OT Self Care/Mgmt Minutes 15  -PG              Untimed Charges    OT Eval/Re-eval Minutes 25  -PG              Total Minutes    Timed Charges Total Minutes 25  -PG      Untimed Charges Total Minutes 25  -PG       Total Minutes 50  -PG            User Key  (r) = Recorded By, (t) = Taken By, (c) = Cosigned By    Initials Name Provider Type    PG Sharan Kramer OT Occupational  Therapist              Therapy Charges for Today     Code Description Service Date Service Provider Modifiers Qty    [id number]  OT THERAPEUTIC ACT EA 15 MIN 3/17/2022 Sharan Kramer OT GO 1    [id number]  OT SELF CARE/MGMT/TRAIN EA 15 MIN 3/17/2022 Sharan Kramer OT GO 1    [id number]  OT EVAL LOW COMPLEXITY 2 3/17/2022 Sharan Kramer OT GO 1               Sharan Kramer OT  3/17/2022

## 2022-03-17 NOTE — THERAPY TREATMENT NOTE
Acute Care - Physical Therapy Treatment Note  JUNO Mohamud     Patient Name: Bing Cruz  : 1931  MRN: [id number]  Today's Date: 3/17/2022      Visit Dx:     ICD-10-CM ICD-9-CM   1. Difficulty in walking  R26.2 719.7   2. Avascular necrosis of bone of left hip (HCC)  M87.052 733.42   3. Decreased activities of daily living (ADL)  Z78.9 V49.89     Patient Active Problem List   Diagnosis   • Anemia   • Closed left hip fracture, initial encounter (Formerly [facility])   • Closed fracture of neck of left femur (Formerly [facility])   • Closed left hip fracture (Formerly [facility])   • Hip fracture requiring operative repair, left, closed, initial encounter (Formerly [facility])   • Lung nodule < 6cm on CT   • Esophageal dysphagia   • Atrial fibrillation, chronic ([facility])   • Malignant neoplasm of colon (HCC)   • Chronic deep vein thrombosis (DVT) of proximal vein of left lower extremity (Formerly [facility])   • Major depressive disorder, recurrent, mild ([facility]ly [facility])   • Gastroesophageal reflux disease without esophagitis   • Vitamin D deficiency   • Hypertension, essential   • Nonrheumatic mitral valve regurgitation   • Nonrheumatic aortic valve insufficiency   • S/P  Left Femoral Neck Open Reduction Internal Fixation   • Left hip pain   • Acquired cystic kidney disease   • Bladder disorder   • Diaphragmatic hernia   • Cardiomegaly   • Cataract   • Chronic fatigue syndrome   • Diarrhea   • Diverticular disease of colon   • Hyperlipemia   • Disorder of bone   • Generalized abdominal pain   • Insomnia   • Major depression, single episode   • Malaise and fatigue   • Pain in joint involving pelvic region and thigh   • Sprain of hip   • Urinary tract infection   • Blood clot in vein   • Colon cancer screening   • Aftercare following surgery of left femoral neck ORIF 2021   • Avascular necrosis of bone of left hip (HCC)   • Arthritis of left hip     Past Medical History:   Diagnosis Date   • Abdominal pain, generalized    • Anemia    • Aortic regurgitation    • Aortic stenosis    • Cancer (HCC)      colon    • Cardiomegaly    • Chronic atrial fibrillation (HCC) 01/01/2019    Atrial fibrillation converted to sinus through cardioversion (March 2019   • Chronic fatigue    • Diarrhea    • Disease of tricuspid valve    • Diverticulosis of colon    • DVT (deep venous thrombosis) (HCC)     LEFT LEG GREATER THAN 5 YRS AGO   • Dysphagia    • Essential (primary) hypertension    • Hiatal hernia    • Insomnia, unspecified    • LVH (left ventricular hypertrophy)    • Major depressive disorder, single episode    • Mitral regurgitation    • Mitral valve insufficiency and aortic valve insufficiency    • Osteopenia    • Pain in joint, pelvic region and thigh    • TR (tricuspid regurgitation)    • UTI (urinary tract infection)     antibx to be completed prior to procedure. ABIOLA Carlos RN ([employer]) notified.     Past Surgical History:   Procedure Laterality Date   • ABDOMINAL HYSTERECTOMY      WITH BSO    • ANKLE SURGERY      (L) ankle fracture   • BLADDER REPAIR     • BREAST BIOPSY      (L) breast biopsy   • CARDIOVERSION  2019   • CATARACT EXTRACTION      OU   • CHOLECYSTECTOMY      OPEN   • COLON SURGERY      STATES SHE HAD 12 INCHES OF COLON REMOVED IN JULY 2020 FOR COLON CANCER   • COLONOSCOPY  2020   • COLONOSCOPY N/A 10/29/2021    Procedure: COLONOSCOPY;  Surgeon: Edmar Back MD;  Location: [facility] ENDOSCOPY;  Service: General;  Laterality: N/A;  DIVERTICULOSIS/ANASTOMOSIS   • FEMUR OPEN REDUCTION INTERNAL FIXATION Left 07/21/2021    Procedure: FEMORAL NECK OPEN REDUCTION INTERNAL FIXATION;  Surgeon: Bam Warner MD;  Location: [facility] MAIN OR;  Service: Orthopedics;  Laterality: Left;   • INGUINAL HERNIA REPAIR Left 09/29/2011   • TOTAL HIP ARTHROPLASTY Left 3/16/2022    Procedure: LEFT TOTAL HIP ARTHROPLASTY AND HARDWARE REMOVAL;  Surgeon: Bam Warner MD;  Location: [facility] MAIN OR;  Service: Orthopedics;  Laterality: Left;   • UPPER GASTROINTESTINAL ENDOSCOPY      WITH DILATATION     PT Assessment  (last 12 hours)     PT Evaluation and Treatment     Row Name 03/17/22 1216          Physical Therapy Time and Intention    Subjective Information complains of;pain;nausea/vomiting;weakness;fatigue  -RH     Document Type therapy note (daily note)  -     Mode of Treatment physical therapy;individual therapy  -     Patient Effort fair  -     Symptoms Noted During/After Treatment --  Pt with c/o nausea after tx.  -RH     Row Name 03/17/22 1216          Pain    Additional Documentation Pain Scale: FACES Pre/Post-Treatment (Group)  -RH     Row Name 03/17/22 1216          Pain Scale: FACES Pre/Post-Treatment    Pain: FACES Scale, Pretreatment 2-->hurts little bit  -RH     Posttreatment Pain Rating 2-->hurts little bit  -RH     Pain Location - Side/Orientation Left  -     Pain Location - hip  -RH     Row Name 03/17/22 1216          Range of Motion (ROM)    Range of Motion --  Pt L hip AAROM at 60 degrees flex and 15 degrees abd.  -RH     Row Name 03/17/22 1216          Strength (Manual Muscle Testing)    Strength (Manual Muscle Testing) --  Pt L hip flex strength at 2-/5.  -RH     Row Name 03/17/22 1216          Mobility    Extremity Weight-bearing Status left lower extremity  -     Left Lower Extremity (Weight-bearing Status) weight-bearing as tolerated (WBAT)  -RH     Row Name 03/17/22 1216          Transfers    Transfers sit-stand transfer;stand-sit transfer  -     Maintains Weight-bearing Status (Transfers) able to maintain  -     Sit-Stand [place] (Transfers) minimum assist (75% patient effort);moderate assist (50% patient effort)  -     Stand-Sit [place] (Transfers) minimum assist (75% patient effort)  -RH     Row Name 03/17/22 1216          Sit-Stand Transfer    Assistive Device (Sit-Stand Transfers) walker, front-wheeled  -     Comment, (Sit-Stand Transfer) --  Pt initially with decreased balance upon standing.  -RH     Row Name 03/17/22 1216          Stand-Sit Transfer    Assistive  Device (Stand-Sit Transfers) walker, front-wheeled  -     Comment, (Stand-Sit Transfer) --  Pt tends to sit toward her R side for stand to sit.  -     Row Name 03/17/22 1216          Gait/Stairs (Locomotion)    Gait/Stairs Locomotion gait/ambulation independence;gait/ambulation assistive device;distance ambulated;gait pattern;gait deviations  -     [place] Level (Gait) minimum assist (75% patient effort);moderate assist (50% patient effort)  -     Assistive Device (Gait) walker, front-wheeled  -     Distance in Feet (Gait) 4  -RH     Pattern (Gait) 3-point;step-to  -RH     Deviations/Abnormal Patterns (Gait) antalgic;base of support, narrow;lashae decreased;festinating/shuffling;gait speed decreased;stride length decreased  -     Gait Assessment/Intervention --  Pt with balance deficits making gait not functional currently.  -     Row Name             Wound 03/16/22 1021 Left anterior hip Incision    Wound - Properties Group Placement Date: 03/16/22  -SC Placement Time: 1021  -SC Present on Hospital Admission: N  -SC Side: Left  -SC Orientation: anterior  -SC Location: hip  -SC Primary Wound Type: Incision  -SC     Retired Wound - Properties Group Placement Date: 03/16/22  -SC Placement Time: 1021  -SC Present on Hospital Admission: N  -SC Side: Left  -SC Orientation: anterior  -SC Location: hip  -SC Primary Wound Type: Incision  -SC     Retired Wound - Properties Group Date first assessed: 03/16/22  -SC Time first assessed: 1021  -SC Present on Hospital Admission: N  -SC Side: Left  -SC Location: hip  -SC Primary Wound Type: Incision  -SC     Row Name             Wound 03/16/22 1028 Left anterior hip Incision    Wound - Properties Group Placement Date: 03/16/22  -SC Placement Time: 1028  -SC Present on Hospital Admission: N  -SC Side: Left  -SC Orientation: anterior  -SC Location: hip  -SC Primary Wound Type: Incision  -SC     Retired Wound - Properties Group Placement Date: 03/16/22  -SC  Placement Time: 1028  -SC Present on Hospital Admission: N  -SC Side: Left  -SC Orientation: anterior  -SC Location: hip  -SC Primary Wound Type: Incision  -SC     Retired Wound - Properties Group Date first assessed: 03/16/22  -SC Time first assessed: 1028  -SC Present on Hospital Admission: N  -SC Side: Left  -SC Location: hip  -SC Primary Wound Type: Incision  -SC     Row Name 03/17/22 1216          Progress Summary (PT)    Progress Toward Functional Goals (PT) progress toward functional goals is gradual  -RH           User Key  (r) = Recorded By, (t) = Taken By, (c) = Cosigned By    Initials Name Provider Type    SC Sophie Munson, RN Registered Nurse    RH John Rudolph PTA Physical Therapy Assistant               Left Hip Ther -ex   Exercise  Reps  Sets    Long arc quads   10 2   Short arc quads  10 2   Heel slides  10 2   Ankle pumps  10 2   Quad sets  10 2   Glut sets  10 2   Abduction/ Adduction  10 2        Physical Therapy Education                 Title: PT OT SLP Therapies (Done)     Topic: Physical Therapy (Done)     Point: Mobility training (Done)     Learning Progress Summary           Patient Acceptance, E,D, DU,VU by PG at 3/17/2022 1018    Acceptance, E,TB, VU by SELENE at 3/16/2022 1428                   Point: Home exercise program (Done)     Learning Progress Summary           Patient Acceptance, E,D, DU,VU by PG at 3/17/2022 1018    Acceptance, E,TB, VU by SELENE at 3/16/2022 1428                   Point: Body mechanics (Done)     Learning Progress Summary           Patient Acceptance, E,D, DU,VU by PG at 3/17/2022 1018    Acceptance, E,TB, VU by SELENE at 3/16/2022 1428                   Point: Precautions (Done)     Learning Progress Summary           Patient Acceptance, E,D, DU,VU by PG at 3/17/2022 1018    Acceptance, E,TB, VU by SELENE at 3/16/2022 1428                               User Key     Initials Effective Dates Name Provider Type Discipline    PG 06/16/21 -  Sharan Kramer, OT Occupational  Therapist OT    SELENE 06/03/21 -  Caio Raymundo, PT Physical Therapist PT              PT Recommendation and Plan     Progress Summary (PT)  Progress Toward Functional Goals (PT): progress toward functional goals is gradual   Outcome Measures     Row Name 03/17/22 1200 03/16/22 1400          How much help from another person do you currently need...    Turning from your back to your side while in flat bed without using bedrails? 3  -RH 3  -SELENE     Moving from lying on back to sitting on the side of a flat bed without bedrails? 3  -RH 3  -SELENE     Moving to and from a bed to a chair (including a wheelchair)? 2  -RH 2  -SELENE     Standing up from a chair using your arms (e.g., wheelchair, bedside chair)? 2  -RH 2  -SELENE     Climbing 3-5 steps with a railing? 1  -RH 1  -SELENE     To walk in hospital room? 2  -RH 2  -SELENE     AM-PAC 6 Clicks Score (PT) 13  -RH 13  -SELENE            Functional Assessment    Outcome Measure Options -- AM-PAC 6 Clicks Basic Mobility (PT)  -SELENE           User Key  (r) = Recorded By, (t) = Taken By, (c) = Cosigned By    Initials Name Provider Type    RH John Rudolph PTA Physical Therapy Assistant    SELENECaio Galloway, PT Physical Therapist                 Time Calculation:    PT Charges     Row Name 03/17/22 1215             Time Calculation    PT Received On 03/17/22  -RH              Timed Charges    30684 - PT Therapeutic Exercise Minutes 26  -RH      64170 - Gait Training Minutes  3  -RH      70657 - PT Therapeutic Activity Minutes 9  -RH              Total Minutes    Timed Charges Total Minutes 38  -RH       Total Minutes 38  -RH            User Key  (r) = Recorded By, (t) = Taken By, (c) = Cosigned By    Initials Name Provider Type    RH John Rudolph PTA Physical Therapy Assistant              Therapy Charges for Today     Code Description Service Date Service Provider Modifiers Qty    57475527403 HC PT THER PROC EA 15 MIN 3/17/2022 John Rudolph PTA GP 2    68615195064 HC PT THERAPEUTIC ACT EA 15  MIN 3/17/2022 John Rudolph, PTA GP 1          PT G-Codes  Outcome Measure Options: AM-PAC 6 Clicks Daily Activity (OT), Optimal Instrument  AM-PAC 6 Clicks Score (PT): 13  AM-PAC 6 Clicks Score (OT): 14    John Rudolph PTA  3/17/2022

## 2022-03-17 NOTE — THERAPY TREATMENT NOTE
Acute Care - Physical Therapy Treatment Note  JUNO Mohamud     Patient Name: Bing Cruz  : 1931  MRN: [id number]  Today's Date: 3/17/2022      Visit Dx:     ICD-10-CM ICD-9-CM   1. Difficulty in walking  R26.2 719.7   2. Avascular necrosis of bone of left hip (HCC)  M87.052 733.42   3. Decreased activities of daily living (ADL)  Z78.9 V49.89     Patient Active Problem List   Diagnosis   • Anemia   • Closed left hip fracture, initial encounter ([facility])   • Closed fracture of neck of left femur ([facility])   • Closed left hip fracture ([facility])   • Hip fracture requiring operative repair, left, closed, initial encounter ([facility])   • Lung nodule < 6cm on CT   • Esophageal dysphagia   • Atrial fibrillation, chronic ([facility])   • Malignant neoplasm of colon (HCC)   • Chronic deep vein thrombosis (DVT) of proximal vein of left lower extremity ([facility])   • Major depressive disorder, recurrent, mild ([facility])   • Gastroesophageal reflux disease without esophagitis   • Vitamin D deficiency   • Hypertension, essential   • Nonrheumatic mitral valve regurgitation   • Nonrheumatic aortic valve insufficiency   • S/P  Left Femoral Neck Open Reduction Internal Fixation   • Left hip pain   • Acquired cystic kidney disease   • Bladder disorder   • Diaphragmatic hernia   • Cardiomegaly   • Cataract   • Chronic fatigue syndrome   • Diarrhea   • Diverticular disease of colon   • Hyperlipemia   • Disorder of bone   • Generalized abdominal pain   • Insomnia   • Major depression, single episode   • Malaise and fatigue   • Pain in joint involving pelvic region and thigh   • Sprain of hip   • Urinary tract infection   • Blood clot in vein   • Colon cancer screening   • Aftercare following surgery of left femoral neck ORIF 2021   • Avascular necrosis of bone of left hip (HCC)   • Arthritis of left hip     Past Medical History:   Diagnosis Date   • Abdominal pain, generalized    • Anemia    • Aortic regurgitation    • Aortic stenosis    • Cancer (HCC)      colon    • Cardiomegaly    • Chronic atrial fibrillation (HCC) 01/01/2019    Atrial fibrillation converted to sinus through cardioversion (March 2019   • Chronic fatigue    • Diarrhea    • Disease of tricuspid valve    • Diverticulosis of colon    • DVT (deep venous thrombosis) (HCC)     LEFT LEG GREATER THAN 5 YRS AGO   • Dysphagia    • Essential (primary) hypertension    • Hiatal hernia    • Insomnia, unspecified    • LVH (left ventricular hypertrophy)    • Major depressive disorder, single episode    • Mitral regurgitation    • Mitral valve insufficiency and aortic valve insufficiency    • Osteopenia    • Pain in joint, pelvic region and thigh    • TR (tricuspid regurgitation)    • UTI (urinary tract infection)     antibx to be completed prior to procedure. ABIOLA Carlos RN ([facility]) notified.     Past Surgical History:   Procedure Laterality Date   • ABDOMINAL HYSTERECTOMY      WITH BSO    • ANKLE SURGERY      (L) ankle fracture   • BLADDER REPAIR     • BREAST BIOPSY      (L) breast biopsy   • CARDIOVERSION  2019   • CATARACT EXTRACTION      OU   • CHOLECYSTECTOMY      OPEN   • COLON SURGERY      STATES SHE HAD 12 INCHES OF COLON REMOVED IN JULY 2020 FOR COLON CANCER   • COLONOSCOPY  2020   • COLONOSCOPY N/A 10/29/2021    Procedure: COLONOSCOPY;  Surgeon: Edmar Back MD;  Location: [facility] ENDOSCOPY;  Service: General;  Laterality: N/A;  DIVERTICULOSIS/ANASTOMOSIS   • FEMUR OPEN REDUCTION INTERNAL FIXATION Left 07/21/2021    Procedure: FEMORAL NECK OPEN REDUCTION INTERNAL FIXATION;  Surgeon: Bam Warner MD;  Location: [facility] MAIN OR;  Service: Orthopedics;  Laterality: Left;   • INGUINAL HERNIA REPAIR Left 09/29/2011   • TOTAL HIP ARTHROPLASTY Left 3/16/2022    Procedure: LEFT TOTAL HIP ARTHROPLASTY AND HARDWARE REMOVAL;  Surgeon: Bam Warner MD;  Location: [facility] MAIN OR;  Service: Orthopedics;  Laterality: Left;   • UPPER GASTROINTESTINAL ENDOSCOPY      WITH DILATATION     PT Assessment  (last 12 hours)     PT Evaluation and Treatment     Row Name 03/17/22 1455 03/17/22 1216       Physical Therapy Time and Intention    Subjective Information complains of;weakness;fatigue;pain  - complains of;pain;nausea/vomiting;weakness;fatigue  -RH    Document Type therapy note (daily note)  - therapy note (daily note)  -    Mode of Treatment physical therapy;individual therapy  -RH physical therapy;individual therapy  -RH    Patient Effort fair  -RH fair  -RH    Symptoms Noted During/After Treatment -- --  Pt with c/o nausea after tx.  -RH    Comment --  Pt with improved tolerance of activity in PM.  -RH --    [facility] Name 03/17/22 1216          Pain    Additional Documentation Pain Scale: FACES Pre/Post-Treatment (Group)  -[facility] Name 03/17/22 1455 03/17/22 1216       Pain Scale: FACES Pre/Post-Treatment    Pain: FACES Scale, Pretreatment 2-->hurts little bit  -RH 2-->hurts little bit  -RH    Posttreatment Pain Rating 2-->hurts little bit  -RH 2-->hurts little bit  -RH    Pain Location - Side/Orientation Left  -RH Left  -RH    Pain Location - hip  -RH hip  -RH    [facility] Name 03/17/22 1216          Range of Motion (ROM)    Range of Motion --  Pt L hip AAROM at 60 degrees flex and 15 degrees abd.  -[facility] Name 03/17/22 1216          Strength (Manual Muscle Testing)    Strength (Manual Muscle Testing) --  Pt L hip flex strength at 2-/5.  -[facility] Name 03/17/22 1216          Mobility    Extremity Weight-bearing Status left lower extremity  -RH     Left Lower Extremity (Weight-bearing Status) weight-bearing as tolerated (WBAT)  -     Row Name 03/17/22 1455 03/17/22 1216       Transfers    Transfers sit-stand transfer;stand-sit transfer  -RH sit-stand transfer;stand-sit transfer  -RH    Maintains Weight-bearing Status (Transfers) able to maintain  - able to maintain  -    Comment, (Transfers) --  Pt performs transfers sit to and from stand x 2 with RW.  -RH --    Sit-Stand [place] (Transfers)  minimum assist (75% patient effort)  - minimum assist (75% patient effort);moderate assist (50% patient effort)  -    Stand-Sit [place] (Transfers) minimum assist (75% patient effort)  - minimum assist (75% patient effort)  -    Row Name 03/17/22 1455 03/17/22 1216       Sit-Stand Transfer    Assistive Device (Sit-Stand Transfers) walker, front-wheeled  - walker, front-wheeled  -RH    Comment, (Sit-Stand Transfer) -- --  Pt initially with decreased balance upon standing.  -    Row Name 03/17/22 1455 03/17/22 1216       Stand-Sit Transfer    Assistive Device (Stand-Sit Transfers) walker, front-wheeled  -RH walker, front-wheeled  -RH    Comment, (Stand-Sit Transfer) -- --  Pt tends to sit toward her R side for stand to sit.  -    Row Name 03/17/22 1455 03/17/22 1216       Gait/Stairs (Locomotion)    Gait/Stairs Locomotion gait/ambulation independence;gait/ambulation assistive device;distance ambulated;gait pattern;gait deviations;maintains weight-bearing status  - gait/ambulation independence;gait/ambulation assistive device;distance ambulated;gait pattern;gait deviations  -    [place] Level (Gait) minimum assist (75% patient effort);moderate assist (50% patient effort)  - minimum assist (75% patient effort);moderate assist (50% patient effort)  -    Assistive Device (Gait) walker, front-wheeled  - walker, front-wheeled  -RH    Distance in Feet (Gait) 8  -RH 4  -RH    Pattern (Gait) 3-point;step-through  -RH 3-point;step-to  -RH    Deviations/Abnormal Patterns (Gait) bilateral deviations;antalgic;base of support, narrow;lashae decreased;festinating/shuffling;gait speed decreased;stride length decreased  - antalgic;base of support, narrow;lashae decreased;festinating/shuffling;gait speed decreased;stride length decreased  -    Gait Assessment/Intervention --  Pt with uneven step lengths and rushing her R foot to the floor.  - --  Pt with balance deficits making gait not  functional currently.  -    Row Name 03/17/22 1455          Hip (Therapeutic Exercise)    Hip Isometrics (Therapeutic Exercise) gluteal sets;bilateral;sitting;10 repetitions;2 sets  -     Row Name 03/17/22 1455          Knee (Therapeutic Exercise)    Knee (Therapeutic Exercise) AROM (active range of motion);isometric exercises  -     Knee AROM (Therapeutic Exercise) left;SAQ (short arc quad);sitting;10 repetitions;2 sets  -     Knee Isometrics (Therapeutic Exercise) quad sets;left;sitting;10 repetitions;2 sets  -     Row Name 03/17/22 1455          Ankle (Therapeutic Exercise)    Ankle (Therapeutic Exercise) AROM (active range of motion)  -     Ankle AROM (Therapeutic Exercise) bilateral;sitting;10 repetitions;2 sets  -     Row Name             Wound 03/16/22 1021 Left anterior hip Incision    Wound - Properties Group Placement Date: 03/16/22  -SC Placement Time: 1021  -SC Present on Hospital Admission: N  -SC Side: Left  -SC Orientation: anterior  -SC Location: hip  -SC Primary Wound Type: Incision  -SC     Retired Wound - Properties Group Placement Date: 03/16/22  -SC Placement Time: 1021  -SC Present on Hospital Admission: N  -SC Side: Left  -SC Orientation: anterior  -SC Location: hip  -SC Primary Wound Type: Incision  -SC     Retired Wound - Properties Group Date first assessed: 03/16/22  -SC Time first assessed: 1021  -SC Present on Hospital Admission: N  -SC Side: Left  -SC Location: hip  -SC Primary Wound Type: Incision  -SC     Row Name             Wound 03/16/22 1028 Left anterior hip Incision    Wound - Properties Group Placement Date: 03/16/22  -SC Placement Time: 1028  -SC Present on Hospital Admission: N  -SC Side: Left  -SC Orientation: anterior  -SC Location: hip  -SC Primary Wound Type: Incision  -SC     Retired Wound - Properties Group Placement Date: 03/16/22  -SC Placement Time: 1028  -SC Present on Hospital Admission: N  -SC Side: Left  -SC Orientation: anterior  -SC Location: hip   -SC Primary Wound Type: Incision  -SC     Retired Wound - Properties Group Date first assessed: 03/16/22  -SC Time first assessed: 1028  -SC Present on Hospital Admission: N  -SC Side: Left  -SC Location: hip  -SC Primary Wound Type: Incision  -SC     Row Name 03/17/22 1455 03/17/22 1216       Progress Summary (PT)    Progress Toward Functional Goals (PT) progress toward functional goals is fair  -RH progress toward functional goals is gradual  -RH          User Key  (r) = Recorded By, (t) = Taken By, (c) = Cosigned By    Initials Name Provider Type    SC Sophie Munson, RN Registered Nurse    RH John Rudolph PTA Physical Therapy Assistant                Physical Therapy Education                 Title: PT OT SLP Therapies (Done)     Topic: Physical Therapy (Done)     Point: Mobility training (Done)     Learning Progress Summary           Patient Acceptance, E,D, DU,VU by PG at 3/17/2022 1018    Acceptance, E,TB, VU by SELENE at 3/16/2022 1428                   Point: Home exercise program (Done)     Learning Progress Summary           Patient Acceptance, E,D, DU,VU by PG at 3/17/2022 1018    Acceptance, E,TB, VU by SELENE at 3/16/2022 1428                   Point: Body mechanics (Done)     Learning Progress Summary           Patient Acceptance, E,D, DU,VU by PG at 3/17/2022 1018    Acceptance, E,TB, VU by SELENE at 3/16/2022 1428                   Point: Precautions (Done)     Learning Progress Summary           Patient Acceptance, E,D, DU,VU by PG at 3/17/2022 1018    Acceptance, E,TB, VU by SELENE at 3/16/2022 1428                               User Key     Initials Effective Dates Name Provider Type Discipline    PG 06/16/21 -  Sharan Kramer OT Occupational Therapist OT    SELENE 06/03/21 -  Caio Raymundo PT Physical Therapist PT              PT Recommendation and Plan     Progress Summary (PT)  Progress Toward Functional Goals (PT): progress toward functional goals is fair   Outcome Measures     Row Name 03/17/22 1200  03/16/22 1400          How much help from another person do you currently need...    Turning from your back to your side while in flat bed without using bedrails? 3  -RH 3  -SELENE     Moving from lying on back to sitting on the side of a flat bed without bedrails? 3  -RH 3  -SELENE     Moving to and from a bed to a chair (including a wheelchair)? 2  -RH 2  -SELENE     Standing up from a chair using your arms (e.g., wheelchair, bedside chair)? 2  -RH 2  -SELENE     Climbing 3-5 steps with a railing? 1  -RH 1  -SELENE     To walk in hospital room? 2  -RH 2  -SELENE     AM-PAC 6 Clicks Score (PT) 13  -RH 13  -SELENE            Functional Assessment    Outcome Measure Options -- AM-PAC 6 Clicks Basic Mobility (PT)  -SELENE           User Key  (r) = Recorded By, (t) = Taken By, (c) = Cosigned By    Initials Name Provider Type     John Rudolph PTA Physical Therapy Assistant    Caio Ribeiro PT Physical Therapist                 Time Calculation:    PT Charges     Row Name 03/17/22 1454 03/17/22 1215          Time Calculation    PT Received On 03/17/22  -RH 03/17/22  -RH            Timed Charges    40920 - PT Therapeutic Exercise Minutes 12  -RH 26  -RH     27824 - Gait Training Minutes  5  -RH 3  -RH     72271 - PT Therapeutic Activity Minutes 7  -RH 9  -RH            Total Minutes    Timed Charges Total Minutes 24  -RH 38  -RH      Total Minutes 24  -RH 38  -RH           User Key  (r) = Recorded By, (t) = Taken By, (c) = Cosigned By    Initials Name Provider Type     John Rudolph PTA Physical Therapy Assistant              Therapy Charges for Today     Code Description Service Date Service Provider Modifiers Qty    59091971643 HC PT THER PROC EA 15 MIN 3/17/2022 John Rudolph PTA GP 2    83621408878 HC PT THERAPEUTIC ACT EA 15 MIN 3/17/2022 John Rudolph PTA GP 1    34233043469 HC PT THERAPEUTIC ACT EA 15 MIN 3/17/2022 John Rudolph PTA GP 1    74265602118 HC PT THER PROC EA 15 MIN 3/17/2022 John Rudolph PTA GP 1          PT  G-Codes  Outcome Measure Options: AM-PAC 6 Clicks Daily Activity (OT), Optimal Instrument  AM-PAC 6 Clicks Score (PT): 13  AM-PAC 6 Clicks Score (OT): 14    John Rudolph, ALCON  3/17/2022

## 2022-03-17 NOTE — CONSULTS
[facility]   HISTORY AND PHYSICAL    Patient Name: Bing Cruz  : 1931  MRN: [id number]  Primary Care Physician: Jairo Kramer MD  Date of admission: 3/16/2022      Subjective   Subjective     Chief Complaint/HPI: Postop left total hip arthroplasty, patient is being seen postoperatively she is in good spirits she is awake alert she is not having a lot of pain yet daughter is at bedside tonight, she is hoping to be able to go home tomorrow,    Bing Cruz is a 89 y.o. female well-known to me from prior office visits hospitalizations, previous hip fracture last summer with ORIF, now with continued pain progressive arthritis and need for replacement which she had done this morning by Dr. Warner,    Review of Systems   All systems were reviewed and negative except for: She is a little bit confused but awake sitting up having pain in her left hip she says, confused a little bit about current events, no chest pains no shortness of breath, she denies nausea or vomiting,      Personal History     Past Medical History:   Diagnosis Date   • Abdominal pain, generalized    • Anemia    • Aortic regurgitation    • Aortic stenosis    • Cancer (HCC)     colon    • Cardiomegaly    • Chronic atrial fibrillation (HCC) 2019    Atrial fibrillation converted to sinus through cardioversion (2019   • Chronic fatigue    • Diarrhea    • Disease of tricuspid valve    • Diverticulosis of colon    • DVT (deep venous thrombosis) (HCC)     LEFT LEG GREATER THAN 5 YRS AGO   • Dysphagia    • Essential (primary) hypertension    • Hiatal hernia    • Insomnia, unspecified    • LVH (left ventricular hypertrophy)    • Major depressive disorder, single episode    • Mitral regurgitation    • Mitral valve insufficiency and aortic valve insufficiency    • Osteopenia    • Pain in joint, pelvic region and thigh    • TR (tricuspid regurgitation)    • UTI (urinary tract infection)     antibx to be completed  prior to procedure. ABIOLA Carlos RN ([facility]) notified.       Past Surgical History:   Procedure Laterality Date   • ABDOMINAL HYSTERECTOMY      WITH BSO    • ANKLE SURGERY      (L) ankle fracture   • BLADDER REPAIR     • BREAST BIOPSY      (L) breast biopsy   • CARDIOVERSION  2019   • CATARACT EXTRACTION      OU   • CHOLECYSTECTOMY      OPEN   • COLON SURGERY      STATES SHE HAD 12 INCHES OF COLON REMOVED IN JULY 2020 FOR COLON CANCER   • COLONOSCOPY  2020   • COLONOSCOPY N/A 10/29/2021    Procedure: COLONOSCOPY;  Surgeon: Edmar Back MD;  Location: [facility] ENDOSCOPY;  Service: General;  Laterality: N/A;  DIVERTICULOSIS/ANASTOMOSIS   • FEMUR OPEN REDUCTION INTERNAL FIXATION Left 07/21/2021    Procedure: FEMORAL NECK OPEN REDUCTION INTERNAL FIXATION;  Surgeon: Bam Warner MD;  Location: [facility] MAIN OR;  Service: Orthopedics;  Laterality: Left;   • INGUINAL HERNIA REPAIR Left 09/29/2011   • UPPER GASTROINTESTINAL ENDOSCOPY      WITH DILATATION       Family History: Family history was reviewed with patient and daughter but is not relevant to this current admission,  Social History:  reports that she has never smoked. She has never used smokeless tobacco. She reports previous alcohol use. She reports that she does not use drugs.  She has been living alone she has good family support from her daughter, she is fairly active and independent at home, she is not driving anymore I do not think,    Home Medications:  Cranberry, HYDROcodone-acetaminophen, LORazepam, Lactobacillus, apixaban, ascorbic acid, colestipol, dilTIAZem CD, latanoprost, losartan, metoprolol succinate XL, and pantoprazole      Allergies:  Allergies   Allergen Reactions   • Citalopram Unknown - High Severity   • Clonazepam Unknown - High Severity   • Levofloxacin Nausea And Vomiting   • Lisinopril Unknown - High Severity   • Macrobid [Nitrofurantoin Macrocrystal] Rash   • Melatonin Unknown - High Severity       Objective   Objective      Vitals:  Temp:  [93.3 °F (34.1 °C)-98.7 °F (37.1 °C)] 97.8 °F (36.6 °C)  Heart Rate:  [] 68  Resp:  [16-18] 18  BP: (102-155)/(55-92) 102/58  Flow (L/min):  [2-3] 2    Physical Exam   Her neck is supple skin is little bit pale she is slightly confused but awake alert sclera nonicteric throat shows dry mucosa lungs are clear anteriorly and laterally cardiac exam reveals an irregular rhythm controlled rate at about 90, distant heart tones 1/6 systolic murmur abdomen soft nontender her lower legs showed no pitting edema she can move her left foot to command, palpated a 1+ PT pulse bilaterally, she has HIREN hose in place up to the distal thighs on both lower legs, sitting up moving her upper extremities well pleasant talkative    Result Review    Result Review:  I have personally reviewed the results from the time of this admission to 03/17/22 8:45 AM EDT and agree with these findings:  [x]  Laboratory  []  Microbiology  [x]  Radiology  [x]  EKG/Telemetry   [x]  Cardiology/Vascular   []  Pathology  []  Old records  []  Other:      Assessment/Plan   Assessment / Plan     Brief Patient Summary:  Bing Cruz is a 89 y.o. female who underwent a left total hip arthroplasty today by Dr. Warner she is being seen postop and is stable clinically,    Active Hospital Problems:  Active Hospital Problems    Diagnosis    • **Avascular necrosis of bone of left hip (HCC)    • Arthritis of left hip        Assessment/Plan:   Left total hip arthroplasty immediate postop, plan is to continue therapy evaluations today and tomorrow, hopefully going home soon with family support versus rehab,    Recurrent UTIs several over the past couple months, will consider rechecking and empiric antibiotics have been given perioperatively may need additional antibiotics,    Chronic atrial fibrillation with RVR, restart medications as appropriate metoprolol especially, consider restarting Cardizem pending blood pressure  response,    Anxiety //depression we will consider medications if necessary watch for delirium    Hypertension we will monitor hold losartan, Cardizem for now,    History of lung nodule found several years ago we will follow-up as an outpatient    Previous left humeral fracture after fall in 2016    History of DVT left lower leg on anticoagulation with Eliquis we will continue and restart    Gastroesophageal reflux, continue Protonix    Previous hyperlipidemia, will continue off statin for now,    Vitamin D deficiency, continue replacement          CODE STATUS:    Code Status and Medical Interventions:   Ordered at: 03/17/22 0843     Level Of Support Discussed With:    Patient     Code Status (Patient has no pulse and is not breathing):    CPR (Attempt to Resuscitate)     Medical Interventions (Patient has pulse or is breathing):    Full Support     This note was dictated on March 17 for my visit last night March 16,    Electronically signed by Jairo Kramer MD, 03/17/22, 8:45 AM EDT.

## 2022-03-17 NOTE — PROGRESS NOTES
[facility]     Progress Note    Patient Name: Bing Cruz  : 1931  MRN: [id number]  Primary Care Physician:  Jairo Kramer MD  Date of admission: 3/16/2022    Subjective   Subjective     HPI:  Patient Reports continued pain in the left hip.  Her daughter was also concerned that she may be having some confusion.  She has  not been very steady when getting up to ambulate.    Review of Systems   See HPI    Objective   Objective     Vitals:   Temp:  [93.3 °F (34.1 °C)-98.7 °F (37.1 °C)] 97.8 °F (36.6 °C)  Heart Rate:  [] 68  Resp:  [16-18] 18  BP: (102-155)/(55-92) 102/58  Flow (L/min):  [2-3] 2  Physical Exam    General: Alert, no acute distress   Chest: Unlabored breathing, cardiovascular: Regular heart rate   Abdomen: Soft, nontender nondistended   Musculoskeletal: Dressing intact with mild bloody drainage.  Mild swelling and bruising to the hip.  Neurovascular intact to extremity.  Minimal pain with hip range of motion.    Result Review      WBC   Date Value Ref Range Status   2022 7.46 3.40 - 10.80 10*3/mm3 Final     RBC   Date Value Ref Range Status   2022 4.29 3.77 - 5.28 10*6/mm3 Final     Hemoglobin   Date Value Ref Range Status   2022 11.1 (L) 12.0 - 15.9 g/dL Final     Hematocrit   Date Value Ref Range Status   2022 32.4 (L) 34.0 - 46.6 % Final     MCV   Date Value Ref Range Status   2022 93.0 79.0 - 97.0 fL Final     MCH   Date Value Ref Range Status   2022 31.7 26.6 - 33.0 pg Final     MCHC   Date Value Ref Range Status   2022 34.1 31.5 - 35.7 g/dL Final     RDW   Date Value Ref Range Status   2022 12.2 (L) 12.3 - 15.4 % Final     RDW-SD   Date Value Ref Range Status   2022 41.2 37.0 - 54.0 fl Final     MPV   Date Value Ref Range Status   2022 12.3 (H) 6.0 - 12.0 fL Final     Platelets   Date Value Ref Range Status   2022 275 140 - 450 10*3/mm3 Final     Neutrophil %   Date Value Ref Range Status   2022  61.1 42.7 - 76.0 % Final     Lymphocyte %   Date Value Ref Range Status   03/14/2022 30.4 19.6 - 45.3 % Final     Monocyte %   Date Value Ref Range Status   03/14/2022 6.7 5.0 - 12.0 % Final     Eosinophil %   Date Value Ref Range Status   03/14/2022 0.7 0.3 - 6.2 % Final     Basophil %   Date Value Ref Range Status   03/14/2022 0.7 0.0 - 1.5 % Final     Immature Grans %   Date Value Ref Range Status   03/14/2022 0.4 0.0 - 0.5 % Final     Neutrophils, Absolute   Date Value Ref Range Status   03/14/2022 4.56 1.70 - 7.00 10*3/mm3 Final     Lymphocytes, Absolute   Date Value Ref Range Status   03/14/2022 2.27 0.70 - 3.10 10*3/mm3 Final     Monocytes, Absolute   Date Value Ref Range Status   03/14/2022 0.50 0.10 - 0.90 10*3/mm3 Final     Eosinophils, Absolute   Date Value Ref Range Status   03/14/2022 0.05 0.00 - 0.40 10*3/mm3 Final     Basophils, Absolute   Date Value Ref Range Status   03/14/2022 0.05 0.00 - 0.20 10*3/mm3 Final     Immature Grans, Absolute   Date Value Ref Range Status   03/14/2022 0.03 0.00 - 0.05 10*3/mm3 Final     nRBC   Date Value Ref Range Status   03/14/2022 0.0 0.0 - 0.2 /100 WBC Final        Result Review:  I have personally reviewed the results from the time of this admission to 3/17/2022 07:43 EDT and agree with these findings:  [x]  Laboratory  []  Microbiology  []  Radiology  []  EKG/Telemetry    []  Cardiology/Vascular   []  Pathology  []  Old records  []  Other:      Assessment/Plan   Assessment / Plan     Brief Patient Summary:  Bing Cruz is a 89 y.o. female who is postoperative day #1 status post left total hip replacement    Active Hospital Problems:  Active Hospital Problems    Diagnosis    • **Avascular necrosis of bone of left hip (HCC)    • Arthritis of left hip      Plan:     Weightbearing as tolerated with walker  Physical and Occupational Therapy  Pain control.  DVT prophylaxis.  Medical management/UTI management per Dr. Kramer  Patient unable to discharge at this point.   May require inpatient rehab stay versus another day in the hospital to see how she is doing with physical therapy tomorrow.    DVT prophylaxis:  Medical and mechanical DVT prophylaxis orders are present.    CODE STATUS:      Disposition:  I expect patient to be discharged when medically able.    Electronically signed by Bam Warner MD, 03/17/22, 7:43 AM EDT.

## 2022-03-17 NOTE — PLAN OF CARE
Goal Outcome Evaluation:   Pain well controlled with tylenol. Patient requiring two assist to ambulate. Family at bedside. Dressings on the hip are dry and intact. Up in chair throughout the shift. Vital signs stable. No complaints at this time.

## 2022-03-17 NOTE — TELEPHONE ENCOUNTER
----- Message from Shawnee Ayala sent at 3/15/2022 11:57 AM EDT -----    ----- Message -----  From: Teresita Short June, APRN  Sent: 3/15/2022   9:11 AM EDT  To: [facility] [place] [facility]    Labs are reviewed.  All within acceptable limits.  Continue current medications

## 2022-03-17 NOTE — SIGNIFICANT NOTE
03/17/22 0757   Plan   Final Discharge Disposition Code 01 - home or self-care   Final Note [facility] PT in [place]. Appt: 3/18/22 at 12pm

## 2022-03-18 VITALS
HEART RATE: 86 BPM | DIASTOLIC BLOOD PRESSURE: 64 MMHG | BODY MASS INDEX: 24.17 KG/M2 | RESPIRATION RATE: 18 BRPM | OXYGEN SATURATION: 96 % | WEIGHT: 145.06 LBS | SYSTOLIC BLOOD PRESSURE: 108 MMHG | TEMPERATURE: 98.5 F | HEIGHT: 65 IN

## 2022-03-18 LAB
25(OH)D3 SERPL-MCNC: 13.2 NG/ML (ref 30–100)
ALBUMIN SERPL-MCNC: 2.8 G/DL (ref 3.5–5.2)
ALBUMIN/GLOB SERPL: 1.1 G/DL
ALP SERPL-CCNC: 90 U/L (ref 39–117)
ALT SERPL W P-5'-P-CCNC: 12 U/L (ref 1–33)
ANION GAP SERPL CALCULATED.3IONS-SCNC: 10 MMOL/L (ref 5–15)
AST SERPL-CCNC: 36 U/L (ref 1–32)
BACTERIA SPEC AEROBE CULT: ABNORMAL
BASOPHILS # BLD AUTO: 0.02 10*3/MM3 (ref 0–0.2)
BASOPHILS NFR BLD AUTO: 0.2 % (ref 0–1.5)
BILIRUB SERPL-MCNC: 1.7 MG/DL (ref 0–1.2)
BUN SERPL-MCNC: 22 MG/DL (ref 8–23)
BUN/CREAT SERPL: 23.9 (ref 7–25)
CALCIUM SPEC-SCNC: 8.4 MG/DL (ref 8.2–9.6)
CHLORIDE SERPL-SCNC: 102 MMOL/L (ref 98–107)
CO2 SERPL-SCNC: 22 MMOL/L (ref 22–29)
CREAT SERPL-MCNC: 0.92 MG/DL (ref 0.57–1)
CYTO UR: NORMAL
DEPRECATED RDW RBC AUTO: 48.8 FL (ref 37–54)
EGFRCR SERPLBLD CKD-EPI 2021: 59.3 ML/MIN/1.73
EOSINOPHIL # BLD AUTO: 0.37 10*3/MM3 (ref 0–0.4)
EOSINOPHIL NFR BLD AUTO: 3.7 % (ref 0.3–6.2)
ERYTHROCYTE [DISTWIDTH] IN BLOOD BY AUTOMATED COUNT: 13.6 % (ref 12.3–15.4)
FOLATE SERPL-MCNC: 9.51 NG/ML (ref 4.78–24.2)
GLOBULIN UR ELPH-MCNC: 2.5 GM/DL
GLUCOSE SERPL-MCNC: 103 MG/DL (ref 65–99)
HCT VFR BLD AUTO: 31.5 % (ref 34–46.6)
HGB BLD-MCNC: 10.4 G/DL (ref 12–15.9)
IMM GRANULOCYTES # BLD AUTO: 0.02 10*3/MM3 (ref 0–0.05)
IMM GRANULOCYTES NFR BLD AUTO: 0.2 % (ref 0–0.5)
LAB AP CASE REPORT: NORMAL
LAB AP CLINICAL INFORMATION: NORMAL
LYMPHOCYTES # BLD AUTO: 1.4 10*3/MM3 (ref 0.7–3.1)
LYMPHOCYTES NFR BLD AUTO: 13.9 % (ref 19.6–45.3)
MAGNESIUM SERPL-MCNC: 1.5 MG/DL (ref 1.6–2.4)
MCH RBC QN AUTO: 32 PG (ref 26.6–33)
MCHC RBC AUTO-ENTMCNC: 33 G/DL (ref 31.5–35.7)
MCV RBC AUTO: 96.9 FL (ref 79–97)
MONOCYTES # BLD AUTO: 0.55 10*3/MM3 (ref 0.1–0.9)
MONOCYTES NFR BLD AUTO: 5.5 % (ref 5–12)
NEUTROPHILS NFR BLD AUTO: 7.69 10*3/MM3 (ref 1.7–7)
NEUTROPHILS NFR BLD AUTO: 76.5 % (ref 42.7–76)
NRBC BLD AUTO-RTO: 0 /100 WBC (ref 0–0.2)
PATH REPORT.FINAL DX SPEC: NORMAL
PATH REPORT.GROSS SPEC: NORMAL
PLATELET # BLD AUTO: 146 10*3/MM3 (ref 140–450)
PMV BLD AUTO: 12.4 FL (ref 6–12)
POTASSIUM SERPL-SCNC: 3.6 MMOL/L (ref 3.5–5.2)
PROT SERPL-MCNC: 5.3 G/DL (ref 6–8.5)
RBC # BLD AUTO: 3.25 10*6/MM3 (ref 3.77–5.28)
SODIUM SERPL-SCNC: 134 MMOL/L (ref 136–145)
TSH SERPL DL<=0.05 MIU/L-ACNC: 1.85 UIU/ML (ref 0.27–4.2)
VIT B12 BLD-MCNC: 270 PG/ML (ref 211–946)
WBC NRBC COR # BLD: 10.05 10*3/MM3 (ref 3.4–10.8)

## 2022-03-18 PROCEDURE — A9270 NON-COVERED ITEM OR SERVICE: HCPCS | Performed by: INTERNAL MEDICINE

## 2022-03-18 PROCEDURE — 63710000001 METOPROLOL SUCCINATE XL 50 MG TABLET SUSTAINED-RELEASE 24 HOUR: Performed by: INTERNAL MEDICINE

## 2022-03-18 PROCEDURE — 97535 SELF CARE MNGMENT TRAINING: CPT

## 2022-03-18 PROCEDURE — 25010000002 CEFEPIME PER 500 MG: Performed by: INTERNAL MEDICINE

## 2022-03-18 PROCEDURE — 85025 COMPLETE CBC W/AUTO DIFF WBC: CPT | Performed by: INTERNAL MEDICINE

## 2022-03-18 PROCEDURE — 82607 VITAMIN B-12: CPT | Performed by: INTERNAL MEDICINE

## 2022-03-18 PROCEDURE — 97530 THERAPEUTIC ACTIVITIES: CPT

## 2022-03-18 PROCEDURE — 99214 OFFICE O/P EST MOD 30 MIN: CPT | Performed by: INTERNAL MEDICINE

## 2022-03-18 PROCEDURE — 97110 THERAPEUTIC EXERCISES: CPT

## 2022-03-18 PROCEDURE — 82746 ASSAY OF FOLIC ACID SERUM: CPT | Performed by: INTERNAL MEDICINE

## 2022-03-18 PROCEDURE — 63710000001 APIXABAN 5 MG TABLET: Performed by: INTERNAL MEDICINE

## 2022-03-18 PROCEDURE — 25010000002 MORPHINE PER 10 MG: Performed by: ORTHOPAEDIC SURGERY

## 2022-03-18 PROCEDURE — 84443 ASSAY THYROID STIM HORMONE: CPT | Performed by: INTERNAL MEDICINE

## 2022-03-18 PROCEDURE — A9270 NON-COVERED ITEM OR SERVICE: HCPCS | Performed by: ORTHOPAEDIC SURGERY

## 2022-03-18 PROCEDURE — 25010000002 MAGNESIUM SULFATE 2 GM/50ML SOLUTION: Performed by: INTERNAL MEDICINE

## 2022-03-18 PROCEDURE — 83735 ASSAY OF MAGNESIUM: CPT | Performed by: INTERNAL MEDICINE

## 2022-03-18 PROCEDURE — 63710000001 MAGNESIUM OXIDE 400 (241.3 MG) MG TABLET: Performed by: INTERNAL MEDICINE

## 2022-03-18 PROCEDURE — 63710000001 HYDROCODONE-ACETAMINOPHEN 5-325 MG TABLET: Performed by: ORTHOPAEDIC SURGERY

## 2022-03-18 PROCEDURE — 63710000001 POTASSIUM CHLORIDE 10 MEQ CAPSULE CONTROLLED-RELEASE: Performed by: INTERNAL MEDICINE

## 2022-03-18 PROCEDURE — 63710000001 PANTOPRAZOLE 40 MG TABLET DELAYED-RELEASE: Performed by: INTERNAL MEDICINE

## 2022-03-18 PROCEDURE — 82306 VITAMIN D 25 HYDROXY: CPT | Performed by: INTERNAL MEDICINE

## 2022-03-18 PROCEDURE — 80053 COMPREHEN METABOLIC PANEL: CPT | Performed by: INTERNAL MEDICINE

## 2022-03-18 PROCEDURE — 94799 UNLISTED PULMONARY SVC/PX: CPT

## 2022-03-18 RX ORDER — ACETAMINOPHEN 325 MG/1
325 TABLET ORAL EVERY 4 HOURS PRN
Start: 2022-03-18

## 2022-03-18 RX ORDER — POTASSIUM CHLORIDE 750 MG/1
40 CAPSULE, EXTENDED RELEASE ORAL ONCE
Status: COMPLETED | OUTPATIENT
Start: 2022-03-18 | End: 2022-03-18

## 2022-03-18 RX ORDER — HYDROCODONE BITARTRATE AND ACETAMINOPHEN 5; 325 MG/1; MG/1
2 TABLET ORAL EVERY 4 HOURS PRN
Refills: 0
Start: 2022-03-18 | End: 2022-03-22 | Stop reason: HOSPADM

## 2022-03-18 RX ORDER — HYDROCODONE BITARTRATE AND ACETAMINOPHEN 5; 325 MG/1; MG/1
1 TABLET ORAL EVERY 4 HOURS PRN
Refills: 0
Start: 2022-03-18 | End: 2022-03-22 | Stop reason: HOSPADM

## 2022-03-18 RX ORDER — HYDROCODONE BITARTRATE AND ACETAMINOPHEN 7.5; 325 MG/1; MG/1
1-2 TABLET ORAL EVERY 6 HOURS PRN
Qty: 40 TABLET | Refills: 0 | Status: SHIPPED | OUTPATIENT
Start: 2022-03-18 | End: 2022-03-22 | Stop reason: HOSPADM

## 2022-03-18 RX ORDER — ACETAMINOPHEN 500 MG
1000 TABLET ORAL EVERY 8 HOURS
Qty: 6 TABLET | Refills: 0
Start: 2022-03-18 | End: 2022-03-19

## 2022-03-18 RX ORDER — PSEUDOEPHEDRINE HCL 30 MG
100 TABLET ORAL 2 TIMES DAILY PRN
Start: 2022-03-18 | End: 2022-04-04

## 2022-03-18 RX ORDER — MAGNESIUM SULFATE HEPTAHYDRATE 40 MG/ML
2 INJECTION, SOLUTION INTRAVENOUS ONCE
Status: COMPLETED | OUTPATIENT
Start: 2022-03-18 | End: 2022-03-18

## 2022-03-18 RX ADMIN — MAGNESIUM OXIDE TAB 400 MG (241.3 MG ELEMENTAL MG) 400 MG: 400 (241.3 MG) TAB at 09:32

## 2022-03-18 RX ADMIN — CEFEPIME HYDROCHLORIDE 1 G: 1 INJECTION, POWDER, FOR SOLUTION INTRAMUSCULAR; INTRAVENOUS at 09:33

## 2022-03-18 RX ADMIN — PANTOPRAZOLE SODIUM 40 MG: 40 TABLET, DELAYED RELEASE ORAL at 06:00

## 2022-03-18 RX ADMIN — Medication 3 ML: at 09:36

## 2022-03-18 RX ADMIN — POTASSIUM CHLORIDE 10 MEQ: 750 CAPSULE, EXTENDED RELEASE ORAL at 09:32

## 2022-03-18 RX ADMIN — MORPHINE SULFATE 4 MG: 4 INJECTION, SOLUTION INTRAMUSCULAR; INTRAVENOUS at 15:38

## 2022-03-18 RX ADMIN — MAGNESIUM SULFATE 2 G: 2 INJECTION INTRAVENOUS at 15:28

## 2022-03-18 RX ADMIN — POTASSIUM CHLORIDE 40 MEQ: 750 CAPSULE, EXTENDED RELEASE ORAL at 10:30

## 2022-03-18 RX ADMIN — POTASSIUM CHLORIDE 10 MEQ: 750 CAPSULE, EXTENDED RELEASE ORAL at 17:01

## 2022-03-18 RX ADMIN — APIXABAN 5 MG: 5 TABLET, FILM COATED ORAL at 09:32

## 2022-03-18 RX ADMIN — Medication 10 ML: at 09:32

## 2022-03-18 RX ADMIN — METOPROLOL SUCCINATE 100 MG: 50 TABLET, EXTENDED RELEASE ORAL at 09:32

## 2022-03-18 RX ADMIN — HYDROCODONE BITARTRATE AND ACETAMINOPHEN 2 TABLET: 5; 325 TABLET ORAL at 09:32

## 2022-03-18 NOTE — DISCHARGE SUMMARY
[facility]         DISCHARGE SUMMARY    Patient Name: Bing Cruz  : 1931  MRN: [id number]    Date of Admission: 3/16/2022  Date of Discharge:  3/18/2022  Primary Care Physician: Jairo Kramer MD    Consults     Date and Time Order Name Status Description    3/16/2022 12:54 PM Inpatient Internal Medicine Consult      3/16/2022 12:46 PM Inpatient Hospitalist Consult            Presenting Problem:   Avascular necrosis of bone of left hip (HCC) [M87.052]  Arthritis of left hip [M16.12]    Active and Resolved Hospital Problems:  Active Hospital Problems    Diagnosis POA   • **Avascular necrosis of bone of left hip (HCC) [M87.052] Unknown   • Arthritis of left hip [M16.12] Yes      Resolved Hospital Problems   No resolved problems to display.         Hospital Course     Hospital Course:  Bing Cruz is a 89 y.o. female     Postop day #2 left total hip arthroplasty stable,      Hypokalemia, low magnesium, will replace both again at time of discharge      Acute blood loss postop anemia, hemoglobin stable at 10.4      Confusion, possible delirium, will check UA and culture rule out UTI, review labs, continue therapy evaluations today, patient may need another night in the hospital, empiric cefepime was started , will stop at this point with culture results not growing significant findings so far, urinalysis unremarkable, reevaluate as necessary at rehab,     Recurrent urinary tract infections,     Previous left hip fracture ORIF 2021,     Depression, anxiety clinically stable -----watch for worsening cognitive issues delirium etc.     Chronic atrial fibrillation, continue metoprolol , and Eliquis,, holding Cardizem for now due to low blood pressures,     Dysphagia previous work-up with GI service, continues on Protonix daily     Colon cancer diagnosed 2020 with anemia,, status post exploratory laparotomy partial colectomy all lymph nodes  negative, prolonged hospital course with rehab, hyponatremia and C. difficile colitis, failure to thrive and weight loss, she is clinically improved over that stay subsequent colonoscopy October 2021 Dr. Back     Lung nodule found June 2025 mm left lower lobe     Hypertension --- hold losartan, and Cardizem due to low blood pressures postop, and continue beta-blockers for now for rate control, A. fib     Remote fall 2016 left humeral fracture ORIF with josseline     DVT left lower leg November 2015 has been on anticoagulation indefinitely     Mitral valve regurgitation moderate degree on previous echoes     Hyperlipidemia off statin therapy for now,     Vitamin D deficiency     Osteopenia    Day of Discharge     Patient Vitals for the past 24 hrs:   BP Temp Temp src Pulse Resp SpO2   03/18/22 0700 100/48 97.8 °F (36.6 °C) Oral 100 18 96 %   03/18/22 0322 104/54 98.6 °F (37 °C) Oral 87 18 94 %   03/17/22 2343 110/69 97.9 °F (36.6 °C) Oral 83 18 98 %   03/17/22 1921 109/63 98.1 °F (36.7 °C) Oral 100 18 97 %   03/17/22 1426 92/51 98.4 °F (36.9 °C) Oral 105 18 94 %   03/17/22 1114 110/50 98 °F (36.7 °C) Oral 95 18 94 %        Physical Exam:  Patient was alert sitting up in the chair pleasant talkative, cardiac exam revealed irregular rhythm slightly tachycardic at about 10 5-1 10 abdomen was soft nontender lungs were clear with a few crackles at both bases otherwise unremarkable lower extremities still showed some edema to the left lower leg around the knee and thigh area,, she has good dorsi and plantar flexion of both feet, she is pleasant talkative alert sitting up she can feed herself, she is doing a little bit of therapy, but weak appearing overall      Pertinent  and/or Most Recent Results     LAB RESULTS:      Lab 03/18/22  0418 03/17/22  0424 03/16/22  1347 03/14/22  1228   WBC 10.05  --   --  7.46   HEMOGLOBIN 10.4* 11.1* 12.2 13.6   HEMATOCRIT 31.5* 32.4* 35.6 39.9   PLATELETS 146  --   --  275   NEUTROS ABS  7.69*  --   --  4.56   IMMATURE GRANS (ABS) 0.02  --   --  0.03   LYMPHS ABS 1.40  --   --  2.27   MONOS ABS 0.55  --   --  0.50   EOS ABS 0.37  --   --  0.05   MCV 96.9  --   --  93.0         Lab 03/18/22  0418 03/17/22  0424 03/14/22  1228   SODIUM 134* 139 136   POTASSIUM 3.6 3.2* 3.6   CHLORIDE 102 103 99   CO2 22.0 26.5 24.6   ANION GAP 10.0 9.5 12.4   BUN 22 13 11   CREATININE 0.92 0.77 0.94   EGFR 59.3* 73.4 57.8*   GLUCOSE 103* 119* 91   CALCIUM 8.4 8.4 9.8*   MAGNESIUM 1.5*  --   --    TSH 1.850  --  3.710         Lab 03/18/22  0418 03/14/22  1228   TOTAL PROTEIN 5.3* 7.4   ALBUMIN 2.80* 4.40   GLOBULIN 2.5 3.0   ALT (SGPT) 12 13   AST (SGOT) 36* 20   BILIRUBIN 1.7* 1.1   ALK PHOS 90 92                     Brief Urine Lab Results  (Last result in the past 365 days)      Color   Clarity   Blood   Leuk Est   Nitrite   Protein   CREAT   Urine HCG        03/17/22 1136 Dark Yellow   Clear   Negative   Small (1+)   Negative   Trace               Microbiology Results (last 10 days)     ** No results found for the last 240 hours. **          XR Hip With or Without Pelvis 2 - 3 View Left    Result Date: 3/16/2022  Impression:  Intraoperative imaging of the left hip as described.      TERRY SAUCEDA MD       Electronically Signed and Approved By: TERRY SAUCEDA MD on 3/16/2022 at 13:20             XR Hip With or Without Pelvis 2 - 3 View Left    Result Date: 3/16/2022  Impression:   1. New left hip prosthesis without complicating features. 2. Pelvic degenerative changes.      HARIS OLSON MD       Electronically Signed and Approved By: HARIS OLSON MD on 3/16/2022 at 11:55               Results for orders placed during the hospital encounter of 01/27/22    Duplex Venous Lower Extremity - Left CAR    Interpretation Summary  · Normal left lower extremity venous duplex scan.      Results for orders placed during the hospital encounter of 01/27/22    Duplex Venous Lower Extremity - Left CAR    Interpretation Summary  ·  Normal left lower extremity venous duplex scan.          Labs Pending at Discharge:  Pending Labs     Order Current Status    Folate In process    Tissue Pathology Exam In process    Urine Culture - Urine, Urine, Clean Catch In process    Vitamin B12 In process    Vitamin D 25 Hydroxy In process          Imaging Results (All)     Procedure Component Value Units Date/Time    XR Hip With or Without Pelvis 2 - 3 View Left [[id number]] Collected: 03/16/22 1320     Updated: 03/16/22 1323    Narrative:      PROCEDURE: XR HIP W OR WO PELVIS 2-3 VIEW LEFT     COMPARISON: [facility] , CR, XR HIP W OR WO PELVIS 2-3 VIEW LEFT, 1/27/2022, 10:47.     INDICATIONS: LEFT HIP AVASCULAR NECROSIS/FLUORO TIME 25.7 SECONDS/2.42 mGy/8 IMAGES     FINDINGS:   There are a total of 8 digital spot fluoroscopic images submitted of the left hip.  Fine osseous   detail is limited as the study was obtained with the portable C-arm.  The images show placement of   a total left hip arthroplasty with normal anatomic alignment.  Expected intraoperative changes are   seen which includes soft tissue gas and swelling.       Impression:       Intraoperative imaging of the left hip as described.               TERRY SAUCEDA MD         Electronically Signed and Approved By: TERRY SAUCEDA MD on 3/16/2022 at 13:20                     XR Hip With or Without Pelvis 2 - 3 View Left [[id number]] Collected: 03/16/22 1155     Updated: 03/16/22 1158    Narrative:      PROCEDURE: XR HIP W OR WO PELVIS 2-3 VIEW LEFT     COMPARISON: [facility], CR, XR HIP W OR WO PELVIS 2-3 VIEW LEFT, 3/16/2022, 10:16.     INDICATIONS: Post-Op LEFT  Hip Arthroplasty     FINDINGS:   There is a total left hip prosthesis, which is new from prior.  The prosthesis appears intact.    There is adjacent soft tissue gas and there are overlying skin staples.  There is no fracture or   dislocation.  There are mild degenerative changes noted about the pelvis.       Impression:          1. New left hip prosthesis without complicating features.  2. Pelvic degenerative changes.               HARIS OLSON MD         Electronically Signed and Approved By: HARIS OLSON MD on 3/16/2022 at 11:55                     FL < 1 Hour [[id number]] Resulted: 03/16/22 1120     Updated: 03/16/22 1120    Narrative:      This procedure was auto-finalized with no dictation required.         Discharge Details        Discharge Medications      New Medications      Instructions Start Date   acetaminophen 500 MG tablet  Commonly known as: TYLENOL   1,000 mg, Oral, Every 8 Hours      acetaminophen 325 MG tablet  Commonly known as: TYLENOL   325 mg, Oral, Every 4 Hours PRN      docusate sodium 100 MG capsule   100 mg, Oral, 2 Times Daily PRN      magnesium hydroxide 2400 MG/10ML suspension suspension  Commonly known as: MILK OF MAGNESIA   10 mL, Oral, Daily PRN      magnesium oxide 400 tablet tablet  Commonly known as: MAG-OX   400 mg, Oral, 2 Times Daily         Changes to Medications      Instructions Start Date   Eliquis 5 MG tablet tablet  Generic drug: apixaban  What changed:   how much to take  when to take this  additional instructions   TAKE 1 TABLET BY MOUTH TWICE A DAY      HYDROcodone-acetaminophen 7.5-325 MG per tablet  Commonly known as: Norco  What changed: how much to take   1-2 tablets, Oral, Every 6 Hours PRN      HYDROcodone-acetaminophen 5-325 MG per tablet  Commonly known as: NORCO  What changed: You were already taking a medication with the same name, and this prescription was added. Make sure you understand how and when to take each.   1 tablet, Oral, Every 4 Hours PRN      HYDROcodone-acetaminophen 5-325 MG per tablet  Commonly known as: NORCO  What changed: You were already taking a medication with the same name, and this prescription was added. Make sure you understand how and when to take each.   2 tablets, Oral, Every 4 Hours PRN         Continue These Medications      Instructions Start Date    ascorbic acid 500 MG tablet  Commonly known as: VITAMIN C   500 mg, Oral, Daily      AZO-CRANBERRY PO   Oral      colestipol 1 g tablet  Commonly known as: COLESTID   TAKE 1 TABLET BY ORAL ROUTE 3 TIMES A DAY AS NEEDED FOR 30 DAYS DIARRHEA      dilTIAZem  MG 24 hr capsule  Commonly known as: CARDIZEM CD   TAKE 1 CAPSULE BY MOUTH AT NOON      latanoprost 0.005 % ophthalmic solution  Commonly known as: XALATAN   No dose, route, or frequency recorded.      LORazepam 0.5 MG tablet  Commonly known as: ATIVAN   TAKE 1 TABLET BY MOUTH EVERY DAY AT BEDTIME AS NEEDED      losartan 50 MG tablet  Commonly known as: COZAAR   TAKE ONE TABLET TWICE A DAY      metoprolol succinate  MG 24 hr tablet  Commonly known as: TOPROL-XL   100 mg, Oral, 2 Times Daily      pantoprazole 40 MG EC tablet  Commonly known as: PROTONIX   40 mg, Oral, Daily         ASK your doctor about these medications      Instructions Start Date   FLORAJEN ACIDOPHILUS PO   Oral             Allergies   Allergen Reactions   • Citalopram Unknown - High Severity   • Clonazepam Unknown - High Severity   • Levofloxacin Nausea And Vomiting   • Lisinopril Unknown - High Severity   • Macrobid [Nitrofurantoin Macrocrystal] Rash   • Melatonin Unknown - High Severity         Discharge Disposition:  Rehab Facility or Unit (DC - External)    Diet: Regular  Diet Instructions     Diet: Regular      Discharge Diet: Regular            Discharge Activity:   Activity Instructions     Activity as Tolerated      Discharge Activity      Discharge to rehab facility when able  Please change dressing to new Aquacel to anterior and lateral hip before discharge.  May leave for 5 to 7 days.  May shower with dressing in place.  Weightbearing as tolerated with walker  Physical therapy  Call with any problems  Cold therapy to hip  DVT prophylaxis per primary    Up WIth Assist              CODE STATUS:  Code Status and Medical Interventions:   Ordered at: 03/17/22 0843     Level  Of Support Discussed With:    Patient     Code Status (Patient has no pulse and is not breathing):    CPR (Attempt to Resuscitate)     Medical Interventions (Patient has pulse or is breathing):    Full Support         Future Appointments   Date Time Provider Department Center   3/31/2022  9:30 AM Weston Bender PA [facility] [place] [facility]   5/5/2022 12:45 PM Jairo Kramer MD [facility] [place] [facility]   8/9/2022 10:15 AM Teresita Short APRN [facility] [place] [facility]   8/16/2022  9:30 AM NURSE/MA [place] [facility] [place] [address] [place]   8/16/2022 10:00 AM Rose Boykin APRN [facility] [place] [address]       Additional Instructions for the Follow-ups that You Need to Schedule     Discharge Follow-up with PCP   As directed       Currently Documented PCP:    Jairo Kramer MD    PCP Phone Number:    [phone]     Follow Up Details: followup in 7-10 days         Discharge Follow-up with Specified Provider: Weston Bender PA-C; 2 Weeks   As directed      To: Weston Bender PA-C    Follow Up: 2 Weeks               Time spent on Discharge including face to face service:  40    minutes    Electronically signed by Jairo Kramer MD, 03/18/22, 9:30 AM EDT.

## 2022-03-18 NOTE — PLAN OF CARE
Goal Outcome Evaluation:  Patient to discharge to [facility] today.  Daughters at bedside to assist with transport.  Family had concerns earlier this shift regarding Eliquis dosage and increased swelling at surgical site.  Dr. Kramer and Dr. Warner notified.  Changes were made to Eliquis dosage by Dr. Kramer and Dr. Warner.  Family denies needs or concerns at this time. Report to be called to MARA Smith at [facility].

## 2022-03-18 NOTE — PLAN OF CARE
Goal Outcome Evaluation:   Pt stable throughout the night. Medicated for pain x1. No concerns or complaints noted.

## 2022-03-18 NOTE — THERAPY TREATMENT NOTE
Acute Care - Physical Therapy Treatment Note  JUNO Mohamud     Patient Name: Bing Cruz  : 1931  MRN: [id number]  Today's Date: 3/18/2022      Visit Dx:     ICD-10-CM ICD-9-CM   1. Difficulty in walking  R26.2 719.7   2. Avascular necrosis of bone of left hip (HCC)  M87.052 733.42   3. Decreased activities of daily living (ADL)  Z78.9 V49.89   4. Aftercare following surgery of left femoral neck ORIF 2021  Z47.89 V58.78   5. Arthritis of left hip  M16.12 716.95     Patient Active Problem List   Diagnosis   • Anemia   • Closed left hip fracture, initial encounter ([facility])   • Closed fracture of neck of left femur ([facility])   • Closed left hip fracture ([facility])   • Hip fracture requiring operative repair, left, closed, initial encounter ([facility])   • Lung nodule < 6cm on CT   • Esophageal dysphagia   • Atrial fibrillation, chronic ([facility])   • Malignant neoplasm of colon ([facility])   • Chronic deep vein thrombosis (DVT) of proximal vein of left lower extremity (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)   • Gastroesophageal reflux disease without esophagitis   • Vitamin D deficiency   • Hypertension, essential   • Nonrheumatic mitral valve regurgitation   • Nonrheumatic aortic valve insufficiency   • S/P  Left Femoral Neck Open Reduction Internal Fixation   • Left hip pain   • Acquired cystic kidney disease   • Bladder disorder   • Diaphragmatic hernia   • Cardiomegaly   • Cataract   • Chronic fatigue syndrome   • Diarrhea   • Diverticular disease of colon   • Hyperlipemia   • Disorder of bone   • Generalized abdominal pain   • Insomnia   • Major depression, single episode   • Malaise and fatigue   • Pain in joint involving pelvic region and thigh   • Sprain of hip   • Urinary tract infection   • Blood clot in vein   • Colon cancer screening   • Aftercare following surgery of left femoral neck ORIF 2021   • Avascular necrosis of bone of left hip (HCC)   • Arthritis of left hip     Past Medical History:    Diagnosis Date   • Abdominal pain, generalized    • Anemia    • Aortic regurgitation    • Aortic stenosis    • Cancer (HCC)     colon    • Cardiomegaly    • Chronic atrial fibrillation (HCC) 01/01/2019    Atrial fibrillation converted to sinus through cardioversion (March 2019   • Chronic fatigue    • Diarrhea    • Disease of tricuspid valve    • Diverticulosis of colon    • DVT (deep venous thrombosis) (HCC)     LEFT LEG GREATER THAN 5 YRS AGO   • Dysphagia    • Essential (primary) hypertension    • Hiatal hernia    • Insomnia, unspecified    • LVH (left ventricular hypertrophy)    • Major depressive disorder, single episode    • Mitral regurgitation    • Mitral valve insufficiency and aortic valve insufficiency    • Osteopenia    • Pain in joint, pelvic region and thigh    • TR (tricuspid regurgitation)    • UTI (urinary tract infection)     antibx to be completed prior to procedure. ABIOLA Carlos RN ([employer]) notified.     Past Surgical History:   Procedure Laterality Date   • ABDOMINAL HYSTERECTOMY      WITH BSO    • ANKLE SURGERY      (L) ankle fracture   • BLADDER REPAIR     • BREAST BIOPSY      (L) breast biopsy   • CARDIOVERSION  2019   • CATARACT EXTRACTION      OU   • CHOLECYSTECTOMY      OPEN   • COLON SURGERY      STATES SHE HAD 12 INCHES OF COLON REMOVED IN JULY 2020 FOR COLON CANCER   • COLONOSCOPY  2020   • COLONOSCOPY N/A 10/29/2021    Procedure: COLONOSCOPY;  Surgeon: Edmar Back MD;  Location: [facility] ENDOSCOPY;  Service: General;  Laterality: N/A;  DIVERTICULOSIS/ANASTOMOSIS   • FEMUR OPEN REDUCTION INTERNAL FIXATION Left 07/21/2021    Procedure: FEMORAL NECK OPEN REDUCTION INTERNAL FIXATION;  Surgeon: Bam Warner MD;  Location: [facility] MAIN OR;  Service: Orthopedics;  Laterality: Left;   • INGUINAL HERNIA REPAIR Left 09/29/2011   • TOTAL HIP ARTHROPLASTY Left 3/16/2022    Procedure: LEFT TOTAL HIP ARTHROPLASTY AND HARDWARE REMOVAL;  Surgeon: Bam Warner MD;  Location:   [facility];  Service: Orthopedics;  Laterality: Left;   • UPPER GASTROINTESTINAL ENDOSCOPY      WITH DILATATION     PT Assessment (last 12 hours)     PT Evaluation and Treatment     Row Name 03/18/22 1251          Physical Therapy Time and Intention    Subjective Information complains of;pain;weakness  -RH     Document Type therapy note (daily note)  -RH     Mode of Treatment physical therapy;individual therapy  -RH     Patient Effort fair  -RH     Row Name 03/18/22 1251          Pain Scale: FACES Pre/Post-Treatment    Pain: FACES Scale, Pretreatment 2-->hurts little bit  -RH     Posttreatment Pain Rating 0-->no hurt  -RH     Pain Location - Side/Orientation Left  -     Pain Location - hip  -     Row Name 03/18/22 1251          Mobility    Extremity Weight-bearing Status left lower extremity  -     Left Lower Extremity (Weight-bearing Status) weight-bearing as tolerated (WBAT)  -     Row Name 03/18/22 1251          Transfers    Transfers sit-stand transfer;stand-sit transfer  -     Maintains Weight-bearing Status (Transfers) able to maintain  -     Comment, (Transfers) --  Pt agreeable to transfer to stand but declined amb.  -     Sit-Stand Edmunds (Transfers) contact guard  -     Stand-Sit [place]ds (Transfers) contact guard  -     Row Name 03/18/22 1251          Sit-Stand Transfer    Assistive Device (Sit-Stand Transfers) walker, front-wheeled  -     Row Name 03/18/22 1251          Stand-Sit Transfer    Assistive Device (Stand-Sit Transfers) walker, front-wheeled  -     Comment, (Stand-Sit Transfer) --  Pt stands with CGA with weigth shift to her L side.  -     Row Name             Wound 03/16/22 1021 Left anterior hip Incision    Wound - Properties Group Placement Date: 03/16/22  -SC Placement Time: 1021  -SC Present on Hospital Admission: N  -SC Side: Left  -SC Orientation: anterior  -SC Location: hip  -SC Primary Wound Type: Incision  -SC     Retired Wound - Properties Group  Placement Date: 03/16/22  -SC Placement Time: 1021  -SC Present on Hospital Admission: N  -SC Side: Left  -SC Orientation: anterior  -SC Location: hip  -SC Primary Wound Type: Incision  -SC     Retired Wound - Properties Group Date first assessed: 03/16/22  -SC Time first assessed: 1021  -SC Present on Hospital Admission: N  -SC Side: Left  -SC Location: hip  -SC Primary Wound Type: Incision  -SC     Row Name             Wound 03/16/22 1028 Left anterior hip Incision    Wound - Properties Group Placement Date: 03/16/22  -SC Placement Time: 1028  -SC Present on Hospital Admission: N  -SC Side: Left  -SC Orientation: anterior  -SC Location: hip  -SC Primary Wound Type: Incision  -SC     Retired Wound - Properties Group Placement Date: 03/16/22  -SC Placement Time: 1028  -SC Present on Hospital Admission: N  -SC Side: Left  -SC Orientation: anterior  -SC Location: hip  -SC Primary Wound Type: Incision  -SC     Retired Wound - Properties Group Date first assessed: 03/16/22  -SC Time first assessed: 1028  -SC Present on Hospital Admission: N  -SC Side: Left  -SC Location: hip  -SC Primary Wound Type: Incision  -SC     Row Name 03/18/22 1251          Progress Summary (PT)    Progress Toward Functional Goals (PT) progress toward functional goals is fair  -RH           User Key  (r) = Recorded By, (t) = Taken By, (c) = Cosigned By    Initials Name Provider Type    Sophie Bowie RN Registered Nurse    John Butcher PTA Physical Therapy Assistant               Left Hip Ther -ex   Exercise  Reps  Sets    Long arc quads   10 2   Short arc quads  10 2   Heel slides  10 2   Ankle pumps  10 2   Quad sets  10 2   Glut sets  10 2   Abduction/ Adduction  10 2        Physical Therapy Education                 Title: PT OT SLP Therapies (Done)     Topic: Physical Therapy (Done)     Point: Mobility training (Done)     Learning Progress Summary           Patient Acceptance, E,D, DU,VU by PG at 3/17/2022 1018    Acceptance,  E,TB, VU by SELENE at 3/16/2022 1428                   Point: Home exercise program (Done)     Learning Progress Summary           Patient Acceptance, E,D, DU,VU by PG at 3/17/2022 1018    Acceptance, E,TB, VU by SELENE at 3/16/2022 1428                   Point: Body mechanics (Done)     Learning Progress Summary           Patient Acceptance, E,D, DU,VU by PG at 3/17/2022 1018    Acceptance, E,TB, VU by SELENE at 3/16/2022 1428                   Point: Precautions (Done)     Learning Progress Summary           Patient Acceptance, E,D, DU,VU by PG at 3/17/2022 1018    Acceptance, E,TB, VU by SELENE at 3/16/2022 1428                               User Key     Initials Effective Dates Name Provider Type Discipline    PG 06/16/21 -  Sharan Kramer OT Occupational Therapist OT    SELENE 06/03/21 -  Caio Raymundo PT Physical Therapist PT              PT Recommendation and Plan     Progress Summary (PT)  Progress Toward Functional Goals (PT): progress toward functional goals is fair   Outcome Measures     Row Name 03/18/22 1200 03/17/22 1200 03/16/22 1400       How much help from another person do you currently need...    Turning from your back to your side while in flat bed without using bedrails? 3  -RH 3  -RH 3  -SELENE    Moving from lying on back to sitting on the side of a flat bed without bedrails? 3  -RH 3  -RH 3  -SELENE    Moving to and from a bed to a chair (including a wheelchair)? 3  -RH 2  -RH 2  -SELENE    Standing up from a chair using your arms (e.g., wheelchair, bedside chair)? 2  -RH 2  -RH 2  -SELENE    Climbing 3-5 steps with a railing? 2  -RH 1  -RH 1  -SELENE    To walk in hospital room? 2  -RH 2  -RH 2  -SELENE    AM-PAC 6 Clicks Score (PT) 15  -RH 13  -RH 13  -SELENE       Functional Assessment    Outcome Measure Options -- -- AM-PAC 6 Clicks Basic Mobility (PT)  -SELENE          User Key  (r) = Recorded By, (t) = Taken By, (c) = Cosigned By    Initials Name Provider Type    RH John Rudolph, PTA Physical Therapy Assistant    Caio Ribeiro,  PT Physical Therapist                 Time Calculation:    PT Charges     Row Name 03/18/22 1249             Time Calculation    PT Received On 03/18/22  -RH              Timed Charges    47944 - PT Therapeutic Exercise Minutes 16  -RH      75692 - PT Therapeutic Activity Minutes 7  -RH              Total Minutes    Timed Charges Total Minutes 23  -RH       Total Minutes 23  -RH            User Key  (r) = Recorded By, (t) = Taken By, (c) = Cosigned By    Initials Name Provider Type     John Rudolph PTA Physical Therapy Assistant              Therapy Charges for Today     Code Description Service Date Service Provider Modifiers Qty    68647892500 HC PT THER PROC EA 15 MIN 3/17/2022 John Rudolph PTA GP 2    33754544135 HC PT THERAPEUTIC ACT EA 15 MIN 3/17/2022 John Rudolph, ALCON GP 1    30592948912 HC PT THERAPEUTIC ACT EA 15 MIN 3/17/2022 Jhon Rudolph, ALCON GP 1    58792240287 HC PT THER PROC EA 15 MIN 3/17/2022 John Rudolph, ALCON GP 1    34972415729 HC PT THER PROC EA 15 MIN 3/18/2022 John Rudolph PTA GP 1    47449220285 HC PT THERAPEUTIC ACT EA 15 MIN 3/18/2022 John Rudolph PTA GP 1          PT G-Codes  Outcome Measure Options: AM-PAC 6 Clicks Daily Activity (OT), Optimal Instrument  AM-PAC 6 Clicks Score (PT): 15  AM-PAC 6 Clicks Score (OT): 14    John Rudolph PTA  3/18/2022

## 2022-03-18 NOTE — PROGRESS NOTES
[facility]     Progress Note    Patient Name: Bing Cruz  : 1931  MRN: [id number]  Primary Care Physician:  Jairo Kramer MD  Date of admission: 3/16/2022    Subjective   Subjective     HPI:  Patient Reports doing some better today.  Still having some pain in the hip.  She walked about 15 steps with therapy.  [facility] from the hospital.    Review of Systems   See HPI    Objective   Objective     Vitals:   Temp:  [97.8 °F (36.6 °C)-98.6 °F (37 °C)] 97.8 °F (36.6 °C)  Heart Rate:  [] 100  Resp:  [18] 18  BP: ()/(48-69) 100/48  Physical Exam    General: Alert, no acute distress   Chest: Unlabored breathing, cardiovascular: Regular heart rate   Musculoskeletal: Mild swelling to hip.  Positive pulses.  Neurovascular intact.  Dressing intact with mild bloody drainage.    Result Review      WBC   Date Value Ref Range Status   2022 10.05 3.40 - 10.80 10*3/mm3 Final     RBC   Date Value Ref Range Status   2022 3.25 (L) 3.77 - 5.28 10*6/mm3 Final     Hemoglobin   Date Value Ref Range Status   2022 10.4 (L) 12.0 - 15.9 g/dL Final     Hematocrit   Date Value Ref Range Status   2022 31.5 (L) 34.0 - 46.6 % Final     MCV   Date Value Ref Range Status   2022 96.9 79.0 - 97.0 fL Final     MCH   Date Value Ref Range Status   2022 32.0 26.6 - 33.0 pg Final     MCHC   Date Value Ref Range Status   2022 33.0 31.5 - 35.7 g/dL Final     RDW   Date Value Ref Range Status   2022 13.6 12.3 - 15.4 % Final     RDW-SD   Date Value Ref Range Status   2022 48.8 37.0 - 54.0 fl Final     MPV   Date Value Ref Range Status   2022 12.4 (H) 6.0 - 12.0 fL Final     Platelets   Date Value Ref Range Status   2022 146 140 - 450 10*3/mm3 Final     Neutrophil %   Date Value Ref Range Status   2022 76.5 (H) 42.7 - 76.0 % Final     Lymphocyte %   Date Value Ref Range Status   2022 13.9 (L) 19.6  - 45.3 % Final     Monocyte %   Date Value Ref Range Status   03/18/2022 5.5 5.0 - 12.0 % Final     Eosinophil %   Date Value Ref Range Status   03/18/2022 3.7 0.3 - 6.2 % Final     Basophil %   Date Value Ref Range Status   03/18/2022 0.2 0.0 - 1.5 % Final     Immature Grans %   Date Value Ref Range Status   03/18/2022 0.2 0.0 - 0.5 % Final     Neutrophils, Absolute   Date Value Ref Range Status   03/18/2022 7.69 (H) 1.70 - 7.00 10*3/mm3 Final     Lymphocytes, Absolute   Date Value Ref Range Status   03/18/2022 1.40 0.70 - 3.10 10*3/mm3 Final     Monocytes, Absolute   Date Value Ref Range Status   03/18/2022 0.55 0.10 - 0.90 10*3/mm3 Final     Eosinophils, Absolute   Date Value Ref Range Status   03/18/2022 0.37 0.00 - 0.40 10*3/mm3 Final     Basophils, Absolute   Date Value Ref Range Status   03/18/2022 0.02 0.00 - 0.20 10*3/mm3 Final     Immature Grans, Absolute   Date Value Ref Range Status   03/18/2022 0.02 0.00 - 0.05 10*3/mm3 Final     nRBC   Date Value Ref Range Status   03/18/2022 0.0 0.0 - 0.2 /100 WBC Final        Result Review:  I have personally reviewed the results from the time of this admission to 3/18/2022 08:01 EDT and agree with these findings:  [x]  Laboratory  []  Microbiology  []  Radiology  []  EKG/Telemetry   []  Cardiology/Vascular   []  Pathology  []  Old records  []  Other:      Assessment/Plan   Assessment / Plan     Brief Patient Summary:  Bing Cruz is a 89 y.o. female who is postoperative day #2 status post left total hip arthroplasty, removal of hardware    Active Hospital Problems:  Active Hospital Problems    Diagnosis    • **Avascular necrosis of bone of left hip (HCC)    • Arthritis of left hip      Plan: Weightbearing as tolerated with walker  Physical and Occupational Therapy  Pain control  DVT prophylaxis  Discharge planning, [facility]       DVT prophylaxis:  Medical and mechanical DVT prophylaxis orders are present.    CODE STATUS:    Level Of Support Discussed With: Patient  Code Status (Patient has no pulse and is not breathing): CPR (Attempt to Resuscitate)  Medical Interventions (Patient has pulse or is breathing): Full Support    Disposition:  I expect patient to be discharged to inpatient rehab when able.    Electronically signed by Bam Warner MD, 03/18/22, 8:01 AM EDT.

## 2022-03-18 NOTE — THERAPY EVALUATION
Patient Name: Bing Cruz  : 1931    MRN: [id number]                              Today's Date: 3/18/2022       Admit Date: 3/16/2022    Visit Dx:     ICD-10-CM ICD-9-CM   1. Difficulty in walking  R26.2 719.7   2. Avascular necrosis of bone of left hip (HCC)  M87.052 733.42   3. Decreased activities of daily living (ADL)  Z78.9 V49.89   4. Aftercare following surgery of left femoral neck ORIF 2021  Z47.89 V58.78   5. Arthritis of left hip  M16.12 716.95     Patient Active Problem List   Diagnosis   • Anemia   • Closed left hip fracture, initial encounter ([facility])   • Closed fracture of neck of left femur ([facility])   • Closed left hip fracture ([facility])   • Hip fracture requiring operative repair, left, closed, initial encounter ([facility])   • Lung nodule < 6cm on CT   • Esophageal dysphagia   • Atrial fibrillation, chronic ([facility])   • Malignant neoplasm of colon ([facility])   • Chronic deep vein thrombosis (DVT) of proximal vein of left lower extremity ([facility])   • Major depressive disorder, recurrent, 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[facility])   • Gastroesophageal reflux disease without esophagitis   • Vitamin D deficiency   • Hypertension, essential   • Nonrheumatic mitral valve regurgitation   • Nonrheumatic aortic valve insufficiency   • S/P  Left Femoral Neck Open Reduction Internal Fixation   • Left hip pain   • Acquired cystic kidney disease   • Bladder disorder   • Diaphragmatic hernia   • Cardiomegaly   • Cataract   • Chronic fatigue syndrome   • Diarrhea   • Diverticular disease of colon   • Hyperlipemia   • Disorder of bone   • Generalized abdominal pain   • Insomnia   • Major depression, single episode   • Malaise and fatigue   • Pain in joint involving pelvic region and thigh   • Sprain of hip   • Urinary tract infection   • Blood clot in vein   • Colon cancer screening   • Aftercare following surgery of left femoral neck ORIF 2021   • Avascular necrosis of bone of left hip (HCC)   • Arthritis of left hip     Past Medical History:    Diagnosis Date   • Abdominal pain, generalized    • Anemia    • Aortic regurgitation    • Aortic stenosis    • Cancer (HCC)     colon    • Cardiomegaly    • Chronic atrial fibrillation (HCC) 01/01/2019    Atrial fibrillation converted to sinus through cardioversion (March 2019   • Chronic fatigue    • Diarrhea    • Disease of tricuspid valve    • Diverticulosis of colon    • DVT (deep venous thrombosis) (HCC)     LEFT LEG GREATER THAN 5 YRS AGO   • Dysphagia    • Essential (primary) hypertension    • Hiatal hernia    • Insomnia, unspecified    • LVH (left ventricular hypertrophy)    • Major depressive disorder, single episode    • Mitral regurgitation    • Mitral valve insufficiency and aortic valve insufficiency    • Osteopenia    • Pain in joint, pelvic region and thigh    • TR (tricuspid regurgitation)    • UTI (urinary tract infection)     antibx to be completed prior to procedure. ABIOLA Carlos RN ([facility]) notified.     Past Surgical History:   Procedure Laterality Date   • ABDOMINAL HYSTERECTOMY      WITH BSO    • ANKLE SURGERY      (L) ankle fracture   • BLADDER REPAIR     • BREAST BIOPSY      (L) breast biopsy   • CARDIOVERSION  2019   • CATARACT EXTRACTION      OU   • CHOLECYSTECTOMY      OPEN   • COLON SURGERY      STATES SHE HAD 12 INCHES OF COLON REMOVED IN JULY 2020 FOR COLON CANCER   • COLONOSCOPY  2020   • COLONOSCOPY N/A 10/29/2021    Procedure: COLONOSCOPY;  Surgeon: Edmar Back MD;  Location: [facility] ENDOSCOPY;  Service: General;  Laterality: N/A;  DIVERTICULOSIS/ANASTOMOSIS   • FEMUR OPEN REDUCTION INTERNAL FIXATION Left 07/21/2021    Procedure: FEMORAL NECK OPEN REDUCTION INTERNAL FIXATION;  Surgeon: Bam Warner MD;  Location: [facility] MAIN OR;  Service: Orthopedics;  Laterality: Left;   • INGUINAL HERNIA REPAIR Left 09/29/2011   • TOTAL HIP ARTHROPLASTY Left 3/16/2022    Procedure: LEFT TOTAL HIP ARTHROPLASTY AND HARDWARE REMOVAL;  Surgeon: Bam Warner MD;  Location:   [facility];  Service: Orthopedics;  Laterality: Left;   • UPPER GASTROINTESTINAL ENDOSCOPY      WITH DILATATION      General Information     Row Name 03/18/22 0925          OT Time and Intention    Document Type therapy note (daily note)  -PG     Mode of Treatment individual therapy;occupational therapy  -PG           User Key  (r) = Recorded By, (t) = Taken By, (c) = Cosigned By    Initials Name Provider Type    PG Sharan Kramer, OT Occupational Therapist                 Mobility/ADL's     Row Name 03/18/22 0925          Transfers    Transfers sit-stand transfer;stand-sit transfer  -PG     Assistive Device (Bed-Chair Transfers) walker, front-wheeled  -PG     Sit-Stand Burke (Transfers) minimum assist (75% patient effort)  -PG     Row Name 03/18/22 0925          Sit-Stand Transfer    Assistive Device (Sit-Stand Transfers) walker, front-wheeled  -PG     Row Name 03/18/22 0925          Activities of Daily Living    BADL Assessment/Intervention grooming  -PG     Row Name 03/18/22 0925          Bathing Assessment/Intervention    Burke Level (Bathing) bathing skills;lower body;moderate assist (50% patient effort)  -PG     Row Name 03/18/22 0925          Upper Body Dressing Assessment/Training    Burke Level (Upper Body Dressing) upper body dressing skills;minimum assist (75% patient effort)  -PG     Row Name 03/18/22 0925          Grooming Assessment/Training    Burke Level (Grooming) grooming skills;set up  -PG           User Key  (r) = Recorded By, (t) = Taken By, (c) = Cosigned By    Initials Name Provider Type    PG Sharan Kramer, OT Occupational Therapist               Obj/Interventions    No documentation.                Goals/Plan    No documentation.                Clinical Impression     Row Name 03/18/22 0927          Plan of Care Review    Progress improving  -PG     Outcome Evaluation Patient appeared to have less difficulty standing to don pants over waist.  She continues to  require maximal assistance with lower body self-care but was able to walk approximately 15 feet with contact-guard/minimal assist in preparation for walking to the bathroom for toileting.  Recommend continued occupational therapy services.  -PG           User Key  (r) = Recorded By, (t) = Taken By, (c) = Cosigned By    Initials Name Provider Type    Sharan Hess OT Occupational Therapist               Outcome Measures     Row Name 03/18/22 0928          How much help from another is currently needed...    Putting on and taking off regular lower body clothing? 2  -PG     Bathing (including washing, rinsing, and drying) 2  -PG     Toileting (which includes using toilet bed pan or urinal) 1  -PG     Putting on and taking off regular upper body clothing 3  -PG     Taking care of personal grooming (such as brushing teeth) 3  -PG     Eating meals 3  -PG     AM-PAC 6 Clicks Score (OT) 14  -PG     Row Name 03/18/22 0928          Functional Assessment    Outcome Measure Options AM-PAC 6 Clicks Daily Activity (OT);Optimal Instrument  -PG     Row Name 03/18/22 0928          Optimal Instrument    Optimal Instrument Optimal - 3  -PG     Bending/Stooping 4  -PG     Standing 3  -PG     Reaching 2  -PG           User Key  (r) = Recorded By, (t) = Taken By, (c) = Cosigned By    Initials Name Provider Type    Sharan Hess OT Occupational Therapist                Occupational Therapy Education                 Title: PT OT SLP Therapies (Done)     Topic: Occupational Therapy (Done)     Point: ADL training (Done)     Description:   Instruct learner(s) on proper safety adaptation and remediation techniques during self care or transfers.   Instruct in proper use of assistive devices.              Learning Progress Summary           Patient Acceptance, E,D, DU,VU by PG at 3/17/2022 1018                   Point: Home exercise program (Done)     Description:   Instruct learner(s) on appropriate technique for monitoring, assisting  and/or progressing therapeutic exercises/activities.              Learning Progress Summary           Patient Acceptance, E,D, DU,VU by PG at 3/17/2022 1018                   Point: Precautions (Done)     Description:   Instruct learner(s) on prescribed precautions during self-care and functional transfers.              Learning Progress Summary           Patient Acceptance, E,D, DU,VU by PG at 3/17/2022 1018                   Point: Body mechanics (Done)     Description:   Instruct learner(s) on proper positioning and spine alignment during self-care, functional mobility activities and/or exercises.              Learning Progress Summary           Patient Acceptance, E,D, DU,VU by PG at 3/17/2022 1018                               User Key     Initials Effective Dates Name Provider Type Discipline    PG 06/16/21 -  Sharan Kramer, OT Occupational Therapist OT              OT Recommendation and Plan  Planned Therapy Interventions (OT): activity tolerance training, BADL retraining, occupation/activity based interventions, transfer/mobility retraining, strengthening exercise, patient/caregiver education/training  Therapy Frequency (OT): 5 times/wk  Plan of Care Review  Plan of Care Reviewed With: patient  Progress: improving  Outcome Evaluation: Patient appeared to have less difficulty standing to don pants over waist.  She continues to require maximal assistance with lower body self-care but was able to walk approximately 15 feet with contact-guard/minimal assist in preparation for walking to the bathroom for toileting.  Recommend continued occupational therapy services.     Time Calculation:    Time Calculation- OT     Row Name 03/18/22 0929             Time Calculation- OT    OT Received On 03/18/22  -PG      OT Goal Re-Cert Due Date 03/26/22  -PG              Timed Charges    47860 - OT Therapeutic Activity Minutes 12  -PG      90509 - OT Self Care/Mgmt Minutes 25  -PG              Total Minutes    Timed Charges  Total Minutes 37  -PG       Total Minutes 37  -PG            User Key  (r) = Recorded By, (t) = Taken By, (c) = Cosigned By    Initials Name Provider Type    PG Sharan Kramer OT Occupational Therapist              Therapy Charges for Today     Code Description Service Date Service Provider Modifiers Qty    63233613333 HC OT THERAPEUTIC ACT EA 15 MIN 3/17/2022 Sharan Kramer OT GO 1    05694383805 HC OT SELF CARE/MGMT/TRAIN EA 15 MIN 3/17/2022 Sharan Kramer, OT GO 1    98242174145 HC OT EVAL LOW COMPLEXITY 2 3/17/2022 Sharan Kramer, OT GO 1    73444914036 HC OT THERAPEUTIC ACT EA 15 MIN 3/18/2022 Sharan Kramer OT GO 1    19100816676 HC OT SELF CARE/MGMT/TRAIN EA 15 MIN 3/18/2022 Sharan Kramer OT GO 2               Sharan Kramer OT  3/18/2022

## 2022-03-19 ENCOUNTER — HOSPITAL ENCOUNTER (INPATIENT)
Facility: HOSPITAL | Age: 87
LOS: 1 days | Discharge: NURSING, REHAB OR LONG-TERM CARE | End: 2022-03-22
Attending: EMERGENCY MEDICINE | Admitting: FAMILY MEDICINE

## 2022-03-19 ENCOUNTER — APPOINTMENT (OUTPATIENT)
Dept: GENERAL RADIOLOGY | Facility: HOSPITAL | Age: 87
End: 2022-03-19

## 2022-03-19 DIAGNOSIS — R26.2 DIFFICULTY WALKING: ICD-10-CM

## 2022-03-19 DIAGNOSIS — F33.0 MAJOR DEPRESSIVE DISORDER, RECURRENT, MILD: ICD-10-CM

## 2022-03-19 DIAGNOSIS — F51.01 PRIMARY INSOMNIA: ICD-10-CM

## 2022-03-19 DIAGNOSIS — S72.002G CLOSED FRACTURE OF LEFT HIP WITH DELAYED HEALING, SUBSEQUENT ENCOUNTER: ICD-10-CM

## 2022-03-19 DIAGNOSIS — IMO0001 LUNG NODULE < 6CM ON CT: ICD-10-CM

## 2022-03-19 DIAGNOSIS — R53.83 MALAISE AND FATIGUE: ICD-10-CM

## 2022-03-19 DIAGNOSIS — N32.9 BLADDER DISORDER: ICD-10-CM

## 2022-03-19 DIAGNOSIS — I82.5Y2 CHRONIC DEEP VEIN THROMBOSIS (DVT) OF PROXIMAL VEIN OF LEFT LOWER EXTREMITY: ICD-10-CM

## 2022-03-19 DIAGNOSIS — Z78.9 DECREASED ACTIVITIES OF DAILY LIVING (ADL): ICD-10-CM

## 2022-03-19 DIAGNOSIS — R10.84 GENERALIZED ABDOMINAL PAIN: ICD-10-CM

## 2022-03-19 DIAGNOSIS — S72.002A CLOSED LEFT HIP FRACTURE, INITIAL ENCOUNTER: ICD-10-CM

## 2022-03-19 DIAGNOSIS — S72.002A HIP FRACTURE REQUIRING OPERATIVE REPAIR, LEFT, CLOSED, INITIAL ENCOUNTER: ICD-10-CM

## 2022-03-19 DIAGNOSIS — K57.30 DIVERTICULAR DISEASE OF COLON: ICD-10-CM

## 2022-03-19 DIAGNOSIS — I10 HYPERTENSION, ESSENTIAL: ICD-10-CM

## 2022-03-19 DIAGNOSIS — C18.9 MALIGNANT NEOPLASM OF COLON, UNSPECIFIED PART OF COLON: ICD-10-CM

## 2022-03-19 DIAGNOSIS — Z12.11 COLON CANCER SCREENING: ICD-10-CM

## 2022-03-19 DIAGNOSIS — M87.052 AVASCULAR NECROSIS OF BONE OF LEFT HIP: ICD-10-CM

## 2022-03-19 DIAGNOSIS — I82.90 BLOOD CLOT IN VEIN: ICD-10-CM

## 2022-03-19 DIAGNOSIS — S73.015D CLOSED POSTERIOR DISLOCATION OF LEFT HIP, SUBSEQUENT ENCOUNTER: ICD-10-CM

## 2022-03-19 DIAGNOSIS — G93.32 CHRONIC FATIGUE SYNDROME: ICD-10-CM

## 2022-03-19 DIAGNOSIS — S73.102A SPRAIN OF LEFT HIP, INITIAL ENCOUNTER: ICD-10-CM

## 2022-03-19 DIAGNOSIS — R13.19 ESOPHAGEAL DYSPHAGIA: ICD-10-CM

## 2022-03-19 DIAGNOSIS — I34.0 NONRHEUMATIC MITRAL VALVE REGURGITATION: Chronic | ICD-10-CM

## 2022-03-19 DIAGNOSIS — E78.5 HYPERLIPIDEMIA, UNSPECIFIED HYPERLIPIDEMIA TYPE: ICD-10-CM

## 2022-03-19 DIAGNOSIS — R53.81 MALAISE AND FATIGUE: ICD-10-CM

## 2022-03-19 DIAGNOSIS — Z47.89 AFTERCARE FOLLOWING SURGERY OF THE MUSCULOSKELETAL SYSTEM: ICD-10-CM

## 2022-03-19 DIAGNOSIS — M25.559 PAIN IN JOINT INVOLVING PELVIC REGION AND THIGH, UNSPECIFIED LATERALITY: ICD-10-CM

## 2022-03-19 DIAGNOSIS — M16.12 ARTHRITIS OF LEFT HIP: ICD-10-CM

## 2022-03-19 DIAGNOSIS — E55.9 VITAMIN D DEFICIENCY: ICD-10-CM

## 2022-03-19 DIAGNOSIS — Z98.890 HISTORY OF HIP SURGERY: ICD-10-CM

## 2022-03-19 DIAGNOSIS — S73.015A CLOSED POSTERIOR DISLOCATION OF LEFT HIP, INITIAL ENCOUNTER: Primary | ICD-10-CM

## 2022-03-19 DIAGNOSIS — K44.9 DIAPHRAGMATIC HERNIA WITHOUT OBSTRUCTION AND WITHOUT GANGRENE: ICD-10-CM

## 2022-03-19 DIAGNOSIS — N28.1 ACQUIRED CYSTIC KIDNEY DISEASE: ICD-10-CM

## 2022-03-19 DIAGNOSIS — R19.7 DIARRHEA, UNSPECIFIED TYPE: ICD-10-CM

## 2022-03-19 DIAGNOSIS — I51.7 CARDIOMEGALY: ICD-10-CM

## 2022-03-19 DIAGNOSIS — I48.20 ATRIAL FIBRILLATION, CHRONIC: ICD-10-CM

## 2022-03-19 DIAGNOSIS — D50.9 IRON DEFICIENCY ANEMIA, UNSPECIFIED IRON DEFICIENCY ANEMIA TYPE: ICD-10-CM

## 2022-03-19 DIAGNOSIS — M25.552 LEFT HIP PAIN: ICD-10-CM

## 2022-03-19 DIAGNOSIS — M89.9 DISORDER OF BONE: ICD-10-CM

## 2022-03-19 DIAGNOSIS — K21.9 GASTROESOPHAGEAL REFLUX DISEASE WITHOUT ESOPHAGITIS: ICD-10-CM

## 2022-03-19 DIAGNOSIS — S72.002S CLOSED FRACTURE OF NECK OF LEFT FEMUR, SEQUELA: ICD-10-CM

## 2022-03-19 DIAGNOSIS — N30.00 ACUTE CYSTITIS WITHOUT HEMATURIA: ICD-10-CM

## 2022-03-19 DIAGNOSIS — I35.1 NONRHEUMATIC AORTIC VALVE INSUFFICIENCY: Chronic | ICD-10-CM

## 2022-03-19 DIAGNOSIS — F32.9 MAJOR DEPRESSIVE DISORDER WITH SINGLE EPISODE, REMISSION STATUS UNSPECIFIED: ICD-10-CM

## 2022-03-19 DIAGNOSIS — H25.9 SENILE CATARACT, UNSPECIFIED AGE-RELATED CATARACT TYPE, UNSPECIFIED LATERALITY: ICD-10-CM

## 2022-03-19 PROCEDURE — 25010000002 ONDANSETRON PER 1 MG: Performed by: EMERGENCY MEDICINE

## 2022-03-19 PROCEDURE — 99285 EMERGENCY DEPT VISIT HI MDM: CPT

## 2022-03-19 PROCEDURE — 73501 X-RAY EXAM HIP UNI 1 VIEW: CPT

## 2022-03-19 PROCEDURE — U0004 COV-19 TEST NON-CDC HGH THRU: HCPCS | Performed by: STUDENT IN AN ORGANIZED HEALTH CARE EDUCATION/TRAINING PROGRAM

## 2022-03-19 PROCEDURE — 73502 X-RAY EXAM HIP UNI 2-3 VIEWS: CPT

## 2022-03-19 PROCEDURE — 25010000002 FENTANYL CITRATE (PF) 50 MCG/ML SOLUTION: Performed by: EMERGENCY MEDICINE

## 2022-03-19 PROCEDURE — 25010000002 PROPOFOL 10 MG/ML EMULSION: Performed by: EMERGENCY MEDICINE

## 2022-03-19 PROCEDURE — U0005 INFEC AGEN DETEC AMPLI PROBE: HCPCS | Performed by: STUDENT IN AN ORGANIZED HEALTH CARE EDUCATION/TRAINING PROGRAM

## 2022-03-19 RX ORDER — PROPOFOL 10 MG/ML
200 VIAL (ML) INTRAVENOUS ONCE
Status: COMPLETED | OUTPATIENT
Start: 2022-03-19 | End: 2022-03-19

## 2022-03-19 RX ORDER — ONDANSETRON 2 MG/ML
4 INJECTION INTRAMUSCULAR; INTRAVENOUS ONCE
Status: COMPLETED | OUTPATIENT
Start: 2022-03-19 | End: 2022-03-19

## 2022-03-19 RX ORDER — FENTANYL CITRATE 50 UG/ML
50 INJECTION, SOLUTION INTRAMUSCULAR; INTRAVENOUS ONCE
Status: COMPLETED | OUTPATIENT
Start: 2022-03-19 | End: 2022-03-19

## 2022-03-19 RX ADMIN — ONDANSETRON 4 MG: 2 INJECTION INTRAMUSCULAR; INTRAVENOUS at 22:44

## 2022-03-19 RX ADMIN — FENTANYL CITRATE 50 MCG: 50 INJECTION, SOLUTION INTRAMUSCULAR; INTRAVENOUS at 22:44

## 2022-03-19 RX ADMIN — PROPOFOL 120 MG: 10 INJECTION, EMULSION INTRAVENOUS at 23:26

## 2022-03-20 ENCOUNTER — ANESTHESIA (OUTPATIENT)
Dept: PERIOP | Facility: HOSPITAL | Age: 87
End: 2022-03-20

## 2022-03-20 ENCOUNTER — APPOINTMENT (OUTPATIENT)
Dept: GENERAL RADIOLOGY | Facility: HOSPITAL | Age: 87
End: 2022-03-20

## 2022-03-20 ENCOUNTER — ANESTHESIA EVENT (OUTPATIENT)
Dept: PERIOP | Facility: HOSPITAL | Age: 87
End: 2022-03-20

## 2022-03-20 LAB
ALBUMIN SERPL-MCNC: 2.8 G/DL (ref 3.5–5.2)
ALP SERPL-CCNC: 84 U/L (ref 39–117)
ALT SERPL W P-5'-P-CCNC: 8 U/L (ref 1–33)
ANION GAP SERPL CALCULATED.3IONS-SCNC: 9.6 MMOL/L (ref 5–15)
AST SERPL-CCNC: 27 U/L (ref 1–32)
BASOPHILS # BLD AUTO: 0.02 10*3/MM3 (ref 0–0.2)
BASOPHILS NFR BLD AUTO: 0.2 % (ref 0–1.5)
BILIRUB CONJ SERPL-MCNC: 0.4 MG/DL (ref 0–0.3)
BILIRUB INDIRECT SERPL-MCNC: 0.8 MG/DL
BILIRUB SERPL-MCNC: 1.2 MG/DL (ref 0–1.2)
BUN SERPL-MCNC: 21 MG/DL (ref 8–23)
BUN/CREAT SERPL: 29.6 (ref 7–25)
CALCIUM SPEC-SCNC: 8.7 MG/DL (ref 8.2–9.6)
CHLORIDE SERPL-SCNC: 102 MMOL/L (ref 98–107)
CO2 SERPL-SCNC: 25.4 MMOL/L (ref 22–29)
CREAT SERPL-MCNC: 0.71 MG/DL (ref 0.57–1)
DEPRECATED RDW RBC AUTO: 48.8 FL (ref 37–54)
EGFRCR SERPLBLD CKD-EPI 2021: 80.9 ML/MIN/1.73
EOSINOPHIL # BLD AUTO: 0.47 10*3/MM3 (ref 0–0.4)
EOSINOPHIL NFR BLD AUTO: 4.9 % (ref 0.3–6.2)
ERYTHROCYTE [DISTWIDTH] IN BLOOD BY AUTOMATED COUNT: 13.8 % (ref 12.3–15.4)
GLUCOSE BLDC GLUCOMTR-MCNC: 99 MG/DL (ref 70–99)
GLUCOSE SERPL-MCNC: 110 MG/DL (ref 65–99)
HCT VFR BLD AUTO: 29.8 % (ref 34–46.6)
HGB BLD-MCNC: 9.9 G/DL (ref 12–15.9)
IMM GRANULOCYTES # BLD AUTO: 0.05 10*3/MM3 (ref 0–0.05)
IMM GRANULOCYTES NFR BLD AUTO: 0.5 % (ref 0–0.5)
LYMPHOCYTES # BLD AUTO: 1.42 10*3/MM3 (ref 0.7–3.1)
LYMPHOCYTES NFR BLD AUTO: 14.8 % (ref 19.6–45.3)
MAGNESIUM SERPL-MCNC: 2 MG/DL (ref 1.6–2.4)
MCH RBC QN AUTO: 31.7 PG (ref 26.6–33)
MCHC RBC AUTO-ENTMCNC: 33.2 G/DL (ref 31.5–35.7)
MCV RBC AUTO: 95.5 FL (ref 79–97)
MONOCYTES # BLD AUTO: 0.73 10*3/MM3 (ref 0.1–0.9)
MONOCYTES NFR BLD AUTO: 7.6 % (ref 5–12)
NEUTROPHILS NFR BLD AUTO: 6.9 10*3/MM3 (ref 1.7–7)
NEUTROPHILS NFR BLD AUTO: 72 % (ref 42.7–76)
NRBC BLD AUTO-RTO: 0 /100 WBC (ref 0–0.2)
PHOSPHATE SERPL-MCNC: 3 MG/DL (ref 2.5–4.5)
PLATELET # BLD AUTO: 196 10*3/MM3 (ref 140–450)
PMV BLD AUTO: 12.1 FL (ref 6–12)
POTASSIUM SERPL-SCNC: 3.9 MMOL/L (ref 3.5–5.2)
PROT SERPL-MCNC: 5.5 G/DL (ref 6–8.5)
RBC # BLD AUTO: 3.12 10*6/MM3 (ref 3.77–5.28)
SARS-COV-2 RNA PNL SPEC NAA+PROBE: NOT DETECTED
SODIUM SERPL-SCNC: 137 MMOL/L (ref 136–145)
WBC NRBC COR # BLD: 9.59 10*3/MM3 (ref 3.4–10.8)

## 2022-03-20 PROCEDURE — 85025 COMPLETE CBC W/AUTO DIFF WBC: CPT | Performed by: FAMILY MEDICINE

## 2022-03-20 PROCEDURE — G0378 HOSPITAL OBSERVATION PER HR: HCPCS

## 2022-03-20 PROCEDURE — 99225 PR SBSQ OBSERVATION CARE/DAY 25 MINUTES: CPT | Performed by: STUDENT IN AN ORGANIZED HEALTH CARE EDUCATION/TRAINING PROGRAM

## 2022-03-20 PROCEDURE — 0SJBXZZ INSPECTION OF LEFT HIP JOINT, EXTERNAL APPROACH: ICD-10-PCS | Performed by: EMERGENCY MEDICINE

## 2022-03-20 PROCEDURE — 99223 1ST HOSP IP/OBS HIGH 75: CPT | Performed by: FAMILY MEDICINE

## 2022-03-20 PROCEDURE — 94799 UNLISTED PULMONARY SVC/PX: CPT

## 2022-03-20 PROCEDURE — 25010000002 PROPOFOL 10 MG/ML EMULSION: Performed by: NURSE ANESTHETIST, CERTIFIED REGISTERED

## 2022-03-20 PROCEDURE — 84100 ASSAY OF PHOSPHORUS: CPT | Performed by: FAMILY MEDICINE

## 2022-03-20 PROCEDURE — 27252 TREAT HIP DISLOCATION: CPT | Performed by: STUDENT IN AN ORGANIZED HEALTH CARE EDUCATION/TRAINING PROGRAM

## 2022-03-20 PROCEDURE — 73501 X-RAY EXAM HIP UNI 1 VIEW: CPT

## 2022-03-20 PROCEDURE — 83735 ASSAY OF MAGNESIUM: CPT | Performed by: FAMILY MEDICINE

## 2022-03-20 PROCEDURE — 80076 HEPATIC FUNCTION PANEL: CPT | Performed by: FAMILY MEDICINE

## 2022-03-20 PROCEDURE — 80048 BASIC METABOLIC PNL TOTAL CA: CPT | Performed by: FAMILY MEDICINE

## 2022-03-20 PROCEDURE — 82962 GLUCOSE BLOOD TEST: CPT

## 2022-03-20 PROCEDURE — 25010000002 ONDANSETRON PER 1 MG: Performed by: EMERGENCY MEDICINE

## 2022-03-20 PROCEDURE — 25010000002 LORAZEPAM PER 2 MG: Performed by: EMERGENCY MEDICINE

## 2022-03-20 PROCEDURE — 25010000002 MORPHINE PER 10 MG: Performed by: STUDENT IN AN ORGANIZED HEALTH CARE EDUCATION/TRAINING PROGRAM

## 2022-03-20 PROCEDURE — 0SSBXZZ REPOSITION LEFT HIP JOINT, EXTERNAL APPROACH: ICD-10-PCS | Performed by: STUDENT IN AN ORGANIZED HEALTH CARE EDUCATION/TRAINING PROGRAM

## 2022-03-20 PROCEDURE — 76000 FLUOROSCOPY <1 HR PHYS/QHP: CPT

## 2022-03-20 RX ORDER — OXYCODONE HYDROCHLORIDE 5 MG/1
5 TABLET ORAL
Status: DISCONTINUED | OUTPATIENT
Start: 2022-03-20 | End: 2022-03-20 | Stop reason: HOSPADM

## 2022-03-20 RX ORDER — POLYETHYLENE GLYCOL 3350 17 G/17G
17 POWDER, FOR SOLUTION ORAL DAILY PRN
Status: DISCONTINUED | OUTPATIENT
Start: 2022-03-20 | End: 2022-03-22 | Stop reason: HOSPADM

## 2022-03-20 RX ORDER — DILTIAZEM HYDROCHLORIDE 120 MG/1
120 CAPSULE, COATED, EXTENDED RELEASE ORAL
Status: DISCONTINUED | OUTPATIENT
Start: 2022-03-20 | End: 2022-03-22 | Stop reason: HOSPADM

## 2022-03-20 RX ORDER — SODIUM CHLORIDE 0.9 % (FLUSH) 0.9 %
10 SYRINGE (ML) INJECTION EVERY 12 HOURS SCHEDULED
Status: DISCONTINUED | OUTPATIENT
Start: 2022-03-20 | End: 2022-03-22 | Stop reason: HOSPADM

## 2022-03-20 RX ORDER — ONDANSETRON 2 MG/ML
4 INJECTION INTRAMUSCULAR; INTRAVENOUS ONCE
Status: COMPLETED | OUTPATIENT
Start: 2022-03-20 | End: 2022-03-20

## 2022-03-20 RX ORDER — DILTIAZEM HYDROCHLORIDE 120 MG/1
120 CAPSULE, COATED, EXTENDED RELEASE ORAL
Status: ON HOLD | COMMUNITY
End: 2022-03-22 | Stop reason: SDUPTHER

## 2022-03-20 RX ORDER — LATANOPROST 50 UG/ML
1 SOLUTION/ DROPS OPHTHALMIC DAILY
Status: DISCONTINUED | OUTPATIENT
Start: 2022-03-20 | End: 2022-03-22 | Stop reason: HOSPADM

## 2022-03-20 RX ORDER — BISACODYL 10 MG
10 SUPPOSITORY, RECTAL RECTAL DAILY PRN
Status: DISCONTINUED | OUTPATIENT
Start: 2022-03-20 | End: 2022-03-22 | Stop reason: HOSPADM

## 2022-03-20 RX ORDER — DEXTROSE, SODIUM CHLORIDE, AND POTASSIUM CHLORIDE 5; .45; .15 G/100ML; G/100ML; G/100ML
75 INJECTION INTRAVENOUS CONTINUOUS
Status: DISCONTINUED | OUTPATIENT
Start: 2022-03-20 | End: 2022-03-20

## 2022-03-20 RX ORDER — BISACODYL 5 MG/1
5 TABLET, DELAYED RELEASE ORAL DAILY PRN
Status: DISCONTINUED | OUTPATIENT
Start: 2022-03-20 | End: 2022-03-22 | Stop reason: HOSPADM

## 2022-03-20 RX ORDER — HYDROCODONE BITARTRATE AND ACETAMINOPHEN 10; 325 MG/1; MG/1
1 TABLET ORAL EVERY 6 HOURS PRN
Status: DISCONTINUED | OUTPATIENT
Start: 2022-03-20 | End: 2022-03-22 | Stop reason: HOSPADM

## 2022-03-20 RX ORDER — PROMETHAZINE HYDROCHLORIDE 12.5 MG/1
25 TABLET ORAL ONCE AS NEEDED
Status: DISCONTINUED | OUTPATIENT
Start: 2022-03-20 | End: 2022-03-20 | Stop reason: HOSPADM

## 2022-03-20 RX ORDER — ONDANSETRON 2 MG/ML
4 INJECTION INTRAMUSCULAR; INTRAVENOUS EVERY 6 HOURS PRN
Status: DISCONTINUED | OUTPATIENT
Start: 2022-03-20 | End: 2022-03-22 | Stop reason: HOSPADM

## 2022-03-20 RX ORDER — ONDANSETRON 2 MG/ML
4 INJECTION INTRAMUSCULAR; INTRAVENOUS ONCE AS NEEDED
Status: DISCONTINUED | OUTPATIENT
Start: 2022-03-20 | End: 2022-03-20 | Stop reason: HOSPADM

## 2022-03-20 RX ORDER — MEPERIDINE HYDROCHLORIDE 25 MG/ML
12.5 INJECTION INTRAMUSCULAR; INTRAVENOUS; SUBCUTANEOUS
Status: DISCONTINUED | OUTPATIENT
Start: 2022-03-20 | End: 2022-03-20 | Stop reason: HOSPADM

## 2022-03-20 RX ORDER — PROMETHAZINE HYDROCHLORIDE 25 MG/1
25 SUPPOSITORY RECTAL ONCE AS NEEDED
Status: DISCONTINUED | OUTPATIENT
Start: 2022-03-20 | End: 2022-03-20 | Stop reason: HOSPADM

## 2022-03-20 RX ORDER — METOPROLOL SUCCINATE 50 MG/1
100 TABLET, EXTENDED RELEASE ORAL 2 TIMES DAILY
Status: DISCONTINUED | OUTPATIENT
Start: 2022-03-20 | End: 2022-03-22 | Stop reason: HOSPADM

## 2022-03-20 RX ORDER — ALUMINA, MAGNESIA, AND SIMETHICONE 2400; 2400; 240 MG/30ML; MG/30ML; MG/30ML
15 SUSPENSION ORAL EVERY 6 HOURS PRN
Status: DISCONTINUED | OUTPATIENT
Start: 2022-03-20 | End: 2022-03-22 | Stop reason: HOSPADM

## 2022-03-20 RX ORDER — HYDROCODONE BITARTRATE AND ACETAMINOPHEN 5; 325 MG/1; MG/1
1 TABLET ORAL EVERY 6 HOURS PRN
Status: DISCONTINUED | OUTPATIENT
Start: 2022-03-20 | End: 2022-03-21

## 2022-03-20 RX ORDER — MORPHINE SULFATE 2 MG/ML
2 INJECTION, SOLUTION INTRAMUSCULAR; INTRAVENOUS EVERY 4 HOURS PRN
Status: DISCONTINUED | OUTPATIENT
Start: 2022-03-20 | End: 2022-03-22 | Stop reason: HOSPADM

## 2022-03-20 RX ORDER — SODIUM CHLORIDE 0.9 % (FLUSH) 0.9 %
10 SYRINGE (ML) INJECTION AS NEEDED
Status: DISCONTINUED | OUTPATIENT
Start: 2022-03-20 | End: 2022-03-22 | Stop reason: HOSPADM

## 2022-03-20 RX ORDER — LORAZEPAM 0.5 MG/1
0.5 TABLET ORAL NIGHTLY PRN
Status: DISCONTINUED | OUTPATIENT
Start: 2022-03-20 | End: 2022-03-22 | Stop reason: HOSPADM

## 2022-03-20 RX ORDER — AMOXICILLIN 250 MG
2 CAPSULE ORAL 2 TIMES DAILY
Status: DISCONTINUED | OUTPATIENT
Start: 2022-03-20 | End: 2022-03-22 | Stop reason: HOSPADM

## 2022-03-20 RX ORDER — LORAZEPAM 2 MG/ML
0.5 INJECTION INTRAMUSCULAR ONCE
Status: COMPLETED | OUTPATIENT
Start: 2022-03-20 | End: 2022-03-20

## 2022-03-20 RX ADMIN — APIXABAN 2.5 MG: 2.5 TABLET, FILM COATED ORAL at 10:18

## 2022-03-20 RX ADMIN — METOPROLOL SUCCINATE 100 MG: 50 TABLET, EXTENDED RELEASE ORAL at 20:29

## 2022-03-20 RX ADMIN — Medication 10 ML: at 02:44

## 2022-03-20 RX ADMIN — SENNOSIDES AND DOCUSATE SODIUM 2 TABLET: 50; 8.6 TABLET ORAL at 10:18

## 2022-03-20 RX ADMIN — ONDANSETRON 4 MG: 2 INJECTION INTRAMUSCULAR; INTRAVENOUS at 00:44

## 2022-03-20 RX ADMIN — METOPROLOL SUCCINATE 100 MG: 50 TABLET, EXTENDED RELEASE ORAL at 10:18

## 2022-03-20 RX ADMIN — LATANOPROST 1 DROP: 50 SOLUTION OPHTHALMIC at 10:17

## 2022-03-20 RX ADMIN — Medication 10 ML: at 20:30

## 2022-03-20 RX ADMIN — DILTIAZEM HYDROCHLORIDE 120 MG: 120 CAPSULE, COATED, EXTENDED RELEASE ORAL at 12:33

## 2022-03-20 RX ADMIN — PROPOFOL 100 MCG/KG/MIN: 10 INJECTION, EMULSION INTRAVENOUS at 07:40

## 2022-03-20 RX ADMIN — APIXABAN 2.5 MG: 2.5 TABLET, FILM COATED ORAL at 20:30

## 2022-03-20 RX ADMIN — HYDROCODONE BITARTRATE AND ACETAMINOPHEN 1 TABLET: 5; 325 TABLET ORAL at 10:18

## 2022-03-20 RX ADMIN — MORPHINE SULFATE 2 MG: 2 INJECTION, SOLUTION INTRAMUSCULAR; INTRAVENOUS at 12:33

## 2022-03-20 RX ADMIN — POTASSIUM CHLORIDE, DEXTROSE MONOHYDRATE AND SODIUM CHLORIDE 75 ML/HR: 150; 5; 450 INJECTION, SOLUTION INTRAVENOUS at 02:44

## 2022-03-20 RX ADMIN — LORAZEPAM 0.5 MG: 2 INJECTION INTRAMUSCULAR; INTRAVENOUS at 00:43

## 2022-03-20 RX ADMIN — Medication 10 ML: at 10:24

## 2022-03-20 RX ADMIN — HYDROCODONE BITARTRATE AND ACETAMINOPHEN 1 TABLET: 10; 325 TABLET ORAL at 20:30

## 2022-03-20 NOTE — SIGNIFICANT NOTE
03/20/22 0957   Plan   Plan Patient was recently discharge, after hip surgery, to [facility] for rehab.  Patient began having pain and returned for surgery.  Daughters at bedside.  Reports patient usually lives alone when not in rehab and has good family support.  Provides her own ADL's.  Family provides transportation.  Family would like for patient to return to [facility] at discharge.

## 2022-03-20 NOTE — ED PROVIDER NOTES
Time: 10:34 PM EDT  Arrived by: ambulance  Chief Complaint: Left hip pain  History provided by: Patient and family  History is limited by: Nothing    History of Present Illness:  Patient is a 89 y.o. year old female who presents to the emergency department with left hip pain.  The patient was recently admitted for a left hip replacement and she was discharged to rehab facility.  The patient's daughter states that since she has been at rehab they have been trying to have her do physical therapy however the patient has been complaining severely of pain in the left hip with physical therapy and inability to ambulate or perform physical therapy.  Patient has had no falls however the pain became intolerable and subsequent she was sent to the emergency room.    HPI    Similar Symptoms Previously: Yes  Recently seen: Yes      Patient Care Team  Primary Care Provider: Jairo Kramer MD    Past Medical History:     Allergies   Allergen Reactions   • Citalopram Unknown - High Severity   • Clonazepam Unknown - High Severity   • Levofloxacin Nausea And Vomiting   • Lisinopril Unknown - High Severity   • Macrobid [Nitrofurantoin Macrocrystal] Rash   • Melatonin Unknown - High Severity     Past Medical History:   Diagnosis Date   • Abdominal pain, generalized    • Anemia    • Aortic regurgitation    • Aortic stenosis    • Cancer (HCC)     colon    • Cardiomegaly    • Chronic atrial fibrillation (HCC) 01/01/2019    Atrial fibrillation converted to sinus through cardioversion (March 2019   • Chronic fatigue    • Diarrhea    • Disease of tricuspid valve    • Diverticulosis of colon    • DVT (deep venous thrombosis) (HCC)     LEFT LEG GREATER THAN 5 YRS AGO   • Dysphagia    • Essential (primary) hypertension    • Hiatal hernia    • Insomnia, unspecified    • LVH (left ventricular hypertrophy)    • Major depressive disorder, single episode    • Mitral regurgitation    • Mitral valve insufficiency and aortic valve  insufficiency    • Osteopenia    • Pain in joint, pelvic region and thigh    • TR (tricuspid regurgitation)    • UTI (urinary tract infection)     antibx to be completed prior to procedure. ABIOLA Carlos RN ([facility]) notified.     Past Surgical History:   Procedure Laterality Date   • ABDOMINAL HYSTERECTOMY      WITH BSO    • ANKLE SURGERY      (L) ankle fracture   • BLADDER REPAIR     • BREAST BIOPSY      (L) breast biopsy   • CARDIOVERSION  2019   • CATARACT EXTRACTION      OU   • CHOLECYSTECTOMY      OPEN   • COLON SURGERY      STATES SHE HAD 12 INCHES OF COLON REMOVED IN JULY 2020 FOR COLON CANCER   • COLONOSCOPY  2020   • COLONOSCOPY N/A 10/29/2021    Procedure: COLONOSCOPY;  Surgeon: Edmar Back MD;  Location: [facility] ENDOSCOPY;  Service: General;  Laterality: N/A;  DIVERTICULOSIS/ANASTOMOSIS   • FEMUR OPEN REDUCTION INTERNAL FIXATION Left 07/21/2021    Procedure: FEMORAL NECK OPEN REDUCTION INTERNAL FIXATION;  Surgeon: Bam Warner MD;  Location: [facility] MAIN OR;  Service: Orthopedics;  Laterality: Left;   • INGUINAL HERNIA REPAIR Left 09/29/2011   • TOTAL HIP ARTHROPLASTY Left 3/16/2022    Procedure: LEFT TOTAL HIP ARTHROPLASTY AND HARDWARE REMOVAL;  Surgeon: Bam Warner MD;  Location: [facility] MAIN OR;  Service: Orthopedics;  Laterality: Left;   • UPPER GASTROINTESTINAL ENDOSCOPY      WITH DILATATION     Family History   Problem Relation Age of Onset   • Malig Hyperthermia Neg Hx        Home Medications:  Prior to Admission medications    Medication Sig Start Date End Date Taking? Authorizing Provider   acetaminophen (TYLENOL) 325 MG tablet Take 1 tablet by mouth Every 4 (Four) Hours As Needed for Fever (Temperature Greater Than 101F). 3/18/22   Jairo Kramer MD   acetaminophen (TYLENOL) 500 MG tablet Take 2 tablets by mouth Every 8 (Eight) Hours for 3 doses. 3/18/22 3/19/22  Jairo Kramer MD   apixaban (ELIQUIS) 2.5 MG tablet tablet Take 1 tablet by mouth 2 (Two)  Times a Day. 3/18/22   Jairo Kramer MD   ascorbic acid (VITAMIN C) 500 MG tablet Take 500 mg by mouth Daily.    Karina Browne MD   AZO-CRANBERRY PO Take  by mouth.    Karina Browne MD   colestipol (COLESTID) 1 g tablet TAKE 1 TABLET BY ORAL ROUTE 3 TIMES A DAY AS NEEDED FOR 30 DAYS DIARRHEA 7/28/21   Jeanne Colunga APRN   docusate sodium 100 MG capsule Take 1 capsule by mouth 2 (Two) Times a Day As Needed for Constipation. 3/18/22   Jairo Kramer MD   HYDROcodone-acetaminophen (NORCO) 5-325 MG per tablet Take 1 tablet by mouth Every 4 (Four) Hours As Needed for Moderate Pain  for up to 5 days. 3/18/22 3/23/22  Jairo Kramer MD   HYDROcodone-acetaminophen (NORCO) 5-325 MG per tablet Take 2 tablets by mouth Every 4 (Four) Hours As Needed for Severe Pain  for up to 5 days. 3/18/22 3/23/22  Jairo Kramer MD   HYDROcodone-acetaminophen (Norco) 7.5-325 MG per tablet Take 1-2 tablets by mouth Every 6 (Six) Hours As Needed for Moderate Pain . 3/18/22   Bam Warner MD   latanoprost (XALATAN) 0.005 % ophthalmic solution  11/11/21   Karina Browne MD   LORazepam (ATIVAN) 0.5 MG tablet TAKE 1 TABLET BY MOUTH EVERY DAY AT BEDTIME AS NEEDED 12/9/21   Jairo Kramer MD   magnesium hydroxide (MILK OF MAGNESIA) 2400 MG/10ML suspension suspension Take 10 mL by mouth Daily As Needed (Indigestion). 3/18/22   Jairo Kramer MD   magnesium oxide (MAG-OX) 400 tablet tablet Take 1 tablet by mouth 2 (Two) Times a Day. 3/18/22   Jairo Kramer MD   metoprolol succinate XL (TOPROL-XL) 100 MG 24 hr tablet Take 1 tablet by mouth 2 (Two) Times a Day. 2/9/22   Wily Jolly MD   pantoprazole (PROTONIX) 40 MG EC tablet Take 40 mg by mouth Daily.    Karina Browne MD        Social History:   Social History     Tobacco Use   • Smoking status: Never Smoker   • Smokeless tobacco: Never Used   Vaping Use   • Vaping Use: Former   Substance  "Use Topics   • Alcohol use: Not Currently   • Drug use: Never     Recent travel: no     Review of Systems:  Review of Systems   Constitutional: Positive for activity change. Negative for appetite change, diaphoresis, fatigue and unexpected weight change.   HENT: Negative for congestion, facial swelling, nosebleeds and sore throat.    Eyes: Negative for pain and redness.   Respiratory: Negative for cough, chest tightness, shortness of breath and wheezing.    Cardiovascular: Positive for leg swelling. Negative for chest pain and palpitations.   Gastrointestinal: Negative for abdominal pain, diarrhea, nausea and vomiting.   Endocrine: Negative for cold intolerance, polydipsia and polyuria.   Genitourinary: Positive for pelvic pain. Negative for difficulty urinating, dysuria and enuresis.   Musculoskeletal: Positive for arthralgias.        Left hip pain   Skin: Positive for wound. Negative for color change, pallor and rash.        The patient has a left lateral hip incision which is clean and dry   Allergic/Immunologic: Negative for environmental allergies, food allergies and immunocompromised state.   Neurological: Negative for dizziness, seizures, weakness, light-headedness and numbness.   Hematological: Negative for adenopathy. Does not bruise/bleed easily.   Psychiatric/Behavioral: Negative.         Physical Exam:  /63   Pulse 97   Temp 97.9 °F (36.6 °C) (Oral)   Resp 18   Ht 162.6 cm (64\")   Wt 71.4 kg (157 lb 6.5 oz)   SpO2 99%   BMI 27.02 kg/m²     Physical Exam  Constitutional:       General: She is in acute distress.      Appearance: Normal appearance.   HENT:      Head: Normocephalic and atraumatic.      Right Ear: External ear normal.      Left Ear: External ear normal.      Nose: Nose normal.      Mouth/Throat:      Mouth: Mucous membranes are dry.      Pharynx: Oropharynx is clear.   Eyes:      Conjunctiva/sclera: Conjunctivae normal.      Pupils: Pupils are equal, round, and reactive to light. "   Cardiovascular:      Rate and Rhythm: Tachycardia present. Rhythm irregular.      Pulses: Normal pulses.   Pulmonary:      Effort: Pulmonary effort is normal.      Breath sounds: Normal breath sounds.   Abdominal:      General: Abdomen is flat. Bowel sounds are normal.      Palpations: Abdomen is soft.   Musculoskeletal:         General: Swelling, tenderness and deformity present.      Cervical back: Normal range of motion and neck supple.      Right lower leg: No edema.      Left lower leg: No edema.      Comments: There is obvious tenderness to the left hip with internal rotation and shortening of the left lower extremity.  Distal neurovascular function is intact.  The patient is unable to normally articulate the left hip.   Skin:     General: Skin is warm and dry.      Capillary Refill: Capillary refill takes less than 2 seconds.      Comments: The left lateral hip incision appears clean and dry   Neurological:      General: No focal deficit present.      Mental Status: She is alert and oriented to person, place, and time. Mental status is at baseline.   Psychiatric:         Mood and Affect: Mood normal.         Behavior: Behavior normal.         Thought Content: Thought content normal.         Judgment: Judgment normal.                Medications in the Emergency Department:  Medications   fentaNYL citrate (PF) (SUBLIMAZE) injection 50 mcg (50 mcg Intravenous Given 3/19/22 2244)   ondansetron (ZOFRAN) injection 4 mg (4 mg Intravenous Given 3/19/22 2244)   Propofol (DIPRIVAN) injection 200 mg (120 mg Intravenous Given 3/19/22 2326)   LORazepam (ATIVAN) injection 0.5 mg (0.5 mg Intravenous Given 3/20/22 0043)   ondansetron (ZOFRAN) injection 4 mg (4 mg Intravenous Given 3/20/22 0044)        Labs  Lab Results (last 24 hours)     Procedure Component Value Units Date/Time    Magnesium [[id number]]  (Normal) Collected: 03/19/22 0430    Specimen: Blood Updated: 03/19/22 0615     Magnesium 2.0 mg/dL     Vitamin D 25  Hydroxy [[id number]]  (Abnormal) Collected: 03/19/22 0430    Specimen: Blood Updated: 03/19/22 1318     25 Hydroxy, Vitamin D 15.0 ng/ml     Narrative:      Reference Range for Total Vitamin D 25(OH)     Deficiency <20.0 ng/mL   Insufficiency 21-29 ng/mL   Sufficiency  ng/mL  Toxicity >100 ng/ml    Results may be falsely increased if patient taking Biotin.      Comprehensive Metabolic Panel [[id number]]  (Abnormal) Collected: 03/19/22 0430    Specimen: Blood Updated: 03/19/22 0609     Glucose 96 mg/dL      BUN 25 mg/dL      Creatinine 0.82 mg/dL      Sodium 135 mmol/L      Potassium 4.1 mmol/L      Chloride 101 mmol/L      CO2 23.9 mmol/L      Calcium 8.8 mg/dL      Total Protein 5.9 g/dL      Albumin 3.00 g/dL      ALT (SGPT) 9 U/L      AST (SGOT) 34 U/L      Alkaline Phosphatase 101 U/L      Total Bilirubin 1.3 mg/dL      Globulin 2.9 gm/dL      A/G Ratio 1.0 g/dL      BUN/Creatinine Ratio 30.5     Anion Gap 10.1 mmol/L      eGFR 68.0 mL/min/1.73      Comment: National Kidney Foundation and American Society of Nephrology (ASN) Task Force recommended calculation based on the Chronic Kidney Disease Epidemiology Collaboration (CKD-EPI) equation refit without adjustment for race.       Narrative:      GFR Normal >60  Chronic Kidney Disease <60  Kidney Failure <15      CBC & Differential [[id number]]  (Abnormal) Collected: 03/19/22 0430    Specimen: Blood Updated: 03/19/22 0554    Narrative:      The following orders were created for panel order CBC & Differential.  Procedure                               Abnormality         Status                     ---------                               -----------         ------                     CBC Auto Differential[[id number]]        Abnormal            Final result                 Please view results for these tests on the individual orders.    CBC Auto Differential [[id number]]  (Abnormal) Collected: 03/19/22 0430    Specimen: Blood Updated: 03/19/22 0554     WBC 11.21  10*3/mm3      RBC 3.23 10*6/mm3      Hemoglobin 10.5 g/dL      Hematocrit 30.6 %      MCV 94.7 fL      MCH 32.5 pg      MCHC 34.3 g/dL      RDW 13.7 %      RDW-SD 47.4 fl      MPV 12.8 fL      Platelets 169 10*3/mm3      Neutrophil % 72.4 %      Lymphocyte % 16.9 %      Monocyte % 6.8 %      Eosinophil % 3.3 %      Basophil % 0.2 %      Immature Grans % 0.4 %      Neutrophils, Absolute 8.12 10*3/mm3      Lymphocytes, Absolute 1.90 10*3/mm3      Monocytes, Absolute 0.76 10*3/mm3      Eosinophils, Absolute 0.37 10*3/mm3      Basophils, Absolute 0.02 10*3/mm3      Immature Grans, Absolute 0.04 10*3/mm3      nRBC 0.0 /100 WBC     COVID PRE-OP / PRE-PROCEDURE SCREENING ORDER (NO ISOLATION) - Swab, Nasopharynx [[id number]] Collected: 03/19/22 2347    Specimen: Swab from Nasopharynx Updated: 03/19/22 2351    Narrative:      The following orders were created for panel order COVID PRE-OP / PRE-PROCEDURE SCREENING ORDER (NO ISOLATION) - Swab, Nasopharynx.  Procedure                               Abnormality         Status                     ---------                               -----------         ------                     COVID-19,APTIMA PANTHER(...[[id number]]                      In process                   Please view results for these tests on the individual orders.    COVID-19,APTIMA PANTHER(JOSUE), [place]/ CHUCK, NP/OP SWAB IN UTM/VTM/SALINE TRANSPORT MEDIA,24 HR TAT - Swab, Nasopharynx [[id number]] Collected: 03/19/22 2347    Specimen: Swab from Nasopharynx Updated: 03/19/22 2351           Imaging:  XR Hip With or Without Pelvis 1 View Left    Result Date: 3/20/2022  PROCEDURE: XR HIP W OR WO PELVIS 1 VIEW LEFT  COMPARISONS: [facility], CR, XR HIP W OR WO PELVIS 1 VIEW LEFT, 3/19/2022, 23:31.   [facility], CR, XR HIP W OR WO PELVIS 1 VIEW LEFT, 3/19/2022, 23:28.   [facility], CR, XR HIP W OR WO PELVIS 2-3 VIEW LEFT, 3/19/2022, 21:53.  [facility], CR, XR HIP W OR  WO PELVIS 2-3 VIEW LEFT, 3/16/2022, 11:19.  [facility], CR, XR HIP W OR WO PELVIS 1 VIEW LEFT, 3/19/2022, 23:34.  INDICATIONS: POST REDUCTION (ATTEMPT 4).  FINDINGS: A single AP view of the left hip reveals persistent posterior dislocation of the left hip prosthesis.  No acute fractures are seen.  The other findings are as described in the prior recent reports.       Persistent posterior dislocation of the left hip prosthesis is noted.     RAFAL ALANIS JR, MD       Electronically Signed and Approved By: RAFAL ALANIS JR, MD on 3/20/2022 at 0:28             XR Hip With or Without Pelvis 1 View Left    Result Date: 3/20/2022  PROCEDURE: XR HIP W OR WO PELVIS 1 VIEW LEFT  COMPARISONS: [facility], CR, XR HIP W OR WO PELVIS 1 VIEW LEFT, 3/19/2022, 23:36.   [facility], CR, XR HIP W OR WO PELVIS 1 VIEW LEFT, 3/19/2022, 23:28.   [facility], CR, XR HIP W OR WO PELVIS 2-3 VIEW LEFT, 3/19/2022, 21:53.   [facility], CR, XR HIP W OR WO PELVIS 2-3 VIEW LEFT, 3/16/2022, 11:19.   [facility], CR, XR HIP W OR WO PELVIS 1 VIEW LEFT, 3/19/2022, 23:31.  INDICATIONS: POST REDUCTION (ATTEMPT 3).  FINDINGS:  A single AP view of the left hip reveals persistent posterior dislocation of the left hip prosthesis.  No acute fractures are seen.  The other findings are as described in the prior recent reports.        Persistent posterior dislocation of the left hip prosthesis is noted.   RFAAL ALANIS JR, MD       Electronically Signed and Approved By: RAFAL ALANIS JR, MD on 3/20/2022 at 0:26             XR Hip With or Without Pelvis 1 View Left    Result Date: 3/20/2022  PROCEDURE: XR HIP W OR WO PELVIS 1 VIEW LEFT  COMPARISONS: [facility], CR, XR HIP W OR WO PELVIS 2-3 VIEW LEFT, 3/19/2022, 21:53.   [facility], CR, XR HIP W OR WO PELVIS 1 VIEW LEFT, 3/19/2022, 23:28.  INDICATIONS: POST REDUCTION (ATTEMPT 2).  FINDINGS:  A single  AP view of the left hip reveals persistent posterior dislocation of the left hip prosthesis.  No acute fractures are seen.  The other findings are as described in the prior recent reports.        Persistent posterior dislocation of the left hip prosthesis is noted.     RAFAL ALANIS JR, MD       Electronically Signed and Approved By: RAFAL ALANIS JR, MD on 3/20/2022 at 0:24             XR Hip With or Without Pelvis 1 View Left    Result Date: 3/20/2022  PROCEDURE: XR HIP W OR WO PELVIS 1 VIEW LEFT  COMPARISON: [facility], CR, XR HIP W OR WO PELVIS 2-3 VIEW LEFT, 3/19/2022, 21:53.  INDICATIONS: POST REDUCTION (ATTEMPT 1).  FINDINGS:  A single AP view of the left hip reveals persistent posterior dislocation of the left hip prosthesis, as described in the prior exam report from 3/19/2022 at 2153 hours.  No acute fractures are seen.        Persistent posterior dislocation of the left hip prosthesis is noted.     RAFAL ALANIS JR, MD       Electronically Signed and Approved By: RAFAL ALANIS JR, MD on 3/20/2022 at 0:23             XR Hip With or Without Pelvis 2 - 3 View Left    Result Date: 3/19/2022  PROCEDURE: XR HIP W OR WO PELVIS 2-3 VIEW LEFT  COMPARISON: [facility], CR, XR HIP W OR WO PELVIS 2-3 VIEW LEFT, 3/16/2022, 11:19.  INDICATIONS: LEFT HIP PAIN POST SURGERY WEDNESDAY.  FINDINGS: Three views were obtained.  There is a total left hip prosthesis in place.  There is new posterior dislocation of the left femoral prosthetic component, relative to the acetabular component of the prosthesis.  No definite periprosthetic fractures are seen.  No acute fractures are seen elsewhere.  Moderate degenerative changes involve the right hip joint.  There are degenerative changes of partially imaged spine.       There is an acute posterior dislocation of the left hip prosthesis.      RAFAL ALANIS JR, MD       Electronically Signed and Approved By: RAFAL ALANIS JR, MD on 3/19/2022 at 22:39                Procedures:  Procedural Sedation    Date/Time: 3/20/2022 1:20 AM  Performed by: Jeevan Padilla DO  Authorized by: Jeevan Padilla DO     Consent:     Consent obtained:  Written    Consent given by:  Patient    Risks, benefits, and alternatives were discussed: yes    Universal protocol:     Procedure explained and questions answered to patient or proxy's satisfaction: yes      Relevant documents present and verified: yes      Test results available: yes      Imaging studies available: yes      Required blood products, implants, devices, and special equipment available: yes      Site/side marked: yes      Immediately prior to procedure, a time out was called: yes    Indications:     Procedure performed:  Dislocation reduction    Procedure necessitating sedation performed by:  Physician performing sedation    Intended level of sedation:  Moderate  Pre-sedation assessment:     Time since last food or drink:  Dinner    ASA classification: class 4 - patient with severe systemic disease that is a constant threat to life      Mouth opening:  3 or more finger widths    Mallampati score:  II - soft palate, uvula, fauces visible    Neck mobility: normal      Pre-sedation assessments completed and reviewed: airway patency, anesthesia/sedation history, cardiovascular function, hydration status, mental status, nausea/vomiting, pain level, respiratory function and temperature      History of difficult intubation: no    Immediate pre-procedure details:     Reviewed: vital signs, relevant labs/tests and NPO status      Verified: bag valve mask available, emergency equipment available, intubation equipment available, IV patency confirmed and oxygen available    Procedure details (see MAR for exact dosages):     Preoxygenation:  Nasal cannula    Sedation:  Propofol    Analgesia:  Fentanyl    Intra-procedure monitoring:  Blood pressure monitoring, cardiac monitor, continuous pulse oximetry, continuous capnometry,  frequent LOC assessments and frequent vital sign checks    Intra-procedure events: none    Post-procedure details:     Estimated blood loss (see I/O flowsheets): no      Post-sedation assessments completed and reviewed: airway patency, cardiovascular function, hydration status, mental status, nausea/vomiting, pain level, respiratory function and temperature      Specimens recovered:  None    Patient is stable for discharge or admission: yes      Procedure completion:  Tolerated well, no immediate complications  Comments:      The patient tolerated sedation well.  I was unable to reduce the left hip dislocation.  The patient be admitted for further evaluation and reduction in the hospital.  Lower Extremity Dislocation    Date/Time: 3/20/2022 1:22 AM  Performed by: Jeevan Padilla DO  Authorized by: Jeevan Padilla DO   Consent: Written consent obtained.  Consent given by: patient  Patient understanding: patient states understanding of the procedure being performed  Patient consent: the patient's understanding of the procedure matches consent given  Procedure consent: procedure consent matches procedure scheduled  Relevant documents: relevant documents present and verified  Test results: test results available and properly labeled  Site marked: the operative site was marked  Imaging studies: imaging studies available  Patient identity confirmed: verbally with patient  Injury location: hip  Location details: left hip  Injury type: dislocation  Dislocation type: posterior  Spontaneous dislocation: yes  Prosthesis: yes  Pre-procedure distal perfusion: normal  Pre-procedure neurological function: normal  Pre-procedure range of motion: reduced    Anesthesia:  Local anesthesia used: no    Sedation:  Patient sedated: yes  Sedation type: moderate (conscious) sedation  Sedatives: propofol  Analgesia: fentanyl  Vitals: Vital signs were monitored during sedation.    Manipulation performed: yes  Reduction method: traction  and counter traction  Reduction successful: no  Post-procedure neurovascular assessment: post-procedure neurovascularly intact  Post-procedure distal perfusion: normal  Post-procedure neurological function: normal  Post-procedure range of motion: unchanged  Patient tolerance: patient tolerated the procedure well with no immediate complications          Progress                            Medical Decision Making:  MDM  Number of Diagnoses or Management Options  Closed posterior dislocation of left hip, initial encounter ([facility])  Diagnosis management comments: I was unable to reduce this patient's hip dislocation in the ED and the patient will be admitted for further evaluation and reduction.       Amount and/or Complexity of Data Reviewed  Tests in the radiology section of CPT®: reviewed  Decide to obtain previous medical records or to obtain history from someone other than the patient: yes  Obtain history from someone other than the patient: yes  Review and summarize past medical records: yes  Discuss the patient with other providers: yes  Independent visualization of images, tracings, or specimens: yes    Patient Progress  Patient progress: stable       Final diagnoses:   Closed posterior dislocation of left hip, initial encounter ([facility])        Disposition:  ED Disposition     ED Disposition   Decision to Admit    Condition   --    Comment   Level of Care: Med/Surg [1]   Diagnosis: Closed posterior dislocation of left hip, initial encounter ([facility]) [[id number]]   Admitting Physician: STEVEN ROMAN [[id number]]   Attending Physician: ALVARO STUART [[id number]]               This medical record created using voice recognition software and may contain unintended errors.           Jeevan Padilla, DO  03/20/22 0124

## 2022-03-20 NOTE — ANESTHESIA POSTPROCEDURE EVALUATION
Patient: Bing Cruz    Procedure Summary     Date: 03/20/22 Room / Location: [facility] ED OR 08 / [facility] MAIN OR    Anesthesia Start: 0730 Anesthesia Stop: 0758    Procedure: HIP CLOSED REDUCTION (Left Hip) Diagnosis:       Closed posterior dislocation of left hip, initial encounter ([facility])      (Closed posterior dislocation of left hip, initial encounter ([facility]) [S73.015A])    Surgeons: Harsha Gayle MD Provider: Pernell Velazquez MD    Anesthesia Type: general ASA Status: 3          Anesthesia Type: general    Vitals  Vitals Value Taken Time   /58 03/20/22 0818   Temp 36.1 °C (97 °F) 03/20/22 0800   Pulse 77 03/20/22 0823   Resp 14 03/20/22 0800   SpO2 97 % 03/20/22 0823   Vitals shown include unvalidated device data.        Post Anesthesia Care and Evaluation    Patient location during evaluation: bedside  Patient participation: complete - patient participated  Level of consciousness: awake  Pain management: adequate  Airway patency: patent  Anesthetic complications: No anesthetic complications  PONV Status: none  Cardiovascular status: acceptable and stable  Respiratory status: acceptable  Hydration status: acceptable    Comments: An Anesthesiologist personally participated in the most demanding procedures (including induction and emergence if applicable) in the anesthesia plan, monitored the course of anesthesia administration at frequent intervals and remained physically present and available for immediate diagnosis and treatment of emergencies.

## 2022-03-20 NOTE — PLAN OF CARE
Goal Outcome Evaluation:       Patient has been disoriented to place and situation throughout shift, pleasantly confused. Medicated for pain twice this shift. Patient reports no significant pain at this time, educated on when to call out for pain medicine. HOB has remained below 30 degrees throughout shift with abduction pillow and knee immobilizer in place. No complaints at this time and will continue to monitor.

## 2022-03-20 NOTE — SIGNIFICANT NOTE
Contacted by the ER Dr. Padilla regarding this patient for hospitalization. 89 year old female with history of HTN, DVT, Long term AC, Mitral Valve Regrug, HLD, Hx of Colon Ca, Chronic A. 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 XR imaging confirmed acute posterior dislocation of left hip prosthesis.. Orthopedics was notified from the ER and covering Hospitalist for Dr. Kramer was contacted for further orders. The patient underwent attempts of bedside reduction of dislocation in the ER and was unsuccessful. Accepted patient to Dr. Kramer's service, Dr. Díaz covering, NPO after midnight, pain control, ortho consulted with plans for OR reduction tomorrow, place lorenzana catheter, further recs based on clinical course and recommendations from Ortho.     Electronically signed by Louisa Díaz MD, 03/19/22, 11:44 PM EDT.

## 2022-03-20 NOTE — H&P
[facility]   HOSPITALIST HISTORY AND PHYSICAL  Date: 3/20/2022   Patient Name: Bing Cruz  : 1931  MRN: [id number]  Primary Care Physician:  Jairo Kramer MD  Date of admission: 3/19/2022    Subjective   Subjective     Chief complaint: Left hip pain    History of presenting illness:  89 year old female with history of HTN, DVT, Long term AC, Mitral Valve Regrug, HLD, Hx of Colon Ca, Chronic A. 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 XR imaging confirmed acute posterior dislocation of left hip prosthesis. Orthopedics was notified from the ER and covering Hospitalist for Dr. Kramer was contacted for further orders. The patient underwent attempts of bedside reduction of dislocation in the ER and was unsuccessful.  Patient was taken to the OR on 3/20/2022 whereby left hip closed reduction was performed, patient tolerated procedure well, seen and examined post procedure for this H&P.  Patient's daughters were at the bedside.  She appeared comfortable, she was confused from anesthesia, she had some significant swelling of her left hip area.      Personal History     Past Medical History:  Past Medical History:   Diagnosis Date   • Abdominal pain, generalized    • Anemia    • Aortic regurgitation    • Aortic stenosis    • Cancer (HCC)     colon    • Cardiomegaly    • Chronic atrial fibrillation (HCC) 2019    Atrial fibrillation converted to sinus through cardioversion (2019   • Chronic fatigue    • Diarrhea    • Disease of tricuspid valve    • Diverticulosis of colon    • DVT (deep venous thrombosis) (HCC)     LEFT LEG GREATER THAN 5 YRS AGO   • Dysphagia    • Essential (primary) hypertension    • Hiatal hernia    • Insomnia, unspecified    • LVH (left ventricular hypertrophy)    • Major depressive  disorder, single episode    • Mitral regurgitation    • Mitral valve insufficiency and aortic valve insufficiency    • Osteopenia    • Pain in joint, pelvic region and thigh    • TR (tricuspid regurgitation)    • UTI (urinary tract infection)     antibx to be completed prior to procedure. ABIOLA Carlos RN ([facility]) notified.         Past Surgical History:  Past Surgical History:   Procedure Laterality Date   • ABDOMINAL HYSTERECTOMY      WITH BSO    • ANKLE SURGERY      (L) ankle fracture   • BLADDER REPAIR     • BREAST BIOPSY      (L) breast biopsy   • CARDIOVERSION  2019   • CATARACT EXTRACTION      OU   • CHOLECYSTECTOMY      OPEN   • COLON SURGERY      STATES SHE HAD 12 INCHES OF COLON REMOVED IN JULY 2020 FOR COLON CANCER   • COLONOSCOPY  2020   • COLONOSCOPY N/A 10/29/2021    Procedure: COLONOSCOPY;  Surgeon: Edmar Back MD;  Location: [facility] ENDOSCOPY;  Service: General;  Laterality: N/A;  DIVERTICULOSIS/ANASTOMOSIS   • FEMUR OPEN REDUCTION INTERNAL FIXATION Left 07/21/2021    Procedure: FEMORAL NECK OPEN REDUCTION INTERNAL FIXATION;  Surgeon: Bam Warner MD;  Location: [facility] MAIN OR;  Service: Orthopedics;  Laterality: Left;   • INGUINAL HERNIA REPAIR Left 09/29/2011   • TOTAL HIP ARTHROPLASTY Left 3/16/2022    Procedure: LEFT TOTAL HIP ARTHROPLASTY AND HARDWARE REMOVAL;  Surgeon: Bam Warner MD;  Location: [facility] MAIN OR;  Service: Orthopedics;  Laterality: Left;   • UPPER GASTROINTESTINAL ENDOSCOPY      WITH DILATATION         Family History:   Family History   Problem Relation Age of Onset   • Malig Hyperthermia Neg Hx    No reported heart disease, diabetes or stroke      Social History:   Social History     Socioeconomic History   • Marital status:    Tobacco Use   • Smoking status: Never Smoker   • Smokeless tobacco: Never Used   Vaping Use   • Vaping Use: Former   Substance and Sexual Activity   • Alcohol use: Not Currently   • Drug use: Never   • Sexual activity: Defer      Home Medications:  Cranberry, HYDROcodone-acetaminophen, LORazepam, acetaminophen, apixaban, ascorbic acid, colestipol, dilTIAZem CD, docusate sodium, latanoprost, magnesium hydroxide, magnesium oxide, metoprolol succinate XL, and pantoprazole    Allergies:  Allergies   Allergen Reactions   • Citalopram Unknown - High Severity   • Clonazepam Unknown - High Severity   • Levofloxacin Nausea And Vomiting   • Lisinopril Unknown - High Severity   • Macrobid [Nitrofurantoin Macrocrystal] Rash   • Melatonin Unknown - High Severity       Review of systems:  Unable to obtain review of systems due to patient having altered mental status secondary to anesthesia immediately after procedure    Objective   Objective     Vitals:   Temp:  [97.9 °F (36.6 °C)] 97.9 °F (36.6 °C)  Heart Rate:  [] 80  Resp:  [16-18] 16  BP: ()/(63-85) 130/68  Flow (L/min):  [2] 2    Physical Exam    Constitutional: Awake, alert, no acute distress, confused right nasal cannula oxygen   Eyes: Pupils equal, sclerae anicteric, no conjunctival injection   HENT: NCAT, mucous membranes moist   Neck: Supple, no thyromegaly, no lymphadenopathy, trachea midline   Respiratory: Clear to auscultation bilaterally, nonlabored respirations    Cardiovascular: Irregular rate, irregular rhythm, 2 out of 6 systolic murmur, palpable pedal pulses bilaterally   Gastrointestinal: Positive bowel sounds, soft, nontender, nondistended   Musculoskeletal: Left hip with dressing intact, significant ecchymosis and swelling, no warmth lateral side, range of motion limited   Psychiatric: Appropriate affect, cooperative pleasantly confused   Neurologic: Oriented x 1, distal left lower extremity neurovascularly intact, cranial Nerves grossly intact to confrontation, speech clear   Skin: No rashes    : Cano catheter in place, clear urine      Result Review    Result Review:  I have personally reviewed the results from the time of this admission to 3/20/2022 07:54 EDT  and agree with these findings:  [x]  Laboratory   LAB RESULTS:      Lab 03/20/22 0418 03/19/22  0430 03/18/22 0418 03/17/22  0424 03/16/22  1347 03/14/22  1228   WBC 9.59 11.21* 10.05  --   --  7.46   HEMOGLOBIN 9.9* 10.5* 10.4* 11.1* 12.2 13.6   HEMATOCRIT 29.8* 30.6* 31.5* 32.4* 35.6 39.9   PLATELETS 196 169 146  --   --  275   NEUTROS ABS 6.90 8.12* 7.69*  --   --  4.56   IMMATURE GRANS (ABS) 0.05 0.04 0.02  --   --  0.03   LYMPHS ABS 1.42 1.90 1.40  --   --  2.27   MONOS ABS 0.73 0.76 0.55  --   --  0.50   EOS ABS 0.47* 0.37 0.37  --   --  0.05   MCV 95.5 94.7 96.9  --   --  93.0         Lab 03/20/22 0418 03/19/22 0430 03/18/22  0418 03/17/22  0424 03/14/22  1228   SODIUM 137 135* 134* 139 136   POTASSIUM 3.9 4.1 3.6 3.2* 3.6   CHLORIDE 102 101 102 103 99   CO2 25.4 23.9 22.0 26.5 24.6   ANION GAP 9.6 10.1 10.0 9.5 12.4   BUN 21 25* 22 13 11   CREATININE 0.71 0.82 0.92 0.77 0.94   EGFR 80.9 68.0 59.3* 73.4 57.8*   GLUCOSE 110* 96 103* 119* 91   CALCIUM 8.7 8.8 8.4 8.4 9.8*   MAGNESIUM 2.0 2.0 1.5*  --   --    PHOSPHORUS 3.0  --   --   --   --    TSH  --   --  1.850  --  3.710         Lab 03/20/22 0418 03/19/22  0430 03/18/22  0418 03/14/22  1228   TOTAL PROTEIN 5.5* 5.9* 5.3* 7.4   ALBUMIN 2.80* 3.00* 2.80* 4.40   GLOBULIN  --  2.9 2.5 3.0   ALT (SGPT) 8 9 12 13   AST (SGOT) 27 34* 36* 20   BILIRUBIN 1.2 1.3* 1.7* 1.1   INDIRECT BILIRUBIN 0.8  --   --   --    BILIRUBIN DIRECT 0.4*  --   --   --    ALK PHOS 84 101 90 92                 Lab 03/18/22  0418   FOLATE 9.51   VITAMIN B 12 270         Brief Urine Lab Results  (Last result in the past 365 days)      Color   Clarity   Blood   Leuk Est   Nitrite   Protein   CREAT   Urine HCG        03/17/22 1136 Dark Yellow   Clear   Negative   Small (1+)   Negative   Trace               Microbiology Results (last 10 days)     Procedure Component Value - Date/Time    COVID PRE-OP / PRE-PROCEDURE SCREENING ORDER (NO ISOLATION) - Swab, Nasopharynx [[id number]]  (Normal)  Collected: 03/19/22 2347    Lab Status: Final result Specimen: Swab from Nasopharynx Updated: 03/20/22 0551    Narrative:      The following orders were created for panel order COVID PRE-OP / PRE-PROCEDURE SCREENING ORDER (NO ISOLATION) - Swab, Nasopharynx.  Procedure                               Abnormality         Status                     ---------                               -----------         ------                     COVID-19,APTIMA PANTHER(...[[id number]]  Normal              Final result                 Please view results for these tests on the individual orders.    COVID-19,APTIMA PANTHER(JOSUE),[facility], NP/OP SWAB IN UTM/VTM/SALINE TRANSPORT MEDIA,24 HR TAT - Swab, Nasopharynx [[id number]]  (Normal) Collected: 03/19/22 2347    Lab Status: Final result Specimen: Swab from Nasopharynx Updated: 03/20/22 0551     COVID19 Not Detected    Narrative:      Fact sheet for providers: https://www.fda.gov/media/700441/download     Fact sheet for patients: https://www.fda.gov/media/275033/download    Test performed by RT PCR.    Urine Culture - Urine, Urine, Clean Catch [[id number]]  (Abnormal) Collected: 03/17/22 1136    Lab Status: Final result Specimen: Urine, Clean Catch Updated: 03/18/22 1302     Urine Culture <10,000 CFU/mL Gram Negative Bacilli     Comment: Call if further workup needed.               []  Microbiology  [x]  Radiology XR Hip 1 View Without Pelvis Left (Surgery Only)    Result Date: 3/20/2022  PROCEDURE: XR HIP 1 VIEW WO PELVIS LEFT  COMPARISON: [facility], CR, XR HIP W OR WO PELVIS 1 VIEW LEFT, 3/19/2022, 23:36.  INDICATIONS: LT HIP REDUCTION/ FLURO TIME 1.04 MIN/ 7.7 mGy  FINDINGS:  Intraoperative fluoroscopy provided for reduction of the left hip prosthesis.  The prosthesis has been reduced compared to the previous study.       Procedural fluoroscopy.  Refer to the procedure note for details      WINSOME LEMUS MD       Electronically Signed and Approved By: WINSOME LEMUS MD  on 3/20/2022 at 8:48             FL < 1 Hour    Result Date: 3/20/2022  This procedure was auto-finalized with no dictation required.    FL < 1 Hour    Result Date: 3/16/2022  This procedure was auto-finalized with no dictation required.    XR Hip With or Without Pelvis 1 View Left    Result Date: 3/20/2022  PROCEDURE: XR HIP W OR WO PELVIS 1 VIEW LEFT  COMPARISONS: [facility], CR, XR HIP W OR WO PELVIS 1 VIEW LEFT, 3/19/2022, 23:31.   [facility], CR, XR HIP W OR WO PELVIS 1 VIEW LEFT, 3/19/2022, 23:28.   [facility], CR, XR HIP W OR WO PELVIS 2-3 VIEW LEFT, 3/19/2022, 21:53.  [facility], CR, XR HIP W OR WO PELVIS 2-3 VIEW LEFT, 3/16/2022, 11:19.  [facility], CR, XR HIP W OR WO PELVIS 1 VIEW LEFT, 3/19/2022, 23:34.  INDICATIONS: POST REDUCTION (ATTEMPT 4).  FINDINGS: A single AP view of the left hip reveals persistent posterior dislocation of the left hip prosthesis.  No acute fractures are seen.  The other findings are as described in the prior recent reports.       Persistent posterior dislocation of the left hip prosthesis is noted.     RAFAL ALANIS JR, MD       Electronically Signed and Approved By: RAFAL ALANIS JR, MD on 3/20/2022 at 0:28             XR Hip With or Without Pelvis 1 View Left    Result Date: 3/20/2022  PROCEDURE: XR HIP W OR WO PELVIS 1 VIEW LEFT  COMPARISONS: [facility], CR, XR HIP W OR WO PELVIS 1 VIEW LEFT, 3/19/2022, 23:36.   [facility], CR, XR HIP W OR WO PELVIS 1 VIEW LEFT, 3/19/2022, 23:28.   [facility], CR, XR HIP W OR WO PELVIS 2-3 VIEW LEFT, 3/19/2022, 21:53.   [facility], CR, XR HIP W OR WO PELVIS 2-3 VIEW LEFT, 3/16/2022, 11:19.   [facility], CR, XR HIP W OR WO PELVIS 1 VIEW LEFT, 3/19/2022, 23:31.  INDICATIONS: POST REDUCTION (ATTEMPT 3).  FINDINGS:  A single AP view of the left hip reveals persistent posterior dislocation of the left hip  prosthesis.  No acute fractures are seen.  The other findings are as described in the prior recent reports.        Persistent posterior dislocation of the left hip prosthesis is noted.   RAFAL ALANIS JR, MD       Electronically Signed and Approved By: RAFAL ALAINS JR, MD on 3/20/2022 at 0:26             XR Hip With or Without Pelvis 1 View Left    Result Date: 3/20/2022  PROCEDURE: XR HIP W OR WO PELVIS 1 VIEW LEFT  COMPARISONS: [facility], CR, XR HIP W OR WO PELVIS 2-3 VIEW LEFT, 3/19/2022, 21:53.   [facility], CR, XR HIP W OR WO PELVIS 1 VIEW LEFT, 3/19/2022, 23:28.  INDICATIONS: POST REDUCTION (ATTEMPT 2).  FINDINGS:  A single AP view of the left hip reveals persistent posterior dislocation of the left hip prosthesis.  No acute fractures are seen.  The other findings are as described in the prior recent reports.        Persistent posterior dislocation of the left hip prosthesis is noted.     RAFAL ALANIS JR, MD       Electronically Signed and Approved By: RAFAL ALANIS JR, MD on 3/20/2022 at 0:24             XR Hip With or Without Pelvis 1 View Left    Result Date: 3/20/2022  PROCEDURE: XR HIP W OR WO PELVIS 1 VIEW LEFT  COMPARISON: [facility], CR, XR HIP W OR WO PELVIS 2-3 VIEW LEFT, 3/19/2022, 21:53.  INDICATIONS: POST REDUCTION (ATTEMPT 1).  FINDINGS:  A single AP view of the left hip reveals persistent posterior dislocation of the left hip prosthesis, as described in the prior exam report from 3/19/2022 at 2153 hours.  No acute fractures are seen.        Persistent posterior dislocation of the left hip prosthesis is noted.     RAFAL ALANIS JR, MD       Electronically Signed and Approved By: RAFAL ALANIS JR, MD on 3/20/2022 at 0:23             XR Hip With or Without Pelvis 2 - 3 View Left    Result Date: 3/19/2022  PROCEDURE: XR HIP W OR WO PELVIS 2-3 VIEW LEFT  COMPARISON: [facility], CR, XR HIP W OR WO PELVIS 2-3 VIEW LEFT, 3/16/2022, 11:19.   INDICATIONS: LEFT HIP PAIN POST SURGERY WEDNESDAY.  FINDINGS: Three views were obtained.  There is a total left hip prosthesis in place.  There is new posterior dislocation of the left femoral prosthetic component, relative to the acetabular component of the prosthesis.  No definite periprosthetic fractures are seen.  No acute fractures are seen elsewhere.  Moderate degenerative changes involve the right hip joint.  There are degenerative changes of partially imaged spine.       There is an acute posterior dislocation of the left hip prosthesis.      RAFAL ALANIS JR, MD       Electronically Signed and Approved By: RAFAL ALANIS JR, MD on 3/19/2022 at 22:39             XR Hip With or Without Pelvis 2 - 3 View Left    Result Date: 3/16/2022  PROCEDURE: XR HIP W OR WO PELVIS 2-3 VIEW LEFT  COMPARISON: [facility] , CR, XR HIP W OR WO PELVIS 2-3 VIEW LEFT, 1/27/2022, 10:47.  INDICATIONS: LEFT HIP AVASCULAR NECROSIS/FLUORO TIME 25.7 SECONDS/2.42 mGy/8 IMAGES  FINDINGS:  There are a total of 8 digital spot fluoroscopic images submitted of the left hip.  Fine osseous detail is limited as the study was obtained with the portable C-arm.  The images show placement of a total left hip arthroplasty with normal anatomic alignment.  Expected intraoperative changes are seen which includes soft tissue gas and swelling.       Intraoperative imaging of the left hip as described.      TERRY SAUCEDA MD       Electronically Signed and Approved By: TERRY SAUCEDA MD on 3/16/2022 at 13:20             XR Hip With or Without Pelvis 2 - 3 View Left    Result Date: 3/16/2022  PROCEDURE: XR HIP W OR WO PELVIS 2-3 VIEW LEFT  COMPARISON: [facility], , XR HIP W OR WO PELVIS 2-3 VIEW LEFT, 3/16/2022, 10:16.  INDICATIONS: Post-Op LEFT  Hip Arthroplasty  FINDINGS:  There is a total left hip prosthesis, which is new from prior.  The prosthesis appears intact.  There is adjacent soft tissue gas and there are overlying skin staples.   There is no fracture or dislocation.  There are mild degenerative changes noted about the pelvis.        1. New left hip prosthesis without complicating features. 2. Pelvic degenerative changes.      HARIS OLSON MD       Electronically Signed and Approved By: HARIS OLSON MD on 3/16/2022 at 11:55             []  EKG/Telemetry   []  Cardiology/Vascular   []  Pathology  [x]  Old records  []  Other:      Assessment/Plan   Assessment / Plan     Assessment/Plan:   Assessment:  Closed posterior dislocation of left hip  History of avascular necrosis status post total left hip arthroplasty  Chronic atrial fibrillation  Long-term anticoagulation with Eliquis  Essential hypertension  History of DVT  Mitral valve regurgitation  Dyslipidemia  Depression  Anxiety    Plan:  Labs and imaging reviewed  Hospitalized for further monitoring and management of dislocated left hip status post closed reduction  DC Cano catheter per protocol  PT/OT consulted  Pain control with p.o. and IV medications  Reconciled home medications, resumed accordingly  Resumed Cardizem, metoprolol, Ativan, eyedrops  DC IV fluids  Wean off oxygen  Orthopedist Dr. Gayle consulted, recommendations appreciated  A.m. labs  Full code  VTE prophylaxis with Eliquis resumed  Clinical course to dictate further management  Discussed with ER Dr. Padilla who requested admission, as well as nurse at the bedside on 2 N.      DVT prophylaxis:  Mechanical DVT prophylaxis orders are present.    CODE STATUS:    Code Status (Patient has no pulse and is not breathing): CPR (Attempt to Resuscitate)  Medical Interventions (Patient has pulse or is breathing): Full Support      Admission Status:  I believe this patient meets observation status.    Electronically signed by Louisa Díaz MD, 03/20/22, 7:54 AM EDT.

## 2022-03-20 NOTE — CONSULTS
[facility]   Consult Note    Patient Name: Bing Cruz  : 1931  MRN: [id number]  Primary Care Physician:  Jairo Kramer MD  Referring Physician: No ref. provider found  Date of admission: 3/19/2022    Subjective   Subjective     Reason for Consult/ Chief Complaint: Left hip pain, recent surgery, dislocation    HPI:  Bing Cruz is a 89 y.o. female who underwent hardware removal and left total hip arthroplasty on 3/16/2022.  Her daughter at bedside provides the history.  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  She did well initially postoperatively.  She developed increasing pain on 3/17 - 3/18.  No reported falls.  She transition to rehab on 3/18.  Due to the severity of her pain she was transported back to the [facility] [place] ED.  X-rays of the left hip revealed a dislocation.  She underwent attempted closed reduction in the ED late last night that was unsuccessful.  She was admitted to the hospitalist team and I was consulted for further evaluation and management.  She takes Eliquis 5 mg twice daily for a history of A. fib and prior DVT.  Given her recent sedation, she is unable to participate in our conversation.    Review of Systems   14 Point ROS is negative except as noted above.     Personal History     Past Medical History:   Diagnosis Date   • Abdominal pain, generalized    • Anemia    • Aortic regurgitation    • Aortic stenosis    • Cancer (HCC)     colon    • Cardiomegaly    • Chronic atrial fibrillation (HCC) 2019    Atrial fibrillation converted to sinus through cardioversion (2019   • Chronic fatigue    • Diarrhea    • Disease of tricuspid valve    • Diverticulosis of colon    • DVT (deep venous thrombosis) (HCC)     LEFT LEG GREATER THAN 5 YRS AGO   • Dysphagia    • Essential (primary) hypertension    • Hiatal hernia    •  Insomnia, unspecified    • LVH (left ventricular hypertrophy)    • Major depressive disorder, single episode    • Mitral regurgitation    • Mitral valve insufficiency and aortic valve insufficiency    • Osteopenia    • Pain in joint, pelvic region and thigh    • TR (tricuspid regurgitation)    • UTI (urinary tract infection)     antibx to be completed prior to procedure. ABIOLA Carlos RN ([facility]) notified.       Past Surgical History:   Procedure Laterality Date   • ABDOMINAL HYSTERECTOMY      WITH BSO    • ANKLE SURGERY      (L) ankle fracture   • BLADDER REPAIR     • BREAST BIOPSY      (L) breast biopsy   • CARDIOVERSION  2019   • CATARACT EXTRACTION      OU   • CHOLECYSTECTOMY      OPEN   • COLON SURGERY      STATES SHE HAD 12 INCHES OF COLON REMOVED IN JULY 2020 FOR COLON CANCER   • COLONOSCOPY  2020   • COLONOSCOPY N/A 10/29/2021    Procedure: COLONOSCOPY;  Surgeon: Edmar Back MD;  Location: [facility] ENDOSCOPY;  Service: General;  Laterality: N/A;  DIVERTICULOSIS/ANASTOMOSIS   • FEMUR OPEN REDUCTION INTERNAL FIXATION Left 07/21/2021    Procedure: FEMORAL NECK OPEN REDUCTION INTERNAL FIXATION;  Surgeon: Bam Warner MD;  Location: [facility] MAIN OR;  Service: Orthopedics;  Laterality: Left;   • INGUINAL HERNIA REPAIR Left 09/29/2011   • TOTAL HIP ARTHROPLASTY Left 3/16/2022    Procedure: LEFT TOTAL HIP ARTHROPLASTY AND HARDWARE REMOVAL;  Surgeon: Bam Wanrer MD;  Location: [facility] MAIN OR;  Service: Orthopedics;  Laterality: Left;   • UPPER GASTROINTESTINAL ENDOSCOPY      WITH DILATATION       Family History: family history is not on file. Otherwise pertinent FHx was reviewed and not pertinent to current issue.    Social History:  reports that she has never smoked. She has never used smokeless tobacco. She reports previous alcohol use. She reports that she does not use drugs.    Home Medications:  Cranberry, HYDROcodone-acetaminophen, LORazepam, acetaminophen, apixaban, ascorbic acid,  colestipol, dilTIAZem CD, docusate sodium, latanoprost, magnesium hydroxide, magnesium oxide, metoprolol succinate XL, and pantoprazole    Allergies:  Allergies   Allergen Reactions   • Citalopram Unknown - High Severity   • Clonazepam Unknown - High Severity   • Levofloxacin Nausea And Vomiting   • Lisinopril Unknown - High Severity   • Macrobid [Nitrofurantoin Macrocrystal] Rash   • Melatonin Unknown - High Severity       Objective    Objective     Vitals:   Temp:  [97.9 °F (36.6 °C)] 97.9 °F (36.6 °C)  Heart Rate:  [] 89  Resp:  [18] 18  BP: ()/(63-85) 122/67    Physical Exam:   Constitutional: Awake, alert   HENT: Atraumatic, Normocephalic   Respiratory: Nonlabored respirations    Cardiovascular: Intact peripheral pulses    Musculoskeletal:     Left lower extremity: Shortened and internally rotated deformity about the left hip.  Aquacel dressings about the anterior and lateral thigh are intact.  Mild blood spotting on the dressings.  Periincisional bruising.  Thigh moderately swollen but soft.  Calf soft.  Nontender about the knee, lower leg, ankle, foot.  Sensation intact over the dorsal and plantar foot.  Demonstrates active ankle plantarflexion and dorsiflexion.  Palpable dorsalis pedis pulse.     Imaging:  Imaging Results (Last 24 Hours)     Procedure Component Value Units Date/Time    XR Hip With or Without Pelvis 1 View Left [[id number]] Collected: 03/20/22 0028     Updated: 03/20/22 0031    Narrative:      PROCEDURE: XR HIP W OR WO PELVIS 1 VIEW LEFT     COMPARISONS: [facility], CR, XR HIP W OR WO PELVIS 1 VIEW LEFT, 3/19/2022, 23:31.     [facility], CR, XR HIP W OR WO PELVIS 1 VIEW LEFT, 3/19/2022, 23:28.     [facility], CR, XR HIP W OR WO PELVIS 2-3 VIEW LEFT, 3/19/2022, 21:53.  [facility], CR, XR HIP W OR WO PELVIS 2-3 VIEW LEFT, 3/16/2022, 11:19.  [facility], CR, XR HIP W OR WO PELVIS 1 VIEW LEFT, 3/19/2022, 23:34.      INDICATIONS: POST REDUCTION (ATTEMPT 4).     FINDINGS: A single AP view of the left hip reveals persistent posterior dislocation of the left hip   prosthesis.  No acute fractures are seen.  The other findings are as described in the prior recent   reports.       Impression:       Persistent posterior dislocation of the left hip prosthesis is noted.              RAFAL ALANIS JR, MD         Electronically Signed and Approved By: RAFAL ALANIS JR, MD on 3/20/2022 at 0:28                     XR Hip With or Without Pelvis 1 View Left [[id number]] Collected: 03/20/22 0026     Updated: 03/20/22 0029    Narrative:      PROCEDURE: XR HIP W OR WO PELVIS 1 VIEW LEFT     COMPARISONS: [facility], CR, XR HIP W OR WO PELVIS 1 VIEW LEFT, 3/19/2022, 23:36.     [facility], CR, XR HIP W OR WO PELVIS 1 VIEW LEFT, 3/19/2022, 23:28.     [facility], CR, XR HIP W OR WO PELVIS 2-3 VIEW LEFT, 3/19/2022, 21:53.     [facility], CR, XR HIP W OR WO PELVIS 2-3 VIEW LEFT, 3/16/2022, 11:19.     [facility], CR, XR HIP W OR WO PELVIS 1 VIEW LEFT, 3/19/2022, 23:31.     INDICATIONS: POST REDUCTION (ATTEMPT 3).     FINDINGS:   A single AP view of the left hip reveals persistent posterior dislocation of the left hip   prosthesis.  No acute fractures are seen.  The other findings are as described in the prior recent   reports.       Impression:         Persistent posterior dislocation of the left hip prosthesis is noted.        RAFAL ALANIS JR, MD         Electronically Signed and Approved By: RAFAL ALANIS JR, MD on 3/20/2022 at 0:26                     XR Hip With or Without Pelvis 1 View Left [[id number]] Collected: 03/20/22 0024     Updated: 03/20/22 0028    Narrative:      PROCEDURE: XR HIP W OR WO PELVIS 1 VIEW LEFT     COMPARISONS: [facility], CR, XR HIP W OR WO PELVIS 2-3 VIEW LEFT, 3/19/2022, 21:53.     [facility], CR, XR HIP W OR WO PELVIS 1  VIEW LEFT, 3/19/2022, 23:28.     INDICATIONS: POST REDUCTION (ATTEMPT 2).     FINDINGS:   A single AP view of the left hip reveals persistent posterior dislocation of the left hip   prosthesis.  No acute fractures are seen.  The other findings are as described in the prior recent   reports.       Impression:         Persistent posterior dislocation of the left hip prosthesis is noted.              RAFAL ALANIS JR, MD         Electronically Signed and Approved By: RAFAL ALANIS JR, MD on 3/20/2022 at 0:24                     XR Hip With or Without Pelvis 1 View Left [[id number]] Collected: 03/20/22 0023     Updated: 03/20/22 0026    Narrative:      PROCEDURE: XR HIP W OR WO PELVIS 1 VIEW LEFT     COMPARISON: [facility], CR, XR HIP W OR WO PELVIS 2-3 VIEW LEFT, 3/19/2022, 21:53.     INDICATIONS: POST REDUCTION (ATTEMPT 1).     FINDINGS:   A single AP view of the left hip reveals persistent posterior dislocation of the left hip   prosthesis, as described in the prior exam report from 3/19/2022 at 2153 hours.  No acute fractures   are seen.       Impression:         Persistent posterior dislocation of the left hip prosthesis is noted.              RAFAL ALANIS JR, MD         Electronically Signed and Approved By: RAFAL ALANIS JR, MD on 3/20/2022 at 0:23                     XR Hip With or Without Pelvis 2 - 3 View Left [[id number]] Collected: 03/19/22 2239     Updated: 03/19/22 2242    Narrative:      PROCEDURE: XR HIP W OR WO PELVIS 2-3 VIEW LEFT     COMPARISON: [facility], CR, XR HIP W OR WO PELVIS 2-3 VIEW LEFT, 3/16/2022, 11:19.     INDICATIONS: LEFT HIP PAIN POST SURGERY WEDNESDAY.     FINDINGS: Three views were obtained.  There is a total left hip prosthesis in place.  There is new   posterior dislocation of the left femoral prosthetic component, relative to the acetabular   component of the prosthesis.  No definite periprosthetic fractures are seen.  No acute fractures   are seen  elsewhere.  Moderate degenerative changes involve the right hip joint.  There are   degenerative changes of partially imaged spine.       Impression:       There is an acute posterior dislocation of the left hip prosthesis.                RAFAL ALANIS JR, MD         Electronically Signed and Approved By: RAFAL ALANIS JR, MD on 3/19/2022 at 22:39                            Result Review    Result Review:  I have personally reviewed the results from the time of this admission to 3/20/2022 06:29 EDT and agree with these findings:  [x]  Laboratory  []  Microbiology  [x]  Radiology  []  EKG/Telemetry   []  Cardiology/Vascular   []  Pathology  []  Old records  []  Other:      Assessment/Plan   Assessment / Plan     Brief Patient Summary:  Bing Cruz is a 89 y.o. female with a left periprosthetic hip dislocation.  She is status post hardware removal and left total hip arthroplasty on 3/16/2022.    Active Hospital Problems:  Active Hospital Problems    Diagnosis    • Closed posterior dislocation of left hip, initial encounter (HCC)      Plan:     I discussed the case and treatment options with the patient and daughter at bedside.  We also spoke to the power of  over the phone.  We discussed a closed reduction of the left hip dislocation.  We discussed the primary benefit of the procedure is reducing the prosthesis.  We discussed procedural risks including failure to obtain or maintain reduction, fracture, hardware complication, recurrent dislocation, persistent pain, anesthesia risk including mortality, DVT/PE, and need for additional procedures.  The patient's power of  elected to proceed on her behalf and phone consent was obtained.    N.p.o.  Bedrest  Pain control  Please hold DVT chemoprophylaxis  Further care per primary team, appreciate assistance      Electronically signed by Harsha Gayle MD, 03/20/22, 6:29 AM EDT.

## 2022-03-20 NOTE — ANESTHESIA PREPROCEDURE EVALUATION
Anesthesia Evaluation     Patient summary reviewed and Nursing notes reviewed   no history of anesthetic complications:  NPO Solid Status: > 8 hours  NPO Liquid Status: > 2 hours           Airway   Mallampati: III  TM distance: >3 FB  Neck ROM: full  No difficulty expected  Dental    (+) edentulous    Pulmonary - negative pulmonary ROS and normal exam    breath sounds clear to auscultation  Cardiovascular - normal exam  Exercise tolerance: good (4-7 METS)    Rhythm: regular  Rate: normal    (+) hypertension, valvular problems/murmurs MVP, dysrhythmias Atrial Fib, DVT resolved, hyperlipidemia,       Neuro/Psych- negative ROS  GI/Hepatic/Renal/Endo    (+)  hiatal hernia,  renal disease,     Musculoskeletal (-) negative ROS    Abdominal    Substance History - negative use     OB/GYN negative ob/gyn ROS         Other - negative ROS                     Anesthesia Plan    ASA 3     general   (Patient understands anesthesia not responsible for dental damage.)  intravenous induction     Anesthetic plan, all risks, benefits, and alternatives have been provided, discussed and informed consent has been obtained with: patient.  Use of blood products discussed with patient .   Plan discussed with CRNA.        CODE STATUS:    Code Status (Patient has no pulse and is not breathing): CPR (Attempt to Resuscitate)  Medical Interventions (Patient has pulse or is breathing): Full Support

## 2022-03-20 NOTE — OP NOTE
HIP CLOSED REDUCTION  Procedure Report    Patient Name:  Bing Cruz  YOB: 1931    Date of Surgery:  3/20/2022     Indications:  Bing is a 89-year-old female status post left total hip arthroplasty on 3/16/2022.  She sustained a left posterior hip dislocation in the early postoperative period.  An attempted closed reduction was performed in the ED but was unsuccessful.  We discussed additional treatment options.  We discussed repeat closed reduction of the left hip dislocation.  We discussed that the primary benefit of the procedure is reducing the prosthesis.  We discussed procedural risks including failure to obtain or maintain reduction, fracture, hardware complication, recurrent instability, persistent pain, functional limitations, anesthesia risk with mortality, DVT/PE.  The patient's power of  elected to proceed on her behalf and written consent was obtained.    Pre-op Diagnosis:   Closed posterior dislocation of left hip, initial encounter ([facility]) [S73.015A]       Post-Op Diagnosis Codes:     * Closed posterior dislocation of left hip, initial encounter ([facility]) [S73.015A]    Procedure/CPT® Codes:      Procedure(s):  Left HIP CLOSED REDUCTION    Staff:  Surgeon(s):  Harsha Gayle MD         Anesthesia: Monitored Anesthesia Care    Estimated Blood Loss: 0 mL    Implants:    Nothing was implanted during the procedure    Specimen:          None        Findings: Left posterior hip dislocation    Complications: None    Description of Procedure: The patient was met in the preoperative holding area and the operative extremity was marked.  The patient was transported to the operating room.  Monitored anesthesia care was performed.  A timeout was taken to ensure the appropriate patient, procedure, and procedural site.  All were in agreement.  I performed a closed reduction maneuver consisting of flexion, internal rotation, traction and direct manipulation of the left hip.  An audible  reduction was achieved.  Leg length and rotation improved post reduction.  The reduction was confirmed with C arm.  The patient was placed into a knee immobilizer and abduction pillow.  She awoke from anesthesia and was transferred to the recovery room in stable condition.  Patient had palpable pedal pulses post reduction.    Electronically signed by Harsha Gayle MD, 03/20/22, 8:07 AM EDT.            Harsha Gayle MD     Date: 3/20/2022  Time: 08:04 EDT

## 2022-03-21 LAB
ALBUMIN SERPL-MCNC: 2.8 G/DL (ref 3.5–5.2)
ALP SERPL-CCNC: 92 U/L (ref 39–117)
ALT SERPL W P-5'-P-CCNC: 8 U/L (ref 1–33)
ANION GAP SERPL CALCULATED.3IONS-SCNC: 9 MMOL/L (ref 5–15)
AST SERPL-CCNC: 22 U/L (ref 1–32)
BASOPHILS # BLD AUTO: 0.03 10*3/MM3 (ref 0–0.2)
BASOPHILS NFR BLD AUTO: 0.3 % (ref 0–1.5)
BILIRUB CONJ SERPL-MCNC: 0.3 MG/DL (ref 0–0.3)
BILIRUB INDIRECT SERPL-MCNC: 0.7 MG/DL
BILIRUB SERPL-MCNC: 1 MG/DL (ref 0–1.2)
BUN SERPL-MCNC: 18 MG/DL (ref 8–23)
BUN/CREAT SERPL: 25.7 (ref 7–25)
CALCIUM SPEC-SCNC: 8.5 MG/DL (ref 8.2–9.6)
CHLORIDE SERPL-SCNC: 101 MMOL/L (ref 98–107)
CO2 SERPL-SCNC: 26 MMOL/L (ref 22–29)
CREAT SERPL-MCNC: 0.7 MG/DL (ref 0.57–1)
DEPRECATED RDW RBC AUTO: 49 FL (ref 37–54)
EGFRCR SERPLBLD CKD-EPI 2021: 82.3 ML/MIN/1.73
EOSINOPHIL # BLD AUTO: 0.49 10*3/MM3 (ref 0–0.4)
EOSINOPHIL NFR BLD AUTO: 5.2 % (ref 0.3–6.2)
ERYTHROCYTE [DISTWIDTH] IN BLOOD BY AUTOMATED COUNT: 13.9 % (ref 12.3–15.4)
GLUCOSE SERPL-MCNC: 101 MG/DL (ref 65–99)
HCT VFR BLD AUTO: 31.3 % (ref 34–46.6)
HGB BLD-MCNC: 10.5 G/DL (ref 12–15.9)
IMM GRANULOCYTES # BLD AUTO: 0.09 10*3/MM3 (ref 0–0.05)
IMM GRANULOCYTES NFR BLD AUTO: 1 % (ref 0–0.5)
LYMPHOCYTES # BLD AUTO: 1.92 10*3/MM3 (ref 0.7–3.1)
LYMPHOCYTES NFR BLD AUTO: 20.4 % (ref 19.6–45.3)
MAGNESIUM SERPL-MCNC: 1.9 MG/DL (ref 1.6–2.4)
MCH RBC QN AUTO: 32.1 PG (ref 26.6–33)
MCHC RBC AUTO-ENTMCNC: 33.5 G/DL (ref 31.5–35.7)
MCV RBC AUTO: 95.7 FL (ref 79–97)
MONOCYTES # BLD AUTO: 0.9 10*3/MM3 (ref 0.1–0.9)
MONOCYTES NFR BLD AUTO: 9.6 % (ref 5–12)
NEUTROPHILS NFR BLD AUTO: 5.97 10*3/MM3 (ref 1.7–7)
NEUTROPHILS NFR BLD AUTO: 63.5 % (ref 42.7–76)
NRBC BLD AUTO-RTO: 0 /100 WBC (ref 0–0.2)
PHOSPHATE SERPL-MCNC: 2.8 MG/DL (ref 2.5–4.5)
PLATELET # BLD AUTO: 255 10*3/MM3 (ref 140–450)
PMV BLD AUTO: 11.9 FL (ref 6–12)
POTASSIUM SERPL-SCNC: 4.5 MMOL/L (ref 3.5–5.2)
PROT SERPL-MCNC: 5.7 G/DL (ref 6–8.5)
RBC # BLD AUTO: 3.27 10*6/MM3 (ref 3.77–5.28)
SODIUM SERPL-SCNC: 136 MMOL/L (ref 136–145)
WBC NRBC COR # BLD: 9.4 10*3/MM3 (ref 3.4–10.8)

## 2022-03-21 PROCEDURE — 97530 THERAPEUTIC ACTIVITIES: CPT

## 2022-03-21 PROCEDURE — 80048 BASIC METABOLIC PNL TOTAL CA: CPT | Performed by: STUDENT IN AN ORGANIZED HEALTH CARE EDUCATION/TRAINING PROGRAM

## 2022-03-21 PROCEDURE — 97165 OT EVAL LOW COMPLEX 30 MIN: CPT

## 2022-03-21 PROCEDURE — 85025 COMPLETE CBC W/AUTO DIFF WBC: CPT | Performed by: STUDENT IN AN ORGANIZED HEALTH CARE EDUCATION/TRAINING PROGRAM

## 2022-03-21 PROCEDURE — 84100 ASSAY OF PHOSPHORUS: CPT | Performed by: STUDENT IN AN ORGANIZED HEALTH CARE EDUCATION/TRAINING PROGRAM

## 2022-03-21 PROCEDURE — 97161 PT EVAL LOW COMPLEX 20 MIN: CPT

## 2022-03-21 PROCEDURE — 80076 HEPATIC FUNCTION PANEL: CPT | Performed by: STUDENT IN AN ORGANIZED HEALTH CARE EDUCATION/TRAINING PROGRAM

## 2022-03-21 PROCEDURE — 25010000002 KETOROLAC TROMETHAMINE PER 15 MG: Performed by: INTERNAL MEDICINE

## 2022-03-21 PROCEDURE — 97116 GAIT TRAINING THERAPY: CPT

## 2022-03-21 PROCEDURE — 83735 ASSAY OF MAGNESIUM: CPT | Performed by: STUDENT IN AN ORGANIZED HEALTH CARE EDUCATION/TRAINING PROGRAM

## 2022-03-21 PROCEDURE — 99232 SBSQ HOSP IP/OBS MODERATE 35: CPT | Performed by: INTERNAL MEDICINE

## 2022-03-21 RX ORDER — HYDROCODONE BITARTRATE AND ACETAMINOPHEN 5; 325 MG/1; MG/1
1 TABLET ORAL EVERY 4 HOURS PRN
Status: DISCONTINUED | OUTPATIENT
Start: 2022-03-21 | End: 2022-03-22 | Stop reason: HOSPADM

## 2022-03-21 RX ORDER — FAMOTIDINE 20 MG/1
20 TABLET, FILM COATED ORAL EVERY 12 HOURS
Status: DISCONTINUED | OUTPATIENT
Start: 2022-03-21 | End: 2022-03-22 | Stop reason: HOSPADM

## 2022-03-21 RX ORDER — FAMOTIDINE 20 MG/1
20 TABLET, FILM COATED ORAL
Status: DISCONTINUED | OUTPATIENT
Start: 2022-03-22 | End: 2022-03-21

## 2022-03-21 RX ORDER — KETOROLAC TROMETHAMINE 30 MG/ML
15 INJECTION, SOLUTION INTRAMUSCULAR; INTRAVENOUS EVERY 6 HOURS PRN
Status: DISCONTINUED | OUTPATIENT
Start: 2022-03-21 | End: 2022-03-22 | Stop reason: HOSPADM

## 2022-03-21 RX ORDER — FAMOTIDINE 10 MG/ML
20 INJECTION, SOLUTION INTRAVENOUS EVERY 12 HOURS SCHEDULED
Status: DISCONTINUED | OUTPATIENT
Start: 2022-03-21 | End: 2022-03-21

## 2022-03-21 RX ADMIN — METOPROLOL SUCCINATE 100 MG: 50 TABLET, EXTENDED RELEASE ORAL at 10:04

## 2022-03-21 RX ADMIN — HYDROCODONE BITARTRATE AND ACETAMINOPHEN 1 TABLET: 10; 325 TABLET ORAL at 16:13

## 2022-03-21 RX ADMIN — NYSTATIN 500000 UNITS: 100000 SUSPENSION ORAL at 22:51

## 2022-03-21 RX ADMIN — SENNOSIDES AND DOCUSATE SODIUM 2 TABLET: 50; 8.6 TABLET ORAL at 21:19

## 2022-03-21 RX ADMIN — SENNOSIDES AND DOCUSATE SODIUM 2 TABLET: 50; 8.6 TABLET ORAL at 10:04

## 2022-03-21 RX ADMIN — METOPROLOL SUCCINATE 100 MG: 50 TABLET, EXTENDED RELEASE ORAL at 21:19

## 2022-03-21 RX ADMIN — HYDROCODONE BITARTRATE AND ACETAMINOPHEN 1 TABLET: 10; 325 TABLET ORAL at 22:16

## 2022-03-21 RX ADMIN — DILTIAZEM HYDROCHLORIDE 120 MG: 120 CAPSULE, COATED, EXTENDED RELEASE ORAL at 13:47

## 2022-03-21 RX ADMIN — FAMOTIDINE 20 MG: 10 INJECTION INTRAVENOUS at 10:05

## 2022-03-21 RX ADMIN — APIXABAN 2.5 MG: 2.5 TABLET, FILM COATED ORAL at 10:04

## 2022-03-21 RX ADMIN — APIXABAN 2.5 MG: 2.5 TABLET, FILM COATED ORAL at 21:19

## 2022-03-21 RX ADMIN — LATANOPROST 1 DROP: 50 SOLUTION OPHTHALMIC at 10:37

## 2022-03-21 RX ADMIN — FAMOTIDINE 20 MG: 20 TABLET ORAL at 22:16

## 2022-03-21 RX ADMIN — Medication 10 ML: at 10:06

## 2022-03-21 RX ADMIN — KETOROLAC TROMETHAMINE 15 MG: 30 INJECTION, SOLUTION INTRAMUSCULAR; INTRAVENOUS at 10:05

## 2022-03-21 RX ADMIN — HYDROCODONE BITARTRATE AND ACETAMINOPHEN 1 TABLET: 10; 325 TABLET ORAL at 07:39

## 2022-03-21 NOTE — PLAN OF CARE
Goal Outcome Evaluation:     Patient presents with decreased strength, transfers, and functional mobility.  Patient will benefit from inpatient PT services to progress towards prior level of function.  She underwent recent left total hip replacement and a more recent left posterior dislocation.  Patient is to follow posterior hip precautions and weightbearing as tolerated for left lower extremity.

## 2022-03-21 NOTE — PROGRESS NOTES
[facility]   Progress Note    Patient Name: Bing Cruz  : 1931  MRN: [id number]  Primary Care Physician: Jairo Kramer MD  Date of admission: 3/19/2022    Subjective   Subjective   HPI: Patient still having hip pain, after being found to have a prosthetic hip dislocation after transferring over to [facility] on Friday pain was intense over night according to the daughter no one would listen to her, so they brought her back after an x-ray on Saturday to the emergency room, she had her hip replaced put back in by Dr. Gayle over the weekend, daughter is frustrated, patient still having pain, discussed repeating x-ray if necessary, encourage patient to try to get up today to do some therapy if possible,        Review of Systems   All systems were reviewed and negative except for: Pain, left hip, frustrated, weak, no nausea or vomiting, no chest pains,      Objective   Objective     Vitals:  Patient Vitals for the past 24 hrs:   BP Temp Temp src Pulse Resp SpO2   22 0255 127/63 98.5 °F (36.9 °C) Oral 94 18 96 %   22 2223 119/64 99.1 °F (37.3 °C) Oral 97 18 95 %   22 1954 141/69 98.8 °F (37.1 °C) Oral 100 18 95 %   22 1600 128/73 98.1 °F (36.7 °C) Oral 94 16 96 %   22 1200 156/85 97.3 °F (36.3 °C) Oral 97 16 94 %   22 1100 104/53 98.1 °F (36.7 °C) Oral 93 16 97 %   22 1005 129/76 97.5 °F (36.4 °C) Oral 92 16 96 %   22 0900 137/80 97.6 °F (36.4 °C) Axillary 107 17 96 %   22 0835 128/53 -- -- 96 16 96 %   22 0830 114/45 97.3 °F (36.3 °C) Temporal 90 16 96 %   22 0825 127/57 -- -- 96 16 94 %   22 0820 115/58 -- -- 89 16 95 %   22 0815 107/62 -- -- 75 14 95 %   22 0810 105/53 -- -- 81 14 98 %   22 0800 113/51 97 °F (36.1 °C) Temporal 80 14 94 %      Physical Exam   Patient is awake alert pleasant talkative seems to be in a little bit of pain in the left hip area, she sitting up in bed coherent, daughters at  bedside, lungs are clear anteriorly and laterally cardiac exam reveals an irregular rhythm heart rates around 95, abdomen soft nontender lower legs showed no PreTAB edema bilaterally she can move both feet well, she has an abduction pillow in place, no rashes on the face or arms or legs,      Result Review    Result Review:  I have personally reviewed the results from the time of this admission to 03/21/22 7:35 AM EDT and agree with these findings:  [x]  Laboratory  []  Microbiology  [x]  Radiology  []  EKG/Telemetry   []  Cardiology/Vascular   []  Pathology  []  Old records  []  Other:      Active Hospital Meds:  Scheduled Meds:apixaban, 2.5 mg, Oral, Q12H  dilTIAZem CD, 120 mg, Oral, Daily With Lunch  latanoprost, 1 drop, Both Eyes, Daily  metoprolol succinate XL, 100 mg, Oral, BID  senna-docusate sodium, 2 tablet, Oral, BID  sodium chloride, 10 mL, Intravenous, Q12H      Continuous Infusions:   PRN Meds:.•  aluminum-magnesium hydroxide-simethicone  •  senna-docusate sodium **AND** polyethylene glycol **AND** bisacodyl **AND** bisacodyl  •  HYDROcodone-acetaminophen  •  HYDROcodone-acetaminophen  •  LORazepam  •  Morphine  •  ondansetron  •  sodium chloride    Assessment/Plan   Assessment / Plan     Active Hospital Problems:  Active Hospital Problems    Diagnosis    • Closed posterior dislocation of left hip, initial encounter ([facility])        Assessment/Plan:     Left hip dislocation following surgery, hip was relocated on Saturday by Dr. Gayle patient is stable clinically, will continue and try therapy today if possible,, pain control seems to be the biggest issue frustration, March 21, will increase frequency of Norco, add Toradol and GI prophylaxis March 21, discussed with daughter and patient    Cano catheter in place for now, due to immobilization, discussed with patient daughter March 21    Postop day #5 left total hip arthroplasty stable,      Hypokalemia, low magnesium, will replace both again at time of  discharge March 18, both resolved March 21     Acute blood loss postop anemia, hemoglobin stable at 10.4 March 18, stable March 21     Confusion, possible delirium, will check UA and culture rule out UTI,, empiric cefepime was started March 17, will stop at this point with culture results not growing significant findings so far, urinalysis unremarkable, reevaluate as necessary at rehab,     Recurrent urinary tract infections,     Previous left hip fracture ORIF July 20, 2021,     Depression, anxiety clinically stable -----watch for worsening cognitive issues delirium etc.     Chronic atrial fibrillation, continue metoprolol , and Eliquis,,, restarted Cardizem on readmission March 19,, blood pressures are stable,     Dysphagia previous work-up with GI service,, will add IV Pepcid for now     Colon cancer diagnosed June 2020 with anemia,, status post exploratory laparotomy partial colectomy all lymph nodes negative, prolonged hospital course with rehab, hyponatremia and C. difficile colitis, failure to thrive and weight loss, she is clinically improved over that stay subsequent colonoscopy October 2021 Dr. Back     Lung nodule found June 2025 mm left lower lobe     Hypertension --- hold losartan, and Cardizem due to low blood pressures postop, and continue beta-blockers for now for rate control, A. fib     Remote fall 2016 left humeral fracture ORIF with josseline     DVT left lower leg November 2015 has been on anticoagulation indefinitely     Mitral valve regurgitation moderate degree on previous echoes     Hyperlipidemia off statin therapy for now,     Vitamin D deficiency     Osteopenia      CODE STATUS:    Code Status and Medical Interventions:   Ordered at: 03/19/22 3623     Code Status (Patient has no pulse and is not breathing):    CPR (Attempt to Resuscitate)     Medical Interventions (Patient has pulse or is breathing):    Full Support       Electronically signed by Jairo Kramer MD, 03/21/22, 7:35 AM  EDT.

## 2022-03-21 NOTE — THERAPY DISCHARGE NOTE
Acute Care - Occupational Therapy Discharge   [place]    Patient Name: Bing Cruz  : 1931    MRN: [id number]                              Today's Date: 3/21/2022       Admit Date: 3/16/2022    Visit Dx:     ICD-10-CM ICD-9-CM   1. Difficulty in walking  R26.2 719.7   2. Avascular necrosis of bone of left hip (HCC)  M87.052 733.42   3. Decreased activities of daily living (ADL)  Z78.9 V49.89   4. Aftercare following surgery of left femoral neck ORIF 2021  Z47.89 V58.78   5. Arthritis of left hip  M16.12 716.95     Patient Active Problem List   Diagnosis   • Anemia   • Closed left hip fracture, initial encounter ([facility])   • Closed fracture of neck of left femur ([facility])   • Closed left hip fracture ([facility])   • Hip fracture requiring operative repair, left, closed, initial encounter ([facility])   • Lung nodule < 6cm on CT   • Esophageal dysphagia   • Atrial fibrillation, chronic ([facility])   • Malignant neoplasm of colon ([facility])   • Chronic deep vein thrombosis (DVT) of proximal vein of left lower extremity ([facility])   • Major depressive disorder, recurrent, mild ([facility])   • Gastroesophageal reflux disease without esophagitis   • Vitamin D deficiency   • Hypertension, essential   • Nonrheumatic mitral valve regurgitation   • Nonrheumatic aortic valve insufficiency   • S/P  Left Femoral Neck Open Reduction Internal Fixation   • Left hip pain   • Acquired cystic kidney disease   • Bladder disorder   • Diaphragmatic hernia   • Cardiomegaly   • Cataract   • Chronic fatigue syndrome   • Diarrhea   • Diverticular disease of colon   • Hyperlipemia   • Disorder of bone   • Generalized abdominal pain   • Insomnia   • Major depression, single episode   • Malaise and fatigue   • Pain in joint involving pelvic region and thigh   • Sprain of hip   • Urinary tract infection   • Blood clot in vein   • Colon cancer screening   • Aftercare following surgery of left femoral neck ORIF 2021   • Avascular necrosis of bone of left hip ([facility])    • Arthritis of left hip   • Closed posterior dislocation of left hip, initial encounter ([facility])     Past Medical History:   Diagnosis Date   • Abdominal pain, generalized    • Anemia    • Aortic regurgitation    • Aortic stenosis    • Cancer (HCC)     colon    • Cardiomegaly    • Chronic atrial fibrillation ([facility]) 01/01/2019    Atrial fibrillation converted to sinus through cardioversion (March 2019   • Chronic fatigue    • Diarrhea    • Disease of tricuspid valve    • Diverticulosis of colon    • DVT (deep venous thrombosis) ([facility])     LEFT LEG GREATER THAN 5 YRS AGO   • Dysphagia    • Essential (primary) hypertension    • Hiatal hernia    • Insomnia, unspecified    • LVH (left ventricular hypertrophy)    • Major depressive disorder, single episode    • Mitral regurgitation    • Mitral valve insufficiency and aortic valve insufficiency    • Osteopenia    • Pain in joint, pelvic region and thigh    • TR (tricuspid regurgitation)    • UTI (urinary tract infection)     antibx to be completed prior to procedure. ABIOLA Carlos RN ([employer]) notified.     Past Surgical History:   Procedure Laterality Date   • ABDOMINAL HYSTERECTOMY      WITH BSO    • ANKLE SURGERY      (L) ankle fracture   • BLADDER REPAIR     • BREAST BIOPSY      (L) breast biopsy   • CARDIOVERSION  2019   • CATARACT EXTRACTION      OU   • CHOLECYSTECTOMY      OPEN   • COLON SURGERY      STATES SHE HAD 12 INCHES OF COLON REMOVED IN JULY 2020 FOR COLON CANCER   • COLONOSCOPY  2020   • COLONOSCOPY N/A 10/29/2021    Procedure: COLONOSCOPY;  Surgeon: Edmar Back MD;  Location: [facility] ENDOSCOPY;  Service: General;  Laterality: N/A;  DIVERTICULOSIS/ANASTOMOSIS   • FEMUR OPEN REDUCTION INTERNAL FIXATION Left 07/21/2021    Procedure: FEMORAL NECK OPEN REDUCTION INTERNAL FIXATION;  Surgeon: Bam Warner MD;  Location: [facility] MAIN OR;  Service: Orthopedics;  Laterality: Left;   • INGUINAL HERNIA REPAIR Left 09/29/2011   • TOTAL HIP ARTHROPLASTY Left  3/16/2022    Procedure: LEFT TOTAL HIP ARTHROPLASTY AND HARDWARE REMOVAL;  Surgeon: Bam Warner MD;  Location: [facility] MAIN OR;  Service: Orthopedics;  Laterality: Left;   • UPPER GASTROINTESTINAL ENDOSCOPY      WITH DILATATION      General Information    No documentation.                Mobility/ADL's    No documentation.                Obj/Interventions    No documentation.                Goals/Plan     Row Name 03/21/22 0610          Transfer Goal 1 (OT)    Progress/Outcome (Transfer Goal 1, OT) goal not met  -PG     Row Name 03/21/22 0610          Bathing Goal 1 (OT)    Progress/Outcomes (Bathing Goal 1, OT) goal not met  -PG     Row Name 03/21/22 0610          Dressing Goal 1 (OT)    Progress/Outcome (Dressing Goal 1, OT) goal not met  -PG     Row Name 03/21/22 0610          Toileting Goal 1 (OT)    Progress/Outcome (Toileting Goal 1, OT) goal not met  -PG     Row Name 03/21/22 0610          Grooming Goal 1 (OT)    Progress/Outcome (Grooming Goal 1, OT) goal not met  -PG           User Key  (r) = Recorded By, (t) = Taken By, (c) = Cosigned By    Initials Name Provider Type    Sharan Hess OT Occupational Therapist               Clinical Impression    No documentation.                Outcome Measures    No documentation.               Occupational Therapy Education                 Title: PT OT SLP Therapies (Resolved)     Topic: Occupational Therapy (Resolved)     Point: ADL training (Resolved)     Description:   Instruct learner(s) on proper safety adaptation and remediation techniques during self care or transfers.   Instruct in proper use of assistive devices.              Learning Progress Summary           Patient Acceptance, E,D, DU,VU by PG at 3/17/2022 1018                   Point: Home exercise program (Resolved)     Description:   Instruct learner(s) on appropriate technique for monitoring, assisting and/or progressing therapeutic exercises/activities.              Learning Progress  Summary           Patient Acceptance, E,D, DU,VU by PG at 3/17/2022 1018                   Point: Precautions (Resolved)     Description:   Instruct learner(s) on prescribed precautions during self-care and functional transfers.              Learning Progress Summary           Patient Acceptance, E,D, DU,VU by PG at 3/17/2022 1018                   Point: Body mechanics (Resolved)     Description:   Instruct learner(s) on proper positioning and spine alignment during self-care, functional mobility activities and/or exercises.              Learning Progress Summary           Patient Acceptance, E,D, DU,VU by PG at 3/17/2022 1018                               User Key     Initials Effective Dates Name Provider Type Discipline    PG 06/16/21 -  Sharan Kramer, OT Occupational Therapist OT              OT Recommendation and Plan  Planned Therapy Interventions (OT): activity tolerance training, BADL retraining, occupation/activity based interventions, transfer/mobility retraining, strengthening exercise, patient/caregiver education/training  Therapy Frequency (OT): 5 times/wk  Plan of Care Review  Plan of Care Reviewed With: patient  Progress: improving  Outcome Evaluation: Patient appeared to have less difficulty standing to don pants over waist.  She continues to require maximal assistance with lower body self-care but was able to walk approximately 15 feet with contact-guard/minimal assist in preparation for walking to the bathroom for toileting.  Recommend continued occupational therapy services.  Plan of Care Reviewed With: patient  Outcome Evaluation: Patient appeared to have less difficulty standing to don pants over waist.  She continues to require maximal assistance with lower body self-care but was able to walk approximately 15 feet with contact-guard/minimal assist in preparation for walking to the bathroom for toileting.  Recommend continued occupational therapy services.     Time Calculation:            OT  Discharge Summary  Anticipated Discharge Disposition (OT): inpatient rehabilitation facility  Reason for Discharge: Discharge from facility  Outcomes Achieved: Patient able to partially acheive established goals  Discharge Destination: Inpatient rehabilitation facility    Sharan Kramer OT  3/21/2022

## 2022-03-21 NOTE — THERAPY EVALUATION
Patient Name: Bing Cruz  : 1931    MRN: [id number]                              Today's Date: 3/21/2022       Admit Date: 3/19/2022    Visit Dx:     ICD-10-CM ICD-9-CM   1. Closed posterior dislocation of left hip, initial encounter ([facility])  S73.015A 835.01   2. Decreased activities of daily living (ADL)  Z78.9 V49.89     Patient Active Problem List   Diagnosis   • Anemia   • Closed left hip fracture, initial encounter ([facility])   • Closed fracture of neck of left femur ([facility])   • Closed left hip fracture ([facility])   • Hip fracture requiring operative repair, left, closed, initial encounter ([facility])   • Lung nodule < 6cm on CT   • Esophageal dysphagia   • Atrial fibrillation, chronic ([facility])   • Malignant neoplasm of colon ([facility])   • Chronic deep vein thrombosis (DVT) of proximal vein of left lower extremity (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)   • Gastroesophageal reflux disease without esophagitis   • Vitamin D deficiency   • Hypertension, essential   • Nonrheumatic mitral valve regurgitation   • Nonrheumatic aortic valve insufficiency   • S/P  Left Femoral Neck Open Reduction Internal Fixation   • Left hip pain   • Acquired cystic kidney disease   • Bladder disorder   • Diaphragmatic hernia   • Cardiomegaly   • Cataract   • Chronic fatigue syndrome   • Diarrhea   • Diverticular disease of colon   • Hyperlipemia   • Disorder of bone   • Generalized abdominal pain   • Insomnia   • Major depression, single episode   • Malaise and fatigue   • Pain in joint involving pelvic region and thigh   • Sprain of hip   • Urinary tract infection   • Blood clot in vein   • Colon cancer screening   • Aftercare following surgery of left femoral neck ORIF 2021   • Avascular necrosis of bone of left hip ([facility])   • Arthritis of left hip   • Closed posterior dislocation of left hip, initial encounter ([facility])     Past Medical History:   Diagnosis Date   • Abdominal pain, generalized    • Anemia    • Aortic  regurgitation    • Aortic stenosis    • Cancer (HCC)     colon    • Cardiomegaly    • Chronic atrial fibrillation (HCC) 01/01/2019    Atrial fibrillation converted to sinus through cardioversion (March 2019   • Chronic fatigue    • Diarrhea    • Disease of tricuspid valve    • Diverticulosis of colon    • DVT (deep venous thrombosis) (HCC)     LEFT LEG GREATER THAN 5 YRS AGO   • Dysphagia    • Essential (primary) hypertension    • Hiatal hernia    • Insomnia, unspecified    • LVH (left ventricular hypertrophy)    • Major depressive disorder, single episode    • Mitral regurgitation    • Mitral valve insufficiency and aortic valve insufficiency    • Osteopenia    • Pain in joint, pelvic region and thigh    • TR (tricuspid regurgitation)    • UTI (urinary tract infection)     antibx to be completed prior to procedure. ABIOLA Carlos RN (Timmy) notified.     Past Surgical History:   Procedure Laterality Date   • ABDOMINAL HYSTERECTOMY      WITH BSO    • ANKLE SURGERY      (L) ankle fracture   • BLADDER REPAIR     • BREAST BIOPSY      (L) breast biopsy   • CARDIOVERSION  2019   • CATARACT EXTRACTION      OU   • CHOLECYSTECTOMY      OPEN   • COLON SURGERY      STATES SHE HAD 12 INCHES OF COLON REMOVED IN JULY 2020 FOR COLON CANCER   • COLONOSCOPY  2020   • COLONOSCOPY N/A 10/29/2021    Procedure: COLONOSCOPY;  Surgeon: Edmar Back MD;  Location: [facility] ENDOSCOPY;  Service: General;  Laterality: N/A;  DIVERTICULOSIS/ANASTOMOSIS   • FEMUR OPEN REDUCTION INTERNAL FIXATION Left 07/21/2021    Procedure: FEMORAL NECK OPEN REDUCTION INTERNAL FIXATION;  Surgeon: Bam Warner MD;  Location: [facility] MAIN OR;  Service: Orthopedics;  Laterality: Left;   • HIP CLOSED REDUCTION Left 3/20/2022    Procedure: HIP CLOSED REDUCTION;  Surgeon: Harsha Gayle MD;  Location: [facility] MAIN OR;  Service: Orthopedics;  Laterality: Left;   • INGUINAL HERNIA REPAIR Left 09/29/2011   • TOTAL HIP ARTHROPLASTY Left 3/16/2022    Procedure:  LEFT TOTAL HIP ARTHROPLASTY AND HARDWARE REMOVAL;  Surgeon: Bam Warner MD;  Location: [facility] MAIN OR;  Service: Orthopedics;  Laterality: Left;   • UPPER GASTROINTESTINAL ENDOSCOPY      WITH DILATATION      General Information     Row Name 03/21/22 1416          OT Time and Intention    Document Type evaluation  -     Mode of Treatment individual therapy;occupational therapy  -     Row Name 03/21/22 1416          General Information    Patient Profile Reviewed yes  -     Prior Level of Function --  Recently transferred to [facility] on 3/18 s/p L NILO and removal of hardware on 3/16/22 at [facility]. Typically (I) w/ADLs, ambulated w/a STC but required RW due to L hip pain, has a step over tub w/shower chair, e.commode, stands to groom and no home O2.  -     Existing Precautions/Restrictions left;hip, posterior;other (see comments)  WBAT w/RW, abduction pillow and knee immobilizer worn in bed/recliner.  -     Barriers to Rehab none identified  -     Row Name 03/21/22 1416          Occupational Profile    Reason for Services/Referral (Occupational Profile) Patient is currently status post left hip closed reduction on March 20th, 2022.  Occupational therapy consulted due to recent decline in ADLs/functional transfers. No previous occupational therapy services for current condition.  -     Row Name 03/21/22 1416          Living Environment    People in Home alone  Daughters are able to assist PRN  -     Row Name 03/21/22 [facility]          Home Main Entrance    Number of Stairs, Main Entrance other (see comments)  5 from the back entrance and 2 from the front  -     Stair Railings, Main Entrance other (see comments)  Bilateral railing at the back entrance and no railing available at the front entrance.  -     Row Name 03/21/22 1416          Stairs Within Home, Primary    Number of Stairs, Within Home, Primary none  -     Row Name 03/21/22 1416          Cognition    Orientation Status (Cognition)  oriented x 3  Very anxious and fearful. Mildly confused at times but easily redirected by OT or patient's daughter.  -     Row Name 03/21/22 1416          Safety Issues, Functional Mobility    Safety Issues Affecting Function (Mobility) safety precaution awareness;sequencing abilities  -     Impairments Affecting Function (Mobility) balance;endurance/activity tolerance;strength  -     Comment, Safety Issues/Impairments (Mobility) Therapeutic exercises to address endurance.  -           User Key  (r) = Recorded By, (t) = Taken By, (c) = Cosigned By    Initials Name Provider Type     Cassandra Alcaraz, GREGORIO Occupational Therapist                 Mobility/ADL's     Row Name 03/21/22 1425          Bed Mobility    Bed Mobility supine-sit  -     Supine-Sit [place] (Bed Mobility) maximum assist (25% patient effort)  -     Bed Mobility, Safety Issues decreased use of legs for bridging/pushing;decreased use of arms for pushing/pulling  -     Assistive Device (Bed Mobility) bed rails;head of bed elevated  -     Comment, (Bed Mobility) Patient adamantly declined removal of LLE immobilizer due to fear of pain from bending her knee, requiring max assist for manuvering of LLEs to EOB.  -     Row Name 03/21/22 1425          Transfers    Transfers bed-chair transfer;sit-stand transfer;stand-sit transfer  -     Bed-Chair [place] (Transfers) moderate assist (50% patient effort)  -     Assistive Device (Bed-Chair Transfers) walker, front-wheeled  -     Sit-Stand [place] (Transfers) moderate assist (50% patient effort)  -     Stand-Sit [place] (Transfers) moderate assist (50% patient effort)  -     Row Name 03/21/22 1425          Sit-Stand Transfer    Assistive Device (Sit-Stand Transfers) walker, front-wheeled  -     Row Name 03/21/22 1425          Functional Mobility    Functional Mobility- Ind. Level moderate assist (50% patient effort)  -     Functional Mobility- Safety Issues step  length decreased;weight-shifting ability decreased;loses balance backward  -     Functional Mobility- Comment Patient ambulated 3-4 steps from EOB to recliner with mod assist using RW.  -     Row Name 03/21/22 1425          Activities of Daily Living    BADL Assessment/Intervention bathing;upper body dressing;lower body dressing;grooming;toileting;feeding  -     Row Name 03/21/22 1425          Mobility    Extremity Weight-bearing Status left lower extremity  -     Left Lower Extremity (Weight-bearing Status) weight-bearing as tolerated (WBAT)  -     Row Name 03/21/22 1425          Stand-Sit Transfer    Assistive Device (Stand-Sit Transfers) walker, front-wheeled  -     Row Name 03/21/22 1425          Bathing Assessment/Intervention    [place] Level (Bathing) bathing skills;upper body;standby assist;lower body;maximum assist (25% patient effort);dependent (less than 25% patient effort)  -     Row Name 03/21/22 1425          Upper Body Dressing Assessment/Training    [place] Level (Upper Body Dressing) upper body dressing skills;standby assist  -     Row Name 03/21/22 1425          Lower Body Dressing Assessment/Training    [place] Level (Lower Body Dressing) lower body dressing skills;maximum assist (25% patient effort);dependent (less than 25% patient effort)  -     Comment, (Lower Body Dressing) OT provided patient and her daughter education on posterior hip precautions as it relates to LB dressing/self-care tasks and potential use of assistive devices to increase independence.  -     Row Name 03/21/22 1425          Grooming Assessment/Training    [place] Level (Grooming) grooming skills;standby assist  -     Row Name 03/21/22 1425          Toileting Assessment/Training    [place] Level (Toileting) toileting skills;maximum assist (25% patient effort);dependent (less than 25% patient effort)  -     Comment, (Toileting) Cano catheter currently in place.  -[facility]  Name 03/21/22 1425          Self-Feeding Assessment/Training    [place] Level (Feeding) feeding skills;set up  -LF           User Key  (r) = Recorded By, (t) = Taken By, (c) = Cosigned By    Initials Name Provider Type     Cassandra Alcaraz OT Occupational Therapist               Obj/Interventions     Row Name 03/21/22 1440          Sensory Assessment (Somatosensory)    Sensory Assessment (Somatosensory) UE sensation intact  -LF     Row Name 03/21/22 1440          Vision Assessment/Intervention    Visual Impairment/Limitations WFL  -LF     Row Name 03/21/22 1440          Range of Motion Comprehensive    General Range of Motion bilateral upper extremity ROM WFL  -LF     Row Name 03/21/22 1440          Strength Comprehensive (MMT)    Comment, General Manual Muscle Testing (MMT) Assessment 4/5 bilateral upper extremities  -LF     Row Name 03/21/22 1440          Motor Skills    Motor Skills coordination;functional endurance  -LF     Coordination WFL  -LF     Functional Endurance Fair-  -LF     Row Name 03/21/22 1440          Balance    Balance Assessment sitting dynamic balance;standing dynamic balance;sitting static balance  -LF     Static Sitting Balance contact guard  -LF     Dynamic Sitting Balance contact guard  -LF     Position, Sitting Balance supported;sitting edge of bed  -LF     Dynamic Standing Balance moderate assist  -LF     Position/Device Used, Standing Balance walker, front-wheeled  -LF     Comment, Balance Posterior lean noted initially with sit to stand transfer and right lateral lean noted during functional mobility using RW, requiring max cues for weightshifting and mod physical assist from OT to offset.  -LF           User Key  (r) = Recorded By, (t) = Taken By, (c) = Cosigned By    Initials Name Provider Type    Cassandra Cota OT Occupational Therapist               Goals/Plan     Row Name 03/21/22 1443          Bed Mobility Goal 1 (OT)    Activity/Assistive Device (Bed Mobility Goal  1, OT) bed mobility activities, all  -LF     [place] Level/Cues Needed (Bed Mobility Goal 1, OT) contact guard required;standby assist  -LF     Time Frame (Bed Mobility Goal 1, OT) long term goal (LTG);10 days  -     Row Name 03/21/22 1443          Transfer Goal 1 (OT)    Activity/Assistive Device (Transfer Goal 1, OT) transfers, all;walker, rolling  -LF     [place] Level/Cues Needed (Transfer Goal 1, OT) contact guard required;standby assist  -LF     Time Frame (Transfer Goal 1, OT) long term goal (LTG);10 days  -     Row Name 03/21/22 1443          Bathing Goal 1 (OT)    Activity/Device (Bathing Goal 1, OT) bathing skills, all  -LF     [place] Level/Cues Needed (Bathing Goal 1, OT) minimum assist (75% or more patient effort)  -LF     Time Frame (Bathing Goal 1, OT) long term goal (LTG);10 days  -     Row Name 03/21/22 1443          Dressing Goal 1 (OT)    Activity/Device (Dressing Goal 1, OT) dressing skills, all  -LF     [place]/Cues Needed (Dressing Goal 1, OT) minimum assist (75% or more patient effort)  -LF     Time Frame (Dressing Goal 1, OT) long term goal (LTG);10 days  -     Row Name 03/21/22 1443          Toileting Goal 1 (OT)    Activity/Device (Toileting Goal 1, OT) toileting skills, all  -LF     [place] Level/Cues Needed (Toileting Goal 1, OT) minimum assist (75% or more patient effort)  -LF     Time Frame (Toileting Goal 1, OT) long term goal (LTG);10 days  -LF     Row Name 03/21/22 1443          Grooming Goal 1 (OT)    Activity/Device (Grooming Goal 1, OT) grooming skills, all  -LF     [place] (Grooming Goal 1, OT) independent  -LF     Time Frame (Grooming Goal 1, OT) long term goal (LTG);10 days  -     Row Name 03/21/22 1443          Problem Specific Goal 1 (OT)    Problem Specific Goal 1 (OT) Patient will tolerate standing 5 minutes with no LOB in preparation for grooming tasks at the sink using RW.  -LF     Time Frame (Problem Specific Goal 1, OT) long term  goal (LTG);10 days  -     Row Name 03/21/22 1443          Therapy Assessment/Plan (OT)    Planned Therapy Interventions (OT) activity tolerance training;patient/caregiver education/training;BADL retraining;adaptive equipment training;functional balance retraining;occupation/activity based interventions;ROM/therapeutic exercise;strengthening exercise;transfer/mobility retraining  -           User Key  (r) = Recorded By, (t) = Taken By, (c) = Cosigned By    Initials Name Provider Type     Cassandra Alcaraz OT Occupational Therapist               Clinical Impression     Row Name 03/21/22 1442          Plan of Care Review    Plan of Care Reviewed With patient;daughter  -     Progress no change  -     Outcome Evaluation Patient presents with limitations in self-care, functional transfers, balance, and endurance. She would benefit from continued skilled occupational therapy services to maximize ADL performance and return home safely and independently.  -     Row Name 03/21/22 1442          Therapy Assessment/Plan (OT)    Patient/Family Therapy Goal Statement (OT) To maximize independence.  -     Rehab Potential (OT) good, to achieve stated therapy goals  -     Criteria for Skilled Therapeutic Interventions Met (OT) yes;meets criteria;skilled treatment is necessary  -     Therapy Frequency (OT) 5 times/wk  -     Row Name 03/21/22 1442          Therapy Plan Review/Discharge Plan (OT)    Equipment Needs Upon Discharge (OT) tub bench;commode chair  -     Anticipated Discharge Disposition (OT) inpatient rehabilitation facility;skilled nursing facility;sub acute care setting  -     Row Name 03/21/22 1442          Vital Signs    O2 Delivery Pre Treatment room air  -LF     O2 Delivery Intra Treatment room air  -LF     O2 Delivery Post Treatment room air  -           User Key  (r) = Recorded By, (t) = Taken By, (c) = Cosigned By    Initials Name Provider Type     Cassandra Alcaraz OT Occupational  Therapist               Outcome Measures     Row Name 03/21/22 1446          How much help from another is currently needed...    Putting on and taking off regular lower body clothing? 1  -LF     Bathing (including washing, rinsing, and drying) 1  -LF     Toileting (which includes using toilet bed pan or urinal) 1  -LF     Putting on and taking off regular upper body clothing 3  -LF     Taking care of personal grooming (such as brushing teeth) 3  -LF     Eating meals 4  -LF     AM-PAC 6 Clicks Score (OT) 13  -LF     Row Name 03/21/22 1446          Functional Assessment    Outcome Measure Options AM-PAC 6 Clicks Daily Activity (OT);Optimal Instrument  -LF     Row Name 03/21/22 1446          Optimal Instrument    Optimal Instrument Optimal - 3  -LF     Bending/Stooping 5  -LF     Standing 3  -LF     Reaching 1  -LF     From the list, choose the 3 activities you would most like to be able to do without any difficulty Standing;Reaching;Bending/stooping  -LF     Total Score Optimal - 3 9  -LF           User Key  (r) = Recorded By, (t) = Taken By, (c) = Cosigned By    Initials Name Provider Type     Cassandra Alcaraz OT Occupational Therapist                Occupational Therapy Education                 Title: PT OT SLP Therapies (In Progress)     Topic: Occupational Therapy (In Progress)     Point: ADL training (Done)     Description:   Instruct learner(s) on proper safety adaptation and remediation techniques during self care or transfers.   Instruct in proper use of assistive devices.              Learning Progress Summary           Patient Acceptance, E,TB, VU by  at 3/21/2022 1447   Family Acceptance, E,TB, VU by  at 3/21/2022 1447                   Point: Home exercise program (Not Started)     Description:   Instruct learner(s) on appropriate technique for monitoring, assisting and/or progressing therapeutic exercises/activities.              Learner Progress:  Not documented in this visit.          Point:  Precautions (Done)     Description:   Instruct learner(s) on prescribed precautions during self-care and functional transfers.              Learning Progress Summary           Patient Acceptance, E,TB, VU by  at 3/21/2022 1447   Family Acceptance, E,TB, VU by LF at 3/21/2022 1447                   Point: Body mechanics (Done)     Description:   Instruct learner(s) on proper positioning and spine alignment during self-care, functional mobility activities and/or exercises.              Learning Progress Summary           Patient Acceptance, E,TB, VU by  at 3/21/2022 1447   Family Acceptance, E,TB, VU by LF at 3/21/2022 1447                               User Key     Initials Effective Dates Name Provider Type Discipline     06/16/21 -  Cassandra Alcaraz OT Occupational Therapist OT              OT Recommendation and Plan  Planned Therapy Interventions (OT): activity tolerance training, patient/caregiver education/training, BADL retraining, adaptive equipment training, functional balance retraining, occupation/activity based interventions, ROM/therapeutic exercise, strengthening exercise, transfer/mobility retraining  Therapy Frequency (OT): 5 times/wk  Plan of Care Review  Plan of Care Reviewed With: patient, daughter  Progress: no change  Outcome Evaluation: Patient presents with limitations in self-care, functional transfers, balance, and endurance. She would benefit from continued skilled occupational therapy services to maximize ADL performance and return home safely and independently.     Time Calculation:    Time Calculation- OT     Row Name 03/21/22 1449             Time Calculation- OT    OT Received On 03/21/22  -      OT Goal Re-Cert Due Date 03/30/22  -              Untimed Charges    OT Eval/Re-eval Minutes 50  -LF              Total Minutes    Untimed Charges Total Minutes 50  -LF       Total Minutes 50  -LF            User Key  (r) = Recorded By, (t) = Taken By, (c) = Cosigned By    Initials  Name Provider Type     Cassandra Alcaraz OT Occupational Therapist              Therapy Charges for Today     Code Description Service Date Service Provider Modifiers Qty    [id number] HC OT EVAL LOW COMPLEXITY 4 3/21/2022 Cassandra Alcaraz OT GO 1               Cassandra Alcaraz OT  3/21/2022

## 2022-03-21 NOTE — PLAN OF CARE
Goal Outcome Evaluation:  Plan of Care Reviewed With: patient, daughter        Progress: no change  Outcome Evaluation: Patient presents with limitations in self-care, functional transfers, balance, and endurance. She would benefit from continued skilled occupational therapy services to maximize ADL performance and return home safely and independently.

## 2022-03-21 NOTE — THERAPY EVALUATION
Acute Care - Physical Therapy Initial Evaluation  JUNO Mohamud     Patient Name: Bing Cruz  : 1931  MRN: [id number]  Today's Date: 3/21/2022      Visit Dx: Admit date: 3/19/2022     Referring Physician: Jairo Kramer, *     Surgery Date:3/19/2022 - 3/20/2022   Procedure(s) (LRB):  HIP CLOSED REDUCTION (Left)         ICD-10-CM ICD-9-CM   1. Closed posterior dislocation of left hip, initial encounter ([facility])  S73.015A 835.01   2. Decreased activities of daily living (ADL)  Z78.9 V49.89   3. Difficulty walking  R26.2 719.7     Patient Active Problem List   Diagnosis   • Anemia   • Closed left hip fracture, initial encounter ([facility])   • Closed fracture of neck of left femur ([facility])   • Closed left hip fracture ([facility])   • Hip fracture requiring operative repair, left, closed, initial encounter ([facility])   • Lung nodule < 6cm on CT   • Esophageal dysphagia   • Atrial fibrillation, chronic ([facility])   • Malignant neoplasm of colon ([facility])   • Chronic deep vein thrombosis (DVT) of proximal vein of left lower extremity ([facility])   • Major depressive disorder, recurrent, mild ([facility])   • Gastroesophageal reflux disease without esophagitis   • Vitamin D deficiency   • Hypertension, essential   • Nonrheumatic mitral valve regurgitation   • Nonrheumatic aortic valve insufficiency   • S/P  Left Femoral Neck Open Reduction Internal Fixation   • Left hip pain   • Acquired cystic kidney disease   • Bladder disorder   • Diaphragmatic hernia   • Cardiomegaly   • Cataract   • Chronic fatigue syndrome   • Diarrhea   • Diverticular disease of colon   • Hyperlipemia   • Disorder of bone   • Generalized abdominal pain   • Insomnia   • Major depression, single episode   • Malaise and fatigue   • Pain in joint involving pelvic region and thigh   • Sprain of hip   • Urinary tract infection   • Blood clot in vein   • Colon cancer screening   • Aftercare following surgery of left femoral neck ORIF 2021   • Avascular necrosis of bone of left  hip (HCC)   • Arthritis of left hip   • Closed posterior dislocation of left hip, initial encounter ([facility])     Past Medical History:   Diagnosis Date   • Abdominal pain, generalized    • Anemia    • Aortic regurgitation    • Aortic stenosis    • Cancer (HCC)     colon    • Cardiomegaly    • Chronic atrial fibrillation ([facility]) 01/01/2019    Atrial fibrillation converted to sinus through cardioversion (March 2019   • Chronic fatigue    • Diarrhea    • Disease of tricuspid valve    • Diverticulosis of colon    • DVT (deep venous thrombosis) ([facility])     LEFT LEG GREATER THAN 5 YRS AGO   • Dysphagia    • Essential (primary) hypertension    • Hiatal hernia    • Insomnia, unspecified    • LVH (left ventricular hypertrophy)    • Major depressive disorder, single episode    • Mitral regurgitation    • Mitral valve insufficiency and aortic valve insufficiency    • Osteopenia    • Pain in joint, pelvic region and thigh    • TR (tricuspid regurgitation)    • UTI (urinary tract infection)     antibx to be completed prior to procedure. ABIOLA Carlos RN ([facility]) notified.     Past Surgical History:   Procedure Laterality Date   • ABDOMINAL HYSTERECTOMY      WITH BSO    • ANKLE SURGERY      (L) ankle fracture   • BLADDER REPAIR     • BREAST BIOPSY      (L) breast biopsy   • CARDIOVERSION  2019   • CATARACT EXTRACTION      OU   • CHOLECYSTECTOMY      OPEN   • COLON SURGERY      STATES SHE HAD 12 INCHES OF COLON REMOVED IN JULY 2020 FOR COLON CANCER   • COLONOSCOPY  2020   • COLONOSCOPY N/A 10/29/2021    Procedure: COLONOSCOPY;  Surgeon: Edmar Back MD;  Location: [facility] ENDOSCOPY;  Service: General;  Laterality: N/A;  DIVERTICULOSIS/ANASTOMOSIS   • FEMUR OPEN REDUCTION INTERNAL FIXATION Left 07/21/2021    Procedure: FEMORAL NECK OPEN REDUCTION INTERNAL FIXATION;  Surgeon: Bam Warner MD;  Location: [facility] MAIN OR;  Service: Orthopedics;  Laterality: Left;   • HIP CLOSED REDUCTION Left 3/20/2022    Procedure: HIP CLOSED  REDUCTION;  Surgeon: Harsha Gayle MD;  Location: [facility] MAIN OR;  Service: Orthopedics;  Laterality: Left;   • INGUINAL HERNIA REPAIR Left 09/29/2011   • TOTAL HIP ARTHROPLASTY Left 3/16/2022    Procedure: LEFT TOTAL HIP ARTHROPLASTY AND HARDWARE REMOVAL;  Surgeon: Bam Warner MD;  Location: [facility] MAIN OR;  Service: Orthopedics;  Laterality: Left;   • UPPER GASTROINTESTINAL ENDOSCOPY      WITH DILATATION     PT Assessment (last 12 hours)     PT Evaluation and Treatment     Row Name 03/21/22 1500          Physical Therapy Time and Intention    Subjective Information complains of;pain  -DP     Document Type evaluation  -DP     Mode of Treatment individual therapy;physical therapy  -DP     Patient Effort good  -DP     Row Name 03/21/22 1500          General Information    Patient Profile Reviewed yes  -DP     Patient Observations alert;cooperative;agree to therapy  -DP     General Observations of Patient Prior to this hospital admission patient was at [facility] for rehab for status post left total hip replacement on 3/16/2022.  Prior to that patient used to live home at baseline.  -DP     Equipment Currently Used at Home cane, straight  -DP     Existing Precautions/Restrictions fall;left;hip, posterior;other (see comments)  Patient aware knee immobilizer and hip abduction pillow when in bed  -DP     Row Name 03/21/22 1500          Living Environment    Current Living Arrangements home  -DP     Row Name 03/21/22 1500          Range of Motion (ROM)    Range of Motion bilateral lower extremities;ROM is WFL  Left hip not tested due to recent dislocation and posterior hip precautions  -DP     Row Name 03/21/22 1500          Strength (Manual Muscle Testing)    Strength (Manual Muscle Testing) bilateral lower extremities  4-/5.  Left hip not tested due to recent hip dislocation and posterior precautions  -DP     Row Name 03/21/22 1500          Mobility    Extremity Weight-bearing Status left lower extremity  -DP      Left Lower Extremity (Weight-bearing Status) weight-bearing as tolerated (WBAT)  -DP     Row Name 03/21/22 1500          Bed Mobility    Bed Mobility sit-supine  -DP     Sit-Supine [place] (Bed Mobility) minimum assist (75% patient effort)  -DP     Row Name 03/21/22 1500          Transfers    Transfers sit-stand transfer  -DP     Sit-Stand [place] (Transfers) moderate assist (50% patient effort)  -DP     Row Name 03/21/22 1500          Sit-Stand Transfer    Assistive Device (Sit-Stand Transfers) walker, front-wheeled  -DP     Row Name 03/21/22 1500          Gait/Stairs (Locomotion)    Gait/Stairs Locomotion gait/ambulation assistive device  -DP     [place] Level (Gait) minimum assist (75% patient effort);2 person assist  Second person to follow with recliner  -DP     Assistive Device (Gait) walker, front-wheeled  -DP     Distance in Feet (Gait) 22  -DP     Row Name 03/21/22 1500          Balance    Dynamic Standing Balance minimal assist  -DP     Row Name             Wound 03/16/22 1028 Left anterior hip Incision    Wound - Properties Group Placement Date: 03/16/22  -SC Placement Time: 1028  -SC Present on Hospital Admission: N  -SC Side: Left  -SC Orientation: anterior  -SC Location: hip  -SC Primary Wound Type: Incision  -SC     Retired Wound - Properties Group Placement Date: 03/16/22  -SC Placement Time: 1028  -SC Present on Hospital Admission: N  -SC Side: Left  -SC Orientation: anterior  -SC Location: hip  -SC Primary Wound Type: Incision  -SC     Retired Wound - Properties Group Date first assessed: 03/16/22  -SC Time first assessed: 1028  -SC Present on Hospital Admission: N  -SC Side: Left  -SC Location: hip  -SC Primary Wound Type: Incision  -SC     Row Name             Wound 03/16/22 1021 Left anterior hip Incision    Wound - Properties Group Placement Date: 03/16/22  -SC Placement Time: 1021  -SC Present on Hospital Admission: N  -SC Side: Left  -SC Orientation: anterior  -SC Location:  hip  -SC Primary Wound Type: Incision  -SC     Retired Wound - Properties Group Placement Date: 03/16/22  -SC Placement Time: 1021  -SC Present on Hospital Admission: N  -SC Side: Left  -SC Orientation: anterior  -SC Location: hip  -SC Primary Wound Type: Incision  -SC     Retired Wound - Properties Group Date first assessed: 03/16/22  -SC Time first assessed: 1021  -SC Present on Hospital Admission: N  -SC Side: Left  -SC Location: hip  -SC Primary Wound Type: Incision  -SC     Row Name 03/21/22 1500          Therapy Assessment/Plan (PT)    Rehab Potential (PT) good, to achieve stated therapy goals  -DP     Criteria for Skilled Interventions Met (PT) yes;skilled treatment is necessary;meets criteria  -DP     Predicted Duration of Therapy Intervention (PT) 10 days  -DP     Problem List (PT) problems related to;balance;mobility;strength  -DP     Activity Limitations Related to Problem List (PT) unable to ambulate safely;unable to transfer safely  -DP     Row Name 03/21/22 1500          PT Evaluation Complexity    History, PT Evaluation Complexity 1-2 personal factors and/or comorbidities  -DP     Examination of Body Systems (PT Eval Complexity) total of 3 or more elements  -DP     Clinical Presentation (PT Evaluation Complexity) evolving  -DP     Clinical Decision Making (PT Evaluation Complexity) moderate complexity  -DP     Overall Complexity (PT Evaluation Complexity) moderate complexity  -DP     Row Name 03/21/22 1500          Physical Therapy Goals    Bed Mobility Goal Selection (PT) bed mobility, PT goal 1  -DP     Transfer Goal Selection (PT) transfer, PT goal 1  -DP     Gait Training Goal Selection (PT) gait training, PT goal 1  -DP     Row Name 03/21/22 1500          Bed Mobility Goal 1 (PT)    Activity/Assistive Device (Bed Mobility Goal 1, PT) sit to supine/supine to sit  -DP     [place] Level/Cues Needed (Bed Mobility Goal 1, PT) standby assist  -DP     Time Frame (Bed Mobility Goal 1, PT) 10 days   -DP     Row Name 03/21/22 1500          Transfer Goal 1 (PT)    Activity/Assistive Device (Transfer Goal 1, PT) sit-to-stand/stand-to-sit  -DP     [place] Level/Cues Needed (Transfer Goal 1, PT) standby assist  -DP     Time Frame (Transfer Goal 1, PT) 10 days  -DP     Row Name 03/21/22 1500          Gait Training Goal 1 (PT)    Activity/Assistive Device (Gait Training Goal 1, PT) assistive device use;walker, rolling  -DP     [place] Level (Gait Training Goal 1, PT) standby assist  -DP     Distance (Gait Training Goal 1, PT) 150  -DP     Time Frame (Gait Training Goal 1, PT) 10 days  -DP           User Key  (r) = Recorded By, (t) = Taken By, (c) = Cosigned By    Initials Name Provider Type    Sophie Bowie, RN Registered Nurse    Radha Merlso, PT Physical Therapist                Physical Therapy Education                 Title: PT OT SLP Therapies (In Progress)     Topic: Physical Therapy (Done)     Point: Mobility training (Done)     Learning Progress Summary           Patient Acceptance, E,TB, VU by DP at 3/21/2022 1544                   Point: Body mechanics (Done)     Learning Progress Summary           Patient Acceptance, E,TB, VU by DP at 3/21/2022 1544                   Point: Precautions (Done)     Learning Progress Summary           Patient Acceptance, E,TB, VU by DP at 3/21/2022 1544                               User Key     Initials Effective Dates Name Provider Type Discipline    DP 06/03/21 -  Radha Burgos PT Physical Therapist PT              PT Recommendation and Plan  Anticipated Discharge Disposition (PT): inpatient rehabilitation facility  Planned Therapy Interventions (PT): balance training, bed mobility training, gait training, strengthening, transfer training  Therapy Frequency (PT): 2 times/day (BID PRN)      Outcome Measures     Row Name 03/21/22 1500             How much help from another person do you currently need...    Turning from your back to your side  while in flat bed without using bedrails? 2  -DP      Moving from lying on back to sitting on the side of a flat bed without bedrails? 2  -DP      Moving to and from a bed to a chair (including a wheelchair)? 2  -DP      Standing up from a chair using your arms (e.g., wheelchair, bedside chair)? 2  -DP      Climbing 3-5 steps with a railing? 1  -DP      To walk in hospital room? 2  -DP      AM-PAC 6 Clicks Score (PT) 11  -DP              Functional Assessment    Outcome Measure Options AM-PAC 6 Clicks Basic Mobility (PT)  -DP            User Key  (r) = Recorded By, (t) = Taken By, (c) = Cosigned By    Initials Name Provider Type    Radha Merlos PT Physical Therapist                 Time Calculation:    PT Charges     Row Name 03/21/22 1545             Time Calculation    PT Received On 03/21/22  -DP      PT Goal Re-Cert Due Date 03/30/22  -DP              Timed Charges    61608 - Gait Training Minutes  12  -DP      43024 - PT Therapeutic Activity Minutes 15  -DP              Untimed Charges    PT Eval/Re-eval Minutes 30  -DP              Total Minutes    Timed Charges Total Minutes 27  -DP      Untimed Charges Total Minutes 30  -DP       Total Minutes 57  -DP            User Key  (r) = Recorded By, (t) = Taken By, (c) = Cosigned By    Initials Name Provider Type    Radha Merlos, PT Physical Therapist              Therapy Charges for Today     Code Description Service Date Service Provider Modifiers Qty    56034194052 HC PT THERAPEUTIC ACT EA 15 MIN 3/21/2022 Radha Burgos, PT GP 1    58722955001 HC GAIT TRAINING EA 15 MIN 3/21/2022 Radha Burgos, PT GP 1    50765513209 HC PT EVAL LOW COMPLEXITY 2 3/21/2022 Radha Burgos, PT GP 1          PT G-Codes  Outcome Measure Options: AM-PAC 6 Clicks Basic Mobility (PT)  AM-PAC 6 Clicks Score (PT): 11  AM-PAC 6 Clicks Score (OT): 13    Radha Burgos PT  3/21/2022

## 2022-03-21 NOTE — PROGRESS NOTES
[facility]     Progress Note    Patient Name: Bing Cruz  : 1931  MRN: [id number]  Primary Care Physician:  Jairo Kramer MD  Date of admission: 3/19/2022    Subjective   Subjective     HPI:  The patient was readmitted on Saturday evening with a posterior prosthetic hip dislocation.  The emergency department attempted reduction without success.  I was contacted by Dr. Gayle yesterday to notify me of his closed reduction of the hip on  morning.  The daughter is at the bedside this morning.  They are concerned as the patient was having increased pain to the hip and difficulty ambulating.  Since surgery the patient has been on bedrest and has had an abduction pillow and knee immobilizer.    Review of Systems   See HPI    Objective   Objective     Vitals:   Temp:  [97 °F (36.1 °C)-99.1 °F (37.3 °C)] 98.5 °F (36.9 °C)  Heart Rate:  [] 94  Resp:  [14-18] 18  BP: (104-156)/(45-85) 127/63  Flow (L/min):  [1-3] 1  Physical Exam    General: Alert, no acute distress   Chest: Unlabored breathing, cardiovascular: Regular heart rate   Musculoskeletal: Dressing intact.  Equal leg lengths.  Patient can wiggle toes and move ankle.  Positive pulses.  Sensation intact to light touch.    Result Review      WBC   Date Value Ref Range Status   2022 9.40 3.40 - 10.80 10*3/mm3 Final     RBC   Date Value Ref Range Status   2022 3.27 (L) 3.77 - 5.28 10*6/mm3 Final     Hemoglobin   Date Value Ref Range Status   2022 10.5 (L) 12.0 - 15.9 g/dL Final     Hematocrit   Date Value Ref Range Status   2022 31.3 (L) 34.0 - 46.6 % Final     MCV   Date Value Ref Range Status   2022 95.7 79.0 - 97.0 fL Final     MCH   Date Value Ref Range Status   2022 32.1 26.6 - 33.0 pg Final     MCHC   Date Value Ref Range Status   2022 33.5 31.5 - 35.7 g/dL Final     RDW   Date Value Ref Range Status   2022 13.9 12.3 - 15.4 % Final     RDW-SD   Date Value Ref Range Status    03/21/2022 49.0 37.0 - 54.0 fl Final     MPV   Date Value Ref Range Status   03/21/2022 11.9 6.0 - 12.0 fL Final     Platelets   Date Value Ref Range Status   03/21/2022 255 140 - 450 10*3/mm3 Final     Neutrophil %   Date Value Ref Range Status   03/21/2022 63.5 42.7 - 76.0 % Final     Lymphocyte %   Date Value Ref Range Status   03/21/2022 20.4 19.6 - 45.3 % Final     Monocyte %   Date Value Ref Range Status   03/21/2022 9.6 5.0 - 12.0 % Final     Eosinophil %   Date Value Ref Range Status   03/21/2022 5.2 0.3 - 6.2 % Final     Basophil %   Date Value Ref Range Status   03/21/2022 0.3 0.0 - 1.5 % Final     Immature Grans %   Date Value Ref Range Status   03/21/2022 1.0 (H) 0.0 - 0.5 % Final     Neutrophils, Absolute   Date Value Ref Range Status   03/21/2022 5.97 1.70 - 7.00 10*3/mm3 Final     Lymphocytes, Absolute   Date Value Ref Range Status   03/21/2022 1.92 0.70 - 3.10 10*3/mm3 Final     Monocytes, Absolute   Date Value Ref Range Status   03/21/2022 0.90 0.10 - 0.90 10*3/mm3 Final     Eosinophils, Absolute   Date Value Ref Range Status   03/21/2022 0.49 (H) 0.00 - 0.40 10*3/mm3 Final     Basophils, Absolute   Date Value Ref Range Status   03/21/2022 0.03 0.00 - 0.20 10*3/mm3 Final     Immature Grans, Absolute   Date Value Ref Range Status   03/21/2022 0.09 (H) 0.00 - 0.05 10*3/mm3 Final     nRBC   Date Value Ref Range Status   03/21/2022 0.0 0.0 - 0.2 /100 WBC Final        Result Review:  I have personally reviewed the results from the time of this admission to 3/21/2022 07:14 EDT and agree with these findings:  []  Laboratory  []  Microbiology  [x]  Radiology  []  EKG/Telemetry   []  Cardiology/Vascular   []  Pathology  []  Old records  []  Other:      Assessment/Plan   Assessment / Plan     Brief Patient Summary:  Bing Cruz is a 89 y.o. female who is status post close reduction of a posterior left prosthetic hip dislocation    Active Hospital Problems:  Active Hospital Problems    Diagnosis    •  Closed posterior dislocation of left hip, initial encounter (HCC)      Plan: We discussed treatment plan with the patient and her daughter.  I spoke with Dr. Gayle who confirmed that the patient's hip was stable after closed reduction.  We will plan for continue rehab with posterior hip precautions.  I think it would be reasonable to continue the knee immobilizer and abduction pillow while she is in bed.  This may be removed while she is doing therapy and ambulating.  From orthopedic standpoint she may return to the rehab facility when medically able.  Plan for follow-up 2 weeks postoperatively with me.  Continue posterior hip precautions from physical therapy.  She may be weightbearing as tolerated with a walker and assistance.  Pain control and DVT prophylaxis.       DVT prophylaxis:  Medical and mechanical DVT prophylaxis orders are present.    CODE STATUS:   Code Status (Patient has no pulse and is not breathing): CPR (Attempt to Resuscitate)  Medical Interventions (Patient has pulse or is breathing): Full Support    Disposition:  I expect patient to be discharged to rehab when medically able.    Electronically signed by Bam Warner MD, 03/21/22, 7:14 AM EDT.

## 2022-03-22 VITALS
SYSTOLIC BLOOD PRESSURE: 104 MMHG | WEIGHT: 154.32 LBS | RESPIRATION RATE: 18 BRPM | BODY MASS INDEX: 26.35 KG/M2 | HEART RATE: 84 BPM | OXYGEN SATURATION: 98 % | DIASTOLIC BLOOD PRESSURE: 68 MMHG | TEMPERATURE: 98.1 F | HEIGHT: 64 IN

## 2022-03-22 PROCEDURE — 99239 HOSP IP/OBS DSCHRG MGMT >30: CPT | Performed by: INTERNAL MEDICINE

## 2022-03-22 PROCEDURE — 97530 THERAPEUTIC ACTIVITIES: CPT

## 2022-03-22 PROCEDURE — 97110 THERAPEUTIC EXERCISES: CPT

## 2022-03-22 PROCEDURE — 97116 GAIT TRAINING THERAPY: CPT

## 2022-03-22 RX ORDER — DILTIAZEM HYDROCHLORIDE 120 MG/1
120 CAPSULE, COATED, EXTENDED RELEASE ORAL
Start: 2022-03-22 | End: 2022-08-12 | Stop reason: ALTCHOICE

## 2022-03-22 RX ORDER — HYDROCODONE BITARTRATE AND ACETAMINOPHEN 10; 325 MG/1; MG/1
1 TABLET ORAL EVERY 6 HOURS PRN
Refills: 0
Start: 2022-03-22 | End: 2022-03-27

## 2022-03-22 RX ORDER — BISACODYL 5 MG/1
5 TABLET, DELAYED RELEASE ORAL DAILY PRN
Start: 2022-03-22 | End: 2022-04-04

## 2022-03-22 RX ORDER — BISACODYL 10 MG
10 SUPPOSITORY, RECTAL RECTAL DAILY PRN
Start: 2022-03-22 | End: 2022-04-04

## 2022-03-22 RX ORDER — LATANOPROST 50 UG/ML
1 SOLUTION/ DROPS OPHTHALMIC DAILY
Refills: 12
Start: 2022-03-23 | End: 2022-04-04 | Stop reason: SDUPTHER

## 2022-03-22 RX ORDER — METOPROLOL SUCCINATE 100 MG/1
100 TABLET, EXTENDED RELEASE ORAL 2 TIMES DAILY
Start: 2022-03-22 | End: 2022-04-04 | Stop reason: SDUPTHER

## 2022-03-22 RX ORDER — AMOXICILLIN 250 MG
2 CAPSULE ORAL 2 TIMES DAILY
Start: 2022-03-22 | End: 2022-04-04

## 2022-03-22 RX ORDER — ALUMINA, MAGNESIA, AND SIMETHICONE 2400; 2400; 240 MG/30ML; MG/30ML; MG/30ML
15 SUSPENSION ORAL EVERY 6 HOURS PRN
Start: 2022-03-22

## 2022-03-22 RX ORDER — POLYETHYLENE GLYCOL 3350 17 G/17G
17 POWDER, FOR SOLUTION ORAL DAILY PRN
Start: 2022-03-22 | End: 2022-08-12

## 2022-03-22 RX ORDER — HYDROCODONE BITARTRATE AND ACETAMINOPHEN 5; 325 MG/1; MG/1
1 TABLET ORAL EVERY 4 HOURS PRN
Refills: 0
Start: 2022-03-22 | End: 2022-04-04 | Stop reason: DRUGHIGH

## 2022-03-22 RX ORDER — LORAZEPAM 0.5 MG/1
0.5 TABLET ORAL NIGHTLY PRN
Start: 2022-03-22 | End: 2022-04-04 | Stop reason: SDUPTHER

## 2022-03-22 RX ADMIN — NYSTATIN 500000 UNITS: 100000 SUSPENSION ORAL at 08:48

## 2022-03-22 RX ADMIN — HYDROCODONE BITARTRATE AND ACETAMINOPHEN 1 TABLET: 10; 325 TABLET ORAL at 14:08

## 2022-03-22 RX ADMIN — DILTIAZEM HYDROCHLORIDE 120 MG: 120 CAPSULE, COATED, EXTENDED RELEASE ORAL at 11:37

## 2022-03-22 RX ADMIN — HYDROCODONE BITARTRATE AND ACETAMINOPHEN 1 TABLET: 10; 325 TABLET ORAL at 07:54

## 2022-03-22 RX ADMIN — LATANOPROST 1 DROP: 50 SOLUTION OPHTHALMIC at 08:48

## 2022-03-22 RX ADMIN — Medication 10 ML: at 08:49

## 2022-03-22 RX ADMIN — NYSTATIN 500000 UNITS: 100000 SUSPENSION ORAL at 11:37

## 2022-03-22 RX ADMIN — METOPROLOL SUCCINATE 100 MG: 50 TABLET, EXTENDED RELEASE ORAL at 08:48

## 2022-03-22 RX ADMIN — SENNOSIDES AND DOCUSATE SODIUM 2 TABLET: 50; 8.6 TABLET ORAL at 08:48

## 2022-03-22 RX ADMIN — FAMOTIDINE 20 MG: 20 TABLET ORAL at 11:37

## 2022-03-22 RX ADMIN — APIXABAN 2.5 MG: 2.5 TABLET, FILM COATED ORAL at 08:49

## 2022-03-22 NOTE — DISCHARGE SUMMARY
[facility]         DISCHARGE SUMMARY    Patient Name: Bing Cruz  : 1931  MRN: [id number]    Date of Admission: 3/19/2022  Date of Discharge:  3/22/2022  Primary Care Physician: Jairo Kramer MD    Consults     Date and Time Order Name Status Description    3/19/2022 11:32 PM IP General Consult (Use specialty-specific consult if known)      3/19/2022 11:25 PM Orthopedics (on-call MD unless specified) Completed           Presenting Problem:   Closed posterior dislocation of left hip, initial encounter ([facility]) [S73.015A]    Active and Resolved Hospital Problems:  Active Hospital Problems    Diagnosis POA   • Dislocation of hip, posterior, left, closed ([facility]) [S73.015A] Unknown      Resolved Hospital Problems   No resolved problems to display.         Hospital Course     Hospital Course:  Bing Cruz is a 89 y.o. female     Oral thrush, nystatin swish and swallow started      Left hip dislocation following surgery, for a left total hip replacement last week, hip was relocated on Saturday by Dr. Gayle, patient's pain levels have improved considerably, continue therapy, abduction pillow while in bed, knee immobilizer per orthopedics recommendations, possible discharge to rehab again soon, pain control much improved , patient family would like to go to rehab today  discharge summary and orders performed, patient stable for discharge with follow-up labs hopefully tomorrow or Thursday, at rehab     Cano catheter in place for now, due to immobilization, discussed with patient daughter , Cano catheter be taken out prior to discharge today,      Postop day #7 left total hip arthroplasty stable,      Acute blood loss postop anemia, hemoglobin stable at 10.5 , follow-up labs the next 1 to 2 days     Confusion, possible delirium, will check UA and culture rule out UTI,, empiric cefepime was started , will stop at this  point with culture results not growing significant findings so far, urinalysis unremarkable, reevaluate as necessary at rehab,     Recurrent urinary tract infections,     Previous left hip fracture ORIF July 20, 2021,     Depression, anxiety--- clinically stable -----watch for worsening cognitive issues delirium etc.     Chronic atrial fibrillation, continue metoprolol , and Eliquis,,, restarted Cardizem on readmission March 19,, blood pressures are stable,, consider increasing Eliquis dosing to full anticoagulation March 22?     Dysphagia previous work-up with GI service,, will add  Pepcid for now     Colon cancer diagnosed June 2020 with anemia,, status post exploratory laparotomy partial colectomy all lymph nodes negative, prolonged hospital course with rehab, hyponatremia and C. difficile colitis, failure to thrive and weight loss, she is clinically improved over that stay subsequent colonoscopy October 2021 Dr. Back     Lung nodule found June 2025 mm left lower lobe     Hypertension --- hold losartan,, resume Cardizem and beta-blockers for rate control and blood pressure     Remote fall 2016 left humeral fracture ORIF with josseline     DVT left lower leg November 2015 has been on anticoagulation indefinitely     Mitral valve regurgitation moderate degree on previous echoes     Hyperlipidemia off statin therapy for now,     Vitamin D deficiency     Osteopenia    Day of Discharge     Patient Vitals for the past 24 hrs:   BP Temp Temp src Pulse Resp SpO2   03/22/22 0700 147/87 97.8 °F (36.6 °C) Oral 95 20 98 %   03/22/22 0424 146/78 97.5 °F (36.4 °C) -- 82 18 98 %   03/21/22 2218 143/70 98.1 °F (36.7 °C) -- 78 18 99 %   03/21/22 1926 133/70 97.9 °F (36.6 °C) -- 84 20 97 %   03/21/22 1500 141/56 98.1 °F (36.7 °C) Oral 78 18 98 %        Physical Exam:    Lungs are clear anteriorly and laterally cardiac exam reveals a irregular rhythm, abdomen soft nondistended lower extremities shows edema and swelling of the left  lower leg from the thigh all the way down to the ankle compared to the right, she has movement of the right foot with dorsi plantarflexion, she is pleasant she does follow commands fairly well this morning, she is a little bit confused when I first woke her up, no other rashes,  Knee immobilizer in place, abduction pillow in place this morning on exam, Cano catheter in place,       Pertinent  and/or Most Recent Results     LAB RESULTS:      Lab 03/21/22 0432 03/20/22 0418 03/19/22 0430 03/18/22 0418 03/17/22  0424   WBC 9.40 9.59 11.21* 10.05  --    HEMOGLOBIN 10.5* 9.9* 10.5* 10.4* 11.1*   HEMATOCRIT 31.3* 29.8* 30.6* 31.5* 32.4*   PLATELETS 255 196 169 146  --    NEUTROS ABS 5.97 6.90 8.12* 7.69*  --    IMMATURE GRANS (ABS) 0.09* 0.05 0.04 0.02  --    LYMPHS ABS 1.92 1.42 1.90 1.40  --    MONOS ABS 0.90 0.73 0.76 0.55  --    EOS ABS 0.49* 0.47* 0.37 0.37  --    MCV 95.7 95.5 94.7 96.9  --          Lab 03/21/22 0432 03/20/22 0418 03/19/22 0430 03/18/22 0418 03/17/22  0424   SODIUM 136 137 135* 134* 139   POTASSIUM 4.5 3.9 4.1 3.6 3.2*   CHLORIDE 101 102 101 102 103   CO2 26.0 25.4 23.9 22.0 26.5   ANION GAP 9.0 9.6 10.1 10.0 9.5   BUN 18 21 25* 22 13   CREATININE 0.70 0.71 0.82 0.92 0.77   EGFR 82.3 80.9 68.0 59.3* 73.4   GLUCOSE 101* 110* 96 103* 119*   CALCIUM 8.5 8.7 8.8 8.4 8.4   MAGNESIUM 1.9 2.0 2.0 1.5*  --    PHOSPHORUS 2.8 3.0  --   --   --    TSH  --   --   --  1.850  --          Lab 03/21/22  0432 03/20/22  0418 03/19/22 0430 03/18/22 0418   TOTAL PROTEIN 5.7* 5.5* 5.9* 5.3*   ALBUMIN 2.80* 2.80* 3.00* 2.80*   GLOBULIN  --   --  2.9 2.5   ALT (SGPT) 8 8 9 12   AST (SGOT) 22 27 34* 36*   BILIRUBIN 1.0 1.2 1.3* 1.7*   INDIRECT BILIRUBIN 0.7 0.8  --   --    BILIRUBIN DIRECT 0.3 0.4*  --   --    ALK PHOS 92 84 101 90                 Lab 03/18/22 0418   FOLATE 9.51   VITAMIN B 12 270         Brief Urine Lab Results  (Last result in the past 365 days)      Color   Clarity   Blood   Leuk Est    Nitrite   Protein   CREAT   Urine HCG        03/17/22 1136 Dark Yellow   Clear   Negative   Small (1+)   Negative   Trace               Microbiology Results (last 10 days)     Procedure Component Value - Date/Time    COVID PRE-OP / PRE-PROCEDURE SCREENING ORDER (NO ISOLATION) - Swab, Nasopharynx [[id number]]  (Normal) Collected: 03/19/22 2347    Lab Status: Final result Specimen: Swab from Nasopharynx Updated: 03/20/22 0551    Narrative:      The following orders were created for panel order COVID PRE-OP / PRE-PROCEDURE SCREENING ORDER (NO ISOLATION) - Swab, Nasopharynx.  Procedure                               Abnormality         Status                     ---------                               -----------         ------                     COVID-19,APTIMA PANTHER(...[[id number]]  Normal              Final result                 Please view results for these tests on the individual orders.    COVID-19,APTIMA PANTHER(JOSUE),[facility], NP/OP SWAB IN UTM/VTM/SALINE TRANSPORT MEDIA,24 HR TAT - Swab, Nasopharynx [[id number]]  (Normal) Collected: 03/19/22 2347    Lab Status: Final result Specimen: Swab from Nasopharynx Updated: 03/20/22 0551     COVID19 Not Detected    Narrative:      Fact sheet for providers: https://www.fda.gov/media/501152/download     Fact sheet for patients: https://www.fda.gov/media/238123/download    Test performed by RT PCR.    Urine Culture - Urine, Urine, Clean Catch [[id number]]  (Abnormal) Collected: 03/17/22 1136    Lab Status: Final result Specimen: Urine, Clean Catch Updated: 03/18/22 1302     Urine Culture <10,000 CFU/mL Gram Negative Bacilli     Comment: Call if further workup needed.               XR Hip 1 View Without Pelvis Left (Surgery Only)    Result Date: 3/20/2022  Impression:  Procedural fluoroscopy.  Refer to the procedure note for details      WINSOME LEMUS MD       Electronically Signed and Approved By: WINSOME LEMUS MD on 3/20/2022 at 8:48             XR Hip With or Without  Pelvis 1 View Left    Result Date: 3/20/2022  Impression:  Persistent posterior dislocation of the left hip prosthesis is noted.     RAFAL ALANIS JR, MD       Electronically Signed and Approved By: RAFAL ALANIS JR, MD on 3/20/2022 at 0:28             XR Hip With or Without Pelvis 1 View Left    Result Date: 3/20/2022  Impression:   Persistent posterior dislocation of the left hip prosthesis is noted.   RAFAL ALANIS JR, MD       Electronically Signed and Approved By: RAFAL ALANIS JR, MD on 3/20/2022 at 0:26             XR Hip With or Without Pelvis 1 View Left    Result Date: 3/20/2022  Impression:   Persistent posterior dislocation of the left hip prosthesis is noted.     RAFAL ALANIS JR, MD       Electronically Signed and Approved By: RAFAL ALANIS JR, MD on 3/20/2022 at 0:24             XR Hip With or Without Pelvis 1 View Left    Result Date: 3/20/2022  Impression:   Persistent posterior dislocation of the left hip prosthesis is noted.     RAFAL ALANIS JR, MD       Electronically Signed and Approved By: RAFAL ALANIS JR, MD on 3/20/2022 at 0:23             XR Hip With or Without Pelvis 2 - 3 View Left    Result Date: 3/19/2022  Impression:  There is an acute posterior dislocation of the left hip prosthesis.      RAFAL ALANIS JR, MD       Electronically Signed and Approved By: RAFAL ALANIS JR, MD on 3/19/2022 at 22:39             XR Hip With or Without Pelvis 2 - 3 View Left    Result Date: 3/16/2022  Impression:  Intraoperative imaging of the left hip as described.      TERRY SAUCEDA MD       Electronically Signed and Approved By: TERRY SAUCEDA MD on 3/16/2022 at 13:20             XR Hip With or Without Pelvis 2 - 3 View Left    Result Date: 3/16/2022  Impression:   1. New left hip prosthesis without complicating features. 2. Pelvic degenerative changes.      HARIS OLSON MD       Electronically Signed and Approved By: HARIS OLSON MD on 3/16/2022 at 11:55               Results for orders placed  during the hospital encounter of 01/27/22    Duplex Venous Lower Extremity - Left CAR    Interpretation Summary  · Normal left lower extremity venous duplex scan.      Results for orders placed during the hospital encounter of 01/27/22    Duplex Venous Lower Extremity - Left CAR    Interpretation Summary  · Normal left lower extremity venous duplex scan.          Labs Pending at Discharge:      Imaging Results (All)     Procedure Component Value Units Date/Time    XR Hip 1 View Without Pelvis Left (Surgery Only) [[id number]] Collected: 03/20/22 0849     Updated: 03/20/22 0852    Narrative:      PROCEDURE: XR HIP 1 VIEW WO PELVIS LEFT     COMPARISON: [facility], CR, XR HIP W OR WO PELVIS 1 VIEW LEFT, 3/19/2022, 23:36.     INDICATIONS: LT HIP REDUCTION/ FLURO TIME 1.04 MIN/ 7.7 mGy     FINDINGS:   Intraoperative fluoroscopy provided for reduction of the left hip prosthesis.  The prosthesis has   been reduced compared to the previous study.       Impression:       Procedural fluoroscopy.  Refer to the procedure note for details               WINSOME LEMUS MD         Electronically Signed and Approved By: WINSOME LEMUS MD on 3/20/2022 at 8:48                     FL < 1 Hour [[id number]] Resulted: 03/20/22 0808     Updated: 03/20/22 0808    Narrative:      This procedure was auto-finalized with no dictation required.    XR Hip With or Without Pelvis 1 View Left [[id number]] Collected: 03/20/22 0028     Updated: 03/20/22 0031    Narrative:      PROCEDURE: XR HIP W OR WO PELVIS 1 VIEW LEFT     COMPARISONS: [facility], CR, XR HIP W OR WO PELVIS 1 VIEW LEFT, 3/19/2022, 23:31.     [facility], CR, XR HIP W OR WO PELVIS 1 VIEW LEFT, 3/19/2022, 23:28.     [facility], CR, XR HIP W OR WO PELVIS 2-3 VIEW LEFT, 3/19/2022, 21:53.  [facility], CR, XR HIP W OR WO PELVIS 2-3 VIEW LEFT, 3/16/2022, 11:19.  [facility], CR, XR HIP W OR WO PELVIS 1 VIEW LEFT,  3/19/2022, 23:34.     INDICATIONS: POST REDUCTION (ATTEMPT 4).     FINDINGS: A single AP view of the left hip reveals persistent posterior dislocation of the left hip   prosthesis.  No acute fractures are seen.  The other findings are as described in the prior recent   reports.       Impression:       Persistent posterior dislocation of the left hip prosthesis is noted.              RAFAL ALANIS JR, MD         Electronically Signed and Approved By: RAFAL ALANIS JR, MD on 3/20/2022 at 0:28                     XR Hip With or Without Pelvis 1 View Left [[id number]] Collected: 03/20/22 0026     Updated: 03/20/22 0029    Narrative:      PROCEDURE: XR HIP W OR WO PELVIS 1 VIEW LEFT     COMPARISONS: [facility], CR, XR HIP W OR WO PELVIS 1 VIEW LEFT, 3/19/2022, 23:36.     [facility], CR, XR HIP W OR WO PELVIS 1 VIEW LEFT, 3/19/2022, 23:28.     [facility], CR, XR HIP W OR WO PELVIS 2-3 VIEW LEFT, 3/19/2022, 21:53.     [facility], CR, XR HIP W OR WO PELVIS 2-3 VIEW LEFT, 3/16/2022, 11:19.     [facility], CR, XR HIP W OR WO PELVIS 1 VIEW LEFT, 3/19/2022, 23:31.     INDICATIONS: POST REDUCTION (ATTEMPT 3).     FINDINGS:   A single AP view of the left hip reveals persistent posterior dislocation of the left hip   prosthesis.  No acute fractures are seen.  The other findings are as described in the prior recent   reports.       Impression:         Persistent posterior dislocation of the left hip prosthesis is noted.        RAFAL ALANIS JR, MD         Electronically Signed and Approved By: RAFAL ALANIS JR, MD on 3/20/2022 at 0:26                     XR Hip With or Without Pelvis 1 View Left [[id number]] Collected: 03/20/22 0024     Updated: 03/20/22 0028    Narrative:      PROCEDURE: XR HIP W OR WO PELVIS 1 VIEW LEFT     COMPARISONS: [facility], CR, XR HIP W OR WO PELVIS 2-3 VIEW LEFT, 3/19/2022, 21:53.     [facility], CR, XR  HIP W OR WO PELVIS 1 VIEW LEFT, 3/19/2022, 23:28.     INDICATIONS: POST REDUCTION (ATTEMPT 2).     FINDINGS:   A single AP view of the left hip reveals persistent posterior dislocation of the left hip   prosthesis.  No acute fractures are seen.  The other findings are as described in the prior recent   reports.       Impression:         Persistent posterior dislocation of the left hip prosthesis is noted.              RAFAL ALANIS JR, MD         Electronically Signed and Approved By: RAFAL ALANIS JR, MD on 3/20/2022 at 0:24                     XR Hip With or Without Pelvis 1 View Left [[id number]] Collected: 03/20/22 0023     Updated: 03/20/22 0026    Narrative:      PROCEDURE: XR HIP W OR WO PELVIS 1 VIEW LEFT     COMPARISON: [facility], CR, XR HIP W OR WO PELVIS 2-3 VIEW LEFT, 3/19/2022, 21:53.     INDICATIONS: POST REDUCTION (ATTEMPT 1).     FINDINGS:   A single AP view of the left hip reveals persistent posterior dislocation of the left hip   prosthesis, as described in the prior exam report from 3/19/2022 at 2153 hours.  No acute fractures   are seen.       Impression:         Persistent posterior dislocation of the left hip prosthesis is noted.              RAFAL ALANIS JR, MD         Electronically Signed and Approved By: RAFAL ALANIS JR, MD on 3/20/2022 at 0:23                     XR Hip With or Without Pelvis 2 - 3 View Left [[id number]] Collected: 03/19/22 2239     Updated: 03/19/22 2242    Narrative:      PROCEDURE: XR HIP W OR WO PELVIS 2-3 VIEW LEFT     COMPARISON: [facility], CR, XR HIP W OR WO PELVIS 2-3 VIEW LEFT, 3/16/2022, 11:19.     INDICATIONS: LEFT HIP PAIN POST SURGERY WEDNESDAY.     FINDINGS: Three views were obtained.  There is a total left hip prosthesis in place.  There is new   posterior dislocation of the left femoral prosthetic component, relative to the acetabular   component of the prosthesis.  No definite periprosthetic fractures are seen.  No acute  fractures   are seen elsewhere.  Moderate degenerative changes involve the right hip joint.  There are   degenerative changes of partially imaged spine.       Impression:       There is an acute posterior dislocation of the left hip prosthesis.                RAFAL ALANIS JR, MD         Electronically Signed and Approved By: RAFAL ALANIS JR, MD on 3/19/2022 at 22:39                          Discharge Details        Discharge Medications      New Medications      Instructions Start Date   aluminum-magnesium hydroxide-simethicone 400-400-40 MG/5ML suspension  Commonly known as: MAALOX MAX   15 mL, Oral, Every 6 Hours PRN      bisacodyl 5 MG EC tablet  Commonly known as: DULCOLAX   5 mg, Oral, Daily PRN      bisacodyl 10 MG suppository  Commonly known as: DULCOLAX   10 mg, Rectal, Daily PRN      nystatin 100,000 unit/mL suspension  Commonly known as: MYCOSTATIN   500,000 Units, Swish & Spit, 4 Times Daily      polyethylene glycol 17 g packet  Commonly known as: MIRALAX   17 g, Oral, Daily PRN      sennosides-docusate 8.6-50 MG per tablet  Commonly known as: PERICOLACE   2 tablets, Oral, 2 Times Daily         Changes to Medications      Instructions Start Date   acetaminophen 325 MG tablet  Commonly known as: TYLENOL  What changed: Another medication with the same name was removed. Continue taking this medication, and follow the directions you see here.   325 mg, Oral, Every 4 Hours PRN      apixaban 2.5 MG tablet tablet  Commonly known as: ELIQUIS  What changed: Another medication with the same name was added. Make sure you understand how and when to take each.   2.5 mg, Oral, 2 Times Daily      apixaban 2.5 MG tablet tablet  Commonly known as: ELIQUIS  What changed: You were already taking a medication with the same name, and this prescription was added. Make sure you understand how and when to take each.   2.5 mg, Oral, Every 12 Hours Scheduled      HYDROcodone-acetaminophen 5-325 MG per tablet  Commonly known  as: NORCO  What changed:   Another medication with the same name was added. Make sure you understand how and when to take each.  Another medication with the same name was removed. Continue taking this medication, and follow the directions you see here.   1 tablet, Oral, Every 4 Hours PRN      HYDROcodone-acetaminophen  MG per tablet  Commonly known as: NORCO  What changed: You were already taking a medication with the same name, and this prescription was added. Make sure you understand how and when to take each.  Replaces: HYDROcodone-acetaminophen 7.5-325 MG per tablet   1 tablet, Oral, Every 6 Hours PRN      latanoprost 0.005 % ophthalmic solution  Commonly known as: XALATAN  What changed: Another medication with the same name was added. Make sure you understand how and when to take each.   1 drop, Both Eyes, Daily      latanoprost 0.005 % ophthalmic solution  Commonly known as: XALATAN  What changed: You were already taking a medication with the same name, and this prescription was added. Make sure you understand how and when to take each.   1 drop, Both Eyes, Daily   Start Date: March 23, 2022     LORazepam 0.5 MG tablet  Commonly known as: ATIVAN  What changed: Another medication with the same name was added. Make sure you understand how and when to take each.   TAKE 1 TABLET BY MOUTH EVERY DAY AT BEDTIME AS NEEDED      LORazepam 0.5 MG tablet  Commonly known as: ATIVAN  What changed: You were already taking a medication with the same name, and this prescription was added. Make sure you understand how and when to take each.   0.5 mg, Oral, Nightly PRN      metoprolol succinate  MG 24 hr tablet  Commonly known as: TOPROL-XL  What changed: Another medication with the same name was added. Make sure you understand how and when to take each.   100 mg, Oral, 2 Times Daily      metoprolol succinate  MG 24 hr tablet  Commonly known as: TOPROL-XL  What changed: You were already taking a medication with  the same name, and this prescription was added. Make sure you understand how and when to take each.   100 mg, Oral, 2 Times Daily         Continue These Medications      Instructions Start Date   ascorbic acid 500 MG tablet  Commonly known as: VITAMIN C   500 mg, Oral, Daily      AZO-CRANBERRY PO   Oral      colestipol 1 g tablet  Commonly known as: COLESTID   TAKE 1 TABLET BY ORAL ROUTE 3 TIMES A DAY AS NEEDED FOR 30 DAYS DIARRHEA      dilTIAZem  MG 24 hr capsule  Commonly known as: CARDIZEM CD   120 mg, Oral, Daily With Lunch      docusate sodium 100 MG capsule   100 mg, Oral, 2 Times Daily PRN      magnesium hydroxide 2400 MG/10ML suspension suspension  Commonly known as: MILK OF MAGNESIA   10 mL, Oral, Daily PRN      magnesium oxide 400 tablet tablet  Commonly known as: MAG-OX   400 mg, Oral, 2 Times Daily      pantoprazole 40 MG EC tablet  Commonly known as: PROTONIX   40 mg, Oral, Daily         Stop These Medications    HYDROcodone-acetaminophen 7.5-325 MG per tablet  Commonly known as: Norco  Replaced by: HYDROcodone-acetaminophen  MG per tablet  You also have another medication with the same name that you need to continue taking as instructed.            Allergies   Allergen Reactions   • Citalopram Unknown - High Severity   • Clonazepam Unknown - High Severity   • Levofloxacin Nausea And Vomiting   • Lisinopril Unknown - High Severity   • Macrobid [Nitrofurantoin Macrocrystal] Rash   • Melatonin Unknown - High Severity         Discharge Disposition:  Short Term Hospital (DC - External)    Diet:        Discharge Activity:         CODE STATUS:  Code Status and Medical Interventions:   Ordered at: 03/19/22 2493     Code Status (Patient has no pulse and is not breathing):    CPR (Attempt to Resuscitate)     Medical Interventions (Patient has pulse or is breathing):    Full Support         Future Appointments   Date Time Provider Department Center   3/31/2022  9:30 AM Weston Bender PA  [facility] [place] ORS [facility]   5/5/2022 12:45 PM Jairo Kramer MD [facility] [place] PC [facility]   8/9/2022 10:15 AM Teresita Short APRN [facility] [place] CD [facility] [facility]   8/16/2022  9:30 AM NURSE/MA [place] [place] [facility] [place] ONC [address] [facility]   8/16/2022 10:00 AM Rose Boykin APRN [facility] [place] ONC [address] [facility]           Time spent on Discharge including face to face service:  40    minutes    Electronically signed by Jairo Kramer MD, 03/22/22, 10:37 AM EDT.

## 2022-03-22 NOTE — THERAPY TREATMENT NOTE
Acute Care - Physical Therapy Treatment Note   [place]     Patient Name: Bing Cruz  : 1931  MRN: [id number]  Today's Date: 3/22/2022      Visit Dx:     ICD-10-CM ICD-9-CM   1. Closed posterior dislocation of left hip, initial encounter ([facility])  S73.015A 835.01   2. Decreased activities of daily living (ADL)  Z78.9 V49.89   3. Difficulty walking  R26.2 719.7   4. Closed posterior dislocation of left hip, subsequent encounter  S73.015D V58.89   5. Arthritis of left hip  M16.12 716.95   6. Avascular necrosis of bone of left hip ([facility])  M87.052 733.42   7. Aftercare following surgery of left femoral neck ORIF 2021  Z47.89 V58.78   8. Colon cancer screening  Z12.11 V76.51   9. Blood clot in vein  I82.90 453.9   10. Acute cystitis without hematuria  N30.00 595.0   11. Sprain of left hip, initial encounter  S73.102A 843.9   12. Pain in joint involving pelvic region and thigh, unspecified laterality  M25.559 719.45   13. Malaise and fatigue  R53.81 780.79    R53.83    14. Major depressive disorder with single episode, remission status unspecified  F32.9 296.20   15. Primary insomnia  F51.01 307.42   16. Generalized abdominal pain  R10.84 789.07   17. Disorder of bone  M89.9 733.90   18. Hyperlipidemia, unspecified hyperlipidemia type  E78.5 272.4   19. Diverticular disease of colon  K57.30 562.10   20. Diarrhea, unspecified type  R19.7 787.91   21. Chronic fatigue syndrome  R53.82 780.71   22. Senile cataract, unspecified age-related cataract type, unspecified laterality  H25.9 366.10   23. Cardiomegaly  I51.7 429.3   24. Diaphragmatic hernia without obstruction and without gangrene  K44.9 553.3   25. Bladder disorder  N32.9 596.9   26. Acquired cystic kidney disease  N28.1 593.2   27. Left hip pain  M25.552 719.45   28. S/P  Left Femoral Neck Open Reduction Internal Fixation  Z98.890 V45.89   29. Nonrheumatic aortic valve insufficiency  I35.1 424.1   30. Nonrheumatic mitral valve regurgitation  I34.0 424.0    31. Hypertension, essential  I10 401.9   32. Vitamin D deficiency  E55.9 268.9   33. Gastroesophageal reflux disease without esophagitis  K21.9 530.81   34. Major depressive disorder, recurrent, mild (HCC)  F33.0 296.31   35. Chronic deep vein thrombosis (DVT) of proximal vein of left lower extremity (HCC)  I82.5Y2 453.51   36. Malignant neoplasm of colon, unspecified part of colon (HCC)  C18.9 153.9   37. Atrial fibrillation, chronic (HCC)  I48.20 427.31   38. Esophageal dysphagia  R13.19 787.29   39. Lung nodule < 6cm on CT  R91.1 793.11   40. Hip fracture requiring operative repair, left, closed, initial encounter ([facility])  S72.002A 820.8   41. Closed fracture of left hip with delayed healing, subsequent encounter  S72.002G V54.13   42. Closed fracture of neck of left femur, sequela  S72.002S 905.3   43. Closed left hip fracture, initial encounter ([facility])  S72.002A 820.8   44. Iron deficiency anemia, unspecified iron deficiency anemia type  D50.9 280.9     Patient Active Problem List   Diagnosis   • Anemia   • Closed left hip fracture, initial encounter ([facility])   • Closed fracture of neck of left femur ([facility])   • Closed left hip fracture ([facility])   • Hip fracture requiring operative repair, left, closed, initial encounter ([facility])   • Lung nodule < 6cm on CT   • Esophageal dysphagia   • Atrial fibrillation, chronic (HCC)   • Malignant neoplasm of colon (HCC)   • Chronic deep vein thrombosis (DVT) of proximal vein of left lower extremity ([facility])   • Major depressive disorder, recurrent, mild (HCC)   • Gastroesophageal reflux disease without esophagitis   • Vitamin D deficiency   • Hypertension, essential   • Nonrheumatic mitral valve regurgitation   • Nonrheumatic aortic valve insufficiency   • S/P  Left Femoral Neck Open Reduction Internal Fixation   • Left hip pain   • Acquired cystic kidney disease   • Bladder disorder   • Diaphragmatic hernia   • Cardiomegaly   • Cataract   • Chronic fatigue syndrome   • Diarrhea   •  Diverticular disease of colon   • Hyperlipemia   • Disorder of bone   • Generalized abdominal pain   • Insomnia   • Major depression, single episode   • Malaise and fatigue   • Pain in joint involving pelvic region and thigh   • Sprain of hip   • Urinary tract infection   • Blood clot in vein   • Colon cancer screening   • Aftercare following surgery of left femoral neck ORIF 7/21/2021   • Avascular necrosis of bone of left hip (HCC)   • Arthritis of left hip   • Dislocation of hip, posterior, left, closed (HCC)     Past Medical History:   Diagnosis Date   • Abdominal pain, generalized    • Anemia    • Aortic regurgitation    • Aortic stenosis    • Cancer (HCC)     colon    • Cardiomegaly    • Chronic atrial fibrillation (HCC) 01/01/2019    Atrial fibrillation converted to sinus through cardioversion (March 2019   • Chronic fatigue    • Diarrhea    • Disease of tricuspid valve    • Diverticulosis of colon    • DVT (deep venous thrombosis) (HCC)     LEFT LEG GREATER THAN 5 YRS AGO   • Dysphagia    • Essential (primary) hypertension    • Hiatal hernia    • Insomnia, unspecified    • LVH (left ventricular hypertrophy)    • Major depressive disorder, single episode    • Mitral regurgitation    • Mitral valve insufficiency and aortic valve insufficiency    • Osteopenia    • Pain in joint, pelvic region and thigh    • TR (tricuspid regurgitation)    • UTI (urinary tract infection)     antibx to be completed prior to procedure. ABIOLA Carlos RN ([employer]) notified.     Past Surgical History:   Procedure Laterality Date   • ABDOMINAL HYSTERECTOMY      WITH BSO    • ANKLE SURGERY      (L) ankle fracture   • BLADDER REPAIR     • BREAST BIOPSY      (L) breast biopsy   • CARDIOVERSION  2019   • CATARACT EXTRACTION      OU   • CHOLECYSTECTOMY      OPEN   • COLON SURGERY      STATES SHE HAD 12 INCHES OF COLON REMOVED IN JULY 2020 FOR COLON CANCER   • COLONOSCOPY  2020   • COLONOSCOPY N/A 10/29/2021    Procedure: COLONOSCOPY;   Surgeon: Edmar Back MD;  Location: [facility] ENDOSCOPY;  Service: General;  Laterality: N/A;  DIVERTICULOSIS/ANASTOMOSIS   • FEMUR OPEN REDUCTION INTERNAL FIXATION Left 07/21/2021    Procedure: FEMORAL NECK OPEN REDUCTION INTERNAL FIXATION;  Surgeon: Bam Warner MD;  Location: [facility] MAIN OR;  Service: Orthopedics;  Laterality: Left;   • HIP CLOSED REDUCTION Left 3/20/2022    Procedure: HIP CLOSED REDUCTION;  Surgeon: Harsha Gayle MD;  Location: [facility] MAIN OR;  Service: Orthopedics;  Laterality: Left;   • INGUINAL HERNIA REPAIR Left 09/29/2011   • TOTAL HIP ARTHROPLASTY Left 3/16/2022    Procedure: LEFT TOTAL HIP ARTHROPLASTY AND HARDWARE REMOVAL;  Surgeon: Bam Warner MD;  Location: [facility] MAIN OR;  Service: Orthopedics;  Laterality: Left;   • UPPER GASTROINTESTINAL ENDOSCOPY      WITH DILATATION     PT Assessment (last 12 hours)     PT Evaluation and Treatment     Row Name 03/22/22 1455 03/22/22 1100       Physical Therapy Time and Intention    Subjective Information complains of;weakness;fatigue;pain  - complains of;pain  -RH    Document Type therapy note (daily note)  - therapy note (daily note)  -    Mode of Treatment physical therapy;individual therapy  -RH physical therapy;individual therapy  -RH    Patient Effort fair  -RH adequate  -RH    Symptoms Noted During/After Treatment --  Pt with c/o minimal pain while sitting at rest in pt car for transfer.  - --    Comment -- --  Pt and family were all educated in posterior hip precautions.  -    Row Name 03/22/22 1455          Pain    Pre/Posttreatment Pain Comment --  Pt indicating only minimal pain after transfer to automobile.  -     Additional Documentation Pain Scale: FACES Pre/Post-Treatment (Group)  -     Row Name 03/22/22 1455 03/22/22 1100       Pain Scale: FACES Pre/Post-Treatment    Pain: FACES Scale, Pretreatment 2-->hurts little bit  - 2-->hurts little bit  -    Posttreatment Pain Rating 2-->hurts little bit   -RH 2-->hurts little bit  -    Pain Location - Side/Orientation Left  - Left  -RH    Pain Location - hip  -RH hip  -RH    Pre/Posttreatment Pain Comment --  Pt with no significant increases in pain.  -RH --    Row Name 03/22/22 1100          Range of Motion (ROM)    Range of Motion --  Pt L hip AAROM at 45 degrees flex and 15 degrees abd.  -     Row Name 03/22/22 1455 03/22/22 1100       Mobility    Extremity Weight-bearing Status left lower extremity  - left lower extremity  -RH    Left Lower Extremity (Weight-bearing Status) weight-bearing as tolerated (WBAT)  -RH weight-bearing as tolerated (WBAT)  -    Additional Documentation --  Pt placing full weight on her LLE for transfer into automobile.  - --    Row Name 03/22/22 1100          Bed Mobility    Bed Mobility supine-sit  -     Supine-Sit [place] (Bed Mobility) moderate assist (50% patient effort)  -     Bed Mobility, Safety Issues --  Pt with posterior hip precautions.  -     Assistive Device (Bed Mobility) bed rails  -     Comment, (Bed Mobility) --  Pt transfers safely within posterior hip precautions.  -     Row Name 03/22/22 1455 03/22/22 1100       Transfers    Transfers sit-stand transfer;stand-sit transfer  - sit-stand transfer;stand-sit transfer  -    Maintains Weight-bearing Status (Transfers) -- able to maintain  -    Comment, (Transfers) --  Pt able to transfer into car while maintaining posterior hip precautions. Pt with LLE out in front of her to limit hip flexion.  -RH --  Pt maintains posterior hip precautions.  -    Sit-Stand [place] (Transfers) contact guard  - contact guard;minimum assist (75% patient effort)  -    Stand-Sit [place] (Transfers) -- contact guard  -    Row Name 03/22/22 1455 03/22/22 1100       Sit-Stand Transfer    Assistive Device (Sit-Stand Transfers) walker, front-wheeled  - walker, front-wheeled  -    Comment, (Sit-Stand Transfer) --  Pt transfers safely within hip  precautions.  -RH --    Row Name 03/22/22 1455 03/22/22 1100       Stand-Sit Transfer    Assistive Device (Stand-Sit Transfers) walker, front-wheeled  -RH walker, front-wheeled  -RH    Row Name 03/22/22 1455          Car Transfer    Type (Car Transfer) stand-sit  -RH     [place] Level (Car Transfer) --  Pt tranfers CGA/min assist with min assist to help move LLE into automobile.  -RH     Assistive Device (Car Transfer) walker, front-wheeled  -RH     Comment, (Car Transfer) --  Pt was observed to be safely in her automobile with no significant pain at end of tx.  -RH     Row Name 03/22/22 1100          Gait/Stairs (Locomotion)    Gait/Stairs Locomotion gait/ambulation independence;gait/ambulation assistive device;distance ambulated;gait pattern;gait deviations  -RH     [place] Level (Gait) contact guard  -RH     Assistive Device (Gait) walker, front-wheeled  -RH     Distance in Feet (Gait) 35  -RH     Pattern (Gait) 3-point;step-through  -RH     Deviations/Abnormal Patterns (Gait) base of support, narrow;lashae decreased;gait speed decreased;antalgic  -RH     Row Name             Wound 03/16/22 1028 Left anterior hip Incision    Wound - Properties Group Placement Date: 03/16/22  -SC Placement Time: 1028  -SC Present on Hospital Admission: N  -SC Side: Left  -SC Orientation: anterior  -SC Location: hip  -SC Primary Wound Type: Incision  -SC     Retired Wound - Properties Group Placement Date: 03/16/22  -SC Placement Time: 1028  -SC Present on Hospital Admission: N  -SC Side: Left  -SC Orientation: anterior  -SC Location: hip  -SC Primary Wound Type: Incision  -SC     Retired Wound - Properties Group Date first assessed: 03/16/22  -SC Time first assessed: 1028  -SC Present on Hospital Admission: N  -SC Side: Left  -SC Location: hip  -SC Primary Wound Type: Incision  -SC     Row Name             Wound 03/16/22 1021 Left anterior hip Incision    Wound - Properties Group Placement Date: 03/16/22  -SC Placement  Time: 1021  -SC Present on Hospital Admission: N  -SC Side: Left  -SC Orientation: anterior  -SC Location: hip  -SC Primary Wound Type: Incision  -SC     Retired Wound - Properties Group Placement Date: 03/16/22  -SC Placement Time: 1021  -SC Present on Hospital Admission: N  -SC Side: Left  -SC Orientation: anterior  -SC Location: hip  -SC Primary Wound Type: Incision  -SC     Retired Wound - Properties Group Date first assessed: 03/16/22  -SC Time first assessed: 1021  -SC Present on Hospital Admission: N  -SC Side: Left  -SC Location: hip  -SC Primary Wound Type: Incision  -SC     Row Name 03/22/22 1455 03/22/22 1100       Progress Summary (PT)    Progress Toward Functional Goals (PT) progress toward functional goals is fair  -RH progress toward functional goals is good  -RH          User Key  (r) = Recorded By, (t) = Taken By, (c) = Cosigned By    Initials Name Provider Type    SC Sophie Munson, RN Registered Nurse    John Butcher PTA Physical Therapist Assistant                Physical Therapy Education                 Title: PT OT SLP Therapies (Resolved)     Topic: Physical Therapy (Resolved)     Point: Mobility training (Resolved)     Learning Progress Summary           Patient Acceptance, E,TB, VU by DP at 3/21/2022 1544                   Point: Home exercise program (Resolved)     Learner Progress:  Not documented in this visit.          Point: Body mechanics (Resolved)     Learning Progress Summary           Patient Acceptance, E,TB, VU by DP at 3/21/2022 1544                   Point: Precautions (Resolved)     Learning Progress Summary           Patient Acceptance, E,TB, VU by DP at 3/21/2022 1544                               User Key     Initials Effective Dates Name Provider Type Discipline    DP 06/03/21 -  Radha Burgos PT Physical Therapist PT              PT Recommendation and Plan     Progress Summary (PT)  Progress Toward Functional Goals (PT): progress toward functional goals is  fair   Outcome Measures     Row Name 03/22/22 1500 03/21/22 1500          How much help from another person do you currently need...    Turning from your back to your side while in flat bed without using bedrails? 2  -RH 2  -DP     Moving from lying on back to sitting on the side of a flat bed without bedrails? 2  -RH 2  -DP     Moving to and from a bed to a chair (including a wheelchair)? 2  -RH 2  -DP     Standing up from a chair using your arms (e.g., wheelchair, bedside chair)? 2  -RH 2  -DP     Climbing 3-5 steps with a railing? 1  -RH 1  -DP     To walk in hospital room? 2  -RH 2  -DP     AM-PAC 6 Clicks Score (PT) 11  -RH 11  -DP            Functional Assessment    Outcome Measure Options -- AM-PAC 6 Clicks Basic Mobility (PT)  -DP           User Key  (r) = Recorded By, (t) = Taken By, (c) = Cosigned By    Initials Name Provider Type     John Rudolph PTA Physical Therapist Assistant    Radha Merlos, PT Physical Therapist                 Time Calculation:    PT Charges     Row Name 03/22/22 1449             Time Calculation    PT Received On 03/22/22  -RH              Timed Charges    83451 - PT Therapeutic Activity Minutes 12  -RH              Total Minutes    Timed Charges Total Minutes 12  -RH       Total Minutes 12  -RH            User Key  (r) = Recorded By, (t) = Taken By, (c) = Cosigned By    Initials Name Provider Type     John Rudolph PTA Physical Therapist Assistant              Therapy Charges for Today     Code Description Service Date Service Provider Modifiers Qty    [id number]  PT THERAPEUTIC ACT EA 15 MIN 3/22/2022 John Rudolph PTA GP 1          PT G-Codes  Outcome Measure Options: AM-PAC 6 Clicks Basic Mobility (PT)  AM-PAC 6 Clicks Score (PT): 11  AM-PAC 6 Clicks Score (OT): 13    John Rudolph PTA  3/22/2022

## 2022-03-22 NOTE — THERAPY TREATMENT NOTE
Acute Care - Physical Therapy Treatment Note   [place]     Patient Name: Bing Cruz  : 1931  MRN: [id number]  Today's Date: 3/22/2022      Visit Dx:     ICD-10-CM ICD-9-CM   1. Closed posterior dislocation of left hip, initial encounter (Formerly [facility])  S73.015A 835.01   2. Decreased activities of daily living (ADL)  Z78.9 V49.89   3. Difficulty walking  R26.2 719.7   4. Closed posterior dislocation of left hip, subsequent encounter  S73.015D V58.89   5. Arthritis of left hip  M16.12 716.95   6. Avascular necrosis of bone of left hip (Formerly [facility])  M87.052 733.42   7. Aftercare following surgery of left femoral neck ORIF 2021  Z47.89 V58.78   8. Colon cancer screening  Z12.11 V76.51   9. Blood clot in vein  I82.90 453.9   10. Acute cystitis without hematuria  N30.00 595.0   11. Sprain of left hip, initial encounter  S73.102A 843.9   12. Pain in joint involving pelvic region and thigh, unspecified laterality  M25.559 719.45   13. Malaise and fatigue  R53.81 780.79    R53.83    14. Major depressive disorder with single episode, remission status unspecified  F32.9 296.20   15. Primary insomnia  F51.01 307.42   16. Generalized abdominal pain  R10.84 789.07   17. Disorder of bone  M89.9 733.90   18. Hyperlipidemia, unspecified hyperlipidemia type  E78.5 272.4   19. Diverticular disease of colon  K57.30 562.10   20. Diarrhea, unspecified type  R19.7 787.91   21. Chronic fatigue syndrome  R53.82 780.71   22. Senile cataract, unspecified age-related cataract type, unspecified laterality  H25.9 366.10   23. Cardiomegaly  I51.7 429.3   24. Diaphragmatic hernia without obstruction and without gangrene  K44.9 553.3   25. Bladder disorder  N32.9 596.9   26. Acquired cystic kidney disease  N28.1 593.2   27. Left hip pain  M25.552 719.45   28. S/P  Left Femoral Neck Open Reduction Internal Fixation  Z98.890 V45.89   29. Nonrheumatic aortic valve insufficiency  I35.1 424.1   30. Nonrheumatic mitral valve regurgitation  I34.0 424.0    31. Hypertension, essential  I10 401.9   32. Vitamin D deficiency  E55.9 268.9   33. Gastroesophageal reflux disease without esophagitis  K21.9 530.81   34. Major depressive disorder, recurrent, mild (HCC)  F33.0 296.31   35. Chronic deep vein thrombosis (DVT) of proximal vein of left lower extremity (HCC)  I82.5Y2 453.51   36. Malignant neoplasm of colon, unspecified part of colon (HCC)  C18.9 153.9   37. Atrial fibrillation, chronic (HCC)  I48.20 427.31   38. Esophageal dysphagia  R13.19 787.29   39. Lung nodule < 6cm on CT  R91.1 793.11   40. Hip fracture requiring operative repair, left, closed, initial encounter ([facility])  S72.002A 820.8   41. Closed fracture of left hip with delayed healing, subsequent encounter  S72.002G V54.13   42. Closed fracture of neck of left femur, sequela  S72.002S 905.3   43. Closed left hip fracture, initial encounter ([facility])  S72.002A 820.8   44. Iron deficiency anemia, unspecified iron deficiency anemia type  D50.9 280.9     Patient Active Problem List   Diagnosis   • Anemia   • Closed left hip fracture, initial encounter ([facility])   • Closed fracture of neck of left femur ([facility])   • Closed left hip fracture ([facility])   • Hip fracture requiring operative repair, left, closed, initial encounter ([facility])   • Lung nodule < 6cm on CT   • Esophageal dysphagia   • Atrial fibrillation, chronic (HCC)   • Malignant neoplasm of colon (HCC)   • Chronic deep vein thrombosis (DVT) of proximal vein of left lower extremity ([facility])   • Major depressive disorder, recurrent, mild (HCC)   • Gastroesophageal reflux disease without esophagitis   • Vitamin D deficiency   • Hypertension, essential   • Nonrheumatic mitral valve regurgitation   • Nonrheumatic aortic valve insufficiency   • S/P  Left Femoral Neck Open Reduction Internal Fixation   • Left hip pain   • Acquired cystic kidney disease   • Bladder disorder   • Diaphragmatic hernia   • Cardiomegaly   • Cataract   • Chronic fatigue syndrome   • Diarrhea   •  Diverticular disease of colon   • Hyperlipemia   • Disorder of bone   • Generalized abdominal pain   • Insomnia   • Major depression, single episode   • Malaise and fatigue   • Pain in joint involving pelvic region and thigh   • Sprain of hip   • Urinary tract infection   • Blood clot in vein   • Colon cancer screening   • Aftercare following surgery of left femoral neck ORIF 7/21/2021   • Avascular necrosis of bone of left hip (HCC)   • Arthritis of left hip   • Dislocation of hip, posterior, left, closed (HCC)     Past Medical History:   Diagnosis Date   • Abdominal pain, generalized    • Anemia    • Aortic regurgitation    • Aortic stenosis    • Cancer (HCC)     colon    • Cardiomegaly    • Chronic atrial fibrillation (HCC) 01/01/2019    Atrial fibrillation converted to sinus through cardioversion (March 2019   • Chronic fatigue    • Diarrhea    • Disease of tricuspid valve    • Diverticulosis of colon    • DVT (deep venous thrombosis) (HCC)     LEFT LEG GREATER THAN 5 YRS AGO   • Dysphagia    • Essential (primary) hypertension    • Hiatal hernia    • Insomnia, unspecified    • LVH (left ventricular hypertrophy)    • Major depressive disorder, single episode    • Mitral regurgitation    • Mitral valve insufficiency and aortic valve insufficiency    • Osteopenia    • Pain in joint, pelvic region and thigh    • TR (tricuspid regurgitation)    • UTI (urinary tract infection)     antibx to be completed prior to procedure. ABIOLA Carlos RN ([employer]) notified.     Past Surgical History:   Procedure Laterality Date   • ABDOMINAL HYSTERECTOMY      WITH BSO    • ANKLE SURGERY      (L) ankle fracture   • BLADDER REPAIR     • BREAST BIOPSY      (L) breast biopsy   • CARDIOVERSION  2019   • CATARACT EXTRACTION      OU   • CHOLECYSTECTOMY      OPEN   • COLON SURGERY      STATES SHE HAD 12 INCHES OF COLON REMOVED IN JULY 2020 FOR COLON CANCER   • COLONOSCOPY  2020   • COLONOSCOPY N/A 10/29/2021    Procedure: COLONOSCOPY;   Surgeon: Edmar Back MD;  Location: [facility] ENDOSCOPY;  Service: General;  Laterality: N/A;  DIVERTICULOSIS/ANASTOMOSIS   • FEMUR OPEN REDUCTION INTERNAL FIXATION Left 07/21/2021    Procedure: FEMORAL NECK OPEN REDUCTION INTERNAL FIXATION;  Surgeon: Bam Warner MD;  Location: [facility] MAIN OR;  Service: Orthopedics;  Laterality: Left;   • HIP CLOSED REDUCTION Left 3/20/2022    Procedure: HIP CLOSED REDUCTION;  Surgeon: Harsha Gayle MD;  Location: [facility] MAIN OR;  Service: Orthopedics;  Laterality: Left;   • INGUINAL HERNIA REPAIR Left 09/29/2011   • TOTAL HIP ARTHROPLASTY Left 3/16/2022    Procedure: LEFT TOTAL HIP ARTHROPLASTY AND HARDWARE REMOVAL;  Surgeon: Bam Warner MD;  Location: [facility] MAIN OR;  Service: Orthopedics;  Laterality: Left;   • UPPER GASTROINTESTINAL ENDOSCOPY      WITH DILATATION     PT Assessment (last 12 hours)     PT Evaluation and Treatment     Row Name 03/22/22 1455          Physical Therapy Time and Intention    Subjective Information complains of;weakness;fatigue;pain  -     Document Type therapy note (daily note)  -     Mode of Treatment physical therapy;individual therapy  -     Patient Effort fair  -RH     Symptoms Noted During/After Treatment --  Pt with c/o minimal pain while sitting at rest in pt car for transfer.  -     Row Name 03/22/22 1455          Pain    Pre/Posttreatment Pain Comment --  Pt indicating only minimal pain after transfer to automobile.  -     Additional Documentation Pain Scale: FACES Pre/Post-Treatment (Group)  -     Row Name 03/22/22 1455          Pain Scale: FACES Pre/Post-Treatment    Pain: FACES Scale, Pretreatment 2-->hurts little bit  -RH     Posttreatment Pain Rating 2-->hurts little bit  -RH     Pain Location - Side/Orientation Left  -     Pain Location - hip  -RH     Pre/Posttreatment Pain Comment --  Pt with no significant increases in pain.  -     Row Name 03/22/22 1450          Mobility    Extremity  Weight-bearing Status left lower extremity  -     Left Lower Extremity (Weight-bearing Status) weight-bearing as tolerated (WBAT)  -     Additional Documentation --  Pt placing full weight on her LLE for transfer into automobile.  -     Row Name 03/22/22 1455          Transfers    Transfers sit-stand transfer;stand-sit transfer  -     Comment, (Transfers) --  Pt able to transfer into car while maintaining posterior hip precautions. Pt with LLE out in front of her to limit hip flexion.  -     Sit-Stand [place] (Transfers) contact guard  -     Row Name 03/22/22 1455          Sit-Stand Transfer    Assistive Device (Sit-Stand Transfers) walker, front-wheeled  -     Comment, (Sit-Stand Transfer) --  Pt transfers safely within hip precautions.  -     Row Name 03/22/22 1455          Stand-Sit Transfer    Assistive Device (Stand-Sit Transfers) walker, front-wheeled  -     Row Name 03/22/22 1455          Car Transfer    Type (Car Transfer) stand-sit  -     [place] Level (Car Transfer) --  Pt tranfers CGA/min assist with min assist to help move LLE into automobile.  -     Assistive Device (Car Transfer) walker, front-wheeled  -     Comment, (Car Transfer) --  Pt was observed to be safely in her automobile with no significant pain at end of tx.  -     Row Name             Wound 03/16/22 1028 Left anterior hip Incision    Wound - Properties Group Placement Date: 03/16/22  -SC Placement Time: 1028  -SC Present on Hospital Admission: N  -SC Side: Left  -SC Orientation: anterior  -SC Location: hip  -SC Primary Wound Type: Incision  -SC     Retired Wound - Properties Group Placement Date: 03/16/22  -SC Placement Time: 1028  -SC Present on Hospital Admission: N  -SC Side: Left  -SC Orientation: anterior  -SC Location: hip  -SC Primary Wound Type: Incision  -SC     Retired Wound - Properties Group Date first assessed: 03/16/22  -SC Time first assessed: 1028  -SC Present on Hospital Admission: N   -SC Side: Left  -SC Location: hip  -SC Primary Wound Type: Incision  -SC     Row Name             Wound 03/16/22 1021 Left anterior hip Incision    Wound - Properties Group Placement Date: 03/16/22  -SC Placement Time: 1021  -SC Present on Hospital Admission: N  -SC Side: Left  -SC Orientation: anterior  -SC Location: hip  -SC Primary Wound Type: Incision  -SC     Retired Wound - Properties Group Placement Date: 03/16/22  -SC Placement Time: 1021  -SC Present on Hospital Admission: N  -SC Side: Left  -SC Orientation: anterior  -SC Location: hip  -SC Primary Wound Type: Incision  -SC     Retired Wound - Properties Group Date first assessed: 03/16/22  -SC Time first assessed: 1021  -SC Present on Hospital Admission: N  -SC Side: Left  -SC Location: hip  -SC Primary Wound Type: Incision  -SC     Row Name 03/22/22 1455          Progress Summary (PT)    Progress Toward Functional Goals (PT) progress toward functional goals is fair  -RH           User Key  (r) = Recorded By, (t) = Taken By, (c) = Cosigned By    Initials Name Provider Type    SC Sophie Munson, RN Registered Nurse    RH John Rudolph PTA Physical Therapist Assistant                Physical Therapy Education                 Title: PT OT SLP Therapies (Resolved)     Topic: Physical Therapy (Resolved)     Point: Mobility training (Resolved)     Learning Progress Summary           Patient Acceptance, E,TB, VU by DP at 3/21/2022 1544                   Point: Home exercise program (Resolved)     Learner Progress:  Not documented in this visit.          Point: Body mechanics (Resolved)     Learning Progress Summary           Patient Acceptance, E,TB, VU by DP at 3/21/2022 1544                   Point: Precautions (Resolved)     Learning Progress Summary           Patient Acceptance, E,TB, VU by DP at 3/21/2022 1544                               User Key     Initials Effective Dates Name Provider Type Discipline    DP 06/03/21 -  Radha Burgos, PT Physical  Therapist PT              PT Recommendation and Plan     Progress Summary (PT)  Progress Toward Functional Goals (PT): progress toward functional goals is fair   Outcome Measures     Row Name 03/21/22 1500             How much help from another person do you currently need...    Turning from your back to your side while in flat bed without using bedrails? 2  -DP      Moving from lying on back to sitting on the side of a flat bed without bedrails? 2  -DP      Moving to and from a bed to a chair (including a wheelchair)? 2  -DP      Standing up from a chair using your arms (e.g., wheelchair, bedside chair)? 2  -DP      Climbing 3-5 steps with a railing? 1  -DP      To walk in hospital room? 2  -DP      AM-PAC 6 Clicks Score (PT) 11  -DP              Functional Assessment    Outcome Measure Options AM-PAC 6 Clicks Basic Mobility (PT)  -DP            User Key  (r) = Recorded By, (t) = Taken By, (c) = Cosigned By    Initials Name Provider Type    Radha Merlos, PT Physical Therapist                 Time Calculation:    PT Charges     Row Name 03/22/22 1449             Time Calculation    PT Received On 03/22/22  -RH              Timed Charges    31244 - PT Therapeutic Activity Minutes 12  -RH              Total Minutes    Timed Charges Total Minutes 12  -RH       Total Minutes 12  -RH            User Key  (r) = Recorded By, (t) = Taken By, (c) = Cosigned By    Initials Name Provider Type     John Rudolhp PTA Physical Therapist Assistant              Therapy Charges for Today     Code Description Service Date Service Provider Modifiers Qty    47185701342 HC PT THERAPEUTIC ACT EA 15 MIN 3/22/2022 John Rudolph PTA GP 1          PT G-Codes  Outcome Measure Options: AM-PAC 6 Clicks Basic Mobility (PT)  AM-PAC 6 Clicks Score (PT): 10  AM-PAC 6 Clicks Score (OT): 13    John Rudolph PTA  3/22/2022

## 2022-03-22 NOTE — PLAN OF CARE
Problem: Adult Inpatient Plan of Care  Goal: Plan of Care Review  Outcome: Adequate for Care Transition  Goal: Patient-Specific Goal (Individualized)  Outcome: Adequate for Care Transition  Goal: Absence of Hospital-Acquired Illness or Injury  Outcome: Adequate for Care Transition  Intervention: Identify and Manage Fall Risk  Recent Flowsheet Documentation  Taken 3/22/2022 0754 by Lorena Woo RN  Safety Promotion/Fall Prevention:   activity supervised   assistive device/personal items within reach   clutter free environment maintained   nonskid shoes/slippers when out of bed   room organization consistent   safety round/check completed  Intervention: Prevent Skin Injury  Recent Flowsheet Documentation  Taken 3/22/2022 0754 by Lorena Woo RN  Body Position: position changed independently  Intervention: Prevent and Manage VTE (Venous Thromboembolism) Risk  Recent Flowsheet Documentation  Taken 3/22/2022 0754 by Lorena Woo RN  Activity Management: bedrest  VTE Prevention/Management:   bilateral   sequential compression devices on  Range of Motion: active ROM (range of motion) encouraged  Goal: Optimal Comfort and Wellbeing  Outcome: Adequate for Care Transition  Intervention: Monitor Pain and Promote Comfort  Recent Flowsheet Documentation  Taken 3/22/2022 0754 by Lorena Woo RN  Pain Management Interventions: see MAR  Intervention: Provide Person-Centered Care  Recent Flowsheet Documentation  Taken 3/22/2022 0754 by Lorena Woo RN  Trust Relationship/Rapport:   care explained   choices provided   emotional support provided   empathic listening provided   questions answered   questions encouraged   reassurance provided   thoughts/feelings acknowledged  Goal: Readiness for Transition of Care  Outcome: Adequate for Care Transition     Problem: Fall Injury Risk  Goal: Absence of Fall and Fall-Related Injury  Outcome: Adequate for Care Transition  Intervention: Identify and Manage Contributors  Recent Flowsheet  Documentation  Taken 3/22/2022 0754 by Lorena Woo RN  Medication Review/Management: medications reviewed  Intervention: Promote Injury-Free Environment  Recent Flowsheet Documentation  Taken 3/22/2022 0754 by Lorena Woo RN  Safety Promotion/Fall Prevention:   activity supervised   assistive device/personal items within reach   clutter free environment maintained   nonskid shoes/slippers when out of bed   room organization consistent   safety round/check completed     Problem: Skin Injury Risk Increased  Goal: Skin Health and Integrity  Outcome: Adequate for Care Transition  Intervention: Optimize Skin Protection  Recent Flowsheet Documentation  Taken 3/22/2022 0754 by Lorena Woo RN  Head of Bed (HOB) Positioning: [facility] elevated  Pressure Reduction Devices: positioning supports utilized     Problem: Hypertension Comorbidity  Goal: Blood Pressure in Desired Range  Outcome: Adequate for Care Transition  Intervention: Maintain Blood Pressure Management  Recent Flowsheet Documentation  Taken 3/22/2022 0754 by Lorena Woo RN  Medication Review/Management: medications reviewed     Problem: Pain Acute  Goal: Acceptable Pain Control and Functional Ability  Outcome: Adequate for Care Transition  Intervention: Prevent or Manage Pain  Recent Flowsheet Documentation  Taken 3/22/2022 0754 by Lorena Woo RN  Medication Review/Management: medications reviewed  Intervention: Develop Pain Management Plan  Recent Flowsheet Documentation  Taken 3/22/2022 0754 by Lorena Woo RN  Pain Management Interventions: see MAR  Intervention: Optimize Psychosocial Wellbeing  Recent Flowsheet Documentation  Taken 3/22/2022 0754 by Lorena Woo RN  Diversional Activities: television   Goal Outcome Evaluation:

## 2022-03-22 NOTE — PROGRESS NOTES
[facility]   Progress Note    Patient Name: Bing Cruz  : 1931  MRN: [id number]  Primary Care Physician: Jairo Kramer MD  Date of admission: 3/19/2022    Subjective   Subjective   HPI: Left hip dislocation after recent left total hip arthroplasty, family at bedside, asking about going back to rehab at this point, will defer to orthopedic surgery at for discharge instructions, patient is medically stable, she looks like she is doing okay otherwise, she is a little bit confused this morning when I woke her up, no distress still having some pain but much improved from yesterday's pain levels with new pain medications    Ortho notes reviewed, supposedly stable for discharge when medically stable    Nystatin swish and swallow was started last night for oral thrush, they ordered pushing fluids,    Review of Systems   All systems were reviewed and negative except for: Weakness, slight confusion, some left hip pain but improved, no nausea or vomiting no chest pains no shortness of breath      Objective   Objective     Vitals:  Patient Vitals for the past 24 hrs:   BP Temp Temp src Pulse Resp SpO2   22 0700 147/87 97.8 °F (36.6 °C) Oral 95 20 98 %   22 0424 146/78 97.5 °F (36.4 °C) -- 82 18 98 %   22 2218 143/70 98.1 °F (36.7 °C) -- 78 18 99 %   22 1926 133/70 97.9 °F (36.6 °C) -- 84 20 97 %   22 1500 141/56 98.1 °F (36.7 °C) Oral 78 18 98 %      Physical Exam   Lungs are clear anteriorly and laterally cardiac exam reveals a irregular rhythm, abdomen soft nondistended lower extremities shows edema and swelling of the left lower leg from the thigh all the way down to the ankle compared to the right, she has movement of the right foot with dorsi plantarflexion, she is pleasant she does follow commands fairly well this morning, she is a little bit confused when I first woke her up, no other rashes,  Knee immobilizer in place, abduction pillow in place this morning on  exam, Cano catheter in place,    Result Review    Result Review:  I have personally reviewed the results from the time of this admission to 03/22/22 8:42 AM EDT and agree with these findings:  [x]  Laboratory  []  Microbiology  []  Radiology  []  EKG/Telemetry   []  Cardiology/Vascular   []  Pathology  []  Old records  []  Other:      Active Hospital Meds:  Scheduled Meds:apixaban, 2.5 mg, Oral, Q12H  dilTIAZem CD, 120 mg, Oral, Daily With Lunch  famotidine, 20 mg, Oral, Q12H  latanoprost, 1 drop, Both Eyes, Daily  metoprolol succinate XL, 100 mg, Oral, BID  nystatin, 5 mL, Swish & Spit, 4x Daily  senna-docusate sodium, 2 tablet, Oral, BID  sodium chloride, 10 mL, Intravenous, Q12H      Continuous Infusions:   PRN Meds:.•  aluminum-magnesium hydroxide-simethicone  •  senna-docusate sodium **AND** polyethylene glycol **AND** bisacodyl **AND** bisacodyl  •  HYDROcodone-acetaminophen  •  HYDROcodone-acetaminophen  •  ketorolac  •  LORazepam  •  Morphine  •  ondansetron  •  sodium chloride    Assessment/Plan   Assessment / Plan     Active Hospital Problems:  Active Hospital Problems    Diagnosis    • Closed posterior dislocation of left hip, initial encounter ([facility])        Assessment/Plan:     Oral thrush, nystatin swish and swallow started March 21    Left hip dislocation following surgery, for a left total hip replacement last week, hip was relocated on Saturday by Dr. Gayle, patient's pain levels have improved considerably, continue therapy, abduction pillow while in bed, knee immobilizer per orthopedics recommendations, possible discharge to rehab again soon, pain control much improved March 22     Cano catheter in place for now, due to immobilization, discussed with patient daughter March 21, 22nd     Postop day #7 left total hip arthroplasty stable,      Acute blood loss postop anemia, hemoglobin stable at 10.5 March 21     Confusion, possible delirium, will check UA and culture rule out UTI,, empiric cefepime  was started March 17, will stop at this point with culture results not growing significant findings so far, urinalysis unremarkable, reevaluate as necessary at rehab,     Recurrent urinary tract infections,     Previous left hip fracture ORIF July 20, 2021,     Depression, anxiety--- clinically stable -----watch for worsening cognitive issues delirium etc.     Chronic atrial fibrillation, continue metoprolol , and Eliquis,,, restarted Cardizem on readmission March 19,, blood pressures are stable,, consider increasing Eliquis dosing to full anticoagulation March 22?     Dysphagia previous work-up with GI service,, will add  Pepcid for now     Colon cancer diagnosed June 2020 with anemia,, status post exploratory laparotomy partial colectomy all lymph nodes negative, prolonged hospital course with rehab, hyponatremia and C. difficile colitis, failure to thrive and weight loss, she is clinically improved over that stay subsequent colonoscopy October 2021 Dr. Back     Lung nodule found June 2025 mm left lower lobe     Hypertension --- hold losartan,, resume Cardizem and beta-blockers for rate control and blood pressure     Remote fall 2016 left humeral fracture ORIF with josseline     DVT left lower leg November 2015 has been on anticoagulation indefinitely     Mitral valve regurgitation moderate degree on previous echoes     Hyperlipidemia off statin therapy for now,     Vitamin D deficiency     Osteopenia      CODE STATUS:    Code Status and Medical Interventions:   Ordered at: 03/19/22 2912     Code Status (Patient has no pulse and is not breathing):    CPR (Attempt to Resuscitate)     Medical Interventions (Patient has pulse or is breathing):    Full Support       Electronically signed by Jairo Kramer MD, 03/22/22, 8:42 AM EDT.

## 2022-03-22 NOTE — PROGRESS NOTES
[facility]     Progress Note    Patient Name: Bing Cruz  : 1931  MRN: [id number]  Primary Care Physician:  Jairo Kramer MD  Date of admission: 3/19/2022    Subjective   Subjective     HPI:  Patient Reports still having some pain in her low back radiating down the leg.  She still has some discomfort in her hip.  She denies any chest pain or shortness of air.  No new events overnight.    Review of Systems   See HPI    Objective   Objective     Vitals:   Temp:  [97.5 °F (36.4 °C)-98.1 °F (36.7 °C)] 97.5 °F (36.4 °C)  Heart Rate:  [78-84] 82  Resp:  [18-20] 18  BP: (133-146)/(56-78) 146/78  Physical Exam    General: Alert, no acute distress   Chest: Unlabored breathing, cardiovascular: Regular heart rate    Musculoskeletal: Leg lengths are equal.  No malrotation of the extremity.  She can wiggle her toes and move her ankle.  Positive pulses.  Dressings intact with small amount of bloody drainage.    Result Review      WBC   Date Value Ref Range Status   2022 9.40 3.40 - 10.80 10*3/mm3 Final     RBC   Date Value Ref Range Status   2022 3.27 (L) 3.77 - 5.28 10*6/mm3 Final     Hemoglobin   Date Value Ref Range Status   2022 10.5 (L) 12.0 - 15.9 g/dL Final     Hematocrit   Date Value Ref Range Status   2022 31.3 (L) 34.0 - 46.6 % Final     MCV   Date Value Ref Range Status   2022 95.7 79.0 - 97.0 fL Final     MCH   Date Value Ref Range Status   2022 32.1 26.6 - 33.0 pg Final     MCHC   Date Value Ref Range Status   2022 33.5 31.5 - 35.7 g/dL Final     RDW   Date Value Ref Range Status   2022 13.9 12.3 - 15.4 % Final     RDW-SD   Date Value Ref Range Status   2022 49.0 37.0 - 54.0 fl Final     MPV   Date Value Ref Range Status   2022 11.9 6.0 - 12.0 fL Final     Platelets   Date Value Ref Range Status   2022 255 140 - 450 10*3/mm3 Final     Neutrophil %   Date Value Ref Range Status   2022 63.5 42.7 - 76.0 % Final      Lymphocyte %   Date Value Ref Range Status   03/21/2022 20.4 19.6 - 45.3 % Final     Monocyte %   Date Value Ref Range Status   03/21/2022 9.6 5.0 - 12.0 % Final     Eosinophil %   Date Value Ref Range Status   03/21/2022 5.2 0.3 - 6.2 % Final     Basophil %   Date Value Ref Range Status   03/21/2022 0.3 0.0 - 1.5 % Final     Immature Grans %   Date Value Ref Range Status   03/21/2022 1.0 (H) 0.0 - 0.5 % Final     Neutrophils, Absolute   Date Value Ref Range Status   03/21/2022 5.97 1.70 - 7.00 10*3/mm3 Final     Lymphocytes, Absolute   Date Value Ref Range Status   03/21/2022 1.92 0.70 - 3.10 10*3/mm3 Final     Monocytes, Absolute   Date Value Ref Range Status   03/21/2022 0.90 0.10 - 0.90 10*3/mm3 Final     Eosinophils, Absolute   Date Value Ref Range Status   03/21/2022 0.49 (H) 0.00 - 0.40 10*3/mm3 Final     Basophils, Absolute   Date Value Ref Range Status   03/21/2022 0.03 0.00 - 0.20 10*3/mm3 Final     Immature Grans, Absolute   Date Value Ref Range Status   03/21/2022 0.09 (H) 0.00 - 0.05 10*3/mm3 Final     nRBC   Date Value Ref Range Status   03/21/2022 0.0 0.0 - 0.2 /100 WBC Final        Result Review:  I have personally reviewed the results from the time of this admission to 3/22/2022 08:00 EDT and agree with these findings:  [x]  Laboratory  []  Microbiology  []  Radiology  []  EKG/Telemetry   []  Cardiology/Vascular   []  Pathology  []  Old records  []  Other:      Assessment/Plan   Assessment / Plan     Brief Patient Summary:  Bing Cruz is a 89 y.o. female who is status post close reduction of the prosthetic left hip dislocation    Active Hospital Problems:  Active Hospital Problems    Diagnosis    • Closed posterior dislocation of left hip, initial encounter ([facility])      Plan: Weightbearing as tolerated with walker.  Fall precautions.  Continue knee immobilizer and abduction pillow while in bed.  May remove to get up to work with physical therapy.  May sit in chair.  Dressing change  today.  Continue DVT prophylaxis.  Pain control.  Okay to discharge to rehab facility when medically able.       DVT prophylaxis:  Medical and mechanical DVT prophylaxis orders are present.    CODE STATUS:   Code Status (Patient has no pulse and is not breathing): CPR (Attempt to Resuscitate)  Medical Interventions (Patient has pulse or is breathing): Full Support    Disposition:  I expect patient to be discharged to inpatient rehab when medically able.    Electronically signed by Bam Warner MD, 03/22/22, 8:00 AM EDT.

## 2022-03-22 NOTE — PLAN OF CARE
Goal Outcome Evaluation:  Plan of Care Reviewed With: patient        Progress: no change  Outcome Evaluation: pt. medicated x 1 for c/o pain with relief noted. abduction pillow and knee immbolizer remain in place while in the bed.  some intermittent confusion noted throughout the night.

## 2022-03-22 NOTE — SIGNIFICANT NOTE
03/22/22 1007   Plan   Final Discharge Disposition Code 62 - inpatient rehab facility   Final Note Patient can discharge to [facility] today. SW notified MD.

## 2022-03-23 ENCOUNTER — TELEPHONE (OUTPATIENT)
Dept: ORTHOPEDIC SURGERY | Facility: CLINIC | Age: 87
End: 2022-03-23

## 2022-03-23 NOTE — TELEPHONE ENCOUNTER
PER DR RICARDO, I CALLED AND SPOKE WITH JEWELL MCNEILL LPN AT [facility] TO CHECK ON LILIBETHTENT'S STATUS.  JEWELL STATES THAT PATIENT IS NOT COMPLAINING OF PAIN TODAY, HAS SLIGHT BRUISING OVER THE INCISION AND SOME SWELLING.  WAS ADVISED TO USE ICE PACK TO HIP FOR SWELLING AND TO CALL OFFICE IF ANY ISSUES ARISE.

## 2022-03-28 ENCOUNTER — LAB REQUISITION (OUTPATIENT)
Dept: LAB | Facility: HOSPITAL | Age: 87
End: 2022-03-28

## 2022-03-28 DIAGNOSIS — Z00.00 ENCOUNTER FOR GENERAL ADULT MEDICAL EXAMINATION WITHOUT ABNORMAL FINDINGS: ICD-10-CM

## 2022-03-28 LAB
BACTERIA UR QL AUTO: ABNORMAL /HPF
BILIRUB UR QL STRIP: NEGATIVE
CLARITY UR: CLEAR
COD CRY URNS QL: ABNORMAL /HPF
COLOR UR: YELLOW
GLUCOSE UR STRIP-MCNC: NEGATIVE MG/DL
HGB UR QL STRIP.AUTO: NEGATIVE
KETONES UR QL STRIP: NEGATIVE
LEUKOCYTE ESTERASE UR QL STRIP.AUTO: ABNORMAL
NITRITE UR QL STRIP: NEGATIVE
PH UR STRIP.AUTO: 6.5 [PH] (ref 5–8)
PROT UR QL STRIP: NEGATIVE
RBC # UR STRIP: ABNORMAL /HPF
REF LAB TEST METHOD: ABNORMAL
SP GR UR STRIP: 1.02 (ref 1–1.03)
SQUAMOUS #/AREA URNS HPF: ABNORMAL /HPF
UROBILINOGEN UR QL STRIP: ABNORMAL
WBC # UR STRIP: ABNORMAL /HPF

## 2022-03-28 PROCEDURE — 81001 URINALYSIS AUTO W/SCOPE: CPT | Performed by: PHYSICAL MEDICINE & REHABILITATION

## 2022-03-31 ENCOUNTER — OFFICE VISIT (OUTPATIENT)
Dept: ORTHOPEDIC SURGERY | Facility: CLINIC | Age: 87
End: 2022-03-31

## 2022-03-31 VITALS — BODY MASS INDEX: 26.49 KG/M2 | HEART RATE: 91 BPM | OXYGEN SATURATION: 98 % | HEIGHT: 64 IN

## 2022-03-31 DIAGNOSIS — Z47.89 AFTERCARE FOLLOWING SURGERY OF THE MUSCULOSKELETAL SYSTEM: Primary | ICD-10-CM

## 2022-03-31 DIAGNOSIS — Z47.89 AFTERCARE FOLLOWING SURGERY OF THE MUSCULOSKELETAL SYSTEM: ICD-10-CM

## 2022-03-31 PROCEDURE — 99024 POSTOP FOLLOW-UP VISIT: CPT | Performed by: PHYSICIAN ASSISTANT

## 2022-03-31 RX ORDER — CEPHALEXIN 500 MG/1
500 CAPSULE ORAL 4 TIMES DAILY
Qty: 40 CAPSULE | Refills: 0 | Status: SHIPPED | OUTPATIENT
Start: 2022-03-31 | End: 2022-04-10

## 2022-03-31 RX ORDER — CHOLESTYRAMINE LIGHT 4 G/5.7G
4 POWDER, FOR SUSPENSION ORAL 2 TIMES DAILY
COMMUNITY
End: 2022-08-12

## 2022-03-31 NOTE — PROGRESS NOTES
Chief Complaint  Pain of the Left Hip    Subjective          Bing Cruz is a 89 y.o. female  presents to [facility] for   History of Present Illness    Patient presents with her 2 daughters for follow-up evaluation of left total hip arthroplasty 3/16/2022 in the left hip dislocation and closed reduction 3/20/2022.  Patient and daughter state that they cannot explain what happened when the patient dislocated her hip, they stated that she almost had a fall and while she was still in the hospital they think that this is what caused her hip to dislocate when she had a near fall.  Patient is at [facility] facility, her daughter states that they are releasing her possibly tomorrow or Monday.  Patient states she still has pain in her hip, patient daughter states she has a red area at the distal incision that they are concerned about.  They deny drainage, denies fever/chills.  Patient has been taking pain medication.  Patient admits to left lower extremity leg swelling and her daughters state that her doctor that is caring for her at the care facility is going to start her on some water pills.  Patient has been wearing a knee immobilizer since her closed reduction surgery.  Allergies   Allergen Reactions   • Citalopram Unknown - High Severity   • Clonazepam Unknown - High Severity   • Levofloxacin Nausea And Vomiting   • Lisinopril Unknown - High Severity   • Macrobid [Nitrofurantoin Macrocrystal] Rash   • Melatonin Unknown - High Severity        Social History     Socioeconomic History   • Marital status:    Tobacco Use   • Smoking status: Never Smoker   • Smokeless tobacco: Never Used   Vaping Use   • Vaping Use: Former   Substance and Sexual Activity   • Alcohol use: Not Currently   • Drug use: Never   • Sexual activity: Defer        REVIEW OF SYSTEMS    Constitutional: Denies fevers, chills, weight loss  Cardiovascular: Denies chest pain, shortness of breath  Skin: Denies  "rashes, acute skin changes  Neurologic: Denies headache, loss of consciousness  MSK: Left hip pain      Objective   Vital Signs:   Pulse 91   Ht 162.6 cm (64\")   SpO2 98%   BMI 26.49 kg/m²     Body mass index is 26.49 kg/m².    Physical Exam    Left hip: Staples were removed today, incision is healing well, the distal incision has a small area of erythema, no dehiscence or drainage, no fluctuance, mild tenderness to palpation at the incision site/hip.  Range of motion appropriate, 1+ pitting edema of left lower extremity, nontender calf, negative Marvin testing.  Neurovascularly intact, range of motion appropriate.    Procedures    Imaging Results (Most Recent)     Procedure Component Value Units Date/Time    XR Hip With or Without Pelvis 2 - 3 View Left [[id number]] Resulted: 03/31/22 1201     Updated: 03/31/22 1202    Narrative:      • View:AP and Lateral view(s)  • Site: Left hip  • Indication: Hip pain  • Study: X-rays ordered, taken in the office, and reviewed today  • X-ray: Intact appearing left total hip arthroplasty, no signs of   hardware failure loosening, no signs of periprosthetic fracture, no   subsidence, good alignment.  • Comparative data: No comparative data found             Result Review :   The following data was reviewed by: JUNAID Henderson on 03/31/2022:  Data reviewed: Radiologic studies Reviewed by me with the patient             Assessment and Plan    Diagnoses and all orders for this visit:    1. Aftercare following surgery of left total hip arthroplasty, 3/16/2022 and left hip closed reduction, 3/20/2022 (Primary)  -     XR Hip With or Without Pelvis 2 - 3 View Left  -     Ambulatory Referral to Home Health    2. Aftercare following surgery of the musculoskeletal system  -     XR Hip With or Without Pelvis 2 - 3 View Left  -     Ambulatory Referral to Home Health    Other orders  -     cephalexin (KEFLEX) 500 MG capsule; Take 1 capsule by mouth 4 (Four) Times a Day for 10 " days.  Dispense: 40 capsule; Refill: 0        Reviewed x-rays with the patient and her family, advised them of normal findings, patient her daughters were advised patient will be placed on Keflex and follow-up in 1 week for incision check/evaluation by Dr. Warner.  Patient will continue physical therapy while at [facility] and she was given order to start home health physical therapy.  She has been in a knee brace and she was advised to continue the brace until seen by Dr. Warner next week.  Follow-up in 1 week with x-rays.    Call or return if worsening symptoms.    Follow Up   Return in about 1 week (around 4/7/2022) for Recheck.  Patient was given instructions and counseling regarding her condition or for health maintenance advice. Please see specific information pulled into the AVS if appropriate.

## 2022-04-04 ENCOUNTER — OFFICE VISIT (OUTPATIENT)
Dept: INTERNAL MEDICINE | Facility: CLINIC | Age: 87
End: 2022-04-04

## 2022-04-04 VITALS
HEIGHT: 64 IN | HEART RATE: 114 BPM | TEMPERATURE: 97 F | SYSTOLIC BLOOD PRESSURE: 115 MMHG | DIASTOLIC BLOOD PRESSURE: 77 MMHG | OXYGEN SATURATION: 97 % | BODY MASS INDEX: 26.49 KG/M2

## 2022-04-04 DIAGNOSIS — I48.20 ATRIAL FIBRILLATION, CHRONIC: ICD-10-CM

## 2022-04-04 DIAGNOSIS — I10 HYPERTENSION, ESSENTIAL: ICD-10-CM

## 2022-04-04 DIAGNOSIS — B37.2 CUTANEOUS CANDIDIASIS: ICD-10-CM

## 2022-04-04 DIAGNOSIS — M25.552 LEFT HIP PAIN: Primary | ICD-10-CM

## 2022-04-04 PROBLEM — Z91.89 AT RISK FOR VENOUS THROMBOEMBOLISM (VTE): Status: ACTIVE | Noted: 2022-03-19

## 2022-04-04 PROBLEM — R26.2 DIFFICULTY WALKING: Status: ACTIVE | Noted: 2022-04-04

## 2022-04-04 PROBLEM — R41.89 IMPAIRED COGNITION: Status: ACTIVE | Noted: 2022-04-04

## 2022-04-04 PROCEDURE — 99214 OFFICE O/P EST MOD 30 MIN: CPT | Performed by: INTERNAL MEDICINE

## 2022-04-04 RX ORDER — HYDROCODONE BITARTRATE AND ACETAMINOPHEN 7.5; 325 MG/1; MG/1
TABLET ORAL
COMMUNITY
Start: 2022-03-31 | End: 2022-04-07 | Stop reason: SDUPTHER

## 2022-04-04 RX ORDER — POLYETHYLENE GLYCOL 3350 17 G/17G
17 POWDER, FOR SOLUTION ORAL
COMMUNITY
Start: 2022-03-22 | End: 2022-04-04 | Stop reason: SDUPTHER

## 2022-04-04 RX ORDER — ACETAMINOPHEN 325 MG/1
650 TABLET ORAL
COMMUNITY
Start: 2022-03-31 | End: 2022-04-04 | Stop reason: SDUPTHER

## 2022-04-04 RX ORDER — DILTIAZEM HYDROCHLORIDE 120 MG/1
120 CAPSULE, EXTENDED RELEASE ORAL
COMMUNITY
Start: 2022-03-22 | End: 2022-04-04 | Stop reason: SDUPTHER

## 2022-04-04 RX ORDER — NYSTATIN 100000 [USP'U]/G
POWDER TOPICAL 3 TIMES DAILY
Qty: 60 G | Refills: 1 | Status: SHIPPED | OUTPATIENT
Start: 2022-04-04 | End: 2022-08-12

## 2022-04-04 NOTE — PROGRESS NOTES
CHIEF COMPLAINT  Bing Cruz presents to [facility] for follow-up of Follow-up (Rehab. Has a red spot on stomach ).    HPI  Patient here for follow-up after being discharged from [facility] rehab facility 4/2/22- for left total hip arthroplasty done March 16, 2022 in the left hip.  Left hip dislocation following surgery for Left THR.    Pt here with her two daughters    Patient also with other medical problems consisting of chronic atrial fibrillation, hypertension, history of DVT left lower leg on anticoagulation since November 2015, mitral valve regurgitation, osteopenia, vitamin D deficiency among a few.      Ortho notes from 3/31/22 reviewed:  Patient presents with her 2 daughters for follow-up evaluation of left total hip arthroplasty 3/16/2022 in the left hip dislocation and closed reduction 3/20/2022.  Patient and daughter state that they cannot explain what happened when the patient dislocated her hip, they stated that she almost had a fall and while she was still in the hospital they think that this is what caused her hip to dislocate when she had a near fall.  Patient is at [facility] rehab facility, her daughter states that they are releasing her possibly tomorrow or Monday.  Patient states she still has pain in her hip, patient daughter states she has a red area at the distal incision that they are concerned about.  They deny drainage, denies fever/chills.  Patient has been taking pain medication.  Patient admits to left lower extremity leg swelling and her daughters state that her doctor that is caring for her at the care facility is going to start her on some water pills.  Patient has been wearing a knee immobilizer since her closed reduction surgery    ROS significant for left hip pain    OBJECTIVE  Vital Signs  Vitals:    04/04/22 1410   BP: 115/77   BP Location: Left arm   Patient Position: Sitting   Pulse: 114   Temp: 97 °F (36.1 °C)   SpO2: 97%   Height: 162.6  "cm (64\")      Body mass index is 26.49 kg/m².        Physical Exam  Vitals and nursing note reviewed.   Constitutional:       Appearance: Normal appearance.      Comments: Sitting in wheelchair left leg with brace   HENT:      Head: Normocephalic and atraumatic.   Cardiovascular:      Rate and Rhythm: Normal rate and regular rhythm.   Pulmonary:      Effort: Pulmonary effort is normal.      Breath sounds: Normal breath sounds.   Abdominal:      Palpations: Abdomen is soft.   Musculoskeletal:      Cervical back: Normal range of motion and neck supple.   Skin:     General: Skin is warm.      Comments: Area under her left breast with erythema 3 cm x 5 cm in diameter no excoriation no exudates   Neurological:      General: No focal deficit present.      Mental Status: She is alert and oriented to person, place, and time.          RESULTS REVIEW  Lab Results   Component Value Date    PROBNP 1,648.0 07/20/2021    BNP 1385 12/05/2020    BNP 4183 (H) 10/12/2020    BNP 5163 (H) 09/09/2020     CMP    CMP 3/21/22 3/21/22 3/23/22 3/26/22    0432 0432     Glucose  101 (A) 91 87   BUN  18 16 11   Creatinine  0.70 0.63 0.56 (A)   Sodium  136 134 (A) 136   Potassium  4.5 4.4 4.0   Chloride  101 102 103   Calcium  8.5 8.2 8.3   Albumin 2.80 (A)  2.30 (A) 2.40 (A)   Total Bilirubin 1.0  1.0 0.6   Alkaline Phosphatase 92  87 80   AST (SGOT) 22  21 17   ALT (SGPT) 8  7 7   (A) Abnormal value       Comments are available for some flowsheets but are not being displayed.           CBC w/diff    CBC w/Diff 3/21/22 3/23/22 3/26/22   WBC 9.40 10.59 11.45 (A)   RBC 3.27 (A) 3.13 (A) 3.06 (A)   Hemoglobin 10.5 (A) 10.1 (A) 9.6 (A)   Hematocrit 31.3 (A) 29.7 (A) 29.3 (A)   MCV 95.7 94.9 95.8   MCH 32.1 32.3 31.4   MCHC 33.5 34.0 32.8   RDW 13.9 13.5 14.3   Platelets 255 300 380   Neutrophil Rel % 63.5 59.0 62.6   Immature Granulocyte Rel % 1.0 (A) 3.3 (A) 2.4 (A)   Lymphocyte Rel % 20.4 23.0 23.4   Monocyte Rel % 9.6 8.2 6.0   Eosinophil Rel % " 5.2 5.9 5.1   Basophil Rel % 0.3 0.6 0.5   (A) Abnormal value             Lipid Panel    Lipid Panel 7/15/21 2/14/22   Total Cholesterol 187 170   Triglycerides 136 164 (A)   HDL Cholesterol 79 (A) 78 (A)   VLDL Cholesterol 23 27   LDL Cholesterol  85 65   LDL/HDL Ratio 1.02 0.76   (A) Abnormal value             Lab Results   Component Value Date    TSH 1.850 03/18/2022    TSH 3.710 03/14/2022    TSH 2.790 07/21/2021      Lab Results   Component Value Date    FREET4 1.42 03/14/2022    FREET4 1.02 07/15/2021    FREET4 1.2 07/18/2020      A1C Last 3 Results    HGBA1C Last 3 Results 3/8/22   Hemoglobin A1C 5.30               XR Hip 1 View Without Pelvis Left (Surgery Only)    Result Date: 3/20/2022   Procedural fluoroscopy.  Refer to the procedure note for details      WINSOME LEMUS MD       Electronically Signed and Approved By: WINSOME LEMUS MD on 3/20/2022 at 8:48             MRI Femur Left Without Contrast    Result Date: 2/8/2022    1. Previous placement of cannulated screws in the left femoral head/neck.  The resulting artifact obscures surrounding tissues. 2. Moderate marrow edema in the femoral head and neck may represent postoperative changes versus posttraumatic, stress related or AVN related changes 3. Bilateral trochanteric bursitis 4. Mild flattening of the left femoral head superior a ticket ower surface could be secondary to underlying a vascular necrosis or previous trauma. 5. Mild-to-moderate left and mild right hip osteoarthritis      Sergei Cisneros M.D.       Electronically Signed and Approved By: Sergei Cisneros M.D. on 2/08/2022 at 15:15             XR Hip With or Without Pelvis 1 View Left    Result Date: 3/20/2022   Persistent posterior dislocation of the left hip prosthesis is noted.     RAFAL ALANIS JR, MD       Electronically Signed and Approved By: RAFAL ALANIS JR, MD on 3/20/2022 at 0:28             XR Hip With or Without Pelvis 1 View Left    Result Date: 3/20/2022    Persistent posterior  dislocation of the left hip prosthesis is noted.   RAFAL ALANIS JR, MD       Electronically Signed and Approved By: RAFAL ALANIS JR, MD on 3/20/2022 at 0:26             XR Hip With or Without Pelvis 1 View Left    Result Date: 3/20/2022    Persistent posterior dislocation of the left hip prosthesis is noted.     RAFAL ALANIS JR, MD       Electronically Signed and Approved By: RAFAL ALANIS JR, MD on 3/20/2022 at 0:24             XR Hip With or Without Pelvis 1 View Left    Result Date: 3/20/2022    Persistent posterior dislocation of the left hip prosthesis is noted.     RAFAL ALANIS JR, MD       Electronically Signed and Approved By: RAFAL ALANIS JR, MD on 3/20/2022 at 0:23             XR Hip With or Without Pelvis 2 - 3 View Left    Result Date: 3/19/2022   There is an acute posterior dislocation of the left hip prosthesis.      RAFAL ALANIS JR, MD       Electronically Signed and Approved By: RAFAL ALANIS JR, MD on 3/19/2022 at 22:39             XR Hip With or Without Pelvis 2 - 3 View Left    Result Date: 3/16/2022   Intraoperative imaging of the left hip as described.      TERRY SAUCEDA MD       Electronically Signed and Approved By: TERRY SAUCEDA MD on 3/16/2022 at 13:20             XR Hip With or Without Pelvis 2 - 3 View Left    Result Date: 3/16/2022    1. New left hip prosthesis without complicating features. 2. Pelvic degenerative changes.      HARIS OLSON MD       Electronically Signed and Approved By: HARIS OLSON MD on 3/16/2022 at 11:55                PFSH reviewed.           ASSESSMENT & PLAN  Diagnoses and all orders for this visit:    1. Left hip pain (Primary)    2. Hypertension, essential    3. Atrial fibrillation, chronic (HCC)    4. Cutaneous candidiasis  -     nystatin (MYCOSTATIN) [id number] UNIT/GM powder; Apply  topically to the appropriate area as directed 3 (Three) Times a Day.  Dispense: 60 g; Refill: 1      Assessment plan  Left hip pain status post left total hip arthroplasty  3/16/2022 with consequent left hip dislocation status post closed reduction March 20, 2022-discharged from rehab April 2, 2022-to get home health and physical therapy as outpatient.    Hypertension-stable.  Continue with metoprolol  mg 1 daily, diltiazem 120 mg CD 1 daily     :  Chronic atrial fibrillation, continue metoprolol , and Eliquis,,, restarted Cardizem on readmission March 19,, blood pressures are stable, on Eliquis 2.5 twice daily    Cutaneous candidiasis at left breast/start nystatin antifungal powder use 3-4 times per day to area for the next 2 to 4 weeks.     Other problems  Dysphagia previous work-up with GI service,, will add  Pepcid for now     Colon cancer diagnosed June 2020 with anemia,, status post exploratory laparotomy partial colectomy all lymph nodes negative, prolonged hospital course with rehab, hyponatremia and C. difficile colitis, failure to thrive and weight loss, she is clinically improved over that stay subsequent colonoscopy October 2021 Dr. Back     Lung nodule found June 2025 mm left lower lobe     Hypertension --- hold losartan,, resume Cardizem and beta-blockers for rate control and blood pressure     Remote fall 2016 left humeral fracture ORIF with josseline     DVT left lower leg November 2015 has been on anticoagulation indefinitely     Mitral valve regurgitation moderate degree on previous echoes     Hyperlipidemia off statin therapy for now,     Vitamin D deficiency     Osteopenia    FOLLOW UP  Return if symptoms worsen or fail to improve, for Next scheduled follow up.    Patient was given instructions and counseling regarding her condition or for health maintenance advice. Please see specific information pulled into the AVS if appropriate.

## 2022-04-04 NOTE — PATIENT INSTRUCTIONS
Use antifungal cream as directed follow-up with Dr. Kramer May 5, 2022 continue all current medications.

## 2022-04-06 ENCOUNTER — TELEPHONE (OUTPATIENT)
Dept: INTERNAL MEDICINE | Facility: CLINIC | Age: 87
End: 2022-04-06

## 2022-04-06 NOTE — TELEPHONE ENCOUNTER
Caller: UCHE    Relationship to patient: Home Health    Best call back number: [phone]    Patient is needing: [facility] WITH [facility] CALLING FOR VERBAL ORDERS FOR PATIENT. THEY WOULD LIKE ORDERS FOR A SPEECH THERAPY EVALUATION DUE TO THE PATIENT HAVING DIFFICULTY SWALLOWING.

## 2022-04-07 ENCOUNTER — OFFICE VISIT (OUTPATIENT)
Dept: ORTHOPEDIC SURGERY | Facility: CLINIC | Age: 87
End: 2022-04-07

## 2022-04-07 VITALS — OXYGEN SATURATION: 97 % | HEART RATE: 73 BPM | HEIGHT: 65 IN | BODY MASS INDEX: 22.82 KG/M2 | WEIGHT: 137 LBS

## 2022-04-07 DIAGNOSIS — Z47.89 AFTERCARE FOLLOWING SURGERY OF THE MUSCULOSKELETAL SYSTEM: ICD-10-CM

## 2022-04-07 DIAGNOSIS — S72.002G CLOSED FRACTURE OF LEFT HIP WITH DELAYED HEALING, SUBSEQUENT ENCOUNTER: Primary | ICD-10-CM

## 2022-04-07 DIAGNOSIS — M25.552 LEFT HIP PAIN: Primary | ICD-10-CM

## 2022-04-07 PROCEDURE — 99024 POSTOP FOLLOW-UP VISIT: CPT | Performed by: ORTHOPAEDIC SURGERY

## 2022-04-07 RX ORDER — HYDROCODONE BITARTRATE AND ACETAMINOPHEN 7.5; 325 MG/1; MG/1
1 TABLET ORAL EVERY 4 HOURS PRN
Qty: 36 TABLET | Refills: 0 | Status: SHIPPED | OUTPATIENT
Start: 2022-04-07 | End: 2022-08-12

## 2022-04-07 NOTE — PROGRESS NOTES
"Chief Complaint  Pain of the Left Hip     Subjective      Bing Cruz presents to [facility] for follow up evaluation of the left hip. The patient is S/P left total hip arthroplasty 3/16/2022 in the left hip dislocation and closed reduction 3/20/2022. She has been wearing a knee immobilizer to keep her leg straight. She has no new complaints. She has been having home health physical therapy. She is here with her daughters.     Allergies   Allergen Reactions   • Citalopram Unknown - High Severity   • Clonazepam Unknown - High Severity   • Levofloxacin Nausea And Vomiting   • Lisinopril Unknown - High Severity   • Macrobid [Nitrofurantoin Macrocrystal] Rash   • Melatonin Unknown - High Severity        Social History     Socioeconomic History   • Marital status:    Tobacco Use   • Smoking status: Never Smoker   • Smokeless tobacco: Never Used   Vaping Use   • Vaping Use: Former   Substance and Sexual Activity   • Alcohol use: Not Currently   • Drug use: Never   • Sexual activity: Defer        Review of Systems     Objective   Vital Signs:   Pulse 73   Ht 163.8 cm (64.5\")   Wt 62.1 kg (137 lb)   SpO2 97%   BMI 23.15 kg/m²       Physical Exam  Constitutional:       Appearance: Normal appearance. The patient is well-developed and normal weight.   HENT:      Head: Normocephalic.      Right Ear: Hearing and external ear normal.      Left Ear: Hearing and external ear normal.      Nose: Nose normal.   Eyes:      Conjunctiva/sclera: Conjunctivae normal.   Cardiovascular:      Rate and Rhythm: Normal rate.   Pulmonary:      Effort: Pulmonary effort is normal.      Breath sounds: No wheezing or rales.   Abdominal:      Palpations: Abdomen is soft.      Tenderness: There is no abdominal tenderness.   Musculoskeletal:      Cervical back: Normal range of motion.   Skin:     Findings: No rash.   Neurological:      Mental Status: The patient is alert and oriented to person, place, and " time.   Psychiatric:         Mood and Affect: Mood and affect normal.         Judgment: Judgment normal.       Ortho Exam      Left hip- incision well healing. No signs of infection. Minimal pain with gentle hip ROM. Neurovascularly intact. Equal leg lengths. Ambulates with a walker. Calf soft non-tender. Mild swelling to the incision.     Procedures      Imaging Results (Most Recent)     Procedure Component Value Units Date/Time    XR Hip With or Without Pelvis 2 - 3 View Left [[id number]] Resulted: 04/07/22 1338     Updated: 04/07/22 1338           Result Review :       XR Hip 1 View Without Pelvis Left (Surgery Only)    Result Date: 3/20/2022  Narrative: PROCEDURE: XR HIP 1 VIEW WO PELVIS LEFT  COMPARISON: [facility], CR, XR HIP W OR WO PELVIS 1 VIEW LEFT, 3/19/2022, 23:36.  INDICATIONS: LT HIP REDUCTION/ FLURO TIME 1.04 MIN/ 7.7 mGy  FINDINGS:  Intraoperative fluoroscopy provided for reduction of the left hip prosthesis.  The prosthesis has been reduced compared to the previous study.      Impression:  Procedural fluoroscopy.  Refer to the procedure note for details      WINSOME LEMUS MD       Electronically Signed and Approved By: WINSOME LEMUS MD on 3/20/2022 at 8:48             FL < 1 Hour    Result Date: 3/20/2022  Narrative: This procedure was auto-finalized with no dictation required.    FL < 1 Hour    Result Date: 3/16/2022  Narrative: This procedure was auto-finalized with no dictation required.    XR Hip With or Without Pelvis 1 View Left    Result Date: 3/20/2022  Narrative: PROCEDURE: XR HIP W OR WO PELVIS 1 VIEW LEFT  COMPARISONS: [facility], CR, XR HIP W OR WO PELVIS 1 VIEW LEFT, 3/19/2022, 23:31.   [facility], CR, XR HIP W OR WO PELVIS 1 VIEW LEFT, 3/19/2022, 23:28.   [facility], CR, XR HIP W OR WO PELVIS 2-3 VIEW LEFT, 3/19/2022, 21:53.  [facility], CR, XR HIP W OR WO PELVIS 2-3 VIEW LEFT, 3/16/2022, 11:19.  [facility]  [facility], CR, XR HIP W OR WO PELVIS 1 VIEW LEFT, 3/19/2022, 23:34.  INDICATIONS: POST REDUCTION (ATTEMPT 4).  FINDINGS: A single AP view of the left hip reveals persistent posterior dislocation of the left hip prosthesis.  No acute fractures are seen.  The other findings are as described in the prior recent reports.      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     RAFAL ALANIS JR, MD       Electronically Signed and Approved By: RAFAL ALANIS JR, MD on 3/20/2022 at 0:28             XR Hip With or Without Pelvis 1 View Left    Result Date: 3/20/2022  Narrative: PROCEDURE: XR HIP W OR WO PELVIS 1 VIEW LEFT  COMPARISONS: [facility], CR, XR HIP W OR WO PELVIS 1 VIEW LEFT, 3/19/2022, 23:36.   [facility], CR, XR HIP W OR WO PELVIS 1 VIEW LEFT, 3/19/2022, 23:28.   [facility], CR, XR HIP W OR WO PELVIS 2-3 VIEW LEFT, 3/19/2022, 21:53.   [facility], CR, XR HIP W OR WO PELVIS 2-3 VIEW LEFT, 3/16/2022, 11:19.   [facility], CR, XR HIP W OR WO PELVIS 1 VIEW LEFT, 3/19/2022, 23:31.  INDICATIONS: POST REDUCTION (ATTEMPT 3).  FINDINGS:  A single AP view of the left hip reveals persistent posterior dislocation of the left hip prosthesis.  No acute fractures are seen.  The other findings are as described in the prior recent reports.      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   RAFAL ALANIS JR, MD       Electronically Signed and Approved By: RAFAL ALANIS JR, MD on 3/20/2022 at 0:26             XR Hip With or Without Pelvis 1 View Left    Result Date: 3/20/2022  Narrative: PROCEDURE: XR HIP W OR WO PELVIS 1 VIEW LEFT  COMPARISONS: [facility], CR, XR HIP W OR WO PELVIS 2-3 VIEW LEFT, 3/19/2022, 21:53.   [facility], CR, XR HIP W OR WO PELVIS 1 VIEW LEFT, 3/19/2022, 23:28.  INDICATIONS: POST REDUCTION (ATTEMPT 2).  FINDINGS:  A single AP view of the  left hip reveals persistent posterior dislocation of the left hip prosthesis.  No acute fractures are seen.  The other findings are as described in the prior recent reports.      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     RAFAL ALANIS JR, MD       Electronically Signed and Approved By: RAFAL ALANIS JR, MD on 3/20/2022 at 0:24             XR Hip With or Without Pelvis 1 View Left    Result Date: 3/20/2022  Narrative: PROCEDURE: XR HIP W OR WO PELVIS 1 VIEW LEFT  COMPARISON: [facility], CR, XR HIP W OR WO PELVIS 2-3 VIEW LEFT, 3/19/2022, 21:53.  INDICATIONS: POST REDUCTION (ATTEMPT 1).  FINDINGS:  A single AP view of the left hip reveals persistent posterior dislocation of the left hip prosthesis, as described in the prior exam report from 3/19/2022 at 2153 hours.  No acute fractures are seen.      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     RAFAL ALANIS JR, MD       Electronically Signed and Approved By: RAFAL ALANIS JR, MD on 3/20/2022 at 0:23             XR Hip With or Without Pelvis 2 - 3 View Left    Result Date: 4/5/2022  Narrative: • View:AP and Lateral view(s) • Site: Left hip • Indication: Hip pain • Study: X-rays ordered, taken in the office, and reviewed today • X-ray: Intact appearing left total hip arthroplasty, no signs of hardware failure loosening, no signs of periprosthetic fracture, no subsidence, good alignment. • Comparative data: No comparative data found     XR Hip With or Without Pelvis 2 - 3 View Left    Result Date: 3/19/2022  Narrative: PROCEDURE: XR HIP W OR WO PELVIS 2-3 VIEW LEFT  COMPARISON: [facility], CR, XR HIP W OR WO PELVIS 2-3 VIEW LEFT, 3/16/2022, 11:19.  INDICATIONS: LEFT HIP PAIN POST SURGERY WEDNESDAY.  FINDINGS: Three views were obtained.  There is a total left hip prosthesis in place.  There is new posterior dislocation of the left femoral prosthetic  component, relative to the acetabular component of the prosthesis.  No definite periprosthetic fractures are seen.  No acute fractures are seen elsewhere.  Moderate degenerative changes involve the right hip joint.  There are degenerative changes of partially imaged spine.      Impression:  There is an acute posterior dislocation of the left hip prosthesis.      RAFAL ALANIS JR, MD       Electronically Signed and Approved By: RAFAL ALANIS JR, MD on 3/19/2022 at 22:39             XR Hip With or Without Pelvis 2 - 3 View Left    Result Date: 3/16/2022  Narrative: PROCEDURE: XR HIP W OR WO PELVIS 2-3 VIEW LEFT  COMPARISON: [facility] , CR, XR HIP W OR WO PELVIS 2-3 VIEW LEFT, 1/27/2022, 10:47.  INDICATIONS: LEFT HIP AVASCULAR NECROSIS/FLUORO TIME 25.7 SECONDS/2.42 mGy/8 IMAGES  FINDINGS:  There are a total of 8 digital spot fluoroscopic images submitted of the left hip.  Fine osseous detail is limited as the study was obtained with the portable C-arm.  The images show placement of a total left hip arthroplasty with normal anatomic alignment.  Expected intraoperative changes are seen which includes soft tissue gas and swelling.      Impression:  Intraoperative imaging of the left hip as described.      TERRY SAUCEDA MD       Electronically Signed and Approved By: TERRY SAUCEDA MD on 3/16/2022 at 13:20             XR Hip With or Without Pelvis 2 - 3 View Left    Result Date: 3/16/2022  Narrative: PROCEDURE: XR HIP W OR WO PELVIS 2-3 VIEW LEFT  COMPARISON: [facility], CR, XR HIP W OR WO PELVIS 2-3 VIEW LEFT, 3/16/2022, 10:16.  INDICATIONS: Post-Op LEFT  Hip Arthroplasty  FINDINGS:  There is a total left hip prosthesis, which is new from prior.  The prosthesis appears intact.  There is adjacent soft tissue gas and there are overlying skin staples.  There is no fracture or dislocation.  There are mild degenerative changes noted about the pelvis.      Impression:   1. New left hip prosthesis without  complicating features. 2. Pelvic degenerative changes.      HARIS OLSON MD       Electronically Signed and Approved By: HARIS OLSON MD on 3/16/2022 at 11:55                      Assessment and Plan     DX:  S/P left total hip arthroplasty 3/16/2022 in the left hip dislocation and closed reduction 3/20/2022.    Discussed the treatment plan with the patient and her daughters. Plan to continue the pillow when sleeping. Plan to continue physical therapy. I advised her to continue posterior hip precautions. The patient's daughter is considering relocating her to [place]. If so, the patient will f/u with ortho there and continue physical therapy. Refill of pain medication given today.     Call or return if worsening symptoms.    Follow Up     4 weeks      Patient was given instructions and counseling regarding her condition or for health maintenance advice. Please see specific information pulled into the AVS if appropriate.     Scribed for Bam Warner MD by Neeta Maldonado.  04/07/22   13:42 EDT    I have personally performed the services described in this document as scribed by the above individual and it is both accurate and complete. Bam Warner MD 04/10/22

## 2022-04-13 ENCOUNTER — TELEPHONE (OUTPATIENT)
Dept: INTERNAL MEDICINE | Facility: CLINIC | Age: 87
End: 2022-04-13

## 2022-04-13 NOTE — TELEPHONE ENCOUNTER
Set patient up for a telehealth visit tomorrow morning to discuss treatment options if she can or tomorrow afternoon

## 2022-04-13 NOTE — TELEPHONE ENCOUNTER
Caller: STEPHANIE BENITES    Relationship: Emergency Contact    Best call back number: [phone]    What medication are you requesting: MEDICATION TO TREAT ANXIETY  NO SPECIFIC ONE     What are your current symptoms: ANXIETY HAS INCREASED RECENTLY DUE TO SURGERY AND OTHER HEALTH ISSUES    How long have you been experiencing symptoms: ABOUT ONE MONTH    Have you had these symptoms before:    [x] Yes  [] No    Have you been treated for these symptoms before:   [x] Yes  [] No    If a prescription is needed, what is your preferred pharmacy and phone number: [id number] - [place] [facility] [address] [place] [phone] [facility] [phone] FX     Additional notes: PATIENT WAS PRESCRIBED PROZAC IN THE PAST AND IT WAS INEFFECTIVE, PLEASE CONTACT CALLER/DAUGHTER IF NEW PRESCRIPTION IS SENT, HER LAST OFFICE VISIT WAS 04/04/2022

## 2022-04-14 ENCOUNTER — TELEMEDICINE (OUTPATIENT)
Dept: INTERNAL MEDICINE | Facility: CLINIC | Age: 87
End: 2022-04-14

## 2022-04-14 DIAGNOSIS — I95.2 HYPOTENSION DUE TO DRUGS: ICD-10-CM

## 2022-04-14 DIAGNOSIS — D50.9 IRON DEFICIENCY ANEMIA, UNSPECIFIED IRON DEFICIENCY ANEMIA TYPE: Primary | ICD-10-CM

## 2022-04-14 DIAGNOSIS — F33.1 MAJOR DEPRESSIVE DISORDER, RECURRENT, MODERATE: ICD-10-CM

## 2022-04-14 DIAGNOSIS — S72.002G CLOSED FRACTURE OF LEFT HIP WITH DELAYED HEALING, SUBSEQUENT ENCOUNTER: ICD-10-CM

## 2022-04-14 DIAGNOSIS — Z96.642 HISTORY OF TOTAL LEFT HIP REPLACEMENT: ICD-10-CM

## 2022-04-14 DIAGNOSIS — I10 HYPERTENSION, ESSENTIAL: ICD-10-CM

## 2022-04-14 DIAGNOSIS — F41.9 ANXIETY: ICD-10-CM

## 2022-04-14 PROCEDURE — 99214 OFFICE O/P EST MOD 30 MIN: CPT | Performed by: INTERNAL MEDICINE

## 2022-04-14 RX ORDER — ESCITALOPRAM OXALATE 5 MG/1
5 TABLET ORAL DAILY
Qty: 30 TABLET | Refills: 3 | Status: SHIPPED | OUTPATIENT
Start: 2022-04-14 | End: 2022-05-10 | Stop reason: SDUPTHER

## 2022-04-14 NOTE — PROGRESS NOTES
DOING TELEHEALTH VISIT WITH GOOD AUDIO AND VIDEO AVAILBELE AND PT AGREEABLE     Chief Complaint/ HPI: f/u with hip pain and anxiety  Still some pain,   [facility]ced [facility] April 2022,  Stress levels high   Worried,   Memory ok   Still getting pt/ot speech          Objective   Vital Signs  110 -115/ 68     There were no vitals filed for this visit.   There is no height or weight on file to calculate BMI.  Review of Systems as above NO FALLS  ,BREATHING OK , NO CP   Physical Exam awake and alert   NO AUDIBLE WHEEZING   SWELLING IN L THIGH AND SOME ANKLE LEFT   THROAT CLEAR ,     Result Review :   Lab Results   Component Value Date    PROBNP 1,648.0 07/20/2021    BNP 1385 12/05/2020    BNP 4183 (H) 10/12/2020    BNP 5163 (H) 09/09/2020     CMP    CMP 3/21/22 3/21/22 3/23/22 3/26/22    0432 0432     Glucose  101 (A) 91 87   BUN  18 16 11   Creatinine  0.70 0.63 0.56 (A)   Sodium  136 134 (A) 136   Potassium  4.5 4.4 4.0   Chloride  101 102 103   Calcium  8.5 8.2 8.3   Albumin 2.80 (A)  2.30 (A) 2.40 (A)   Total Bilirubin 1.0  1.0 0.6   Alkaline Phosphatase 92  87 80   AST (SGOT) 22  21 17   ALT (SGPT) 8  7 7   (A) Abnormal value       Comments are available for some flowsheets but are not being displayed.           CBC w/diff    CBC w/Diff 3/21/22 3/23/22 3/26/22   WBC 9.40 10.59 11.45 (A)   RBC 3.27 (A) 3.13 (A) 3.06 (A)   Hemoglobin 10.5 (A) 10.1 (A) 9.6 (A)   Hematocrit 31.3 (A) 29.7 (A) 29.3 (A)   MCV 95.7 94.9 95.8   MCH 32.1 32.3 31.4   MCHC 33.5 34.0 32.8   RDW 13.9 13.5 14.3   Platelets 255 300 380   Neutrophil Rel % 63.5 59.0 62.6   Immature Granulocyte Rel % 1.0 (A) 3.3 (A) 2.4 (A)   Lymphocyte Rel % 20.4 23.0 23.4   Monocyte Rel % 9.6 8.2 6.0   Eosinophil Rel % 5.2 5.9 5.1   Basophil Rel % 0.3 0.6 0.5   (A) Abnormal value             Lipid Panel    Lipid Panel 7/15/21 2/14/22   Total Cholesterol 187 170   Triglycerides 136 164 (A)   HDL Cholesterol 79 (A) 78 (A)   VLDL Cholesterol 23 27   LDL Cholesterol  85  65   LDL/HDL Ratio 1.02 0.76   (A) Abnormal value             Lab Results   Component Value Date    TSH 1.850 03/18/2022    TSH 3.710 03/14/2022    TSH 2.790 07/21/2021      Lab Results   Component Value Date    FREET4 1.42 03/14/2022    FREET4 1.02 07/15/2021    FREET4 1.2 07/18/2020      A1C Last 3 Results    HGBA1C Last 3 Results 3/8/22   Hemoglobin A1C 5.30                             Visit Diagnoses:  No diagnosis found.    Assessment and Plan   There are no diagnoses linked to this encounter.     Depression- lost son to stomach ca, nov 2018, and daughter had AVR nov 2018, - NO RX --  WORSENING ANXIETY SINCE surgery, MARCH 16, 2022, AND REHAB STAY  WILL RE TRY LEXAPRO 5 MG QD AND CONT LORAZEPAM 0.5 HS AND 1/2= 0.25 MG QD PRN April 14, 2022,--, we will ask home health nursing to go back out and redraw blood next week to check blood counts, cautioned on patient with use of medicines regarding the sedation, falls etc., patient and daughter understand,    Hypertension, may be too low at this time, reviewed blood pressure medications with patient daughter by telehealth visit April 14, 2022, will decrease medication to avoid hypotension,--WILL CONT METOPROLOL  MG BID , LOSARTAN 50 MG BID , DILTIAZEM  MG QD , ---STOP LOSARTAN FOR NOW , April 14, 2022    L NILO, MARCH 16, 2022, POST OP DISLOCATION MARCH 20, 2022 AND THEN TO [facility] REHAB,, will check follow-up labs April 17, 2018 timeframe through [facility] home health, patient's daughter is going to be staying there a little longer to help with care and monitoring of medications discussed April 14, 2022    Transitional care visit for closed l hip fx and orif, July 20, 2021, --- still having some left hip proximal leg weakness, doing outpatient therapy currently, finished home health, November 2021    Dysphagia, Status post barium swallow October 2020, showing narrowing at the distal esophagus, for EGD by Dr. Blount February February 25, 2021,--CONT  PROTONIX daily    Colon cancer diagnosed June 2020 initially found to be Anemia, was referred to hematology oncology,--- sent to gastroenterology---Dr. Blount--FOUND COLON CA JUNE 2020, REFERRED TO DR QUISPE--subsequent exploratory laparotomy and partial colectomy with 14 out of 14 lymph nodes negative, -------patient had prolonged hospital course, subsequent sent to rehab, now home--HAD LOW NA , DEVELOPED C DIFF COLITIS, IN HOSP, FTT WITH WGT LOSS IN REHAB --GAIT DYS. AND GEN WEAKNESS --Slowly improving, current medications reviewed----Patient had delirium in the hospital also, improved---Patient has follow-up for a 5 mm lung nodule found on CT scan in the left lower lobe and surgical changes -----With repeat CT scan in February 2021, no change mild to moderate cardiomegaly stable nodular density lung fields bilateral follow-up CT 1 year recommended stable right hilar lymph node,--- Dr. Solorio------ continues on colestipol half a pill every other day, November 2021  Status post colonoscopy October 2021 Dr. Quispe    LUNG NODULE --Found June 2020, 5 millimeter left lower lobe--- on CT scan for abdominal pains---- FOUND PER HEM/ONC AND HAS F/U CT scan of the chest February 4, 2021, and CT abdomen and pelvis for follow-up of colon cancer,    HYPONATREMIA, -- NOT SEEING DR ELLIOTT--- IN HOSP. JULY AND AUG 2020, ---- restart Lasix 20 mg daily and potassium 20 mEq twice a day, stay off hydrochlorothiazide for now due to hyponatremia, etc. SEPT 2020     Chronic atrial fibrillation-----with history of rapid ventricular response, MARCH 23, 2017, --continues ON ELIQUIS 2.5 mg twice a day ,Metoprolol 100 mg twice a day,--, Cardizem extended release 120 mg daily,---------Cardioverted April 2019 Dr. Jolly , NOW BACK IN AFIB     L HUMERAL FX DEC 2016, ORIF WITH BENSON --at [facility]    adverse reaction with rash to nitrofurantoin,, SEPT. 2016.    EXTENSIVE LLE DVT 11/15, , Continues on Eliquis 2.5 mg  twice a day discussed potentially decreasing dose given age GFR, April 2020    MVR-MOD-, ON ECHO,--APRIL 2017, -- PER DR KAY.     Depression- lost son to stomach ca, nov 2018, and daughter had AVR nov 2018, - NO RX     Gastroesophageal reflux--Off PPIs due to C. difficile colitis July 2020 August 2020---Restart Protonix 40 mg once a day until procedure is complete, January 27, 2021    Hyperlipidemia -/ Elevated triglycerides-----oFF LIPITOR     Osteopenia    Vitamin D deficiency- NL NOW, ON REPLACEMENT  Mild osteoarthrits of left hip            Follow Up   No follow-ups on file.  Patient was given instructions and counseling regarding her condition or for health maintenance advice. Please see specific information pulled into the AVS if appropriate.

## 2022-04-14 NOTE — TELEPHONE ENCOUNTER
I have scheduled the pt for 2:45 today facetime as there was no openings but I told them you wouldn't call until the end of the day and they were ok with that.

## 2022-04-18 ENCOUNTER — TELEPHONE (OUTPATIENT)
Dept: INTERNAL MEDICINE | Facility: CLINIC | Age: 87
End: 2022-04-18

## 2022-04-18 NOTE — TELEPHONE ENCOUNTER
Caller: DORIAN PEREZ    Relationship: Emergency Contact    Best call back number: [phone]    What is the best time to reach you: ANYTIME    Who are you requesting to speak with (clinical staff, provider,  specific staff member): CLINICAL    What was the call regarding: THE PATIENT'S DAUGHTER STATED THE LOSARTAN WAS DISCONTINUED BY PCP AND NOW THE PATIENT'S BLOOD PRESSURE READ 90/58 AND SHE WOULD LIKE A CALL BACK TO DISCUSS OPTIONS    Do you require a callback: YES

## 2022-04-18 NOTE — TELEPHONE ENCOUNTER
Make patient a telehealth visit with me tomorrow preferably in the morning to discuss treatment options

## 2022-04-20 ENCOUNTER — TELEMEDICINE (OUTPATIENT)
Dept: INTERNAL MEDICINE | Facility: CLINIC | Age: 87
End: 2022-04-20

## 2022-04-20 ENCOUNTER — LAB REQUISITION (OUTPATIENT)
Dept: LAB | Facility: HOSPITAL | Age: 87
End: 2022-04-20

## 2022-04-20 DIAGNOSIS — N30.00 ACUTE CYSTITIS WITHOUT HEMATURIA: Primary | ICD-10-CM

## 2022-04-20 DIAGNOSIS — Z87.440 PERSONAL HISTORY OF URINARY (TRACT) INFECTIONS: ICD-10-CM

## 2022-04-20 DIAGNOSIS — I10 ESSENTIAL (PRIMARY) HYPERTENSION: ICD-10-CM

## 2022-04-20 LAB
ALBUMIN SERPL-MCNC: 3.5 G/DL (ref 3.5–5.2)
ALBUMIN/GLOB SERPL: 1.3 G/DL
ALP SERPL-CCNC: 94 U/L (ref 39–117)
ALT SERPL W P-5'-P-CCNC: 5 U/L (ref 1–33)
ANION GAP SERPL CALCULATED.3IONS-SCNC: 9.8 MMOL/L (ref 5–15)
AST SERPL-CCNC: 16 U/L (ref 1–32)
BASOPHILS # BLD AUTO: 0.03 10*3/MM3 (ref 0–0.2)
BASOPHILS NFR BLD AUTO: 0.4 % (ref 0–1.5)
BILIRUB SERPL-MCNC: 0.7 MG/DL (ref 0–1.2)
BUN SERPL-MCNC: 9 MG/DL (ref 8–23)
BUN/CREAT SERPL: 13.8 (ref 7–25)
CALCIUM SPEC-SCNC: 9 MG/DL (ref 8.2–9.6)
CHLORIDE SERPL-SCNC: 103 MMOL/L (ref 98–107)
CO2 SERPL-SCNC: 25.2 MMOL/L (ref 22–29)
CREAT SERPL-MCNC: 0.65 MG/DL (ref 0.57–1)
DEPRECATED RDW RBC AUTO: 51.8 FL (ref 37–54)
EGFRCR SERPLBLD CKD-EPI 2021: 83.8 ML/MIN/1.73
EOSINOPHIL # BLD AUTO: 0.08 10*3/MM3 (ref 0–0.4)
EOSINOPHIL NFR BLD AUTO: 1.1 % (ref 0.3–6.2)
ERYTHROCYTE [DISTWIDTH] IN BLOOD BY AUTOMATED COUNT: 14.6 % (ref 12.3–15.4)
GLOBULIN UR ELPH-MCNC: 2.7 GM/DL
GLUCOSE SERPL-MCNC: 92 MG/DL (ref 65–99)
HCT VFR BLD AUTO: 33.5 % (ref 34–46.6)
HGB BLD-MCNC: 11 G/DL (ref 12–15.9)
IMM GRANULOCYTES # BLD AUTO: 0.03 10*3/MM3 (ref 0–0.05)
IMM GRANULOCYTES NFR BLD AUTO: 0.4 % (ref 0–0.5)
LYMPHOCYTES # BLD AUTO: 2.05 10*3/MM3 (ref 0.7–3.1)
LYMPHOCYTES NFR BLD AUTO: 27.2 % (ref 19.6–45.3)
MCH RBC QN AUTO: 31.9 PG (ref 26.6–33)
MCHC RBC AUTO-ENTMCNC: 32.8 G/DL (ref 31.5–35.7)
MCV RBC AUTO: 97.1 FL (ref 79–97)
MONOCYTES # BLD AUTO: 0.51 10*3/MM3 (ref 0.1–0.9)
MONOCYTES NFR BLD AUTO: 6.8 % (ref 5–12)
NEUTROPHILS NFR BLD AUTO: 4.85 10*3/MM3 (ref 1.7–7)
NEUTROPHILS NFR BLD AUTO: 64.1 % (ref 42.7–76)
NRBC BLD AUTO-RTO: 0 /100 WBC (ref 0–0.2)
PLATELET # BLD AUTO: 292 10*3/MM3 (ref 140–450)
PMV BLD AUTO: 11.1 FL (ref 6–12)
POTASSIUM SERPL-SCNC: 3.7 MMOL/L (ref 3.5–5.2)
PROT SERPL-MCNC: 6.2 G/DL (ref 6–8.5)
RBC # BLD AUTO: 3.45 10*6/MM3 (ref 3.77–5.28)
SODIUM SERPL-SCNC: 138 MMOL/L (ref 136–145)
WBC NRBC COR # BLD: 7.55 10*3/MM3 (ref 3.4–10.8)

## 2022-04-20 PROCEDURE — 85025 COMPLETE CBC W/AUTO DIFF WBC: CPT | Performed by: INTERNAL MEDICINE

## 2022-04-20 PROCEDURE — 99213 OFFICE O/P EST LOW 20 MIN: CPT | Performed by: INTERNAL MEDICINE

## 2022-04-20 PROCEDURE — 80053 COMPREHEN METABOLIC PANEL: CPT | Performed by: INTERNAL MEDICINE

## 2022-04-20 RX ORDER — ONDANSETRON 4 MG/1
4 TABLET, FILM COATED ORAL EVERY 8 HOURS PRN
Qty: 20 TABLET | Refills: 0 | Status: SHIPPED | OUTPATIENT
Start: 2022-04-20 | End: 2022-08-12

## 2022-04-20 RX ORDER — CIPROFLOXACIN 500 MG/1
500 TABLET, FILM COATED ORAL 2 TIMES DAILY
Qty: 14 TABLET | Refills: 0 | Status: SHIPPED | OUTPATIENT
Start: 2022-04-20 | End: 2022-05-05

## 2022-04-20 NOTE — PROGRESS NOTES
Chief Complaint/ HPI: Patient is here to follow-up with concerns, this is a telehealth visit with good A/V available and family at bedside,    ? bp med concerns and bp low  bp better yest and today --103/62, pulse 87       Objective   Vital Signs  There were no vitals filed for this visit.   There is no height or weight on file to calculate BMI.  Review of Systems no falls, no syncope or chest pain   Not eating well  Urine strip positive for uti, April 19, 2022    Physical Exam alert and ox 3   Weak, sitting up     Result Review :   Lab Results   Component Value Date    PROBNP 1,648.0 07/20/2021    BNP 1385 12/05/2020    BNP 4183 (H) 10/12/2020    BNP 5163 (H) 09/09/2020     CMP    CMP 3/23/22 3/26/22 4/20/22   Glucose 91 87 92   BUN 16 11 9   Creatinine 0.63 0.56 (A) 0.65   Sodium 134 (A) 136 138   Potassium 4.4 4.0 3.7   Chloride 102 103 103   Calcium 8.2 8.3 9.0   Albumin 2.30 (A) 2.40 (A) 3.50   Total Bilirubin 1.0 0.6 0.7   Alkaline Phosphatase 87 80 94   AST (SGOT) 21 17 16   ALT (SGPT) 7 7 5   (A) Abnormal value       Comments are available for some flowsheets but are not being displayed.           CBC w/diff    CBC w/Diff 3/21/22 3/23/22 3/26/22   WBC 9.40 10.59 11.45 (A)   RBC 3.27 (A) 3.13 (A) 3.06 (A)   Hemoglobin 10.5 (A) 10.1 (A) 9.6 (A)   Hematocrit 31.3 (A) 29.7 (A) 29.3 (A)   MCV 95.7 94.9 95.8   MCH 32.1 32.3 31.4   MCHC 33.5 34.0 32.8   RDW 13.9 13.5 14.3   Platelets 255 300 380   Neutrophil Rel % 63.5 59.0 62.6   Immature Granulocyte Rel % 1.0 (A) 3.3 (A) 2.4 (A)   Lymphocyte Rel % 20.4 23.0 23.4   Monocyte Rel % 9.6 8.2 6.0   Eosinophil Rel % 5.2 5.9 5.1   Basophil Rel % 0.3 0.6 0.5   (A) Abnormal value             Lipid Panel    Lipid Panel 7/15/21 2/14/22   Total Cholesterol 187 170   Triglycerides 136 164 (A)   HDL Cholesterol 79 (A) 78 (A)   VLDL Cholesterol 23 27   LDL Cholesterol  85 65   LDL/HDL Ratio 1.02 0.76   (A) Abnormal value             Lab Results   Component Value Date    TSH  1.850 03/18/2022    TSH 3.710 03/14/2022    TSH 2.790 07/21/2021      Lab Results   Component Value Date    FREET4 1.42 03/14/2022    FREET4 1.02 07/15/2021    FREET4 1.2 07/18/2020      A1C Last 3 Results    HGBA1C Last 3 Results 3/8/22   Hemoglobin A1C 5.30                             Visit Diagnoses:    ICD-10-CM ICD-9-CM   1. Acute cystitis without hematuria  N30.00 595.0       Assessment and Plan   Diagnoses and all orders for this visit:    1. Acute cystitis without hematuria (Primary)  -     Comprehensive Metabolic Panel; Future  -     CBC & Differential; Future  -     Urinalysis With Culture If Indicated -; Future    Other orders  -     ondansetron (Zofran) 4 MG tablet; Take 1 tablet by mouth Every 8 (Eight) Hours As Needed for Nausea or Vomiting.  Dispense: 20 tablet; Refill: 0  -     ciprofloxacin (Cipro) 500 MG tablet; Take 1 tablet by mouth 2 (Two) Times a Day.  Dispense: 14 tablet; Refill: 0         Patient with failure to thrive, not eating well, possible UTI with positive home urine test kit April 19, blood pressures are running low despite being off losartan, plan at this point is to treat UTI and stop Cardizem for now, push fluids if possible, will also add some Zofran as needed, April 20, 2022    Depression- lost son to stomach ca, nov 2018, and daughter had AVR nov 2018, - NO RX --    WORSENING ANXIETY SINCE surgery, MARCH 16, 2022, AND REHAB STAY  WILL RE TRY LEXAPRO 5 MG QD AND CONT LORAZEPAM 0.5 HS AND 1/2= 0.25 MG QD PRN April 14, 2022,--, we will ask home health nursing to go back out and redraw blood next week to check blood counts, cautioned on patient with use of medicines regarding the sedation, falls etc., patient and daughter understand,    Hypertension, may be too low at this time, reviewed blood pressure medications with patient daughter by telehealth visit April 14, 2022, will decrease medication to avoid hypotension,--WILL CONT METOPROLOL  MG BID , LOSARTAN 50 MG BID ,  DILTIAZEM  MG QD , ---STOP LOSARTAN FOR NOW , April 14, 2022    L NILO, MARCH 16, 2022, POST OP DISLOCATION MARCH 20, 2022 AND THEN TO [facility] REHAB,, will check follow-up labs April 17, 2018 timeframe through [facility], patient's daughter is going to be staying there a little longer to help with care and monitoring of medications discussed April 14, 2022    Transitional care visit for closed l hip fx and orif, July 20, 2021, --- still having some left hip proximal leg weakness, doing outpatient therapy currently, finished [facility], November 2021    Dysphagia, Status post barium swallow October 2020, showing narrowing at the distal esophagus, for EGD by Dr. Blount February February 25, 2021,--CONT PROTONIX daily    Colon cancer diagnosed June 2020 initially found to be Anemia, was referred to hematology oncology,--- sent to gastroenterology---Dr. Blount--FOUND COLON CA JUNE 2020, REFERRED TO DR QUISPE--subsequent exploratory laparotomy and partial colectomy with 14 out of 14 lymph nodes negative, -------patient had prolonged hospital course, subsequent sent to rehab, now home--HAD LOW NA , DEVELOPED C DIFF COLITIS, IN HOSP, FTT WITH WGT LOSS IN REHAB --GAIT DYS. AND GEN WEAKNESS --Slowly improving, current medications reviewed----Patient had delirium in the hospital also, improved---Patient has follow-up for a 5 mm lung nodule found on CT scan in the left lower lobe and surgical changes -----With repeat CT scan in February 2021, no change mild to moderate cardiomegaly stable nodular density lung fields bilateral follow-up CT 1 year recommended stable right hilar lymph node,--- Dr. Solorio------ continues on colestipol half a pill every other day, November 2021  Status post colonoscopy October 2021 Dr. Quispe    LUNG NODULE --Found June 2020, 5 millimeter left lower lobe--- on CT scan for abdominal pains---- FOUND PER HEM/ONC AND HAS F/U CT scan of the chest February 4, 2021, and CT  abdomen and pelvis for follow-up of colon cancer,    HYPONATREMIA, -- NOT SEEING DR ELLIOTT--- IN HOSP. JULY AND AUG 2020, ---- restart Lasix 20 mg daily and potassium 20 mEq twice a day, stay off hydrochlorothiazide for now due to hyponatremia, etc. SEPT 2020     Chronic atrial fibrillation-----with history of rapid ventricular response, MARCH 23, 2017, --continues ON ELIQUIS 2.5 mg twice a day ,Metoprolol 100 mg twice a day,--, will stop Cardizem extended release 120 mg daily, due to low blood pressures weakness, failure to thrive April 20, 2022, as above ---------Cardioverted April 2019 Dr. Kay , NOW BACK IN AFIB     L HUMERAL FX DEC 2016, ORIF WITH BENSON --at [facility]    adverse reaction with rash to nitrofurantoin,, SEPT. 2016.    EXTENSIVE LLE DVT 11/15, , Continues on Eliquis 2.5 mg twice a day discussed potentially decreasing dose given age GFR, April 2020    MVR-MOD-, ON ECHO,--APRIL 2017, -- PER DR KAY.     Depression- lost son to stomach ca, nov 2018, and daughter had AVR nov 2018, - NO RX     Gastroesophageal reflux--Off PPIs due to C. difficile colitis July 2020 August 2020---Restart Protonix 40 mg once a day until procedure is complete, January 27, 2021    Hyperlipidemia -/ Elevated triglycerides-----oFF LIPITOR     Osteopenia    Vitamin D deficiency- NL NOW, ON REPLACEMENT  Mild osteoarthrits of left hip          Follow Up   No follow-ups on file.  Patient was given instructions and counseling regarding her condition or for health maintenance advice. Please see specific information pulled into the AVS if appropriate.

## 2022-04-28 ENCOUNTER — TELEPHONE (OUTPATIENT)
Dept: INTERNAL MEDICINE | Facility: CLINIC | Age: 87
End: 2022-04-28

## 2022-04-28 NOTE — TELEPHONE ENCOUNTER
Kalyani from [facility] Home health states that she is going to order a Zinc ointment for pt's bottom and they are going to retest her urine next week since she just finished an antibiotic.

## 2022-05-05 ENCOUNTER — OFFICE VISIT (OUTPATIENT)
Dept: ORTHOPEDIC SURGERY | Facility: CLINIC | Age: 87
End: 2022-05-05

## 2022-05-05 ENCOUNTER — OFFICE VISIT (OUTPATIENT)
Dept: INTERNAL MEDICINE | Facility: CLINIC | Age: 87
End: 2022-05-05

## 2022-05-05 VITALS
TEMPERATURE: 98.2 F | WEIGHT: 144.6 LBS | HEIGHT: 64 IN | SYSTOLIC BLOOD PRESSURE: 133 MMHG | OXYGEN SATURATION: 96 % | BODY MASS INDEX: 24.69 KG/M2 | DIASTOLIC BLOOD PRESSURE: 82 MMHG | HEART RATE: 96 BPM

## 2022-05-05 VITALS — HEIGHT: 65 IN | OXYGEN SATURATION: 99 % | WEIGHT: 146 LBS | HEART RATE: 98 BPM | BODY MASS INDEX: 24.32 KG/M2

## 2022-05-05 DIAGNOSIS — G93.32 CHRONIC FATIGUE SYNDROME: ICD-10-CM

## 2022-05-05 DIAGNOSIS — K59.1 FUNCTIONAL DIARRHEA: Primary | ICD-10-CM

## 2022-05-05 DIAGNOSIS — I10 HYPERTENSION, ESSENTIAL: ICD-10-CM

## 2022-05-05 DIAGNOSIS — Z47.89 AFTERCARE FOLLOWING SURGERY OF THE MUSCULOSKELETAL SYSTEM: Primary | ICD-10-CM

## 2022-05-05 DIAGNOSIS — I34.0 NONRHEUMATIC MITRAL VALVE REGURGITATION: ICD-10-CM

## 2022-05-05 DIAGNOSIS — I82.5Y2 CHRONIC DEEP VEIN THROMBOSIS (DVT) OF PROXIMAL VEIN OF LEFT LOWER EXTREMITY: ICD-10-CM

## 2022-05-05 DIAGNOSIS — Z98.890 HISTORY OF HIP SURGERY: ICD-10-CM

## 2022-05-05 DIAGNOSIS — D50.9 IRON DEFICIENCY ANEMIA, UNSPECIFIED IRON DEFICIENCY ANEMIA TYPE: ICD-10-CM

## 2022-05-05 DIAGNOSIS — R13.19 ESOPHAGEAL DYSPHAGIA: ICD-10-CM

## 2022-05-05 DIAGNOSIS — M79.89 LEFT LEG SWELLING: ICD-10-CM

## 2022-05-05 DIAGNOSIS — E55.9 VITAMIN D DEFICIENCY: ICD-10-CM

## 2022-05-05 DIAGNOSIS — I48.20 ATRIAL FIBRILLATION, CHRONIC: ICD-10-CM

## 2022-05-05 DIAGNOSIS — F33.0 MAJOR DEPRESSIVE DISORDER, RECURRENT, MILD: ICD-10-CM

## 2022-05-05 DIAGNOSIS — F51.01 PRIMARY INSOMNIA: ICD-10-CM

## 2022-05-05 DIAGNOSIS — S72.002G CLOSED FRACTURE OF LEFT HIP WITH DELAYED HEALING, SUBSEQUENT ENCOUNTER: ICD-10-CM

## 2022-05-05 DIAGNOSIS — C18.9 MALIGNANT NEOPLASM OF COLON, UNSPECIFIED PART OF COLON: ICD-10-CM

## 2022-05-05 PROCEDURE — 99024 POSTOP FOLLOW-UP VISIT: CPT | Performed by: ORTHOPAEDIC SURGERY

## 2022-05-05 PROCEDURE — 99214 OFFICE O/P EST MOD 30 MIN: CPT | Performed by: INTERNAL MEDICINE

## 2022-05-05 RX ORDER — PANTOPRAZOLE SODIUM 40 MG/1
TABLET, DELAYED RELEASE ORAL
Qty: 90 TABLET | Refills: 1 | Status: SHIPPED | OUTPATIENT
Start: 2022-05-05 | End: 2022-08-12

## 2022-05-05 RX ORDER — HYDROCODONE BITARTRATE AND ACETAMINOPHEN 5; 325 MG/1; MG/1
1 TABLET ORAL EVERY 4 HOURS PRN
Qty: 40 TABLET | Refills: 0 | Status: SHIPPED | OUTPATIENT
Start: 2022-05-05 | End: 2022-05-05

## 2022-05-05 NOTE — PROGRESS NOTES
"Chief Complaint  Pain and Follow-up of the Left Hip     Subjective      Bing Cruz presents to [facility] for for follow up evaluation of the left hip. The patient is S/P left total hip arthroplasty 3/16/2022 in the left hip dislocation and closed reduction 3/20/2022. She is ambulating with a walker. She has no other complaints.     Allergies   Allergen Reactions   • Citalopram Unknown - High Severity   • Clonazepam Unknown - High Severity   • Levofloxacin Nausea And Vomiting   • Lisinopril Unknown - High Severity   • Macrobid [Nitrofurantoin Macrocrystal] Rash   • Melatonin Unknown - High Severity        Social History     Socioeconomic History   • Marital status:    Tobacco Use   • Smoking status: Never Smoker   • Smokeless tobacco: Never Used   Vaping Use   • Vaping Use: Former   Substance and Sexual Activity   • Alcohol use: Not Currently   • Drug use: Never   • Sexual activity: Defer        Review of Systems     Objective   Vital Signs:   Pulse 98   Ht 163.8 cm (64.5\")   Wt 66.2 kg (146 lb)   SpO2 99%   BMI 24.67 kg/m²       Physical Exam  Constitutional:       Appearance: Normal appearance. The patient is well-developed and normal weight.   HENT:      Head: Normocephalic.      Right Ear: Hearing and external ear normal.      Left Ear: Hearing and external ear normal.      Nose: Nose normal.   Eyes:      Conjunctiva/sclera: Conjunctivae normal.   Cardiovascular:      Rate and Rhythm: Normal rate.   Pulmonary:      Effort: Pulmonary effort is normal.      Breath sounds: No wheezing or rales.   Abdominal:      Palpations: Abdomen is soft.      Tenderness: There is no abdominal tenderness.   Musculoskeletal:      Cervical back: Normal range of motion.   Skin:     Findings: No rash.   Neurological:      Mental Status: The patient is alert and oriented to person, place, and time.   Psychiatric:         Mood and Affect: Mood and affect normal.         Judgment: Judgment " normal.       Ortho Exam      Left hip- incision well healing. Lower extremity mild swelling. Neurovascularly intact. No signs of infection. Positive EHL, FHL, GS and TA. Sensation intact to all 5 nerves of the foot. Positive pulses. Mild pain with flexion. Knee Extensor Mechanism intact. Mild swelling to the incision.     Procedures      Imaging Results (Most Recent)     None           Result Review :{Labs  Result Review  Imaging  Med Tab  Media  Procedures :23}       No results found.           Assessment and Plan     Diagnoses and all orders for this visit:    1. Aftercare following surgery of left total hip arthroplasty, 3/16/2022 and left hip closed reduction, 3/20/2022 (Primary)        Discussed the treatment plan with the patient.  Plan to continue with home exercises and physical therapy. Plan for activity as tolerated. Prescription for Norco 5/325 given today     Call or return if worsening symptoms.    Follow Up     6 weeks with repeat x-rays      Patient was given instructions and counseling regarding her condition or for health maintenance advice. Please see specific information pulled into the AVS if appropriate.     Scribed for Bam Warner MD by Neeta Maldonado.  05/05/22   10:54 EDT    I have personally performed the services described in this document as scribed by the above individual and it is both accurate and complete. Bam Warner MD 05/08/22

## 2022-05-06 ENCOUNTER — LAB REQUISITION (OUTPATIENT)
Dept: LAB | Facility: HOSPITAL | Age: 87
End: 2022-05-06

## 2022-05-06 DIAGNOSIS — N39.0 URINARY TRACT INFECTION, SITE NOT SPECIFIED: ICD-10-CM

## 2022-05-06 LAB
BACTERIA UR QL AUTO: ABNORMAL /HPF
BILIRUB UR QL STRIP: NEGATIVE
CLARITY UR: CLEAR
COLOR UR: YELLOW
GLUCOSE UR STRIP-MCNC: NEGATIVE MG/DL
HGB UR QL STRIP.AUTO: ABNORMAL
HYALINE CASTS UR QL AUTO: ABNORMAL /LPF
KETONES UR QL STRIP: NEGATIVE
LEUKOCYTE ESTERASE UR QL STRIP.AUTO: ABNORMAL
NITRITE UR QL STRIP: NEGATIVE
PH UR STRIP.AUTO: 6 [PH] (ref 5–8)
PROT UR QL STRIP: NEGATIVE
RBC # UR STRIP: ABNORMAL /HPF
REF LAB TEST METHOD: ABNORMAL
SP GR UR STRIP: 1.02 (ref 1–1.03)
SQUAMOUS #/AREA URNS HPF: ABNORMAL /HPF
UROBILINOGEN UR QL STRIP: ABNORMAL
WBC # UR STRIP: ABNORMAL /HPF

## 2022-05-06 PROCEDURE — 81001 URINALYSIS AUTO W/SCOPE: CPT | Performed by: INTERNAL MEDICINE

## 2022-05-06 PROCEDURE — 87086 URINE CULTURE/COLONY COUNT: CPT | Performed by: INTERNAL MEDICINE

## 2022-05-07 LAB — BACTERIA SPEC AEROBE CULT: NORMAL

## 2022-05-07 RX ORDER — CEPHALEXIN 500 MG/1
500 CAPSULE ORAL 3 TIMES DAILY
Qty: 15 CAPSULE | Refills: 0 | Status: SHIPPED | OUTPATIENT
Start: 2022-05-07 | End: 2022-05-25

## 2022-05-10 RX ORDER — ESCITALOPRAM OXALATE 5 MG/1
5 TABLET ORAL DAILY
Qty: 30 TABLET | Refills: 3 | Status: SHIPPED | OUTPATIENT
Start: 2022-05-10 | End: 2022-08-01 | Stop reason: SDUPTHER

## 2022-05-11 ENCOUNTER — HOSPITAL ENCOUNTER (OUTPATIENT)
Dept: CARDIOLOGY | Facility: HOSPITAL | Age: 87
Discharge: HOME | End: 2022-05-11
Admitting: INTERNAL MEDICINE

## 2022-05-11 DIAGNOSIS — E55.9 VITAMIN D DEFICIENCY: ICD-10-CM

## 2022-05-11 DIAGNOSIS — F33.0 MAJOR DEPRESSIVE DISORDER, RECURRENT, MILD: ICD-10-CM

## 2022-05-11 DIAGNOSIS — K59.1 FUNCTIONAL DIARRHEA: ICD-10-CM

## 2022-05-11 DIAGNOSIS — S72.002G CLOSED FRACTURE OF LEFT HIP WITH DELAYED HEALING, SUBSEQUENT ENCOUNTER: ICD-10-CM

## 2022-05-11 DIAGNOSIS — C18.9 MALIGNANT NEOPLASM OF COLON, UNSPECIFIED PART OF COLON: ICD-10-CM

## 2022-05-11 DIAGNOSIS — Z98.890 HISTORY OF HIP SURGERY: ICD-10-CM

## 2022-05-11 DIAGNOSIS — R13.19 ESOPHAGEAL DYSPHAGIA: ICD-10-CM

## 2022-05-11 DIAGNOSIS — D50.9 IRON DEFICIENCY ANEMIA, UNSPECIFIED IRON DEFICIENCY ANEMIA TYPE: ICD-10-CM

## 2022-05-11 DIAGNOSIS — I10 HYPERTENSION, ESSENTIAL: ICD-10-CM

## 2022-05-11 DIAGNOSIS — I82.5Y2 CHRONIC DEEP VEIN THROMBOSIS (DVT) OF PROXIMAL VEIN OF LEFT LOWER EXTREMITY: ICD-10-CM

## 2022-05-11 DIAGNOSIS — F51.01 PRIMARY INSOMNIA: ICD-10-CM

## 2022-05-11 DIAGNOSIS — G93.32 CHRONIC FATIGUE SYNDROME: ICD-10-CM

## 2022-05-11 DIAGNOSIS — I34.0 NONRHEUMATIC MITRAL VALVE REGURGITATION: ICD-10-CM

## 2022-05-11 DIAGNOSIS — M79.89 LEFT LEG SWELLING: ICD-10-CM

## 2022-05-11 DIAGNOSIS — I48.20 ATRIAL FIBRILLATION, CHRONIC: ICD-10-CM

## 2022-05-11 LAB
BH CV LOWER VASCULAR LEFT COMMON FEMORAL AUGMENT: NORMAL
BH CV LOWER VASCULAR LEFT COMMON FEMORAL COMPETENT: NORMAL
BH CV LOWER VASCULAR LEFT COMMON FEMORAL COMPRESS: NORMAL
BH CV LOWER VASCULAR LEFT COMMON FEMORAL PHASIC: NORMAL
BH CV LOWER VASCULAR LEFT COMMON FEMORAL SPONT: NORMAL
BH CV LOWER VASCULAR LEFT DISTAL FEMORAL COMPRESS: NORMAL
BH CV LOWER VASCULAR LEFT GASTRONEMIUS COMPRESS: NORMAL
BH CV LOWER VASCULAR LEFT GREATER SAPH AK COMPRESS: NORMAL
BH CV LOWER VASCULAR LEFT GREATER SAPH BK COMPRESS: NORMAL
BH CV LOWER VASCULAR LEFT LESSER SAPH COMPRESS: NORMAL
BH CV LOWER VASCULAR LEFT MID FEMORAL AUGMENT: NORMAL
BH CV LOWER VASCULAR LEFT MID FEMORAL COMPETENT: NORMAL
BH CV LOWER VASCULAR LEFT MID FEMORAL COMPRESS: NORMAL
BH CV LOWER VASCULAR LEFT MID FEMORAL PHASIC: NORMAL
BH CV LOWER VASCULAR LEFT MID FEMORAL SPONT: NORMAL
BH CV LOWER VASCULAR LEFT PERONEAL COMPRESS: NORMAL
BH CV LOWER VASCULAR LEFT POPLITEAL AUGMENT: NORMAL
BH CV LOWER VASCULAR LEFT POPLITEAL COMPETENT: NORMAL
BH CV LOWER VASCULAR LEFT POPLITEAL COMPRESS: NORMAL
BH CV LOWER VASCULAR LEFT POPLITEAL PHASIC: NORMAL
BH CV LOWER VASCULAR LEFT POPLITEAL SPONT: NORMAL
BH CV LOWER VASCULAR LEFT POSTERIOR TIBIAL COMPRESS: NORMAL
BH CV LOWER VASCULAR LEFT PROXIMAL FEMORAL COMPRESS: NORMAL
BH CV LOWER VASCULAR LEFT SAPHENOFEMORAL JUNCTION COMPRESS: NORMAL
BH CV LOWER VASCULAR RIGHT COMMON FEMORAL AUGMENT: NORMAL
BH CV LOWER VASCULAR RIGHT COMMON FEMORAL COMPETENT: NORMAL
BH CV LOWER VASCULAR RIGHT COMMON FEMORAL COMPRESS: NORMAL
BH CV LOWER VASCULAR RIGHT COMMON FEMORAL PHASIC: NORMAL
BH CV LOWER VASCULAR RIGHT COMMON FEMORAL SPONT: NORMAL
MAXIMAL PREDICTED HEART RATE: 130 BPM
STRESS TARGET HR: 111 BPM

## 2022-05-11 PROCEDURE — 93971 EXTREMITY STUDY: CPT | Performed by: SURGERY

## 2022-05-11 PROCEDURE — 93971 EXTREMITY STUDY: CPT

## 2022-05-13 ENCOUNTER — OFFICE VISIT (OUTPATIENT)
Dept: INTERNAL MEDICINE | Facility: CLINIC | Age: 87
End: 2022-05-13

## 2022-05-13 VITALS
DIASTOLIC BLOOD PRESSURE: 86 MMHG | BODY MASS INDEX: 24.96 KG/M2 | TEMPERATURE: 96 F | HEIGHT: 64 IN | HEART RATE: 78 BPM | OXYGEN SATURATION: 97 % | SYSTOLIC BLOOD PRESSURE: 143 MMHG | WEIGHT: 146.2 LBS

## 2022-05-13 DIAGNOSIS — M79.89 LEFT LEG SWELLING: Primary | ICD-10-CM

## 2022-05-13 PROCEDURE — 99213 OFFICE O/P EST LOW 20 MIN: CPT

## 2022-05-13 RX ORDER — LOSARTAN POTASSIUM 50 MG/1
50 TABLET ORAL 2 TIMES DAILY
COMMUNITY
Start: 2022-05-09 | End: 2022-08-12 | Stop reason: ALTCHOICE

## 2022-05-13 RX ORDER — FUROSEMIDE 20 MG/1
20 TABLET ORAL AS NEEDED
COMMUNITY
End: 2022-08-12 | Stop reason: ALTCHOICE

## 2022-05-13 NOTE — PROGRESS NOTES
"Chief Complaint  Follow-up (Patient had a Hip replacement on 03-16-22 & is having Edema in left leg. She has sore like spots on her Left leg where it is swelling.)    Subjective          Bing Cruz presents to [facility]  History of Present Illness    Patient had a hip replacement on 03-16-22 & is having edema in left leg. She has sore like spots on her left leg where it is swelling.  She has been seen and evaluated by PCP for this before.  Her daughter reports she has as needed Lasix to give her as needed as Dr. Kramer recommended using it every other day when needed.  Her daughter reports she did give her 1 dose of Lasix yesterday morning but did not seem to think it helped very much.  Patient and daughter deny bilateral leg swelling and reported swelling in the left leg.  Patient does not report any significant pain with the swelling.  Patient's daughter was concerned about possible bruising on leg and patient denied any known injury or falls.  Patient recently had Doppler ultrasound on left lower extremity to rule out DVT due to swelling and redness at that time.  Patient is finishing antibiotic for UTI.  Daughter/patient deny the patient elevating or icing the lower extremity.  Patient reports when she sitting in her chair her feet pretty much dangle most of the time.    Objective   Vital Signs:   /86 (BP Location: Left arm, Patient Position: Sitting, Cuff Size: Adult)   Pulse 78   Temp 96 °F (35.6 °C) (Oral)   Ht 163.8 cm (64.49\")   Wt 66.3 kg (146 lb 3.2 oz)   SpO2 97%   BMI 24.72 kg/m²     Physical Exam  Vitals reviewed.   Constitutional:       Appearance: Normal appearance. She is well-developed.   HENT:      Head: Normocephalic and atraumatic.      Mouth/Throat:      Pharynx: No oropharyngeal exudate.   Eyes:      Conjunctiva/sclera: Conjunctivae normal.      Pupils: Pupils are equal, round, and reactive to light.   Cardiovascular:      Rate and Rhythm: " Normal rate and regular rhythm.      Heart sounds: No murmur heard.    No friction rub. No gallop.   Pulmonary:      Effort: Pulmonary effort is normal.      Breath sounds: Normal breath sounds. No wheezing or rhonchi.   Musculoskeletal:      Right lower le+ Pitting Edema present.      Left lower leg: Edema present.   Skin:     General: Skin is warm and dry.      Findings: Ecchymosis present.      Comments: Scattered ecchymosis on left anterior leg.  No erythema, rash, wound, abscess, abrasion noted.   Neurological:      Mental Status: She is alert and oriented to person, place, and time.   Psychiatric:         Mood and Affect: Affect normal.        Result Review :     CMP    CMP 3/23/22 3/26/22 4/20/22   Glucose 91 87 92   BUN 16 11 9   Creatinine 0.63 0.56 (A) 0.65   Sodium 134 (A) 136 138   Potassium 4.4 4.0 3.7   Chloride 102 103 103   Calcium 8.2 8.3 9.0   Albumin 2.30 (A) 2.40 (A) 3.50   Total Bilirubin 1.0 0.6 0.7   Alkaline Phosphatase 87 80 94   AST (SGOT) 21 17 16   ALT (SGPT) 7 7 5   (A) Abnormal value       Comments are available for some flowsheets but are not being displayed.           CBC    CBC 3/23/22 3/26/22 4/20/22   WBC 10.59 11.45 (A) 7.55   RBC 3.13 (A) 3.06 (A) 3.45 (A)   Hemoglobin 10.1 (A) 9.6 (A) 11.0 (A)   Hematocrit 29.7 (A) 29.3 (A) 33.5 (A)   MCV 94.9 95.8 97.1 (A)   MCH 32.3 31.4 31.9   MCHC 34.0 32.8 32.8   RDW 13.5 14.3 14.6   Platelets 300 380 292   (A) Abnormal value            Lipid Panel    Lipid Panel 7/15/21 2/14/22   Total Cholesterol 187 170   Triglycerides 136 164 (A)   HDL Cholesterol 79 (A) 78 (A)   VLDL Cholesterol 23 27   LDL Cholesterol  85 65   LDL/HDL Ratio 1.02 0.76   (A) Abnormal value            TSH    TSH 7/21/21 3/14/22 3/18/22   TSH 2.790 3.710 1.850           Renal Profile    Renal Profile 3/23/22 3/26/22 4/20/22   BUN 16 11 9   Creatinine 0.63 0.56 (A) 0.65   (A) Abnormal value               Procedures      Assessment and Plan    Diagnoses and all orders  for this visit:    1. Left leg swelling (Primary)  Assessment & Plan:  Patient reports that the swelling is not getting worse but just not resolving.  Patient and daughter report that they have noticed swelling since her hip replacement in March.  Patient denies any significant pain.  There is no redness in ankle/leg.  Scattered areas of small ecchymosis discussed with patient's daughter that that is most likely due to swelling and capillaries breaking on the surface of the skin.  No wounds visible.  Discussed administering Lasix as patient's most recent kidney function was within normal limits.  Patient's daughter reports she has 20 mg Lasix to be given as needed.  Also advised patient's daughter and patient to utilize wearing compression stockings and elevating lower extremities when not up walking around.  Also discussed with patient's daughter she may benefit from contacting orthopedics as this swelling in the hip and lower extremity has been present since her hip replacement.  Case will be discussed with Dr. Kramer and if Lasix can be increased in duration or dose.  Patient denies shortness of breath, chest pain, bilateral leg swelling.  Patient reports she feels good otherwise.  Patient assessment unremarkable.  Lung sounds clear.  Patient's daughter to follow-up with Dr. Kramer in regards to lasix dosing and further proceeding.  Patient's daughter also advised on when to seek emergency medical evaluation which include shortness of breath, redness, increased swelling, increased pain of left lower extremity.  Patient does have a history of a DVT and is anticoagulated on Eliquis daily.  Patient's daughter verbalized understanding of treatment plan and what to watch for worsening symptoms.      Patient was instructed and educated on their diagnoses and treatment plan prior to leaving the office. If symptoms worsen or persist, seek emergency medical attention. Take all medications as prescribed. Call the office  if any questions or concerns arise.         Follow Up   No follow-ups on file.  Patient was given instructions and counseling regarding her condition or for health maintenance advice. Please see specific information pulled into the AVS if appropriate.

## 2022-05-15 RX ORDER — L.ACIDOPH/B.ANIMALIS/B.LONGUM 15B CELL
CAPSULE ORAL
Qty: 90 CAPSULE | Refills: 1 | Status: SHIPPED | OUTPATIENT
Start: 2022-05-15

## 2022-05-16 NOTE — ASSESSMENT & PLAN NOTE
Patient reports that the swelling is not getting worse but just not resolving.  Patient and daughter report that they have noticed swelling since her hip replacement in March.  Patient denies any significant pain.  There is no redness in ankle/leg.  Scattered areas of small ecchymosis discussed with patient's daughter that that is most likely due to swelling and capillaries breaking on the surface of the skin.  No wounds visible.  Discussed administering Lasix as patient's most recent kidney function was within normal limits.  Patient's daughter reports she has 20 mg Lasix to be given as needed.  Also advised patient's daughter and patient to utilize wearing compression stockings and elevating lower extremities when not up walking around.  Also discussed with patient's daughter she may benefit from contacting orthopedics as this swelling in the hip and lower extremity has been present since her hip replacement.  Case will be discussed with Dr. Kramer and if Lasix can be increased in duration or dose.  Patient denies shortness of breath, chest pain, bilateral leg swelling.  Patient reports she feels good otherwise.  Patient assessment unremarkable.  Lung sounds clear.  Patient's daughter to follow-up with Dr. Kramer in regards to lasix dosing and further proceeding.  Patient's daughter also advised on when to seek emergency medical evaluation which include shortness of breath, redness, increased swelling, increased pain of left lower extremity.  Patient does have a history of a DVT and is anticoagulated on Eliquis daily.  Patient's daughter verbalized understanding of treatment plan and what to watch for worsening symptoms.

## 2022-05-17 ENCOUNTER — HOSPITAL ENCOUNTER (OUTPATIENT)
Dept: CT IMAGING | Facility: HOSPITAL | Age: 87
Discharge: HOME | End: 2022-05-17
Admitting: INTERNAL MEDICINE

## 2022-05-17 DIAGNOSIS — F51.01 PRIMARY INSOMNIA: ICD-10-CM

## 2022-05-17 DIAGNOSIS — I48.20 ATRIAL FIBRILLATION, CHRONIC: ICD-10-CM

## 2022-05-17 DIAGNOSIS — C18.9 MALIGNANT NEOPLASM OF COLON, UNSPECIFIED PART OF COLON: ICD-10-CM

## 2022-05-17 DIAGNOSIS — S72.002G CLOSED FRACTURE OF LEFT HIP WITH DELAYED HEALING, SUBSEQUENT ENCOUNTER: ICD-10-CM

## 2022-05-17 DIAGNOSIS — D50.9 IRON DEFICIENCY ANEMIA, UNSPECIFIED IRON DEFICIENCY ANEMIA TYPE: ICD-10-CM

## 2022-05-17 DIAGNOSIS — M79.89 LEFT LEG SWELLING: ICD-10-CM

## 2022-05-17 DIAGNOSIS — G93.32 CHRONIC FATIGUE SYNDROME: ICD-10-CM

## 2022-05-17 DIAGNOSIS — I10 HYPERTENSION, ESSENTIAL: ICD-10-CM

## 2022-05-17 DIAGNOSIS — Z98.890 HISTORY OF HIP SURGERY: ICD-10-CM

## 2022-05-17 DIAGNOSIS — E55.9 VITAMIN D DEFICIENCY: ICD-10-CM

## 2022-05-17 DIAGNOSIS — K59.1 FUNCTIONAL DIARRHEA: ICD-10-CM

## 2022-05-17 DIAGNOSIS — I82.5Y2 CHRONIC DEEP VEIN THROMBOSIS (DVT) OF PROXIMAL VEIN OF LEFT LOWER EXTREMITY: ICD-10-CM

## 2022-05-17 DIAGNOSIS — F33.0 MAJOR DEPRESSIVE DISORDER, RECURRENT, MILD: ICD-10-CM

## 2022-05-17 DIAGNOSIS — I34.0 NONRHEUMATIC MITRAL VALVE REGURGITATION: ICD-10-CM

## 2022-05-17 DIAGNOSIS — R13.19 ESOPHAGEAL DYSPHAGIA: ICD-10-CM

## 2022-05-17 PROCEDURE — 71250 CT THORAX DX C-: CPT

## 2022-05-18 ENCOUNTER — LAB REQUISITION (OUTPATIENT)
Dept: LAB | Facility: HOSPITAL | Age: 87
End: 2022-05-18

## 2022-05-18 DIAGNOSIS — F41.9 ANXIETY: ICD-10-CM

## 2022-05-18 DIAGNOSIS — N39.0 URINARY TRACT INFECTION, SITE NOT SPECIFIED: ICD-10-CM

## 2022-05-18 LAB
BACTERIA UR QL AUTO: ABNORMAL /HPF
BILIRUB UR QL STRIP: NEGATIVE
CLARITY UR: CLEAR
COLOR UR: YELLOW
GLUCOSE UR STRIP-MCNC: NEGATIVE MG/DL
HGB UR QL STRIP.AUTO: NEGATIVE
HYALINE CASTS UR QL AUTO: ABNORMAL /LPF
KETONES UR QL STRIP: NEGATIVE
LEUKOCYTE ESTERASE UR QL STRIP.AUTO: ABNORMAL
NITRITE UR QL STRIP: NEGATIVE
PH UR STRIP.AUTO: 6.5 [PH] (ref 5–8)
PROT UR QL STRIP: NEGATIVE
RBC # UR STRIP: ABNORMAL /HPF
REF LAB TEST METHOD: ABNORMAL
SP GR UR STRIP: 1.01 (ref 1–1.03)
SQUAMOUS #/AREA URNS HPF: ABNORMAL /HPF
UROBILINOGEN UR QL STRIP: ABNORMAL
WBC # UR STRIP: ABNORMAL /HPF

## 2022-05-18 PROCEDURE — 81001 URINALYSIS AUTO W/SCOPE: CPT | Performed by: INTERNAL MEDICINE

## 2022-05-18 PROCEDURE — 87086 URINE CULTURE/COLONY COUNT: CPT | Performed by: INTERNAL MEDICINE

## 2022-05-18 RX ORDER — LORAZEPAM 0.5 MG/1
0.5 TABLET ORAL NIGHTLY PRN
Qty: 30 TABLET | Refills: 5 | Status: SHIPPED | OUTPATIENT
Start: 2022-05-18 | End: 2022-12-05 | Stop reason: SDUPTHER

## 2022-05-18 NOTE — TELEPHONE ENCOUNTER
Caller: DORIAN PEREZ    Relationship: Emergency Contact    Best call back number:[phone]    Requested Prescriptions:   Requested Prescriptions     Pending Prescriptions Disp Refills   • LORazepam (ATIVAN) 0.5 MG tablet 30 tablet 5     Sig: Take 1 tablet by mouth At Night As Needed.        Pharmacy where request should be sent: [facility] #[id number] - [place], [place] - [address] [facility] [phone]  - [phone] FX     Additional details provided by patient: PATIENTS DAUGHTER STATES THAT THIS MEDICATION WAS CHANGED BY DR APPLE BUT A NEW PRESCRIPTION   IS NEEDED TO BE SENT TO PHARMACY. PLEASE SEND NEW PRESCRIPTION .     Does the patient have less than a 3 day supply:  [x] Yes  [] No    Renata Granados Rep   05/18/22 09:00 EDT

## 2022-05-19 LAB — BACTERIA SPEC AEROBE CULT: NORMAL

## 2022-05-20 ENCOUNTER — APPOINTMENT (OUTPATIENT)
Dept: GENERAL RADIOLOGY | Facility: HOSPITAL | Age: 87
End: 2022-05-20

## 2022-05-20 ENCOUNTER — HOSPITAL ENCOUNTER (EMERGENCY)
Facility: HOSPITAL | Age: 87
Discharge: HOME | End: 2022-05-21
Attending: EMERGENCY MEDICINE | Admitting: EMERGENCY MEDICINE

## 2022-05-20 DIAGNOSIS — I10 HYPERTENSION, ESSENTIAL: ICD-10-CM

## 2022-05-20 DIAGNOSIS — F33.0 MAJOR DEPRESSIVE DISORDER, RECURRENT, MILD: ICD-10-CM

## 2022-05-20 DIAGNOSIS — M79.89 LEFT LEG SWELLING: ICD-10-CM

## 2022-05-20 DIAGNOSIS — I48.20 ATRIAL FIBRILLATION, CHRONIC: ICD-10-CM

## 2022-05-20 DIAGNOSIS — R13.19 ESOPHAGEAL DYSPHAGIA: ICD-10-CM

## 2022-05-20 DIAGNOSIS — D50.9 IRON DEFICIENCY ANEMIA, UNSPECIFIED IRON DEFICIENCY ANEMIA TYPE: ICD-10-CM

## 2022-05-20 DIAGNOSIS — C18.9 MALIGNANT NEOPLASM OF COLON, UNSPECIFIED PART OF COLON: ICD-10-CM

## 2022-05-20 DIAGNOSIS — I10 UNCONTROLLED HYPERTENSION: ICD-10-CM

## 2022-05-20 DIAGNOSIS — E55.9 VITAMIN D DEFICIENCY: ICD-10-CM

## 2022-05-20 DIAGNOSIS — I82.5Y2 CHRONIC DEEP VEIN THROMBOSIS (DVT) OF PROXIMAL VEIN OF LEFT LOWER EXTREMITY: ICD-10-CM

## 2022-05-20 DIAGNOSIS — E87.6 HYPOKALEMIA: ICD-10-CM

## 2022-05-20 DIAGNOSIS — I48.20 CHRONIC ATRIAL FIBRILLATION: ICD-10-CM

## 2022-05-20 DIAGNOSIS — I34.0 NONRHEUMATIC MITRAL VALVE REGURGITATION: ICD-10-CM

## 2022-05-20 DIAGNOSIS — G93.32 CHRONIC FATIGUE SYNDROME: ICD-10-CM

## 2022-05-20 DIAGNOSIS — K59.1 FUNCTIONAL DIARRHEA: ICD-10-CM

## 2022-05-20 DIAGNOSIS — Z98.890 HISTORY OF HIP SURGERY: ICD-10-CM

## 2022-05-20 DIAGNOSIS — J90 PLEURAL EFFUSION, BILATERAL: ICD-10-CM

## 2022-05-20 DIAGNOSIS — I50.9 ACUTE ON CHRONIC CONGESTIVE HEART FAILURE, UNSPECIFIED HEART FAILURE TYPE: Primary | ICD-10-CM

## 2022-05-20 DIAGNOSIS — S72.002G CLOSED FRACTURE OF LEFT HIP WITH DELAYED HEALING, SUBSEQUENT ENCOUNTER: ICD-10-CM

## 2022-05-20 DIAGNOSIS — F51.01 PRIMARY INSOMNIA: ICD-10-CM

## 2022-05-20 LAB
ALBUMIN SERPL-MCNC: 4 G/DL (ref 3.5–5.2)
ALBUMIN/GLOB SERPL: 1.2 G/DL
ALP SERPL-CCNC: 87 U/L (ref 39–117)
ALT SERPL W P-5'-P-CCNC: 8 U/L (ref 1–33)
ANION GAP SERPL CALCULATED.3IONS-SCNC: 13.2 MMOL/L (ref 5–15)
AST SERPL-CCNC: 19 U/L (ref 1–32)
BASOPHILS # BLD AUTO: 0.04 10*3/MM3 (ref 0–0.2)
BASOPHILS NFR BLD AUTO: 0.5 % (ref 0–1.5)
BILIRUB SERPL-MCNC: 0.9 MG/DL (ref 0–1.2)
BUN SERPL-MCNC: 12 MG/DL (ref 8–23)
BUN/CREAT SERPL: 15.2 (ref 7–25)
CALCIUM SPEC-SCNC: 9.4 MG/DL (ref 8.2–9.6)
CHLORIDE SERPL-SCNC: 101 MMOL/L (ref 98–107)
CK MB SERPL-CCNC: 2.24 NG/ML
CK SERPL-CCNC: 53 U/L (ref 20–180)
CO2 SERPL-SCNC: 24.8 MMOL/L (ref 22–29)
CREAT SERPL-MCNC: 0.79 MG/DL (ref 0.57–1)
DEPRECATED RDW RBC AUTO: 49.3 FL (ref 37–54)
EGFRCR SERPLBLD CKD-EPI 2021: 71.2 ML/MIN/1.73
EOSINOPHIL # BLD AUTO: 0.17 10*3/MM3 (ref 0–0.4)
EOSINOPHIL NFR BLD AUTO: 2.1 % (ref 0.3–6.2)
ERYTHROCYTE [DISTWIDTH] IN BLOOD BY AUTOMATED COUNT: 14.1 % (ref 12.3–15.4)
GLOBULIN UR ELPH-MCNC: 3.4 GM/DL
GLUCOSE SERPL-MCNC: 104 MG/DL (ref 65–99)
HCT VFR BLD AUTO: 36.7 % (ref 34–46.6)
HGB BLD-MCNC: 11.7 G/DL (ref 12–15.9)
HOLD SPECIMEN: NORMAL
IMM GRANULOCYTES # BLD AUTO: 0.02 10*3/MM3 (ref 0–0.05)
IMM GRANULOCYTES NFR BLD AUTO: 0.2 % (ref 0–0.5)
LIPASE SERPL-CCNC: 16 U/L (ref 13–60)
LYMPHOCYTES # BLD AUTO: 2.81 10*3/MM3 (ref 0.7–3.1)
LYMPHOCYTES NFR BLD AUTO: 35 % (ref 19.6–45.3)
MAGNESIUM SERPL-MCNC: 1.7 MG/DL (ref 1.6–2.4)
MCH RBC QN AUTO: 30.5 PG (ref 26.6–33)
MCHC RBC AUTO-ENTMCNC: 31.9 G/DL (ref 31.5–35.7)
MCV RBC AUTO: 95.6 FL (ref 79–97)
MONOCYTES # BLD AUTO: 0.59 10*3/MM3 (ref 0.1–0.9)
MONOCYTES NFR BLD AUTO: 7.3 % (ref 5–12)
NEUTROPHILS NFR BLD AUTO: 4.41 10*3/MM3 (ref 1.7–7)
NEUTROPHILS NFR BLD AUTO: 54.9 % (ref 42.7–76)
NRBC BLD AUTO-RTO: 0 /100 WBC (ref 0–0.2)
NT-PROBNP SERPL-MCNC: 4198 PG/ML (ref 0–1800)
PLATELET # BLD AUTO: 291 10*3/MM3 (ref 140–450)
PMV BLD AUTO: 11.5 FL (ref 6–12)
POTASSIUM SERPL-SCNC: 3.1 MMOL/L (ref 3.5–5.2)
PROT SERPL-MCNC: 7.4 G/DL (ref 6–8.5)
QT INTERVAL: 345 MS
QT INTERVAL: 364 MS
RBC # BLD AUTO: 3.84 10*6/MM3 (ref 3.77–5.28)
SODIUM SERPL-SCNC: 139 MMOL/L (ref 136–145)
TROPONIN I SERPL-MCNC: 0 NG/ML (ref 0–0.6)
TROPONIN I SERPL-MCNC: 0.01 NG/ML (ref 0–0.6)
WBC NRBC COR # BLD: 8.04 10*3/MM3 (ref 3.4–10.8)
WHOLE BLOOD HOLD COAG: NORMAL
WHOLE BLOOD HOLD SPECIMEN: NORMAL

## 2022-05-20 PROCEDURE — 71045 X-RAY EXAM CHEST 1 VIEW: CPT

## 2022-05-20 PROCEDURE — 99284 EMERGENCY DEPT VISIT MOD MDM: CPT

## 2022-05-20 PROCEDURE — 82550 ASSAY OF CK (CPK): CPT

## 2022-05-20 PROCEDURE — 83880 ASSAY OF NATRIURETIC PEPTIDE: CPT

## 2022-05-20 PROCEDURE — 80053 COMPREHEN METABOLIC PANEL: CPT

## 2022-05-20 PROCEDURE — 83690 ASSAY OF LIPASE: CPT

## 2022-05-20 PROCEDURE — 93005 ELECTROCARDIOGRAM TRACING: CPT

## 2022-05-20 PROCEDURE — 83735 ASSAY OF MAGNESIUM: CPT

## 2022-05-20 PROCEDURE — 82378 CARCINOEMBRYONIC ANTIGEN: CPT | Performed by: INTERNAL MEDICINE

## 2022-05-20 PROCEDURE — 85025 COMPLETE CBC W/AUTO DIFF WBC: CPT

## 2022-05-20 PROCEDURE — 82553 CREATINE MB FRACTION: CPT

## 2022-05-20 PROCEDURE — 93005 ELECTROCARDIOGRAM TRACING: CPT | Performed by: EMERGENCY MEDICINE

## 2022-05-20 PROCEDURE — 84484 ASSAY OF TROPONIN QUANT: CPT

## 2022-05-20 PROCEDURE — 36415 COLL VENOUS BLD VENIPUNCTURE: CPT

## 2022-05-20 RX ORDER — SODIUM CHLORIDE 0.9 % (FLUSH) 0.9 %
10 SYRINGE (ML) INJECTION AS NEEDED
Status: DISCONTINUED | OUTPATIENT
Start: 2022-05-20 | End: 2022-05-21 | Stop reason: HOSPADM

## 2022-05-21 ENCOUNTER — APPOINTMENT (OUTPATIENT)
Dept: CT IMAGING | Facility: HOSPITAL | Age: 87
End: 2022-05-21

## 2022-05-21 VITALS
SYSTOLIC BLOOD PRESSURE: 155 MMHG | TEMPERATURE: 97.8 F | HEIGHT: 67 IN | WEIGHT: 143.3 LBS | DIASTOLIC BLOOD PRESSURE: 99 MMHG | HEART RATE: 77 BPM | BODY MASS INDEX: 22.49 KG/M2 | RESPIRATION RATE: 16 BRPM | OXYGEN SATURATION: 97 %

## 2022-05-21 LAB
BACTERIA UR QL AUTO: ABNORMAL /HPF
BILIRUB UR QL STRIP: NEGATIVE
CEA SERPL-MCNC: 1.66 NG/ML
CLARITY UR: CLEAR
COLOR UR: YELLOW
FLUAV AG NPH QL: NEGATIVE
FLUBV AG NPH QL IA: NEGATIVE
GLUCOSE UR STRIP-MCNC: NEGATIVE MG/DL
HGB UR QL STRIP.AUTO: ABNORMAL
HYALINE CASTS UR QL AUTO: ABNORMAL /LPF
KETONES UR QL STRIP: ABNORMAL
LEUKOCYTE ESTERASE UR QL STRIP.AUTO: ABNORMAL
NITRITE UR QL STRIP: NEGATIVE
PH UR STRIP.AUTO: 7.5 [PH] (ref 5–8)
PROT UR QL STRIP: NEGATIVE
RBC # UR STRIP: ABNORMAL /HPF
REF LAB TEST METHOD: ABNORMAL
SARS-COV-2 RNA PNL SPEC NAA+PROBE: NOT DETECTED
SP GR UR STRIP: 1.02 (ref 1–1.03)
SQUAMOUS #/AREA URNS HPF: ABNORMAL /HPF
UROBILINOGEN UR QL STRIP: ABNORMAL
WBC # UR STRIP: ABNORMAL /HPF

## 2022-05-21 PROCEDURE — 74177 CT ABD & PELVIS W/CONTRAST: CPT

## 2022-05-21 PROCEDURE — 25010000002 FUROSEMIDE PER 20 MG: Performed by: EMERGENCY MEDICINE

## 2022-05-21 PROCEDURE — U0005 INFEC AGEN DETEC AMPLI PROBE: HCPCS | Performed by: EMERGENCY MEDICINE

## 2022-05-21 PROCEDURE — 0 IOPAMIDOL PER 1 ML: Performed by: EMERGENCY MEDICINE

## 2022-05-21 PROCEDURE — 87804 INFLUENZA ASSAY W/OPTIC: CPT | Performed by: EMERGENCY MEDICINE

## 2022-05-21 PROCEDURE — C9803 HOPD COVID-19 SPEC COLLECT: HCPCS | Performed by: EMERGENCY MEDICINE

## 2022-05-21 PROCEDURE — 81001 URINALYSIS AUTO W/SCOPE: CPT | Performed by: EMERGENCY MEDICINE

## 2022-05-21 PROCEDURE — 96374 THER/PROPH/DIAG INJ IV PUSH: CPT

## 2022-05-21 PROCEDURE — U0004 COV-19 TEST NON-CDC HGH THRU: HCPCS | Performed by: EMERGENCY MEDICINE

## 2022-05-21 RX ORDER — FUROSEMIDE 10 MG/ML
40 INJECTION INTRAMUSCULAR; INTRAVENOUS ONCE
Status: COMPLETED | OUTPATIENT
Start: 2022-05-21 | End: 2022-05-21

## 2022-05-21 RX ORDER — POTASSIUM CHLORIDE 750 MG/1
10 TABLET, FILM COATED, EXTENDED RELEASE ORAL DAILY
Qty: 5 TABLET | Refills: 0 | Status: SHIPPED | OUTPATIENT
Start: 2022-05-21 | End: 2022-05-26

## 2022-05-21 RX ORDER — POTASSIUM CHLORIDE 750 MG/1
40 CAPSULE, EXTENDED RELEASE ORAL ONCE
Status: COMPLETED | OUTPATIENT
Start: 2022-05-21 | End: 2022-05-21

## 2022-05-21 RX ADMIN — POTASSIUM CHLORIDE 40 MEQ: 10 CAPSULE, COATED, EXTENDED RELEASE ORAL at 03:32

## 2022-05-21 RX ADMIN — FUROSEMIDE 40 MG: 10 INJECTION, SOLUTION INTRAMUSCULAR; INTRAVENOUS at 03:32

## 2022-05-21 RX ADMIN — IOPAMIDOL 100 ML: 755 INJECTION, SOLUTION INTRAVENOUS at 01:40

## 2022-05-21 NOTE — DISCHARGE INSTRUCTIONS
Please check your blood pressure twice a day, record and discuss those with your primary care physician on Monday to determine possible need to change your blood pressure medication    Please take your Lasix as previously prescribed on Sunday and then on Tuesday    Please have your doctor check a potassium level on Wednesday at your appointment    Please return to the emergency room for blood pressure greater than 230/120, headache, vomiting, altered mental status, chest pain, chest pressure, shortness of breath, near passing out, passing out, unusual fatigue, unusual sweating or any new symptoms you are concerned with

## 2022-05-21 NOTE — ED PROVIDER NOTES
"Time: 12:35 AM EDT  Arrived by: Private car  Chief Complaint: Hypertension  History provided by: Patient and family  History is limited by: N/A     History of Present Illness:    Bing Cruz is a 89 y.o. female who presents to the emergency department today with complaints of moderate and constant hypertension. The patient's granddaughter and daughter, Nicolasa, are at baseline.    The patient reports that her blood pressure was found to be low during physical therapy last week (120s/50s-60s). She states that she saw Dr. Kramer last week for this issue and that her hypertension medications were reduced as a result. Her relative also advises that the patient's Eliquis was \"cut in half\" recently; however, she later amends that this medication may have been changed one year ago.    The patient's other relative, Blanca, was contacted via phone in order to obtain more accurate history. She confirms that the patient was taken off of her diltiazem 120 ER couple weeks ago due to her \"blood pressure dropping too low.\" She states that during the patient's follow up with Dr. Kramer last week, she was taken off of her Losartan as well. Her blood pressure has been within normal limits until today, at which time it was found to be elevated.    The patient's relative on the phone states that the patient has complained of malaise and generalized body aches today. The patient confirms that she \"didn't feel well at all,\" though she denies having any body aches or other sources of pain. She does state that she has been feeling fatigued and mildly nauseous. She denies any chest pain, chest pressure, or shortness of breath; however, she does note that she is having some abdominal pain and diarrhea. Per her family, she has been having several loose stools daily (\"more than two the past few weeks\").    The patient's relative on the phone advises that the patient had a UTI recently with decreased appetite and confusion. She adds " "that the patient does not typically have any urinary symptoms with her UTIs. She states that Dr. Kramer started the patient on antibiotics, which she did finish as prescribed over the weekend. The patient's nurse then took another urine sample and found that the patient possibly had another UTI, though there was concern for possible contamination. The family is presently still unsure if the patient has a UTI.    Along with hypertension, the patient also has a history of atrial fibrillation, C. diff, DVT, UTI, diverticulosis, aortic stenosis, mitral/tricuspid regurgitation, and colon cancer. Her relative notes that she recently received a CT chest for \"a spot on her lungs.\" The patient denies smoking cigarettes, drinking alcohol, or illicit drug use. There are no other acute complaints at this time.            Similar Symptoms Previously: Yes.  Recently seen: Patient was seen for a CT chest with Dr. Kramer on 5/17/2022. She was also seen for labs with Dr. Kramer on 5/18/2022.      Patient Care Team  Primary Care Provider: Jairo Kramer MD  Cardiologist: Dr. Jolly    Past Medical History:     Allergies   Allergen Reactions   • Citalopram Unknown - High Severity   • Clonazepam Unknown - High Severity   • Levofloxacin Nausea And Vomiting   • Lisinopril Unknown - High Severity   • Macrobid [Nitrofurantoin Macrocrystal] Rash   • Melatonin Unknown - High Severity     Past Medical History:   Diagnosis Date   • Abdominal pain, generalized    • Anemia    • Aortic regurgitation    • Aortic stenosis    • Cancer (HCC)     colon    • Cardiomegaly    • Chronic atrial fibrillation (HCC) 01/01/2019    Atrial fibrillation converted to sinus through cardioversion (March 2019   • Chronic fatigue    • Diarrhea    • Disease of tricuspid valve    • Diverticulosis of colon    • DVT (deep venous thrombosis) (HCC)     LEFT LEG GREATER THAN 5 YRS AGO   • Dysphagia    • Essential (primary) hypertension    • Hiatal hernia    • " Insomnia, unspecified    • LVH (left ventricular hypertrophy)    • Major depressive disorder, single episode    • Mitral regurgitation    • Mitral valve insufficiency and aortic valve insufficiency    • Osteopenia    • Pain in joint, pelvic region and thigh    • TR (tricuspid regurgitation)    • UTI (urinary tract infection)     antibx to be completed prior to procedure. ABIOLA Carlos RN ([facility]) notified.     Past Surgical History:   Procedure Laterality Date   • ABDOMINAL HYSTERECTOMY      WITH BSO    • ANKLE SURGERY      (L) ankle fracture   • BLADDER REPAIR     • BREAST BIOPSY      (L) breast biopsy   • CARDIOVERSION  2019   • CATARACT EXTRACTION      OU   • CHOLECYSTECTOMY      OPEN   • COLON SURGERY      STATES SHE HAD 12 INCHES OF COLON REMOVED IN JULY 2020 FOR COLON CANCER   • COLONOSCOPY  2020   • COLONOSCOPY N/A 10/29/2021    Procedure: COLONOSCOPY;  Surgeon: Edmar Back MD;  Location: [facility] ENDOSCOPY;  Service: General;  Laterality: N/A;  DIVERTICULOSIS/ANASTOMOSIS   • FEMUR OPEN REDUCTION INTERNAL FIXATION Left 07/21/2021    Procedure: FEMORAL NECK OPEN REDUCTION INTERNAL FIXATION;  Surgeon: Bam Warner MD;  Location: [facility] MAIN OR;  Service: Orthopedics;  Laterality: Left;   • HIP CLOSED REDUCTION Left 3/20/2022    Procedure: HIP CLOSED REDUCTION;  Surgeon: Harsha Gayle MD;  Location: [facility] MAIN OR;  Service: Orthopedics;  Laterality: Left;   • INGUINAL HERNIA REPAIR Left 09/29/2011   • TOTAL HIP ARTHROPLASTY Left 3/16/2022    Procedure: LEFT TOTAL HIP ARTHROPLASTY AND HARDWARE REMOVAL;  Surgeon: Bam Warner MD;  Location: [facility] MAIN OR;  Service: Orthopedics;  Laterality: Left;   • UPPER GASTROINTESTINAL ENDOSCOPY      WITH DILATATION     Family History   Problem Relation Age of Onset   • Malig Hyperthermia Neg Hx        Home Medications:  Prior to Admission medications    Medication Sig Start Date End Date Taking? Authorizing Provider   acetaminophen (TYLENOL) 325 MG  tablet Take 1 tablet by mouth Every 4 (Four) Hours As Needed for Fever (Temperature Greater Than 101F). 3/18/22   Jairo Kramer MD   aluminum-magnesium hydroxide-simethicone (MAALOX MAX) 400-400-40 MG/5ML suspension Take 15 mL by mouth Every 6 (Six) Hours As Needed for Heartburn. 3/22/22   Jairo Kramer MD   apixaban (ELIQUIS) 2.5 MG tablet tablet Take 1 tablet by mouth 2 (Two) Times a Day. 3/18/22   Jairo Kramer MD   apixaban (ELIQUIS) 2.5 MG tablet tablet Take 2.5 mg by mouth 2 (Two) Times a Day.    Karina Browne MD   ascorbic acid (VITAMIN C) 500 MG tablet Take 500 mg by mouth Daily.    Karina Browne MD   AZO-CRANBERRY PO Take  by mouth.    Karina Browne MD   cephalexin (Keflex) 500 MG capsule Take 1 capsule by mouth 3 (Three) Times a Day. 5/7/22   Jairo Kramer MD   cholestyramine light 4 g packet Take 4 g by mouth 2 (Two) Times a Day.    Karina Browne MD   colestipol (COLESTID) 1 g tablet TAKE 1 TABLET BY ORAL ROUTE 3 TIMES A DAY AS NEEDED FOR 30 DAYS DIARRHEA 7/28/21   Jeanne Colunga APRN   dilTIAZem CD (CARDIZEM CD) 120 MG 24 hr capsule Take 1 capsule by mouth Daily With Lunch. 3/22/22   Jairo Kramer MD   escitalopram (Lexapro) 5 MG tablet Take 1 tablet by mouth Daily. 5/10/22   Jairo Kramer MD   furosemide (LASIX) 20 MG tablet Take 20 mg by mouth As Needed. Up to three times a week based on leg swelling.    Karina Browne MD   HYDROcodone-acetaminophen (NORCO) 7.5-325 MG per tablet Take 1 tablet by mouth Every 4 (Four) Hours As Needed for Moderate Pain . 4/7/22   Bam Warner MD   latanoprost (XALATAN) 0.005 % ophthalmic solution Administer 1 drop to both eyes Daily. 11/11/21   Karina Browne MD   LORazepam (ATIVAN) 0.5 MG tablet Take 1 tablet by mouth At Night As Needed for Anxiety. 5/18/22   Jairo Kramer MD   losartan (COZAAR) 50 MG tablet Take 50 mg by mouth 2 (Two) Times  a Day. 5/9/22   Karina Browne MD   magnesium oxide (MAG-OX) 400 tablet tablet Take 1 tablet by mouth 2 (Two) Times a Day. 3/18/22   Jairo Kramer MD   metoprolol succinate XL (TOPROL-XL) 100 MG 24 hr tablet Take 1 tablet by mouth 2 (Two) Times a Day. 2/9/22   Wily Jolly MD   nystatin (MYCOSTATIN) 763171 UNIT/GM powder Apply  topically to the appropriate area as directed 3 (Three) Times a Day. 4/4/22   Lora Rudd MD   ondansetron (Zofran) 4 MG tablet Take 1 tablet by mouth Every 8 (Eight) Hours As Needed for Nausea or Vomiting. 4/20/22   Jairo Kramer MD   pantoprazole (PROTONIX) 40 MG EC tablet TAKE 1 TABLET BY MOUTH EVERY DAY 5/5/22   Annabelle Powell APRN   polyethylene glycol (MIRALAX) 17 g packet Take 17 g by mouth Daily As Needed (Use if senna-docusate is ineffective). 3/22/22   Jairo Kramer MD   Probiotic Product (Florajen3) capsule TAKE 1 CAPSULE BY MOUTH EVERY DAY 5/15/22   Annabelle Powell APRN        Social History:   Social History     Tobacco Use   • Smoking status: Never Smoker   • Smokeless tobacco: Never Used   Vaping Use   • Vaping Use: Former   Substance Use Topics   • Alcohol use: Not Currently   • Drug use: Never         Review of Systems:  Review of Systems   Constitutional: Positive for appetite change (with UTI) and fatigue. Negative for chills, diaphoresis and fever.        Malaise.   HENT: Negative for congestion, postnasal drip, rhinorrhea and sore throat.    Eyes: Negative for photophobia.   Respiratory: Negative for cough, chest tightness and shortness of breath.    Cardiovascular: Negative for chest pain, palpitations and leg swelling.   Gastrointestinal: Positive for abdominal pain, diarrhea and nausea (mild). Negative for vomiting.   Genitourinary: Negative for difficulty urinating, dysuria, flank pain, frequency, hematuria and urgency.   Musculoskeletal: Negative for back pain, myalgias (patient's relative states  "the patient has complained of body aches, though the patient denies any pain), neck pain and neck stiffness.   Skin: Negative for pallor and rash.   Neurological: Negative for dizziness, syncope, weakness, numbness and headaches.   Hematological: Negative for adenopathy. Does not bruise/bleed easily.   Psychiatric/Behavioral: Positive for confusion (with UTI).        Physical Exam:  /99 (BP Location: Right arm, Patient Position: Lying)   Pulse 77   Temp 97.8 °F (36.6 °C) (Oral)   Resp 16   Ht 170.2 cm (67\")   Wt 65 kg (143 lb 4.8 oz)   SpO2 97%   BMI 22.44 kg/m²     Physical Exam  Vitals and nursing note reviewed.   Constitutional:       General: She is not in acute distress.     Appearance: Normal appearance.   HENT:      Head: Normocephalic and atraumatic.      Nose: Nose normal.      Mouth/Throat:      Pharynx: Oropharynx is clear.   Eyes:      General: No scleral icterus.     Conjunctiva/sclera: Conjunctivae normal.   Cardiovascular:      Rate and Rhythm: Normal rate. Rhythm irregular.      Pulses: Normal pulses.      Heart sounds: Normal heart sounds. No murmur heard.  Pulmonary:      Effort: No respiratory distress.      Breath sounds: Wheezing (faint) present. No rhonchi or rales.      Comments: Lungs are slightly diminished.  Chest:      Chest wall: No tenderness.   Abdominal:      Palpations: Abdomen is soft.      Tenderness: There is abdominal tenderness (left-sided lower abdominal tenderness). There is no right CVA tenderness, left CVA tenderness, guarding or rebound.      Comments: No rigidity.   Musculoskeletal:         General: No tenderness. Normal range of motion.      Cervical back: Normal range of motion and neck supple.      Right lower leg: Edema (trace) present.      Left lower leg: Edema (trace) present.   Skin:     General: Skin is warm and dry.   Neurological:      Mental Status: She is alert. Mental status is at baseline.   Psychiatric:         Mood and Affect: Mood normal.    "      Behavior: Behavior normal.                Medications in the Emergency Department:  Medications   iopamidol (ISOVUE-370) 76 % injection 100 mL (100 mL Intravenous Given 5/21/22 0140)   furosemide (LASIX) injection 40 mg (40 mg Intravenous Given 5/21/22 0332)   potassium chloride (MICRO-K) CR capsule 40 mEq (40 mEq Oral Given 5/21/22 0332)        Labs  Lab Results (last 24 hours)     Procedure Component Value Units Date/Time    Urinalysis With Microscopic If Indicated (No Culture) - Urine, Clean Catch [[id number]]  (Abnormal) Collected: 05/21/22 0200    Specimen: Urine, Clean Catch Updated: 05/21/22 0223     Color, UA Yellow     Appearance, UA Clear     pH, UA 7.5     Specific Gravity, UA 1.020     Glucose, UA Negative     Ketones, UA Trace     Bilirubin, UA Negative     Blood, UA Trace     Protein, UA Negative     Leuk Esterase, UA Moderate (2+)     Nitrite, UA Negative     Urobilinogen, UA 0.2 E.U./dL    COVID-19,APTIMA PANTHER(JOSUE),[facility], NP/OP SWAB IN UTM/VTM/[facility] TRANSPORT MEDIA,24 HR TAT - Swab, Nasopharynx [[id number]]  (Normal) Collected: 05/21/22 0200    Specimen: Swab from Nasopharynx Updated: 05/21/22 2051     COVID19 Not Detected    Narrative:      Fact sheet for providers: https://www.fda.gov/media/231799/download     Fact sheet for patients: https://www.fda.gov/media/441470/download    Test performed by RT PCR.    Urinalysis, Microscopic Only - Urine, Clean Catch [[id number]]  (Abnormal) Collected: 05/21/22 0200    Specimen: Urine, Clean Catch Updated: 05/21/22 0227     RBC, UA None Seen /HPF      WBC, UA 3-5 /HPF      Bacteria, UA None Seen /HPF      Squamous Epithelial Cells, UA 3-6 /HPF      Hyaline Casts, UA 0-2 /LPF      Methodology Automated Microscopy    Influenza Antigen, Rapid - Swab, Nasopharynx [[id number]]  (Normal) Collected: 05/21/22 0201    Specimen: Swab from Nasopharynx Updated: 05/21/22 0248     Influenza A Ag, EIA Negative     Influenza B Ag, EIA Negative            Imaging:  CT Abdomen Pelvis With Contrast    Result Date: 5/21/2022  PROCEDURE: CT ABDOMEN PELVIS W CONTRAST  COMPARISON: [facility], CT, CT CHEST WO CONTRAST DIAGNOSTIC, 5/17/2022, 8:37.  INDICATIONS: LOW ABDOMEN PAIN AND DIARRHEA FOR 2-3 DAYS  TECHNIQUE: After obtaining the patient's consent, CT images were created with non-ionic intravenous contrast material.   PROTOCOL:   Standard imaging protocol performed    RADIATION:   DLP: 593.8mGy*cm   Automated exposure control was utilized to minimize radiation dose. CONTRAST: 100cc Isovue 370 I.V.  FINDINGS:  There are small bilateral pleural effusions.  There is a moderate-sized stomach containing hiatal hernia.  There is mild dependent bibasilar atelectasis and there is interlobular septal thickening in the lung bases.  The heart is enlarged with asymmetric enlargement of the right atrium.  There are coronary artery calcifications.  There is diffuse muscular atrophy.  There is evidence of prior ventral laparotomy.  There is a left hip prosthesis in place.  There are no acute osseous abnormalities or destructive bone lesions.  The bones appear demineralized.  There are old healed posterior right rib fractures.  There are moderate thoracolumbar degenerative changes.  There is a 15 mm cyst in liver segment 4. Patient is status post cholecystectomy.  There is no biliary distention.  The pancreas is atrophic.  Spleen is normal size.  No adrenal mass.  The kidneys, ureters and urinary bladder appear within normal limits.  There is an anastomosis at the transverse colon with evidence of prior proximal colon resection.  There is extensive sigmoid colon diverticulosis.  Patient appears status post hysterectomy.  There is no ascites, pneumoperitoneum or lymphadenopathy.  There are mild atherosclerotic calcifications.         1. No acute abdominal or pelvic abnormality. 2. Colonic diverticulosis.  Evidence of prior proximal colon resection. 3. Small bilateral  pleural effusions, dependent bibasilar atelectasis and interlobular septal thickening in the lung bases, which could be related to mild edema. 4. Cardiomegaly and coronary artery disease. 5. Moderate-sized hiatal hernia. 6. Osteopenia and thoracolumbar degenerative changes. 7. Left hip prosthesis in place.  Status post cholecystectomy.     HARIS OLSON MD       Electronically Signed and Approved By: HARIS OLSON MD on 5/21/2022 at 2:15               Procedures:  Procedures    Progress  ED Course as of 05/22/22 0046   Fri May 20, 2022   1958 EKG:    Rhythm: Atrial fibrillation  Rate: 104  Intervals: Normal QT interval  T-wave: Nonspecific T wave flattening  ST Segment: Nonspecific ST abnormalities but there is no pathological ST elevation or ST depression    EKG Comparison: Probably no palpable changes in the QRS and ST complexes from EKG performed July 20, 2021    Interpreted by me   [SD]      ED Course User Index  [SD] Pernell Murray DO                            Medical Decision Making:  MDM  Number of Diagnoses or Management Options  Acute on chronic congestive heart failure, unspecified heart failure type (HCC)  Chronic atrial fibrillation (HCC)  Hypokalemia  Pleural effusion, bilateral  Uncontrolled hypertension  Diagnosis management comments:     Patient's EKG demonstrated chronic A. fib that was rate controlled.  The patient's chest x-ray have changes consistent with chronic congestive heart failure.  The patient is in no respiratory failure and has a normal room air pulse ox.  Patient has no signs of hypertensive crisis or emergency.  The patient will monitor their blood pressure at home twice a day, record discusses readings with her primary care physician on the next scheduled visit which is Wednesday.  The patient's potassium is low.  The patient was had her potassium replaced orally.  The patient will be sent home on potassium for 5 days and will have her primary care physician recheck it on  Wednesday..  The patient's vital signs are stable at the time of discharge.  The patient is in no respiratory distress.  The patient family were given very specific instructions on when and why to return to the emergency room.  They all felt comfortable those instructions and felt comfortable for outpatient follow-up.       Amount and/or Complexity of Data Reviewed  Clinical lab tests: reviewed  Tests in the radiology section of CPT®: reviewed  Tests in the medicine section of CPT®: reviewed  Decide to obtain previous medical records or to obtain history from someone other than the patient: yes                 Final diagnoses:   Acute on chronic congestive heart failure, unspecified heart failure type (HCC)   Pleural effusion, bilateral   Chronic atrial fibrillation (HCC)   Hypokalemia   Uncontrolled hypertension        Disposition:  ED Disposition     ED Disposition   Discharge    Condition   Stable    Comment   --             Part of this note may be an electronic transcription/translation of spoken language to printed text using the Dragon Dictation System.     Documentation assistance provided by Keren Sanford acting as scribe for Pernell Murray DO. Information recorded by the scribe was done at my direction and has been verified and validated by me.        Keren Sanford  05/21/22 0100       Keren Sanford  05/21/22 0103       Pernell Murray DO  05/22/22 0046

## 2022-05-25 ENCOUNTER — TELEPHONE (OUTPATIENT)
Dept: INTERNAL MEDICINE | Facility: CLINIC | Age: 87
End: 2022-05-25

## 2022-05-25 ENCOUNTER — OFFICE VISIT (OUTPATIENT)
Dept: INTERNAL MEDICINE | Facility: CLINIC | Age: 87
End: 2022-05-25

## 2022-05-25 VITALS
WEIGHT: 140.8 LBS | BODY MASS INDEX: 22.1 KG/M2 | HEART RATE: 70 BPM | DIASTOLIC BLOOD PRESSURE: 82 MMHG | OXYGEN SATURATION: 94 % | HEIGHT: 67 IN | TEMPERATURE: 97.9 F | SYSTOLIC BLOOD PRESSURE: 138 MMHG

## 2022-05-25 DIAGNOSIS — D50.9 IRON DEFICIENCY ANEMIA, UNSPECIFIED IRON DEFICIENCY ANEMIA TYPE: ICD-10-CM

## 2022-05-25 DIAGNOSIS — I50.31 ACUTE DIASTOLIC CHF (CONGESTIVE HEART FAILURE): ICD-10-CM

## 2022-05-25 DIAGNOSIS — F41.9 ANXIETY: Primary | ICD-10-CM

## 2022-05-25 DIAGNOSIS — I48.20 ATRIAL FIBRILLATION, CHRONIC: ICD-10-CM

## 2022-05-25 DIAGNOSIS — I82.5Y2 CHRONIC DEEP VEIN THROMBOSIS (DVT) OF PROXIMAL VEIN OF LEFT LOWER EXTREMITY: Primary | ICD-10-CM

## 2022-05-25 DIAGNOSIS — F41.9 ANXIETY: ICD-10-CM

## 2022-05-25 DIAGNOSIS — I10 HYPERTENSION, ESSENTIAL: ICD-10-CM

## 2022-05-25 PROCEDURE — 99214 OFFICE O/P EST MOD 30 MIN: CPT | Performed by: INTERNAL MEDICINE

## 2022-05-25 RX ORDER — FUROSEMIDE 20 MG/1
20 TABLET ORAL 2 TIMES DAILY
Qty: 90 TABLET | Refills: 3 | Status: SHIPPED | OUTPATIENT
Start: 2022-05-25 | End: 2022-08-30

## 2022-05-25 RX ORDER — LORAZEPAM 0.5 MG/1
0.5 TABLET ORAL 2 TIMES DAILY PRN
Qty: 60 TABLET | Refills: 5 | Status: SHIPPED | OUTPATIENT
Start: 2022-05-25 | End: 2022-08-12 | Stop reason: SDUPTHER

## 2022-05-25 RX ORDER — POTASSIUM CHLORIDE 750 MG/1
10 TABLET, FILM COATED, EXTENDED RELEASE ORAL 2 TIMES DAILY
Qty: 180 TABLET | Refills: 3 | Status: SHIPPED | OUTPATIENT
Start: 2022-05-25

## 2022-05-25 RX ORDER — LACTOBACILLUS ACIDOPHILUS 20B CELL
1 CAPSULE ORAL DAILY
Qty: 90 CAPSULE | Refills: 3 | Status: SHIPPED | OUTPATIENT
Start: 2022-05-25

## 2022-05-25 NOTE — TELEPHONE ENCOUNTER
Call the patient's family her daughter tell her we will change the prescription to Ativan 0.5 mg she can take 1-1/2 tablets daily, tell her I called in 0.5 mg twice daily as needed so she will have enough, but she only has to take 1-1/2 a day if she wants to

## 2022-05-25 NOTE — TELEPHONE ENCOUNTER
Caller: STEPHANIE BENITES    Relationship to patient: Emergency Contact    Best call back number: [phone]    Patient is needing: PATIENTS DAUGHTER CALLED STATING THE PATIENTS LORAZEPAM INSTRUCTIONS ON THE BOTTLE HAVE NOT BEEN CHANGED. SHE STATED THE PATIENTS PCP CHANGED THE DOSAGE TO TAKE 1 AND A HALF TABLET MY MOUTH DAILY BUT IT WAS NOT CHANGED IN THE SYSTEM SO THE PATIENT IS NEEDING IT TO BE CHANGED SO SHE CAN GET A REFILL AT THE PHARMACY PLEASE ADVISE THANK YOU.

## 2022-05-25 NOTE — PROGRESS NOTES
"Chief Complaint/ HPI: Patient is here to follow-up with emergency room visit for erratic blood pressures, family was concerned extensive work-up in the emergency room reviewed, flu test negative, COVID test was negative, CT scan abdomen and pelvis reviewed chest x-ray noted urinalysis noted, lab work showed an elevated BR NP level of 4100, potassium was at 3.1,  Home blood pressures noted 120s to 160s systolic range,    Objective   Vital Signs  Vitals:    05/25/22 1050   BP: 138/82   Pulse: 70   Temp: 97.9 °F (36.6 °C)   SpO2: 94%   Weight: 63.9 kg (140 lb 12.8 oz)   Height: 170.2 cm (67.01\")      Body mass index is 22.05 kg/m².  Review of Systems   HENT: Negative.    Eyes: Negative.    Respiratory: Positive for shortness of breath.    Cardiovascular: Negative.    Gastrointestinal: Negative.    Endocrine: Negative.    Genitourinary: Negative.    Musculoskeletal: Negative.    Allergic/Immunologic: Negative.    Neurological: Positive for weakness.   Hematological: Negative.    Psychiatric/Behavioral: Negative.     as above, patient is here with her daughter,  General malaise,  Physical Exam  Constitutional:       General: She is not in acute distress.     Appearance: Normal appearance.   HENT:      Head: Normocephalic.      Mouth/Throat:      Mouth: Mucous membranes are moist.   Eyes:      Conjunctiva/sclera: Conjunctivae normal.      Pupils: Pupils are equal, round, and reactive to light.   Cardiovascular:      Rate and Rhythm: Normal rate. Rhythm irregular.      Pulses: Normal pulses.      Heart sounds: Murmur heard.   Pulmonary:      Effort: Pulmonary effort is normal.      Breath sounds: Normal breath sounds.   Abdominal:      General: Abdomen is flat. Bowel sounds are normal.      Palpations: Abdomen is soft.   Musculoskeletal:         General: No swelling. Normal range of motion.      Cervical back: Neck supple.   Skin:     General: Skin is warm and dry.      Coloration: Skin is not jaundiced.   Neurological:    "   General: No focal deficit present.      Mental Status: She is alert and oriented to person, place, and time. Mental status is at baseline.   Psychiatric:         Mood and Affect: Mood normal.         Behavior: Behavior normal.         Thought Content: Thought content normal.         Judgment: Judgment normal.        Result Review :   Lab Results   Component Value Date    PROBNP 4,198.0 (H) 05/20/2022    PROBNP 1,648.0 07/20/2021    BNP 1385 12/05/2020    BNP 4183 (H) 10/12/2020    BNP 5163 (H) 09/09/2020     CMP    CMP 3/26/22 4/20/22 5/20/22   Glucose 87 92 104 (A)   BUN 11 9 12   Creatinine 0.56 (A) 0.65 0.79   Sodium 136 138 139   Potassium 4.0 3.7 3.1 (A)   Chloride 103 103 101   Calcium 8.3 9.0 9.4   Albumin 2.40 (A) 3.50 4.00   Total Bilirubin 0.6 0.7 0.9   Alkaline Phosphatase 80 94 87   AST (SGOT) 17 16 19   ALT (SGPT) 7 5 8   (A) Abnormal value            CBC w/diff    CBC w/Diff 3/26/22 4/20/22 5/20/22   WBC 11.45 (A) 7.55 8.04   RBC 3.06 (A) 3.45 (A) 3.84   Hemoglobin 9.6 (A) 11.0 (A) 11.7 (A)   Hematocrit 29.3 (A) 33.5 (A) 36.7   MCV 95.8 97.1 (A) 95.6   MCH 31.4 31.9 30.5   MCHC 32.8 32.8 31.9   RDW 14.3 14.6 14.1   Platelets 380 292 291   Neutrophil Rel % 62.6 64.1 54.9   Immature Granulocyte Rel % 2.4 (A) 0.4 0.2   Lymphocyte Rel % 23.4 27.2 35.0   Monocyte Rel % 6.0 6.8 7.3   Eosinophil Rel % 5.1 1.1 2.1   Basophil Rel % 0.5 0.4 0.5   (A) Abnormal value             Lipid Panel    Lipid Panel 7/15/21 2/14/22   Total Cholesterol 187 170   Triglycerides 136 164 (A)   HDL Cholesterol 79 (A) 78 (A)   VLDL Cholesterol 23 27   LDL Cholesterol  85 65   LDL/HDL Ratio 1.02 0.76   (A) Abnormal value             Lab Results   Component Value Date    TSH 1.850 03/18/2022    TSH 3.710 03/14/2022    TSH 2.790 07/21/2021      Lab Results   Component Value Date    FREET4 1.42 03/14/2022    FREET4 1.02 07/15/2021    FREET4 1.2 07/18/2020      A1C Last 3 Results    HGBA1C Last 3 Results 3/8/22   Hemoglobin A1C 5.30                              Visit Diagnoses:    ICD-10-CM ICD-9-CM   1. Chronic deep vein thrombosis (DVT) of proximal vein of left lower extremity (HCC)  I82.5Y2 453.51   2. Iron deficiency anemia, unspecified iron deficiency anemia type  D50.9 280.9   3. Anxiety  F41.9 300.00   4. Atrial fibrillation, chronic (HCC)  I48.20 427.31   5. Hypertension, essential  I10 401.9   6. Acute diastolic CHF (congestive heart failure) ([facility])  I50.31 428.31     428.0       Assessment and Plan   Diagnoses and all orders for this visit:    1. Chronic deep vein thrombosis (DVT) of proximal vein of left lower extremity (HCC) (Primary)  -     Comprehensive Metabolic Panel; Future  -     CBC & Differential; Future  -     proBNP; Future    2. Iron deficiency anemia, unspecified iron deficiency anemia type  -     Comprehensive Metabolic Panel; Future  -     CBC & Differential; Future  -     proBNP; Future    3. Anxiety  -     Comprehensive Metabolic Panel; Future  -     CBC & Differential; Future  -     proBNP; Future    4. Atrial fibrillation, chronic (HCC)  -     Comprehensive Metabolic Panel; Future  -     CBC & Differential; Future  -     proBNP; Future    5. Hypertension, essential  -     Comprehensive Metabolic Panel; Future  -     CBC & Differential; Future  -     proBNP; Future    6. Acute diastolic CHF (congestive heart failure) (HCC)  -     Comprehensive Metabolic Panel; Future  -     CBC & Differential; Future  -     proBNP; Future    Other orders  -     potassium chloride 10 MEQ CR tablet; Take 1 tablet by mouth 2 (Two) Times a Day.  Dispense: 180 tablet; Refill: 3  -     furosemide (Lasix) 20 MG tablet; Take 1 tablet by mouth 2 (Two) Times a Day.  Dispense: 90 tablet; Refill: 3  -     Lactobacillus (Florajen Acidophilus) capsule; Take 1 each by mouth Daily.  Dispense: 90 capsule; Refill: 3         Hypertension, --- too low at this time April 2022,, reviewed blood pressure medications with patient daughter by telehealth visit  April 14, 2022, will decrease medication to avoid hypotension,--WILL CONT METOPROLOL  MG BID ,, stopped LOSARTAN 50 MG BID , and DILTIAZEM  MG QD , -, doing much better, May 5, 2022    Acute diastolic heart failure may 2022, treatment options discussed, patient will restart Lasix 20 mg daily potassium will be 10 mEq twice a day, repeat labs in 1 to 2 weeks, patient will hold losartan and Cardizem for now and monitor blood pressures to make sure they do not get too low, she was encouraged to continue drinking plenty of fluids, May 25, 2022    Left leg swelling new over the past couple weeks, will check VE LE, will add Lasix family already has on an as-needed basis, May 5, 2022    Anemia, mild improving since March 26 through April 20, up to 11.0 consider over-the-counter multivitamin with iron Geritol or just iron supplements,    Depression- lost son to stomach ca, nov 2018, and daughter had AVR nov 2018, -currently on treatment as below,     ANXIETY SINCE surgery, MARCH 16, 2022,, improved with and, cont lexapro  5 MG QD AND CONT LORAZEPAM 0.5 HS AND 1/2= 0.25 MG QD PRN April 14, 2022,--,     L NILO, MARCH 16, 2022, POST OP DISLOCATION MARCH 20, 2022 AND THEN TO [facility],,    Transitional care visit for closed l hip fx and orif, July 20, 2021, ---     Dysphagia, Status post barium swallow October 2020, showing narrowing at the distal esophagus, for EGD by Dr. Blount February February 25, 2021,--CONT PROTONIX daily    Colon cancer diagnosed June 2020 initially found to be Anemia, was referred to hematology oncology,--- sent to gastroenterology---Dr. Blount--FOUND COLON CA JUNE 2020, REFERRED TO DR QUISPE--subsequent exploratory laparotomy and partial colectomy with 14 out of 14 lymph nodes negative, -------patient had prolonged hospital course, subsequent sent to rehab, now home--HAD LOW NA , DEVELOPED C DIFF COLITIS, IN HOSP, FTT WITH WGT LOSS IN REHAB --GAIT DYS. AND GEN WEAKNESS --Slowly  improving, current medications reviewed----Patient had delirium in the hospital also, improved---Patient has follow-up for a 5 mm lung nodule found on CT scan in the left lower lobe and surgical changes -----With repeat CT scan in February 2021, no change mild to moderate cardiomegaly stable nodular density lung fields bilateral follow-up CT 1 year recommended stable right hilar lymph node,--- Dr. Solorio------ continues on colestipol half a pill every other day, November 2021  Status post colonoscopy October 2021 Dr. Back    LUNG NODULE --Found June 2020, 5 millimeter left lower lobe--- on CT scan for abdominal pains---- FOUND PER HEM/ONC AND F/U CT scan of the chest February 4, 2021, showing stable pulmonary nodules recommended a follow-up in 1 year we will reschedule,--- and CT abdomen and pelvis for follow-up of colon cancer,    Chronic atrial fibrillation---- MARCH 23, 2017, --continues ON ELIQUIS 2.5 mg twice a day ,Metoprolol 100 mg twice a day,--, will stop Cardizem extended release 120 mg daily, due to low blood pressures weakness, failure to thrive April 20, 2022, as above ---------Cardioverted April 2019 Dr. Kay , NOW BACK IN [facility]     L HUMERAL FX DEC 2016, ORIF WITH BENSON --at [facility]    adverse reaction with rash to nitrofurantoin,, SEPT. 2016.    EXTENSIVE LLE DVT 11/15, , Continues on Eliquis 2.5 mg twice a day discussed potentially decreasing dose given age GFR, April 2020    MVR-MOD-, ON ECHO,--APRIL 2017, -- PER DR KAY.               Follow Up   Return for Next scheduled follow up.  Patient was given instructions and counseling regarding her condition or for health maintenance advice. Please see specific information pulled into the AVS if appropriate.

## 2022-05-27 ENCOUNTER — TELEPHONE (OUTPATIENT)
Dept: CARDIOLOGY | Facility: CLINIC | Age: 87
End: 2022-05-27

## 2022-06-07 ENCOUNTER — TELEPHONE (OUTPATIENT)
Dept: CARDIOLOGY | Facility: CLINIC | Age: 87
End: 2022-06-07

## 2022-06-07 RX ORDER — METOPROLOL SUCCINATE 100 MG/1
TABLET, EXTENDED RELEASE ORAL
Qty: 180 TABLET | Refills: 0 | Status: SHIPPED | OUTPATIENT
Start: 2022-06-07 | End: 2023-03-02

## 2022-06-07 RX ORDER — FAMOTIDINE 20 MG/1
TABLET, FILM COATED ORAL
Qty: 180 TABLET | Refills: 1 | Status: SHIPPED | OUTPATIENT
Start: 2022-06-07 | End: 2022-08-12

## 2022-06-07 NOTE — TELEPHONE ENCOUNTER
The pts daughter called and stated that Dr. Kramer had taken the pt off of her Diltiazem and they didn't think this was appropriate. They want to make sure that we are aware are are okay with this change.     Please advise.

## 2022-06-07 NOTE — TELEPHONE ENCOUNTER
I Called the pts daughter and made aware. They are agreeable to do the BP log. She said Dr. Kramer also took her off her losartan too.

## 2022-06-07 NOTE — TELEPHONE ENCOUNTER
According to his notes diltiazem was stopped due to low blood pressures.  Please do a blood pressure log and send a list of medications and we will adjust accordingly

## 2022-06-08 ENCOUNTER — TELEPHONE (OUTPATIENT)
Dept: INTERNAL MEDICINE | Facility: CLINIC | Age: 87
End: 2022-06-08

## 2022-06-08 ENCOUNTER — LAB (OUTPATIENT)
Dept: LAB | Facility: HOSPITAL | Age: 87
End: 2022-06-08

## 2022-06-08 DIAGNOSIS — F41.9 ANXIETY: ICD-10-CM

## 2022-06-08 DIAGNOSIS — Z96.642 HISTORY OF TOTAL LEFT HIP REPLACEMENT: ICD-10-CM

## 2022-06-08 DIAGNOSIS — N39.0 URINARY TRACT INFECTION WITH HEMATURIA, SITE UNSPECIFIED: Primary | ICD-10-CM

## 2022-06-08 DIAGNOSIS — R30.0 BURNING WITH URINATION: ICD-10-CM

## 2022-06-08 DIAGNOSIS — D50.9 IRON DEFICIENCY ANEMIA, UNSPECIFIED IRON DEFICIENCY ANEMIA TYPE: ICD-10-CM

## 2022-06-08 DIAGNOSIS — I95.2 HYPOTENSION DUE TO DRUGS: ICD-10-CM

## 2022-06-08 DIAGNOSIS — I10 HYPERTENSION, ESSENTIAL: ICD-10-CM

## 2022-06-08 DIAGNOSIS — N30.00 ACUTE CYSTITIS WITHOUT HEMATURIA: ICD-10-CM

## 2022-06-08 DIAGNOSIS — S72.002G CLOSED FRACTURE OF LEFT HIP WITH DELAYED HEALING, SUBSEQUENT ENCOUNTER: ICD-10-CM

## 2022-06-08 DIAGNOSIS — F33.1 MAJOR DEPRESSIVE DISORDER, RECURRENT, MODERATE: ICD-10-CM

## 2022-06-08 DIAGNOSIS — R31.9 URINARY TRACT INFECTION WITH HEMATURIA, SITE UNSPECIFIED: Primary | ICD-10-CM

## 2022-06-08 LAB
ALBUMIN SERPL-MCNC: 4.3 G/DL (ref 3.5–5.2)
ALBUMIN/GLOB SERPL: 1.2 G/DL
ALP SERPL-CCNC: 76 U/L (ref 39–117)
ALT SERPL W P-5'-P-CCNC: 10 U/L (ref 1–33)
ANION GAP SERPL CALCULATED.3IONS-SCNC: 11.6 MMOL/L (ref 5–15)
AST SERPL-CCNC: 19 U/L (ref 1–32)
BACTERIA UR QL AUTO: ABNORMAL /HPF
BASOPHILS # BLD AUTO: 0.06 10*3/MM3 (ref 0–0.2)
BASOPHILS NFR BLD AUTO: 0.8 % (ref 0–1.5)
BILIRUB SERPL-MCNC: 1.2 MG/DL (ref 0–1.2)
BILIRUB UR QL STRIP: NEGATIVE
BUN SERPL-MCNC: 15 MG/DL (ref 8–23)
BUN/CREAT SERPL: 18.8 (ref 7–25)
CALCIUM SPEC-SCNC: 9.7 MG/DL (ref 8.2–9.6)
CHLORIDE SERPL-SCNC: 102 MMOL/L (ref 98–107)
CLARITY UR: ABNORMAL
CO2 SERPL-SCNC: 25.4 MMOL/L (ref 22–29)
COD CRY URNS QL: ABNORMAL /HPF
COLOR UR: ABNORMAL
CREAT SERPL-MCNC: 0.8 MG/DL (ref 0.57–1)
DEPRECATED RDW RBC AUTO: 40.2 FL (ref 37–54)
EGFRCR SERPLBLD CKD-EPI 2021: 70.1 ML/MIN/1.73
EOSINOPHIL # BLD AUTO: 0.19 10*3/MM3 (ref 0–0.4)
EOSINOPHIL NFR BLD AUTO: 2.5 % (ref 0.3–6.2)
ERYTHROCYTE [DISTWIDTH] IN BLOOD BY AUTOMATED COUNT: 12.3 % (ref 12.3–15.4)
GLOBULIN UR ELPH-MCNC: 3.7 GM/DL
GLUCOSE SERPL-MCNC: 94 MG/DL (ref 65–99)
GLUCOSE UR STRIP-MCNC: NEGATIVE MG/DL
HCT VFR BLD AUTO: 38.2 % (ref 34–46.6)
HGB BLD-MCNC: 12.7 G/DL (ref 12–15.9)
HGB UR QL STRIP.AUTO: ABNORMAL
HYALINE CASTS UR QL AUTO: ABNORMAL /LPF
IMM GRANULOCYTES # BLD AUTO: 0.01 10*3/MM3 (ref 0–0.05)
IMM GRANULOCYTES NFR BLD AUTO: 0.1 % (ref 0–0.5)
KETONES UR QL STRIP: ABNORMAL
LEUKOCYTE ESTERASE UR QL STRIP.AUTO: ABNORMAL
LYMPHOCYTES # BLD AUTO: 2.52 10*3/MM3 (ref 0.7–3.1)
LYMPHOCYTES NFR BLD AUTO: 33.6 % (ref 19.6–45.3)
MCH RBC QN AUTO: 30.4 PG (ref 26.6–33)
MCHC RBC AUTO-ENTMCNC: 33.2 G/DL (ref 31.5–35.7)
MCV RBC AUTO: 91.4 FL (ref 79–97)
MONOCYTES # BLD AUTO: 0.54 10*3/MM3 (ref 0.1–0.9)
MONOCYTES NFR BLD AUTO: 7.2 % (ref 5–12)
NEUTROPHILS NFR BLD AUTO: 4.18 10*3/MM3 (ref 1.7–7)
NEUTROPHILS NFR BLD AUTO: 55.8 % (ref 42.7–76)
NITRITE UR QL STRIP: POSITIVE
NRBC BLD AUTO-RTO: 0 /100 WBC (ref 0–0.2)
PH UR STRIP.AUTO: 6 [PH] (ref 5–8)
PLATELET # BLD AUTO: 242 10*3/MM3 (ref 140–450)
PMV BLD AUTO: 12.2 FL (ref 6–12)
POTASSIUM SERPL-SCNC: 3.5 MMOL/L (ref 3.5–5.2)
PROT SERPL-MCNC: 8 G/DL (ref 6–8.5)
PROT UR QL STRIP: ABNORMAL
RBC # BLD AUTO: 4.18 10*6/MM3 (ref 3.77–5.28)
RBC # UR STRIP: ABNORMAL /HPF
REF LAB TEST METHOD: ABNORMAL
SODIUM SERPL-SCNC: 139 MMOL/L (ref 136–145)
SP GR UR STRIP: 1.02 (ref 1–1.03)
SQUAMOUS #/AREA URNS HPF: ABNORMAL /HPF
UROBILINOGEN UR QL STRIP: ABNORMAL
WBC # UR STRIP: ABNORMAL /HPF
WBC NRBC COR # BLD: 7.5 10*3/MM3 (ref 3.4–10.8)

## 2022-06-08 PROCEDURE — 36415 COLL VENOUS BLD VENIPUNCTURE: CPT

## 2022-06-08 PROCEDURE — 80053 COMPREHEN METABOLIC PANEL: CPT

## 2022-06-08 PROCEDURE — 81001 URINALYSIS AUTO W/SCOPE: CPT

## 2022-06-08 PROCEDURE — 87086 URINE CULTURE/COLONY COUNT: CPT

## 2022-06-08 PROCEDURE — 85025 COMPLETE CBC W/AUTO DIFF WBC: CPT

## 2022-06-08 PROCEDURE — 87186 SC STD MICRODIL/AGAR DIL: CPT

## 2022-06-08 RX ORDER — CEPHALEXIN 500 MG/1
500 CAPSULE ORAL 3 TIMES DAILY
Qty: 21 CAPSULE | Refills: 0 | Status: SHIPPED | OUTPATIENT
Start: 2022-06-08 | End: 2022-08-12

## 2022-06-08 NOTE — TELEPHONE ENCOUNTER
I knew the losartan was stopped also.  We will decide which one to restart when we get the blood pressure log

## 2022-06-08 NOTE — TELEPHONE ENCOUNTER
----- Message from Jairo Kramer MD sent at 6/8/2022  6:23 PM EDT -----  Call patient let her know she has a urinary tract infection and we can treat it with Keflex for now but it may not be sensitive because the culture is not back yet but if she wants to start medication she can, call in Keflex 500 mg 3  times a day #21 no refills

## 2022-06-09 ENCOUNTER — OFFICE VISIT (OUTPATIENT)
Dept: ORTHOPEDIC SURGERY | Facility: CLINIC | Age: 87
End: 2022-06-09

## 2022-06-09 VITALS — OXYGEN SATURATION: 98 % | HEIGHT: 67 IN | WEIGHT: 140 LBS | HEART RATE: 96 BPM | BODY MASS INDEX: 21.97 KG/M2

## 2022-06-09 DIAGNOSIS — Z47.89 AFTERCARE FOLLOWING SURGERY OF THE MUSCULOSKELETAL SYSTEM: ICD-10-CM

## 2022-06-09 DIAGNOSIS — Z47.89 AFTERCARE FOLLOWING SURGERY OF THE MUSCULOSKELETAL SYSTEM: Primary | ICD-10-CM

## 2022-06-09 PROCEDURE — 99024 POSTOP FOLLOW-UP VISIT: CPT | Performed by: ORTHOPAEDIC SURGERY

## 2022-06-09 RX ORDER — HYDROCODONE BITARTRATE AND ACETAMINOPHEN 5; 325 MG/1; MG/1
1 TABLET ORAL EVERY 6 HOURS PRN
Qty: 30 TABLET | Refills: 0 | Status: SHIPPED | OUTPATIENT
Start: 2022-06-09 | End: 2022-08-12

## 2022-06-09 NOTE — PROGRESS NOTES
"Chief Complaint  Follow-up of the Left Hip     Subjective      Bing Cruz presents to [facility] for follow up evaluation of the left hip. The patient is S/P left total hip arthroplasty 3/16/2022 in the left hip dislocation and closed reduction 3/20/2022. She is ambulating with a walker. She has no other complaints.     Allergies   Allergen Reactions   • Citalopram Unknown - High Severity   • Clonazepam Unknown - High Severity   • Levofloxacin Nausea And Vomiting   • Lisinopril Unknown - High Severity   • Macrobid [Nitrofurantoin Macrocrystal] Rash   • Melatonin Unknown - High Severity        Social History     Socioeconomic History   • Marital status:    Tobacco Use   • Smoking status: Never Smoker   • Smokeless tobacco: Never Used   Vaping Use   • Vaping Use: Former   Substance and Sexual Activity   • Alcohol use: Not Currently   • Drug use: Never   • Sexual activity: Defer        Review of Systems     Objective   Vital Signs:   Pulse 96   Ht 170.2 cm (67.01\")   Wt 63.5 kg (140 lb)   SpO2 98%   BMI 21.92 kg/m²       Physical Exam  Constitutional:       Appearance: Normal appearance. The patient is well-developed and normal weight.   HENT:      Head: Normocephalic.      Right Ear: Hearing and external ear normal.      Left Ear: Hearing and external ear normal.      Nose: Nose normal.   Eyes:      Conjunctiva/sclera: Conjunctivae normal.   Cardiovascular:      Rate and Rhythm: Normal rate.   Pulmonary:      Effort: Pulmonary effort is normal.      Breath sounds: No wheezing or rales.   Abdominal:      Palpations: Abdomen is soft.      Tenderness: There is no abdominal tenderness.   Musculoskeletal:      Cervical back: Normal range of motion.   Skin:     Findings: No rash.   Neurological:      Mental Status: The patient is alert and oriented to person, place, and time.   Psychiatric:         Mood and Affect: Mood and affect normal.         Judgment: Judgment normal. "       Ortho Exam      Left hip- incision well healing. Neurovascularly intact. No signs of infection. Positive EHL, FHL, GS and TA. Sensation intact to all 5 nerves of the foot. Positive pulses. No pain with flexion. Knee Extensor Mechanism intact.     Procedures    X-Ray Report:  Left hip(s) X-Ray  Indication: Evaluation of left hip  AP and Lateral view(s)  Findings: Intact appearing left total hip arthroplasty, no signs of hardware failure loosening, no signs of periprosthetic fracture, no subsidence, good alignment.  Prior studies available for comparison: yes         Imaging Results (Most Recent)     Procedure Component Value Units Date/Time    XR Hip With or Without Pelvis 2 - 3 View Left [[id number]] Resulted: 06/09/22 1312     Updated: 06/09/22 1316           Result Review :       CT Chest Without Contrast Diagnostic    Result Date: 5/17/2022  Narrative: PROCEDURE: CT CHEST WO CONTRAST DIAGNOSTIC  COMPARISON: Chest CT 02/04/2021  INDICATIONS: Lung nodule, > 8mm  TECHNIQUE: CT images were created without the administration of contrast material.   PROTOCOL:   Standard imaging protocol performed    RADIATION:   DLP: 305mGy*cm   Automated exposure control was utilized to minimize radiation dose.  cc  FINDINGS:  A few scattered subcentimeter pulmonary nodules noted on the comparison are unchanged.  No new or enlarging nodules.  New small-moderate size dependent bilateral pleural effusions with adjacent passive atelectasis.  No pneumothorax.  No evidence of pneumonia.  Normal-sized noncalcified lymph nodes, no concerning lymph nodes.  Calcified lymph nodes again noted from old granulomatous disease.  No pericardial effusion.  No evidence of acute process of the included upper abdomen.  No acute or aggressive osseous findings.        Impression:  Stable small subcentimeter pulmonary nodules over the past 1 year.   New small-moderate bilateral pleural effusions with adjacent compressive atelectasis.     VIDHYA TANG MD        Electronically Signed and Approved By: VIDHYA TANG MD on 5/17/2022 at 9:00             CT Abdomen Pelvis With Contrast    Result Date: 5/21/2022  Narrative: PROCEDURE: CT ABDOMEN PELVIS W CONTRAST  COMPARISON: [facility], CT, CT CHEST WO CONTRAST DIAGNOSTIC, 5/17/2022, 8:37.  INDICATIONS: LOW ABDOMEN PAIN AND DIARRHEA FOR 2-3 DAYS  TECHNIQUE: After obtaining the patient's consent, CT images were created with non-ionic intravenous contrast material.   PROTOCOL:   Standard imaging protocol performed    RADIATION:   DLP: 593.8mGy*cm   Automated exposure control was utilized to minimize radiation dose. CONTRAST: 100cc Isovue 370 I.V.  FINDINGS:  There are small bilateral pleural effusions.  There is a moderate-sized stomach containing hiatal hernia.  There is mild dependent bibasilar atelectasis and there is interlobular septal thickening in the lung bases.  The heart is enlarged with asymmetric enlargement of the right atrium.  There are coronary artery calcifications.  There is diffuse muscular atrophy.  There is evidence of prior ventral laparotomy.  There is a left hip prosthesis in place.  There are no acute osseous abnormalities or destructive bone lesions.  The bones appear demineralized.  There are old healed posterior right rib fractures.  There are moderate thoracolumbar degenerative changes.  There is a 15 mm cyst in liver segment 4. Patient is status post cholecystectomy.  There is no biliary distention.  The pancreas is atrophic.  Spleen is normal size.  No adrenal mass.  The kidneys, ureters and urinary bladder appear within normal limits.  There is an anastomosis at the transverse colon with evidence of prior proximal colon resection.  There is extensive sigmoid colon diverticulosis.  Patient appears status post hysterectomy.  There is no ascites, pneumoperitoneum or lymphadenopathy.  There are mild atherosclerotic calcifications.       Impression:   1. No acute abdominal or pelvic  abnormality. 2. Colonic diverticulosis.  Evidence of prior proximal colon resection. 3. Small bilateral pleural effusions, dependent bibasilar atelectasis and interlobular septal thickening in the lung bases, which could be related to mild edema. 4. Cardiomegaly and coronary artery disease. 5. Moderate-sized hiatal hernia. 6. Osteopenia and thoracolumbar degenerative changes. 7. Left hip prosthesis in place.  Status post cholecystectomy.     HARIS OLSON MD       Electronically Signed and Approved By: HARIS OLSON MD on 5/21/2022 at 2:15             XR Chest 1 View    Result Date: 5/20/2022  Narrative: PROCEDURE: XR CHEST 1 VW  COMPARISON: [facility], CR, RIBS-PA CHEST- UNIL RT, 5/16/2020, 13:45.  INDICATIONS: CHEST PAIN  FINDINGS:  There is evidence of left humeral ORIF.  There are new small bilateral pleural effusions and new mild bibasilar airspace disease/atelectasis.  No pneumothorax.  No acute osseous abnormalities.  Stable thoracic degenerative changes.      Impression:   1. New bibasilar airspace disease/atelectasis and small pleural effusions. 2. Stable left humeral ORIF hardware.       HARIS OLSON MD       Electronically Signed and Approved By: HARIS OLSON MD on 5/20/2022 at 23:34             Duplex Venous Lower Extremity - Left CAR    Result Date: 5/11/2022  Narrative: · Normal left lower extremity venous duplex scan.               Assessment and Plan     Diagnoses and all orders for this visit:    1. Aftercare following surgery of the musculoskeletal system (Primary)  -     XR Hip With or Without Pelvis 2 - 3 View Left        Discussed the treatment plan with the patient.  I reviewed the x-rays that were obtained today with the patient. She an transition to a cane when she feels stable enough. I advised her to still be careful for several more months.     Call or return if worsening symptoms.    Follow Up     PRN      Patient was given instructions and counseling regarding her  condition or for health maintenance advice. Please see specific information pulled into the AVS if appropriate.     Scribed for Bam Warner MD by Neeta Maldonado.  06/09/22   13:22 EDT    I have personally performed the services described in this document as scribed by the above individual and it is both accurate and complete. Bam Warner MD 06/12/22

## 2022-06-10 LAB — BACTERIA SPEC AEROBE CULT: ABNORMAL

## 2022-06-10 RX ORDER — SULFAMETHOXAZOLE AND TRIMETHOPRIM 800; 160 MG/1; MG/1
1 TABLET ORAL 2 TIMES DAILY
Qty: 10 TABLET | Refills: 0 | Status: SHIPPED | OUTPATIENT
Start: 2022-06-10 | End: 2022-08-12

## 2022-06-29 ENCOUNTER — TELEPHONE (OUTPATIENT)
Dept: CARDIOLOGY | Facility: CLINIC | Age: 87
End: 2022-06-29

## 2022-08-01 RX ORDER — ESCITALOPRAM OXALATE 5 MG/1
5 TABLET ORAL DAILY
Qty: 30 TABLET | Refills: 3 | Status: SHIPPED | OUTPATIENT
Start: 2022-08-01 | End: 2022-10-05 | Stop reason: SDUPTHER

## 2022-08-12 ENCOUNTER — OFFICE VISIT (OUTPATIENT)
Dept: CARDIOLOGY | Facility: CLINIC | Age: 87
End: 2022-08-12

## 2022-08-12 VITALS
WEIGHT: 142 LBS | SYSTOLIC BLOOD PRESSURE: 142 MMHG | DIASTOLIC BLOOD PRESSURE: 77 MMHG | HEART RATE: 76 BPM | OXYGEN SATURATION: 96 % | BODY MASS INDEX: 22.29 KG/M2 | HEIGHT: 67 IN

## 2022-08-12 DIAGNOSIS — E78.2 MIXED HYPERLIPIDEMIA: ICD-10-CM

## 2022-08-12 DIAGNOSIS — Z79.01 CHRONIC ANTICOAGULATION: ICD-10-CM

## 2022-08-12 DIAGNOSIS — I48.21 PERMANENT ATRIAL FIBRILLATION: Primary | ICD-10-CM

## 2022-08-12 DIAGNOSIS — I10 ESSENTIAL HYPERTENSION: ICD-10-CM

## 2022-08-12 PROCEDURE — 99213 OFFICE O/P EST LOW 20 MIN: CPT | Performed by: INTERNAL MEDICINE

## 2022-08-12 NOTE — PROGRESS NOTES
Chief Complaint  Atrial Fibrillation, Hypertension, and Hyperlipidemia    Subjective            Bing Cruz presents to [facility] CARDIOLOGY  History of Present Illness  Ms. Cruz was previously followed by Dr. Jolly and presents today to establish care with me.  She has a history of permanent atrial fibrillation, on chronic anticoagulation with Eliquis 2.5 mg twice daily, as well as chronic rate control with Toprol- mg daily.  She does not report any episodes of palpitations, chest pain/pressure, orthopnea, PND, peripheral edema, lightheadedness, or syncope.  Her mild chronic exertional dyspnea has been stable for >1 year, and she states her physical activity level is unchanged.  She also has a history of hypertension and hyperlipidemia.  Her BP was mildly elevated at 142/77 in the office today, but she states she was taken off several of her previous antihypertensive medications due to episodes of hypotension.    Past Medical History:   Diagnosis Date   • Abdominal pain, generalized    • Anemia    • Aortic regurgitation    • Aortic stenosis    • Cancer (HCC)     colon    • Cardiomegaly    • Chronic atrial fibrillation ([facility]) 01/01/2019    Atrial fibrillation converted to sinus through cardioversion (March 2019   • Chronic fatigue    • Diarrhea    • Disease of tricuspid valve    • Diverticulosis of colon    • DVT (deep venous thrombosis) (HCC)     LEFT LEG GREATER THAN 5 YRS AGO   • Dysphagia    • Essential (primary) hypertension    • Hiatal hernia    • Insomnia, unspecified    • LVH (left ventricular hypertrophy)    • Major depressive disorder, single episode    • Mitral regurgitation    • Mitral valve insufficiency and aortic valve insufficiency    • Osteopenia    • Pain in joint, pelvic region and thigh    • TR (tricuspid regurgitation)    • UTI (urinary tract infection)     antibx to be completed prior to procedure. ABIOLA Carlos RN ([facility]) notified.       Past Surgical History:    • ABDOMINAL HYSTERECTOMY    WITH BSO    • ANKLE SURGERY    (L) ankle fracture   • BLADDER REPAIR   • BREAST BIOPSY    (L) breast biopsy   • CARDIOVERSION   • CATARACT EXTRACTION    OU   • CHOLECYSTECTOMY    OPEN   • COLON SURGERY    STATES SHE HAD 12 INCHES OF COLON REMOVED IN JULY 2020 FOR COLON CANCER   • COLONOSCOPY   • COLONOSCOPY    Procedure: COLONOSCOPY;  Surgeon: Edmar Back MD;  Location: [facility] ENDOSCOPY;  Service: General;  Laterality: N/A;  DIVERTICULOSIS/ANASTOMOSIS   • FEMUR OPEN REDUCTION INTERNAL FIXATION    Procedure: FEMORAL NECK OPEN REDUCTION INTERNAL FIXATION;  Surgeon: Bam Warner MD;  Location: [facility] MAIN OR;  Service: Orthopedics;  Laterality: Left;   • HIP CLOSED REDUCTION    Procedure: HIP CLOSED REDUCTION;  Surgeon: Harsha Gayle MD;  Location: [facility] MAIN OR;  Service: Orthopedics;  Laterality: Left;   • INGUINAL HERNIA REPAIR   • TOTAL HIP ARTHROPLASTY    Procedure: LEFT TOTAL HIP ARTHROPLASTY AND HARDWARE REMOVAL;  Surgeon: Bam Warner MD;  Location: [facility] MAIN OR;  Service: Orthopedics;  Laterality: Left;   • UPPER GASTROINTESTINAL ENDOSCOPY    WITH DILATATION       Social History     Tobacco Use   • Smoking status: Never Smoker   • Smokeless tobacco: Never Used   Vaping Use   • Vaping Use: Former   Substance Use Topics   • Alcohol use: Not Currently   • Drug use: Never       Family History   Problem Relation Age of Onset   • Malig Hyperthermia Neg Hx         Current Outpatient Medications on File Prior to Visit   Medication Sig   • acetaminophen (TYLENOL) 325 MG tablet Take 1 tablet by mouth Every 4 (Four) Hours As Needed for Fever (Temperature Greater Than 101F).   • aluminum-magnesium hydroxide-simethicone (MAALOX MAX) 400-400-40 MG/5ML suspension Take 15 mL by mouth Every 6 (Six) Hours As Needed for Heartburn.   • apixaban (ELIQUIS) 2.5 MG tablet tablet Take 1 tablet by mouth 2 (Two) Times a Day.   • ascorbic acid (VITAMIN C) 500 MG tablet Take 500 mg  by mouth Daily.   • AZO-CRANBERRY PO Take  by mouth.   • colestipol (COLESTID) 1 g tablet TAKE 1 TABLET BY ORAL ROUTE 3 TIMES A DAY AS NEEDED FOR 30 DAYS DIARRHEA   • escitalopram (Lexapro) 5 MG tablet Take 1 tablet by mouth Daily.   • furosemide (Lasix) 20 MG tablet Take 1 tablet by mouth 2 (Two) Times a Day.   • Lactobacillus (Florajen Acidophilus) capsule Take 1 each by mouth Daily.   • latanoprost (XALATAN) 0.005 % ophthalmic solution Administer 1 drop to both eyes Daily.   • LORazepam (ATIVAN) 0.5 MG tablet Take 1 tablet by mouth At Night As Needed for Anxiety.   • magnesium oxide (MAG-OX) 400 tablet tablet Take 1 tablet by mouth 2 (Two) Times a Day.   • metoprolol succinate XL (TOPROL-XL) 100 MG 24 hr tablet TAKE ONE TABLET TWICE A DAY   • potassium chloride 10 MEQ CR tablet Take 1 tablet by mouth 2 (Two) Times a Day.   • Probiotic Product (Florajen3) capsule TAKE 1 CAPSULE BY MOUTH EVERY DAY   • [DISCONTINUED] cephalexin (Keflex) 500 MG capsule Take 1 capsule by mouth 3 (Three) Times a Day.   • [DISCONTINUED] cholestyramine light 4 g packet Take 4 g by mouth 2 (Two) Times a Day.   • [DISCONTINUED] dilTIAZem CD (CARDIZEM CD) 120 MG 24 hr capsule Take 1 capsule by mouth Daily With Lunch.   • [DISCONTINUED] famotidine (PEPCID) 20 MG tablet TAKE 1 TABLET BY MOUTH TWICE A DAY   • [DISCONTINUED] furosemide (LASIX) 20 MG tablet Take 20 mg by mouth As Needed. Up to three times a week based on leg swelling.   • [DISCONTINUED] HYDROcodone-acetaminophen (NORCO) 5-325 MG per tablet Take 1 tablet by mouth Every 6 (Six) Hours As Needed (as needed).   • [DISCONTINUED] HYDROcodone-acetaminophen (NORCO) 7.5-325 MG per tablet Take 1 tablet by mouth Every 4 (Four) Hours As Needed for Moderate Pain .   • [DISCONTINUED] LORazepam (Ativan) 0.5 MG tablet Take 1 tablet by mouth 2 (Two) Times a Day As Needed for Anxiety. (Patient taking differently: Take 0.5 mg by mouth Daily As Needed for Anxiety.)   • [DISCONTINUED] losartan  "(COZAAR) 50 MG tablet Take 50 mg by mouth 2 (Two) Times a Day.   • [DISCONTINUED] nystatin (MYCOSTATIN) 234147 UNIT/GM powder Apply  topically to the appropriate area as directed 3 (Three) Times a Day.   • [DISCONTINUED] ondansetron (Zofran) 4 MG tablet Take 1 tablet by mouth Every 8 (Eight) Hours As Needed for Nausea or Vomiting.   • [DISCONTINUED] pantoprazole (PROTONIX) 40 MG EC tablet TAKE 1 TABLET BY MOUTH EVERY DAY   • [DISCONTINUED] polyethylene glycol (MIRALAX) 17 g packet Take 17 g by mouth Daily As Needed (Use if senna-docusate is ineffective).   • [DISCONTINUED] sulfamethoxazole-trimethoprim (Bactrim DS) 800-160 MG per tablet Take 1 tablet by mouth 2 (Two) Times a Day.     No current facility-administered medications on file prior to visit.       Allergies   Allergen Reactions   • Citalopram Unknown - High Severity   • Clonazepam Unknown - High Severity   • Levofloxacin Nausea And Vomiting   • Lisinopril Unknown - High Severity   • Macrobid [Nitrofurantoin Macrocrystal] Rash   • Melatonin Unknown - High Severity       Review of Systems   Constitutional: Positive for fatigue. Negative for chills and fever.   HENT: Negative for hearing loss, nosebleeds and sore throat.    Eyes: Negative for pain and visual disturbance.   Respiratory: Positive for cough. Negative for chest tightness, shortness of breath and wheezing.    Cardiovascular: Negative for chest pain, palpitations and leg swelling.   Gastrointestinal: Negative for abdominal pain, blood in stool and vomiting.   Genitourinary: Negative for dysuria and hematuria.   Musculoskeletal: Positive for arthralgias. Negative for joint swelling and myalgias.   Skin: Negative for color change, pallor and rash.   Neurological: Negative for tremors, syncope, speech difficulty, light-headedness and headache.   Hematological: Negative for adenopathy. Does not bruise/bleed easily.        Objective     /77   Pulse 76   Ht 170.2 cm (67\")   Wt 64.4 kg (142 lb)  "  SpO2 96%   BMI 22.24 kg/m²       Physical Exam  Constitutional:       General: She is not in acute distress.     Appearance: Normal appearance.   HENT:      Head: Atraumatic.      Mouth/Throat:      Mouth: Mucous membranes are moist.      Pharynx: Oropharynx is clear. No oropharyngeal exudate.   Eyes:      General: No scleral icterus.     Conjunctiva/sclera: Conjunctivae normal.   Neck:      Vascular: No carotid bruit or JVD.   Cardiovascular:      Rate and Rhythm: Normal rate and regular rhythm.      Chest Wall: PMI is not displaced.      Pulses:           Radial pulses are 2+ on the right side and 2+ on the left side.      Heart sounds: S1 normal and S2 normal. Murmur heard.    Systolic murmur is present with a grade of 2/6.    No friction rub. No gallop. No S3 sounds.      Comments: Systolic murmur noted at RUSB and at the apex.  Pulmonary:      Effort: Pulmonary effort is normal. No tachypnea or respiratory distress.      Breath sounds: No decreased breath sounds, wheezing, rhonchi or rales.   Chest:      Chest wall: No tenderness.   Abdominal:      General: Bowel sounds are normal. There is no distension.      Palpations: Abdomen is soft. There is no mass.      Tenderness: There is no abdominal tenderness.   Musculoskeletal:         General: No swelling, tenderness or deformity.      Cervical back: Neck supple. No tenderness.      Right lower leg: No edema.      Left lower leg: No edema.   Skin:     General: Skin is warm and dry.      Coloration: Skin is not jaundiced.      Findings: No erythema or rash.      Nails: There is no clubbing.   Neurological:      General: No focal deficit present.      Mental Status: She is alert and oriented to person, place, and time.      Motor: No weakness.   Psychiatric:         Mood and Affect: Mood normal.         Behavior: Behavior normal.       Result Review :     The following data was reviewed by: Richar Escalera MD on 08/12/2022:    CMP    CMP 4/20/22 5/20/22  6/8/22   Glucose 92 104 (A) 94   BUN 9 12 15   Creatinine 0.65 0.79 0.80   Sodium 138 139 139   Potassium 3.7 3.1 (A) 3.5   Chloride 103 101 102   Calcium 9.0 9.4 9.7 (A)   Albumin 3.50 4.00 4.30   Total Bilirubin 0.7 0.9 1.2   Alkaline Phosphatase 94 87 76   AST (SGOT) 16 19 19   ALT (SGPT) 5 8 10   (A) Abnormal value            CBC    CBC 4/20/22 5/20/22 6/8/22   WBC 7.55 8.04 7.50   RBC 3.45 (A) 3.84 4.18   Hemoglobin 11.0 (A) 11.7 (A) 12.7   Hematocrit 33.5 (A) 36.7 38.2   MCV 97.1 (A) 95.6 91.4   MCH 31.9 30.5 30.4   MCHC 32.8 31.9 33.2   RDW 14.6 14.1 12.3   Platelets 292 291 242   (A) Abnormal value            Lipid Panel    Lipid Panel 2/14/22   Total Cholesterol 170   Triglycerides 164 (A)   HDL Cholesterol 78 (A)   VLDL Cholesterol 27   LDL Cholesterol  65   LDL/HDL Ratio 0.76   (A) Abnormal value            Echocardiogram 12/9/20:     CONCLUSION:  1.  Left ventricular chamber size is normal. The left ventricular wall thickness is normal. The left ventricular systolic function is normal with an estimated EF of 60%. No significant regional wall motion abnormalities are identified.   2.  Fibrocalcific changes of the mitral valve with moderate mitral valve regurgitation.   3.  Fibrocalcific changes of the trileaflet aortic valve with trace aortic valve regurgitation and mild aortic valve stenosis.   4.  Tricuspid regurgitation with estimated pulmonary artery systolic pressure of 35 mmHg.          Assessment and Plan      Diagnoses and all orders for this visit:    1. Permanent atrial fibrillation (HCC) (Primary)    2. Chronic anticoagulation    3. Essential hypertension    4. Mixed hyperlipidemia    -Permanent atrial fibrillation, on chronic anticoagulation: She appears to be achieving adequate rate control on her current dose of Toprol-XL.  Continue chronic anticoagulation with Eliquis for stroke prophylaxis.    -Hypertension: BP appears adequately controlled on her current medications, and she previously  developed episodes of hypotension when other medications were added in the past.  We will continue her current regimen for now.    -Hyperlipidemia:  LDL goal <70, last LDL = 65.  Continue colestipol.        Follow Up     Return in about 9 months (around 5/12/2023) for Next scheduled follow up.    Patient was given instructions and counseling regarding her condition or for health maintenance advice. Please see specific information pulled into the AVS if appropriate.     Bing Cruz  reports that she has never smoked. She has never used smokeless tobacco.. I have educated her on the risk of diseases from using tobacco products such as cancer, COPD and heart disease.

## 2022-08-16 ENCOUNTER — LAB (OUTPATIENT)
Dept: ONCOLOGY | Facility: HOSPITAL | Age: 87
End: 2022-08-16

## 2022-08-16 ENCOUNTER — OFFICE VISIT (OUTPATIENT)
Dept: ONCOLOGY | Facility: HOSPITAL | Age: 87
End: 2022-08-16

## 2022-08-16 VITALS
DIASTOLIC BLOOD PRESSURE: 76 MMHG | SYSTOLIC BLOOD PRESSURE: 132 MMHG | RESPIRATION RATE: 18 BRPM | BODY MASS INDEX: 22.17 KG/M2 | OXYGEN SATURATION: 96 % | TEMPERATURE: 97.5 F | WEIGHT: 141.54 LBS | HEART RATE: 72 BPM

## 2022-08-16 DIAGNOSIS — D50.9 IRON DEFICIENCY ANEMIA, UNSPECIFIED IRON DEFICIENCY ANEMIA TYPE: ICD-10-CM

## 2022-08-16 DIAGNOSIS — C18.2 MALIGNANT NEOPLASM OF ASCENDING COLON: Primary | ICD-10-CM

## 2022-08-16 DIAGNOSIS — K59.00 CONSTIPATION, UNSPECIFIED CONSTIPATION TYPE: ICD-10-CM

## 2022-08-16 LAB
ALBUMIN SERPL-MCNC: 4.15 G/DL (ref 3.5–5.2)
ALBUMIN/GLOB SERPL: 1.3 G/DL
ALP SERPL-CCNC: 90 U/L (ref 39–117)
ALT SERPL W P-5'-P-CCNC: 6 U/L (ref 1–33)
ANION GAP SERPL CALCULATED.3IONS-SCNC: 14.1 MMOL/L (ref 5–15)
AST SERPL-CCNC: 19 U/L (ref 1–32)
BASOPHILS # BLD AUTO: 0.04 10*3/MM3 (ref 0–0.2)
BASOPHILS NFR BLD AUTO: 0.5 % (ref 0–1.5)
BILIRUB SERPL-MCNC: 1.3 MG/DL (ref 0–1.2)
BUN SERPL-MCNC: 16 MG/DL (ref 8–23)
BUN/CREAT SERPL: 20.5 (ref 7–25)
CALCIUM SPEC-SCNC: 9.2 MG/DL (ref 8.2–9.6)
CHLORIDE SERPL-SCNC: 101 MMOL/L (ref 98–107)
CO2 SERPL-SCNC: 21.9 MMOL/L (ref 22–29)
CREAT SERPL-MCNC: 0.78 MG/DL (ref 0.57–1)
DEPRECATED RDW RBC AUTO: 51.3 FL (ref 37–54)
EGFRCR SERPLBLD CKD-EPI 2021: 71.8 ML/MIN/1.73
EOSINOPHIL # BLD AUTO: 0.13 10*3/MM3 (ref 0–0.4)
EOSINOPHIL NFR BLD AUTO: 1.7 % (ref 0.3–6.2)
ERYTHROCYTE [DISTWIDTH] IN BLOOD BY AUTOMATED COUNT: 14.9 % (ref 12.3–15.4)
GLOBULIN UR ELPH-MCNC: 3.2 GM/DL
GLUCOSE SERPL-MCNC: 100 MG/DL (ref 65–99)
HCT VFR BLD AUTO: 37.2 % (ref 34–46.6)
HGB BLD-MCNC: 12.1 G/DL (ref 12–15.9)
IMM GRANULOCYTES # BLD AUTO: 0.01 10*3/MM3 (ref 0–0.05)
IMM GRANULOCYTES NFR BLD AUTO: 0.1 % (ref 0–0.5)
LYMPHOCYTES # BLD AUTO: 2.12 10*3/MM3 (ref 0.7–3.1)
LYMPHOCYTES NFR BLD AUTO: 27.1 % (ref 19.6–45.3)
MCH RBC QN AUTO: 30.3 PG (ref 26.6–33)
MCHC RBC AUTO-ENTMCNC: 32.5 G/DL (ref 31.5–35.7)
MCV RBC AUTO: 93.2 FL (ref 79–97)
MONOCYTES # BLD AUTO: 0.52 10*3/MM3 (ref 0.1–0.9)
MONOCYTES NFR BLD AUTO: 6.6 % (ref 5–12)
NEUTROPHILS NFR BLD AUTO: 5.01 10*3/MM3 (ref 1.7–7)
NEUTROPHILS NFR BLD AUTO: 64 % (ref 42.7–76)
PLATELET # BLD AUTO: 216 10*3/MM3 (ref 140–450)
PMV BLD AUTO: 11.3 FL (ref 6–12)
POTASSIUM SERPL-SCNC: 3.7 MMOL/L (ref 3.5–5.2)
PROT SERPL-MCNC: 7.3 G/DL (ref 6–8.5)
RBC # BLD AUTO: 3.99 10*6/MM3 (ref 3.77–5.28)
SODIUM SERPL-SCNC: 137 MMOL/L (ref 136–145)
WBC NRBC COR # BLD: 7.83 10*3/MM3 (ref 3.4–10.8)

## 2022-08-16 PROCEDURE — G0463 HOSPITAL OUTPT CLINIC VISIT: HCPCS | Performed by: NURSE PRACTITIONER

## 2022-08-16 PROCEDURE — 80053 COMPREHEN METABOLIC PANEL: CPT

## 2022-08-16 PROCEDURE — 99214 OFFICE O/P EST MOD 30 MIN: CPT | Performed by: NURSE PRACTITIONER

## 2022-08-16 PROCEDURE — 36415 COLL VENOUS BLD VENIPUNCTURE: CPT

## 2022-08-16 PROCEDURE — 85025 COMPLETE CBC W/AUTO DIFF WBC: CPT

## 2022-08-30 RX ORDER — FUROSEMIDE 20 MG/1
TABLET ORAL
Qty: 180 TABLET | Refills: 1 | Status: SHIPPED | OUTPATIENT
Start: 2022-08-30 | End: 2023-01-30 | Stop reason: SDUPTHER

## 2022-08-30 RX ORDER — PANTOPRAZOLE SODIUM 20 MG/1
TABLET, DELAYED RELEASE ORAL
Qty: 90 TABLET | Refills: 1 | Status: SHIPPED | OUTPATIENT
Start: 2022-08-30 | End: 2023-01-03 | Stop reason: SDUPTHER

## 2022-09-09 ENCOUNTER — LAB (OUTPATIENT)
Dept: LAB | Facility: HOSPITAL | Age: 87
End: 2022-09-09

## 2022-09-09 ENCOUNTER — OFFICE VISIT (OUTPATIENT)
Dept: ORTHOPEDIC SURGERY | Facility: CLINIC | Age: 87
End: 2022-09-09

## 2022-09-09 VITALS — HEIGHT: 67 IN | OXYGEN SATURATION: 98 % | WEIGHT: 143.8 LBS | BODY MASS INDEX: 22.57 KG/M2 | HEART RATE: 125 BPM

## 2022-09-09 DIAGNOSIS — D50.9 IRON DEFICIENCY ANEMIA, UNSPECIFIED IRON DEFICIENCY ANEMIA TYPE: ICD-10-CM

## 2022-09-09 DIAGNOSIS — F33.0 MAJOR DEPRESSIVE DISORDER, RECURRENT, MILD: ICD-10-CM

## 2022-09-09 DIAGNOSIS — F51.01 PRIMARY INSOMNIA: ICD-10-CM

## 2022-09-09 DIAGNOSIS — E55.9 VITAMIN D DEFICIENCY: ICD-10-CM

## 2022-09-09 DIAGNOSIS — I50.31 ACUTE DIASTOLIC CHF (CONGESTIVE HEART FAILURE): ICD-10-CM

## 2022-09-09 DIAGNOSIS — I34.0 NONRHEUMATIC MITRAL VALVE REGURGITATION: ICD-10-CM

## 2022-09-09 DIAGNOSIS — Z47.89 AFTERCARE FOLLOWING SURGERY OF THE MUSCULOSKELETAL SYSTEM: ICD-10-CM

## 2022-09-09 DIAGNOSIS — S72.002G CLOSED FRACTURE OF LEFT HIP WITH DELAYED HEALING, SUBSEQUENT ENCOUNTER: ICD-10-CM

## 2022-09-09 DIAGNOSIS — Z98.890 HISTORY OF HIP SURGERY: ICD-10-CM

## 2022-09-09 DIAGNOSIS — S72.002A CLOSED LEFT HIP FRACTURE, INITIAL ENCOUNTER: ICD-10-CM

## 2022-09-09 DIAGNOSIS — C18.9 MALIGNANT NEOPLASM OF COLON, UNSPECIFIED PART OF COLON: ICD-10-CM

## 2022-09-09 DIAGNOSIS — I82.5Y2 CHRONIC DEEP VEIN THROMBOSIS (DVT) OF PROXIMAL VEIN OF LEFT LOWER EXTREMITY: ICD-10-CM

## 2022-09-09 DIAGNOSIS — I48.20 ATRIAL FIBRILLATION, CHRONIC: ICD-10-CM

## 2022-09-09 DIAGNOSIS — F41.9 ANXIETY: ICD-10-CM

## 2022-09-09 DIAGNOSIS — IMO0001 LUNG NODULE < 6CM ON CT: ICD-10-CM

## 2022-09-09 DIAGNOSIS — I10 HYPERTENSION, ESSENTIAL: ICD-10-CM

## 2022-09-09 DIAGNOSIS — G93.32 CHRONIC FATIGUE SYNDROME: ICD-10-CM

## 2022-09-09 DIAGNOSIS — R13.19 ESOPHAGEAL DYSPHAGIA: ICD-10-CM

## 2022-09-09 DIAGNOSIS — K59.1 FUNCTIONAL DIARRHEA: ICD-10-CM

## 2022-09-09 DIAGNOSIS — Z47.89 AFTERCARE FOLLOWING SURGERY OF THE MUSCULOSKELETAL SYSTEM: Primary | ICD-10-CM

## 2022-09-09 LAB
BASOPHILS # BLD AUTO: 0.06 10*3/MM3 (ref 0–0.2)
BASOPHILS NFR BLD AUTO: 0.9 % (ref 0–1.5)
DEPRECATED RDW RBC AUTO: 45.5 FL (ref 37–54)
EOSINOPHIL # BLD AUTO: 0.09 10*3/MM3 (ref 0–0.4)
EOSINOPHIL NFR BLD AUTO: 1.3 % (ref 0.3–6.2)
ERYTHROCYTE [DISTWIDTH] IN BLOOD BY AUTOMATED COUNT: 13.6 % (ref 12.3–15.4)
HCT VFR BLD AUTO: 37.2 % (ref 34–46.6)
HGB BLD-MCNC: 12.4 G/DL (ref 12–15.9)
IMM GRANULOCYTES # BLD AUTO: 0.02 10*3/MM3 (ref 0–0.05)
IMM GRANULOCYTES NFR BLD AUTO: 0.3 % (ref 0–0.5)
LYMPHOCYTES # BLD AUTO: 1.95 10*3/MM3 (ref 0.7–3.1)
LYMPHOCYTES NFR BLD AUTO: 28.5 % (ref 19.6–45.3)
MCH RBC QN AUTO: 31 PG (ref 26.6–33)
MCHC RBC AUTO-ENTMCNC: 33.3 G/DL (ref 31.5–35.7)
MCV RBC AUTO: 93 FL (ref 79–97)
MONOCYTES # BLD AUTO: 0.55 10*3/MM3 (ref 0.1–0.9)
MONOCYTES NFR BLD AUTO: 8 % (ref 5–12)
NEUTROPHILS NFR BLD AUTO: 4.17 10*3/MM3 (ref 1.7–7)
NEUTROPHILS NFR BLD AUTO: 61 % (ref 42.7–76)
NRBC BLD AUTO-RTO: 0 /100 WBC (ref 0–0.2)
PLATELET # BLD AUTO: 221 10*3/MM3 (ref 140–450)
PMV BLD AUTO: 12.3 FL (ref 6–12)
RBC # BLD AUTO: 4 10*6/MM3 (ref 3.77–5.28)
WBC NRBC COR # BLD: 6.84 10*3/MM3 (ref 3.4–10.8)

## 2022-09-09 PROCEDURE — 83540 ASSAY OF IRON: CPT

## 2022-09-09 PROCEDURE — 84466 ASSAY OF TRANSFERRIN: CPT

## 2022-09-09 PROCEDURE — 99213 OFFICE O/P EST LOW 20 MIN: CPT | Performed by: PHYSICIAN ASSISTANT

## 2022-09-09 PROCEDURE — 85025 COMPLETE CBC W/AUTO DIFF WBC: CPT

## 2022-09-09 PROCEDURE — 36415 COLL VENOUS BLD VENIPUNCTURE: CPT

## 2022-09-09 PROCEDURE — 83880 ASSAY OF NATRIURETIC PEPTIDE: CPT

## 2022-09-09 PROCEDURE — 80053 COMPREHEN METABOLIC PANEL: CPT

## 2022-09-09 NOTE — PROGRESS NOTES
"Chief Complaint  Pain and Follow-up of the Left Hip    Subjective          Bing Cruz is a 89 y.o. female  presents to [facility] ORTH[facility]ED[facility] for   History of Present Illness      Patient presents with her daughter for follow-up evaluation of left total hip arthroplasty, 3/16/2022 and left hip dislocation and closed reduction, 3/20/2022.  Patient states she is \"doing okay \".  She presents ambulating with a cane she states she finished therapy a while ago and has been working on home exercises, she states she is active doing housework, work outside of her house and states that this keeps her mobile.  She states her pain is controlled she denies new injury or symptoms of pain, states she still has pain when she lays on her side of the left side otherwise patient states she has been able to do most activities of daily without difficulty.  Patient daughter has concerns that patient goes up and down 13 flights of stairs to do her laundry and patient daughter is worried about this, patient lives by herself.      Allergies   Allergen Reactions   • Citalopram Unknown - High Severity   • Clonazepam Unknown - High Severity   • Levofloxacin Nausea And Vomiting   • Lisinopril Unknown - High Severity   • Macrobid [Nitrofurantoin Macrocrystal] Rash   • Melatonin Unknown - High Severity        Social History     Socioeconomic History   • Marital status:    Tobacco Use   • Smoking status: Never Smoker   • Smokeless tobacco: Never Used   Vaping Use   • Vaping Use: Former   Substance and Sexual Activity   • Alcohol use: Not Currently   • Drug use: Never   • Sexual activity: Defer        REVIEW OF SYSTEMS    Constitutional: Denies fevers, chills, weight loss  Cardiovascular: Denies chest pain, shortness of breath  Skin: Denies rashes, acute skin changes  Neurologic: Denies headache, loss of consciousness  MSK: Left hip pain      Objective   Vital Signs:   Pulse (!) 125   Ht 168.9 cm (66.5\")   Wt 65.2 kg " (143 lb 12.8 oz)   SpO2 98%   BMI 22.86 kg/m²     Body mass index is 22.86 kg/m².    Physical Exam    Left hip: Nontender to palpation, no pain with range of motion, active flexion, flexion 120, no pain with rotation, strength appropriate, patient able hold straight leg raise, neurovascular intact, nontender calf, negative Marvin testing.    Procedures    Imaging Results (Most Recent)     Procedure Component Value Units Date/Time    XR Hip With or Without Pelvis 2 - 3 View Left [[id number]] Resulted: 09/09/22 1028     Updated: 09/09/22 1028    Narrative:      • View:AP and Lateral view(s)  • Site: Left hip  • Indication: Left hip pain  • Study: X-rays ordered, taken in the office, and reviewed today  • X-ray: Intact appearing left total hip arthroplasty, no signs of   hardware failure or loosening, no subsidence or periprosthetic fracture,   good alignment  • Comparative data: No comparative data found             Result Review :   The following data was reviewed by: JUNAID Henderson on 09/09/2022:  Data reviewed: Radiologic studies Reviewed by me with the patient and her daughter             Assessment and Plan    Diagnoses and all orders for this visit:    1. S/P left total hip arthroplasty 3/16/2022 in the left hip dislocation and closed reduction 3/20/2022.  (Primary)  -     XR Hip With or Without Pelvis 2 - 3 View Left    2. Aftercare following surgery of the musculoskeletal system  -     XR Hip With or Without Pelvis 2 - 3 View Left        Reviewed x-rays with patient and her daughter, advised them that recommend continuing activity as tolerated patient was advised recommend not doing her own laundry anymore her daughter states she will do this for her, patient will continue activity as tolerated and weightbearing as tolerated, follow-up in 6 months with x-rays.    Call or return if worsening symptoms.    Follow Up   Return in about 6 months (around 3/9/2023) for Recheck.  Patient was given  instructions and counseling regarding her condition or for health maintenance advice. Please see specific information pulled into the AVS if appropriate.

## 2022-09-10 LAB
ALBUMIN SERPL-MCNC: 3.9 G/DL (ref 3.5–5.2)
ALBUMIN/GLOB SERPL: 1.3 G/DL
ALP SERPL-CCNC: 90 U/L (ref 39–117)
ALT SERPL W P-5'-P-CCNC: 7 U/L (ref 1–33)
ANION GAP SERPL CALCULATED.3IONS-SCNC: 10.6 MMOL/L (ref 5–15)
AST SERPL-CCNC: 19 U/L (ref 1–32)
BILIRUB SERPL-MCNC: 1.1 MG/DL (ref 0–1.2)
BUN SERPL-MCNC: 15 MG/DL (ref 8–23)
BUN/CREAT SERPL: 19.7 (ref 7–25)
CALCIUM SPEC-SCNC: 9.2 MG/DL (ref 8.2–9.6)
CHLORIDE SERPL-SCNC: 103 MMOL/L (ref 98–107)
CO2 SERPL-SCNC: 27.4 MMOL/L (ref 22–29)
CREAT SERPL-MCNC: 0.76 MG/DL (ref 0.57–1)
EGFRCR SERPLBLD CKD-EPI 2021: 74.1 ML/MIN/1.73
GLOBULIN UR ELPH-MCNC: 3.1 GM/DL
GLUCOSE SERPL-MCNC: 94 MG/DL (ref 65–99)
IRON 24H UR-MRATE: 64 MCG/DL (ref 37–145)
IRON SATN MFR SERPL: 15 % (ref 20–50)
NT-PROBNP SERPL-MCNC: 3443 PG/ML (ref 0–1800)
POTASSIUM SERPL-SCNC: 4 MMOL/L (ref 3.5–5.2)
PROT SERPL-MCNC: 7 G/DL (ref 6–8.5)
SODIUM SERPL-SCNC: 141 MMOL/L (ref 136–145)
TIBC SERPL-MCNC: 429 MCG/DL (ref 298–536)
TRANSFERRIN SERPL-MCNC: 288 MG/DL (ref 200–360)

## 2022-09-13 ENCOUNTER — OFFICE VISIT (OUTPATIENT)
Dept: INTERNAL MEDICINE | Facility: CLINIC | Age: 87
End: 2022-09-13

## 2022-09-13 VITALS
WEIGHT: 142.4 LBS | HEIGHT: 66 IN | DIASTOLIC BLOOD PRESSURE: 121 MMHG | OXYGEN SATURATION: 95 % | BODY MASS INDEX: 22.88 KG/M2 | TEMPERATURE: 97.3 F | HEART RATE: 82 BPM | SYSTOLIC BLOOD PRESSURE: 147 MMHG

## 2022-09-13 DIAGNOSIS — I10 HYPERTENSION, ESSENTIAL: ICD-10-CM

## 2022-09-13 DIAGNOSIS — S72.002A CLOSED LEFT HIP FRACTURE, INITIAL ENCOUNTER: ICD-10-CM

## 2022-09-13 DIAGNOSIS — S72.002A HIP FRACTURE REQUIRING OPERATIVE REPAIR, LEFT, CLOSED, INITIAL ENCOUNTER: ICD-10-CM

## 2022-09-13 DIAGNOSIS — M79.89 LEFT LEG SWELLING: ICD-10-CM

## 2022-09-13 DIAGNOSIS — K59.1 FUNCTIONAL DIARRHEA: ICD-10-CM

## 2022-09-13 DIAGNOSIS — I35.1 NONRHEUMATIC AORTIC VALVE INSUFFICIENCY: ICD-10-CM

## 2022-09-13 DIAGNOSIS — R10.84 GENERALIZED ABDOMINAL PAIN: ICD-10-CM

## 2022-09-13 DIAGNOSIS — Z98.890 HISTORY OF HIP SURGERY: ICD-10-CM

## 2022-09-13 DIAGNOSIS — R13.19 ESOPHAGEAL DYSPHAGIA: ICD-10-CM

## 2022-09-13 DIAGNOSIS — IMO0001 LUNG NODULE < 6CM ON CT: ICD-10-CM

## 2022-09-13 DIAGNOSIS — I48.20 ATRIAL FIBRILLATION, CHRONIC: ICD-10-CM

## 2022-09-13 DIAGNOSIS — E55.9 VITAMIN D DEFICIENCY: ICD-10-CM

## 2022-09-13 DIAGNOSIS — F33.0 MAJOR DEPRESSIVE DISORDER, RECURRENT, MILD: ICD-10-CM

## 2022-09-13 DIAGNOSIS — I50.31 ACUTE DIASTOLIC (CONGESTIVE) HEART FAILURE: ICD-10-CM

## 2022-09-13 DIAGNOSIS — I82.5Y2 CHRONIC DEEP VEIN THROMBOSIS (DVT) OF PROXIMAL VEIN OF LEFT LOWER EXTREMITY: ICD-10-CM

## 2022-09-13 DIAGNOSIS — F51.01 PRIMARY INSOMNIA: ICD-10-CM

## 2022-09-13 DIAGNOSIS — H91.93 BILATERAL HEARING LOSS, UNSPECIFIED HEARING LOSS TYPE: Primary | ICD-10-CM

## 2022-09-13 PROCEDURE — 99214 OFFICE O/P EST MOD 30 MIN: CPT | Performed by: INTERNAL MEDICINE

## 2022-09-13 NOTE — PROGRESS NOTES
"Chief Complaint/ HPI: Patient is here to follow-up with blood pressure, congestive heart failure atrial fibrillation she had been living at one of her daughters houses in [place] until this past month, she is now back home,          Objective   Vital Signs  Vitals:    09/13/22 1252   BP: (!) 147/121   Pulse: 82   Temp: 97.3 °F (36.3 °C)   SpO2: 95%   Weight: 64.6 kg (142 lb 6.4 oz)   Height: 168.9 cm (66.5\")      Body mass index is 22.64 kg/m².  Review of Systems as above,  Physical Exam  Constitutional:       General: She is not in acute distress.     Appearance: Normal appearance.   HENT:      Head: Normocephalic.   Eyes:      Conjunctiva/sclera: Conjunctivae normal.      Pupils: Pupils are equal, round, and reactive to light.   Cardiovascular:      Rate and Rhythm: Normal rate and regular rhythm.      Pulses: Normal pulses.      Heart sounds: Normal heart sounds.   Pulmonary:      Effort: Pulmonary effort is normal.      Breath sounds: Normal breath sounds.   Abdominal:      General: Bowel sounds are normal.      Palpations: Abdomen is soft.   Musculoskeletal:         General: No swelling. Normal range of motion.      Cervical back: Neck supple.   Skin:     General: Skin is warm and dry.      Coloration: Skin is not jaundiced.   Neurological:      General: No focal deficit present.      Mental Status: She is alert and oriented to person, place, and time. Mental status is at baseline.   Psychiatric:         Mood and Affect: Mood normal.         Behavior: Behavior normal.         Thought Content: Thought content normal.         Judgment: Judgment normal.      ears --both ears are clear, tympanic membranes intact,  Result Review :   Lab Results   Component Value Date    PROBNP 3,443.0 (H) 09/09/2022    PROBNP 4,198.0 (H) 05/20/2022    PROBNP 1,648.0 07/20/2021    BNP 1385 12/05/2020    BNP 4183 (H) 10/12/2020    BNP 5163 (H) 09/09/2020     CMP    CMP 6/8/22 8/16/22 9/9/22   Glucose 94 100 (A) 94   BUN 15 16 " 15   Creatinine 0.80 0.78 0.76   Sodium 139 137 141   Potassium 3.5 3.7 4.0   Chloride 102 101 103   Calcium 9.7 (A) 9.2 9.2   Albumin 4.30 4.15 3.90   Total Bilirubin 1.2 1.3 (A) 1.1   Alkaline Phosphatase 76 90 90   AST (SGOT) 19 19 19   ALT (SGPT) 10 6 7   (A) Abnormal value       Comments are available for some flowsheets but are not being displayed.           CBC w/diff    CBC w/Diff 6/8/22 8/16/22 9/9/22   WBC 7.50 7.83 6.84   RBC 4.18 3.99 4.00   Hemoglobin 12.7 12.1 12.4   Hematocrit 38.2 37.2 37.2   MCV 91.4 93.2 93.0   MCH 30.4 30.3 31.0   MCHC 33.2 32.5 33.3   RDW 12.3 14.9 13.6   Platelets 242 216 221   Neutrophil Rel % 55.8 64.0 61.0   Immature Granulocyte Rel % 0.1 0.1 0.3   Lymphocyte Rel % 33.6 27.1 28.5   Monocyte Rel % 7.2 6.6 8.0   Eosinophil Rel % 2.5 1.7 1.3   Basophil Rel % 0.8 0.5 0.9            Lipid Panel    Lipid Panel 2/14/22   Total Cholesterol 170   Triglycerides 164 (A)   HDL Cholesterol 78 (A)   VLDL Cholesterol 27   LDL Cholesterol  65   LDL/HDL Ratio 0.76   (A) Abnormal value             Lab Results   Component Value Date    TSH 1.850 03/18/2022    TSH 3.710 03/14/2022    TSH 2.790 07/21/2021      Lab Results   Component Value Date    FREET4 1.42 03/14/2022    FREET4 1.02 07/15/2021    FREET4 1.2 07/18/2020      A1C Last 3 Results    HGBA1C Last 3 Results 3/8/22   Hemoglobin A1C 5.30                             Visit Diagnoses:    ICD-10-CM ICD-9-CM   1. Bilateral hearing loss, unspecified hearing loss type  H91.93 389.9   2. S/P  Left Femoral Neck Open Reduction Internal Fixation  Z98.890 V45.89   3. Hypertension, essential  I10 401.9   4. Major depressive disorder, recurrent, mild (HCC)  F33.0 296.31   5. Atrial fibrillation, chronic ([facility])  I48.20 427.31   6. Hip fracture requiring operative repair, left, closed, initial encounter ([facility])  S72.002A 820.8   7. Closed left hip fracture, initial encounter ([facility])  S72.002A 820.8   8. Primary insomnia  F51.01 307.42   9. Left leg swelling   M79.89 729.81   10. Functional diarrhea  K59.1 564.5   11. Esophageal dysphagia  R13.19 787.29   12. Chronic deep vein thrombosis (DVT) of proximal vein of left lower extremity ([facility])  I82.5Y2 453.51   13. Vitamin D deficiency  E55.9 268.9   14. Nonrheumatic aortic valve insufficiency  I35.1 424.1   15. Lung nodule < 6cm on CT  R91.1 793.11   16. Generalized abdominal pain  R10.84 789.07   17. Acute diastolic (congestive) heart failure ([facility])   I50.31 428.31     428.0       Assessment and Plan   Diagnoses and all orders for this visit:    1. Bilateral hearing loss, unspecified hearing loss type (Primary)  -     Comprehensive Metabolic Panel; Future  -     CBC & Differential; Future  -     Magnesium; Future  -     proBNP; Future  -     TSH+Free T4; Future    2. S/P  Left Femoral Neck Open Reduction Internal Fixation  -     Comprehensive Metabolic Panel; Future  -     CBC & Differential; Future  -     Magnesium; Future  -     proBNP; Future  -     TSH+Free T4; Future    3. Hypertension, essential  -     Comprehensive Metabolic Panel; Future  -     CBC & Differential; Future  -     Magnesium; Future  -     proBNP; Future  -     TSH+Free T4; Future    4. Major depressive disorder, recurrent, mild ([facility])  -     Comprehensive Metabolic Panel; Future  -     CBC & Differential; Future  -     Magnesium; Future  -     proBNP; Future  -     TSH+Free T4; Future    5. Atrial fibrillation, chronic ([facility])  -     Comprehensive Metabolic Panel; Future  -     CBC & Differential; Future  -     Magnesium; Future  -     proBNP; Future  -     TSH+Free T4; Future    6. Hip fracture requiring operative repair, left, closed, initial encounter ([facility])  -     Comprehensive Metabolic Panel; Future  -     CBC & Differential; Future  -     Magnesium; Future  -     proBNP; Future  -     TSH+Free T4; Future    7. Closed left hip fracture, initial encounter ([facility])  -     Comprehensive Metabolic Panel; Future  -     CBC & Differential; Future  -      Magnesium; Future  -     proBNP; Future  -     TSH+Free T4; Future    8. Primary insomnia  -     Comprehensive Metabolic Panel; Future  -     CBC & Differential; Future  -     Magnesium; Future  -     proBNP; Future  -     TSH+Free T4; Future    9. Left leg swelling  -     Comprehensive Metabolic Panel; Future  -     CBC & Differential; Future  -     Magnesium; Future  -     proBNP; Future  -     TSH+Free T4; Future    10. Functional diarrhea  -     Comprehensive Metabolic Panel; Future  -     CBC & Differential; Future  -     Magnesium; Future  -     proBNP; Future  -     TSH+Free T4; Future    11. Esophageal dysphagia  -     Comprehensive Metabolic Panel; Future  -     CBC & Differential; Future  -     Magnesium; Future  -     proBNP; Future  -     TSH+Free T4; Future    12. Chronic deep vein thrombosis (DVT) of proximal vein of left lower extremity (HCC)  -     Comprehensive Metabolic Panel; Future  -     CBC & Differential; Future  -     Magnesium; Future  -     proBNP; Future  -     TSH+Free T4; Future    13. Vitamin D deficiency  -     Comprehensive Metabolic Panel; Future  -     CBC & Differential; Future  -     Magnesium; Future  -     proBNP; Future  -     TSH+Free T4; Future    14. Nonrheumatic aortic valve insufficiency  -     Comprehensive Metabolic Panel; Future  -     CBC & Differential; Future  -     Magnesium; Future  -     proBNP; Future  -     TSH+Free T4; Future    15. Lung nodule < 6cm on CT  -     Comprehensive Metabolic Panel; Future  -     CBC & Differential; Future  -     Magnesium; Future  -     proBNP; Future  -     TSH+Free T4; Future    16. Generalized abdominal pain  -     Comprehensive Metabolic Panel; Future  -     CBC & Differential; Future  -     Magnesium; Future  -     proBNP; Future  -     TSH+Free T4; Future    17. Acute diastolic (congestive) heart failure (HCC)   -     proBNP; Future        Hypertension, --- too low at this time April 2022,, reviewed blood pressure medications  with patient daughter by telehealth visit April 14, 2022, will decrease medication to avoid hypotension,--WILL CONT METOPROLOL  MG BID ,,     Acute diastolic heart failure may 2022,   Lasix 20 mg bid,  potassium will be 10 mEq twice a day, --- BR NP levels at 3400 September 9, 2022 slightly improved from May 2022,--- continues magnesium, Florajen,, or previous CT scan as below may 17 2022 showed bilateral pleural effusions and symptoms consistent with congestive heart failure clinically better September 2022    Left leg swelling new over the past couple weeks, will check VE LE, will add Lasix family already has on an as-needed basis, May 5, 2022    Anemia, stable, iron levels normal September 9, 2022,---    Depression- lost son to stomach ca, nov 2018, and daughter had AVR nov 2018, -currently on treatment as below,     ANXIETY SINCE surgery, MARCH 16, 2022,, improved with and, cont lexapro  5 MG QD AND CONT LORAZEPAM 0.5 HS AND 1/2= 0.25 MG QD PRN April 14, 2022,--,     L NILO, MARCH 16, 2022, POST OP DISLOCATION MARCH 20, 2022 AND THEN TO [facility],,    Transitional care visit for closed l hip fx and orif, July 20, 2021, ---     Dysphagia, Status post barium swallow October 2020, showing narrowing at the distal esophagus, for EGD by Dr. Blount February February 25, 2021,--CONT PROTONIX daily--patient has stopped PPIs if family is concerned, September 2022    Colon cancer diagnosed June 2020 initially found to be Anemia, was referred to hematology oncology,--- sent to gastroenterology---Dr. Blount--FOUND COLON CA JUNE 2020, REFERRED TO DR QUISPE--subsequent exploratory laparotomy and partial colectomy with 14 out of 14 lymph nodes negative, -------patient had prolonged hospital course, subsequent sent to rehab, now home--HAD LOW NA , DEVELOPED C DIFF COLITIS, IN HOSP, FTT WITH WGT LOSS IN REHAB --GAIT DYS. AND GEN WEAKNESS --Slowly improving, current medications reviewed----Patient had delirium in  the hospital also, improved---Patient has follow-up for a 5 mm lung nodule found on CT scan in the left lower lobe and surgical changes ----- CT scan in February 2021, no change mild to moderate cardiomegaly stable nodular density lung fields bilateral follow-up CT 1 year recommended stable right hilar lymph node,--- Dr. Solorio------ continues on colestipol half a pill every other day, November 2021  Status post colonoscopy October 2021 Dr. Back--- recent CT scan May 21, 2022 abdomen and pelvis shows no acute abdominal or pelvic abnormality colonic diverticulosis proximal colon resection noted small bilateral pleural effusions cardiomegaly coronary disease moderate sized hiatal hernia and osteopenia and thoracodegenerative changes left hip prosthesis and previous cholecystectomy, -----CT scan of the chest May 17, 2022 shows stable small subcentimeter pulmonary nodules over the past year new small bilateral pleural effusions with adjacent compressive atelectasis consistent with congestive heart failure clinically improved    LUNG NODULE --Found June 2020, 5 millimeter left lower lobe--- on CT scan for abdominal pains---- FOUND PER HEM/ONC AND F/U CT scan of the chest February 4, 2021, showing stable pulmonary nodules recommended a follow-up in 1 year we will reschedule,--- and CT abdomen and pelvis for follow-up of colon cancer,    Chronic atrial fibrillation---- MARCH 23, 2017, --continues ON ELIQUIS 2.5 mg twice a day ,Metoprolol 100 mg twice a day,--,---Cardioverted April 2019 Dr. Kay , NOW BACK IN AFIB     L HUMERAL FX DEC 2016, ORIF WITH BENSON --at [facility]    adverse reaction with rash to nitrofurantoin,, SEPT. 2016.,  Currently taking Azo tolerating well    EXTENSIVE LLE DVT 11/15, , Continues on Eliquis 2.5 mg twice a day discussed potentially decreasing dose given age GFR, April 2020    MVR-MOD-, ON ECHO,--APRIL 2017, -- PER DR KAY.       Follow Up   Return in about 4 months  (around 1/13/2023).  Patient was given instructions and counseling regarding her condition or for health maintenance advice. Please see specific information pulled into the AVS if appropriate.

## 2022-10-05 RX ORDER — ESCITALOPRAM OXALATE 5 MG/1
5 TABLET ORAL DAILY
Qty: 90 TABLET | Refills: 3 | Status: SHIPPED | OUTPATIENT
Start: 2022-10-05

## 2022-11-02 RX ORDER — APIXABAN 5 MG/1
TABLET, FILM COATED ORAL
Qty: 60 TABLET | Refills: 5 | OUTPATIENT
Start: 2022-11-02

## 2022-12-05 ENCOUNTER — TELEPHONE (OUTPATIENT)
Dept: INTERNAL MEDICINE | Facility: CLINIC | Age: 87
End: 2022-12-05

## 2022-12-05 DIAGNOSIS — F41.9 ANXIETY: ICD-10-CM

## 2022-12-05 RX ORDER — LORAZEPAM 0.5 MG/1
0.5 TABLET ORAL NIGHTLY PRN
Qty: 30 TABLET | Refills: 5 | Status: SHIPPED | OUTPATIENT
Start: 2022-12-05

## 2022-12-05 NOTE — TELEPHONE ENCOUNTER
Caller: STEPHANIE BENITES    Relationship: Emergency Contact    Best call back number: [phone]    Requested Prescriptions:   Requested Prescriptions     Pending Prescriptions Disp Refills   • LORazepam (ATIVAN) 0.5 MG tablet 30 tablet 5     Sig: Take 1 tablet by mouth At Night As Needed for Anxiety.        Pharmacy where request should be sent: [facility]Y #[id number] - [place], [place] - [address] [facility] [phone] [facility] [phone] FX     Does the patient have less than a 3 day supply:  [x] Yes  [] No    CALL BACK PLEASE    Renata Chisholm Rep   12/05/22 14:22 EST

## 2022-12-06 RX ORDER — APIXABAN 5 MG/1
TABLET, FILM COATED ORAL
Qty: 60 TABLET | Refills: 5 | OUTPATIENT
Start: 2022-12-06

## 2022-12-27 NOTE — OP NOTE
FEMUR OPEN REDUCTION INTERNAL FIXATION  Procedure Report    Patient Name:  Bing Cruz  YOB: 1931    Date of Surgery:  7/21/2021     Indications: The patient fell on Friday and sustained a valgus impacted femoral neck fracture.  She was seen at urgent care and discharged home.  She saw me in the office yesterday and we had her admitted to the hospital and plan for surgery today.  We discussed risk of surgery including bleeding, infection, damage to neurovascular structures, heart attack, stroke, DVT/PE, anesthesia complications including death.   informed consent was obtained and the patient wished to proceed.    Pre-op Diagnosis:   Closed left hip fracture (CMS/[facility]) [S72.002A]       Post-Op Diagnosis Codes:     * Closed left hip fracture (CMS/[facility]) [S72.002A]    Procedure/CPT® Codes:      Procedure(s):  Left FEMORAL NECK OPEN REDUCTION INTERNAL FIXATION    Staff:  Surgeon(s):  Bam Warner MD    Assistant: Weston Bender PA    Anesthesia: General    Estimated Blood Loss: 20 mL    Implants:    Implant Name Type Inv. Item Serial No.  Lot No. LRB No. Used Action   SCRW PETER 16MM PT 6.5X80MM - [id number] Implant SCRW PETER 16MM PT 6.5X80MM  [employer] XX Left 1 Explanted   S[employer] 16MM PT 6.5X85MM - [id number] Implant SCRW [employer]ER 16MM PT 6.5X85MM  [employer] XX Left 1 Implanted   [id number]     XX Left 1 Implanted       Specimen:          None        Findings: Left nondisplaced femoral neck fracture    Complications: None    Description of Procedure: The operative site was marked in the preoperative holding area.  The patient was brought to the operating room.  Anesthesia was given.  The patient was placed supine on the [place] operative table. Well-padded traction boots were placed to the lower extremities. All bony prominences were padded.  We then placed a padded perineal post. Formal time out was held.  The patient received preoperative antibiotic. Traction  Patient called back, patient stopped methimazole and gabapentin. She is aware of the new medication. RX sent, she will get labs done in two weeks, she verbalized understanding.     and internal rotation was placed across the operative lower extremity. The nonoperative leg was extended and adducted.  Fluoroscopic imaging was taken AP and lateral plane to confirm anatomic reduction.    Intraoperative fluoroscopy was taken of the left hip to confirm nondisplaced nature of the fracture.  There have been no residual displacement of the fracture.  A longitudinal incision was made over the proximal lateral femur and the fascia was split.  A guidepin was placed just above the level of the lesser trochanter and directed into the inferior  femoral head neck.  At this point a guide was used to place another parallel guidepin superior in the neck and head on the AP view and anterior on the lateral view.  Another guidepin was placed posteriorly on the lateral view and superiorly on the AP view.   The guidepins formed an inverted triangle.  They were measured and partially threaded cannulated screws were inserted.  The screws had good purchase and the guide pins were removed.  The 80 mm screw was removed to place a 75 mm screw so it would not be near the articular surface.  The wound was irrigated and the fascia was closed with 0 Vicryl subtendinous tissues with 2-0 Vicryl the skin with staples Xeroform 4 x 4 and Tegaderm was placed patient with menses in stable condition.  There were no complications and all counts were correct.   patient was stable to recovery.  Assistant: Weston Bender PA  was responsible for performing the following activities: Retraction, Suction and Irrigation and their skilled assistance was necessary for the success of this case.    Bam Warner MD     Date: 7/21/2021  Time: 14:41 EDT

## 2023-01-03 RX ORDER — APIXABAN 5 MG/1
TABLET, FILM COATED ORAL
Qty: 60 TABLET | Refills: 5 | OUTPATIENT
Start: 2023-01-03

## 2023-01-03 RX ORDER — PANTOPRAZOLE SODIUM 40 MG/1
TABLET, DELAYED RELEASE ORAL
Qty: 90 TABLET | Refills: 1 | Status: SHIPPED | OUTPATIENT
Start: 2023-01-03

## 2023-01-06 RX ORDER — APIXABAN 5 MG/1
TABLET, FILM COATED ORAL
Qty: 60 TABLET | Refills: 5 | Status: SHIPPED | OUTPATIENT
Start: 2023-01-06

## 2023-01-10 ENCOUNTER — LAB (OUTPATIENT)
Dept: LAB | Facility: HOSPITAL | Age: 88
End: 2023-01-10
Payer: MEDICARE

## 2023-01-10 DIAGNOSIS — I82.5Y2 CHRONIC DEEP VEIN THROMBOSIS (DVT) OF PROXIMAL VEIN OF LEFT LOWER EXTREMITY: ICD-10-CM

## 2023-01-10 DIAGNOSIS — Z98.890 HISTORY OF HIP SURGERY: ICD-10-CM

## 2023-01-10 DIAGNOSIS — I35.1 NONRHEUMATIC AORTIC VALVE INSUFFICIENCY: ICD-10-CM

## 2023-01-10 DIAGNOSIS — R13.19 ESOPHAGEAL DYSPHAGIA: ICD-10-CM

## 2023-01-10 DIAGNOSIS — H91.93 BILATERAL HEARING LOSS, UNSPECIFIED HEARING LOSS TYPE: ICD-10-CM

## 2023-01-10 DIAGNOSIS — I50.31 ACUTE DIASTOLIC (CONGESTIVE) HEART FAILURE: ICD-10-CM

## 2023-01-10 DIAGNOSIS — F51.01 PRIMARY INSOMNIA: ICD-10-CM

## 2023-01-10 DIAGNOSIS — M79.89 LEFT LEG SWELLING: ICD-10-CM

## 2023-01-10 DIAGNOSIS — K59.1 FUNCTIONAL DIARRHEA: ICD-10-CM

## 2023-01-10 DIAGNOSIS — R10.84 GENERALIZED ABDOMINAL PAIN: ICD-10-CM

## 2023-01-10 DIAGNOSIS — E55.9 VITAMIN D DEFICIENCY: ICD-10-CM

## 2023-01-10 DIAGNOSIS — I10 HYPERTENSION, ESSENTIAL: ICD-10-CM

## 2023-01-10 DIAGNOSIS — IMO0001 LUNG NODULE < 6CM ON CT: ICD-10-CM

## 2023-01-10 DIAGNOSIS — F33.0 MAJOR DEPRESSIVE DISORDER, RECURRENT, MILD: ICD-10-CM

## 2023-01-10 DIAGNOSIS — I48.20 ATRIAL FIBRILLATION, CHRONIC: ICD-10-CM

## 2023-01-10 DIAGNOSIS — S72.002A HIP FRACTURE REQUIRING OPERATIVE REPAIR, LEFT, CLOSED, INITIAL ENCOUNTER: ICD-10-CM

## 2023-01-10 DIAGNOSIS — S72.002A CLOSED LEFT HIP FRACTURE, INITIAL ENCOUNTER: ICD-10-CM

## 2023-01-10 LAB
ALBUMIN SERPL-MCNC: 4 G/DL (ref 3.5–5.2)
ALBUMIN/GLOB SERPL: 1.1 G/DL
ALP SERPL-CCNC: 89 U/L (ref 39–117)
ALT SERPL W P-5'-P-CCNC: 10 U/L (ref 1–33)
ANION GAP SERPL CALCULATED.3IONS-SCNC: 11.1 MMOL/L (ref 5–15)
AST SERPL-CCNC: 20 U/L (ref 1–32)
BASOPHILS # BLD AUTO: 0.04 10*3/MM3 (ref 0–0.2)
BASOPHILS NFR BLD AUTO: 0.5 % (ref 0–1.5)
BILIRUB SERPL-MCNC: 0.9 MG/DL (ref 0–1.2)
BUN SERPL-MCNC: 20 MG/DL (ref 8–23)
BUN/CREAT SERPL: 19 (ref 7–25)
CALCIUM SPEC-SCNC: 9.6 MG/DL (ref 8.2–9.6)
CHLORIDE SERPL-SCNC: 103 MMOL/L (ref 98–107)
CO2 SERPL-SCNC: 28.9 MMOL/L (ref 22–29)
CREAT SERPL-MCNC: 1.05 MG/DL (ref 0.57–1)
DEPRECATED RDW RBC AUTO: 42.6 FL (ref 37–54)
EGFRCR SERPLBLD CKD-EPI 2021: 50.3 ML/MIN/1.73
EOSINOPHIL # BLD AUTO: 0.06 10*3/MM3 (ref 0–0.4)
EOSINOPHIL NFR BLD AUTO: 0.8 % (ref 0.3–6.2)
ERYTHROCYTE [DISTWIDTH] IN BLOOD BY AUTOMATED COUNT: 12.9 % (ref 12.3–15.4)
GLOBULIN UR ELPH-MCNC: 3.6 GM/DL
GLUCOSE SERPL-MCNC: 87 MG/DL (ref 65–99)
HCT VFR BLD AUTO: 38.5 % (ref 34–46.6)
HGB BLD-MCNC: 13.4 G/DL (ref 12–15.9)
IMM GRANULOCYTES # BLD AUTO: 0.03 10*3/MM3 (ref 0–0.05)
IMM GRANULOCYTES NFR BLD AUTO: 0.4 % (ref 0–0.5)
LYMPHOCYTES # BLD AUTO: 2.46 10*3/MM3 (ref 0.7–3.1)
LYMPHOCYTES NFR BLD AUTO: 32.9 % (ref 19.6–45.3)
MAGNESIUM SERPL-MCNC: 2 MG/DL (ref 1.7–2.3)
MCH RBC QN AUTO: 31.5 PG (ref 26.6–33)
MCHC RBC AUTO-ENTMCNC: 34.8 G/DL (ref 31.5–35.7)
MCV RBC AUTO: 90.6 FL (ref 79–97)
MONOCYTES # BLD AUTO: 0.5 10*3/MM3 (ref 0.1–0.9)
MONOCYTES NFR BLD AUTO: 6.7 % (ref 5–12)
NEUTROPHILS NFR BLD AUTO: 4.38 10*3/MM3 (ref 1.7–7)
NEUTROPHILS NFR BLD AUTO: 58.7 % (ref 42.7–76)
NRBC BLD AUTO-RTO: 0 /100 WBC (ref 0–0.2)
PLATELET # BLD AUTO: 239 10*3/MM3 (ref 140–450)
PMV BLD AUTO: 11.8 FL (ref 6–12)
POTASSIUM SERPL-SCNC: 3.8 MMOL/L (ref 3.5–5.2)
PROT SERPL-MCNC: 7.6 G/DL (ref 6–8.5)
RBC # BLD AUTO: 4.25 10*6/MM3 (ref 3.77–5.28)
SODIUM SERPL-SCNC: 143 MMOL/L (ref 136–145)
WBC NRBC COR # BLD: 7.47 10*3/MM3 (ref 3.4–10.8)

## 2023-01-10 PROCEDURE — 80053 COMPREHEN METABOLIC PANEL: CPT

## 2023-01-10 PROCEDURE — 83735 ASSAY OF MAGNESIUM: CPT

## 2023-01-10 PROCEDURE — 36415 COLL VENOUS BLD VENIPUNCTURE: CPT

## 2023-01-10 PROCEDURE — 84439 ASSAY OF FREE THYROXINE: CPT

## 2023-01-10 PROCEDURE — 84443 ASSAY THYROID STIM HORMONE: CPT

## 2023-01-10 PROCEDURE — 85025 COMPLETE CBC W/AUTO DIFF WBC: CPT

## 2023-01-10 PROCEDURE — 83880 ASSAY OF NATRIURETIC PEPTIDE: CPT

## 2023-01-11 LAB
NT-PROBNP SERPL-MCNC: 1762 PG/ML (ref 0–1800)
T4 FREE SERPL-MCNC: 1.13 NG/DL (ref 0.93–1.7)
TSH SERPL DL<=0.05 MIU/L-ACNC: 2.47 UIU/ML (ref 0.27–4.2)

## 2023-01-16 ENCOUNTER — OFFICE VISIT (OUTPATIENT)
Dept: INTERNAL MEDICINE | Facility: CLINIC | Age: 88
End: 2023-01-16
Payer: MEDICARE

## 2023-01-16 VITALS
WEIGHT: 142.4 LBS | TEMPERATURE: 97.3 F | HEART RATE: 82 BPM | OXYGEN SATURATION: 97 % | BODY MASS INDEX: 22.88 KG/M2 | SYSTOLIC BLOOD PRESSURE: 165 MMHG | HEIGHT: 66 IN | DIASTOLIC BLOOD PRESSURE: 93 MMHG

## 2023-01-16 DIAGNOSIS — S72.002A HIP FRACTURE REQUIRING OPERATIVE REPAIR, LEFT, CLOSED, INITIAL ENCOUNTER: ICD-10-CM

## 2023-01-16 DIAGNOSIS — R55 POSTURAL DIZZINESS WITH PRESYNCOPE: Primary | ICD-10-CM

## 2023-01-16 DIAGNOSIS — F41.9 ANXIETY: ICD-10-CM

## 2023-01-16 DIAGNOSIS — I10 HYPERTENSION, ESSENTIAL: ICD-10-CM

## 2023-01-16 DIAGNOSIS — F33.0 MAJOR DEPRESSIVE DISORDER, RECURRENT, MILD: ICD-10-CM

## 2023-01-16 DIAGNOSIS — E55.9 VITAMIN D DEFICIENCY: ICD-10-CM

## 2023-01-16 DIAGNOSIS — K59.1 FUNCTIONAL DIARRHEA: ICD-10-CM

## 2023-01-16 DIAGNOSIS — F51.01 PRIMARY INSOMNIA: ICD-10-CM

## 2023-01-16 DIAGNOSIS — I50.32 CHRONIC DIASTOLIC (CONGESTIVE) HEART FAILURE: ICD-10-CM

## 2023-01-16 DIAGNOSIS — D50.9 IRON DEFICIENCY ANEMIA, UNSPECIFIED IRON DEFICIENCY ANEMIA TYPE: ICD-10-CM

## 2023-01-16 DIAGNOSIS — R42 POSTURAL DIZZINESS WITH PRESYNCOPE: Primary | ICD-10-CM

## 2023-01-16 DIAGNOSIS — F33.1 MAJOR DEPRESSIVE DISORDER, RECURRENT, MODERATE: ICD-10-CM

## 2023-01-16 DIAGNOSIS — I82.5Y2 CHRONIC DEEP VEIN THROMBOSIS (DVT) OF PROXIMAL VEIN OF LEFT LOWER EXTREMITY: ICD-10-CM

## 2023-01-16 DIAGNOSIS — I35.1 NONRHEUMATIC AORTIC VALVE INSUFFICIENCY: Chronic | ICD-10-CM

## 2023-01-16 DIAGNOSIS — I95.2 HYPOTENSION DUE TO DRUGS: ICD-10-CM

## 2023-01-16 DIAGNOSIS — Z98.890 HISTORY OF HIP SURGERY: ICD-10-CM

## 2023-01-16 DIAGNOSIS — I48.20 ATRIAL FIBRILLATION, CHRONIC: ICD-10-CM

## 2023-01-16 DIAGNOSIS — K21.9 GASTROESOPHAGEAL REFLUX DISEASE WITHOUT ESOPHAGITIS: ICD-10-CM

## 2023-01-16 DIAGNOSIS — E78.2 MIXED HYPERLIPIDEMIA: ICD-10-CM

## 2023-01-16 PROCEDURE — 99214 OFFICE O/P EST MOD 30 MIN: CPT | Performed by: INTERNAL MEDICINE

## 2023-01-16 NOTE — PROGRESS NOTES
"CHIEF COMPLAINT: Tells me she has had 2 spells 1 before [place] and 1 recently where she felt like she might blackout she just could not go any further she had to sit down she got very weak, she is doing something and then she can scan go any further  Hypertension and Follow-up      HPI:  follow-up with blood pressure, congestive heart failure atrial fibrillation      Objective   Vital Signs  Vitals:    01/16/23 1344 01/16/23 1346   BP: (!) 149/108 165/93   Pulse: 82    Temp: 97.3 °F (36.3 °C)    SpO2: 97%    Weight: 64.6 kg (142 lb 6.4 oz)    Height: 168.9 cm (66.5\")       Body mass index is 22.64 kg/m².  Review of Systems   HENT: Negative.    Eyes: Negative.    Respiratory: Negative.    Cardiovascular: Negative.    Gastrointestinal: Negative.    Endocrine: Negative.    Genitourinary: Negative.    Musculoskeletal: Negative.    Allergic/Immunologic: Negative.    Neurological: Positive for syncope, weakness and light-headedness.   Hematological: Negative.    Psychiatric/Behavioral: Negative.       Physical Exam  Constitutional:       General: She is not in acute distress.     Appearance: Normal appearance.   HENT:      Head: Normocephalic.      Mouth/Throat:      Mouth: Mucous membranes are moist.   Eyes:      Conjunctiva/sclera: Conjunctivae normal.      Pupils: Pupils are equal, round, and reactive to light.   Cardiovascular:      Rate and Rhythm: Normal rate. Rhythm irregular.      Pulses: Normal pulses.      Heart sounds: Murmur heard.   Pulmonary:      Effort: Pulmonary effort is normal.      Breath sounds: Normal breath sounds.   Abdominal:      General: Abdomen is flat. Bowel sounds are normal.      Palpations: Abdomen is soft.   Musculoskeletal:         General: No swelling. Normal range of motion.      Cervical back: Neck supple.      Right lower leg: Edema (Trace to 1+ edema bilateral left greater than right) present.   Skin:     General: Skin is warm and dry.      Coloration: Skin is not jaundiced. "   Neurological:      General: No focal deficit present.      Mental Status: She is alert and oriented to person, place, and time. Mental status is at baseline.   Psychiatric:         Mood and Affect: Mood normal.         Behavior: Behavior normal.         Thought Content: Thought content normal.         Judgment: Judgment normal.        Result Review :   Lab Results   Component Value Date    PROBNP 1,762.0 01/10/2023    PROBNP 3,443.0 (H) 09/09/2022    PROBNP 4,198.0 (H) 05/20/2022    BNP 1385 12/05/2020    BNP 4183 (H) 10/12/2020    BNP 5163 (H) 09/09/2020     CMP    CMP 8/16/22 9/9/22 1/10/23   Glucose 100 (A) 94 87   BUN 16 15 20   Creatinine 0.78 0.76 1.05 (A)   eGFR 71.8 74.1 50.3 (A)   Sodium 137 141 143   Potassium 3.7 4.0 3.8   Chloride 101 103 103   Calcium 9.2 9.2 9.6   Total Protein 7.3 7.0 7.6   Albumin 4.15 3.90 4.0   Globulin 3.2 3.1 3.6   Total Bilirubin 1.3 (A) 1.1 0.9   Alkaline Phosphatase 90 90 89   AST (SGOT) 19 19 20   ALT (SGPT) 6 7 10   Albumin/Globulin Ratio 1.3 1.3 1.1   BUN/Creatinine Ratio 20.5 19.7 19.0   Anion Gap 14.1 10.6 11.1   (A) Abnormal value       Comments are available for some flowsheets but are not being displayed.           CBC w/diff    CBC w/Diff 8/16/22 9/9/22 1/10/23   WBC 7.83 6.84 7.47   RBC 3.99 4.00 4.25   Hemoglobin 12.1 12.4 13.4   Hematocrit 37.2 37.2 38.5   MCV 93.2 93.0 90.6   MCH 30.3 31.0 31.5   MCHC 32.5 33.3 34.8   RDW 14.9 13.6 12.9   Platelets 216 221 239   Neutrophil Rel % 64.0 61.0 58.7   Immature Granulocyte Rel % 0.1 0.3 0.4   Lymphocyte Rel % 27.1 28.5 32.9   Monocyte Rel % 6.6 8.0 6.7   Eosinophil Rel % 1.7 1.3 0.8   Basophil Rel % 0.5 0.9 0.5            Lipid Panel    Lipid Panel 2/14/22   Total Cholesterol 170   Triglycerides 164 (A)   HDL Cholesterol 78 (A)   VLDL Cholesterol 27   LDL Cholesterol  65   LDL/HDL Ratio 0.76   (A) Abnormal value             Lab Results   Component Value Date    TSH 2.470 01/10/2023    TSH 1.850 03/18/2022    TSH 3.710  03/14/2022      Lab Results   Component Value Date    FREET4 1.13 01/10/2023    FREET4 1.42 03/14/2022    FREET4 1.02 07/15/2021      A1C Last 3 Results    HGBA1C Last 3 Results 3/8/22   Hemoglobin A1C 5.30                             Visit Diagnoses:    ICD-10-CM ICD-9-CM   1. Postural dizziness with presyncope  R42 780.4    R55 780.2   2. Anxiety  F41.9 300.00   3. Vitamin D deficiency  E55.9 268.9   4. Nonrheumatic aortic valve insufficiency  I35.1 424.1   5. Mixed hyperlipidemia  E78.2 272.2   6. Major depressive disorder, recurrent, mild ([facility])  F33.0 296.31   7. Hypertension, essential  I10 401.9   8. Atrial fibrillation, chronic ([facility])  I48.20 427.31   9. Gastroesophageal reflux disease without esophagitis  K21.9 530.81   10. Iron deficiency anemia, unspecified iron deficiency anemia type  D50.9 280.9   11. Major depressive disorder, recurrent, moderate ([facility])  F33.1 296.32   12. Chronic deep vein thrombosis (DVT) of proximal vein of left lower extremity ([facility])  I82.5Y2 453.51   13. Hypotension due to drugs  I95.2 458.8     E947.9   14. Functional diarrhea  K59.1 564.5   15. Hip fracture requiring operative repair, left, closed, initial encounter ([facility])  S72.002A 820.8   16. Primary insomnia  F51.01 307.42   17. S/P  Left Femoral Neck Open Reduction Internal Fixation  Z98.890 V45.89   18. Chronic diastolic (congestive) heart failure ([facility])  I50.32 428.32     428.0       Assessment and Plan   Diagnoses and all orders for this visit:    1. Postural dizziness with presyncope (Primary)  -     proBNP; Future  -     Comprehensive Metabolic Panel; Future  -     CBC & Differential; Future    2. Anxiety  -     proBNP; Future  -     Comprehensive Metabolic Panel; Future  -     CBC & Differential; Future    3. Vitamin D deficiency  -     proBNP; Future  -     Comprehensive Metabolic Panel; Future  -     CBC & Differential; Future    4. Nonrheumatic aortic valve insufficiency  -     proBNP; Future  -     Comprehensive  Metabolic Panel; Future  -     CBC & Differential; Future    5. Mixed hyperlipidemia  -     proBNP; Future  -     Comprehensive Metabolic Panel; Future  -     CBC & Differential; Future    6. Major depressive disorder, recurrent, mild (HCC)  -     proBNP; Future  -     Comprehensive Metabolic Panel; Future  -     CBC & Differential; Future    7. Hypertension, essential  -     proBNP; Future  -     Comprehensive Metabolic Panel; Future  -     CBC & Differential; Future    8. Atrial fibrillation, chronic (HCC)  -     proBNP; Future  -     Comprehensive Metabolic Panel; Future  -     CBC & Differential; Future    9. Gastroesophageal reflux disease without esophagitis  -     proBNP; Future  -     Comprehensive Metabolic Panel; Future  -     CBC & Differential; Future    10. Iron deficiency anemia, unspecified iron deficiency anemia type  -     proBNP; Future  -     Comprehensive Metabolic Panel; Future  -     CBC & Differential; Future    11. Major depressive disorder, recurrent, moderate (HCC)  -     proBNP; Future  -     Comprehensive Metabolic Panel; Future  -     CBC & Differential; Future    12. Chronic deep vein thrombosis (DVT) of proximal vein of left lower extremity (HCC)  -     proBNP; Future  -     Comprehensive Metabolic Panel; Future  -     CBC & Differential; Future    13. Hypotension due to drugs  -     proBNP; Future  -     Comprehensive Metabolic Panel; Future  -     CBC & Differential; Future    14. Functional diarrhea  -     proBNP; Future  -     Comprehensive Metabolic Panel; Future  -     CBC & Differential; Future    15. Hip fracture requiring operative repair, left, closed, initial encounter (HCC)  -     proBNP; Future  -     Comprehensive Metabolic Panel; Future  -     CBC & Differential; Future    16. Primary insomnia  -     proBNP; Future  -     Comprehensive Metabolic Panel; Future  -     CBC & Differential; Future    17. S/P  Left Femoral Neck Open Reduction Internal Fixation  -     proBNP;  Future  -     Comprehensive Metabolic Panel; Future  -     CBC & Differential; Future    18. Chronic diastolic (congestive) heart failure (HCC)  -     proBNP; Future  -     Comprehensive Metabolic Panel; Future  -     CBC & Differential; Future        Hypertension, --- CONT METOPROLOL  MG BID     Acute diastolic heart failure may 2022,   Lasix 20 mg bid,  potassium 10 mEq twice a day, --- BR NP levels at 3400 September 9, 2022, down to 1700 January 2023- continues magnesium, Florajen,, or previous CT scan as below may 17 2022 showed bilateral pleural effusions and symptoms consistent with congestive heart failure clinically better September 2022    Left leg swelling  VE LE,, -neg  May 2022     Depression- lost son to stomach ca, nov 2018, and daughter had AVR nov 2018, -currently on treatment as below,     ANXIETY SINCE surgery, MARCH 16, 2022,, improved with and, cont lexapro  5 MG QD AND CONT LORAZEPAM 0.5 HS AND 1/2= 0.25 MG QD PRN April 14, 2022,--,     L NILO, MARCH 16, 2022, POST OP DISLOCATION MARCH 20, 2022 AND THEN TO [facility],,    Transitional care visit for closed l hip fx and orif, July 20, 2021, ---     Dysphagia, Status post barium swallow October 2020, showing narrowing at the distal esophagus, for EGD by Dr. Blount February February 25, 2021,--stopped PPIs-- September 2022    Colon cancer diagnosed June 2020 initially found to be Anemia, was referred to hematology oncology,--- sent to gastroenterology---Dr. Blount--FOUND COLON CA JUNE 2020, REFERRED TO DR QUISPE--subsequent exploratory laparotomy and partial colectomy with 14 out of 14 lymph nodes negative, -------patient had prolonged hospital course, subsequent sent to rehab, now home--HAD LOW NA , DEVELOPED C DIFF COLITIS, IN HOSP, FTT WITH WGT LOSS IN REHAB --GAIT DYS. AND GEN WEAKNESS --Slowly improving, current medications reviewed----Patient had delirium in the hospital also, improved---Patient has follow-up for a 5 mm lung  nodule found on CT scan in the left lower lobe and surgical changes ----- CT scan in February 2021, no change mild to moderate cardiomegaly stable nodular density lung fields bilateral, CT scan May 17, 2022 showed stable small subcentimeter pulmonary nodules that have not changed over 1 year, new small moderate bilateral pleural effusions were noted    Status post colonoscopy October 2021 Dr. Back--- recent CT scan May 21, 2022 abdomen and pelvis shows no acute abdominal or pelvic abnormality colonic diverticulosis, blood pressure proximal colon resection noted small bilateral pleural effusions cardiomegaly coronary disease moderate sized hiatal hernia and osteopenia and thoracodegenerative changes left hip prosthesis and previous cholecystectomy, -----CT scan of the chest May 17, 2022 shows stable small subcentimeter pulmonary nodules over the past year new small bilateral pleural effusions with adjacent compressive atelectasis consistent with congestive heart failure clinically improved    LUNG NODULE --Found June 2020, 5 millimeter left lower lobe--- on CT scan for abdominal pains---- FOUND PER HEM/ONC AND F/U CT scan of the chest February 4, 2021, showing stable pulmonary nodules, no change on CT scan May 2022,-- and CT abdomen and pelvis for follow-up of colon cancer,    Chronic atrial fibrillation---- MARCH 23, 2017, --continues ON ELIQUIS 2.5 mg twice a day ,Metoprolol 100 mg twice a day,---Cardioverted April 2019 Dr. Jolly , NOW BACK IN [facility]     L HUMERAL FX DEC 2016, ORIF WITH BENSON --at [facility]    adverse reaction with rash to nitrofurantoin,, SEPT. 2016.,  Currently taking Azo tolerating well    EXTENSIVE LLE DVT 11/15, , Continues on Eliquis 2.5 mg twice a day     MVR-MOD-, ON ECHO,--APRIL 2017, -- PER DR price .       Follow Up   Return in about 4 months (around 5/16/2023).  Patient was given instructions and counseling regarding her condition or for health maintenance advice.  Please see specific information pulled into the AVS if appropriate.

## 2023-01-30 RX ORDER — FUROSEMIDE 20 MG/1
20 TABLET ORAL 2 TIMES DAILY
Qty: 180 TABLET | Refills: 1 | Status: SHIPPED | OUTPATIENT
Start: 2023-01-30

## 2023-01-30 NOTE — TELEPHONE ENCOUNTER
Caller: STEPHANIE BENITES    Relationship: Emergency Contact    Best call back number: [phone]     Requested Prescriptions:   Requested Prescriptions     Pending Prescriptions Disp Refills   • furosemide (LASIX) 20 MG tablet 180 tablet 1     Sig: Take 1 tablet by mouth 2 (Two) Times a Day.        Pharmacy where request should be sent: [facility] #[id number] - [place], [place] - [address] [phone]  - [phone] FX     Additional details provided by patient: ONLY HAD 1 PILL FOR TODAY    Does the patient have less than a 3 day supply:  [x] Yes  [] No    Would you like a call back once the refill request has been completed: [x] Yes [] No    If the office needs to give you a call back, can they leave a voicemail: [x] Yes [] No    Renata Vogel Rep   01/30/23 15:02 EST

## 2023-02-16 ENCOUNTER — LAB (OUTPATIENT)
Dept: ONCOLOGY | Facility: HOSPITAL | Age: 88
End: 2023-02-16
Payer: MEDICARE

## 2023-02-16 ENCOUNTER — OFFICE VISIT (OUTPATIENT)
Dept: ONCOLOGY | Facility: HOSPITAL | Age: 88
End: 2023-02-16
Payer: MEDICARE

## 2023-02-16 VITALS
BODY MASS INDEX: 21.94 KG/M2 | HEART RATE: 100 BPM | RESPIRATION RATE: 18 BRPM | DIASTOLIC BLOOD PRESSURE: 75 MMHG | TEMPERATURE: 97.5 F | WEIGHT: 138.01 LBS | OXYGEN SATURATION: 99 % | SYSTOLIC BLOOD PRESSURE: 131 MMHG

## 2023-02-16 DIAGNOSIS — C18.2 MALIGNANT NEOPLASM OF ASCENDING COLON: ICD-10-CM

## 2023-02-16 DIAGNOSIS — D50.9 IRON DEFICIENCY ANEMIA, UNSPECIFIED IRON DEFICIENCY ANEMIA TYPE: ICD-10-CM

## 2023-02-16 DIAGNOSIS — C18.9 MALIGNANT NEOPLASM OF COLON, UNSPECIFIED PART OF COLON: Primary | ICD-10-CM

## 2023-02-16 LAB
ALBUMIN SERPL-MCNC: 4.2 G/DL (ref 3.5–5.2)
ALBUMIN/GLOB SERPL: 1.2 G/DL
ALP SERPL-CCNC: 93 U/L (ref 39–117)
ALT SERPL W P-5'-P-CCNC: 6 U/L (ref 1–33)
ANION GAP SERPL CALCULATED.3IONS-SCNC: 13.1 MMOL/L (ref 5–15)
AST SERPL-CCNC: 17 U/L (ref 1–32)
BASOPHILS # BLD AUTO: 0.03 10*3/MM3 (ref 0–0.2)
BASOPHILS NFR BLD AUTO: 0.4 % (ref 0–1.5)
BILIRUB SERPL-MCNC: 1 MG/DL (ref 0–1.2)
BUN SERPL-MCNC: 18 MG/DL (ref 8–23)
BUN/CREAT SERPL: 22.2 (ref 7–25)
CALCIUM SPEC-SCNC: 9.6 MG/DL (ref 8.2–9.6)
CEA SERPL-MCNC: 2.35 NG/ML
CHLORIDE SERPL-SCNC: 102 MMOL/L (ref 98–107)
CO2 SERPL-SCNC: 26.9 MMOL/L (ref 22–29)
CREAT SERPL-MCNC: 0.81 MG/DL (ref 0.57–1)
DEPRECATED RDW RBC AUTO: 47.6 FL (ref 37–54)
EGFRCR SERPLBLD CKD-EPI 2021: 68.6 ML/MIN/1.73
EOSINOPHIL # BLD AUTO: 0.13 10*3/MM3 (ref 0–0.4)
EOSINOPHIL NFR BLD AUTO: 1.7 % (ref 0.3–6.2)
ERYTHROCYTE [DISTWIDTH] IN BLOOD BY AUTOMATED COUNT: 13.3 % (ref 12.3–15.4)
FERRITIN SERPL-MCNC: 146.4 NG/ML (ref 13–150)
GLOBULIN UR ELPH-MCNC: 3.4 GM/DL
GLUCOSE SERPL-MCNC: 100 MG/DL (ref 65–99)
HCT VFR BLD AUTO: 41.3 % (ref 34–46.6)
HGB BLD-MCNC: 13.7 G/DL (ref 12–15.9)
IMM GRANULOCYTES # BLD AUTO: 0.01 10*3/MM3 (ref 0–0.05)
IMM GRANULOCYTES NFR BLD AUTO: 0.1 % (ref 0–0.5)
IRON 24H UR-MRATE: 105 MCG/DL (ref 37–145)
IRON SATN MFR SERPL: 28 % (ref 20–50)
LYMPHOCYTES # BLD AUTO: 2.23 10*3/MM3 (ref 0.7–3.1)
LYMPHOCYTES NFR BLD AUTO: 28.8 % (ref 19.6–45.3)
MCH RBC QN AUTO: 32.1 PG (ref 26.6–33)
MCHC RBC AUTO-ENTMCNC: 33.2 G/DL (ref 31.5–35.7)
MCV RBC AUTO: 96.7 FL (ref 79–97)
MONOCYTES # BLD AUTO: 0.56 10*3/MM3 (ref 0.1–0.9)
MONOCYTES NFR BLD AUTO: 7.2 % (ref 5–12)
NEUTROPHILS NFR BLD AUTO: 4.78 10*3/MM3 (ref 1.7–7)
NEUTROPHILS NFR BLD AUTO: 61.8 % (ref 42.7–76)
PLATELET # BLD AUTO: 244 10*3/MM3 (ref 140–450)
PMV BLD AUTO: 11.7 FL (ref 6–12)
POTASSIUM SERPL-SCNC: 3.7 MMOL/L (ref 3.5–5.2)
PROT SERPL-MCNC: 7.6 G/DL (ref 6–8.5)
RBC # BLD AUTO: 4.27 10*6/MM3 (ref 3.77–5.28)
SODIUM SERPL-SCNC: 142 MMOL/L (ref 136–145)
TIBC SERPL-MCNC: 374 MCG/DL (ref 298–536)
TRANSFERRIN SERPL-MCNC: 251 MG/DL (ref 200–360)
WBC NRBC COR # BLD: 7.74 10*3/MM3 (ref 3.4–10.8)

## 2023-02-16 PROCEDURE — 84466 ASSAY OF TRANSFERRIN: CPT

## 2023-02-16 PROCEDURE — 85025 COMPLETE CBC W/AUTO DIFF WBC: CPT

## 2023-02-16 PROCEDURE — G0463 HOSPITAL OUTPT CLINIC VISIT: HCPCS | Performed by: INTERNAL MEDICINE

## 2023-02-16 PROCEDURE — 36415 COLL VENOUS BLD VENIPUNCTURE: CPT

## 2023-02-16 PROCEDURE — 99214 OFFICE O/P EST MOD 30 MIN: CPT | Performed by: INTERNAL MEDICINE

## 2023-02-16 PROCEDURE — 83540 ASSAY OF IRON: CPT

## 2023-02-16 PROCEDURE — 82728 ASSAY OF FERRITIN: CPT

## 2023-02-16 PROCEDURE — 80053 COMPREHEN METABOLIC PANEL: CPT

## 2023-02-16 PROCEDURE — 82378 CARCINOEMBRYONIC ANTIGEN: CPT

## 2023-02-16 NOTE — ASSESSMENT & PLAN NOTE
Status post resection for stage I colon cancer.  She has had recent endoscopy which is the only recommendation for surveillance for stage I.  Follow-up with gastroenterology/surgery for ongoing endoscopy

## 2023-02-16 NOTE — PROGRESS NOTES
Chief Complaint  Colon Cancer    Jairo Kramer,*  Jairo Kramer MD    Subjective          Bing Cruz presents to [facility] for follow-up of iron deficiency anemia and stage I colon cancer.  Status post resection.  On return she states she is feeling pretty good.  Her bowels are working normally without blood per rectum, pain or melena.  She has any masses or adenopathy.  No unusual aches or pains.  She denies any obvious blood loss.  Her appetite is adequate.  She notes that her energy level is low but she can perform her ADLs.    Oncology/Hematology History Overview Note   6/23/20 Ascending colon pathology revealed invasive, moderated      differentiated adenocarcinoma.  3.5 cm.  T1, N0, M0 = stage I                 Surgeries      7/8/2020 Right hemicholectomy with negative margins and 14 lymph nodes negative performed by Dr. Back.         Malignant neoplasm of colon (HCC)   7/27/2021 Initial Diagnosis    Malignant neoplasm of colon (CMS/HCC)         Review of Systems   Constitutional: Positive for fatigue. Negative for appetite change, diaphoresis, fever, unexpected weight gain and unexpected weight loss.   HENT: Positive for hearing loss. Negative for mouth sores, sore throat, swollen glands, trouble swallowing and voice change.    Eyes: Negative for blurred vision.   Respiratory: Negative for cough, shortness of breath and wheezing.    Cardiovascular: Negative for chest pain and palpitations.   Gastrointestinal: Negative for abdominal pain, blood in stool, constipation, diarrhea, nausea and vomiting.   Endocrine: Negative for cold intolerance and heat intolerance.   Genitourinary: Negative for difficulty urinating, dysuria, frequency, hematuria and urinary incontinence.   Musculoskeletal: Negative for arthralgias, back pain and myalgias.   Skin: Negative for rash, skin lesions and wound.   Neurological: Negative for dizziness, seizures,  weakness, numbness and headache.   Hematological: Does not bruise/bleed easily.   Psychiatric/Behavioral: Negative for depressed mood. The patient is not nervous/anxious.      Current Outpatient Medications on File Prior to Visit   Medication Sig Dispense Refill   • acetaminophen (TYLENOL) 325 MG tablet Take 1 tablet by mouth Every 4 (Four) Hours As Needed for Fever (Temperature Greater Than 101F).     • aluminum-magnesium hydroxide-simethicone (MAALOX MAX) 400-400-40 MG/5ML suspension Take 15 mL by mouth Every 6 (Six) Hours As Needed for Heartburn.     • apixaban (ELIQUIS) 2.5 MG tablet tablet Take 1 tablet by mouth 2 (Two) Times a Day. 180 tablet 1   • ascorbic acid (VITAMIN C) 500 MG tablet Take 500 mg by mouth Daily.     • AZO-CRANBERRY PO Take  by mouth.     • colestipol (COLESTID) 1 g tablet TAKE 1 TABLET BY ORAL ROUTE 3 TIMES A DAY AS NEEDED FOR 30 DAYS DIARRHEA 270 tablet 1   • Eliquis 5 MG tablet tablet TAKE 1 TABLET BY MOUTH TWICE A DAY 60 tablet 5   • escitalopram (Lexapro) 5 MG tablet Take 1 tablet by mouth Daily. 90 tablet 3   • furosemide (LASIX) 20 MG tablet Take 1 tablet by mouth 2 (Two) Times a Day. 180 tablet 1   • Lactobacillus (Florajen Acidophilus) capsule Take 1 each by mouth Daily. 90 capsule 3   • latanoprost (XALATAN) 0.005 % ophthalmic solution Administer 1 drop to both eyes Daily.     • LORazepam (ATIVAN) 0.5 MG tablet Take 1 tablet by mouth At Night As Needed for Anxiety. 30 tablet 5   • magnesium oxide (MAG-OX) 400 tablet tablet Take 1 tablet by mouth 2 (Two) Times a Day.     • metoprolol succinate XL (TOPROL-XL) 100 MG 24 hr tablet TAKE ONE TABLET TWICE A  tablet 0   • pantoprazole (PROTONIX) 40 MG EC tablet TAKE 1 TABLET BY MOUTH EVERY DAY 90 tablet 1   • potassium chloride 10 MEQ CR tablet Take 1 tablet by mouth 2 (Two) Times a Day. 180 tablet 3   • Probiotic Product (Florajen3) capsule TAKE 1 CAPSULE BY MOUTH EVERY DAY 90 capsule 1     No current facility-administered  medications on file prior to visit.       Allergies   Allergen Reactions   • Citalopram Unknown - High Severity   • Clonazepam Unknown - High Severity   • Levofloxacin Nausea And Vomiting   • Lisinopril Unknown - High Severity   • Macrobid [Nitrofurantoin Macrocrystal] Rash   • Melatonin Unknown - High Severity     Past Medical History:   Diagnosis Date   • Abdominal pain, generalized    • Anemia    • Aortic regurgitation    • Aortic stenosis    • Cardiomegaly    • Chronic atrial fibrillation (HCC) 01/01/2019    Atrial fibrillation converted to sinus through cardioversion (March 2019   • Chronic fatigue    • Colon cancer (HCC)    • Diarrhea    • Disease of tricuspid valve    • Diverticulosis of colon    • DVT (deep venous thrombosis) (HCC)     LEFT LEG GREATER THAN 5 YRS AGO   • Dysphagia    • Essential (primary) hypertension    • Hiatal hernia    • Insomnia, unspecified    • LVH (left ventricular hypertrophy)    • Major depressive disorder, single episode    • Mitral regurgitation    • Mitral valve insufficiency and aortic valve insufficiency    • Osteopenia    • Pain in joint, pelvic region and thigh    • TR (tricuspid regurgitation)    • UTI (urinary tract infection)     antibx to be completed prior to procedure. ABIOLA Carlos RN ([facility]) notified.     Past Surgical History:   Procedure Laterality Date   • ABDOMINAL HYSTERECTOMY      WITH BSO    • ANKLE SURGERY      (L) ankle fracture   • BLADDER REPAIR     • BREAST BIOPSY      (L) breast biopsy   • CARDIOVERSION  2019   • CATARACT EXTRACTION      OU   • CHOLECYSTECTOMY      OPEN   • COLON SURGERY      STATES SHE HAD 12 INCHES OF COLON REMOVED IN JULY 2020 FOR COLON CANCER   • COLONOSCOPY  2020   • COLONOSCOPY N/A 10/29/2021    Procedure: COLONOSCOPY;  Surgeon: Edmar Back MD;  Location: [facility] ENDOSCOPY;  Service: General;  Laterality: N/A;  DIVERTICULOSIS/ANASTOMOSIS   • FEMUR OPEN REDUCTION INTERNAL FIXATION Left 07/21/2021    Procedure: FEMORAL NECK  OPEN REDUCTION INTERNAL FIXATION;  Surgeon: Bam Warner MD;  Location: [facility] MAIN OR;  Service: Orthopedics;  Laterality: Left;   • HIP CLOSED REDUCTION Left 3/20/2022    Procedure: HIP CLOSED REDUCTION;  Surgeon: Harsha Gayle MD;  Location: [facility] MAIN OR;  Service: Orthopedics;  Laterality: Left;   • INGUINAL HERNIA REPAIR Left 09/29/2011   • TOTAL HIP ARTHROPLASTY Left 3/16/2022    Procedure: LEFT TOTAL HIP ARTHROPLASTY AND HARDWARE REMOVAL;  Surgeon: Bam Warner MD;  Location: [facility] MAIN OR;  Service: Orthopedics;  Laterality: Left;   • UPPER GASTROINTESTINAL ENDOSCOPY      WITH DILATATION     Social History     Socioeconomic History   • Marital status:    Tobacco Use   • Smoking status: Never   • Smokeless tobacco: Never   Vaping Use   • Vaping Use: Former   Substance and Sexual Activity   • Alcohol use: Not Currently   • Drug use: Never   • Sexual activity: Defer     Family History   Problem Relation Age of Onset   • Malig Hyperthermia Neg Hx        Objective   Physical Exam  Vitals reviewed. Exam conducted with a chaperone present.   Constitutional:       General: She is not in acute distress.     Appearance: Normal appearance.   Cardiovascular:      Rate and Rhythm: Normal rate and regular rhythm.      Heart sounds: Normal heart sounds. No murmur heard.    No gallop.   Pulmonary:      Effort: Pulmonary effort is normal.      Breath sounds: Normal breath sounds.   Abdominal:      General: Abdomen is flat. Bowel sounds are normal.      Palpations: Abdomen is soft.   Musculoskeletal:      Cervical back: Neck supple.      Right lower leg: No edema.      Left lower leg: No edema.   Lymphadenopathy:      Cervical: No cervical adenopathy.   Neurological:      Mental Status: She is alert and oriented to person, place, and time.   Psychiatric:         Mood and Affect: Mood normal.         Behavior: Behavior normal.         Vitals:    02/16/23 0939   BP: 131/75   Pulse: 100   Resp: 18    Temp: 97.5 °F (36.4 °C)   TempSrc: Temporal   SpO2: 99%   Weight: 62.6 kg (138 lb 0.1 oz)   PainSc: 0-No pain     ECOG score: 1         PHQ-9 Total Score:                    Result Review :   The following data was reviewed by: Matthew Lewis MD on 02/16/2023:  Lab Results   Component Value Date    HGB 13.7 02/16/2023    HCT 41.3 02/16/2023    MCV 96.7 02/16/2023     02/16/2023    WBC 7.74 02/16/2023    NEUTROABS 4.78 02/16/2023    LYMPHSABS 2.23 02/16/2023    MONOSABS 0.56 02/16/2023    EOSABS 0.13 02/16/2023    BASOSABS 0.03 02/16/2023     Lab Results   Component Value Date    GLUCOSE 100 (H) 02/16/2023    BUN 18 02/16/2023    CREATININE 0.81 02/16/2023     02/16/2023    K 3.7 02/16/2023     02/16/2023    CO2 26.9 02/16/2023    CALCIUM 9.6 02/16/2023    PROTEINTOT 7.6 02/16/2023    ALBUMIN 4.2 02/16/2023    BILITOT 1.0 02/16/2023    ALKPHOS 93 02/16/2023    AST 17 02/16/2023    ALT 6 02/16/2023     Lab Results   Component Value Date    MG 2.0 01/10/2023    PHOS 2.8 03/21/2022    FREET4 1.13 01/10/2023    TSH 2.470 01/10/2023     Lab Results   Component Value Date    IRON 105 02/16/2023    LABIRON 28 02/16/2023    TRANSFERRIN 251 02/16/2023    TIBC 374 02/16/2023     Lab Results   Component Value Date    FERRITIN 146.40 02/16/2023    [id number] 270 03/18/2022    FOLATE 9.51 03/18/2022     Lab Results   Component Value Date    CEA 1.66 05/20/2022             Assessment and Plan    Diagnoses and all orders for this visit:    1. Malignant neoplasm of colon, unspecified part of colon (HCC) (Primary)  Assessment & Plan:  Status post resection for stage I colon cancer.  She has had recent endoscopy which is the only recommendation for surveillance for stage I.  Follow-up with gastroenterology/surgery for ongoing endoscopy    Orders:  -     CBC & Differential; Future  -     CEA; Future  -     Comprehensive Metabolic Panel; Future  -     Iron Profile; Future    2. Iron deficiency anemia, unspecified  iron deficiency anemia type  Assessment & Plan:  Patient's hemoglobin has normalized.  Repeat CBC, iron profile and ferritin at next visit            Patient Follow Up: 6 months    Patient was given instructions and counseling regarding her condition or for health maintenance advice. Please see specific information pulled into the AVS if appropriate.     Matthew Lewis MD    2/16/2023

## 2023-03-02 RX ORDER — METOPROLOL SUCCINATE 100 MG/1
TABLET, EXTENDED RELEASE ORAL
Qty: 180 TABLET | Refills: 3 | Status: SHIPPED | OUTPATIENT
Start: 2023-03-02

## 2023-03-13 ENCOUNTER — OFFICE VISIT (OUTPATIENT)
Dept: ORTHOPEDIC SURGERY | Facility: CLINIC | Age: 88
End: 2023-03-13
Payer: MEDICARE

## 2023-03-13 VITALS — HEART RATE: 116 BPM | BODY MASS INDEX: 21.66 KG/M2 | WEIGHT: 138.01 LBS | HEIGHT: 67 IN | OXYGEN SATURATION: 95 %

## 2023-03-13 DIAGNOSIS — M25.552 LEFT HIP PAIN: ICD-10-CM

## 2023-03-13 DIAGNOSIS — Z47.89 AFTERCARE FOLLOWING SURGERY OF THE MUSCULOSKELETAL SYSTEM: Primary | ICD-10-CM

## 2023-03-13 PROCEDURE — 99213 OFFICE O/P EST LOW 20 MIN: CPT | Performed by: PHYSICIAN ASSISTANT

## 2023-03-13 NOTE — PROGRESS NOTES
"Chief Complaint  Follow-up of the Left Hip    Subjective          History of Present Illness      Bing Cruz is a 89 y.o. female  presents to [facility] for     Patient presents for follow-up of left total hip arthroplasty, 3/16/2022, left hip dislocation and closed reduction 3/20/2022.  She is here with her daughter Nicole.  Patient and daughter state that she is \"doing good \".  She presents with a cane today but states she only uses the cane when out in public she does not use a cane when at home.  Denies pain in the hip, denies instability or difficulty with ambulation/strength activities.  She does point to the lateral hip as an area of pain it is uncomfortable to sleep on her side.  Otherwise no new complaints      Allergies   Allergen Reactions   • Citalopram Unknown - High Severity   • Clonazepam Unknown - High Severity   • Levofloxacin Nausea And Vomiting   • Lisinopril Unknown - High Severity   • Macrobid [Nitrofurantoin Macrocrystal] Rash   • Melatonin Unknown - High Severity        Social History     Socioeconomic History   • Marital status:    Tobacco Use   • Smoking status: Never   • Smokeless tobacco: Never   Vaping Use   • Vaping Use: Former   Substance and Sexual Activity   • Alcohol use: Not Currently   • Drug use: Never   • Sexual activity: Defer        REVIEW OF SYSTEMS    Constitutional: Denies fevers, chills, weight loss  Cardiovascular: Denies chest pain, shortness of breath  Skin: Denies rashes, acute skin changes  Neurologic: Denies headache, loss of consciousness  MSK: Left hip pain      Objective   Vital Signs:   Pulse 116   Ht 168.9 cm (66.5\")   Wt 62.6 kg (138 lb 0.1 oz)   SpO2 95%   BMI 21.94 kg/m²     Body mass index is 21.94 kg/m².    Physical Exam    Left hip: Tender to palpation lateral hip, full range of motion with active flexion flexion 120, no pain with rotation, negative straight leg raise, neurovascular intact no pain with " resisted range of motion.    Procedures    Imaging Results (Most Recent)     Procedure Component Value Units Date/Time    XR Hip With or Without Pelvis 2 - 3 View Left [[id number]] Resulted: 03/13/23 1144     Updated: 03/13/23 1144    Narrative:      • View:AP/Lateral view(s)  • Site: Left hip  • Indication: Left hip pain  • Study: X-rays ordered, taken in the office, and reviewed today  • X-ray: Intact appearing left total hip arthroplasty, no signs of   hardware failure or loosening, no subsidence or periprosthetic fracture,   good alignment  • Comparative data: No comparative data found             Result Review :   The following data was reviewed by: JUNAID Henderson on 03/13/2023:  Data reviewed: Radiologic studies Reviewed by me with the patient             Assessment and Plan    Diagnoses and all orders for this visit:    1. S/P left total hip arthroplasty 3/16/2022 in the left hip dislocation and closed reduction 3/20/2022.  (Primary)    2. Left hip pain  -     XR Hip With or Without Pelvis 2 - 3 View Left    Other orders  -     Diclofenac Sodium (Voltaren) 1 % gel gel; Apply 4 g topically to the appropriate area as directed 4 (Four) Times a Day.  Dispense: 400 g; Refill: 2        Reviewed x-rays with the patient and her daughter discussed diagnosis and treatment options with them, patient was given prescription for Voltaren gel to apply to her lateral hip for her pain, if any new or concerning symptoms occur call us right away otherwise she was advised to continue activity and weightbearing as tolerated follow-up in 1 year with x-rays    Call or return if worsening symptoms.    Follow Up   Return in about 1 year (around 3/13/2024).  Patient was given instructions and counseling regarding her condition or for health maintenance advice. Please see specific information pulled into the AVS if appropriate.

## 2023-04-12 RX ORDER — MONTELUKAST SODIUM 4 MG/1
1 TABLET, CHEWABLE ORAL DAILY
Qty: 90 TABLET | Refills: 1 | OUTPATIENT
Start: 2023-04-12

## 2023-04-12 NOTE — TELEPHONE ENCOUNTER
Patient requesting a refill of Colestipol, order pended.  Last o/v-1/11/21  Last refill-7/28/21  Next o/v-none

## 2023-04-19 ENCOUNTER — TELEPHONE (OUTPATIENT)
Dept: GASTROENTEROLOGY | Facility: CLINIC | Age: 88
End: 2023-04-19
Payer: MEDICARE

## 2023-04-19 NOTE — TELEPHONE ENCOUNTER
Patient has to see Leslie scheduled back to original appointment with Leslie on 4/20/23 at 8:15 per Ann. Spoke with patient daughter and she is ok with changing the appointment.

## 2023-04-19 NOTE — TELEPHONE ENCOUNTER
Talked to daughter, to see if she wanted to reschedule her mom appointment because I had booked her in the wrong slot. She is booked with Audrey on 4/25/23 at 10:45. She wanted a later appointment, it would be better for her mom.

## 2023-04-20 ENCOUNTER — OFFICE VISIT (OUTPATIENT)
Dept: GASTROENTEROLOGY | Facility: CLINIC | Age: 88
End: 2023-04-20
Payer: MEDICARE

## 2023-04-20 VITALS
BODY MASS INDEX: 23.3 KG/M2 | SYSTOLIC BLOOD PRESSURE: 149 MMHG | HEART RATE: 76 BPM | WEIGHT: 145 LBS | HEIGHT: 66 IN | DIASTOLIC BLOOD PRESSURE: 121 MMHG

## 2023-04-20 DIAGNOSIS — K21.00 GASTROESOPHAGEAL REFLUX DISEASE WITH ESOPHAGITIS WITHOUT HEMORRHAGE: ICD-10-CM

## 2023-04-20 DIAGNOSIS — R19.7 DIARRHEA, UNSPECIFIED TYPE: Primary | ICD-10-CM

## 2023-04-20 DIAGNOSIS — Z85.038 HISTORY OF COLON CANCER: ICD-10-CM

## 2023-04-20 DIAGNOSIS — R13.10 DYSPHAGIA, UNSPECIFIED TYPE: ICD-10-CM

## 2023-04-20 PROCEDURE — 1160F RVW MEDS BY RX/DR IN RCRD: CPT | Performed by: NURSE PRACTITIONER

## 2023-04-20 PROCEDURE — 99214 OFFICE O/P EST MOD 30 MIN: CPT | Performed by: NURSE PRACTITIONER

## 2023-04-20 PROCEDURE — 1159F MED LIST DOCD IN RCRD: CPT | Performed by: NURSE PRACTITIONER

## 2023-04-20 RX ORDER — MONTELUKAST SODIUM 4 MG/1
1 TABLET, CHEWABLE ORAL DAILY
Qty: 90 TABLET | Refills: 3 | Status: SHIPPED | OUTPATIENT
Start: 2023-04-20

## 2023-04-20 RX ORDER — PANTOPRAZOLE SODIUM 20 MG/1
20 TABLET, DELAYED RELEASE ORAL DAILY
Qty: 90 TABLET | Refills: 3 | Status: SHIPPED | OUTPATIENT
Start: 2023-04-20

## 2023-04-20 NOTE — PATIENT INSTRUCTIONS
Start colestid 1/2 tab daily for diarrhea    Will decrease protonix 20 mg daily and see how she does     Call office or MY CHART me with update in 2 weeks    Ask PCP about magnesium and diarrhea????

## 2023-04-20 NOTE — PROGRESS NOTES
Chief Complaint   Diarrhea    History of Present Illness       Bing Cruz is a 89 y.o. female who presents today accompanied by her daughter to [facility] for follow-up for diarrhea.  She is new to me today.  She was last seen in the office by nurse practitioner Glenroy Powell on 11/11/2021.      She has a history of GERD with trouble swallowing and admits she is doing really well on Protonix 40 mg daily.  Last EGD was done by Dr. Blount on 2/2021.Occasionally still has some dysphagia but admits that's rare. Reports good appetite. Has gained 3 lbs since January.     Does have a history of chronic diarrhea and does really well on a half a tab of colestipol PRN. Admits she doesn't take it everyday. Will have watery diarrhea with fecal urgency and fecal incontinence.  Her daughter tells me it can get so bad the patient is afraid to leave the house.    Last colonoscopy was done by Dr. Back on 10/21 and it showed diverticulosis.  She has a personal history of colon cancer. S/p colon resection in 7/2020.    She denies any significant GI family history at this time.    She has a history of DVT with A-fib and tricuspid regurgitation and is on Eliquis.      Has history of cholecystectomy.  Results       Result Review :       CMP        9/9/2022    11:28 1/10/2023    15:24 2/16/2023    09:31   CMP   Glucose 94   87   100     BUN 15   20   18     Creatinine 0.76   1.05   0.81     EGFR 74.1   50.3   68.6     Sodium 141   143   142     Potassium 4.0   3.8   3.7     Chloride 103   103   102     Calcium 9.2   9.6   9.6     Total Protein 7.0   7.6   7.6     Albumin 3.90   4.0   4.2     Globulin 3.1   3.6   3.4     Total Bilirubin 1.1   0.9   1.0     Alkaline Phosphatase 90   89   93     AST (SGOT) 19   20   17     ALT (SGPT) 7   10   6     Albumin/Globulin Ratio 1.3   1.1   1.2     BUN/Creatinine Ratio 19.7   19.0   22.2     Anion Gap 10.6   11.1   13.1       CBC        9/9/2022    11:28  1/10/2023    15:24 2/16/2023    09:31   CBC   WBC 6.84   7.47   7.74     RBC 4.00   4.25   4.27     Hemoglobin 12.4   13.4   13.7     Hematocrit 37.2   38.5   41.3     MCV 93.0   90.6   96.7     MCH 31.0   31.5   32.1     MCHC 33.3   34.8   33.2     RDW 13.6   12.9   13.3     Platelets 221   239   244         Last EGD was done by Dr. Blount on 2/25/2021.  An intermittent Schatzki's ring was found in the GE junction and was dilated.  A small hiatal hernia was noted.  Gastritis was also noted.  Path was positive for gastritis.  Last colonoscopy done by Dr. Back on 10/2021.  It showed diverticulosis.  Otherwise normal.    Past Medical History       Past Medical History:   Diagnosis Date   • Abdominal pain, generalized    • Anemia    • Aortic regurgitation    • Aortic stenosis    • Cardiomegaly    • Chronic atrial fibrillation 01/01/2019    Atrial fibrillation converted to sinus through cardioversion (March 2019   • Chronic fatigue    • Colon cancer    • Diarrhea    • Disease of tricuspid valve    • Diverticulosis of colon    • DVT (deep venous thrombosis)     LEFT LEG GREATER THAN 5 YRS AGO   • Dysphagia    • Essential (primary) hypertension    • Hiatal hernia    • Insomnia, unspecified    • LVH (left ventricular hypertrophy)    • Major depressive disorder, single episode    • Mitral regurgitation    • Mitral valve insufficiency and aortic valve insufficiency    • Osteopenia    • Pain in joint, pelvic region and thigh    • TR (tricuspid regurgitation)    • UTI (urinary tract infection)     antibx to be completed prior to procedure. ABIOLA Carlos RN ([employer]) notified.       Past Surgical History:   Procedure Laterality Date   • ABDOMINAL HYSTERECTOMY      WITH BSO    • ANKLE SURGERY      (L) ankle fracture   • BLADDER REPAIR     • BREAST BIOPSY      (L) breast biopsy   • CARDIOVERSION  2019   • CATARACT EXTRACTION      OU   • CHOLECYSTECTOMY      OPEN   • COLON SURGERY      STATES SHE HAD 12 INCHES OF COLON  REMOVED IN JULY 2020 FOR COLON CANCER   • COLONOSCOPY  2020   • COLONOSCOPY N/A 10/29/2021    Procedure: COLONOSCOPY;  Surgeon: Edmar Back MD;  Location: [facility];  Service: General;  Laterality: N/A;  DIVERTICULOSIS/ANASTOMOSIS   • FEMUR OPEN REDUCTION INTERNAL FIXATION Left 07/21/2021    Procedure: FEMORAL NECK OPEN REDUCTION INTERNAL FIXATION;  Surgeon: Bam Warner MD;  Location: [facility] MAIN OR;  Service: Orthopedics;  Laterality: Left;   • HIP CLOSED REDUCTION Left 3/20/2022    Procedure: HIP CLOSED REDUCTION;  Surgeon: Harsha Gayle MD;  Location: [facility] MAIN OR;  Service: Orthopedics;  Laterality: Left;   • INGUINAL HERNIA REPAIR Left 09/29/2011   • TOTAL HIP ARTHROPLASTY Left 3/16/2022    Procedure: LEFT TOTAL HIP ARTHROPLASTY AND HARDWARE REMOVAL;  Surgeon: Bam Warner MD;  Location: [facility] MAIN OR;  Service: Orthopedics;  Laterality: Left;   • UPPER GASTROINTESTINAL ENDOSCOPY      WITH DILATATION         Current Outpatient Medications:   •  acetaminophen (TYLENOL) 325 MG tablet, Take 1 tablet by mouth Every 4 (Four) Hours As Needed for Fever (Temperature Greater Than 101F)., Disp: , Rfl:   •  aluminum-magnesium hydroxide-simethicone (MAALOX MAX) 400-400-40 MG/5ML suspension, Take 15 mL by mouth Every 6 (Six) Hours As Needed for Heartburn., Disp: , Rfl:   •  apixaban (ELIQUIS) 2.5 MG tablet tablet, Take 1 tablet by mouth 2 (Two) Times a Day., Disp: 180 tablet, Rfl: 1  •  ascorbic acid (VITAMIN C) 500 MG tablet, Take 1 tablet by mouth Daily., Disp: , Rfl:   •  AZO-CRANBERRY PO, Take  by mouth., Disp: , Rfl:   •  colestipol (COLESTID) 1 g tablet, Take 1 tablet by mouth Daily., Disp: 90 tablet, Rfl: 3  •  Diclofenac Sodium (Voltaren) 1 % gel gel, Apply 4 g topically to the appropriate area as directed 4 (Four) Times a Day., Disp: 400 g, Rfl: 2  •  Eliquis 5 MG tablet tablet, TAKE 1 TABLET BY MOUTH TWICE A DAY, Disp: 60 tablet, Rfl: 5  •  escitalopram (Lexapro) 5 MG tablet, Take  1 tablet by mouth Daily., Disp: 90 tablet, Rfl: 3  •  furosemide (LASIX) 20 MG tablet, Take 1 tablet by mouth 2 (Two) Times a Day., Disp: 180 tablet, Rfl: 1  •  Lactobacillus (Florajen Acidophilus) capsule, Take 1 each by mouth Daily., Disp: 90 capsule, Rfl: 3  •  latanoprost (XALATAN) 0.005 % ophthalmic solution, Administer 1 drop to both eyes Daily., Disp: , Rfl:   •  LORazepam (ATIVAN) 0.5 MG tablet, Take 1 tablet by mouth At Night As Needed for Anxiety., Disp: 30 tablet, Rfl: 5  •  magnesium oxide (MAG-OX) 400 tablet tablet, Take 1 tablet by mouth 2 (Two) Times a Day., Disp: , Rfl:   •  metoprolol succinate XL (TOPROL-XL) 100 MG 24 hr tablet, TAKE 1 TABLET BY MOUTH TWICE A DAY, Disp: 180 tablet, Rfl: 3  •  potassium chloride 10 MEQ CR tablet, Take 1 tablet by mouth 2 (Two) Times a Day., Disp: 180 tablet, Rfl: 3  •  Probiotic Product (Florajen3) capsule, TAKE 1 CAPSULE BY MOUTH EVERY DAY, Disp: 90 capsule, Rfl: 1  •  pantoprazole (PROTONIX) 20 MG EC tablet, Take 1 tablet by mouth Daily., Disp: 90 tablet, Rfl: 3     Allergies   Allergen Reactions   • Citalopram Unknown - High Severity   • Clonazepam Unknown - High Severity   • Levofloxacin Nausea And Vomiting   • Lisinopril Unknown - High Severity   • Macrobid [Nitrofurantoin Macrocrystal] Rash   • Melatonin Unknown - High Severity       Family History   Problem Relation Age of Onset   • Malig Hyperthermia Neg Hx         Social History     Social History Narrative   • Not on file       Objective       Review of Systems   Constitutional: Negative for appetite change, fatigue, fever, unexpected weight gain and unexpected weight loss.   HENT: Positive for trouble swallowing.    Respiratory: Negative for cough, choking, chest tightness, shortness of breath, wheezing and stridor.    Cardiovascular: Negative for chest pain, palpitations and leg swelling.   Gastrointestinal: Positive for diarrhea. Negative for abdominal distention, abdominal pain, anal bleeding, blood in  "stool, constipation, nausea, rectal pain, vomiting, GERD and indigestion.        Vital Signs:   BP (!) 149/121 (BP Location: Left arm, Patient Position: Sitting, Cuff Size: Adult)   Pulse 76   Ht 167.6 cm (66\")   Wt 65.8 kg (145 lb)   BMI 23.40 kg/m²       Physical Exam  Constitutional:       General: She is not in acute distress.     Appearance: She is well-developed. She is not ill-appearing.   HENT:      Head: Normocephalic.   Eyes:      Pupils: Pupils are equal, round, and reactive to light.   Cardiovascular:      Rate and Rhythm: Normal rate and regular rhythm.      Heart sounds: Normal heart sounds.   Pulmonary:      Effort: Pulmonary effort is normal.      Breath sounds: Normal breath sounds.   Abdominal:      General: Bowel sounds are normal. There is no distension.      Palpations: Abdomen is soft. There is no mass.      Tenderness: There is no abdominal tenderness. There is no guarding or rebound.      Hernia: No hernia is present.   Musculoskeletal:         General: Normal range of motion.   Skin:     General: Skin is warm and dry.   Neurological:      Mental Status: She is alert and oriented to person, place, and time.   Psychiatric:         Speech: Speech normal.         Behavior: Behavior normal.         Judgment: Judgment normal.           Assessment & Plan          Assessment and Plan    Diagnoses and all orders for this visit:    1. Diarrhea, unspecified type (Primary)  -     colestipol (COLESTID) 1 g tablet; Take 1 tablet by mouth Daily.  Dispense: 90 tablet; Refill: 3    2. Gastroesophageal reflux disease with esophagitis without hemorrhage  -     pantoprazole (PROTONIX) 20 MG EC tablet; Take 1 tablet by mouth Daily.  Dispense: 90 tablet; Refill: 3    3. Dysphagia, unspecified type    4. History of colon cancer    Reviewed medical history with her and her daughter today.  Diarrhea does seem better on colestipol.  Recommend she start taking a half a tab of colestipol daily.  Recommend a " high-fiber diet.  GERD seems well controlled on Protonix 40 mg daily.  Given her age and other comorbidities would like to decrease the Protonix to 20 mg daily and see how she does.  I do wonder if her magnesium is contributing to her diarrhea?  Will have the patient talk to her PCP about this.  Overall she seems to be doing really well.  Patient's daughter to call the office in 2 weeks with an update.  Patient to follow-up with me in 3 months.  Patient and daughter are agreeable to the plan.            Follow Up       Follow Up   Return in about 3 months (around 7/20/2023) for DIARRHEA.  Patient was given instructions and counseling regarding her condition or for health maintenance advice. Please see specific information pulled into the AVS if appropriate.

## 2023-05-15 ENCOUNTER — LAB (OUTPATIENT)
Dept: LAB | Facility: HOSPITAL | Age: 88
End: 2023-05-15
Payer: MEDICARE

## 2023-05-15 DIAGNOSIS — R42 POSTURAL DIZZINESS WITH PRESYNCOPE: ICD-10-CM

## 2023-05-15 DIAGNOSIS — F33.0 MAJOR DEPRESSIVE DISORDER, RECURRENT, MILD: ICD-10-CM

## 2023-05-15 DIAGNOSIS — I10 HYPERTENSION, ESSENTIAL: ICD-10-CM

## 2023-05-15 DIAGNOSIS — K59.1 FUNCTIONAL DIARRHEA: ICD-10-CM

## 2023-05-15 DIAGNOSIS — F41.9 ANXIETY: ICD-10-CM

## 2023-05-15 DIAGNOSIS — Z98.890 HISTORY OF HIP SURGERY: ICD-10-CM

## 2023-05-15 DIAGNOSIS — F51.01 PRIMARY INSOMNIA: ICD-10-CM

## 2023-05-15 DIAGNOSIS — F33.1 MAJOR DEPRESSIVE DISORDER, RECURRENT, MODERATE: ICD-10-CM

## 2023-05-15 DIAGNOSIS — I48.20 ATRIAL FIBRILLATION, CHRONIC: ICD-10-CM

## 2023-05-15 DIAGNOSIS — R55 POSTURAL DIZZINESS WITH PRESYNCOPE: ICD-10-CM

## 2023-05-15 DIAGNOSIS — D50.9 IRON DEFICIENCY ANEMIA, UNSPECIFIED IRON DEFICIENCY ANEMIA TYPE: ICD-10-CM

## 2023-05-15 DIAGNOSIS — I50.32 CHRONIC DIASTOLIC (CONGESTIVE) HEART FAILURE: ICD-10-CM

## 2023-05-15 DIAGNOSIS — I35.1 NONRHEUMATIC AORTIC VALVE INSUFFICIENCY: ICD-10-CM

## 2023-05-15 DIAGNOSIS — I82.5Y2 CHRONIC DEEP VEIN THROMBOSIS (DVT) OF PROXIMAL VEIN OF LEFT LOWER EXTREMITY: ICD-10-CM

## 2023-05-15 DIAGNOSIS — K21.9 GASTROESOPHAGEAL REFLUX DISEASE WITHOUT ESOPHAGITIS: ICD-10-CM

## 2023-05-15 DIAGNOSIS — I95.2 HYPOTENSION DUE TO DRUGS: ICD-10-CM

## 2023-05-15 DIAGNOSIS — E78.2 MIXED HYPERLIPIDEMIA: ICD-10-CM

## 2023-05-15 DIAGNOSIS — E55.9 VITAMIN D DEFICIENCY: ICD-10-CM

## 2023-05-15 DIAGNOSIS — S72.002A HIP FRACTURE REQUIRING OPERATIVE REPAIR, LEFT, CLOSED, INITIAL ENCOUNTER: ICD-10-CM

## 2023-05-15 LAB
ALBUMIN SERPL-MCNC: 4.2 G/DL (ref 3.5–5.2)
ALBUMIN/GLOB SERPL: 1.4 G/DL
ALP SERPL-CCNC: 77 U/L (ref 39–117)
ALT SERPL W P-5'-P-CCNC: 6 U/L (ref 1–33)
ANION GAP SERPL CALCULATED.3IONS-SCNC: 11.1 MMOL/L (ref 5–15)
AST SERPL-CCNC: 22 U/L (ref 1–32)
BASOPHILS # BLD AUTO: 0.04 10*3/MM3 (ref 0–0.2)
BASOPHILS NFR BLD AUTO: 0.5 % (ref 0–1.5)
BILIRUB SERPL-MCNC: 0.8 MG/DL (ref 0–1.2)
BUN SERPL-MCNC: 18 MG/DL (ref 8–23)
BUN/CREAT SERPL: 21.4 (ref 7–25)
CALCIUM SPEC-SCNC: 9.5 MG/DL (ref 8.2–9.6)
CHLORIDE SERPL-SCNC: 108 MMOL/L (ref 98–107)
CO2 SERPL-SCNC: 26.9 MMOL/L (ref 22–29)
CREAT SERPL-MCNC: 0.84 MG/DL (ref 0.57–1)
DEPRECATED RDW RBC AUTO: 43 FL (ref 37–54)
EGFRCR SERPLBLD CKD-EPI 2021: 65.7 ML/MIN/1.73
EOSINOPHIL # BLD AUTO: 0.12 10*3/MM3 (ref 0–0.4)
EOSINOPHIL NFR BLD AUTO: 1.6 % (ref 0.3–6.2)
ERYTHROCYTE [DISTWIDTH] IN BLOOD BY AUTOMATED COUNT: 12.2 % (ref 12.3–15.4)
GLOBULIN UR ELPH-MCNC: 3.1 GM/DL
GLUCOSE SERPL-MCNC: 94 MG/DL (ref 65–99)
HCT VFR BLD AUTO: 38 % (ref 34–46.6)
HGB BLD-MCNC: 12.9 G/DL (ref 12–15.9)
IMM GRANULOCYTES # BLD AUTO: 0.02 10*3/MM3 (ref 0–0.05)
IMM GRANULOCYTES NFR BLD AUTO: 0.3 % (ref 0–0.5)
LYMPHOCYTES # BLD AUTO: 2.18 10*3/MM3 (ref 0.7–3.1)
LYMPHOCYTES NFR BLD AUTO: 29.6 % (ref 19.6–45.3)
MCH RBC QN AUTO: 32.7 PG (ref 26.6–33)
MCHC RBC AUTO-ENTMCNC: 33.9 G/DL (ref 31.5–35.7)
MCV RBC AUTO: 96.2 FL (ref 79–97)
MONOCYTES # BLD AUTO: 0.55 10*3/MM3 (ref 0.1–0.9)
MONOCYTES NFR BLD AUTO: 7.5 % (ref 5–12)
NEUTROPHILS NFR BLD AUTO: 4.46 10*3/MM3 (ref 1.7–7)
NEUTROPHILS NFR BLD AUTO: 60.5 % (ref 42.7–76)
NRBC BLD AUTO-RTO: 0 /100 WBC (ref 0–0.2)
NT-PROBNP SERPL-MCNC: 3067 PG/ML (ref 0–1800)
PLATELET # BLD AUTO: 241 10*3/MM3 (ref 140–450)
PMV BLD AUTO: 11.6 FL (ref 6–12)
POTASSIUM SERPL-SCNC: 4.5 MMOL/L (ref 3.5–5.2)
PROT SERPL-MCNC: 7.3 G/DL (ref 6–8.5)
RBC # BLD AUTO: 3.95 10*6/MM3 (ref 3.77–5.28)
SODIUM SERPL-SCNC: 146 MMOL/L (ref 136–145)
WBC NRBC COR # BLD: 7.37 10*3/MM3 (ref 3.4–10.8)

## 2023-05-15 PROCEDURE — 36415 COLL VENOUS BLD VENIPUNCTURE: CPT

## 2023-05-15 PROCEDURE — 83880 ASSAY OF NATRIURETIC PEPTIDE: CPT

## 2023-05-15 PROCEDURE — 80053 COMPREHEN METABOLIC PANEL: CPT

## 2023-05-15 PROCEDURE — 85025 COMPLETE CBC W/AUTO DIFF WBC: CPT

## 2023-05-17 ENCOUNTER — OFFICE VISIT (OUTPATIENT)
Dept: INTERNAL MEDICINE | Facility: CLINIC | Age: 88
End: 2023-05-17
Payer: MEDICARE

## 2023-05-17 VITALS
WEIGHT: 146.2 LBS | HEIGHT: 66 IN | SYSTOLIC BLOOD PRESSURE: 164 MMHG | HEART RATE: 82 BPM | TEMPERATURE: 97.8 F | DIASTOLIC BLOOD PRESSURE: 95 MMHG | BODY MASS INDEX: 23.5 KG/M2 | OXYGEN SATURATION: 97 %

## 2023-05-17 DIAGNOSIS — D50.9 IRON DEFICIENCY ANEMIA, UNSPECIFIED IRON DEFICIENCY ANEMIA TYPE: ICD-10-CM

## 2023-05-17 DIAGNOSIS — R13.19 ESOPHAGEAL DYSPHAGIA: ICD-10-CM

## 2023-05-17 DIAGNOSIS — F33.0 MAJOR DEPRESSIVE DISORDER, RECURRENT, MILD: ICD-10-CM

## 2023-05-17 DIAGNOSIS — I10 HYPERTENSION, ESSENTIAL: Primary | ICD-10-CM

## 2023-05-17 DIAGNOSIS — R19.7 DIARRHEA, UNSPECIFIED TYPE: ICD-10-CM

## 2023-05-17 DIAGNOSIS — I82.5Y2 CHRONIC DEEP VEIN THROMBOSIS (DVT) OF PROXIMAL VEIN OF LEFT LOWER EXTREMITY: ICD-10-CM

## 2023-05-17 DIAGNOSIS — I48.20 ATRIAL FIBRILLATION, CHRONIC: ICD-10-CM

## 2023-05-17 DIAGNOSIS — I50.32 CHF (CONGESTIVE HEART FAILURE), NYHA CLASS I, CHRONIC, DIASTOLIC: ICD-10-CM

## 2023-05-17 RX ORDER — ESCITALOPRAM OXALATE 10 MG/1
10 TABLET ORAL DAILY
Qty: 90 TABLET | Refills: 3 | Status: SHIPPED | OUTPATIENT
Start: 2023-05-17

## 2023-05-17 NOTE — PROGRESS NOTES
"CHIEF COMPLAINT/ HPI:  Hypertension and Follow-up (PATIENT IS HER FOR A ROUTINE FOLLOW UP )    Back on colestipol, for diarrhea improved    Here to follow-up with labs, anxiety, previous diarrhea issues are better with colestipol here to follow-up with her heart condition CHF,      Objective   Vital Signs  Vitals:    05/17/23 1116   BP: 164/95   Pulse: 82   Temp: 97.8 °F (36.6 °C)   SpO2: 97%   Weight: 66.3 kg (146 lb 3.2 oz)   Height: 167.6 cm (65.98\")      Body mass index is 23.61 kg/m².  Review of Systems   Constitutional: Negative.    HENT: Negative.    Eyes: Negative.    Respiratory: Negative.    Cardiovascular: Negative.    Gastrointestinal: Negative.    Endocrine: Negative.    Genitourinary: Negative.    Musculoskeletal: Negative.    Allergic/Immunologic: Negative.    Neurological: Negative.    Hematological: Negative.    Psychiatric/Behavioral: Negative.       Physical Exam  Constitutional:       General: She is not in acute distress.     Appearance: Normal appearance.   HENT:      Head: Normocephalic.      Mouth/Throat:      Mouth: Mucous membranes are moist.   Eyes:      Conjunctiva/sclera: Conjunctivae normal.      Pupils: Pupils are equal, round, and reactive to light.   Cardiovascular:      Rate and Rhythm: Normal rate. Rhythm irregular.      Pulses: Normal pulses.      Heart sounds: Murmur (1/6 systolic murmur left upper right upper sternal border) heard.   Pulmonary:      Effort: Pulmonary effort is normal.      Breath sounds: Normal breath sounds.   Abdominal:      General: Abdomen is flat. Bowel sounds are normal.      Palpations: Abdomen is soft.   Musculoskeletal:         General: No swelling. Normal range of motion.      Cervical back: Neck supple.      Right lower leg: Edema present.      Left lower leg: Edema present.   Skin:     General: Skin is warm and dry.      Coloration: Skin is not jaundiced.      Findings: Bruising (Right lower leg PreTAB region) present.   Neurological:      General: " No focal deficit present.      Mental Status: She is alert and oriented to person, place, and time. Mental status is at baseline.   Psychiatric:         Mood and Affect: Mood normal.         Behavior: Behavior normal.         Thought Content: Thought content normal.         Judgment: Judgment normal.        Result Review :   Lab Results   Component Value Date    PROBNP 3,067.0 (H) 05/15/2023    PROBNP 1,762.0 01/10/2023    PROBNP 3,443.0 (H) 09/09/2022    BNP 1385 12/05/2020    BNP 4183 (H) 10/12/2020    BNP 5163 (H) 09/09/2020     CMP        1/10/2023    15:24 2/16/2023    09:31 5/15/2023    09:52   CMP   Glucose 87   100   94     BUN 20   18   18     Creatinine 1.05   0.81   0.84     EGFR 50.3   68.6   65.7     Sodium 143   142   146     Potassium 3.8   3.7   4.5     Chloride 103   102   108     Calcium 9.6   9.6   9.5     Total Protein 7.6   7.6   7.3     Albumin 4.0   4.2   4.2     Globulin 3.6   3.4   3.1     Total Bilirubin 0.9   1.0   0.8     Alkaline Phosphatase 89   93   77     AST (SGOT) 20   17   22     ALT (SGPT) 10   6   6     Albumin/Globulin Ratio 1.1   1.2   1.4     BUN/Creatinine Ratio 19.0   22.2   21.4     Anion Gap 11.1   13.1   11.1       CBC w/diff        1/10/2023    15:24 2/16/2023    09:31 5/15/2023    09:52   CBC w/Diff   WBC 7.47   7.74   7.37     RBC 4.25   4.27   3.95     Hemoglobin 13.4   13.7   12.9     Hematocrit 38.5   41.3   38.0     MCV 90.6   96.7   96.2     MCH 31.5   32.1   32.7     MCHC 34.8   33.2   33.9     RDW 12.9   13.3   12.2     Platelets 239   244   241     Neutrophil Rel % 58.7   61.8   60.5     Immature Granulocyte Rel % 0.4   0.1   0.3     Lymphocyte Rel % 32.9   28.8   29.6     Monocyte Rel % 6.7   7.2   7.5     Eosinophil Rel % 0.8   1.7   1.6     Basophil Rel % 0.5   0.4   0.5           Lab Results   Component Value Date    TSH 2.470 01/10/2023    TSH 1.850 03/18/2022    TSH 3.710 03/14/2022      Lab Results   Component Value Date    FREET4 1.13 01/10/2023     FREET4 1.42 03/14/2022    FREET4 1.02 07/15/2021                          Visit Diagnoses:    ICD-10-CM ICD-9-CM   1. Hypertension, essential  I10 401.9   2. Major depressive disorder, recurrent, mild  F33.0 296.31   3. Atrial fibrillation, chronic (HCC)  I48.20 427.31   4. Iron deficiency anemia, unspecified iron deficiency anemia type  D50.9 280.9   5. Chronic deep vein thrombosis (DVT) of proximal vein of left lower extremity  I82.5Y2 453.51   6. Esophageal dysphagia  R13.19 787.29   7. Diarrhea, unspecified type  R19.7 787.91   8. CHF (congestive heart failure), NYHA class I, chronic, diastolic  I50.32 428.32       Assessment and Plan   Diagnoses and all orders for this visit:    1. Hypertension, essential (Primary)  -     Lipid panel; Future  -     CBC & Differential; Future  -     Comprehensive Metabolic Panel; Future  -     Magnesium; Future  -     proBNP; Future  -     Lipid Panel; Future    2. Major depressive disorder, recurrent, mild  -     Lipid panel; Future  -     CBC & Differential; Future  -     Comprehensive Metabolic Panel; Future  -     Magnesium; Future  -     proBNP; Future  -     Lipid Panel; Future    3. Atrial fibrillation, chronic (HCC)  -     Lipid panel; Future  -     CBC & Differential; Future  -     Comprehensive Metabolic Panel; Future  -     Magnesium; Future  -     proBNP; Future  -     Lipid Panel; Future    4. Iron deficiency anemia, unspecified iron deficiency anemia type  -     Lipid panel; Future  -     CBC & Differential; Future  -     Comprehensive Metabolic Panel; Future  -     Magnesium; Future  -     proBNP; Future  -     Lipid Panel; Future    5. Chronic deep vein thrombosis (DVT) of proximal vein of left lower extremity  -     Lipid panel; Future  -     CBC & Differential; Future  -     Comprehensive Metabolic Panel; Future  -     Magnesium; Future  -     proBNP; Future  -     Lipid Panel; Future    6. Esophageal dysphagia  -     Lipid panel; Future  -     CBC &  Differential; Future  -     Comprehensive Metabolic Panel; Future  -     Magnesium; Future  -     proBNP; Future  -     Lipid Panel; Future    7. Diarrhea, unspecified type  -     Lipid panel; Future  -     CBC & Differential; Future  -     Comprehensive Metabolic Panel; Future  -     Magnesium; Future  -     proBNP; Future  -     Lipid Panel; Future    8. CHF (congestive heart failure), NYHA class I, chronic, diastolic  -     Lipid panel; Future  -     CBC & Differential; Future  -     Comprehensive Metabolic Panel; Future  -     Magnesium; Future  -     proBNP; Future  -     Lipid Panel; Future    Other orders  -     escitalopram (Lexapro) 10 MG tablet; Take 1 tablet by mouth Daily.  Dispense: 90 tablet; Refill: 3    Diarrhea issues as above, okay to stop magnesium for now, continue colestipol 1 g tablet daily, will monitor    Hypertension, --- CONT METOPROLOL  MG BID     Acute diastolic heart failure may 2022,   Lasix 20 mg bid,  potassium 10 mEq twice a day, --- BR NP  down to 1700 January 2023- continues magnesium, Florajen,, or previous CT scan as below may 17, 2022 showed bilateral pleural effusions and symptoms consistent with congestive heart failure clinically better September 2022    Left leg swelling  VE LE,, -neg  May 2022     Depression- lost son to stomach ca, nov 2018, and daughter had AVR nov 2018, -currently on treatment as below,     ANXIETY  cont lexapro  5 MG QD , LORAZEPAM 0.5 HS AND 1/2= 0.25 MG QD PRN ----consider increasing Lexapro to 10 mg daily,    L NILO, MARCH 16, 2022, POST OP DISLOCATION MARCH 20, 2022 AND THEN TO [facility],,    Dysphagia, Status post barium swallow October 2020, showing narrowing at the distal esophagus, for EGD by Dr. Blount February February 25, 2021,--stopped PPIs-- September 2022    Colon cancer diagnosed June 2020 initially found to be Anemia, was referred to hematology oncology,--- sent to gastroenterology---Dr. Blount--FOUND COLON CA JUNE 2020,  REFERRED TO DR QUISPE--subsequent exploratory laparotomy and partial colectomy with 14 out of 14 lymph nodes negative, -------patient had prolonged hospital course, subsequent sent to rehab, now home--HAD LOW NA , DEVELOPED C DIFF COLITIS, IN HOSP, FTT WITH WGT LOSS IN REHAB --GAIT DYS. AND GEN WEAKNESS --Slowly improving, current medications reviewed----Patient had delirium in the hospital also, improved---Patient has follow-up for a 5 mm lung nodule found on CT scan in the left lower lobe and surgical changes ----- CT scan in February 2021, no change mild to moderate cardiomegaly stable nodular density lung fields bilateral, CT scan May 17, 2022 showed stable small subcentimeter pulmonary nodules that have not changed over 1 year, new small moderate bilateral pleural effusions were noted    Status post colonoscopy October 2021 Dr. Quispe--- recent CT scan May 21, 2022 abdomen and pelvis shows no acute abdominal or pelvic abnormality colonic diverticulosis, blood pressure proximal colon resection noted small bilateral pleural effusions cardiomegaly coronary disease moderate sized hiatal hernia and osteopenia and thoracodegenerative changes left hip prosthesis and previous cholecystectomy, -----CT scan of the chest May 17, 2022 shows stable small subcentimeter pulmonary nodules over the past year new small bilateral pleural effusions with adjacent compressive atelectasis consistent with congestive heart failure clinically improved    LUNG NODULE --Found June 2020, 5 millimeter left lower lobe--- on CT scan for abdominal pains---- FOUND PER HEM/ONC AND F/U CT scan of the chest February 4, 2021, showing stable pulmonary nodules, no change on CT scan May 2022,-- and CT abdomen and pelvis for follow-up of colon cancer,    Chronic atrial fibrillation---- MARCH 23, 2017, --continues  ELIQUIS 2.5 mg twice a day ,Metoprolol 100 mg twice a day,---Cardioverted April 2019 Dr. Jolly , NOW BACK IN AFIB MAY 2023     L HUMERAL  FX DEC 2016, ORIF WITH BENSON --at [facility]    adverse reaction with rash to nitrofurantoin,, SEPT. 2016.,  Currently taking Azo tolerating well    EXTENSIVE LLE DVT 11/15, , Continues on Eliquis 2.5 mg twice a day     MVR-MOD-, ON ECHO,--APRIL 2017, --    Follow Up   Return in about 4 months (around 9/17/2023).  Patient was given instructions and counseling regarding her condition or for health maintenance advice. Please see specific information pulled into the AVS if appropriate.

## 2023-06-01 RX ORDER — POTASSIUM CHLORIDE 750 MG/1
TABLET, FILM COATED, EXTENDED RELEASE ORAL
Qty: 180 TABLET | Refills: 3 | Status: SHIPPED | OUTPATIENT
Start: 2023-06-01

## 2023-06-04 DIAGNOSIS — F41.9 ANXIETY: ICD-10-CM

## 2023-06-05 RX ORDER — LORAZEPAM 0.5 MG/1
0.5 TABLET ORAL NIGHTLY PRN
Qty: 30 TABLET | Refills: 2 | Status: SHIPPED | OUTPATIENT
Start: 2023-06-05

## 2023-06-09 ENCOUNTER — OFFICE VISIT (OUTPATIENT)
Dept: CARDIOLOGY | Facility: CLINIC | Age: 88
End: 2023-06-09
Payer: MEDICARE

## 2023-06-09 VITALS
HEIGHT: 66 IN | BODY MASS INDEX: 23.4 KG/M2 | DIASTOLIC BLOOD PRESSURE: 96 MMHG | HEART RATE: 83 BPM | SYSTOLIC BLOOD PRESSURE: 164 MMHG | WEIGHT: 145.6 LBS

## 2023-06-09 DIAGNOSIS — I10 ESSENTIAL HYPERTENSION: ICD-10-CM

## 2023-06-09 DIAGNOSIS — R42 DIZZINESS: ICD-10-CM

## 2023-06-09 DIAGNOSIS — I48.21 PERMANENT ATRIAL FIBRILLATION: Primary | ICD-10-CM

## 2023-06-09 DIAGNOSIS — E78.2 MIXED HYPERLIPIDEMIA: ICD-10-CM

## 2023-06-09 PROCEDURE — 99214 OFFICE O/P EST MOD 30 MIN: CPT | Performed by: INTERNAL MEDICINE

## 2023-06-09 NOTE — PROGRESS NOTES
Chief Complaint  Atrial Fibrillation    Subjective      Patient returns clinic for follow-up on atrial fibrillation and hypertension.  She has occasional palpitations.  They are sporadic.  They are self-limited.  She has occasional dizziness.  She denies presyncope or syncope.  However, recently she had a ground-level fall which she attributes to feeling weak in her legs.  She has no chest pain per se.  She has no significant dyspnea or other complaints.    Past Medical History:   Diagnosis Date   • Abdominal pain, generalized    • Anemia    • Aortic regurgitation    • Aortic stenosis    • Cardiomegaly    • Chronic atrial fibrillation 01/01/2019    Atrial fibrillation converted to sinus through cardioversion (March 2019   • Chronic fatigue    • Colon cancer    • Diarrhea    • Disease of tricuspid valve    • Diverticulosis of colon    • DVT (deep venous thrombosis)     LEFT LEG GREATER THAN 5 YRS AGO   • Dysphagia    • Essential (primary) hypertension    • Hiatal hernia    • Insomnia, unspecified    • LVH (left ventricular hypertrophy)    • Major depressive disorder, single episode    • Mitral regurgitation    • Mitral valve insufficiency and aortic valve insufficiency    • Osteopenia    • Pain in joint, pelvic region and thigh    • TR (tricuspid regurgitation)    • UTI (urinary tract infection)     antibx to be completed prior to procedure. ABIOLA Carlos RN ([facility]) notified.         Current Outpatient Medications:   •  acetaminophen (TYLENOL) 325 MG tablet, Take 1 tablet by mouth Every 4 (Four) Hours As Needed for Fever (Temperature Greater Than 101F)., Disp: , Rfl:   •  apixaban (ELIQUIS) 2.5 MG tablet tablet, Take 1 tablet by mouth 2 (Two) Times a Day., Disp: 180 tablet, Rfl: 1  •  ascorbic acid (VITAMIN C) 500 MG tablet, Take 1 tablet by mouth Daily., Disp: , Rfl:   •  AZO-CRANBERRY PO, Take  by mouth., Disp: , Rfl:   •  colestipol (COLESTID) 1 g tablet, Take 1 tablet by mouth Daily., Disp: 90 tablet, Rfl: 3  •   furosemide (LASIX) 20 MG tablet, Take 1 tablet by mouth 2 (Two) Times a Day., Disp: 180 tablet, Rfl: 1  •  Lactobacillus (Florajen Acidophilus) capsule, Take 1 each by mouth Daily., Disp: 90 capsule, Rfl: 3  •  latanoprost (XALATAN) 0.005 % ophthalmic solution, Administer 1 drop to both eyes Daily., Disp: , Rfl:   •  LORazepam (ATIVAN) 0.5 MG tablet, TAKE 1 TABLET BY MOUTH AT NIGHT AS NEEDED FOR ANXIETY. (Patient taking differently: Take 5 tablets by mouth At Night As Needed for Anxiety.), Disp: 30 tablet, Rfl: 2  •  metoprolol succinate XL (TOPROL-XL) 100 MG 24 hr tablet, TAKE 1 TABLET BY MOUTH TWICE A DAY, Disp: 180 tablet, Rfl: 3  •  potassium chloride 10 MEQ CR tablet, TAKE 1 TABLET BY MOUTH TWICE A DAY, Disp: 180 tablet, Rfl: 3  •  Probiotic Product (Florajen3) capsule, TAKE 1 CAPSULE BY MOUTH EVERY DAY, Disp: 90 capsule, Rfl: 1    Medications Discontinued During This Encounter   Medication Reason   • aluminum-magnesium hydroxide-simethicone (MAALOX MAX) 400-400-40 MG/5ML suspension *Therapy completed   • Diclofenac Sodium (Voltaren) 1 % gel gel *Therapy completed   • Eliquis 5 MG tablet tablet *Therapy completed   • escitalopram (Lexapro) 10 MG tablet *Therapy completed   • escitalopram (Lexapro) 5 MG tablet Side effects   • magnesium oxide (MAG-OX) 400 tablet tablet *Therapy completed   • pantoprazole (PROTONIX) 20 MG EC tablet *Therapy completed     Allergies   Allergen Reactions   • Citalopram Unknown - High Severity   • Clonazepam Unknown - High Severity   • Levofloxacin Nausea And Vomiting   • Lisinopril Unknown - High Severity   • Macrobid [Nitrofurantoin Macrocrystal] Rash   • Melatonin Unknown - High Severity        Social History     Tobacco Use   • Smoking status: Never   • Smokeless tobacco: Never   Vaping Use   • Vaping Use: Former   Substance Use Topics   • Alcohol use: Not Currently   • Drug use: Never       Family History   Problem Relation Age of Onset   • Malig Hyperthermia Neg Hx      "    Objective     /96   Pulse 83   Ht 167.6 cm (65.98\")   Wt 66 kg (145 lb 9.6 oz)   BMI 23.51 kg/m²       Physical Exam    General Appearance:   · no acute distress  · Alert and oriented x3  HENT:   · lips not cyanotic  · Atraumatic  Neck:  · No jvd   · supple  Respiratory:  · no respiratory distress  · normal breath sounds  · no rales  Cardiovascular:  · no S3, no S4   · no murmur  · no rub  Extremities  · No cyanosis  · lower extremity edema: none    Skin:   · warm, dry  · No rashes      Result Review :     proBNP   Date Value Ref Range Status   05/15/2023 3,067.0 (H) 0.0 - 1,800.0 pg/mL Final     CMP        1/10/2023    15:24 2/16/2023    09:31 5/15/2023    09:52   CMP   Glucose 87  100  94    BUN 20  18  18    Creatinine 1.05  0.81  0.84    EGFR 50.3  68.6  65.7    Sodium 143  142  146    Potassium 3.8  3.7  4.5    Chloride 103  102  108    Calcium 9.6  9.6  9.5    Total Protein 7.6  7.6  7.3    Albumin 4.0  4.2  4.2    Globulin 3.6  3.4  3.1    Total Bilirubin 0.9  1.0  0.8    Alkaline Phosphatase 89  93  77    AST (SGOT) 20  17  22    ALT (SGPT) 10  6  6    Albumin/Globulin Ratio 1.1  1.2  1.4    BUN/Creatinine Ratio 19.0  22.2  21.4    Anion Gap 11.1  13.1  11.1      CBC w/diff        1/10/2023    15:24 2/16/2023    09:31 5/15/2023    09:52   CBC w/Diff   WBC 7.47  7.74  7.37    RBC 4.25  4.27  3.95    Hemoglobin 13.4  13.7  12.9    Hematocrit 38.5  41.3  38.0    MCV 90.6  96.7  96.2    MCH 31.5  32.1  32.7    MCHC 34.8  33.2  33.9    RDW 12.9  13.3  12.2    Platelets 239  244  241    Neutrophil Rel % 58.7  61.8  60.5    Immature Granulocyte Rel % 0.4  0.1  0.3    Lymphocyte Rel % 32.9  28.8  29.6    Monocyte Rel % 6.7  7.2  7.5    Eosinophil Rel % 0.8  1.7  1.6    Basophil Rel % 0.5  0.4  0.5       Lab Results   Component Value Date    TSH 2.470 01/10/2023      Lab Results   Component Value Date    FREET4 1.13 01/10/2023      No results found for: DDIMERQUANT  Magnesium   Date Value Ref Range " Status   01/10/2023 2.0 1.7 - 2.3 mg/dL Final      No results found for: DIGOXIN   No results found for: TROPONINT          Lab Results   Component Value Date    POCTROP 0.01 05/20/2022                      Diagnoses and all orders for this visit:    1. Permanent atrial fibrillation (Primary)  -     Cancel: Holter Monitor - 72 Hour Up To 15 Days; Future  -     CBC & Differential; Future  -     Comprehensive Metabolic Panel; Future  -     Holter Monitor - 48 Hour; Future  -     Adult Transthoracic Echo Complete W/ Cont if Necessary Per Protocol; Future    2. Essential hypertension    3. Mixed hyperlipidemia    4. Dizziness  -     Holter Monitor - 48 Hour; Future  -     Adult Transthoracic Echo Complete W/ Cont if Necessary Per Protocol; Future        Assessment:    -Permanent atrial fibrillation: She has been having occasional palpitations and dizziness as described in HPI.  Her exam today is benign.  However, she does have irregular heart rhythm with normal rate consistent with her underlying A-fib.  I will check a 48-hour Holter monitor to determine ventricular rate and to rule out bradycardia.  Echo will be checked for LVEF and valvular function.  Continue current medical therapy including Eliquis and metoprolol.    -Hypertension: Her blood pressure is elevated in clinic.  She has labile blood pressure.  She was advised to check her blood pressure at home and to bring her readings for us for review and medications adjustment if needed.    -Mixed dyslipidemia: Diet controlled.      Follow Up     Return for Return to clinic after diagnostic testing, With Sheyla QUIROZ.        Patient was given instructions and counseling regarding her condition or for health maintenance advice. Please see specific information pulled into the AVS if appropriate.

## 2023-06-12 ENCOUNTER — LAB (OUTPATIENT)
Dept: LAB | Facility: HOSPITAL | Age: 88
End: 2023-06-12
Payer: MEDICARE

## 2023-06-12 DIAGNOSIS — I48.21 PERMANENT ATRIAL FIBRILLATION: ICD-10-CM

## 2023-06-12 LAB
ALBUMIN SERPL-MCNC: 4.5 G/DL (ref 3.5–5.2)
ALBUMIN/GLOB SERPL: 1.4 G/DL
ALP SERPL-CCNC: 93 U/L (ref 39–117)
ALT SERPL W P-5'-P-CCNC: 11 U/L (ref 1–33)
ANION GAP SERPL CALCULATED.3IONS-SCNC: 9.8 MMOL/L (ref 5–15)
AST SERPL-CCNC: 24 U/L (ref 1–32)
BASOPHILS # BLD AUTO: 0.04 10*3/MM3 (ref 0–0.2)
BASOPHILS NFR BLD AUTO: 0.5 % (ref 0–1.5)
BILIRUB SERPL-MCNC: 1.2 MG/DL (ref 0–1.2)
BUN SERPL-MCNC: 15 MG/DL (ref 8–23)
BUN/CREAT SERPL: 19.7 (ref 7–25)
CALCIUM SPEC-SCNC: 9.7 MG/DL (ref 8.2–9.6)
CHLORIDE SERPL-SCNC: 105 MMOL/L (ref 98–107)
CO2 SERPL-SCNC: 27.2 MMOL/L (ref 22–29)
CREAT SERPL-MCNC: 0.76 MG/DL (ref 0.57–1)
DEPRECATED RDW RBC AUTO: 44.3 FL (ref 37–54)
EGFRCR SERPLBLD CKD-EPI 2021: 74.1 ML/MIN/1.73
EOSINOPHIL # BLD AUTO: 0.1 10*3/MM3 (ref 0–0.4)
EOSINOPHIL NFR BLD AUTO: 1.2 % (ref 0.3–6.2)
ERYTHROCYTE [DISTWIDTH] IN BLOOD BY AUTOMATED COUNT: 12.7 % (ref 12.3–15.4)
GLOBULIN UR ELPH-MCNC: 3.2 GM/DL
GLUCOSE SERPL-MCNC: 93 MG/DL (ref 65–99)
HCT VFR BLD AUTO: 40.5 % (ref 34–46.6)
HGB BLD-MCNC: 13.7 G/DL (ref 12–15.9)
IMM GRANULOCYTES # BLD AUTO: 0.03 10*3/MM3 (ref 0–0.05)
IMM GRANULOCYTES NFR BLD AUTO: 0.4 % (ref 0–0.5)
LYMPHOCYTES # BLD AUTO: 2.04 10*3/MM3 (ref 0.7–3.1)
LYMPHOCYTES NFR BLD AUTO: 25.3 % (ref 19.6–45.3)
MCH RBC QN AUTO: 32.9 PG (ref 26.6–33)
MCHC RBC AUTO-ENTMCNC: 33.8 G/DL (ref 31.5–35.7)
MCV RBC AUTO: 97.1 FL (ref 79–97)
MONOCYTES # BLD AUTO: 0.52 10*3/MM3 (ref 0.1–0.9)
MONOCYTES NFR BLD AUTO: 6.5 % (ref 5–12)
NEUTROPHILS NFR BLD AUTO: 5.33 10*3/MM3 (ref 1.7–7)
NEUTROPHILS NFR BLD AUTO: 66.1 % (ref 42.7–76)
NRBC BLD AUTO-RTO: 0 /100 WBC (ref 0–0.2)
PLATELET # BLD AUTO: 266 10*3/MM3 (ref 140–450)
PMV BLD AUTO: 12.1 FL (ref 6–12)
POTASSIUM SERPL-SCNC: 3.8 MMOL/L (ref 3.5–5.2)
PROT SERPL-MCNC: 7.7 G/DL (ref 6–8.5)
RBC # BLD AUTO: 4.17 10*6/MM3 (ref 3.77–5.28)
SODIUM SERPL-SCNC: 142 MMOL/L (ref 136–145)
WBC NRBC COR # BLD: 8.06 10*3/MM3 (ref 3.4–10.8)

## 2023-06-12 PROCEDURE — 36415 COLL VENOUS BLD VENIPUNCTURE: CPT

## 2023-06-12 PROCEDURE — 85025 COMPLETE CBC W/AUTO DIFF WBC: CPT

## 2023-06-12 PROCEDURE — 80053 COMPREHEN METABOLIC PANEL: CPT

## 2023-06-13 ENCOUNTER — OFFICE VISIT (OUTPATIENT)
Dept: INTERNAL MEDICINE | Facility: CLINIC | Age: 88
End: 2023-06-13
Payer: MEDICARE

## 2023-06-13 VITALS
TEMPERATURE: 98.2 F | HEIGHT: 66 IN | BODY MASS INDEX: 22.79 KG/M2 | OXYGEN SATURATION: 96 % | SYSTOLIC BLOOD PRESSURE: 167 MMHG | WEIGHT: 141.8 LBS | HEART RATE: 100 BPM | DIASTOLIC BLOOD PRESSURE: 93 MMHG

## 2023-06-13 DIAGNOSIS — I10 HYPERTENSION, ESSENTIAL: ICD-10-CM

## 2023-06-13 DIAGNOSIS — R42 POSTURAL DIZZINESS WITH PRESYNCOPE: Primary | ICD-10-CM

## 2023-06-13 DIAGNOSIS — I50.32 CHF (CONGESTIVE HEART FAILURE), NYHA CLASS I, CHRONIC, DIASTOLIC: ICD-10-CM

## 2023-06-13 DIAGNOSIS — I48.20 ATRIAL FIBRILLATION, CHRONIC: ICD-10-CM

## 2023-06-13 DIAGNOSIS — R55 POSTURAL DIZZINESS WITH PRESYNCOPE: Primary | ICD-10-CM

## 2023-06-13 NOTE — PROGRESS NOTES
"CHIEF COMPLAINT/ HPI:  Fall (Patient had a fall about 2 weeks ago and is needing to be checked out)    She injured her front chest and breast area, bruising, tender and sore from hitting the cabinet she thinks,--legs were whobbly, dizzy  She was off balance did not feel right weak and wobbly,    She thought it could be the medications that we had recently increased,          Objective   Vital Signs  Vitals:    06/13/23 1512 06/13/23 1513   BP: (!) 180/110 167/93   Pulse: 100    Temp: 98.2 °F (36.8 °C)    SpO2: 96%    Weight: 64.3 kg (141 lb 12.8 oz)    Height: 167.6 cm (65.98\")       Body mass index is 22.9 kg/m².  Review of Systems fall, syncope, ?   Physical Exam  alert and o x 3 , anxious, cardiac exam irregular rhythm tachycardic, lower extremities venous insufficiency changes trace edema, lungs are clear anteriorly she is alert and oriented she is tender to palpation on the right lateral chest wall area no bruising is noted in that area on the right lateral area,  Result Review :   Lab Results   Component Value Date    PROBNP 3,067.0 (H) 05/15/2023    PROBNP 1,762.0 01/10/2023    PROBNP 3,443.0 (H) 09/09/2022    BNP 1385 12/05/2020    BNP 4183 (H) 10/12/2020    BNP 5163 (H) 09/09/2020     CMP          2/16/2023    09:31 5/15/2023    09:52 6/12/2023    09:15   CMP   Glucose 100  94  93    BUN 18  18  15    Creatinine 0.81  0.84  0.76    EGFR 68.6  65.7  74.1    Sodium 142  146  142    Potassium 3.7  4.5  3.8    Chloride 102  108  105    Calcium 9.6  9.5  9.7    Total Protein 7.6  7.3  7.7    Albumin 4.2  4.2  4.5    Globulin 3.4  3.1  3.2    Total Bilirubin 1.0  0.8  1.2    Alkaline Phosphatase 93  77  93    AST (SGOT) 17  22  24    ALT (SGPT) 6  6  11    Albumin/Globulin Ratio 1.2  1.4  1.4    BUN/Creatinine Ratio 22.2  21.4  19.7    Anion Gap 13.1  11.1  9.8      CBC w/diff          2/16/2023    09:31 5/15/2023    09:52 6/12/2023    09:15   CBC w/Diff   WBC 7.74  7.37  8.06    RBC 4.27  3.95  4.17  "   Hemoglobin 13.7  12.9  13.7    Hematocrit 41.3  38.0  40.5    MCV 96.7  96.2  97.1    MCH 32.1  32.7  32.9    MCHC 33.2  33.9  33.8    RDW 13.3  12.2  12.7    Platelets 244  241  266    Neutrophil Rel % 61.8  60.5  66.1    Immature Granulocyte Rel % 0.1  0.3  0.4    Lymphocyte Rel % 28.8  29.6  25.3    Monocyte Rel % 7.2  7.5  6.5    Eosinophil Rel % 1.7  1.6  1.2    Basophil Rel % 0.4  0.5  0.5          Lab Results   Component Value Date    TSH 2.470 01/10/2023    TSH 1.850 03/18/2022    TSH 3.710 03/14/2022      Lab Results   Component Value Date    FREET4 1.13 01/10/2023    FREET4 1.42 03/14/2022    FREET4 1.02 07/15/2021                          Visit Diagnoses:    ICD-10-CM ICD-9-CM   1. Postural dizziness with presyncope  R42 780.4    R55 780.2   2. Hypertension, essential  I10 401.9   3. Atrial fibrillation, chronic  I48.20 427.31   4. CHF (congestive heart failure), NYHA class I, chronic, diastolic  I50.32 428.32       Assessment and Plan   Diagnoses and all orders for this visit:    1. Postural dizziness with presyncope (Primary)    2. Hypertension, essential    3. Atrial fibrillation, chronic    4. CHF (congestive heart failure), NYHA class I, chronic, diastolic        Patient with presyncopal syncopal spell about 2 weeks ago, saw cardiology Friday is getting an echo and a Holter monitor placed, Elina 15, 2023,-patient's labs June 12, 2023 noted, excellent, discussed possibility of sick sinus syndrome low heart rate versus fast heart rate,    Right flank and chest wall trauma pain, June 2023    Hypertension, --- CONT METOPROLOL  MG BID --patient may need additional blood pressure medication such as valsartan discussed briefly June 13, 2023    Acute diastolic heart failure may 2022,   Lasix 20 mg bid,  potassium 10 mEq twice a day, --- BR NP  down to 1700 January 2023- continues magnesium, Florajen,, or previous CT scan as below may 17, 2022 showed bilateral pleural effusions and symptoms consistent  with congestive heart failure clinically better September 2022    Left leg swelling  VE LE,, -neg  May 2022     Depression- lost son to stomach ca, nov 2018, and daughter had AVR nov 2018, -currently on treatment as below,     ANXIETY  --- continues LORAZEPAM 0.5 HS AND 1/2= 0.25 MG QD PRN ----we had increased her Lexapro recently, she felt like it may be causing her to feel weak or wobbly so she stopped it and has restarted it a couple days ago at half dose    L NILO, MARCH 16, 2022, POST OP DISLOCATION MARCH 20, 2022 AND THEN TO [facility],,    Dysphagia, Status post barium swallow October 2020, showing narrowing at the distal esophagus, for EGD by Dr. Blount February February 25, 2021,--stopped PPIs-- September 2022    Colon cancer diagnosed June 2020 initially found to be Anemia, was referred to hematology oncology,--- sent to gastroenterology---Dr. Blount--FOUND COLON CA JUNE 2020, REFERRED TO DR QUISPE--subsequent exploratory laparotomy and partial colectomy with 14 out of 14 lymph nodes negative, -------patient had prolonged hospital course, subsequent sent to rehab, now home--HAD LOW NA , DEVELOPED C DIFF COLITIS, IN HOSP, FTT WITH WGT LOSS IN REHAB --GAIT DYS. AND GEN WEAKNESS --Slowly improving, current medications reviewed----Patient had delirium in the hospital also, improved---Patient has follow-up for a 5 mm lung nodule found on CT scan in the left lower lobe and surgical changes ----- CT scan in February 2021, no change mild to moderate cardiomegaly stable nodular density lung fields bilateral, CT scan May 17, 2022 showed stable small subcentimeter pulmonary nodules that have not changed over 1 year, new small moderate bilateral pleural effusions were noted    Status post colonoscopy October 2021 Dr. Quispe--- recent CT scan May 21, 2022 abdomen and pelvis shows no acute abdominal or pelvic abnormality colonic diverticulosis, blood pressure proximal colon resection noted small bilateral  pleural effusions cardiomegaly coronary disease moderate sized hiatal hernia and osteopenia and thoracodegenerative changes left hip prosthesis and previous cholecystectomy, -----CT scan of the chest May 17, 2022 shows stable small subcentimeter pulmonary nodules over the past year new small bilateral pleural effusions with adjacent compressive atelectasis consistent with congestive heart failure clinically improved    LUNG NODULE --Found June 2020, 5 millimeter left lower lobe--- on CT scan for abdominal pains---- FOUND PER HEM/ONC AND F/U CT scan of the chest February 4, 2021, showing stable pulmonary nodules, no change on CT scan May 2022,-- and CT abdomen and pelvis for follow-up of colon cancer,    Chronic atrial fibrillation---- MARCH 23, 2017, --continues  ELIQUIS 2.5 mg twice a day ,Metoprolol 100 mg twice a day,---Cardioverted April 2019 Dr. Jolly , NOW BACK IN [facility] MAY 2023     L HUMERAL FX DEC 2016, ORIF WITH BENSON --at [facility]    adverse reaction with rash to nitrofurantoin,, SEPT. 2016.,  Currently taking Azo tolerating well    EXTENSIVE LLE DVT 11/15, , Continues on Eliquis 2.5 mg twice a day     MVR-MOD-, ON ECHO,--APRIL 2017, --        Follow Up   No follow-ups on file.  Patient was given instructions and counseling regarding her condition or for health maintenance advice. Please see specific information pulled into the AVS if appropriate.

## 2023-08-08 ENCOUNTER — OFFICE VISIT (OUTPATIENT)
Dept: INTERNAL MEDICINE | Facility: CLINIC | Age: 88
End: 2023-08-08
Payer: MEDICARE

## 2023-08-08 ENCOUNTER — TELEPHONE (OUTPATIENT)
Dept: ONCOLOGY | Facility: HOSPITAL | Age: 88
End: 2023-08-08
Payer: MEDICARE

## 2023-08-08 VITALS
DIASTOLIC BLOOD PRESSURE: 88 MMHG | SYSTOLIC BLOOD PRESSURE: 138 MMHG | OXYGEN SATURATION: 98 % | TEMPERATURE: 98 F | HEIGHT: 66 IN | WEIGHT: 144.2 LBS | BODY MASS INDEX: 23.18 KG/M2 | HEART RATE: 104 BPM

## 2023-08-08 DIAGNOSIS — R30.0 DYSURIA: ICD-10-CM

## 2023-08-08 DIAGNOSIS — R13.19 ESOPHAGEAL DYSPHAGIA: Primary | ICD-10-CM

## 2023-08-08 DIAGNOSIS — N30.01 ACUTE CYSTITIS WITH HEMATURIA: ICD-10-CM

## 2023-08-08 PROCEDURE — 87088 URINE BACTERIA CULTURE: CPT | Performed by: INTERNAL MEDICINE

## 2023-08-08 PROCEDURE — 99214 OFFICE O/P EST MOD 30 MIN: CPT | Performed by: INTERNAL MEDICINE

## 2023-08-08 PROCEDURE — 87186 SC STD MICRODIL/AGAR DIL: CPT | Performed by: INTERNAL MEDICINE

## 2023-08-08 PROCEDURE — 81003 URINALYSIS AUTO W/O SCOPE: CPT | Performed by: INTERNAL MEDICINE

## 2023-08-08 PROCEDURE — 87086 URINE CULTURE/COLONY COUNT: CPT | Performed by: INTERNAL MEDICINE

## 2023-08-08 RX ORDER — PHENAZOPYRIDINE HYDROCHLORIDE 100 MG/1
100 TABLET, FILM COATED ORAL 3 TIMES DAILY PRN
Qty: 6 TABLET | Refills: 0 | Status: SHIPPED | OUTPATIENT
Start: 2023-08-08

## 2023-08-08 RX ORDER — CIPROFLOXACIN 500 MG/1
500 TABLET, FILM COATED ORAL 2 TIMES DAILY
Qty: 14 TABLET | Refills: 0 | Status: SHIPPED | OUTPATIENT
Start: 2023-08-08

## 2023-08-08 RX ORDER — DILTIAZEM HYDROCHLORIDE 120 MG/1
1 CAPSULE, COATED, EXTENDED RELEASE ORAL EVERY 24 HOURS
COMMUNITY

## 2023-08-08 RX ORDER — FAMOTIDINE 20 MG/1
20 TABLET, FILM COATED ORAL EVERY 12 HOURS SCHEDULED
COMMUNITY

## 2023-08-08 RX ORDER — FLUOXETINE 10 MG/1
10 CAPSULE ORAL EVERY 24 HOURS
COMMUNITY

## 2023-08-08 NOTE — TELEPHONE ENCOUNTER
Caller: STEPHANIE BENITES    Relationship: Emergency Contact    Best call back number: [phone]     What is the best time to reach you: ASAP    Who are you requesting to speak with (clinical staff, provider,  specific staff member): SCHEDULING    What was the call regarding: PT HAS LAB 8/15 BUT CAN'T COME THAT DAY, CAN SHE GET LAB DONE AT A DIFFERENT LOCATION OR A DIFFERENT DAY?  PLEASE CALL STEPHANIE TO ADVISE.  WANTS TO KEEP 8/17 WITH DR SORTO AS SCHEDULED.

## 2023-08-08 NOTE — PROGRESS NOTES
CHIEF COMPLAINT  Bing Cruz presents to [facility] for follow-up of Cough (Pt stated she started coughing up thick, yellowish, phlegm after eating breakfast and thought she was going to choke due to it being excessive. Not associated with any other symptoms.  ) and Urinary Tract Infection (Pt stated she started having burning sensation during urination a couple days ago, and frequently gets UTI. ).    HPI    Here with daughter    89-year-old pleasant patient of Dr. Kramer's with complaint of cough productive of thick yellow sputum after eating breakfast- (toast with peanut butter) felt she was going to choke--and with dysuria and urinary frequency  UA at clinic with 3+ leukocytes nitrite negative 3+ blood    Has had problems eating bread-solid foods-    Past medical history significant for chronic atrial fibrillation, CHF, hypertension and postural dizziness with [id number] pending.      Current Outpatient Medications:     acetaminophen (TYLENOL) 325 MG tablet, Take 1 tablet by mouth Every 4 (Four) Hours As Needed for Fever (Temperature Greater Than 101F)., Disp: , Rfl:     apixaban (ELIQUIS) 2.5 MG tablet tablet, Take 1 tablet by mouth 2 (Two) Times a Day., Disp: 180 tablet, Rfl: 3    ascorbic acid (VITAMIN C) 500 MG tablet, Take 1 tablet by mouth Daily., Disp: , Rfl:     AZO-CRANBERRY PO, Take  by mouth., Disp: , Rfl:     ciprofloxacin (Cipro) 500 MG tablet, Take 1 tablet by mouth 2 (Two) Times a Day., Disp: 14 tablet, Rfl: 0    colestipol (COLESTID) 1 g tablet, Take 2 tablets by mouth Daily., Disp: 180 tablet, Rfl: 3    dilTIAZem CD (CARDIZEM CD) 120 MG 24 hr capsule, 1 capsule Daily., Disp: , Rfl:     famotidine (PEPCID) 20 MG tablet, Take 1 tablet by mouth Every 12 (Twelve) Hours., Disp: , Rfl:     FLUoxetine (PROzac) 10 MG capsule, Take 1 capsule by mouth Daily., Disp: , Rfl:     furosemide (LASIX) 20 MG tablet, Take 1 tablet by mouth 2 (Two) Times a Day.,  "Disp: 180 tablet, Rfl: 3    latanoprost (XALATAN) 0.005 % ophthalmic solution, Administer 1 drop to both eyes Daily., Disp: , Rfl:     LORazepam (ATIVAN) 0.5 MG tablet, TAKE 1 TABLET BY MOUTH AT NIGHT AS NEEDED FOR ANXIETY. (Patient taking differently: Take 5 tablets by mouth At Night As Needed for Anxiety.), Disp: 30 tablet, Rfl: 2    losartan (Cozaar) 25 MG tablet, Take 1 tablet by mouth Daily., Disp: 30 tablet, Rfl: 3    metoprolol succinate XL (TOPROL-XL) 100 MG 24 hr tablet, Take 1 tablet by mouth 2 (Two) Times a Day., Disp: 180 tablet, Rfl: 3    phenazopyridine (Pyridium) 100 MG tablet, Take 1 tablet by mouth 3 (Three) Times a Day As Needed for Bladder Spasms., Disp: 6 tablet, Rfl: 0    potassium chloride 10 MEQ CR tablet, Take 1 tablet by mouth 2 (Two) Times a Day., Disp: 180 tablet, Rfl: 3    Probiotic Product (Florajen3) capsule, TAKE 1 CAPSULE BY MOUTH EVERY DAY, Disp: 90 capsule, Rfl: 1    timolol (TIMOPTIC) 0.5 % ophthalmic solution, INSTILL 1 DROP IN BOTH EYES IN THE MORNING, Disp: , Rfl:    PFSH reviewed.      OBJECTIVE  Vital Signs  Vitals:    08/08/23 1114   BP: 138/88   BP Location: Left arm   Patient Position: Sitting   Cuff Size: Adult   Pulse: 104   Temp: 98 øF (36.7 øC)   TempSrc: Tympanic   SpO2: 98%   Weight: 65.4 kg (144 lb 3.2 oz)   Height: 167.6 cm (65.98\")      Body mass index is 23.29 kg/mý.    Physical Exam  Vitals and nursing note reviewed.   Constitutional:       Appearance: Normal appearance.   HENT:      Head: Normocephalic and atraumatic.      Right Ear: Tympanic membrane normal.      Left Ear: Tympanic membrane normal.      Mouth/Throat:      Pharynx: No oropharyngeal exudate or posterior oropharyngeal erythema.   Cardiovascular:      Rate and Rhythm: Normal rate and regular rhythm.   Pulmonary:      Effort: Pulmonary effort is normal.      Breath sounds: Normal breath sounds.   Abdominal:      Palpations: Abdomen is soft.   Musculoskeletal:      Cervical back: Normal range of " motion and neck supple.   Neurological:      General: No focal deficit present.      Mental Status: She is alert and oriented to person, place, and time.        RESULTS REVIEW  Lab Results   Component Value Date    PROBNP 3,067.0 (H) 05/15/2023    PROBNP 1,762.0 01/10/2023    PROBNP 3,443.0 (H) 09/09/2022    BNP 1385 12/05/2020    BNP 4183 (H) 10/12/2020    BNP 5163 (H) 09/09/2020     CMP          2/16/2023    09:31 5/15/2023    09:52 6/12/2023    09:15   CMP   Glucose 100  94  93    BUN 18  18  15    Creatinine 0.81  0.84  0.76    EGFR 68.6  65.7  74.1    Sodium 142  146  142    Potassium 3.7  4.5  3.8    Chloride 102  108  105    Calcium 9.6  9.5  9.7    Total Protein 7.6  7.3  7.7    Albumin 4.2  4.2  4.5    Globulin 3.4  3.1  3.2    Total Bilirubin 1.0  0.8  1.2    Alkaline Phosphatase 93  77  93    AST (SGOT) 17  22  24    ALT (SGPT) 6  6  11    Albumin/Globulin Ratio 1.2  1.4  1.4    BUN/Creatinine Ratio 22.2  21.4  19.7    Anion Gap 13.1  11.1  9.8      CBC w/diff          2/16/2023    09:31 5/15/2023    09:52 6/12/2023    09:15   CBC w/Diff   WBC 7.74  7.37  8.06    RBC 4.27  3.95  4.17    Hemoglobin 13.7  12.9  13.7    Hematocrit 41.3  38.0  40.5    MCV 96.7  96.2  97.1    MCH 32.1  32.7  32.9    MCHC 33.2  33.9  33.8    RDW 13.3  12.2  12.7    Platelets 244  241  266    Neutrophil Rel % 61.8  60.5  66.1    Immature Granulocyte Rel % 0.1  0.3  0.4    Lymphocyte Rel % 28.8  29.6  25.3    Monocyte Rel % 7.2  7.5  6.5    Eosinophil Rel % 1.7  1.6  1.2    Basophil Rel % 0.4  0.5  0.5          Lab Results   Component Value Date    TSH 2.470 01/10/2023    TSH 1.850 03/18/2022    TSH 3.710 03/14/2022      Lab Results   Component Value Date    FREET4 1.13 01/10/2023    FREET4 1.42 03/14/2022    FREET4 1.02 07/15/2021         Lab Results   Component Value Date    [id number] 270 03/18/2022    QOJK23QR 14.0 (L) 03/23/2022    MG 2.0 01/10/2023        No Images in the past 120 days found..             ASSESSMENT &  PLAN  Diagnoses and all orders for this visit:    1. Esophageal dysphagia (Primary)  Comments:  do Barium swallow-had this done a few hours ago showing soft diet  Assessment & Plan:   Status post barium swallow October 2020, showing narrowing at the distal esophagus, for EGD by Dr. Blount February February 25, 2021,--stopped PPIs-- September 2022-physical exam with no erythema in the posterior pharynx ears no infection.     Plan-schedule repeat barium swallow pending results may need to be seen by Dr. Blount again  Try to follow a soft diet at this time to slowly.    Orders:  -     FL Video Swallow With Speech Single Contrast; Future  -     FL Video Swallow With Speech Single Contrast    2. Acute cystitis with hematuria  Comments:  use cipro 500 mg one BID x 7 days--and pyridium =push fluids-send urine for culture  Orders:  -     ciprofloxacin (Cipro) 500 MG tablet; Take 1 tablet by mouth 2 (Two) Times a Day.  Dispense: 14 tablet; Refill: 0  -     phenazopyridine (Pyridium) 100 MG tablet; Take 1 tablet by mouth 3 (Three) Times a Day As Needed for Bladder Spasms.  Dispense: 6 tablet; Refill: 0    3. Dysuria  -     POC Urinalysis Dipstick, Automated  -     phenazopyridine (Pyridium) 100 MG tablet; Take 1 tablet by mouth 3 (Three) Times a Day As Needed for Bladder Spasms.  Dispense: 6 tablet; Refill: 0  -     Urine Culture - Urine, Urine, Clean Catch; Future  -     Urine Culture - Urine, Urine, Clean Catch         BMI is within normal parameters. No other follow-up for BMI required.    Addendum August 10, 2023  Urine culture grew E. coli resistant to Cipro but sensitive to nitrofurantoin(she is allergic to this) but sensitive to the cephalosporins.  Will give Keflex.    There are no Patient Instructions on file for this visit.     FOLLOW UP  Return if symptoms worsen or fail to improve, for Next scheduled follow up.    Patient was given instructions and counseling regarding her condition or for health maintenance  advice. Please see specific information pulled into the AVS if appropriate.

## 2023-08-08 NOTE — ASSESSMENT & PLAN NOTE
Status post barium swallow October 2020, showing narrowing at the distal esophagus, for EGD by Dr. Blount February February 25, 2021,--stopped PPIs-- September 2022-physical exam with no erythema in the posterior pharynx ears no infection.     Plan-schedule repeat barium swallow pending results may need to be seen by Dr. Blount again  Try to follow a soft diet at this time to slowly.

## 2023-08-10 LAB — BACTERIA SPEC AEROBE CULT: ABNORMAL

## 2023-08-10 RX ORDER — CEPHALEXIN 500 MG/1
500 CAPSULE ORAL 2 TIMES DAILY
Qty: 14 CAPSULE | Refills: 0 | Status: SHIPPED | OUTPATIENT
Start: 2023-08-10

## 2023-08-14 ENCOUNTER — LAB (OUTPATIENT)
Dept: LAB | Facility: HOSPITAL | Age: 88
End: 2023-08-14
Payer: MEDICARE

## 2023-08-14 DIAGNOSIS — C18.9 MALIGNANT NEOPLASM OF COLON, UNSPECIFIED PART OF COLON: ICD-10-CM

## 2023-08-14 DIAGNOSIS — D50.9 IRON DEFICIENCY ANEMIA, UNSPECIFIED IRON DEFICIENCY ANEMIA TYPE: ICD-10-CM

## 2023-08-14 LAB
ALBUMIN SERPL-MCNC: 4 G/DL (ref 3.5–5.2)
ALBUMIN/GLOB SERPL: 1.2 G/DL
ALP SERPL-CCNC: 83 U/L (ref 39–117)
ALT SERPL W P-5'-P-CCNC: 6 U/L (ref 1–33)
ANION GAP SERPL CALCULATED.3IONS-SCNC: 8.1 MMOL/L (ref 5–15)
AST SERPL-CCNC: 17 U/L (ref 1–32)
BASOPHILS # BLD AUTO: 0.04 10*3/MM3 (ref 0–0.2)
BASOPHILS NFR BLD AUTO: 0.5 % (ref 0–1.5)
BILIRUB SERPL-MCNC: 0.8 MG/DL (ref 0–1.2)
BUN SERPL-MCNC: 18 MG/DL (ref 8–23)
BUN/CREAT SERPL: 22.2 (ref 7–25)
CALCIUM SPEC-SCNC: 9 MG/DL (ref 8.2–9.6)
CEA SERPL-MCNC: 2.18 NG/ML
CHLORIDE SERPL-SCNC: 103 MMOL/L (ref 98–107)
CO2 SERPL-SCNC: 28.9 MMOL/L (ref 22–29)
CREAT SERPL-MCNC: 0.81 MG/DL (ref 0.57–1)
DEPRECATED RDW RBC AUTO: 40.4 FL (ref 37–54)
EGFRCR SERPLBLD CKD-EPI 2021: 68.2 ML/MIN/1.73
EOSINOPHIL # BLD AUTO: 0.1 10*3/MM3 (ref 0–0.4)
EOSINOPHIL NFR BLD AUTO: 1.2 % (ref 0.3–6.2)
ERYTHROCYTE [DISTWIDTH] IN BLOOD BY AUTOMATED COUNT: 11.7 % (ref 12.3–15.4)
GLOBULIN UR ELPH-MCNC: 3.3 GM/DL
GLUCOSE SERPL-MCNC: 97 MG/DL (ref 65–99)
HCT VFR BLD AUTO: 37.8 % (ref 34–46.6)
HGB BLD-MCNC: 12.7 G/DL (ref 12–15.9)
IMM GRANULOCYTES # BLD AUTO: 0.03 10*3/MM3 (ref 0–0.05)
IMM GRANULOCYTES NFR BLD AUTO: 0.4 % (ref 0–0.5)
IRON 24H UR-MRATE: 71 MCG/DL (ref 37–145)
IRON SATN MFR SERPL: 21 % (ref 20–50)
LYMPHOCYTES # BLD AUTO: 2.22 10*3/MM3 (ref 0.7–3.1)
LYMPHOCYTES NFR BLD AUTO: 27.3 % (ref 19.6–45.3)
MCH RBC QN AUTO: 31.8 PG (ref 26.6–33)
MCHC RBC AUTO-ENTMCNC: 33.6 G/DL (ref 31.5–35.7)
MCV RBC AUTO: 94.5 FL (ref 79–97)
MONOCYTES # BLD AUTO: 0.43 10*3/MM3 (ref 0.1–0.9)
MONOCYTES NFR BLD AUTO: 5.3 % (ref 5–12)
NEUTROPHILS NFR BLD AUTO: 5.31 10*3/MM3 (ref 1.7–7)
NEUTROPHILS NFR BLD AUTO: 65.3 % (ref 42.7–76)
NRBC BLD AUTO-RTO: 0 /100 WBC (ref 0–0.2)
PLATELET # BLD AUTO: 253 10*3/MM3 (ref 140–450)
PMV BLD AUTO: 11.7 FL (ref 6–12)
POTASSIUM SERPL-SCNC: 3.7 MMOL/L (ref 3.5–5.2)
PROT SERPL-MCNC: 7.3 G/DL (ref 6–8.5)
RBC # BLD AUTO: 4 10*6/MM3 (ref 3.77–5.28)
SODIUM SERPL-SCNC: 140 MMOL/L (ref 136–145)
TIBC SERPL-MCNC: 338 MCG/DL (ref 298–536)
TRANSFERRIN SERPL-MCNC: 227 MG/DL (ref 200–360)
WBC NRBC COR # BLD: 8.13 10*3/MM3 (ref 3.4–10.8)

## 2023-08-14 PROCEDURE — 36415 COLL VENOUS BLD VENIPUNCTURE: CPT

## 2023-08-14 PROCEDURE — 82728 ASSAY OF FERRITIN: CPT

## 2023-08-14 PROCEDURE — 85025 COMPLETE CBC W/AUTO DIFF WBC: CPT

## 2023-08-14 PROCEDURE — 80053 COMPREHEN METABOLIC PANEL: CPT

## 2023-08-14 PROCEDURE — 83540 ASSAY OF IRON: CPT

## 2023-08-14 PROCEDURE — 82378 CARCINOEMBRYONIC ANTIGEN: CPT

## 2023-08-14 PROCEDURE — 84466 ASSAY OF TRANSFERRIN: CPT

## 2023-08-16 LAB — FERRITIN SERPL-MCNC: 84.1 NG/ML (ref 13–150)

## 2023-08-21 ENCOUNTER — APPOINTMENT (OUTPATIENT)
Dept: GENERAL RADIOLOGY | Facility: HOSPITAL | Age: 88
End: 2023-08-21
Payer: MEDICARE

## 2023-08-21 ENCOUNTER — HOSPITAL ENCOUNTER (EMERGENCY)
Facility: HOSPITAL | Age: 88
Discharge: HOME | End: 2023-08-21
Attending: EMERGENCY MEDICINE
Payer: MEDICARE

## 2023-08-21 ENCOUNTER — APPOINTMENT (OUTPATIENT)
Dept: CT IMAGING | Facility: HOSPITAL | Age: 88
End: 2023-08-21
Payer: MEDICARE

## 2023-08-21 VITALS
DIASTOLIC BLOOD PRESSURE: 110 MMHG | TEMPERATURE: 97.8 F | HEIGHT: 67 IN | BODY MASS INDEX: 24.01 KG/M2 | RESPIRATION RATE: 18 BRPM | WEIGHT: 153 LBS | OXYGEN SATURATION: 95 % | HEART RATE: 111 BPM | SYSTOLIC BLOOD PRESSURE: 160 MMHG

## 2023-08-21 DIAGNOSIS — S62.102A CLOSED FRACTURE OF LEFT WRIST, INITIAL ENCOUNTER: Primary | ICD-10-CM
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
## 2023-08-21 PROCEDURE — 25010000002 ONDANSETRON PER 1 MG: Performed by: EMERGENCY MEDICINE

## 2023-08-21 PROCEDURE — 96375 TX/PRO/DX INJ NEW DRUG ADDON: CPT

## 2023-08-21 PROCEDURE — 25010000002 MORPHINE PER 10 MG: Performed by: EMERGENCY MEDICINE

## 2023-08-21 PROCEDURE — 73100 X-RAY EXAM OF WRIST: CPT

## 2023-08-21 PROCEDURE — 25010000002 BUPIVACAINE PF 0.75 % SOLUTION: Performed by: EMERGENCY MEDICINE

## 2023-08-21 PROCEDURE — 96374 THER/PROPH/DIAG INJ IV PUSH: CPT

## 2023-08-21 PROCEDURE — 99284 EMERGENCY DEPT VISIT MOD MDM: CPT

## 2023-08-21 PROCEDURE — 0 LIDOCAINE 1 % SOLUTION: Performed by: EMERGENCY MEDICINE

## 2023-08-21 PROCEDURE — 70450 CT HEAD/BRAIN W/O DYE: CPT

## 2023-08-21 PROCEDURE — 25010000002 HYDROMORPHONE 1 MG/ML SOLUTION: Performed by: EMERGENCY MEDICINE

## 2023-08-21 PROCEDURE — 73090 X-RAY EXAM OF FOREARM: CPT

## 2023-08-21 RX ORDER — BUPIVACAINE HYDROCHLORIDE 7.5 MG/ML
5 INJECTION, SOLUTION EPIDURAL; RETROBULBAR ONCE
Status: COMPLETED | OUTPATIENT
Start: 2023-08-21 | End: 2023-08-21

## 2023-08-21 RX ORDER — ONDANSETRON 2 MG/ML
4 INJECTION INTRAMUSCULAR; INTRAVENOUS ONCE
Status: COMPLETED | OUTPATIENT
Start: 2023-08-21 | End: 2023-08-21

## 2023-08-21 RX ORDER — LIDOCAINE HYDROCHLORIDE 10 MG/ML
5 INJECTION, SOLUTION INFILTRATION; PERINEURAL ONCE
Status: COMPLETED | OUTPATIENT
Start: 2023-08-21 | End: 2023-08-21

## 2023-08-21 RX ORDER — MORPHINE SULFATE 2 MG/ML
2 INJECTION, SOLUTION INTRAMUSCULAR; INTRAVENOUS ONCE
Status: COMPLETED | OUTPATIENT
Start: 2023-08-21 | End: 2023-08-21

## 2023-08-21 RX ORDER — HYDROCODONE BITARTRATE AND ACETAMINOPHEN 5; 325 MG/1; MG/1
1 TABLET ORAL EVERY 6 HOURS PRN
Qty: 12 TABLET | Refills: 0 | Status: SHIPPED | OUTPATIENT
Start: 2023-08-21 | End: 2023-08-29 | Stop reason: SDUPTHER

## 2023-08-21 RX ADMIN — MORPHINE SULFATE 2 MG: 2 INJECTION, SOLUTION INTRAMUSCULAR; INTRAVENOUS at 17:40

## 2023-08-21 RX ADMIN — ONDANSETRON 4 MG: 2 INJECTION INTRAMUSCULAR; INTRAVENOUS at 17:38

## 2023-08-21 RX ADMIN — BUPIVACAINE HYDROCHLORIDE 37.5 MG: 7.5 INJECTION, SOLUTION EPIDURAL; RETROBULBAR at 19:11

## 2023-08-21 RX ADMIN — LIDOCAINE HYDROCHLORIDE 5 ML: 10 INJECTION, SOLUTION INFILTRATION; PERINEURAL at 19:10

## 2023-08-21 RX ADMIN — HYDROMORPHONE HYDROCHLORIDE 0.5 MG: 1 INJECTION, SOLUTION INTRAMUSCULAR; INTRAVENOUS; SUBCUTANEOUS at 19:10

## 2023-08-21 NOTE — DISCHARGE INSTRUCTIONS
Maintain splint at all times.  Use sling as needed for comfort.  Elevate extremity when possible.  Apply cool compresses.  Apply for 30 to 45 minutes 3-5 times per day as needed.  Take the hydrocodone for pain as needed.  Use sparingly.  Consider taking a stool softener to avoid constipation.  Otherwise use Tylenol and/or Motrin if possible for pain control.  Follow-up with Dr. Alegre on Wednesday.  Call tomorrow for an appointment time.  Return to the ER for any change or worsening or uncontrollable pain or any other concerns issues that may arise.

## 2023-08-21 NOTE — ED PROVIDER NOTES
Time: 4:48 PM EDT  Date of encounter:  8/21/2023  Independent Historian/Clinical History and Information was obtained by:   Patient and Family    History is limited by: N/A    Chief Complaint   Patient presents with    Arm Injury         History of Present Illness:  Patient is a 89 y.o. year old female who presents to the emergency department for evaluation of fall and arm injury.  Patient was cleaning and tripped and fell injuring her left wrist.  There is obvious deformity/swelling of her left wrist and distal forearm..  She is on a blood thinner.  Denies syncope, head injury, chest pain, palpitations, shortness of breath.  Denies numbness to the finger.     HPI    Patient Care Team  Primary Care Provider: Jairo Kramer MD    Past Medical History:     Allergies   Allergen Reactions    Citalopram Unknown - High Severity    Clonazepam Unknown - High Severity    Levofloxacin Nausea And Vomiting    Lisinopril Unknown - High Severity    Macrobid [Nitrofurantoin Macrocrystal] Rash    Melatonin Unknown - High Severity     Past Medical History:   Diagnosis Date    Abdominal pain, generalized     Anemia     Aortic regurgitation     Aortic stenosis     Cardiomegaly     Chronic atrial fibrillation 01/01/2019    Atrial fibrillation converted to sinus through cardioversion (March 2019    Chronic fatigue     Colon cancer     Diarrhea     Disease of tricuspid valve     Diverticulosis of colon     DVT (deep venous thrombosis)     LEFT LEG GREATER THAN 5 YRS AGO    Dysphagia     Essential (primary) hypertension     Hiatal hernia     Insomnia, unspecified     LVH (left ventricular hypertrophy)     Major depressive disorder, single episode     Mitral regurgitation     Mitral valve insufficiency and aortic valve insufficiency     Osteopenia     Pain in joint, pelvic region and thigh     TR (tricuspid regurgitation)     UTI (urinary tract infection)     antibx to be completed prior to procedure. ABIOLA Carlos RN ([facility])  notified.     Past Surgical History:   Procedure Laterality Date    ABDOMINAL HYSTERECTOMY      WITH BSO     ANKLE SURGERY      (L) ankle fracture    BLADDER REPAIR      BREAST BIOPSY      (L) breast biopsy    CARDIOVERSION  2019    CATARACT EXTRACTION      OU    CHOLECYSTECTOMY      OPEN    COLON SURGERY      STATES SHE HAD 12 INCHES OF COLON REMOVED IN JULY 2020 FOR COLON CANCER    COLONOSCOPY  2020    COLONOSCOPY N/A 10/29/2021    Procedure: COLONOSCOPY;  Surgeon: Edmar Back MD;  Location: [facility] ENDOSCOPY;  Service: General;  Laterality: N/A;  DIVERTICULOSIS/ANASTOMOSIS    FEMUR OPEN REDUCTION INTERNAL FIXATION Left 07/21/2021    Procedure: FEMORAL NECK OPEN REDUCTION INTERNAL FIXATION;  Surgeon: Bam Warner MD;  Location: [facility] MAIN OR;  Service: Orthopedics;  Laterality: Left;    HIP CLOSED REDUCTION Left 3/20/2022    Procedure: HIP CLOSED REDUCTION;  Surgeon: Harsha Gayle MD;  Location: [facility] MAIN OR;  Service: Orthopedics;  Laterality: Left;    INGUINAL HERNIA REPAIR Left 09/29/2011    TOTAL HIP ARTHROPLASTY Left 3/16/2022    Procedure: LEFT TOTAL HIP ARTHROPLASTY AND HARDWARE REMOVAL;  Surgeon: Bam Warner MD;  Location: [facility] MAIN OR;  Service: Orthopedics;  Laterality: Left;    UPPER GASTROINTESTINAL ENDOSCOPY      WITH DILATATION     Family History   Problem Relation Age of Onset    Malig Hyperthermia Neg Hx        Home Medications:  Prior to Admission medications    Medication Sig Start Date End Date Taking? Authorizing Provider   acetaminophen (TYLENOL) 325 MG tablet Take 1 tablet by mouth Every 4 (Four) Hours As Needed for Fever (Temperature Greater Than 101F). 3/18/22   Jairo Kramer MD   apixaban (ELIQUIS) 2.5 MG tablet tablet Take 1 tablet by mouth 2 (Two) Times a Day. 7/11/23   Sheyla Guillermo APRN   ascorbic acid (VITAMIN C) 500 MG tablet Take 1 tablet by mouth Daily.    Provider, MD Karina   AZO-CRANBERRY PO Take  by mouth.    Provider,  MD Karina   cephalexin (Keflex) 500 MG capsule Take 1 capsule by mouth 2 (Two) Times a Day. 8/10/23   Lora Rudd MD   ciprofloxacin (Cipro) 500 MG tablet Take 1 tablet by mouth 2 (Two) Times a Day. 8/8/23   Lora Rudd MD   colestipol (COLESTID) 1 g tablet Take 2 tablets by mouth Daily. 7/20/23   Leslie Cody APRN   dilTIAZem CD (CARDIZEM CD) 120 MG 24 hr capsule 1 capsule Daily.    ProviderKarina MD   famotidine (PEPCID) 20 MG tablet Take 1 tablet by mouth Every 12 (Twelve) Hours.    Karina Browne MD   FLUoxetine (PROzac) 10 MG capsule Take 1 capsule by mouth Daily.    Karina Browne MD   furosemide (LASIX) 20 MG tablet Take 1 tablet by mouth 2 (Two) Times a Day. 7/11/23   Sheyla Guillermo APRN   latanoprost (XALATAN) 0.005 % ophthalmic solution Administer 1 drop to both eyes Daily. 11/11/21   Karina Browne MD   LORazepam (ATIVAN) 0.5 MG tablet TAKE 1 TABLET BY MOUTH AT NIGHT AS NEEDED FOR ANXIETY.  Patient taking differently: Take 5 tablets by mouth At Night As Needed for Anxiety. 6/5/23   Jairo Kramer MD   losartan (Cozaar) 25 MG tablet Take 1 tablet by mouth Daily. 6/21/23   Sheyla Guillermo APRN   metoprolol succinate XL (TOPROL-XL) 100 MG 24 hr tablet Take 1 tablet by mouth 2 (Two) Times a Day. 7/11/23   Sheyla Guillermo APRN   phenazopyridine (Pyridium) 100 MG tablet Take 1 tablet by mouth 3 (Three) Times a Day As Needed for Bladder Spasms. 8/8/23   Lora Rudd MD   potassium chloride 10 MEQ CR tablet Take 1 tablet by mouth 2 (Two) Times a Day. 7/11/23   Sheyla Guillermo APRN   Probiotic Product (Florajen3) capsule TAKE 1 CAPSULE BY MOUTH EVERY DAY 5/15/22   Annabelle Powell APRN   timolol (TIMOPTIC) 0.5 % ophthalmic solution INSTILL 1 DROP IN BOTH EYES IN THE MORNING 7/12/23   Provider, Historical, MD        Social History:   Social History     Tobacco  "Use    Smoking status: Never    Smokeless tobacco: Never   Vaping Use    Vaping Use: Never used   Substance Use Topics    Alcohol use: Not Currently    Drug use: Never         Review of Systems:  Review of Systems   Constitutional:  Negative for chills and fever.   HENT:  Negative for congestion, ear pain and sore throat.    Eyes:  Negative for pain.   Respiratory:  Negative for cough, chest tightness and shortness of breath.    Cardiovascular:  Negative for chest pain and palpitations.   Gastrointestinal:  Negative for abdominal pain, diarrhea, nausea and vomiting.   Genitourinary:  Negative for flank pain and hematuria.   Musculoskeletal:  Positive for arthralgias and joint swelling.   Skin:  Negative for pallor.   Neurological:  Negative for seizures, syncope and headaches.   All other systems reviewed and are negative.     Physical Exam:  BP (!) 160/110   Pulse 111   Temp 97.8 øF (36.6 øC) (Oral)   Resp 18   Ht 170.2 cm (67\")   Wt 69.4 kg (153 lb)   SpO2 95%   BMI 23.96 kg/mý   Vital signs were reviewed under triage note.  General appearance - Patient appears well-developed and well-nourished.  Patient is in no acute distress.  Head - Normocephalic, atraumatic.  Pupils - Equal, round, reactive to light.  Extraocular muscles are intact.  Conjunctiva is clear.  Nasal - Normal inspection.  No evidence of trauma or epistaxis.  Tympanic membranes - Gray, intact without erythema or retractions.  Oral mucosa - Pink and moist without lesions or erythema.  Uvula is midline.  Chest wall - Atraumatic.  Chest wall is nontender.  There are no vesicular rashes noted.  Neck - Supple.  Trachea was midline.  There is no palpable lymphadenopathy or thyromegaly.  There are no meningeal signs  Lungs - Clear to auscultation and percussion bilaterally.  Heart - Irregularly irregular rate and rhythm without any murmurs, clicks, or gallops.  Abdomen - Soft.  Bowel sounds are present.  There is no palpable tenderness.  There is " no rebound, guarding, or rigidity.  There are no palpable masses.  There are no pulsatile masses.  Back - Spine is straight and midline.  There is no CVA tenderness.  Extremities - Intact x4 with full range of motion with the exception of the left wrist.  The patient's left wrist was noted to be moderately swollen.  Patient has a moderate to large hematoma on the ulnar aspect of the wrist.  Patient has moderate tenderness in the wrist.  Capillary refill to all 5 fingers is brisk and patient has range of motion of all 5 fingers.  There is no palpable edema.  Pulses are intact x4 and equal.  Neurologic - Patient is awake, alert, and oriented x3.  Cranial nerves II through XII are grossly intact.  Motor and sensory functions grossly intact.  Cerebellar function was normal.  Integument - There are no rashes.  There are no petechia or purpura lesions noted.  There are no vesicular lesions noted.            Procedures:  Procedures  Fracture reduction and splint -I reviewed the radiographic findings with the patient and family.  Patient agreed to allowing me to reduce the fracture and improve alignment.  A fracture block was performed by me using a total of 8 mL of a one-to-one mixture of 1% lidocaine without epinephrine and 0.75% bupivacaine.  Once the fracture/hematoma block was performed formed a manipulated the distal radius fracture and volar manner.  A sugar-tong splint was then applied by me.  Post splinting distal neurovascular status was intact.  Patient was placed in a sling.  Total procedure time was 22 minutes.    Medical Decision Making:      Comorbidities that affect care:    Valvular heart disease, hypertension, chronic atrial fibrillation.    External Notes reviewed:    Previous Clinic Note: Office visit with Dr. Kristen Lopez on 8/8/2023 was reviewed by me.      The following orders were placed and all results were independently analyzed by me:  Orders Placed This Encounter   Procedures    [facility]  [facility] 03.  [employer]    XR Forearm 2 View Left    CT Head Without Contrast    XR Wrist 2 View Left       Medications Given in the Emergency Department:  Medications   morphine injection 2 mg (2 mg Intravenous Given 8/21/23 1740)   ondansetron (ZOFRAN) injection 4 mg (4 mg Intravenous Given 8/21/23 1738)   lidocaine (XYLOCAINE) 1 % injection 5 mL (5 mL Infiltration Given 8/21/23 1910)   bupivacaine PF (MARCAINE) 0.75 % injection 37.5 mg (37.5 mg Injection Given 8/21/23 1911)   HYDROmorphone (DILAUDID) injection 0.5 mg (0.5 mg Intravenous Given 8/21/23 1910)        ED Course:    The patient was initially evaluated in the triage area where orders were placed. The patient was later dispositioned by Edmar Duckworth DO.      The patient was advised to stay for completion of workup which includes but is not limited to communication of labs and radiological results, reassessment and plan. The patient was advised that leaving prior to disposition by a provider could result in critical findings that are not communicated to the patient.      The patient was seen and evaluated the ED by me.  The above history and physical examination was performed as documented.  Diagnostic data was obtained.  Results reviewed.  Discussed with the patient and family.  The patient was noted to have distal radius fracture.  The fracture was manipulated to improve alignment.  Patient was placed in a sling.  Patient will follow-up with Dr. Alegre for orthopedic follow-up.    Labs:    Lab Results (last 24 hours)       ** No results found for the last 24 hours. **             Imaging:    No Radiology Exams Resulted Within Past 24 Hours      Differential Diagnosis and Discussion:      Extremity Pain: Differential diagnosis includes but is not limited to soft tissue sprain, tendonitis, tendon injury, dislocation, fracture, deep vein thrombosis, arterial insufficiency, osteoarthritis, bursitis, and ligamentous damage.    All X-rays impressions were  independently interpreted by me.    MDM     Amount and/or Complexity of Data Reviewed  Tests in the radiology section of [facility]: reviewed             Patient Care Considerations:    LABS: I considered ordering labs, however laboratory data would not alter the ED treatment course or plan.      Consultants/Shared Management Plan:    I have discussed the case with Dr. Alegre who states that the patient can be safely discharged with close follow up.    Social Determinants of Health:    Patient has presented with family members who are responsible, reliable and will ensure follow up care.      Disposition and Care Coordination:    Discharged: The patient is suitable and stable for discharge with no need for consideration of observation or admission.    I have explained the patient's condition, diagnoses and treatment plan based on the information available to me at this time. I have answered questions and addressed any concerns. The patient has a good  understanding of the patient's diagnosis, condition, and treatment plan as can be expected at this point. The vital signs have been stable. The patient's condition is stable and appropriate for discharge from the emergency department.      The patient will pursue further outpatient evaluation with the primary care physician or other designated or consulting physician as outlined in the discharge instructions. They are agreeable to this plan of care and follow-up instructions have been explained in detail. The patient has received these instructions in written format and have expressed an understanding of the discharge instructions. The patient is aware that any significant change in condition or worsening of symptoms should prompt an immediate return to this or the closest emergency department or call to 911.  I have explained discharge medications and the need for follow up with the patient/caretakers. This was also printed in the discharge instructions. Patient was  discharged with the following medications and follow up:      Medication List        New Prescriptions      HYDROcodone-acetaminophen 5-325 MG per tablet  Commonly known as: NORCO  Take 1 tablet by mouth Every 6 (Six) Hours As Needed for Moderate Pain.            Changed      LORazepam 0.5 MG tablet  Commonly known as: ATIVAN  TAKE 1 TABLET BY MOUTH AT NIGHT AS NEEDED FOR ANXIETY.  What changed: how much to take               Where to Get Your Medications        These medications were sent to [facility]/pharmacy #[id number] - [place], [place] - [address] - [phone]  - [phone]   [address] [place] [address] [place] [address]      Hours: 24-hours Phone: [phone]   HYDROcodone-acetaminophen 5-325 MG per tablet      Jairo Kramer MD  [address]  [address]  [facility] [id number] [phone]      As needed    Been, Ben GALARZA MD  [address]  [place] [id number] [phone]    In 2 days  Call tomorrow for an appointment time.       Final diagnoses:   Closed fracture of left wrist, initial encounter   Fall on same level from slipping, tripping or stumbling, initial encounter        ED Disposition       ED Disposition   Discharge    Condition   Stable    Comment   --               This medical record created using voice recognition software.             Edmar Duckworth DO  08/23/23 1041

## 2023-08-21 NOTE — ED PROVIDER NOTES
Time: 5:13 PM EDT  Date of encounter:  8/21/2023  Independent Historian/Clinical History and Information was obtained by:   Patient  Chief Complaint: Fall    History is limited by: N/A    History of Present Illness:  Patient is a 89 y.o. year old female who presents to the emergency department for evaluation of a fall. Pt states that she was at home sweeping her basement entrance when she walked inside, lost her balance, and fell down. She denies any light-headedness preceding the event. She denies any LOC or head injury. Pt states that she fell onto her left wrist as her hand was outstretched to catch her fall. She denies any other symptoms currently. She is currently taking Eliquis.    HPI    Patient Care Team  Primary Care Provider: Jairo Kramer MD    Past Medical History:     Allergies   Allergen Reactions    Citalopram Unknown - High Severity    Clonazepam Unknown - High Severity    Levofloxacin Nausea And Vomiting    Lisinopril Unknown - High Severity    Macrobid [Nitrofurantoin Macrocrystal] Rash    Melatonin Unknown - High Severity     Past Medical History:   Diagnosis Date    Abdominal pain, generalized     Anemia     Aortic regurgitation     Aortic stenosis     Cardiomegaly     Chronic atrial fibrillation 01/01/2019    Atrial fibrillation converted to sinus through cardioversion (March 2019    Chronic fatigue     Colon cancer     Diarrhea     Disease of tricuspid valve     Diverticulosis of colon     DVT (deep venous thrombosis)     LEFT LEG GREATER THAN 5 YRS AGO    Dysphagia     Essential (primary) hypertension     Hiatal hernia     Insomnia, unspecified     LVH (left ventricular hypertrophy)     Major depressive disorder, single episode     Mitral regurgitation     Mitral valve insufficiency and aortic valve insufficiency     Osteopenia     Pain in joint, pelvic region and thigh     TR (tricuspid regurgitation)     UTI (urinary tract infection)     antibx to be completed prior to procedure.  ABIOLA Carlos RN ([facility] [place]) notified.     Past Surgical History:   Procedure Laterality Date    ABDOMINAL HYSTERECTOMY      WITH BSO     ANKLE SURGERY      (L) ankle fracture    BLADDER REPAIR      BREAST BIOPSY      (L) breast biopsy    CARDIOVERSION  2019    CATARACT EXTRACTION      OU    CHOLECYSTECTOMY      OPEN    COLON SURGERY      STATES SHE HAD 12 INCHES OF COLON REMOVED IN JULY 2020 FOR COLON CANCER    COLONOSCOPY  2020    COLONOSCOPY N/A 10/29/2021    Procedure: COLONOSCOPY;  Surgeon: Edmar Back MD;  Location: [facility] ENDOSCOPY;  Service: General;  Laterality: N/A;  DIVERTICULOSIS/ANASTOMOSIS    FEMUR OPEN REDUCTION INTERNAL FIXATION Left 07/21/2021    Procedure: FEMORAL NECK OPEN REDUCTION INTERNAL FIXATION;  Surgeon: Bam Warner MD;  Location: [facility] MAIN OR;  Service: Orthopedics;  Laterality: Left;    HIP CLOSED REDUCTION Left 3/20/2022    Procedure: HIP CLOSED REDUCTION;  Surgeon: Harsha Gayle MD;  Location: [facility] MAIN OR;  Service: Orthopedics;  Laterality: Left;    INGUINAL HERNIA REPAIR Left 09/29/2011    TOTAL HIP ARTHROPLASTY Left 3/16/2022    Procedure: LEFT TOTAL HIP ARTHROPLASTY AND HARDWARE REMOVAL;  Surgeon: Bam Warner MD;  Location: [facility] MAIN OR;  Service: Orthopedics;  Laterality: Left;    UPPER GASTROINTESTINAL ENDOSCOPY      WITH DILATATION     Family History   Problem Relation Age of Onset    Malig Hyperthermia Neg Hx        Home Medications:  Prior to Admission medications    Medication Sig Start Date End Date Taking? Authorizing Provider   acetaminophen (TYLENOL) 325 MG tablet Take 1 tablet by mouth Every 4 (Four) Hours As Needed for Fever (Temperature Greater Than 101F). 3/18/22   Jairo Kramer MD   apixaban (ELIQUIS) 2.5 MG tablet tablet Take 1 tablet by mouth 2 (Two) Times a Day. 7/11/23   Sheyla Guillermo APRN   ascorbic acid (VITAMIN C) 500 MG tablet Take 1 tablet by mouth Daily.    Provider, MD Karina   AZO-CRANBERRY PO Take   by mouth.    Karina Browne MD   cephalexin (Keflex) 500 MG capsule Take 1 capsule by mouth 2 (Two) Times a Day. 8/10/23   Lora Rudd MD   ciprofloxacin (Cipro) 500 MG tablet Take 1 tablet by mouth 2 (Two) Times a Day. 8/8/23   Lora Rudd MD   colestipol (COLESTID) 1 g tablet Take 2 tablets by mouth Daily. 7/20/23   Leslie Cody APRN   dilTIAZem CD (CARDIZEM CD) 120 MG 24 hr capsule 1 capsule Daily.    Karina Browne MD   famotidine (PEPCID) 20 MG tablet Take 1 tablet by mouth Every 12 (Twelve) Hours.    Karina Browne MD   FLUoxetine (PROzac) 10 MG capsule Take 1 capsule by mouth Daily.    Karina Browne MD   furosemide (LASIX) 20 MG tablet Take 1 tablet by mouth 2 (Two) Times a Day. 7/11/23   Sheyla Guillermo APRN   latanoprost (XALATAN) 0.005 % ophthalmic solution Administer 1 drop to both eyes Daily. 11/11/21   Karina Browne MD   LORazepam (ATIVAN) 0.5 MG tablet TAKE 1 TABLET BY MOUTH AT NIGHT AS NEEDED FOR ANXIETY.  Patient taking differently: Take 5 tablets by mouth At Night As Needed for Anxiety. 6/5/23   Jairo Kramer MD   losartan (Cozaar) 25 MG tablet Take 1 tablet by mouth Daily. 6/21/23   Sheyla Guillermo APRN   metoprolol succinate XL (TOPROL-XL) 100 MG 24 hr tablet Take 1 tablet by mouth 2 (Two) Times a Day. 7/11/23   Sheyla Guillermo APRN   phenazopyridine (Pyridium) 100 MG tablet Take 1 tablet by mouth 3 (Three) Times a Day As Needed for Bladder Spasms. 8/8/23   Lora Rudd MD   potassium chloride 10 MEQ CR tablet Take 1 tablet by mouth 2 (Two) Times a Day. 7/11/23   Sheyla Guillermo APRN   Probiotic Product (Florajen3) capsule TAKE 1 CAPSULE BY MOUTH EVERY DAY 5/15/22   Annabelle Powell APRN   timolol (TIMOPTIC) 0.5 % ophthalmic solution INSTILL 1 DROP IN BOTH EYES IN THE MORNING 7/12/23   Provider, Historical, MD        Social History:  "  Social History     Tobacco Use    Smoking status: Never    Smokeless tobacco: Never   Vaping Use    Vaping Use: Former   Substance Use Topics    Alcohol use: Not Currently    Drug use: Never         Review of Systems:  Review of Systems   Constitutional:  Negative for chills and fever.   Respiratory:  Negative for cough and shortness of breath.    Cardiovascular:  Negative for chest pain and palpitations.   Gastrointestinal:  Negative for abdominal pain, diarrhea, nausea and vomiting.   Genitourinary:  Negative for dysuria and hematuria.   Musculoskeletal:  Negative for back pain and neck pain.        Positive for left wrist pain   Neurological:  Negative for dizziness, light-headedness and headaches.      Physical Exam:  BP (!) 170/104 (BP Location: Left arm, Patient Position: Sitting)   Pulse 99   Temp 97.8 øF (36.6 øC) (Oral)   Resp 18   Ht 170.2 cm (67\")   Wt 69.4 kg (153 lb)   SpO2 98%   BMI 23.96 kg/mý     Physical Exam  Constitutional:       Appearance: Normal appearance.   HENT:      Head: Normocephalic and atraumatic.      Right Ear: Tympanic membrane normal.      Left Ear: Tympanic membrane normal.   Eyes:      Extraocular Movements: Extraocular movements intact.      Pupils: Pupils are equal, round, and reactive to light.   Cardiovascular:      Rate and Rhythm: Normal rate and regular rhythm.      Pulses: Normal pulses.      Heart sounds: Normal heart sounds.   Pulmonary:      Effort: Pulmonary effort is normal.      Breath sounds: Normal breath sounds.   Abdominal:      General: Abdomen is flat. Bowel sounds are normal.      Palpations: Abdomen is soft.   Musculoskeletal:      Right wrist: Normal.      Left wrist: Swelling, deformity and tenderness present.      Cervical back: Normal range of motion and neck supple.   Skin:     General: Skin is warm and dry.   Neurological:      General: No focal deficit present.      Mental Status: She is alert and oriented to person, place, and time.          "     Procedures:  Procedures      Medical Decision Making:      Comorbidities that affect care:    Atrial Fibrillation, History of DVT, Hypertension, Hyperlipidemia, CHF    External Notes reviewed:    Previous Clinic Note: I reviewed Dr. Rudd's office visit note with this patient from 8/8/23.      The following orders were placed and all results were independently analyzed by me:  Orders Placed This Encounter   Procedures    XR Forearm 2 View Left    CT Head Without Contrast       Medications Given in the Emergency Department:  Medications   morphine injection 2 mg (2 mg Intravenous Given 8/21/23 1740)   ondansetron (ZOFRAN) injection 4 mg (4 mg Intravenous Given 8/21/23 1738)        ED Course:    Bing Cruz is a 89 year old female who presented to the ED for evaluation after a fall. I personally evaluated this patient during their visit to the ED. History was obtained. Their physical exam was remarkable for left wrist deformity with tenderness and lateral hematoma. I ordered appropriate imaging related to this patient's chief complaint. Their work up in the ED was remarkable for fractures of the distal left radius and ulnar styloid process. I gave the pt 2 mg morphine in the ED for pain control. I discussed the work up results with the patient and family. I performed a reduction of the pt's fracture while they were in the ED. I instructed them to follow up with Dr. Alegre on Wednesday and return to the ED with any new or worsening symptoms. The patient was agreeable to the plan of care. I determined this patient to be stable and appropriate for discharge. I addressed all of this patient's questions and the patient has no further questions.      Labs:    Lab Results (last 24 hours)       ** No results found for the last 24 hours. **             Imaging:    XR Forearm 2 View Left    Result Date: 8/21/2023  PROCEDURE: XR FOREARM 2 VW LEFT  COMPARISON: None  INDICATIONS: FELL COMPLAINS OF LEFT ARM PAIN WITH  LIMIT RANGE OF MOTION  FINDINGS:  BONES: There is an impacted comminuted intra-articular fracture of the distal left radius with dorsal angulation.  There is an ulnar styloid process fracture. SOFT TISSUES: There is extensive soft tissue swelling in the left wrist. EFFUSION: None visible.  OTHER: Partially visualized IM josseline in the distal left humerus.       Extensive soft tissue swelling in the left wrist.  Fractures of the distal left radius and left ulnar styloid process.      HUY LOPEZ MD       Electronically Signed and Approved By: HUY LOPEZ MD on 8/21/2023 at 17:06                Differential Diagnosis and Discussion:    Trauma:  Differential diagnosis considered but not limited to were subarachnoid hemorrhage, intracranial bleeding, pneumothorax, cardiac contusion, lung contusion, intra-abdominal bleeding, and compartment syndrome of any extremity or other significant traumatic pathology    All X-rays impressions were independently interpreted by me.    MDM  Number of Diagnoses or Management Options       {Critical Care:05165}    Patient Care Considerations:    {Considerations (Optional):31941}      Consultants/Shared Management Plan:    Consultant: I have discussed the case with Dr. Alegre who agrees to consult on the patient.    Social Determinants of Health:    Patient is independent, reliable, and has access to care.       Disposition and Care Coordination:    Discharged: I considered escalation of care by admitting this patient for observation, however the patient has improved and is suitable and  stable for discharge.    I have explained the patient's condition, diagnoses and treatment plan based on the information available to me at this time. I have answered questions and addressed any concerns. The patient has a good  understanding of the patient's diagnosis, condition, and treatment plan as can be expected at this point. The vital signs have been stable. The patient's condition is stable  and appropriate for discharge from the emergency department.      The patient will pursue further outpatient evaluation with the primary care physician or other designated or consulting physician as outlined in the discharge instructions. They are agreeable to this plan of care and follow-up instructions have been explained in detail. The patient has received these instructions in written format and have expressed an understanding of the discharge instructions. The patient is aware that any significant change in condition or worsening of symptoms should prompt an immediate return to this or the closest emergency department or call to 911.    Final diagnoses:   None        ED Disposition       None            This medical record created using voice recognition software.

## 2023-08-22 ENCOUNTER — TELEPHONE (OUTPATIENT)
Dept: ORTHOPEDIC SURGERY | Facility: CLINIC | Age: 88
End: 2023-08-22
Payer: MEDICARE

## 2023-08-22 NOTE — TELEPHONE ENCOUNTER
Caller: STEPHANIE BENITES    Relationship to patient: Emergency Contact    Best call back number: [phone]    Chief complaint: CLOSED FRACTURE OF THE LEFT WRIST     Type of visit: NEW PROBLEM     Requested date: WITHIN 2 DAYS PER ER NOTE 08/21/2023    Additional notes:WAS TOLD TO SCHEDULE WITH RUTH OLIVER WITH [facility] FOR THE LEFT HIP

## 2023-08-23 ENCOUNTER — LAB (OUTPATIENT)
Dept: LAB | Facility: HOSPITAL | Age: 88
End: 2023-08-23
Payer: MEDICARE

## 2023-08-23 ENCOUNTER — OFFICE VISIT (OUTPATIENT)
Dept: ORTHOPEDIC SURGERY | Facility: CLINIC | Age: 88
End: 2023-08-23
Payer: MEDICARE

## 2023-08-23 ENCOUNTER — TELEPHONE (OUTPATIENT)
Dept: ONCOLOGY | Facility: HOSPITAL | Age: 88
End: 2023-08-23

## 2023-08-23 ENCOUNTER — PREP FOR SURGERY (OUTPATIENT)
Dept: OTHER | Facility: HOSPITAL | Age: 88
End: 2023-08-23
Payer: MEDICARE

## 2023-08-23 VITALS
DIASTOLIC BLOOD PRESSURE: 78 MMHG | WEIGHT: 153 LBS | HEART RATE: 97 BPM | BODY MASS INDEX: 24.01 KG/M2 | HEIGHT: 67 IN | SYSTOLIC BLOOD PRESSURE: 118 MMHG | OXYGEN SATURATION: 93 %

## 2023-08-23 DIAGNOSIS — S52.502A CLOSED FRACTURE OF DISTAL END OF LEFT RADIUS, UNSPECIFIED FRACTURE MORPHOLOGY, INITIAL ENCOUNTER: Primary | ICD-10-CM

## 2023-08-23 DIAGNOSIS — S52.502A CLOSED FRACTURE OF DISTAL END OF LEFT RADIUS, UNSPECIFIED FRACTURE MORPHOLOGY, INITIAL ENCOUNTER: ICD-10-CM

## 2023-08-23 PROBLEM — S62.102A WRIST FRACTURE, LEFT: Status: ACTIVE | Noted: 2023-08-23

## 2023-08-23 LAB
BACTERIA UR QL AUTO: ABNORMAL /HPF
BILIRUB UR QL STRIP: NEGATIVE
CLARITY UR: ABNORMAL
COLOR UR: ABNORMAL
GLUCOSE UR STRIP-MCNC: NEGATIVE MG/DL
HGB UR QL STRIP.AUTO: ABNORMAL
HYALINE CASTS UR QL AUTO: ABNORMAL /LPF
KETONES UR QL STRIP: ABNORMAL
LEUKOCYTE ESTERASE UR QL STRIP.AUTO: ABNORMAL
NITRITE UR QL STRIP: NEGATIVE
PH UR STRIP.AUTO: 6 [PH] (ref 5–8)
PROT UR QL STRIP: ABNORMAL
RBC # UR STRIP: ABNORMAL /HPF
REF LAB TEST METHOD: ABNORMAL
SP GR UR STRIP: 1.02 (ref 1–1.03)
SQUAMOUS #/AREA URNS HPF: ABNORMAL /HPF
UROBILINOGEN UR QL STRIP: ABNORMAL
WBC # UR STRIP: ABNORMAL /HPF

## 2023-08-23 PROCEDURE — 87086 URINE CULTURE/COLONY COUNT: CPT

## 2023-08-23 PROCEDURE — 81001 URINALYSIS AUTO W/SCOPE: CPT

## 2023-08-23 RX ORDER — CEFAZOLIN SODIUM IN 0.9 % NACL 3 G/100 ML
3 INTRAVENOUS SOLUTION, PIGGYBACK (ML) INTRAVENOUS ONCE
Status: CANCELLED | OUTPATIENT
Start: 2023-08-23 | End: 2023-08-23

## 2023-08-23 RX ORDER — CEFAZOLIN SODIUM 2 G/100ML
2 INJECTION, SOLUTION INTRAVENOUS ONCE
Status: CANCELLED | OUTPATIENT
Start: 2023-08-23 | End: 2023-08-23

## 2023-08-23 NOTE — PROGRESS NOTES
"Chief Complaint  Initial Evaluation of the Left Wrist     Subjective      Bing Cruz presents to [facility] for initial evaluation of the left wrist. She had a fall.  She ambulates with a cane.  She had X rays and placed in a splint and sling. She was tried to reduced at the hospital.  She is here today for treatment intervention. Her daughter feels that she has a UTI.  She is getting it tested.  She is in a great deal of pain.  She is on a blood thinner.  Eliquis 2.5 twice a day.     Allergies   Allergen Reactions    Citalopram Unknown - High Severity    Clonazepam Unknown - High Severity    Levofloxacin Nausea And Vomiting    Lisinopril Unknown - High Severity    Macrobid [Nitrofurantoin Macrocrystal] Rash    Melatonin Unknown - High Severity        Social History     Socioeconomic History    Marital status:    Tobacco Use    Smoking status: Never    Smokeless tobacco: Never   Vaping Use    Vaping Use: Never used   Substance and Sexual Activity    Alcohol use: Not Currently    Drug use: Never    Sexual activity: Defer        I reviewed the patient's chief complaint, history of present illness, review of systems, past medical history, surgical history, family history, social history, medications, and allergy list.     Review of Systems     Constitutional: Denies fevers, chills, weight loss  Cardiovascular: Denies chest pain, shortness of breath  Skin: Denies rashes, acute skin changes  Neurologic: Denies headache, loss of consciousness        Vital Signs:   /78   Pulse 97   Ht 170.2 cm (67\")   Wt 69.4 kg (153 lb)   SpO2 93%   BMI 23.96 kg/mý          Physical Exam  General: Alert. No acute distress    Ortho Exam        LEFT WRIST  The patient is in a long arm splint and sling. Mildly Full ROM of the hand, fingers, elbow and wrist. Sensation grossly intact to light touch, median, radial and ulnar nerve. Positive AIN, PIN and ulnar nerve motor function intact. " Axillary nerve intact. Positive pulses. Moderate swelling and bruising      Procedures        Imaging Results (Most Recent)       None             Result Review :         XR Forearm 2 View Left    Result Date: 8/21/2023  Narrative: PROCEDURE: XR FOREARM 2 VW LEFT  COMPARISON: None  INDICATIONS: FELL COMPLAINS OF LEFT ARM PAIN WITH LIMIT RANGE OF MOTION  FINDINGS:  BONES: There is an impacted comminuted intra-articular fracture of the distal left radius with dorsal angulation.  There is an ulnar styloid process fracture. SOFT TISSUES: There is extensive soft tissue swelling in the left wrist. EFFUSION: None visible.  OTHER: Partially visualized IM josseline in the distal left humerus.      Impression:  Extensive soft tissue swelling in the left wrist.  Fractures of the distal left radius and left ulnar styloid process.      HUY LOPEZ MD       Electronically Signed and Approved By: HUY LOPEZ MD on 8/21/2023 at 17:06             XR Wrist 2 View Left    Result Date: 8/21/2023  Narrative: PROCEDURE: XR WRIST 2 VW LEFT  COMPARISON: [facility], , XR FOREARM 2 VW LEFT, 8/21/2023, 16:47.  INDICATIONS: Postreduction left wrist  FINDINGS:   Interval closed reduction of the fractures of the distal left radius and ulnar styloid process.  Alignment appears similar.  A splint is in place.  IMPRESSION: Closed reduction of the fractures of the distal left radius and ulnar styloid process.   HUY LOPEZ MD       Electronically Signed and Approved By: HUY LOPEZ MD on 8/21/2023 at 19:40             CT Head Without Contrast    Result Date: 8/21/2023  Narrative: PROCEDURE: CT HEAD WO CONTRAST  [facility], HEAD W/O CONTRAST, 10/12/2020, 12:03. INDICATIONS: FALL. KNOT TO POSTERIOR HEAD.  PROTOCOL:   Standard imaging protocol performed    RADIATION:   DLP: 1081.2mGy*cm   MA and/or KV was adjusted to minimize radiation dose.     TECHNIQUE: Axial images of the head  without intravenous contrast.  FINDINGS:  The ventricles have a normal size and configuration. There is no evidence of acute intracranial hemorrhage, mass or midline shift. No extra-axial fluid collections are identified. There are no skull fractures. The visualized paranasal sinuses and mastoid air cells are grossly clear.  There is a scalp contusion in left lateral frontal region.  IMPRESSION:  No acute intracranial abnormalities are identified.  HUY LOPEZ MD       Electronically Signed and Approved By: HUY LOPEZ MD on 8/21/2023 at 18:22                     Assessment and Plan     Diagnoses and all orders for this visit:    1. Closed fracture of distal end of left radius, unspecified fracture morphology, initial encounter (Primary)      Discussed the treatment plan with the patient. I reviewed the X-rays that were obtained 8/21/23 with the patient.     Discussed the treatment options with the patient, operative vs non-operative.     The patient expressed understanding and wished to proceed left open reduction and fixation of the left wrist.         Discussed surgery., Risks/benefits discussed with patient including, but not limited to: infection, bleeding, neurovascular damage, re-rupture, aesthetic deformity, need for further surgery, and death., Discussed with patient the implant type being used during surgery and patient understands., Surgery pamphlet given., and Call or return if worsening symptoms.    Follow Up     2 weeks postoperatively       Patient was given instructions and counseling regarding her condition or for health maintenance advice. Please see specific information pulled into the AVS if appropriate.     Scribed for Ben Alegre MD by Madhavi Caraballo MA.  08/23/23   09:52 EDT    I have personally performed the services described in this document as scribed by the above individual and it is both accurate and complete. Ben Alegre MD 08/24/23

## 2023-08-23 NOTE — TELEPHONE ENCOUNTER
Caller: STEPHANIE BENITES    Relationship to patient: Emergency Contact    Best call back number: [phone]    Chief complaint: BROKE HER WRIST HAS TO HAVE SURGERY ON 8/24/2023    Type of visit: F/U 1     Requested date: CALL TO R/S     If rescheduling, when is the original appointment: 8/24/2023

## 2023-08-24 ENCOUNTER — TELEPHONE (OUTPATIENT)
Dept: INTERNAL MEDICINE | Facility: CLINIC | Age: 88
End: 2023-08-24
Payer: MEDICARE

## 2023-08-24 ENCOUNTER — ANESTHESIA EVENT CONVERTED (OUTPATIENT)
Dept: ANESTHESIOLOGY | Facility: HOSPITAL | Age: 88
End: 2023-08-24
Payer: MEDICARE

## 2023-08-24 ENCOUNTER — ANESTHESIA (OUTPATIENT)
Dept: PERIOP | Facility: HOSPITAL | Age: 88
End: 2023-08-24
Payer: MEDICARE

## 2023-08-24 ENCOUNTER — HOSPITAL ENCOUNTER (OUTPATIENT)
Facility: HOSPITAL | Age: 88
Discharge: HOME | End: 2023-08-24
Attending: ORTHOPAEDIC SURGERY | Admitting: ORTHOPAEDIC SURGERY
Payer: MEDICARE

## 2023-08-24 ENCOUNTER — APPOINTMENT (OUTPATIENT)
Dept: GENERAL RADIOLOGY | Facility: HOSPITAL | Age: 88
End: 2023-08-24
Payer: MEDICARE

## 2023-08-24 ENCOUNTER — ANESTHESIA EVENT (OUTPATIENT)
Dept: PERIOP | Facility: HOSPITAL | Age: 88
End: 2023-08-24
Payer: MEDICARE

## 2023-08-24 ENCOUNTER — TELEPHONE (OUTPATIENT)
Dept: PREOP | Facility: HOSPITAL | Age: 88
End: 2023-08-24
Payer: MEDICARE

## 2023-08-24 VITALS
WEIGHT: 142.64 LBS | BODY MASS INDEX: 22.39 KG/M2 | SYSTOLIC BLOOD PRESSURE: 137 MMHG | OXYGEN SATURATION: 96 % | RESPIRATION RATE: 20 BRPM | HEART RATE: 108 BPM | HEIGHT: 67 IN | TEMPERATURE: 97.2 F | DIASTOLIC BLOOD PRESSURE: 99 MMHG

## 2023-08-24 DIAGNOSIS — S52.502A CLOSED FRACTURE OF DISTAL END OF LEFT RADIUS, UNSPECIFIED FRACTURE MORPHOLOGY, INITIAL ENCOUNTER: ICD-10-CM

## 2023-08-24 DIAGNOSIS — N30.90 CYSTITIS: Primary | ICD-10-CM

## 2023-08-24 LAB — BACTERIA SPEC AEROBE CULT: NORMAL

## 2023-08-24 PROCEDURE — C1713 ANCHOR/SCREW BN/BN,TIS/BN: HCPCS | Performed by: ORTHOPAEDIC SURGERY

## 2023-08-24 PROCEDURE — 25010000002 ROPIVACAINE PER 1 MG: Performed by: ANESTHESIOLOGY

## 2023-08-24 PROCEDURE — 93005 ELECTROCARDIOGRAM TRACING: CPT | Performed by: ANESTHESIOLOGY

## 2023-08-24 PROCEDURE — 25010000002 MIDAZOLAM PER 1MG: Performed by: ANESTHESIOLOGY

## 2023-08-24 PROCEDURE — A9270 NON-COVERED ITEM OR SERVICE: HCPCS | Performed by: ANESTHESIOLOGY

## 2023-08-24 PROCEDURE — 25010000002 ONDANSETRON PER 1 MG: Performed by: NURSE ANESTHETIST, CERTIFIED REGISTERED

## 2023-08-24 PROCEDURE — 25010000002 DEXAMETHASONE PER 1 MG: Performed by: NURSE ANESTHETIST, CERTIFIED REGISTERED

## 2023-08-24 PROCEDURE — 25010000002 PROPOFOL 10 MG/ML EMULSION: Performed by: NURSE ANESTHETIST, CERTIFIED REGISTERED

## 2023-08-24 PROCEDURE — 76000 FLUOROSCOPY <1 HR PHYS/QHP: CPT

## 2023-08-24 PROCEDURE — 63710000001 ACETAMINOPHEN EXTRA STRENGTH 500 MG TABLET: Performed by: ANESTHESIOLOGY

## 2023-08-24 PROCEDURE — 25609 OPTX DST RD XART FX/EP SEP3+: CPT | Performed by: ORTHOPAEDIC SURGERY

## 2023-08-24 PROCEDURE — S0260 H&P FOR SURGERY: HCPCS | Performed by: ORTHOPAEDIC SURGERY

## 2023-08-24 PROCEDURE — 25010000002 CEFAZOLIN IN DEXTROSE 2000 MG/ 100 ML SOLUTION: Performed by: ORTHOPAEDIC SURGERY

## 2023-08-24 PROCEDURE — 25010000002 FENTANYL CITRATE (PF) 50 MCG/ML SOLUTION: Performed by: NURSE ANESTHETIST, CERTIFIED REGISTERED

## 2023-08-24 DEVICE — LOCKING SCREW, FULLY THREADED,T8
Type: IMPLANTABLE DEVICE | Site: WRIST | Status: FUNCTIONAL
Brand: VARIAX

## 2023-08-24 DEVICE — VOLAR SMARTLOCK DR PLATE,STAND. LE,SHORT
Type: IMPLANTABLE DEVICE | Site: WRIST | Status: FUNCTIONAL
Brand: VARIAX

## 2023-08-24 DEVICE — LOCKING SCREW
Type: IMPLANTABLE DEVICE | Site: WRIST | Status: FUNCTIONAL
Brand: VARIAX

## 2023-08-24 DEVICE — BONE SCREW, FULLY THREADED, T8
Type: IMPLANTABLE DEVICE | Site: WRIST | Status: FUNCTIONAL
Brand: VARIAX

## 2023-08-24 RX ORDER — CEFAZOLIN SODIUM 2 G/100ML
2 INJECTION, SOLUTION INTRAVENOUS ONCE
Status: COMPLETED | OUTPATIENT
Start: 2023-08-24 | End: 2023-08-24

## 2023-08-24 RX ORDER — CEFAZOLIN SODIUM IN 0.9 % NACL 3 G/100 ML
3 INTRAVENOUS SOLUTION, PIGGYBACK (ML) INTRAVENOUS ONCE
Status: DISCONTINUED | OUTPATIENT
Start: 2023-08-24 | End: 2023-08-24

## 2023-08-24 RX ORDER — FENTANYL CITRATE 50 UG/ML
INJECTION, SOLUTION INTRAMUSCULAR; INTRAVENOUS AS NEEDED
Status: DISCONTINUED | OUTPATIENT
Start: 2023-08-24 | End: 2023-08-24 | Stop reason: SURG

## 2023-08-24 RX ORDER — CEPHALEXIN 500 MG/1
500 CAPSULE ORAL 3 TIMES DAILY
Qty: 21 CAPSULE | Refills: 0 | Status: SHIPPED | OUTPATIENT
Start: 2023-08-24

## 2023-08-24 RX ORDER — CIPROFLOXACIN 500 MG/1
500 TABLET, FILM COATED ORAL 2 TIMES DAILY
Qty: 10 TABLET | Refills: 0 | Status: SHIPPED | OUTPATIENT
Start: 2023-08-24

## 2023-08-24 RX ORDER — SODIUM CHLORIDE, SODIUM LACTATE, POTASSIUM CHLORIDE, CALCIUM CHLORIDE 600; 310; 30; 20 MG/100ML; MG/100ML; MG/100ML; MG/100ML
9 INJECTION, SOLUTION INTRAVENOUS CONTINUOUS PRN
Status: DISCONTINUED | OUTPATIENT
Start: 2023-08-24 | End: 2023-08-24 | Stop reason: HOSPADM

## 2023-08-24 RX ORDER — PROMETHAZINE HYDROCHLORIDE 12.5 MG/1
25 TABLET ORAL ONCE AS NEEDED
Status: DISCONTINUED | OUTPATIENT
Start: 2023-08-24 | End: 2023-08-24 | Stop reason: HOSPADM

## 2023-08-24 RX ORDER — ACETAMINOPHEN 500 MG
1000 TABLET ORAL ONCE
Status: COMPLETED | OUTPATIENT
Start: 2023-08-24 | End: 2023-08-24

## 2023-08-24 RX ORDER — MIDAZOLAM HYDROCHLORIDE 2 MG/2ML
1 INJECTION, SOLUTION INTRAMUSCULAR; INTRAVENOUS ONCE
Status: COMPLETED | OUTPATIENT
Start: 2023-08-24 | End: 2023-08-24

## 2023-08-24 RX ORDER — ONDANSETRON 2 MG/ML
4 INJECTION INTRAMUSCULAR; INTRAVENOUS ONCE AS NEEDED
Status: DISCONTINUED | OUTPATIENT
Start: 2023-08-24 | End: 2023-08-24 | Stop reason: HOSPADM

## 2023-08-24 RX ORDER — OXYCODONE HCL 5 MG/5 ML
5 SOLUTION, ORAL ORAL EVERY 4 HOURS PRN
Status: DISCONTINUED | OUTPATIENT
Start: 2023-08-24 | End: 2023-08-24 | Stop reason: HOSPADM

## 2023-08-24 RX ORDER — DEXAMETHASONE SODIUM PHOSPHATE 4 MG/ML
INJECTION, SOLUTION INTRA-ARTICULAR; INTRALESIONAL; INTRAMUSCULAR; INTRAVENOUS; SOFT TISSUE AS NEEDED
Status: DISCONTINUED | OUTPATIENT
Start: 2023-08-24 | End: 2023-08-24 | Stop reason: SURG

## 2023-08-24 RX ORDER — ONDANSETRON 2 MG/ML
INJECTION INTRAMUSCULAR; INTRAVENOUS AS NEEDED
Status: DISCONTINUED | OUTPATIENT
Start: 2023-08-24 | End: 2023-08-24 | Stop reason: SURG

## 2023-08-24 RX ORDER — PROPOFOL 10 MG/ML
VIAL (ML) INTRAVENOUS AS NEEDED
Status: DISCONTINUED | OUTPATIENT
Start: 2023-08-24 | End: 2023-08-24 | Stop reason: SURG

## 2023-08-24 RX ORDER — LIDOCAINE HYDROCHLORIDE 20 MG/ML
INJECTION, SOLUTION EPIDURAL; INFILTRATION; INTRACAUDAL; PERINEURAL AS NEEDED
Status: DISCONTINUED | OUTPATIENT
Start: 2023-08-24 | End: 2023-08-24 | Stop reason: SURG

## 2023-08-24 RX ORDER — ROPIVACAINE HYDROCHLORIDE 5 MG/ML
INJECTION, SOLUTION EPIDURAL; INFILTRATION; PERINEURAL
Status: COMPLETED | OUTPATIENT
Start: 2023-08-24 | End: 2023-08-24

## 2023-08-24 RX ORDER — PROMETHAZINE HYDROCHLORIDE 25 MG/1
25 SUPPOSITORY RECTAL ONCE AS NEEDED
Status: DISCONTINUED | OUTPATIENT
Start: 2023-08-24 | End: 2023-08-24 | Stop reason: HOSPADM

## 2023-08-24 RX ORDER — MEPERIDINE HYDROCHLORIDE 25 MG/ML
12.5 INJECTION INTRAMUSCULAR; INTRAVENOUS; SUBCUTANEOUS
Status: DISCONTINUED | OUTPATIENT
Start: 2023-08-24 | End: 2023-08-24 | Stop reason: HOSPADM

## 2023-08-24 RX ADMIN — LIDOCAINE HYDROCHLORIDE 60 MG: 20 INJECTION, SOLUTION EPIDURAL; INFILTRATION; INTRACAUDAL; PERINEURAL at 14:08

## 2023-08-24 RX ADMIN — PROPOFOL 50 MG: 10 INJECTION, EMULSION INTRAVENOUS at 14:13

## 2023-08-24 RX ADMIN — FENTANYL CITRATE 25 MCG: 50 INJECTION, SOLUTION INTRAMUSCULAR; INTRAVENOUS at 14:14

## 2023-08-24 RX ADMIN — FENTANYL CITRATE 25 MCG: 50 INJECTION, SOLUTION INTRAMUSCULAR; INTRAVENOUS at 14:20

## 2023-08-24 RX ADMIN — MIDAZOLAM HYDROCHLORIDE 1 MG: 1 INJECTION, SOLUTION INTRAMUSCULAR; INTRAVENOUS at 13:34

## 2023-08-24 RX ADMIN — FENTANYL CITRATE 25 MCG: 50 INJECTION, SOLUTION INTRAMUSCULAR; INTRAVENOUS at 14:24

## 2023-08-24 RX ADMIN — ONDANSETRON 4 MG: 2 INJECTION INTRAMUSCULAR; INTRAVENOUS at 14:33

## 2023-08-24 RX ADMIN — SODIUM CHLORIDE, POTASSIUM CHLORIDE, SODIUM LACTATE AND CALCIUM CHLORIDE 9 ML/HR: 600; 310; 30; 20 INJECTION, SOLUTION INTRAVENOUS at 12:48

## 2023-08-24 RX ADMIN — DEXAMETHASONE SODIUM PHOSPHATE 4 MG: 4 INJECTION, SOLUTION INTRAMUSCULAR; INTRAVENOUS at 14:33

## 2023-08-24 RX ADMIN — ROPIVACAINE HYDROCHLORIDE 25 ML: 5 INJECTION EPIDURAL; INFILTRATION; PERINEURAL at 13:51

## 2023-08-24 RX ADMIN — ACETAMINOPHEN 1000 MG: 500 TABLET ORAL at 12:47

## 2023-08-24 RX ADMIN — PROPOFOL 100 MG: 10 INJECTION, EMULSION INTRAVENOUS at 14:08

## 2023-08-24 RX ADMIN — CEFAZOLIN SODIUM 2 G: 2 INJECTION, SOLUTION INTRAVENOUS at 14:05

## 2023-08-24 NOTE — DISCHARGE INSTRUCTIONS
DISCHARGE INSTRUCTIONS  ORTHOPEDICS      For your surgery you had:  General anesthesia (you may have a sore throat for the first 24 hours)  IV sedation.  Local anesthesia  Monitored anesthesia care    You may experience dizziness, drowsiness, or light-headedness for several hours following surgery  Do not stay alone today or tonight.  Limit your activity for 24 hours.  Resume your diet slowly.  Follow whatever special dietary instructions you may have been given by the doctor.  You should not drive or operate machinery or drink alcohol for 24 hours or while you are taking pain medication.  You should not sign any legally binding documents.  If you had an axillary or regional block, you will not have control of the involved limb for up to 12 hours.  Protect the arm with a sling or follow your physician's specific instructions.  Leave surgical dressing intact until follow-up appointment.  You may shower or bathe: SATURDAY. MUST COVER OPERATIVE ARM WITH PLASTIC BAG/WRAP AND KEEP OUT OF WATER.  Sleep with the injured part elevated on a pillow.  Medications per physician's instructions as indicated on Discharge Medication Information Sheet.  Follow verbal instructions of your doctor.  Carry the upper arm in a sling so that the hand and wrist are above the level of the heart.  Exercise fingers for 10 minutes every hour while awake. Ice bag to injured area for 72 hours.  Apply 20 minutes on - 20 minutes off.  Never place ice directly on skin or cast.    Avoid getting cast or dressing wet.  In addition to these instructions, follow the discharge instructions on postoperative arthroscopic surgery.    NOTIFY THE PHYSICIAN IF YOU EXPERIENCE:  Numbness of fingers or toes.  Inability to move fingers or toes.  Extreme coldness, paleness or blue dis-coloration of fingers or toes.  Excessive swelling of affected surgical site or swelling that causes the cast to rub or cut into skin.  Pain unrelieved by pain  medication  Nausea/vomiting not relieved by prescribed medication  Unable to urinate in 6 hours after surgery  Temperature greater than 101 degree Fahrenheit or chills  If unable to reach your doctor, please go to the closest emergency room  You should see   for follow-up care   on   .   Phone number:

## 2023-08-24 NOTE — ANESTHESIA PREPROCEDURE EVALUATION
Anesthesia Evaluation     Patient summary reviewed and Nursing notes reviewed   no history of anesthetic complications:   NPO Solid Status: > 8 hours  NPO Liquid Status: > 2 hours           Airway   Mallampati: I  TM distance: >3 FB  Neck ROM: full  No difficulty expected  Dental      Pulmonary - negative pulmonary ROS and normal exam    breath sounds clear to auscultation  Cardiovascular   Exercise tolerance: poor (<4 METS)    ECG reviewed  PT is on anticoagulation therapy  Patient on routine beta blocker and Beta blocker given within 24 hours of surgery  Rhythm: irregular  Rate: normal    (+) hypertension, valvular problems/murmurs MR, dysrhythmias Atrial Fib, CHF Diastolic >=55%, DVT, hyperlipidemia      Neuro/Psych  (+) dizziness/light headedness, psychiatric history Depression  GI/Hepatic/Renal/Endo    (+) hiatal hernia, GERD    Musculoskeletal     Abdominal    Substance History - negative use     OB/GYN negative ob/gyn ROS         Other   arthritis,   history of cancer (colon)    ROS/Med Hx Other: PAT Nursing Notes unavailable.                 Anesthesia Plan    ASA 4     general with block     (Patient understands anesthesia not responsible for dental damage.)  intravenous induction     Anesthetic plan, risks, benefits, and alternatives have been provided, discussed and informed consent has been obtained with: patient and child.    Use of blood products discussed with patient .    Plan discussed with CRNA.    CODE STATUS:

## 2023-08-24 NOTE — H&P
"H and p      Chief Complaint  Initial Evaluation of the Left Wrist       Subjective          Bing Cruz presents to [facility] for initial evaluation of the left wrist. She had a fall.  She ambulates with a cane.  She had X rays and placed in a splint and sling. She was tried to reduced at the hospital.  She is here today for treatment intervention. Her daughter feels that she has a UTI.  She is getting it tested.  She is in a great deal of pain.  She is on a blood thinner.  Eliquis 2.5 twice a day.           Allergies   Allergen Reactions    Citalopram Unknown - High Severity    Clonazepam Unknown - High Severity    Levofloxacin Nausea And Vomiting    Lisinopril Unknown - High Severity    Macrobid [Nitrofurantoin Macrocrystal] Rash    Melatonin Unknown - High Severity         Social History   Social History           Socioeconomic History    Marital status:    Tobacco Use    Smoking status: Never    Smokeless tobacco: Never   Vaping Use    Vaping Use: Never used   Substance and Sexual Activity    Alcohol use: Not Currently    Drug use: Never    Sexual activity: Defer            I reviewed the patient's chief complaint, history of present illness, review of systems, past medical history, surgical history, family history, social history, medications, and allergy list.      Review of Systems      Constitutional: Denies fevers, chills, weight loss  Cardiovascular: Denies chest pain, shortness of breath  Skin: Denies rashes, acute skin changes  Neurologic: Denies headache, loss of consciousness           Vital Signs:   /78   Pulse 97   Ht 170.2 cm (67\")   Wt 69.4 kg (153 lb)   SpO2 93%   BMI 23.96 kg/mý           Physical Exam  General: Alert. No acute distress     Ortho Exam          LEFT WRIST  The patient is in a long arm splint and sling. Mildly Full ROM of the hand, fingers, elbow and wrist. Sensation grossly intact to light touch, median, radial and ulnar nerve. " Positive AIN, PIN and ulnar nerve motor function intact. Axillary nerve intact. Positive pulses. Moderate swelling and bruising        Procedures           Imaging Results (Most Recent)         None                      Result Review    :            XR Forearm 2 View Left     Result Date: 8/21/2023  Narrative: PROCEDURE:       XR FOREARM 2 VW LEFT  COMPARISON:            None  INDICATIONS:           FELL COMPLAINS OF LEFT ARM PAIN WITH LIMIT RANGE OF MOTION  FINDINGS:             BONES:         There is an impacted comminuted intra-articular fracture of the distal left radius with dorsal angulation.  There is an ulnar styloid process fracture. SOFT TISSUES:        There is extensive soft tissue swelling in the left wrist. EFFUSION:           None visible.  OTHER:         Partially visualized IM josseline in the distal left humerus.       Impression:     Extensive soft tissue swelling in the left wrist.  Fractures of the distal left radius and left ulnar styloid process.      HUY LOPEZ MD       Electronically Signed and Approved By: HUY LOPEZ MD on 8/21/2023 at 17:06              XR Wrist 2 View Left     Result Date: 8/21/2023  Narrative: PROCEDURE:       XR WRIST 2 VW LEFT  COMPARISON:      [facility], , XR FOREARM 2 VW LEFT, 8/21/2023, 16:47.  INDICATIONS:      Postreduction left wrist  FINDINGS:   Interval closed reduction of the fractures of the distal left radius and ulnar styloid process.  Alignment appears similar.  A splint is in place.  IMPRESSION: Closed reduction of the fractures of the distal left radius and ulnar styloid process.   HUY LOPEZ MD       Electronically Signed and Approved By: HUY LOPEZ MD on 8/21/2023 at 19:40              CT Head Without Contrast     Result Date: 8/21/2023  Narrative: PROCEDURE:       CT HEAD WO CONTRAST  COMPARISON:             [facility], CT, HEAD W/O CONTRAST, 10/12/2020, 12:03. INDICATIONS:     FALL. KNOT TO  POSTERIOR HEAD.  PROTOCOL:         Standard imaging protocol performed        RADIATION:              DLP: 1081.2mGy*cm              MA and/or KV was adjusted to minimize radiation dose.               TECHNIQUE:            Axial images of the head without intravenous contrast.  FINDINGS:  The ventricles have a normal size and configuration. There is no evidence of acute intracranial hemorrhage, mass or midline shift. No extra-axial fluid collections are identified. There are no skull fractures. The visualized paranasal sinuses and mastoid air cells are grossly clear.  There is a scalp contusion in left lateral frontal region.  IMPRESSION:  No acute intracranial abnormalities are identified.  HUY LOPEZ MD       Electronically Signed and Approved By: HUY LOPEZ MD on 8/21/2023 at 18:22                         Assessment      Assessment and Plan      Diagnoses and all orders for this visit:     1. Closed fracture of distal end of left radius, unspecified fracture morphology, initial encounter (Primary)        Discussed the treatment plan with the patient. I reviewed the X-rays that were obtained 8/21/23 with the patient.      Discussed the treatment options with the patient, operative vs non-operative.      The patient expressed understanding and wished to proceed left open reduction and fixation of the left wrist.            Discussed surgery., Risks/benefits discussed with patient including, but not limited to: infection, bleeding, neurovascular damage, re-rupture, aesthetic deformity, need for further surgery, and death., Discussed with patient the implant type being used during surgery and patient understands., Surgery pamphlet given., and Call or return if worsening symptoms.     Follow Up      2 weeks postoperatively    Ben Alegre MD  08/24/23

## 2023-08-24 NOTE — TELEPHONE ENCOUNTER
Caller: STEPHANIE BENITES    Relationship to patient: Emergency Contact    Best call back number: [phone]    Patient is needing: PATIENTS DAUGHTER CALLED REQUESTING DOCTOR CASTRO HAVE A LOOK AT HER RESULTS FROM HER RECENT URINE ANALYSIS. DAUGHTER STATES ON MY CHART IT CAME BACK POSITIVE AGAIN AND SHE IS NEEDING TO HAVE AN ANTIBIOTIC CALLED IN.

## 2023-08-24 NOTE — ANESTHESIA POSTPROCEDURE EVALUATION
Patient: Bing Cruz    Procedure Summary       Date: 08/24/23 Room / Location: [facility]  / [facility]    Anesthesia Start: 1405 Anesthesia Stop: 1500    Procedure: OPEN REDUCTION AND INTERNAL FIXATION WRIST (Left: Wrist) Diagnosis:       Closed fracture of distal end of left radius, unspecified fracture morphology, initial encounter      (Closed fracture of distal end of left radius, unspecified fracture morphology, initial encounter [S52.502A])    Surgeons: Ben Alegre MD Provider: Shae Nguyen MD    Anesthesia Type: general with block ASA Status: 4            Anesthesia Type: general with block    Vitals  Vitals Value Taken Time   /99 08/24/23 1519   Temp 36.2 øC (97.2 øF) 08/24/23 1458   Pulse 111 08/24/23 1519   Resp 20 08/24/23 1458   SpO2 95 % 08/24/23 1519   Vitals shown include unvalidated device data.        Post Anesthesia Care and Evaluation    Patient location during evaluation: bedside  Patient participation: complete - patient participated  Level of consciousness: awake  Pain management: adequate    Airway patency: patent  PONV Status: none  Cardiovascular status: acceptable and stable  Respiratory status: acceptable  Hydration status: acceptable    Comments: An Anesthesiologist personally participated in the most demanding procedures (including induction and emergence if applicable) in the anesthesia plan, monitored the course of anesthesia administration at frequent intervals and remained physically present and available for immediate diagnosis and treatment of emergencies.

## 2023-08-24 NOTE — TELEPHONE ENCOUNTER
I spoke to patient's daughter Nicole.  She noted that the patient's urine started with more cloudy with potential of blood.  Urine culture grew 100,000 E. coli in August 8 sensitive to cephalosporins resistant to levofloxacin.  She has finished the Keflex 5 mg 1 tablet 3 times a day with the change in the urine noted by her daughter.  Repeat urine was done yesterday which grew only 25,000 colonies mixed sandy.  Discussed this with the daughter

## 2023-08-24 NOTE — ANESTHESIA PROCEDURE NOTES
Peripheral Block      Patient reassessed immediately prior to procedure    Patient location during procedure: pre-op  Reason for block: at surgeon's request and post-op pain management  Performed by  Anesthesiologist: Shae Nguyen MD  Preanesthetic Checklist  Completed: patient identified, IV checked, site marked, risks and benefits discussed, surgical consent, monitors and equipment checked, pre-op evaluation and timeout performed  Prep:  Pt Position: supine  Sterile barriers:alcohol skin prep, partial drape, cap, washed/disinfected hands, gloves and mask  Prep: ChloraPrep  Patient monitoring: blood pressure monitoring, continuous pulse oximetry and EKG  Procedure    Sedation: yes  Performed under: local infiltration  Guidance:ultrasound guided    ULTRASOUND INTERPRETATION.  Using ultrasound guidance a 22 G gauge needle was placed in close proximity to the brachial plexus nerve, at which point, under ultrasound guidance anesthetic was injected in the area of the nerve and spread of the anesthesia was seen on ultrasound in close proximity thereto.  There were no abnormalities seen on ultrasound; a digital image was taken; and the patient tolerated the procedure with no complications. Images:still images obtained, printed/placed on chart    Laterality:left  Block Type:axillary  Injection Technique:single-shot  Needle Type:echogenic  Needle Gauge:22 G (2in)  Resistance on Injection: none    Medications Used: ropivacaine (NAROPIN) 0.5 % injection - Injection   25 mL - 8/24/2023 1:51:00 PM      Medications  Comment:With epi 1:200,000    Post Assessment  Injection Assessment: negative aspiration for heme, no paresthesia on injection and incremental injection  Patient Tolerance:comfortable throughout block  Complications:no  Additional Notes  The block or continuous infusion is requested by the referring physician for management of postoperative pain, or pain related to a procedure. Ultrasound guidance (deemed  medically necessary). Painless injection, pt was awake and conversant during the procedure without complications. Needle and surrounding structures visualized throughout procedure. No adverse reactions or complications seen during this period. Post-procedure image showed no signs of complication, and anatomy was consistent with an uncomplicated nerve blockade.

## 2023-08-25 NOTE — OP NOTE
WRIST OPEN REDUCTION INTERNAL FIXATION  Procedure Report    Patient Name:  Bing Cruz  YOB: 1931    Date of Surgery:  8/24/2023       Pre-op Diagnosis:   Closed fracture of distal end of left radius, unspecified fracture morphology, initial encounter [S52.502A]     Three-part intra-articular left distal radius fracture same  Post-Op Diagnosis Codes:     * Closed fracture of distal end of left radius, unspecified fracture morphology, initial encounter [S52.502A]    Procedure/CPTr Codes:      Procedure(s):  Open reduction internal fixation of a three-part intra-articular left distal radius fracture staff:  Surgeon(s):  Ben Alegre MD    Assistant: Lisy Padilla RN    Anesthesia: General with Block    Estimated Blood Loss: none    Implants:    Implant Name Type Inv. Item Serial No.  Lot No. LRB No. Used Action   PLT D/R VARIAX SMRTLK VOLRANAT STD LT - [id number] Implant PLT D/R VARIAX SMRTLK VOLRANAT STD LT  JOSE JU[employer] ELOISA NR Left 1 Implanted   SCRW NL BONE FUL/THRD T8 2.7X14MM - [id number] Implant SCRW NL BONE FUL/THRD T8 2.7X14MM  JOSE JUAN ELOISA NR Left 1 Implanted   SCRW LK BONE VARIAX FUL/THRD V8 2.4X12MM - [id number] Implant SCRW LK BONE VARIAX FUL/THRD V8 2.4X12MM  JOSE JUAN ELOISA NR Left 1 Implanted   SCRW LK BONE VARIAX FUL/THRD V8 2.4X16MM - [id number] Implant SCRW LK BONE VARIAX FUL/THRD V8 2.4X16MM  JOSE JUAN ELOISA NR Left 1 Implanted   SCRW LK BONE VARIAX FUL/THRD V8 2.4X18MM - [id number] Implant SCRW LK BONE VARIAX FUL/THRD V8 2.4X18MM  JOSE JUAN ELOISA NR Left 1 Implanted   SCRW LK BONE VARIAX FUL/THRD V8 2.4X20MM - [id number] Implant SCRW LK BONE VARIAX FUL/THRD V8 2.4X20MM  JOSE JUAN ELOISA NR Left 1 Implanted   SCRW LK BONE VARIAX T10 3.5X12MM - [id number] Implant SCRW LK BONE VARIAX T10 3.5X12MM  [employer] ELOISA NR Left 2 Implanted       Specimen:          None      Complications: None    Description of Procedure: See H&P for risks and benefits. The patient was taken to the  operating room and placed supine on the table.  After general endotracheal anesthesia was established, the left wrist was prepped and draped in standard usual fashion using alcohol and ChloraPrep.  A standard incision was made just distal ulnar to the flexor carpi radialis and carried down to the subcutaneous tissue using a knife.  Dissection was carried down while protecting the radial artery and the median nerve down to the profundus quadratus.  The profundus quadratus was elevated off the distal radius.  The fracture site was repaired using a knife, curette, and irrigation.  A temporary reduction was held manually while a Johnathan VariAx plate was placed in the distal radius.  It was held with a cortical screw proximal to the fracture site using standard technique and C-arm shots with excellent alignment of the plate and the end of the fracture.  Then, the locking holes were filled proximally and distally using standard locking screw technique.  C-arm shots showed anatomic alignment of the fracture.  The radial height, inclination, and volar tilt were restored, and there were no complications from the implants.  The wounds were copiously irrigated.  The profundus quadratus was closed with 0 Vicryl, the subcutaneous was closed with 2-0 Vicryl, and the skin was closed with horizontal simple interrupted nylon.  The incisions were washed and dried and a sterile dressing applied, including a volar plaster splint and Ace wrap.  The patient tolerated the procedure well, was extubated, and taken to the recovery room.       Ben Alegre MD     Date: 8/25/2023  Time: 12:36 EDT

## 2023-08-29 ENCOUNTER — TELEPHONE (OUTPATIENT)
Dept: ORTHOPEDIC SURGERY | Facility: CLINIC | Age: 88
End: 2023-08-29
Payer: MEDICARE

## 2023-08-29 DIAGNOSIS — S62.102A CLOSED FRACTURE OF LEFT WRIST, INITIAL ENCOUNTER: ICD-10-CM

## 2023-08-29 LAB
QT INTERVAL: 357 MS
QTC INTERVAL: 458 MS

## 2023-08-29 NOTE — TELEPHONE ENCOUNTER
Spoke with patient daughter and she stated patient is taking abx prescribe and she is feeling better.

## 2023-08-29 NOTE — TELEPHONE ENCOUNTER
PATIENT REQUESTING PAIN MEDICATION REFILL. [facility] IN [facility].

## 2023-08-30 RX ORDER — HYDROCODONE BITARTRATE AND ACETAMINOPHEN 5; 325 MG/1; MG/1
1 TABLET ORAL EVERY 6 HOURS PRN
Qty: 28 TABLET | Refills: 0 | Status: SHIPPED | OUTPATIENT
Start: 2023-08-30

## 2023-09-01 DIAGNOSIS — F41.9 ANXIETY: ICD-10-CM

## 2023-09-02 RX ORDER — LORAZEPAM 0.5 MG/1
0.5 TABLET ORAL NIGHTLY PRN
Qty: 30 TABLET | Refills: 5 | Status: SHIPPED | OUTPATIENT
Start: 2023-09-02

## 2023-09-05 DIAGNOSIS — R19.7 DIARRHEA, UNSPECIFIED TYPE: ICD-10-CM

## 2023-09-05 RX ORDER — MONTELUKAST SODIUM 4 MG/1
2 TABLET, CHEWABLE ORAL DAILY
Qty: 180 TABLET | Refills: 3 | Status: SHIPPED | OUTPATIENT
Start: 2023-09-05

## 2023-09-05 NOTE — TELEPHONE ENCOUNTER
Patient's daughter called and left a voicemail stating that Dahsa Cody told this patient to take 2 tablets of Colestipol by mouth daily but that her current rx bottle just said one and that they needed a refill.

## 2023-09-05 NOTE — TELEPHONE ENCOUNTER
Called and let patient's daughter know that I received her voicemail and that the refill was in the process and had been sent to the provider. Daughter verbalized understanding.

## 2023-09-06 ENCOUNTER — OFFICE VISIT (OUTPATIENT)
Dept: INTERNAL MEDICINE | Facility: CLINIC | Age: 88
End: 2023-09-06
Payer: MEDICARE

## 2023-09-06 ENCOUNTER — OFFICE VISIT (OUTPATIENT)
Dept: ORTHOPEDIC SURGERY | Facility: CLINIC | Age: 88
End: 2023-09-06
Payer: MEDICARE

## 2023-09-06 VITALS
TEMPERATURE: 96.8 F | OXYGEN SATURATION: 98 % | DIASTOLIC BLOOD PRESSURE: 90 MMHG | HEART RATE: 101 BPM | BODY MASS INDEX: 22.79 KG/M2 | WEIGHT: 145.2 LBS | SYSTOLIC BLOOD PRESSURE: 140 MMHG | HEIGHT: 67 IN

## 2023-09-06 VITALS — WEIGHT: 142 LBS | HEIGHT: 67 IN | BODY MASS INDEX: 22.29 KG/M2

## 2023-09-06 DIAGNOSIS — S52.592D OTHER CLOSED FRACTURE OF DISTAL END OF LEFT RADIUS WITH ROUTINE HEALING, SUBSEQUENT ENCOUNTER: ICD-10-CM

## 2023-09-06 DIAGNOSIS — I10 HYPERTENSION, ESSENTIAL: ICD-10-CM

## 2023-09-06 DIAGNOSIS — N30.01 ACUTE CYSTITIS WITH HEMATURIA: Primary | ICD-10-CM

## 2023-09-06 DIAGNOSIS — S62.102A CLOSED FRACTURE OF LEFT WRIST, INITIAL ENCOUNTER: Primary | ICD-10-CM

## 2023-09-06 DIAGNOSIS — R30.0 BURNING WITH URINATION: ICD-10-CM

## 2023-09-06 DIAGNOSIS — I48.20 ATRIAL FIBRILLATION, CHRONIC: ICD-10-CM

## 2023-09-06 DIAGNOSIS — Z47.89 AFTERCARE FOLLOWING SURGERY OF THE MUSCULOSKELETAL SYSTEM: ICD-10-CM

## 2023-09-06 LAB
BILIRUB BLD-MCNC: NEGATIVE MG/DL
CLARITY, POC: ABNORMAL
COLOR UR: YELLOW
GLUCOSE UR STRIP-MCNC: NEGATIVE MG/DL
KETONES UR QL: NEGATIVE
LEUKOCYTE EST, POC: NEGATIVE
NITRITE UR-MCNC: POSITIVE MG/ML
PH UR: 6 [PH] (ref 5–8)
PROT UR STRIP-MCNC: ABNORMAL MG/DL
RBC # UR STRIP: ABNORMAL /UL
SP GR UR: 1.01 (ref 1–1.03)
UROBILINOGEN UR QL: ABNORMAL

## 2023-09-06 PROCEDURE — 87086 URINE CULTURE/COLONY COUNT: CPT | Performed by: INTERNAL MEDICINE

## 2023-09-06 PROCEDURE — 87077 CULTURE AEROBIC IDENTIFY: CPT | Performed by: INTERNAL MEDICINE

## 2023-09-06 PROCEDURE — 87186 SC STD MICRODIL/AGAR DIL: CPT | Performed by: INTERNAL MEDICINE

## 2023-09-06 RX ORDER — HYDROCODONE BITARTRATE AND ACETAMINOPHEN 7.5; 325 MG/1; MG/1
1 TABLET ORAL EVERY 4 HOURS PRN
Qty: 40 TABLET | Refills: 0 | Status: SHIPPED | OUTPATIENT
Start: 2023-09-06

## 2023-09-06 RX ORDER — BIMATOPROST 0.1 MG/ML
SOLUTION/ DROPS OPHTHALMIC
COMMUNITY
Start: 2023-08-30

## 2023-09-06 RX ORDER — FLUCONAZOLE 100 MG/1
TABLET ORAL
Qty: 3 TABLET | Refills: 1 | Status: SHIPPED | OUTPATIENT
Start: 2023-09-06

## 2023-09-06 NOTE — ASSESSMENT & PLAN NOTE
Patient has had a cast change today, no significant swelling etc.  Discussed with her to try to keep it elevated to help reduce this down the road.  She is requiring hydrocodone every 6 hours as written presently, discussed with family this is probably contributing to decrease in appetite and hypersomnolence.

## 2023-09-06 NOTE — ASSESSMENT & PLAN NOTE
August 24, 2023  Lora Rudd MD    I spoke to patient's daughter Nicole.  She noted that the patient's urine started with more cloudy with potential of blood.  Urine culture grew 100,000 E. coli in August 8 sensitive to cephalosporins resistant to levofloxacin.  She has finished the Keflex 5 mg 1 tablet 3 times a day with the change in the urine noted by her daughter.  Repeat urine was done yesterday which grew only 25,000 colonies mixed sandy.  Discussed this with the daughter        ---> Patient here 9/6/2023 for follow-up recent UTI.  Her urinalysis is nitrite positive and there is 3+ blood.  Leukocyte is actually negative presently.  Her last culture was negative.  I think we should wait to see if anything grows out on this since she is not toxic, no fevers no flank pain, no emesis.  She does have a little bit of anorexia.

## 2023-09-06 NOTE — ASSESSMENT & PLAN NOTE
Patient is in chronic A-fib, relatively controlled around 100, she is on moderate to high dose metoprolol, so obviously continue same.  Additionally she is on renally adjusted Eliquis, did suffer a recent fall, hit her head, but head CT was negative on 8/21/2023.  She stable to continue current dose of Eliquis.

## 2023-09-06 NOTE — PROGRESS NOTES
"Chief Complaint  Pain and Follow-up of the Left Wrist    Subjective      Bing Cruz presents to [facility] for follow-up of ORIF of three-part intra-articular left distal radius fracture performed on 8/24/2023 by Dr. Alegre.  Patient presents today with postoperative splint and dressing in place and in the presence of her daughter.  Daughter reports pain has been poorly controlled with use of Norco.    Objective   Allergies   Allergen Reactions    Citalopram Unknown - High Severity    Clonazepam Unknown - High Severity    Levofloxacin Nausea And Vomiting    Lisinopril Unknown - High Severity    Macrobid [Nitrofurantoin Macrocrystal] Rash    Melatonin Unknown - High Severity       Vital Signs:   Ht 170.2 cm (67\")   Wt 64.4 kg (142 lb)   BMI 22.24 kg/m²       Physical Exam    Constitutional: Awake, alert. Well nourished appearance.    Integumentary: Warm, dry, intact. No obvious rashes.    HENT: Atraumatic, normocephalic.   Respiratory: Non labored respirations .   Cardiovascular: Intact peripheral pulses.    Psychiatric: Normal mood and affect. A&O X3    Ortho Exam  Left wrist: Well-healing surgical incision visualized.  Moderate edema and ecchymosis.  Limited wrist ROM in all planes.  Patient is able to form a loose fist.  Limited thumb opposition.  Sensation intact light touch.  Distal neurovascular intact.    Imaging Results (Most Recent)       Procedure Component Value Units Date/Time    XR Wrist 2 View Left [[id number]] Resulted: 09/06/23 0933     Updated: 09/06/23 0934    Narrative:      X-Ray Report:  Study: X-rays ordered, taken in the office, and reviewed today.   Site: Left wrist Xray  Indication: ORIF  View: AP/Lateral view(s)  Findings: Intact left distal radius ORIF fixation hardware. No evidence of   hardware malfunction.   Prior studies available for comparison: yes              Orthopedic Injury Treatment    Date/Time: 9/6/2023 10:51 AM  Performed by: Mae Woods, " IMER  Authorized by: Mae Woods PA-C   Injury location: wrist  Location details: left wrist  Pre-procedure neurovascular assessment: neurovascularly intact    Anesthesia:  Local anesthesia used: no    Sedation:  Patient sedated: no    Immobilization: cast  Post-procedure neurovascular assessment: post-procedure neurovascularly intact  Patient tolerance: patient tolerated the procedure well with no immediate complications  Comments: Patient was placed in fiberglass cast today.  The patient tolerated the procedure without any complications.              Assessment and Plan   Problem List Items Addressed This Visit          Musculoskeletal and Injuries    Aftercare following surgery of left femoral neck ORIF 7/21/2021    Wrist fracture, left - Primary    Relevant Orders    XR Wrist 2 View Left (Completed)       Follow Up   Return in about 3 weeks (around 9/27/2023).    Tobacco Use: Low Risk     Smoking Tobacco Use: Never    Smokeless Tobacco Use: Never    Passive Exposure: Not on file     Patient is a non-smoker.  Did not discuss tobacco cessation options.    Patient Instructions   X-rays taken and reviewed, showing intact hardware.    Sutures/staples removed in office.  Left short arm cast applied in office.  Patient educated on cast care.  Please keep cast clean and dry.  No lifting, pushing, or pulling. Continue elevation as needed to help with pain and swelling.      Follow-up in 4 weeks. Remove case and repeat x-rays needed at this visit.  Please call with questions or concerns.  Patient was given instructions and counseling regarding her condition or for health maintenance advice. Please see specific information pulled into the AVS if appropriate.

## 2023-09-06 NOTE — PROGRESS NOTES
"Chief Complaint  Urinary Tract Infection (Burning with urination, she had UTI on 8/24/2023 when she had surgery on her wrist. She finished antibiotics. She did mention that it feels like a UTI or yeast infection) and Anorexia (Pt's daughter states that she has no appetite. She also states that she is sleeping a lot. )    Subjective      Bing Cruz presents to [facility] INTERNAL MEDICINE    History of Present Illness  Patient is a 89 yo female, patient of Dr Pierre, with underlying AFIB on Eliquis, HTN, diastolic HF on ARB/b-blocker, SOMMER, colon ca in 6/20, among others, who is being seen 9/23 for urgent visit as per the chief complaint above.    Review of Systems   Constitutional:  Negative for appetite change, fatigue and fever.   HENT:  Negative for congestion and ear pain.    Eyes:  Negative for blurred vision.   Respiratory:  Negative for cough, chest tightness, shortness of breath and wheezing.    Cardiovascular:  Negative for chest pain, palpitations and leg swelling.   Gastrointestinal:  Negative for abdominal pain.   Genitourinary:  Negative for difficulty urinating, dysuria and hematuria.   Musculoskeletal:  Negative for arthralgias and gait problem.   Skin:  Negative for skin lesions.   Neurological:  Negative for syncope, memory problem and confusion.   Psychiatric/Behavioral:  Negative for self-injury and depressed mood.      Objective   Vital Signs:   /90   Pulse 101   Temp 96.8 °F (36 °C) (Skin)   Ht 170.2 cm (67.01\")   Wt 65.9 kg (145 lb 3.2 oz)   SpO2 98%   BMI 22.74 kg/m²           Physical Exam  Vitals and nursing note reviewed.   Constitutional:       General: She is not in acute distress.     Appearance: Normal appearance. She is not toxic-appearing.   HENT:      Head: Atraumatic.      Right Ear: External ear normal.      Left Ear: External ear normal.      Nose: Nose normal.      Mouth/Throat:      Mouth: Mucous membranes are moist.   Eyes:      General:    "      Right eye: No discharge.         Left eye: No discharge.      Extraocular Movements: Extraocular movements intact.      Pupils: Pupils are equal, round, and reactive to light.   Cardiovascular:      Rate and Rhythm: Normal rate and regular rhythm.      Pulses: Normal pulses.      Heart sounds: Normal heart sounds. No murmur heard.    No gallop.   Pulmonary:      Effort: Pulmonary effort is normal. No respiratory distress.      Breath sounds: No wheezing, rhonchi or rales.   Abdominal:      General: There is no distension.      Palpations: Abdomen is soft. There is no mass.      Tenderness: There is no abdominal tenderness. There is no guarding.   Musculoskeletal:         General: No swelling or tenderness.      Cervical back: No tenderness.      Right lower leg: No edema.      Left lower leg: No edema.   Skin:     General: Skin is warm and dry.      Findings: No rash.   Neurological:      General: No focal deficit present.      Mental Status: She is alert and oriented to person, place, and time. Mental status is at baseline.      Motor: No weakness.      Gait: Gait normal.   Psychiatric:         Mood and Affect: Mood normal.         Thought Content: Thought content normal.        Result Review   The following data was reviewed by: Richar Baeza MD on 09/06/2023:  [] Laboratory  [] Microbiology  [] Radiology  [] EKG/telemetry  [] Cardiology/Vascular  [] Pathology  [] Old records             Assessment and Plan   Diagnoses and all orders for this visit:    1. Acute cystitis with hematuria (Primary)  Assessment & Plan:  August 24, 2023  Lora Rudd MD    I spoke to patient's daughter Nicole.  She noted that the patient's urine started with more cloudy with potential of blood.  Urine culture grew 100,000 E. coli in August 8 sensitive to cephalosporins resistant to levofloxacin.  She has finished the Keflex 5 mg 1 tablet 3 times a day with the change in the urine noted by her daughter.  Repeat  urine was done yesterday which grew only 25,000 colonies mixed sandy.  Discussed this with the daughter        ---> Patient here 9/6/2023 for follow-up recent UTI.  Her urinalysis is nitrite positive and there is 3+ blood.  Leukocyte is actually negative presently.  Her last culture was negative.  I think we should wait to see if anything grows out on this since she is not toxic, no fevers no flank pain, no emesis.  She does have a little bit of anorexia.      Orders:  -     Urine Culture - Urine, Urine, Clean Catch; Future  -     Urine Culture - Urine, Urine, Clean Catch    2. Atrial fibrillation, chronic  Assessment & Plan:  Patient is in chronic A-fib, relatively controlled around 100, she is on moderate to high dose metoprolol, so obviously continue same.  Additionally she is on renally adjusted Eliquis, did suffer a recent fall, hit her head, but head CT was negative on 8/21/2023.  She stable to continue current dose of Eliquis.      3. Hypertension, essential  Assessment & Plan:  Blood pressure is reasonable for her at least as of her 9/23 office visit.  We will make no changes at this time, she will continue with low-dose losartan along with moderate to high dose of metoprolol given her A-fib, and low-dose Lasix.      4. Burning with urination  -     POCT urinalysis dipstick, manual  -     Urine Culture - Urine, Urine, Clean Catch; Future  -     Urine Culture - Urine, Urine, Clean Catch    5. Other closed fracture of distal end of left radius with routine healing, subsequent encounter  Assessment & Plan:  Patient has had a cast change today, no significant swelling etc.  Discussed with her to try to keep it elevated to help reduce this down the road.  She is requiring hydrocodone every 6 hours as written presently, discussed with family this is probably contributing to decrease in appetite and hypersomnolence.      Other orders  -     fluconazole (DIFLUCAN) 100 MG tablet; 1 tablet every other day for 3 doses.   Dispense: 3 tablet; Refill: 1        BMI is within normal parameters. No other follow-up for BMI required.            Follow Up   Return for Next scheduled follow up.  Patient was given instructions and counseling regarding her condition or for health maintenance advice. Please see specific information pulled into the AVS if appropriate.     Total Time Spent:   minutes     This time includes time spent by me in the following activities: preparing for the visit, reviewing extensive past medical history and tests, performing a medically appropriate examination and/or evaluation, counseling and educating the patient and/or caregivers, ordering medications, tests, or procedures, referring and/or communicating with other health care professionals and documenting information in the medical record all on this date of service.

## 2023-09-06 NOTE — ASSESSMENT & PLAN NOTE
Blood pressure is reasonable for her at least as of her 9/23 office visit.  We will make no changes at this time, she will continue with low-dose losartan along with moderate to high dose of metoprolol given her A-fib, and low-dose Lasix.

## 2023-09-06 NOTE — PATIENT INSTRUCTIONS
X-rays taken and reviewed, showing intact hardware.    Sutures/staples removed in office.  Left short arm cast applied in office.  Patient educated on cast care.  Please keep cast clean and dry.  No lifting, pushing, or pulling. Continue elevation as needed to help with pain and swelling.      Follow-up in 4 weeks. Remove case and repeat x-rays needed at this visit.  Please call with questions or concerns.

## 2023-09-08 LAB — BACTERIA SPEC AEROBE CULT: ABNORMAL

## 2023-09-08 RX ORDER — CEPHALEXIN 250 MG/1
250 CAPSULE ORAL 3 TIMES DAILY
Qty: 21 CAPSULE | Refills: 0 | Status: SHIPPED | OUTPATIENT
Start: 2023-09-08 | End: 2023-09-08

## 2023-09-08 RX ORDER — SULFAMETHOXAZOLE AND TRIMETHOPRIM 800; 160 MG/1; MG/1
1 TABLET ORAL 2 TIMES DAILY
Qty: 14 TABLET | Refills: 0 | Status: SHIPPED | OUTPATIENT
Start: 2023-09-08

## 2023-09-08 NOTE — PROGRESS NOTES
Relayed Dr. Baeza's message for the UA results to the patient's daughter and she verbalized understanding.

## 2023-09-12 ENCOUNTER — OFFICE VISIT (OUTPATIENT)
Dept: ONCOLOGY | Facility: HOSPITAL | Age: 88
End: 2023-09-12
Payer: MEDICARE

## 2023-09-12 VITALS
TEMPERATURE: 98.8 F | HEART RATE: 105 BPM | DIASTOLIC BLOOD PRESSURE: 84 MMHG | OXYGEN SATURATION: 98 % | WEIGHT: 141.09 LBS | SYSTOLIC BLOOD PRESSURE: 135 MMHG | RESPIRATION RATE: 20 BRPM | BODY MASS INDEX: 22.09 KG/M2

## 2023-09-12 DIAGNOSIS — C18.2 MALIGNANT NEOPLASM OF ASCENDING COLON: ICD-10-CM

## 2023-09-12 DIAGNOSIS — D50.9 IRON DEFICIENCY ANEMIA, UNSPECIFIED IRON DEFICIENCY ANEMIA TYPE: Primary | ICD-10-CM

## 2023-09-12 PROCEDURE — G0463 HOSPITAL OUTPT CLINIC VISIT: HCPCS | Performed by: INTERNAL MEDICINE

## 2023-09-12 RX ORDER — CEPHALEXIN 250 MG/1
1 CAPSULE ORAL 3 TIMES DAILY
COMMUNITY
Start: 2023-09-08 | End: 2023-09-15

## 2023-09-12 NOTE — ASSESSMENT & PLAN NOTE
CBC, iron profile, ferritin have all normalized.  She does not require routine follow-up here but should have a CBC at least yearly as part of the physical evaluation by her primary care provider.  I am happy to see her back in the future should any questions or concerns arise.

## 2023-09-12 NOTE — PROGRESS NOTES
Chief Complaint  Colon Cancer    Jairo Kramer,*  Jairo Kramer MD    Subjective          Bing Cruz presents to [facility] for follow-up of her iron deficiency anemia and stage I colon cancer.  She status post right hemicolectomy.  She underwent surveillance colonoscopy August 21.  She reports occasional constipation alternating with diarrhea depending on what she eats.  She denies any blood per rectum, melena or pain with bowel movement.  She has completed oral iron therapy.  She feels like she is eating adequately.  She had a recent fall with a fracture of her left arm.  It is in a cast.    Oncology/Hematology History Overview Note   6/23/20 Ascending colon pathology revealed invasive, moderated      differentiated adenocarcinoma.  3.5 cm.  T1, N0, M0 = stage I                 Surgeries      7/8/2020 Right hemicholectomy with negative margins and 14 lymph nodes negative performed by Dr. Back.         Malignant neoplasm of ascending colon   7/27/2021 Initial Diagnosis    Malignant neoplasm of colon (CMS/HCC)         Review of Systems   Constitutional:  Positive for fatigue. Negative for appetite change, diaphoresis, fever, unexpected weight gain and unexpected weight loss.   HENT:  Positive for hearing loss. Negative for mouth sores, sore throat, swollen glands, trouble swallowing and voice change.    Eyes:  Negative for blurred vision.   Respiratory:  Negative for cough, shortness of breath and wheezing.    Cardiovascular:  Negative for chest pain and palpitations.   Gastrointestinal:  Negative for abdominal pain, blood in stool, constipation, diarrhea, nausea and vomiting.   Endocrine: Negative for cold intolerance and heat intolerance.   Genitourinary:  Negative for difficulty urinating, dysuria, frequency, hematuria and urinary incontinence.   Musculoskeletal:  Negative for arthralgias, back pain and myalgias.   Skin:  Negative for rash, skin  lesions and wound.   Neurological:  Positive for weakness. Negative for dizziness, seizures, numbness and headache.   Hematological:  Bruises/bleeds easily.   Psychiatric/Behavioral:  Negative for depressed mood. The patient is not nervous/anxious.    Current Outpatient Medications on File Prior to Visit   Medication Sig Dispense Refill    apixaban (ELIQUIS) 2.5 MG tablet tablet Take 1 tablet by mouth 2 (Two) Times a Day. 180 tablet 3    ascorbic acid (VITAMIN C) 500 MG tablet Take 1 tablet by mouth Daily.      AZO-CRANBERRY PO Take  by mouth.      cephalexin (KEFLEX) 250 MG capsule Take 1 capsule by mouth 3 (Three) Times a Day.      colestipol (COLESTID) 1 g tablet Take 2 tablets by mouth Daily. 180 tablet 3    fluconazole (DIFLUCAN) 100 MG tablet 1 tablet every other day for 3 doses. 3 tablet 1    FLUoxetine (PROzac) 10 MG capsule Take 1 capsule by mouth Daily.      furosemide (LASIX) 20 MG tablet Take 1 tablet by mouth 2 (Two) Times a Day. 180 tablet 3    HYDROcodone-acetaminophen (NORCO) 5-325 MG per tablet Take 1 tablet by mouth Every 6 (Six) Hours As Needed for Moderate Pain. 28 tablet 0    HYDROcodone-acetaminophen (Norco) 7.5-325 MG per tablet Take 1 tablet by mouth Every 4 (Four) Hours As Needed for Moderate Pain. 40 tablet 0    latanoprost (XALATAN) 0.005 % ophthalmic solution Administer 1 drop to both eyes Daily.      LORazepam (ATIVAN) 0.5 MG tablet TAKE 1 TABLET BY MOUTH AT NIGHT AS NEEDED FOR ANXIETY. 30 tablet 5    losartan (Cozaar) 25 MG tablet Take 1 tablet by mouth Daily. 30 tablet 3    Lumigan 0.01 % ophthalmic drops INSTILL 1 DROP IN BOTH EYES NIGHTLY      metoprolol succinate XL (TOPROL-XL) 100 MG 24 hr tablet Take 1 tablet by mouth 2 (Two) Times a Day. 180 tablet 3    potassium chloride 10 MEQ CR tablet Take 1 tablet by mouth 2 (Two) Times a Day. 180 tablet 3    Probiotic Product (Florajen3) capsule TAKE 1 CAPSULE BY MOUTH EVERY DAY 90 capsule 1    sulfamethoxazole-trimethoprim (Bactrim DS)  800-160 MG per tablet Take 1 tablet by mouth 2 (Two) Times a Day. 14 tablet 0    timolol (TIMOPTIC) 0.5 % ophthalmic solution INSTILL 1 DROP IN BOTH EYES IN THE MORNING       No current facility-administered medications on file prior to visit.       Allergies   Allergen Reactions    Citalopram Unknown - High Severity    Clonazepam Unknown - High Severity    Levofloxacin Nausea And Vomiting    Lisinopril Unknown - High Severity    Macrobid [Nitrofurantoin Macrocrystal] Rash    Melatonin Unknown - High Severity     Past Medical History:   Diagnosis Date    Abdominal pain, generalized     Anemia     Aortic regurgitation     Aortic stenosis     Cardiomegaly     Chronic atrial fibrillation 01/01/2019    Atrial fibrillation converted to sinus through cardioversion (March 2019    Chronic fatigue     Colon cancer     Diarrhea     Disease of tricuspid valve     Diverticulosis of colon     DVT (deep venous thrombosis)     LEFT LEG GREATER THAN 5 YRS AGO    Dysphagia     Essential (primary) hypertension     Hiatal hernia     Insomnia, unspecified     LVH (left ventricular hypertrophy)     Major depressive disorder, single episode     Mitral regurgitation     Mitral valve insufficiency and aortic valve insufficiency     Osteopenia     Pain in joint, pelvic region and thigh     TR (tricuspid regurgitation)     UTI (urinary tract infection)     antibx to be completed prior to procedure. ABIOLA Carlos RN ([facility]) notified.     Past Surgical History:   Procedure Laterality Date    ABDOMINAL HYSTERECTOMY      WITH BSO     ANKLE SURGERY      (L) ankle fracture    BLADDER REPAIR      BREAST BIOPSY      (L) breast biopsy    CARDIOVERSION  2019    CATARACT EXTRACTION      OU    CHOLECYSTECTOMY      OPEN    COLON SURGERY      STATES SHE HAD 12 INCHES OF COLON REMOVED IN JULY 2020 FOR COLON CANCER    COLONOSCOPY  2020    COLONOSCOPY N/A 10/29/2021    Procedure: COLONOSCOPY;  Surgeon: Edmar Back MD;  Location: [facility] ENDOSCOPY;   Service: General;  Laterality: N/A;  DIVERTICULOSIS/ANASTOMOSIS    FEMUR OPEN REDUCTION INTERNAL FIXATION Left 07/21/2021    Procedure: FEMORAL NECK OPEN REDUCTION INTERNAL FIXATION;  Surgeon: Bam Warner MD;  Location: [facility] MAIN OR;  Service: Orthopedics;  Laterality: Left;    HIP CLOSED REDUCTION Left 3/20/2022    Procedure: HIP CLOSED REDUCTION;  Surgeon: Harsha Gayle MD;  Location: [facility] MAIN OR;  Service: Orthopedics;  Laterality: Left;    INGUINAL HERNIA REPAIR Left 09/29/2011    ORIF WRIST FRACTURE Left 8/24/2023    Procedure: OPEN REDUCTION AND INTERNAL FIXATION WRIST;  Surgeon: Ben Alegre MD;  Location: [facility] OR [facility];  Service: Orthopedics;  Laterality: Left;    TOTAL HIP ARTHROPLASTY Left 3/16/2022    Procedure: LEFT TOTAL HIP ARTHROPLASTY AND HARDWARE REMOVAL;  Surgeon: Bam Warner MD;  Location: [facility] OR;  Service: Orthopedics;  Laterality: Left;    UPPER GASTROINTESTINAL ENDOSCOPY      WITH DILATATION     Social History     Socioeconomic History    Marital status:    Tobacco Use    Smoking status: Never    Smokeless tobacco: Never   Vaping Use    Vaping Use: Never used   Substance and Sexual Activity    Alcohol use: Not Currently    Drug use: Never    Sexual activity: Defer     Family History   Problem Relation Age of Onset    Malig Hyperthermia Neg Hx        Objective   Physical Exam  Vitals reviewed. Exam conducted with a chaperone present.   Cardiovascular:      Rate and Rhythm: Normal rate and regular rhythm.      Heart sounds: Normal heart sounds. No murmur heard.  Pulmonary:      Effort: Pulmonary effort is normal.      Breath sounds: Normal breath sounds.   Abdominal:      General: Abdomen is flat. Bowel sounds are normal.      Palpations: Abdomen is soft.   Psychiatric:         Mood and Affect: Mood normal.         Behavior: Behavior normal.       Vitals:    09/12/23 1109   BP: 135/84   Pulse: 105   Resp: 20   Temp: 98.8 °F (37.1 °C)   TempSrc: Temporal    SpO2: 98%   Weight: 64 kg (141 lb 1.5 oz)   PainSc:   5   PainLoc: Arm  Comment: left     ECOG score: 3         PHQ-9 Total Score:                    Result Review :   The following data was reviewed by: Matthew Lewis MD on 09/12/2023:  Lab Results   Component Value Date    HGB 12.7 08/14/2023    HCT 37.8 08/14/2023    MCV 94.5 08/14/2023     08/14/2023    WBC 8.13 08/14/2023    NEUTROABS 5.31 08/14/2023    LYMPHSABS 2.22 08/14/2023    MONOSABS 0.43 08/14/2023    EOSABS 0.10 08/14/2023    BASOSABS 0.04 08/14/2023     Lab Results   Component Value Date    GLUCOSE 97 08/14/2023    BUN 18 08/14/2023    CREATININE 0.81 08/14/2023     08/14/2023    K 3.7 08/14/2023     08/14/2023    CO2 28.9 08/14/2023    CALCIUM 9.0 08/14/2023    PROTEINTOT 7.3 08/14/2023    ALBUMIN 4.0 08/14/2023    BILITOT 0.8 08/14/2023    ALKPHOS 83 08/14/2023    AST 17 08/14/2023    ALT 6 08/14/2023     Lab Results   Component Value Date    MG 2.0 01/10/2023    PHOS 2.8 03/21/2022    FREET4 1.13 01/10/2023    TSH 2.470 01/10/2023     Lab Results   Component Value Date    IRON 71 08/14/2023    LABIRON 21 08/14/2023    TRANSFERRIN 227 08/14/2023    TIBC 338 08/14/2023     Lab Results   Component Value Date    FERRITIN 84.10 08/14/2023    [id number] 270 03/18/2022    FOLATE 9.51 03/18/2022     Lab Results   Component Value Date    CEA 2.18 08/14/2023             Assessment and Plan    Diagnoses and all orders for this visit:    1. Iron deficiency anemia, unspecified iron deficiency anemia type (Primary)  Assessment & Plan:  CBC, iron profile, ferritin have all normalized.  She does not require routine follow-up here but should have a CBC at least yearly as part of the physical evaluation by her primary care provider.  I am happy to see her back in the future should any questions or concerns arise.      2. Malignant neoplasm of ascending colon  Assessment & Plan:  Stage I.  Status post resection 7/20.  Patient is current on  screening colonoscopy which is the only recommendation based on NCCN guidelines.  She follows with gastroenterology routinely.              Patient Follow Up: As needed    Patient was given instructions and counseling regarding her condition or for health maintenance advice. Please see specific information pulled into the AVS if appropriate.     Matthew Lewis MD    9/12/2023

## 2023-09-12 NOTE — ASSESSMENT & PLAN NOTE
Stage I.  Status post resection 7/20.  Patient is current on screening colonoscopy which is the only recommendation based on NCCN guidelines.  She follows with gastroenterology routinely.

## 2023-09-15 ENCOUNTER — OFFICE VISIT (OUTPATIENT)
Dept: INTERNAL MEDICINE | Facility: CLINIC | Age: 88
End: 2023-09-15
Payer: MEDICARE

## 2023-09-15 VITALS
TEMPERATURE: 98.8 F | OXYGEN SATURATION: 97 % | RESPIRATION RATE: 18 BRPM | HEIGHT: 67 IN | DIASTOLIC BLOOD PRESSURE: 86 MMHG | WEIGHT: 136.4 LBS | HEART RATE: 68 BPM | BODY MASS INDEX: 21.41 KG/M2 | SYSTOLIC BLOOD PRESSURE: 124 MMHG

## 2023-09-15 DIAGNOSIS — N30.01 ACUTE CYSTITIS WITH HEMATURIA: Primary | ICD-10-CM

## 2023-09-15 DIAGNOSIS — N39.0 COMPLICATED UTI (URINARY TRACT INFECTION): ICD-10-CM

## 2023-09-15 LAB
BILIRUB BLD-MCNC: NEGATIVE MG/DL
CLARITY, POC: ABNORMAL
COLOR UR: ABNORMAL
EXPIRATION DATE: ABNORMAL
GLUCOSE UR STRIP-MCNC: NEGATIVE MG/DL
KETONES UR QL: NEGATIVE
LEUKOCYTE EST, POC: ABNORMAL
Lab: ABNORMAL
NITRITE UR-MCNC: NEGATIVE MG/ML
PH UR: 6 [PH] (ref 5–8)
PROT UR STRIP-MCNC: ABNORMAL MG/DL
RBC # UR STRIP: ABNORMAL /UL
SP GR UR: 1.01 (ref 1–1.03)
UROBILINOGEN UR QL: NORMAL

## 2023-09-15 PROCEDURE — 87086 URINE CULTURE/COLONY COUNT: CPT

## 2023-09-15 PROCEDURE — 87088 URINE BACTERIA CULTURE: CPT

## 2023-09-15 PROCEDURE — 87186 SC STD MICRODIL/AGAR DIL: CPT

## 2023-09-15 RX ORDER — ONDANSETRON 4 MG/1
4 TABLET, FILM COATED ORAL EVERY 8 HOURS PRN
Qty: 30 TABLET | Refills: 0 | Status: SHIPPED | OUTPATIENT
Start: 2023-09-15

## 2023-09-15 RX ORDER — CIPROFLOXACIN 500 MG/1
500 TABLET, FILM COATED ORAL 2 TIMES DAILY
Qty: 20 TABLET | Refills: 0 | Status: SHIPPED | OUTPATIENT
Start: 2023-09-15 | End: 2023-09-25

## 2023-09-15 NOTE — ASSESSMENT & PLAN NOTE
"Per Dr. Baeza---> \"Patient here 9/6/2023 for follow-up recent UTI.  Her urinalysis is nitrite positive and there is 3+ blood.  Leukocyte is actually negative presently.  Her last culture was negative.  I think we should wait to see if anything grows out on this since she is not toxic, no fevers no flank pain, no emesis.  She does have a little bit of anorexia.\"     9/15/2023-No improvement on UTI. She continues to have frequency, urgency and nausea.  Denies flank pain, vomiting or diarrhea, fever.  Urine analysis was positive for blood and leukocytes.  We will see what her urine culture shows.  We will go ahead and start treatment with Cipro 500 mg twice daily for 10 days.  Take with food.  Also recommend she start probiotic.  If she does not get any better or develops any worsening symptoms she should go to the emergency department right away.  We will also get stat CT abdomen and pelvis    "

## 2023-09-15 NOTE — PROGRESS NOTES
Chief Complaint  Urinary Tract Infection (89 year old female here today complaining of pressure and frequency of urination. States she has been on 2 different abx and is not getting better. Denies any strong odor to her urine but states it is dark. )    History of Present Illness  SUBJECTIVE  Bing Cruz presents to [facility] with UTI symptoms.    Patient complains of urinary frequency, urgency, suprapubic pressure, mild confusion.   Patient denies flank pain, fever, chills.    Symptom Onset-8/24-She has been treated twice recently. She started on keflex 8/24  She started on bactrim last week.    Past Medical History:   Diagnosis Date    Abdominal pain, generalized     Anemia     Aortic regurgitation     Aortic stenosis     Cardiomegaly     Chronic atrial fibrillation 01/01/2019    Atrial fibrillation converted to sinus through cardioversion (March 2019    Chronic fatigue     Colon cancer     Diarrhea     Disease of tricuspid valve     Diverticulosis of colon     DVT (deep venous thrombosis)     LEFT LEG GREATER THAN 5 YRS AGO    Dysphagia     Essential (primary) hypertension     Hiatal hernia     Insomnia, unspecified     LVH (left ventricular hypertrophy)     Major depressive disorder, single episode     Mitral regurgitation     Mitral valve insufficiency and aortic valve insufficiency     Osteopenia     Pain in joint, pelvic region and thigh     TR (tricuspid regurgitation)     UTI (urinary tract infection)     antibx to be completed prior to procedure. ABIOLA Carlos RN ([facility]) notified.      Family History   Problem Relation Age of Onset    Malig Hyperthermia Neg Hx       Past Surgical History:   Procedure Laterality Date    ABDOMINAL HYSTERECTOMY      WITH BSO     ANKLE SURGERY      (L) ankle fracture    BLADDER REPAIR      BREAST BIOPSY      (L) breast biopsy    CARDIOVERSION  2019    CATARACT EXTRACTION      OU    CHOLECYSTECTOMY      OPEN    COLON SURGERY       STATES SHE HAD 12 INCHES OF COLON REMOVED IN JULY 2020 FOR COLON CANCER    COLONOSCOPY  2020    COLONOSCOPY N/A 10/29/2021    Procedure: COLONOSCOPY;  Surgeon: Edmar Back MD;  Location: [facility] ENDOSCOPY;  Service: General;  Laterality: N/A;  DIVERTICULOSIS/ANASTOMOSIS    FEMUR OPEN REDUCTION INTERNAL FIXATION Left 07/21/2021    Procedure: FEMORAL NECK OPEN REDUCTION INTERNAL FIXATION;  Surgeon: Bam Warner MD;  Location: [facility] MAIN OR;  Service: Orthopedics;  Laterality: Left;    HIP CLOSED REDUCTION Left 3/20/2022    Procedure: HIP CLOSED REDUCTION;  Surgeon: Harsha Gayle MD;  Location: [facility] MAIN OR;  Service: Orthopedics;  Laterality: Left;    INGUINAL HERNIA REPAIR Left 09/29/2011    ORIF WRIST FRACTURE Left 8/24/2023    Procedure: OPEN REDUCTION AND INTERNAL FIXATION WRIST;  Surgeon: Ben Alegre MD;  Location: [facility] OR OSC;  Service: Orthopedics;  Laterality: Left;    TOTAL HIP ARTHROPLASTY Left 3/16/2022    Procedure: LEFT TOTAL HIP ARTHROPLASTY AND HARDWARE REMOVAL;  Surgeon: Bam Warner MD;  Location: [facility] MAIN OR;  Service: Orthopedics;  Laterality: Left;    UPPER GASTROINTESTINAL ENDOSCOPY      WITH DILATATION        Current Outpatient Medications:     apixaban (ELIQUIS) 2.5 MG tablet tablet, Take 1 tablet by mouth 2 (Two) Times a Day., Disp: 180 tablet, Rfl: 3    ascorbic acid (VITAMIN C) 500 MG tablet, Take 1 tablet by mouth Daily., Disp: , Rfl:     AZO-CRANBERRY PO, Take  by mouth., Disp: , Rfl:     colestipol (COLESTID) 1 g tablet, Take 2 tablets by mouth Daily., Disp: 180 tablet, Rfl: 3    FLUoxetine (PROzac) 10 MG capsule, Take 1 capsule by mouth Daily., Disp: , Rfl:     furosemide (LASIX) 20 MG tablet, Take 1 tablet by mouth 2 (Two) Times a Day., Disp: 180 tablet, Rfl: 3    HYDROcodone-acetaminophen (NORCO) 5-325 MG per tablet, Take 1 tablet by mouth Every 6 (Six) Hours As Needed for Moderate Pain., Disp: 28 tablet, Rfl: 0    LORazepam (ATIVAN) 0.5 MG tablet,  "TAKE 1 TABLET BY MOUTH AT NIGHT AS NEEDED FOR ANXIETY. (Patient taking differently: Take 1 tablet by mouth At Night As Needed for Anxiety. PT only takes if needed), Disp: 30 tablet, Rfl: 5    losartan (Cozaar) 25 MG tablet, Take 1 tablet by mouth Daily., Disp: 30 tablet, Rfl: 3    Lumigan 0.01 % ophthalmic drops, INSTILL 1 DROP IN BOTH EYES NIGHTLY, Disp: , Rfl:     metoprolol succinate XL (TOPROL-XL) 100 MG 24 hr tablet, Take 1 tablet by mouth 2 (Two) Times a Day., Disp: 180 tablet, Rfl: 3    potassium chloride 10 MEQ CR tablet, Take 1 tablet by mouth 2 (Two) Times a Day., Disp: 180 tablet, Rfl: 3    Probiotic Product (Florajen3) capsule, TAKE 1 CAPSULE BY MOUTH EVERY DAY, Disp: 90 capsule, Rfl: 1    sulfamethoxazole-trimethoprim (Bactrim DS) 800-160 MG per tablet, Take 1 tablet by mouth 2 (Two) Times a Day., Disp: 14 tablet, Rfl: 0    timolol (TIMOPTIC) 0.5 % ophthalmic solution, INSTILL 1 DROP IN BOTH EYES IN THE MORNING, Disp: , Rfl:     ciprofloxacin (Cipro) 500 MG tablet, Take 1 tablet by mouth 2 (Two) Times a Day for 10 days., Disp: 20 tablet, Rfl: 0    ondansetron (Zofran) 4 MG tablet, Take 1 tablet by mouth Every 8 (Eight) Hours As Needed for Nausea or Vomiting., Disp: 30 tablet, Rfl: 0    OBJECTIVE  Vital Signs:   /86 (BP Location: Right arm, Patient Position: Sitting)   Pulse 68   Temp 98.8 °F (37.1 °C) (Temporal)   Resp 18   Ht 170.2 cm (67.01\")   Wt 61.9 kg (136 lb 6.4 oz)   SpO2 97%   BMI 21.36 kg/m²    Estimated body mass index is 21.36 kg/m² as calculated from the following:    Height as of this encounter: 170.2 cm (67.01\").    Weight as of this encounter: 61.9 kg (136 lb 6.4 oz).     Wt Readings from Last 3 Encounters:   09/15/23 61.9 kg (136 lb 6.4 oz)   09/12/23 64 kg (141 lb 1.5 oz)   09/06/23 65.9 kg (145 lb 3.2 oz)     BP Readings from Last 3 Encounters:   09/15/23 124/86   09/12/23 135/84   09/06/23 140/90       Physical Exam  Vitals and nursing note reviewed.   Constitutional:  "      Appearance: Normal appearance.   HENT:      Head: Normocephalic.   Eyes:      Extraocular Movements: Extraocular movements intact.      Conjunctiva/sclera: Conjunctivae normal.   Cardiovascular:      Rate and Rhythm: Normal rate and regular rhythm.      Heart sounds: Normal heart sounds. No murmur heard.  Pulmonary:      Effort: Pulmonary effort is normal.      Breath sounds: Normal breath sounds. No wheezing or rales.   Abdominal:      General: Bowel sounds are normal. There is no distension.      Palpations: Abdomen is soft.      Tenderness: There is no abdominal tenderness. There is no right CVA tenderness, left CVA tenderness or guarding.   Musculoskeletal:         General: No swelling. Normal range of motion.      Cervical back: Normal range of motion and neck supple.   Skin:     General: Skin is warm and dry.   Neurological:      General: No focal deficit present.      Mental Status: She is alert and oriented to person, place, and time. Mental status is at baseline.   Psychiatric:         Mood and Affect: Mood normal.         Behavior: Behavior normal.         Thought Content: Thought content normal.         Judgment: Judgment normal.        Result Review        XR Forearm 2 View Left    Result Date: 8/21/2023   Extensive soft tissue swelling in the left wrist.  Fractures of the distal left radius and left ulnar styloid process.      HUY LOPEZ MD       Electronically Signed and Approved By: HUY LOPEZ MD on 8/21/2023 at 17:06                The above data has been reviewed by GABRIELLA Mayen 09/15/2023 15:39 EDT.          Patient Care Team:  Jairo Kramer MD as PCP - General (Internal Medicine)  Jairo Kramer MD (Internal Medicine)  Matthew Lewis MD as Consulting Physician (Hematology and Oncology)  Rose Boykin APRN as Nurse Practitioner (Nurse Practitioner)  Griselda Blount MD as Consulting Physician (Gastroenterology)  Leslie Cody APRN  "as Nurse Practitioner (Nurse Practitioner)    BMI is within normal parameters. No other follow-up for BMI required.       ASSESSMENT & PLAN    Diagnoses and all orders for this visit:    1. Acute cystitis with hematuria (Primary)  Assessment & Plan:  Per Dr. Baeza---> \"Patient here 9/6/2023 for follow-up recent UTI.  Her urinalysis is nitrite positive and there is 3+ blood.  Leukocyte is actually negative presently.  Her last culture was negative.  I think we should wait to see if anything grows out on this since she is not toxic, no fevers no flank pain, no emesis.  She does have a little bit of anorexia.\"     9/15/2023-No improvement on UTI. She continues to have frequency, urgency and nausea.  Denies flank pain, vomiting or diarrhea, fever.  Urine analysis was positive for blood and leukocytes.  We will see what her urine culture shows.  We will go ahead and start treatment with Cipro 500 mg twice daily for 10 days.  Take with food.  Also recommend she start probiotic.  If she does not get any better or develops any worsening symptoms she should go to the emergency department right away.  We will also get stat CT abdomen and pelvis      Orders:  -     POCT urinalysis dipstick, automated  -     Urine Culture - Urine, Urine, Clean Catch; Future  -     CT Abdomen Pelvis With & Without Contrast; Future  -     Comprehensive Metabolic Panel; Future  -     CBC & Differential; Future  -     Urine Culture - Urine, Urine, Clean Catch    2. Complicated UTI (urinary tract infection)  -     CT Abdomen Pelvis With & Without Contrast; Future  -     Comprehensive Metabolic Panel; Future  -     CBC & Differential; Future    Other orders  -     ciprofloxacin (Cipro) 500 MG tablet; Take 1 tablet by mouth 2 (Two) Times a Day for 10 days.  Dispense: 20 tablet; Refill: 0  -     ondansetron (Zofran) 4 MG tablet; Take 1 tablet by mouth Every 8 (Eight) Hours As Needed for Nausea or Vomiting.  Dispense: 30 tablet; Refill: 0     "     Tobacco Use: Low Risk     Smoking Tobacco Use: Never    Smokeless Tobacco Use: Never    Passive Exposure: Not on file       Follow Up     Return if symptoms worsen or fail to improve.    Please note that portions of this note were completed with a voice recognition program.    Patient was given instructions and counseling regarding her condition or for health maintenance advice. Please see specific information pulled into the AVS if appropriate.   I have reviewed information obtained and documented by others and I have confirmed the accuracy of this documented note.    GABRIELLA Mayen

## 2023-09-16 LAB — BACTERIA SPEC AEROBE CULT: ABNORMAL

## 2023-09-18 ENCOUNTER — TELEPHONE (OUTPATIENT)
Dept: INTERNAL MEDICINE | Facility: CLINIC | Age: 88
End: 2023-09-18

## 2023-09-18 ENCOUNTER — HOSPITAL ENCOUNTER (OUTPATIENT)
Dept: CT IMAGING | Facility: HOSPITAL | Age: 88
Discharge: HOME | End: 2023-09-18
Payer: MEDICARE

## 2023-09-18 DIAGNOSIS — N30.01 ACUTE CYSTITIS WITH HEMATURIA: ICD-10-CM

## 2023-09-18 DIAGNOSIS — N39.0 COMPLICATED UTI (URINARY TRACT INFECTION): ICD-10-CM

## 2023-09-18 LAB
CREAT BLDA-MCNC: 0.9 MG/DL
EGFRCR SERPLBLD CKD-EPI 2021: 60.1 ML/MIN/1.73

## 2023-09-18 PROCEDURE — 25510000001 IOPAMIDOL PER 1 ML

## 2023-09-18 PROCEDURE — 74178 CT ABD&PLV WO CNTR FLWD CNTR: CPT

## 2023-09-18 PROCEDURE — 82565 ASSAY OF CREATININE: CPT

## 2023-09-18 RX ORDER — LOSARTAN POTASSIUM 25 MG/1
TABLET ORAL
Qty: 90 TABLET | Refills: 1 | Status: SHIPPED | OUTPATIENT
Start: 2023-09-18

## 2023-09-18 RX ADMIN — IOPAMIDOL 75 ML: 755 INJECTION, SOLUTION INTRAVENOUS at 12:42

## 2023-09-18 NOTE — TELEPHONE ENCOUNTER
Radiology called to report the following: an oval 10 mm x 6 mm stone within the right renal pelvis with associated urothelial thickening and  inflammation.  No associated obstruction, Multiple additional nonobstructing right-sided renal stones. Postsurgical changes of prior right hemicolectomy. Extensive colonic diverticulosis without   acute diverticulitis. Small 9 mm pancreatic cystic lesion along the ventral aspect of the pancreatic body.  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 This is present within an otherwise atrophic   pancreas.

## 2023-09-19 DIAGNOSIS — N20.0 RENAL STONE: Primary | ICD-10-CM

## 2023-09-19 DIAGNOSIS — K86.9 PANCREATIC LESION: Primary | ICD-10-CM

## 2023-09-19 LAB — BACTERIA SPEC AEROBE CULT: ABNORMAL

## 2023-09-21 ENCOUNTER — HOSPITAL ENCOUNTER (OUTPATIENT)
Dept: GENERAL RADIOLOGY | Facility: HOSPITAL | Age: 88
Discharge: HOME | End: 2023-09-21
Payer: MEDICARE

## 2023-09-21 DIAGNOSIS — R13.19 ESOPHAGEAL DYSPHAGIA: Primary | ICD-10-CM

## 2023-09-21 PROCEDURE — 63710000001 BARIUM SULFATE 40 % RECONSTITUTED SUSPENSION: Performed by: INTERNAL MEDICINE

## 2023-09-21 PROCEDURE — A9270 NON-COVERED ITEM OR SERVICE: HCPCS | Performed by: INTERNAL MEDICINE

## 2023-09-21 PROCEDURE — 74230 X-RAY XM SWLNG FUNCJ C+: CPT

## 2023-09-21 PROCEDURE — 63710000001 BARIUM SULFATE 60 % CREAM: Performed by: INTERNAL MEDICINE

## 2023-09-21 PROCEDURE — 92611 MOTION FLUOROSCOPY/SWALLOW: CPT

## 2023-09-21 RX ADMIN — BARIUM SULFATE 55 ML: 0.81 POWDER, FOR SUSPENSION ORAL at 10:40

## 2023-09-21 RX ADMIN — BARIUM SULFATE 1 TEASPOON(S): 0.6 CREAM ORAL at 10:40

## 2023-09-21 NOTE — MBS/VFSS/FEES
Outpatient  - Speech Language Pathology   Swallow  Modified Barium Swallow  JUNO Mohamud     Patient Name: Bing Cruz  : 1931  MRN: [id number]  Today's Date: 2023               Admit Date: 2023    Visit Dx:   No diagnosis found.  Patient Active Problem List   Diagnosis    Anemia    Closed left hip fracture, initial encounter    Closed fracture of neck of left femur    Closed left hip fracture    Hip fracture requiring operative repair, left, closed, initial encounter    Lung nodule < 6cm on CT    Esophageal dysphagia    Atrial fibrillation, chronic    Malignant neoplasm of ascending colon    Chronic deep vein thrombosis (DVT) of proximal vein of left lower extremity    Major depressive disorder, recurrent, mild    Gastroesophageal reflux disease without esophagitis    Vitamin D deficiency    Hypertension, essential    Nonrheumatic mitral valve regurgitation    Nonrheumatic aortic valve insufficiency    S/P  Left Femoral Neck Open Reduction Internal Fixation    Left hip pain    Acquired cystic kidney disease    Bladder disorder    Diaphragmatic hernia    Cardiomegaly    Cataract    Chronic fatigue syndrome    Diarrhea    Diverticular disease of colon    Hyperlipemia    Disorder of bone    Generalized abdominal pain    Insomnia    Major depression, single episode    Malaise and fatigue    Pain in joint involving pelvic region and thigh    Sprain of hip    Acute cystitis with hematuria    Blood clot in vein    Colon cancer screening    Aftercare following surgery of left femoral neck ORIF 2021    Avascular necrosis of bone of left hip    Arthritis of left hip    Dislocation of hip, posterior, left, closed    At risk for venous thromboembolism (VTE)    Difficulty walking    Impaired cognition    Hypotension due to drugs    History of total left hip replacement    Major depressive disorder, recurrent, moderate    Anxiety    Left leg swelling    Acute diastolic CHF (congestive heart failure)     Postural dizziness with presyncope    CHF (congestive heart failure), NYHA class I, chronic, diastolic    Dysuria    Wrist fracture, left    Closed fracture of left distal radius     Past Medical History:   Diagnosis Date    Abdominal pain, generalized     Anemia     Aortic regurgitation     Aortic stenosis     Cardiomegaly     Chronic atrial fibrillation 01/01/2019    Atrial fibrillation converted to sinus through cardioversion (March 2019    Chronic fatigue     Colon cancer     Diarrhea     Disease of tricuspid valve     Diverticulosis of colon     DVT (deep venous thrombosis)     LEFT LEG GREATER THAN 5 YRS AGO    Dysphagia     Essential (primary) hypertension     Hiatal hernia     Insomnia, unspecified     LVH (left ventricular hypertrophy)     Major depressive disorder, single episode     Mitral regurgitation     Mitral valve insufficiency and aortic valve insufficiency     Osteopenia     Pain in joint, pelvic region and thigh     TR (tricuspid regurgitation)     UTI (urinary tract infection)     antibx to be completed prior to procedure. ABIOLA Carlos RN ([facility]) notified.     Past Surgical History:   Procedure Laterality Date    ABDOMINAL HYSTERECTOMY      WITH BSO     ANKLE SURGERY      (L) ankle fracture    BLADDER REPAIR      BREAST BIOPSY      (L) breast biopsy    CARDIOVERSION  2019    CATARACT EXTRACTION      OU    CHOLECYSTECTOMY      OPEN    COLON SURGERY      STATES SHE HAD 12 INCHES OF COLON REMOVED IN JULY 2020 FOR COLON CANCER    COLONOSCOPY  2020    COLONOSCOPY N/A 10/29/2021    Procedure: COLONOSCOPY;  Surgeon: Edmar Back MD;  Location: [facility] ENDOSCOPY;  Service: General;  Laterality: N/A;  DIVERTICULOSIS/ANASTOMOSIS    FEMUR OPEN REDUCTION INTERNAL FIXATION Left 07/21/2021    Procedure: FEMORAL NECK OPEN REDUCTION INTERNAL FIXATION;  Surgeon: Bam Warner MD;  Location: [facility] MAIN OR;  Service: Orthopedics;  Laterality: Left;    HIP CLOSED REDUCTION Left 3/20/2022     Procedure: HIP CLOSED REDUCTION;  Surgeon: Harsha Gayle MD;  Location: [facility] MAIN OR;  Service: Orthopedics;  Laterality: Left;    INGUINAL HERNIA REPAIR Left 09/29/2011    ORIF WRIST FRACTURE Left 8/24/2023    Procedure: OPEN REDUCTION AND INTERNAL FIXATION WRIST;  Surgeon: Ben Alegre MD;  Location: [facility];  Service: Orthopedics;  Laterality: Left;    TOTAL HIP ARTHROPLASTY Left 3/16/2022    Procedure: LEFT TOTAL HIP ARTHROPLASTY AND HARDWARE REMOVAL;  Surgeon: Bam Warner MD;  Location: [facility] MAIN OR;  Service: Orthopedics;  Laterality: Left;    UPPER GASTROINTESTINAL ENDOSCOPY      WITH DILATATION     MODIFIED BARIUM SWALLOW STUDY: SPEECH PATHOLOGY REPORT    PERTINENT INFORMATION:  This patient is a 89year old female referred for modified barium swallow study due to report of frequent choking episodes..    Patient was referred for an MBSS by Dr. Kristen Lopez to rule out aspiration as well as to determine appropriate treatment plan for this patient.      PROCEDURE:    Patient  was alert and cooperative.  The patient was viewed in lateral projection.  The following Ba consistencies were administered: Thin liquids from cup and straw, purée consistencies and regular solids.  The following compensatory swallowing strategies were performed: Bolus size modification      RESULTS:    1.  Oral stage is characterized by good bolus preparation and control.  Pharyngeal phase is mildly delayed with some premature spillage and pooling in the vallecula prior to swallow.  Patient with deep laryngeal penetration to the level of the cords with thin liquids with large bolus volumes.  Patient tolerates small single sips from cup with only shallow laryngeal penetration.  No aspiration noted.  No significant pharyngeal residue noted.      IMPRESSIONS:    Patient demonstrated mild to moderate pharyngeal dysphagia characterized by deep laryngeal penetration to the level of the cords with large bolus volumes of  thin liquids.  Reducing bolus volume illuminates..     FUNCTIONAL DEFICIT: Patient scored level 5 of 7 on Functional Communication Measures for swallowing indicating a 20-39% limitation in function for current status, goal status, and discharge status.      RECOMMENDATIONS:    Soft solids, small single sips of thin liquids  2.  Upright for all intake  3.  Slow rate, small bites/drinks  4.  Caution with mixed consistencies        YES: Patient/responsible party agrees with the plan of care and has been informed of all alternatives, risks and benefits.    Thank you for this referral.      EDUCATION  The patient has been educated in the following areas:   Dysphagia (Swallowing Impairment).     Yee Marie, SLP  9/21/2023

## 2023-09-27 ENCOUNTER — LAB (OUTPATIENT)
Dept: LAB | Facility: HOSPITAL | Age: 88
End: 2023-09-27
Payer: MEDICARE

## 2023-09-27 ENCOUNTER — OFFICE VISIT (OUTPATIENT)
Dept: ORTHOPEDIC SURGERY | Facility: CLINIC | Age: 88
End: 2023-09-27
Payer: MEDICARE

## 2023-09-27 VITALS — WEIGHT: 136 LBS | HEIGHT: 67 IN | BODY MASS INDEX: 21.35 KG/M2

## 2023-09-27 DIAGNOSIS — F33.0 MAJOR DEPRESSIVE DISORDER, RECURRENT, MILD: ICD-10-CM

## 2023-09-27 DIAGNOSIS — I10 HYPERTENSION, ESSENTIAL: ICD-10-CM

## 2023-09-27 DIAGNOSIS — D50.9 IRON DEFICIENCY ANEMIA, UNSPECIFIED IRON DEFICIENCY ANEMIA TYPE: ICD-10-CM

## 2023-09-27 DIAGNOSIS — I50.32 CHF (CONGESTIVE HEART FAILURE), NYHA CLASS I, CHRONIC, DIASTOLIC: ICD-10-CM

## 2023-09-27 DIAGNOSIS — N39.0 COMPLICATED UTI (URINARY TRACT INFECTION): ICD-10-CM

## 2023-09-27 DIAGNOSIS — S62.102A CLOSED FRACTURE OF LEFT WRIST, INITIAL ENCOUNTER: Primary | ICD-10-CM

## 2023-09-27 DIAGNOSIS — I48.20 ATRIAL FIBRILLATION, CHRONIC: ICD-10-CM

## 2023-09-27 DIAGNOSIS — Z47.89 AFTERCARE FOLLOWING SURGERY OF THE MUSCULOSKELETAL SYSTEM: ICD-10-CM

## 2023-09-27 DIAGNOSIS — I82.5Y2 CHRONIC DEEP VEIN THROMBOSIS (DVT) OF PROXIMAL VEIN OF LEFT LOWER EXTREMITY: ICD-10-CM

## 2023-09-27 DIAGNOSIS — R19.7 DIARRHEA, UNSPECIFIED TYPE: ICD-10-CM

## 2023-09-27 DIAGNOSIS — N30.01 ACUTE CYSTITIS WITH HEMATURIA: ICD-10-CM

## 2023-09-27 DIAGNOSIS — R13.19 ESOPHAGEAL DYSPHAGIA: ICD-10-CM

## 2023-09-27 LAB
ALBUMIN SERPL-MCNC: 3.9 G/DL (ref 3.5–5.2)
ALBUMIN/GLOB SERPL: 1.1 G/DL
ALP SERPL-CCNC: 91 U/L (ref 39–117)
ALT SERPL W P-5'-P-CCNC: <5 U/L (ref 1–33)
ANION GAP SERPL CALCULATED.3IONS-SCNC: 11.8 MMOL/L (ref 5–15)
AST SERPL-CCNC: 18 U/L (ref 1–32)
BASOPHILS # BLD AUTO: 0.05 10*3/MM3 (ref 0–0.2)
BASOPHILS NFR BLD AUTO: 0.6 % (ref 0–1.5)
BILIRUB SERPL-MCNC: 0.6 MG/DL (ref 0–1.2)
BUN SERPL-MCNC: 21 MG/DL (ref 8–23)
BUN/CREAT SERPL: 24.4 (ref 7–25)
CALCIUM SPEC-SCNC: 9.1 MG/DL (ref 8.2–9.6)
CHLORIDE SERPL-SCNC: 106 MMOL/L (ref 98–107)
CHOLEST SERPL-MCNC: 144 MG/DL (ref 0–200)
CO2 SERPL-SCNC: 25.2 MMOL/L (ref 22–29)
CREAT SERPL-MCNC: 0.86 MG/DL (ref 0.57–1)
DEPRECATED RDW RBC AUTO: 42.9 FL (ref 37–54)
EGFRCR SERPLBLD CKD-EPI 2021: 63.5 ML/MIN/1.73
EOSINOPHIL # BLD AUTO: 0.09 10*3/MM3 (ref 0–0.4)
EOSINOPHIL NFR BLD AUTO: 1.1 % (ref 0.3–6.2)
ERYTHROCYTE [DISTWIDTH] IN BLOOD BY AUTOMATED COUNT: 12.4 % (ref 12.3–15.4)
GLOBULIN UR ELPH-MCNC: 3.4 GM/DL
GLUCOSE SERPL-MCNC: 98 MG/DL (ref 65–99)
HCT VFR BLD AUTO: 39.9 % (ref 34–46.6)
HDLC SERPL-MCNC: 57 MG/DL (ref 40–60)
HGB BLD-MCNC: 13.2 G/DL (ref 12–15.9)
IMM GRANULOCYTES # BLD AUTO: 0.05 10*3/MM3 (ref 0–0.05)
IMM GRANULOCYTES NFR BLD AUTO: 0.6 % (ref 0–0.5)
LDLC SERPL CALC-MCNC: 51 MG/DL (ref 0–100)
LDLC/HDLC SERPL: 0.71 {RATIO}
LYMPHOCYTES # BLD AUTO: 2.23 10*3/MM3 (ref 0.7–3.1)
LYMPHOCYTES NFR BLD AUTO: 27.8 % (ref 19.6–45.3)
MAGNESIUM SERPL-MCNC: 1.9 MG/DL (ref 1.7–2.3)
MCH RBC QN AUTO: 31.8 PG (ref 26.6–33)
MCHC RBC AUTO-ENTMCNC: 33.1 G/DL (ref 31.5–35.7)
MCV RBC AUTO: 96.1 FL (ref 79–97)
MONOCYTES # BLD AUTO: 0.51 10*3/MM3 (ref 0.1–0.9)
MONOCYTES NFR BLD AUTO: 6.4 % (ref 5–12)
NEUTROPHILS NFR BLD AUTO: 5.08 10*3/MM3 (ref 1.7–7)
NEUTROPHILS NFR BLD AUTO: 63.5 % (ref 42.7–76)
NRBC BLD AUTO-RTO: 0 /100 WBC (ref 0–0.2)
NT-PROBNP SERPL-MCNC: 1846 PG/ML (ref 0–1800)
PLATELET # BLD AUTO: 279 10*3/MM3 (ref 140–450)
PMV BLD AUTO: 12.3 FL (ref 6–12)
POTASSIUM SERPL-SCNC: 3.7 MMOL/L (ref 3.5–5.2)
PROT SERPL-MCNC: 7.3 G/DL (ref 6–8.5)
RBC # BLD AUTO: 4.15 10*6/MM3 (ref 3.77–5.28)
SODIUM SERPL-SCNC: 143 MMOL/L (ref 136–145)
TRIGL SERPL-MCNC: 232 MG/DL (ref 0–150)
VLDLC SERPL-MCNC: 36 MG/DL (ref 5–40)
WBC NRBC COR # BLD: 8.01 10*3/MM3 (ref 3.4–10.8)

## 2023-09-27 PROCEDURE — 1159F MED LIST DOCD IN RCRD: CPT | Performed by: PHYSICIAN ASSISTANT

## 2023-09-27 PROCEDURE — 80053 COMPREHEN METABOLIC PANEL: CPT

## 2023-09-27 PROCEDURE — 85025 COMPLETE CBC W/AUTO DIFF WBC: CPT

## 2023-09-27 PROCEDURE — 80061 LIPID PANEL: CPT

## 2023-09-27 PROCEDURE — 36415 COLL VENOUS BLD VENIPUNCTURE: CPT

## 2023-09-27 PROCEDURE — 83735 ASSAY OF MAGNESIUM: CPT

## 2023-09-27 PROCEDURE — 99024 POSTOP FOLLOW-UP VISIT: CPT | Performed by: PHYSICIAN ASSISTANT

## 2023-09-27 PROCEDURE — 83880 ASSAY OF NATRIURETIC PEPTIDE: CPT

## 2023-09-27 PROCEDURE — 1160F RVW MEDS BY RX/DR IN RCRD: CPT | Performed by: PHYSICIAN ASSISTANT

## 2023-09-27 NOTE — PATIENT INSTRUCTIONS
Cast removed in office today.  X-rays taken and reviewed.  Patient placed into left wrist brace.  Educated on gentle hand and wrist ROM.  No lifting, pushing, or pulling.  Continue icing and elevation as needed for pain or swelling.    Follow-up in 4 weeks.  Repeat x-rays needed.  Call changes or concerns.

## 2023-09-27 NOTE — PROGRESS NOTES
"Chief Complaint  Pain and Follow-up of the Left Wrist    Subjective      Bing Cruz presents to [facility] for follow-up of ORIF of three-part intra-articular left distal radius fracture performed on 8/20/2023 by Dr. Alegre.  She presents today with short arm cast in place, reporting she has tolerated this well.  She has been taking Norco on an as-needed basis only.    Objective   Allergies   Allergen Reactions    Citalopram Unknown - High Severity    Clonazepam Unknown - High Severity    Levofloxacin Nausea And Vomiting    Lisinopril Unknown - High Severity    Macrobid [Nitrofurantoin Macrocrystal] Rash    Melatonin Unknown - High Severity       Vital Signs:   Ht 170.2 cm (67\")   Wt 61.7 kg (136 lb)   BMI 21.30 kg/m²       Physical Exam    Constitutional: Awake, alert. Well nourished appearance.    Integumentary: Warm, dry, intact. No obvious rashes.    HENT: Atraumatic, normocephalic.   Respiratory: Non labored respirations .   Cardiovascular: Intact peripheral pulses.    Psychiatric: Normal mood and affect. A&O X3    Ortho Exam  Left wrist: Skin is warm, dry, and intact.  Well-healing surgical incision visualized.  There is moderate edema.  Limited wrist ROM in all planes.  Patient is able to form a full fist.  Good thumb opposition.  Sensation intact to light touch.  Distal neurovascular intact.  2+ radial and ulnar pulses noted.    Imaging Results (Most Recent)       Procedure Component Value Units Date/Time    XR Wrist 2 View Left [[id number]] Resulted: 09/27/23 1455     Updated: 09/27/23 1456    Narrative:      X-Ray Report:  Study: X-rays ordered, taken in the office, and reviewed today.   Site: Left wrist Xray  Indication: ORIF  View: AP/Lateral view(s)  Findings: Intact left distal radius ORIF fixation hardware. No evidence of   hardware malfunction.   Prior studies available for comparison: yes                       Assessment and Plan   Problem List Items Addressed This " Visit          Musculoskeletal and Injuries    Aftercare following surgery of left femoral neck ORIF 7/21/2021    Wrist fracture, left - Primary    Relevant Orders    XR Wrist 2 View Left (Completed)       Follow Up   Return in about 4 weeks (around 10/25/2023).    Tobacco Use: Low Risk     Smoking Tobacco Use: Never    Smokeless Tobacco Use: Never    Passive Exposure: Not on file       Educated on risk of smoking. Discussed options for smoking cessation.    Patient Instructions   Cast removed in office today.  X-rays taken and reviewed.  Patient placed into left wrist brace.  Educated on gentle hand and wrist ROM.  No lifting, pushing, or pulling.  Continue icing and elevation as needed for pain or swelling.    Follow-up in 4 weeks.  Repeat x-rays needed.  Call changes or concerns.  Patient was given instructions and counseling regarding her condition or for health maintenance advice. Please see specific information pulled into the AVS if appropriate.

## 2023-09-28 ENCOUNTER — HOSPITAL ENCOUNTER (OUTPATIENT)
Dept: MRI IMAGING | Facility: HOSPITAL | Age: 88
Discharge: HOME | End: 2023-09-28
Payer: MEDICARE

## 2023-09-28 DIAGNOSIS — K86.9 PANCREATIC LESION: ICD-10-CM

## 2023-09-28 PROCEDURE — A9577 INJ MULTIHANCE: HCPCS

## 2023-09-28 PROCEDURE — 74183 MRI ABD W/O CNTR FLWD CNTR: CPT

## 2023-09-28 PROCEDURE — 0 GADOBENATE DIMEGLUMINE 529 MG/ML SOLUTION

## 2023-09-28 RX ADMIN — GADOBENATE DIMEGLUMINE 12 ML: 529 INJECTION, SOLUTION INTRAVENOUS at 14:04

## 2023-10-02 ENCOUNTER — OFFICE VISIT (OUTPATIENT)
Dept: INTERNAL MEDICINE | Facility: CLINIC | Age: 88
End: 2023-10-02
Payer: MEDICARE

## 2023-10-02 VITALS
TEMPERATURE: 97.3 F | HEART RATE: 100 BPM | OXYGEN SATURATION: 96 % | HEIGHT: 67 IN | BODY MASS INDEX: 21.35 KG/M2 | DIASTOLIC BLOOD PRESSURE: 80 MMHG | WEIGHT: 136 LBS | SYSTOLIC BLOOD PRESSURE: 113 MMHG

## 2023-10-02 DIAGNOSIS — F41.9 ANXIETY: ICD-10-CM

## 2023-10-02 DIAGNOSIS — D50.9 IRON DEFICIENCY ANEMIA, UNSPECIFIED IRON DEFICIENCY ANEMIA TYPE: ICD-10-CM

## 2023-10-02 DIAGNOSIS — E55.9 VITAMIN D DEFICIENCY: ICD-10-CM

## 2023-10-02 DIAGNOSIS — I10 HYPERTENSION, ESSENTIAL: ICD-10-CM

## 2023-10-02 DIAGNOSIS — S52.552D OTHER CLOSED EXTRA-ARTICULAR FRACTURE OF DISTAL END OF LEFT RADIUS WITH ROUTINE HEALING, SUBSEQUENT ENCOUNTER: ICD-10-CM

## 2023-10-02 DIAGNOSIS — R42 POSTURAL DIZZINESS WITH PRESYNCOPE: ICD-10-CM

## 2023-10-02 DIAGNOSIS — N39.0 COMPLICATED UTI (URINARY TRACT INFECTION): ICD-10-CM

## 2023-10-02 DIAGNOSIS — R55 POSTURAL DIZZINESS WITH PRESYNCOPE: ICD-10-CM

## 2023-10-02 DIAGNOSIS — Z00.00 MEDICARE ANNUAL WELLNESS VISIT, SUBSEQUENT: Primary | ICD-10-CM

## 2023-10-02 DIAGNOSIS — N20.0 RENAL STONE: ICD-10-CM

## 2023-10-02 DIAGNOSIS — I48.20 ATRIAL FIBRILLATION, CHRONIC: ICD-10-CM

## 2023-10-02 DIAGNOSIS — R26.2 DIFFICULTY WALKING: ICD-10-CM

## 2023-10-02 DIAGNOSIS — K86.9 PANCREATIC LESION: ICD-10-CM

## 2023-10-02 DIAGNOSIS — F51.01 PRIMARY INSOMNIA: ICD-10-CM

## 2023-10-02 RX ORDER — FLUOXETINE HYDROCHLORIDE 20 MG/1
20 CAPSULE ORAL DAILY
Qty: 90 CAPSULE | Refills: 3 | Status: ON HOLD | OUTPATIENT
Start: 2023-10-02

## 2023-10-02 NOTE — PROGRESS NOTES
The ABCs of the Annual Wellness Visit  Subsequent Medicare Wellness Visit    Subjective      Bing Cruz is a 89 y.o. female who presents for a Subsequent Medicare Wellness Visit.    The following portions of the patient's history were reviewed and   updated as appropriate: allergies, current medications, past family history, past medical history, past social history, past surgical history, and problem list.    Compared to one year ago, the patient feels her physical   health is worse.    Compared to one year ago, the patient feels her mental   health is worse.    Recent Hospitalizations:  She was not admitted to the hospital during the last year.       Current Medical Providers:  Patient Care Team:  Jairo Kramer MD as PCP - General (Internal Medicine)  Jairo Kramer MD (Internal Medicine)  Matthew Lewis MD as Consulting Physician (Hematology and Oncology)  Rose Boykin APRN as Nurse Practitioner (Nurse Practitioner)  Griselda Blount MD as Consulting Physician (Gastroenterology)  Leslie Cody APRN as Nurse Practitioner (Nurse Practitioner)    Outpatient Medications Prior to Visit   Medication Sig Dispense Refill    apixaban (ELIQUIS) 2.5 MG tablet tablet Take 1 tablet by mouth 2 (Two) Times a Day. 180 tablet 3    ascorbic acid (VITAMIN C) 500 MG tablet Take 1 tablet by mouth Daily.      AZO-CRANBERRY PO Take  by mouth.      colestipol (COLESTID) 1 g tablet Take 2 tablets by mouth Daily. 180 tablet 3    furosemide (LASIX) 20 MG tablet Take 1 tablet by mouth 2 (Two) Times a Day. 180 tablet 3    LORazepam (ATIVAN) 0.5 MG tablet TAKE 1 TABLET BY MOUTH AT NIGHT AS NEEDED FOR ANXIETY. (Patient taking differently: Take 1 tablet by mouth At Night As Needed for Anxiety. PT only takes if needed) 30 tablet 5    losartan (COZAAR) 25 MG tablet TAKE 1 TABLET BY MOUTH EVERY DAY 90 tablet 1    Lumigan 0.01 % ophthalmic drops INSTILL 1 DROP IN BOTH EYES NIGHTLY      metoprolol  succinate XL (TOPROL-XL) 100 MG 24 hr tablet Take 1 tablet by mouth 2 (Two) Times a Day. 180 tablet 3    ondansetron (Zofran) 4 MG tablet Take 1 tablet by mouth Every 8 (Eight) Hours As Needed for Nausea or Vomiting. 30 tablet 0    potassium chloride 10 MEQ CR tablet Take 1 tablet by mouth 2 (Two) Times a Day. 180 tablet 3    timolol (TIMOPTIC) 0.5 % ophthalmic solution INSTILL 1 DROP IN BOTH EYES IN THE MORNING      FLUoxetine (PROzac) 10 MG capsule Take 1 capsule by mouth Daily.      HYDROcodone-acetaminophen (NORCO) 5-325 MG per tablet Take 1 tablet by mouth Every 6 (Six) Hours As Needed for Moderate Pain. (Patient not taking: Reported on 10/2/2023) 28 tablet 0    Probiotic Product (Florajen3) capsule TAKE 1 CAPSULE BY MOUTH EVERY DAY (Patient not taking: Reported on 10/2/2023) 90 capsule 1    sulfamethoxazole-trimethoprim (Bactrim DS) 800-160 MG per tablet Take 1 tablet by mouth 2 (Two) Times a Day. (Patient not taking: Reported on 10/2/2023) 14 tablet 0     No facility-administered medications prior to visit.       No opioid medication identified on active medication list. I have reviewed chart for other potential  high risk medication/s and harmful drug interactions in the elderly.        Aspirin is not on active medication list.  Aspirin use is contraindicated for this patient due to: current use of Eliquis.  .    Patient Active Problem List   Diagnosis    Anemia    Closed left hip fracture, initial encounter    Closed fracture of neck of left femur    Closed left hip fracture    Hip fracture requiring operative repair, left, closed, initial encounter    Lung nodule < 6cm on CT    Esophageal dysphagia    Atrial fibrillation, chronic    Malignant neoplasm of ascending colon    Chronic deep vein thrombosis (DVT) of proximal vein of left lower extremity    Major depressive disorder, recurrent, mild    Gastroesophageal reflux disease without esophagitis    Vitamin D deficiency    Hypertension, essential     "Nonrheumatic mitral valve regurgitation    Nonrheumatic aortic valve insufficiency    S/P  Left Femoral Neck Open Reduction Internal Fixation    Left hip pain    Acquired cystic kidney disease    Bladder disorder    Diaphragmatic hernia    Cardiomegaly    Cataract    Chronic fatigue syndrome    Diarrhea    Diverticular disease of colon    Hyperlipemia    Disorder of bone    Generalized abdominal pain    Insomnia    Major depression, single episode    Malaise and fatigue    Pain in joint involving pelvic region and thigh    Sprain of hip    Acute cystitis with hematuria    Blood clot in vein    Colon cancer screening    Aftercare following surgery of left femoral neck ORIF 7/21/2021    Avascular necrosis of bone of left hip    Arthritis of left hip    Dislocation of hip, posterior, left, closed    At risk for venous thromboembolism (VTE)    Difficulty walking    Impaired cognition    Hypotension due to drugs    History of total left hip replacement    Major depressive disorder, recurrent, moderate    Anxiety    Left leg swelling    Acute diastolic CHF (congestive heart failure)    Postural dizziness with presyncope    CHF (congestive heart failure), NYHA class I, chronic, diastolic    Dysuria    Wrist fracture, left    Closed fracture of left distal radius    Complicated UTI (urinary tract infection)    Renal stone    Pancreatic lesion    Medicare annual wellness visit, subsequent     Advance Care Planning   Advance Care Planning     Advance Directive is on file.  ACP discussion was held with the patient during this visit. Patient has an advance directive in EMR which is still valid.      Objective    Vitals:    10/02/23 1242   BP: 113/80   Pulse: 100   Temp: 97.3 °F (36.3 °C)   SpO2: 96%   Weight: 61.7 kg (136 lb)   Height: 170.2 cm (67.01\")     Estimated body mass index is 21.3 kg/m² as calculated from the following:    Height as of this encounter: 170.2 cm (67.01\").    Weight as of this encounter: 61.7 kg (136 " lb).    BMI is within normal parameters. No other follow-up for BMI required.      Does the patient have evidence of cognitive impairment?   No    Lab Results   Component Value Date    TRIG 232 (H) 2023    HDL 57 2023    LDL 51 2023    VLDL 36 2023          HEALTH RISK ASSESSMENT    Smoking Status:  Social History     Tobacco Use   Smoking Status Never   Smokeless Tobacco Never     Alcohol Consumption:  Social History     Substance and Sexual Activity   Alcohol Use Not Currently     Fall Risk Screen:    STEADI Fall Risk Assessment was completed, and patient is at HIGH risk for falls. Assessment completed on:2023    Depression Screenin/17/2023    11:16 AM   PHQ-2/PHQ-9 Depression Screening   Little Interest or Pleasure in Doing Things 0-->not at all   Feeling Down, Depressed or Hopeless 0-->not at all   PHQ-9: Brief Depression Severity Measure Score 0       Health Habits and Functional and Cognitive Screening:      10/2/2023    12:00 PM   Functional & Cognitive Status   Do you have difficulty preparing food and eating? Yes   Do you have difficulty bathing yourself, getting dressed or grooming yourself? Yes   Do you have difficulty using the toilet? No   Do you have difficulty moving around from place to place? Yes   Do you have trouble with steps or getting out of a bed or a chair? Yes   Do you need help using the phone?  No   Are you deaf or do you have serious difficulty hearing?  Yes   Do you need help to go to places out of walking distance? Yes   Do you need help shopping? Yes   Do you need help preparing meals?  Yes   Do you need help with housework?  Yes   Do you need help with laundry? Yes   Do you need help taking your medications? Yes   Do you need help managing money? No   Do you ever drive or ride in a car without wearing a seat belt? No   Do you have difficulty concentrating, remembering or making decisions? No       Age-appropriate Screening Schedule:  Refer to the  list below for future screening recommendations based on patient's age, sex and/or medical conditions. Orders for these recommended tests are listed in the plan section. The patient has been provided with a written plan.    Health Maintenance   Topic Date Due    Pneumococcal Vaccine 65+ (1 - PCV) Never done    TDAP/TD VACCINES (1 - Tdap) Never done    ZOSTER VACCINE (1 of 2) Never done    DXA SCAN  01/06/2019    INFLUENZA VACCINE  08/01/2023    COVID-19 Vaccine (5 - 2023-24 season) 09/01/2023    LIPID PANEL  09/27/2024    ANNUAL WELLNESS VISIT  10/02/2024                  CMS Preventative Services Quick Reference  Risk Factors Identified During Encounter:    Depression/Dysphoria: We increased her Prozac dosing to 20 mg daily Tober second 2023     fall Risk-High or Moderate: Discussed Fall Prevention in the home  Inactivity/Sedentary: Patient was advised to exercise at least 150 minutes a week per CDC recommendations.    The above risks/problems have been discussed with the patient.  Pertinent information has been shared with the patient in the After Visit Summary.    Diagnoses and all orders for this visit:    1. Medicare annual wellness visit, subsequent (Primary)    2. Difficulty walking  -     Comprehensive Metabolic Panel; Future  -     CBC & Differential; Future  -     Amylase; Future  -     Lipase; Future  -     Sedimentation Rate; Future    3. Postural dizziness with presyncope  -     Comprehensive Metabolic Panel; Future  -     CBC & Differential; Future  -     Amylase; Future  -     Lipase; Future  -     Sedimentation Rate; Future    4. Primary insomnia  -     Comprehensive Metabolic Panel; Future  -     CBC & Differential; Future  -     Amylase; Future  -     Lipase; Future  -     Sedimentation Rate; Future    5. Other closed extra-articular fracture of distal end of left radius with routine healing, subsequent encounter  -     Comprehensive Metabolic Panel; Future  -     CBC & Differential; Future  -      Amylase; Future  -     Lipase; Future  -     Sedimentation Rate; Future    6. Anxiety  -     Comprehensive Metabolic Panel; Future  -     CBC & Differential; Future  -     Amylase; Future  -     Lipase; Future  -     Sedimentation Rate; Future    7. Vitamin D deficiency  -     Comprehensive Metabolic Panel; Future  -     CBC & Differential; Future  -     Amylase; Future  -     Lipase; Future  -     Sedimentation Rate; Future    8. Hypertension, essential  -     Comprehensive Metabolic Panel; Future  -     CBC & Differential; Future  -     Amylase; Future  -     Lipase; Future  -     Sedimentation Rate; Future    9. Pancreatic lesion  -     Comprehensive Metabolic Panel; Future  -     CBC & Differential; Future  -     Amylase; Future  -     Lipase; Future  -     Sedimentation Rate; Future    10. Renal stone  -     Comprehensive Metabolic Panel; Future  -     CBC & Differential; Future  -     Amylase; Future  -     Lipase; Future  -     Sedimentation Rate; Future    11. Atrial fibrillation, chronic  -     Comprehensive Metabolic Panel; Future  -     CBC & Differential; Future  -     Amylase; Future  -     Lipase; Future  -     Sedimentation Rate; Future    12. Complicated UTI (urinary tract infection)  -     Comprehensive Metabolic Panel; Future  -     CBC & Differential; Future  -     Amylase; Future  -     Lipase; Future  -     Sedimentation Rate; Future    13. Iron deficiency anemia, unspecified iron deficiency anemia type  -     Comprehensive Metabolic Panel; Future  -     CBC & Differential; Future  -     Amylase; Future  -     Lipase; Future  -     Sedimentation Rate; Future    Other orders  -     FLUoxetine (PROzac) 20 MG capsule; Take 1 capsule by mouth Daily.  Dispense: 90 capsule; Refill: 3        Follow Up:   Next Medicare Wellness visit to be scheduled in 1 year.      An After Visit Summary and PPPS were made available to the patient.          Answers submitted by the patient for this visit:  Primary  Reason for Visit (Submitted on 10/1/2023)  What is the primary reason for your visit?: Physical

## 2023-10-02 NOTE — PROGRESS NOTES
"CHIEF COMPLAINT/ HPI:  Hypertension (Routine follow up, Lab follow. Pt has had 4 rounds of ABX for uti. Pt has kidney stones and is seeing a urologist. Pt daughter states weakness, lack of energy. Pt has fear of falling. Pt daughter is asking for something like PT. )              Objective   Vital Signs  Vitals:    10/02/23 1242   BP: 113/80   Pulse: 100   Temp: 97.3 °F (36.3 °C)   SpO2: 96%   Weight: 61.7 kg (136 lb)   Height: 170.2 cm (67.01\")      Body mass index is 21.3 kg/m².  Review of Systems   Constitutional: Negative.    HENT: Negative.     Eyes: Negative.    Respiratory: Negative.     Cardiovascular: Negative.    Gastrointestinal: Negative.    Endocrine: Negative.    Genitourinary: Negative.    Musculoskeletal: Negative.    Allergic/Immunologic: Negative.    Neurological: Negative.    Hematological: Negative.    Psychiatric/Behavioral: Negative.      Recent fall fracture left wrist, feels bad overall  Physical Exam  Constitutional:       General: She is not in acute distress.     Appearance: Normal appearance.   HENT:      Head: Normocephalic.      Mouth/Throat:      Mouth: Mucous membranes are moist.   Eyes:      Conjunctiva/sclera: Conjunctivae normal.      Pupils: Pupils are equal, round, and reactive to light.   Cardiovascular:      Rate and Rhythm: Normal rate and regular rhythm. Rhythm irregular.      Pulses: Normal pulses.      Heart sounds: Murmur heard.   Pulmonary:      Effort: Pulmonary effort is normal.      Breath sounds: Normal breath sounds.   Abdominal:      General: Abdomen is flat. Bowel sounds are normal.      Palpations: Abdomen is soft.   Musculoskeletal:         General: Deformity (Left wrist splint in place, patient is able to move her fingers on the left wrist) present. No swelling. Normal range of motion.      Cervical back: Neck supple.   Skin:     General: Skin is warm and dry.      Coloration: Skin is not jaundiced.   Neurological:      General: No focal deficit present.      " Mental Status: She is alert and oriented to person, place, and time. Mental status is at baseline.   Psychiatric:         Mood and Affect: Mood normal.         Behavior: Behavior normal.         Thought Content: Thought content normal.         Judgment: Judgment normal.      Result Review :   Lab Results   Component Value Date    PROBNP 1,846.0 (H) 09/27/2023    PROBNP 3,067.0 (H) 05/15/2023    PROBNP 1,762.0 01/10/2023    BNP 1385 12/05/2020    BNP 4183 (H) 10/12/2020    BNP 5163 (H) 09/09/2020     CMP          8/14/2023    10:09 9/18/2023    12:25 9/27/2023    15:36   CMP   Glucose 97   98    BUN 18   21    Creatinine 0.81  0.90  0.86    EGFR 68.2  60.1  63.5    Sodium 140   143    Potassium 3.7   3.7    Chloride 103   106    Calcium 9.0   9.1    Total Protein 7.3   7.3    Albumin 4.0   3.9    Globulin 3.3   3.4    Total Bilirubin 0.8   0.6    Alkaline Phosphatase 83   91    AST (SGOT) 17   18    ALT (SGPT) 6   <5    Albumin/Globulin Ratio 1.2   1.1    BUN/Creatinine Ratio 22.2   24.4    Anion Gap 8.1   11.8      CBC w/diff          6/12/2023    09:15 8/14/2023    10:09 9/27/2023    15:36   CBC w/Diff   WBC 8.06  8.13  8.01    RBC 4.17  4.00  4.15    Hemoglobin 13.7  12.7  13.2    Hematocrit 40.5  37.8  39.9    MCV 97.1  94.5  96.1    MCH 32.9  31.8  31.8    MCHC 33.8  33.6  33.1    RDW 12.7  11.7  12.4    Platelets 266  253  279    Neutrophil Rel % 66.1  65.3  63.5    Immature Granulocyte Rel % 0.4  0.4  0.6    Lymphocyte Rel % 25.3  27.3  27.8    Monocyte Rel % 6.5  5.3  6.4    Eosinophil Rel % 1.2  1.2  1.1    Basophil Rel % 0.5  0.5  0.6       Lipid Panel          9/27/2023    15:36   Lipid Panel   Total Cholesterol 144    Triglycerides 232    HDL Cholesterol 57    VLDL Cholesterol 36    LDL Cholesterol  51    LDL/HDL Ratio 0.71       Lab Results   Component Value Date    TSH 2.470 01/10/2023    TSH 1.850 03/18/2022    TSH 3.710 03/14/2022      Lab Results   Component Value Date    FREET4 1.13 01/10/2023     FREET4 1.42 03/14/2022    FREET4 1.02 07/15/2021                          Visit Diagnoses:    ICD-10-CM ICD-9-CM   1. Medicare annual wellness visit, subsequent  Z00.00 V70.0   2. Difficulty walking  R26.2 719.7   3. Postural dizziness with presyncope  R42 780.4    R55 780.2   4. Primary insomnia  F51.01 307.42   5. Other closed extra-articular fracture of distal end of left radius with routine healing, subsequent encounter  S52.552D V54.12   6. Anxiety  F41.9 300.00   7. Vitamin D deficiency  E55.9 268.9   8. Hypertension, essential  I10 401.9   9. Pancreatic lesion  K86.9 577.9   10. Renal stone  N20.0 592.0   11. Atrial fibrillation, chronic  I48.20 427.31   12. Complicated UTI (urinary tract infection)  N39.0 599.0   13. Iron deficiency anemia, unspecified iron deficiency anemia type  D50.9 280.9       Assessment and Plan   Diagnoses and all orders for this visit:    1. Medicare annual wellness visit, subsequent (Primary)    2. Difficulty walking  -     Comprehensive Metabolic Panel; Future  -     CBC & Differential; Future  -     Amylase; Future  -     Lipase; Future  -     Sedimentation Rate; Future    3. Postural dizziness with presyncope  -     Comprehensive Metabolic Panel; Future  -     CBC & Differential; Future  -     Amylase; Future  -     Lipase; Future  -     Sedimentation Rate; Future    4. Primary insomnia  -     Comprehensive Metabolic Panel; Future  -     CBC & Differential; Future  -     Amylase; Future  -     Lipase; Future  -     Sedimentation Rate; Future    5. Other closed extra-articular fracture of distal end of left radius with routine healing, subsequent encounter  -     Comprehensive Metabolic Panel; Future  -     CBC & Differential; Future  -     Amylase; Future  -     Lipase; Future  -     Sedimentation Rate; Future    6. Anxiety  -     Comprehensive Metabolic Panel; Future  -     CBC & Differential; Future  -     Amylase; Future  -     Lipase; Future  -     Sedimentation Rate;  Future    7. Vitamin D deficiency  -     Comprehensive Metabolic Panel; Future  -     CBC & Differential; Future  -     Amylase; Future  -     Lipase; Future  -     Sedimentation Rate; Future    8. Hypertension, essential  -     Comprehensive Metabolic Panel; Future  -     CBC & Differential; Future  -     Amylase; Future  -     Lipase; Future  -     Sedimentation Rate; Future    9. Pancreatic lesion  -     Comprehensive Metabolic Panel; Future  -     CBC & Differential; Future  -     Amylase; Future  -     Lipase; Future  -     Sedimentation Rate; Future    10. Renal stone  -     Comprehensive Metabolic Panel; Future  -     CBC & Differential; Future  -     Amylase; Future  -     Lipase; Future  -     Sedimentation Rate; Future    11. Atrial fibrillation, chronic  -     Comprehensive Metabolic Panel; Future  -     CBC & Differential; Future  -     Amylase; Future  -     Lipase; Future  -     Sedimentation Rate; Future    12. Complicated UTI (urinary tract infection)  -     Comprehensive Metabolic Panel; Future  -     CBC & Differential; Future  -     Amylase; Future  -     Lipase; Future  -     Sedimentation Rate; Future    13. Iron deficiency anemia, unspecified iron deficiency anemia type  -     Comprehensive Metabolic Panel; Future  -     CBC & Differential; Future  -     Amylase; Future  -     Lipase; Future  -     Sedimentation Rate; Future    Other orders  -     FLUoxetine (PROzac) 20 MG capsule; Take 1 capsule by mouth Daily.  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, pancreas was otherwise atrophic, recommended a follow-up MRCP which was performed September 28, 2023, showing multiple cystic lesions within the pancreas measuring up to 1 cm many of these appear to communicate with the main pancreatic duct mild dilated sidebranch pancreatic ducts are also noted, follow-up in 1 year with another MRI recommended redemonstration of right renal pelvis stones measuring 0.7 cm-----discussed follow-up again, could consider repeating MRCP in 4 to 6 months or repeating the CAT scan patient says she does not want to do the MRI again it was too long of the test and I concur, discussed October 2, 2023    BMI is within normal parameters. No other follow-up for BMI required.     presyncopal syncopal spell -- cardiology Friday is getting an echo and a Holter monitor placed, Elina 15, 2023,-echocardiogram showed normal ejection fraction, diastolic indeterminate, mild to moderate biatrial enlargement mild to moderate mitral regurgitation mild to moderate tricuspid regurgitation, Holter showed predominant rhythm A-fib, heart rate average of 90 occasional PVCs no significant bradycardia arrhythmias June 2023    Hypertension, --- CONT METOPROLOL  MG BID --    Acute diastolic heart failure may 2022,   Lasix 20 mg bid,  potassium 10 mEq twice a day, --- BR NP  down to 1700 January 2023- continues magnesium, Florajen,, or previous CT scan as below may 17, 2022 showed bilateral pleural effusions and symptoms consistent with congestive heart failure clinically better September 2022    Left leg swelling  VE LE,, -neg  May 2022     Depression- lost son to stomach ca, nov 2018, and daughter had AVR nov 2018, -currently on treatment as below,    ANXIETY  --- continues LORAZEPAM 0.5 HS AND 1/2= 0.25 MG QD PRN ----we had increased her Lexapro recently, she felt like it may be causing her to feel weak or wobbly so she stopped it and has restarted it a couple days  ago at half dose    L NILO, MARCH 16, 2022, POST OP DISLOCATION MARCH 20, 2022 AND THEN TO [facility],,    Dysphagia, Status post barium swallow October 2020, showing narrowing at the distal esophagus, for EGD by Dr. Blount February February 25, 2021,--stopped PPIs-- September 2022    Colon cancer diagnosed June 2020 initially found to be Anemia, was referred to hematology oncology,--- sent to gastroenterology---Dr. Blount--FOUND COLON CA JUNE 2020, REFERRED TO DR QUISPE--subsequent exploratory laparotomy and partial colectomy with 14 out of 14 lymph nodes negative, -------patient had prolonged hospital course, subsequent sent to rehab, now home--HAD LOW NA , DEVELOPED C DIFF COLITIS, IN HOSP, FTT WITH WGT LOSS IN REHAB --GAIT DYS. AND GEN WEAKNESS --Slowly improving, current medications reviewed----Patient had delirium in the hospital also, improved---Patient has follow-up for a 5 mm lung nodule found on CT scan in the left lower lobe and surgical changes ----- CT scan in February 2021, no change mild to moderate cardiomegaly stable nodular density lung fields bilateral, CT scan May 17, 2022 showed stable small subcentimeter pulmonary nodules that have not changed over 1 year, new small moderate bilateral pleural effusions were noted--- patient continues Colestid 1 g, 2 tablets once a day    Status post colonoscopy October 2021 Dr. Quispe--- recent CT scan May 21, 2022 abdomen and pelvis shows no acute abdominal or pelvic abnormality colonic diverticulosis, blood pressure proximal colon resection noted small bilateral pleural effusions cardiomegaly coronary disease moderate sized hiatal hernia and osteopenia and thoracodegenerative changes left hip prosthesis and previous cholecystectomy, -----CT scan of the chest May 17, 2022 shows stable small subcentimeter pulmonary nodules over the past year new small bilateral pleural effusions with adjacent compressive atelectasis consistent with congestive heart  failure clinically improved    LUNG NODULE --Found June 2020, 5 millimeter left lower lobe--- on CT scan for abdominal pains---- FOUND PER HEM/ONC AND F/U CT scan of the chest February 4, 2021, showing stable pulmonary nodules, no change on CT scan May 2022,-- and CT abdomen and pelvis for follow-up of colon cancer,    Chronic atrial fibrillation---- MARCH 23, 2017, --continues  ELIQUIS 2.5 mg twice a day ,Metoprolol 100 mg twice a day,---Cardioverted April 2019 Dr. Jolly , NOW BACK IN AFIB MAY 2023, October 2023    L HUMERAL FX DEC 2016, ORIF WITH BENSON --at [facility]    adverse reaction with rash to nitrofurantoin,, SEPT. 2016.,  Currently taking Azo tolerating well    EXTENSIVE LLE DVT 11/15, , Continues Eliquis 2.5 mg twice a day     Follow Up   Return in about 4 months (around 2/2/2024).  Patient was given instructions and counseling regarding her condition or for health maintenance advice. Please see specific information pulled into the AVS if appropriate.       Answers submitted by the patient for this visit:  Primary Reason for Visit (Submitted on 10/1/2023)  What is the primary reason for your visit?: Physical

## 2023-10-04 PROBLEM — N39.0 RECURRENT URINARY TRACT INFECTION: Status: ACTIVE | Noted: 2023-10-04

## 2023-10-04 NOTE — PROGRESS NOTES
Chief Complaint: Urologic complaint    Subjective         History of Present Illness  Bing Cruz is a 89 y.o. female         Nephrolithiasis  Ureteral stone  Recurrent UTI      Patient was living independently until she broke her left arm recently.    No right flank pain    Patient has had several UTIs using symptoms would be decreased p.o. intake and nausea.    History of colon cancer that is caused diarrhea at times, sometimes constipation      9/27/2023   0.8, GFR 63  9/19/2023 E. coli resistant to oral antibiotics other than nitrofurantoin  9/8/2023 Citrobacter -resistant to Ancef  8/23 E. coli  6/22 Citrobacter  3/22   Proteus    9/18/2023 CT abdomen/pelvis with and without - 13 x 6 mm stone right renal pelvis.  With some urothelial thickening and inflammation around this.  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  No hydro-.  No stones on the left.  Postsurgical changes of prior right hemicolectomy.  9 mm pancreatic cyst.  MRCP follow-up would be recommended.  Images reviewed.       No history of kidney  stone.  No CAD, patient has A-fib on Eliquis    No pulmonary issues    History of urethral stenosis sling     Non-smoker.         Objective     Past Medical History:   Diagnosis Date    Abdominal pain, generalized     Anemia     Aortic regurgitation     Aortic stenosis     Cardiomegaly     Chronic atrial fibrillation 01/01/2019    Atrial fibrillation converted to sinus through cardioversion (March 2019    Chronic fatigue     Colon cancer     Diarrhea     Disease of tricuspid valve     Diverticulosis of colon     DVT (deep venous thrombosis)     LEFT LEG GREATER THAN 5 YRS AGO    Dysphagia     Essential (primary) hypertension     Hiatal hernia     Insomnia, unspecified     LVH (left ventricular hypertrophy)     Major depressive disorder, single episode     Mitral regurgitation     Mitral valve insufficiency and aortic valve insufficiency     Osteopenia     Pain in joint,  pelvic region and thigh     TR (tricuspid regurgitation)     UTI (urinary tract infection)     antibx to be completed prior to procedure. ABIOLA Carlos RN ([facility]) notified.       Past Surgical History:   Procedure Laterality Date    ABDOMINAL HYSTERECTOMY      WITH BSO     ANKLE SURGERY      (L) ankle fracture    BLADDER REPAIR      BREAST BIOPSY      (L) breast biopsy    CARDIOVERSION  2019    CATARACT EXTRACTION      OU    CHOLECYSTECTOMY      OPEN    COLON SURGERY      STATES SHE HAD 12 INCHES OF COLON REMOVED IN JULY 2020 FOR COLON CANCER    COLONOSCOPY  2020    COLONOSCOPY N/A 10/29/2021    Procedure: COLONOSCOPY;  Surgeon: Edmar Back MD;  Location: [facility] ENDOSCOPY;  Service: General;  Laterality: N/A;  DIVERTICULOSIS/ANASTOMOSIS    FEMUR OPEN REDUCTION INTERNAL FIXATION Left 07/21/2021    Procedure: FEMORAL NECK OPEN REDUCTION INTERNAL FIXATION;  Surgeon: Bam Warner MD;  Location: [facility] MAIN OR;  Service: Orthopedics;  Laterality: Left;    HIP CLOSED REDUCTION Left 3/20/2022    Procedure: HIP CLOSED REDUCTION;  Surgeon: Harsha Gayle MD;  Location: [facility] MAIN OR;  Service: Orthopedics;  Laterality: Left;    INGUINAL HERNIA REPAIR Left 09/29/2011    ORIF WRIST FRACTURE Left 8/24/2023    Procedure: OPEN REDUCTION AND INTERNAL FIXATION WRIST;  Surgeon: Ben Alegre MD;  Location: [facility] OR OSC;  Service: Orthopedics;  Laterality: Left;    TOTAL HIP ARTHROPLASTY Left 3/16/2022    Procedure: LEFT TOTAL HIP ARTHROPLASTY AND HARDWARE REMOVAL;  Surgeon: Bam Warner MD;  Location: [facility] MAIN OR;  Service: Orthopedics;  Laterality: Left;    UPPER GASTROINTESTINAL ENDOSCOPY      WITH DILATATION         Current Outpatient Medications:     apixaban (ELIQUIS) 2.5 MG tablet tablet, Take 1 tablet by mouth 2 (Two) Times a Day., Disp: 180 tablet, Rfl: 3    ascorbic acid (VITAMIN C) 500 MG tablet, Take 1 tablet by mouth Daily., Disp: , Rfl:     AZO-CRANBERRY PO, Take  by mouth., Disp: , Rfl:      colestipol (COLESTID) 1 g tablet, Take 2 tablets by mouth Daily., Disp: 180 tablet, Rfl: 3    FLUoxetine (PROzac) 20 MG capsule, Take 1 capsule by mouth Daily., Disp: 90 capsule, Rfl: 3    furosemide (LASIX) 20 MG tablet, Take 1 tablet by mouth 2 (Two) Times a Day., Disp: 180 tablet, Rfl: 3    LORazepam (ATIVAN) 0.5 MG tablet, TAKE 1 TABLET BY MOUTH AT NIGHT AS NEEDED FOR ANXIETY. (Patient taking differently: Take 1 tablet by mouth At Night As Needed for Anxiety. PT only takes if needed), Disp: 30 tablet, Rfl: 5    losartan (COZAAR) 25 MG tablet, TAKE 1 TABLET BY MOUTH EVERY DAY, Disp: 90 tablet, Rfl: 1    Lumigan 0.01 % ophthalmic drops, INSTILL 1 DROP IN BOTH EYES NIGHTLY, Disp: , Rfl:     metoprolol succinate XL (TOPROL-XL) 100 MG 24 hr tablet, Take 1 tablet by mouth 2 (Two) Times a Day., Disp: 180 tablet, Rfl: 3    ondansetron (Zofran) 4 MG tablet, Take 1 tablet by mouth Every 8 (Eight) Hours As Needed for Nausea or Vomiting., Disp: 30 tablet, Rfl: 0    potassium chloride 10 MEQ CR tablet, Take 1 tablet by mouth 2 (Two) Times a Day., Disp: 180 tablet, Rfl: 3    timolol (TIMOPTIC) 0.5 % ophthalmic solution, INSTILL 1 DROP IN BOTH EYES IN THE MORNING, Disp: , Rfl:     Allergies   Allergen Reactions    Citalopram Unknown - High Severity    Clonazepam Unknown - High Severity    Levofloxacin Nausea And Vomiting    Lisinopril Unknown - High Severity    Macrobid [Nitrofurantoin Macrocrystal] Rash    Melatonin Unknown - High Severity        Family History   Problem Relation Age of Onset    Malig Hyperthermia Neg Hx        Social History     Socioeconomic History    Marital status:    Tobacco Use    Smoking status: Never    Smokeless tobacco: Never   Vaping Use    Vaping Use: Never used   Substance and Sexual Activity    Alcohol use: Not Currently    Drug use: Never    Sexual activity: Defer       Vital Signs:   There were no vitals taken for this visit.     Physical exam    Alert and orient x3  Well  appearing, well developed, in no acute distress   Unlabored respirations  Nontender/nondistended      Grossly oriented to person, place and time, judgment is intact, normal mood and affect              Assessment and Plan    Diagnoses and all orders for this visit:    1. Nephrolithiasis (Primary)    2. Recurrent urinary tract infection          Nephrolithiasis    Records reviewed and summarized in chart   CT images and read reviewed discussed with patient    Discussed with patient she does have a large stone that seems to be nonobstructing but it is in the renal pelvis where it could be obstructing at some point.  We discussed implications of this.    We discussed she could slowly lose function of that kidney or get septic.  We also discussed that she is 89 and putting her through surgery could also be detrimental to her health or cause problems.    Risks and benefits were discussed including bleeding, infection and damage to the urinary system.  We also discussed the risk of anesthesia up to and including death.  Patient voiced understanding     After discussion of risk and benefits patient understands risk and we will not proceed with surgery unless she starts having pain or UTIs with sepsis from obstructing stone.      Patient had a lot of trouble with anesthesia in the past            Recurrent UTI      Cultures reviewed today.      Increase p.o. fluid intake.      At this time patient does not really have a lot of oral options  -  nitrofurantoin is a possibility but with her age this does have risk - we discussed this today at this time because she only drinks 1 glass of fluid daily I will have her increase fluids first.        Because of her age we will try to be as conservative as possible    Urine culture today, follow-up in 6 months with NP

## 2023-10-06 ENCOUNTER — OFFICE VISIT (OUTPATIENT)
Dept: UROLOGY | Facility: CLINIC | Age: 88
End: 2023-10-06
Payer: MEDICARE

## 2023-10-06 ENCOUNTER — LAB (OUTPATIENT)
Dept: LAB | Facility: HOSPITAL | Age: 88
DRG: 659 | End: 2023-10-06
Payer: MEDICARE

## 2023-10-06 VITALS
BODY MASS INDEX: 21.35 KG/M2 | HEART RATE: 92 BPM | SYSTOLIC BLOOD PRESSURE: 144 MMHG | WEIGHT: 136 LBS | HEIGHT: 67 IN | DIASTOLIC BLOOD PRESSURE: 88 MMHG

## 2023-10-06 DIAGNOSIS — N39.0 RECURRENT URINARY TRACT INFECTION: ICD-10-CM

## 2023-10-06 DIAGNOSIS — N20.0 NEPHROLITHIASIS: Primary | ICD-10-CM

## 2023-10-06 PROCEDURE — 87086 URINE CULTURE/COLONY COUNT: CPT

## 2023-10-06 PROCEDURE — 87186 SC STD MICRODIL/AGAR DIL: CPT

## 2023-10-06 PROCEDURE — 87088 URINE BACTERIA CULTURE: CPT

## 2023-10-07 ENCOUNTER — HOSPITAL ENCOUNTER (INPATIENT)
Facility: HOSPITAL | Age: 88
LOS: 4 days | Discharge: NURSING, REHAB OR LONG-TERM CARE | DRG: 659 | End: 2023-10-11
Attending: EMERGENCY MEDICINE | Admitting: HOSPITALIST
Payer: MEDICARE

## 2023-10-07 ENCOUNTER — APPOINTMENT (OUTPATIENT)
Dept: CT IMAGING | Facility: HOSPITAL | Age: 88
DRG: 659 | End: 2023-10-07
Payer: MEDICARE

## 2023-10-07 ENCOUNTER — APPOINTMENT (OUTPATIENT)
Dept: GENERAL RADIOLOGY | Facility: HOSPITAL | Age: 88
DRG: 659 | End: 2023-10-07
Payer: MEDICARE

## 2023-10-07 ENCOUNTER — ANESTHESIA EVENT (OUTPATIENT)
Dept: PERIOP | Facility: HOSPITAL | Age: 88
DRG: 659 | End: 2023-10-07
Payer: MEDICARE

## 2023-10-07 ENCOUNTER — ANESTHESIA (OUTPATIENT)
Dept: PERIOP | Facility: HOSPITAL | Age: 88
DRG: 659 | End: 2023-10-07
Payer: MEDICARE

## 2023-10-07 DIAGNOSIS — R26.2 DIFFICULTY IN WALKING: ICD-10-CM

## 2023-10-07 DIAGNOSIS — R13.12 OROPHARYNGEAL DYSPHAGIA: ICD-10-CM

## 2023-10-07 DIAGNOSIS — N20.0 KIDNEY STONE: ICD-10-CM

## 2023-10-07 DIAGNOSIS — N13.30 HYDRONEPHROSIS OF RIGHT KIDNEY: ICD-10-CM

## 2023-10-07 DIAGNOSIS — A41.9 SEPSIS WITHOUT ACUTE ORGAN DYSFUNCTION, DUE TO UNSPECIFIED ORGANISM: Primary | ICD-10-CM

## 2023-10-07 DIAGNOSIS — N20.1 RIGHT URETERAL STONE: ICD-10-CM

## 2023-10-07 DIAGNOSIS — N39.0 ACUTE UTI (URINARY TRACT INFECTION): ICD-10-CM

## 2023-10-07 DIAGNOSIS — F41.9 ANXIETY: ICD-10-CM

## 2023-10-07 DIAGNOSIS — Z78.9 DECREASED ACTIVITIES OF DAILY LIVING (ADL): ICD-10-CM

## 2023-10-07 LAB
ALBUMIN SERPL-MCNC: 3.8 G/DL (ref 3.5–5.2)
ALBUMIN/GLOB SERPL: 1.1 G/DL
ALP SERPL-CCNC: 112 U/L (ref 39–117)
ALT SERPL W P-5'-P-CCNC: 23 U/L (ref 1–33)
ANION GAP SERPL CALCULATED.3IONS-SCNC: 12 MMOL/L (ref 5–15)
AST SERPL-CCNC: 39 U/L (ref 1–32)
BACTERIA UR QL AUTO: ABNORMAL /HPF
BASOPHILS # BLD AUTO: 0.06 10*3/MM3 (ref 0–0.2)
BASOPHILS NFR BLD AUTO: 0.3 % (ref 0–1.5)
BILIRUB SERPL-MCNC: 1.9 MG/DL (ref 0–1.2)
BILIRUB UR QL STRIP: NEGATIVE
BUN SERPL-MCNC: 21 MG/DL (ref 8–23)
BUN/CREAT SERPL: 18.1 (ref 7–25)
CALCIUM SPEC-SCNC: 9.3 MG/DL (ref 8.2–9.6)
CHLORIDE SERPL-SCNC: 105 MMOL/L (ref 98–107)
CLARITY UR: ABNORMAL
CO2 SERPL-SCNC: 24 MMOL/L (ref 22–29)
COLOR UR: YELLOW
CREAT SERPL-MCNC: 1.16 MG/DL (ref 0.57–1)
D-LACTATE SERPL-SCNC: 1.9 MMOL/L (ref 0.5–2)
DEPRECATED RDW RBC AUTO: 47.9 FL (ref 37–54)
EGFRCR SERPLBLD CKD-EPI 2021: 44.3 ML/MIN/1.73
EOSINOPHIL # BLD AUTO: 0.02 10*3/MM3 (ref 0–0.4)
EOSINOPHIL NFR BLD AUTO: 0.1 % (ref 0.3–6.2)
ERYTHROCYTE [DISTWIDTH] IN BLOOD BY AUTOMATED COUNT: 13.2 % (ref 12.3–15.4)
GLOBULIN UR ELPH-MCNC: 3.4 GM/DL
GLUCOSE SERPL-MCNC: 125 MG/DL (ref 65–99)
GLUCOSE UR STRIP-MCNC: NEGATIVE MG/DL
HCT VFR BLD AUTO: 39.1 % (ref 34–46.6)
HGB BLD-MCNC: 12.8 G/DL (ref 12–15.9)
HGB UR QL STRIP.AUTO: ABNORMAL
HOLD SPECIMEN: NORMAL
HOLD SPECIMEN: NORMAL
HYALINE CASTS UR QL AUTO: ABNORMAL /LPF
IMM GRANULOCYTES # BLD AUTO: 0.16 10*3/MM3 (ref 0–0.05)
IMM GRANULOCYTES NFR BLD AUTO: 0.7 % (ref 0–0.5)
KETONES UR QL STRIP: ABNORMAL
LEUKOCYTE ESTERASE UR QL STRIP.AUTO: ABNORMAL
LYMPHOCYTES # BLD AUTO: 1.05 10*3/MM3 (ref 0.7–3.1)
LYMPHOCYTES NFR BLD AUTO: 4.6 % (ref 19.6–45.3)
MAGNESIUM SERPL-MCNC: 1.5 MG/DL (ref 1.7–2.3)
MCH RBC QN AUTO: 31.8 PG (ref 26.6–33)
MCHC RBC AUTO-ENTMCNC: 32.7 G/DL (ref 31.5–35.7)
MCV RBC AUTO: 97.3 FL (ref 79–97)
MONOCYTES # BLD AUTO: 1.31 10*3/MM3 (ref 0.1–0.9)
MONOCYTES NFR BLD AUTO: 5.8 % (ref 5–12)
MUCOUS THREADS URNS QL MICRO: ABNORMAL /HPF
NEUTROPHILS NFR BLD AUTO: 20.1 10*3/MM3 (ref 1.7–7)
NEUTROPHILS NFR BLD AUTO: 88.5 % (ref 42.7–76)
NITRITE UR QL STRIP: NEGATIVE
NRBC BLD AUTO-RTO: 0 /100 WBC (ref 0–0.2)
PH UR STRIP.AUTO: 5.5 [PH] (ref 5–8)
PHOSPHATE SERPL-MCNC: 1.6 MG/DL (ref 2.5–4.5)
PLATELET # BLD AUTO: 235 10*3/MM3 (ref 140–450)
PMV BLD AUTO: 11.6 FL (ref 6–12)
POTASSIUM SERPL-SCNC: 3.8 MMOL/L (ref 3.5–5.2)
PROT SERPL-MCNC: 7.2 G/DL (ref 6–8.5)
PROT UR QL STRIP: ABNORMAL
RBC # BLD AUTO: 4.02 10*6/MM3 (ref 3.77–5.28)
RBC # UR STRIP: ABNORMAL /HPF
REF LAB TEST METHOD: ABNORMAL
SODIUM SERPL-SCNC: 141 MMOL/L (ref 136–145)
SP GR UR STRIP: 1.02 (ref 1–1.03)
SQUAMOUS #/AREA URNS HPF: ABNORMAL /HPF
TROPONIN T SERPL HS-MCNC: 29 NG/L
UROBILINOGEN UR QL STRIP: ABNORMAL
WBC # UR STRIP: ABNORMAL /HPF
WBC NRBC COR # BLD: 22.7 10*3/MM3 (ref 3.4–10.8)
WHOLE BLOOD HOLD COAG: NORMAL
WHOLE BLOOD HOLD SPECIMEN: NORMAL

## 2023-10-07 PROCEDURE — 83605 ASSAY OF LACTIC ACID: CPT | Performed by: EMERGENCY MEDICINE

## 2023-10-07 PROCEDURE — 25010000002 PIPERACILLIN SOD-TAZOBACTAM PER 1 G: Performed by: HOSPITALIST

## 2023-10-07 PROCEDURE — 25010000002 ONDANSETRON PER 1 MG: Performed by: NURSE ANESTHETIST, CERTIFIED REGISTERED

## 2023-10-07 PROCEDURE — 87040 BLOOD CULTURE FOR BACTERIA: CPT | Performed by: EMERGENCY MEDICINE

## 2023-10-07 PROCEDURE — 84484 ASSAY OF TROPONIN QUANT: CPT | Performed by: EMERGENCY MEDICINE

## 2023-10-07 PROCEDURE — 25010000002 FENTANYL CITRATE (PF) 50 MCG/ML SOLUTION: Performed by: NURSE ANESTHETIST, CERTIFIED REGISTERED

## 2023-10-07 PROCEDURE — 87150 DNA/RNA AMPLIFIED PROBE: CPT | Performed by: EMERGENCY MEDICINE

## 2023-10-07 PROCEDURE — 99285 EMERGENCY DEPT VISIT HI MDM: CPT

## 2023-10-07 PROCEDURE — 25510000001 IOPAMIDOL PER 1 ML: Performed by: UROLOGY

## 2023-10-07 PROCEDURE — 25810000003 SODIUM CHLORIDE 0.9 % SOLUTION: Performed by: EMERGENCY MEDICINE

## 2023-10-07 PROCEDURE — 87186 SC STD MICRODIL/AGAR DIL: CPT | Performed by: EMERGENCY MEDICINE

## 2023-10-07 PROCEDURE — 93005 ELECTROCARDIOGRAM TRACING: CPT | Performed by: EMERGENCY MEDICINE

## 2023-10-07 PROCEDURE — 76000 FLUOROSCOPY <1 HR PHYS/QHP: CPT

## 2023-10-07 PROCEDURE — 25010000002 CEFTRIAXONE PER 250 MG: Performed by: EMERGENCY MEDICINE

## 2023-10-07 PROCEDURE — 84100 ASSAY OF PHOSPHORUS: CPT | Performed by: HOSPITALIST

## 2023-10-07 PROCEDURE — 80053 COMPREHEN METABOLIC PANEL: CPT | Performed by: EMERGENCY MEDICINE

## 2023-10-07 PROCEDURE — C1769 GUIDE WIRE: HCPCS | Performed by: UROLOGY

## 2023-10-07 PROCEDURE — C1758 CATHETER, URETERAL: HCPCS | Performed by: UROLOGY

## 2023-10-07 PROCEDURE — 71045 X-RAY EXAM CHEST 1 VIEW: CPT

## 2023-10-07 PROCEDURE — 25010000002 MAGNESIUM SULFATE 2 GM/50ML SOLUTION: Performed by: HOSPITALIST

## 2023-10-07 PROCEDURE — 83735 ASSAY OF MAGNESIUM: CPT | Performed by: EMERGENCY MEDICINE

## 2023-10-07 PROCEDURE — 85025 COMPLETE CBC W/AUTO DIFF WBC: CPT

## 2023-10-07 PROCEDURE — 25010000002 PROPOFOL 10 MG/ML EMULSION: Performed by: NURSE ANESTHETIST, CERTIFIED REGISTERED

## 2023-10-07 PROCEDURE — 93005 ELECTROCARDIOGRAM TRACING: CPT

## 2023-10-07 PROCEDURE — 74420 UROGRAPHY RTRGR +-KUB: CPT | Performed by: UROLOGY

## 2023-10-07 PROCEDURE — 25810000003 SODIUM CHLORIDE 0.9 % SOLUTION: Performed by: HOSPITALIST

## 2023-10-07 PROCEDURE — 74176 CT ABD & PELVIS W/O CONTRAST: CPT

## 2023-10-07 PROCEDURE — 81001 URINALYSIS AUTO W/SCOPE: CPT | Performed by: EMERGENCY MEDICINE

## 2023-10-07 PROCEDURE — C2617 STENT, NON-COR, TEM W/O DEL: HCPCS | Performed by: UROLOGY

## 2023-10-07 PROCEDURE — 0T768DZ DILATION OF RIGHT URETER WITH INTRALUMINAL DEVICE, VIA NATURAL OR ARTIFICIAL OPENING ENDOSCOPIC: ICD-10-PCS | Performed by: UROLOGY

## 2023-10-07 PROCEDURE — 36415 COLL VENOUS BLD VENIPUNCTURE: CPT

## 2023-10-07 PROCEDURE — 52332 CYSTOSCOPY AND TREATMENT: CPT | Performed by: UROLOGY

## 2023-10-07 PROCEDURE — 25010000002 DEXAMETHASONE PER 1 MG: Performed by: NURSE ANESTHETIST, CERTIFIED REGISTERED

## 2023-10-07 DEVICE — URETERAL STENT
Type: IMPLANTABLE DEVICE | Site: URETER | Status: FUNCTIONAL
Brand: ASCERTA™

## 2023-10-07 RX ORDER — AMOXICILLIN 250 MG
2 CAPSULE ORAL 2 TIMES DAILY
Status: DISCONTINUED | OUTPATIENT
Start: 2023-10-07 | End: 2023-10-11 | Stop reason: HOSPADM

## 2023-10-07 RX ORDER — SODIUM CHLORIDE 0.9 % (FLUSH) 0.9 %
10 SYRINGE (ML) INJECTION AS NEEDED
Status: DISCONTINUED | OUTPATIENT
Start: 2023-10-07 | End: 2023-10-11 | Stop reason: HOSPADM

## 2023-10-07 RX ORDER — PROPOFOL 10 MG/ML
VIAL (ML) INTRAVENOUS AS NEEDED
Status: DISCONTINUED | OUTPATIENT
Start: 2023-10-07 | End: 2023-10-07 | Stop reason: SURG

## 2023-10-07 RX ORDER — ACETAMINOPHEN 650 MG/1
650 SUPPOSITORY RECTAL EVERY 4 HOURS PRN
Status: DISCONTINUED | OUTPATIENT
Start: 2023-10-07 | End: 2023-10-11 | Stop reason: HOSPADM

## 2023-10-07 RX ORDER — CEFTRIAXONE SODIUM 1 G/50ML
1000 INJECTION, SOLUTION INTRAVENOUS EVERY 24 HOURS
Qty: 200 ML | Refills: 0 | Status: DISCONTINUED | OUTPATIENT
Start: 2023-10-08 | End: 2023-10-07

## 2023-10-07 RX ORDER — FENTANYL CITRATE 50 UG/ML
INJECTION, SOLUTION INTRAMUSCULAR; INTRAVENOUS AS NEEDED
Status: DISCONTINUED | OUTPATIENT
Start: 2023-10-07 | End: 2023-10-07 | Stop reason: SURG

## 2023-10-07 RX ORDER — DEXAMETHASONE SODIUM PHOSPHATE 4 MG/ML
INJECTION, SOLUTION INTRA-ARTICULAR; INTRALESIONAL; INTRAMUSCULAR; INTRAVENOUS; SOFT TISSUE AS NEEDED
Status: DISCONTINUED | OUTPATIENT
Start: 2023-10-07 | End: 2023-10-07 | Stop reason: SURG

## 2023-10-07 RX ORDER — TAMSULOSIN HYDROCHLORIDE 0.4 MG/1
0.4 CAPSULE ORAL DAILY
Status: DISCONTINUED | OUTPATIENT
Start: 2023-10-08 | End: 2023-10-11 | Stop reason: HOSPADM

## 2023-10-07 RX ORDER — POLYETHYLENE GLYCOL 3350 17 G/17G
17 POWDER, FOR SOLUTION ORAL DAILY PRN
Status: DISCONTINUED | OUTPATIENT
Start: 2023-10-07 | End: 2023-10-11 | Stop reason: HOSPADM

## 2023-10-07 RX ORDER — LORAZEPAM 0.5 MG/1
0.5 TABLET ORAL NIGHTLY PRN
Status: DISCONTINUED | OUTPATIENT
Start: 2023-10-07 | End: 2023-10-11 | Stop reason: HOSPADM

## 2023-10-07 RX ORDER — ROCURONIUM BROMIDE 10 MG/ML
INJECTION, SOLUTION INTRAVENOUS AS NEEDED
Status: DISCONTINUED | OUTPATIENT
Start: 2023-10-07 | End: 2023-10-07 | Stop reason: SURG

## 2023-10-07 RX ORDER — FLUOXETINE HYDROCHLORIDE 20 MG/1
20 CAPSULE ORAL DAILY
Status: DISCONTINUED | OUTPATIENT
Start: 2023-10-07 | End: 2023-10-11 | Stop reason: HOSPADM

## 2023-10-07 RX ORDER — SUCCINYLCHOLINE/SOD CL,ISO/PF 100 MG/5ML
SYRINGE (ML) INTRAVENOUS AS NEEDED
Status: DISCONTINUED | OUTPATIENT
Start: 2023-10-07 | End: 2023-10-07 | Stop reason: SURG

## 2023-10-07 RX ORDER — MAGNESIUM SULFATE HEPTAHYDRATE 40 MG/ML
2 INJECTION, SOLUTION INTRAVENOUS ONCE
Status: COMPLETED | OUTPATIENT
Start: 2023-10-07 | End: 2023-10-07

## 2023-10-07 RX ORDER — TIMOLOL MALEATE 5 MG/ML
1 SOLUTION/ DROPS OPHTHALMIC DAILY
Status: DISCONTINUED | OUTPATIENT
Start: 2023-10-07 | End: 2023-10-11 | Stop reason: HOSPADM

## 2023-10-07 RX ORDER — BISACODYL 10 MG
10 SUPPOSITORY, RECTAL RECTAL DAILY PRN
Status: DISCONTINUED | OUTPATIENT
Start: 2023-10-07 | End: 2023-10-11 | Stop reason: HOSPADM

## 2023-10-07 RX ORDER — PHENYLEPHRINE HCL IN 0.9% NACL 1 MG/10 ML
SYRINGE (ML) INTRAVENOUS AS NEEDED
Status: DISCONTINUED | OUTPATIENT
Start: 2023-10-07 | End: 2023-10-07 | Stop reason: SURG

## 2023-10-07 RX ORDER — LIDOCAINE HYDROCHLORIDE 20 MG/ML
INJECTION, SOLUTION EPIDURAL; INFILTRATION; INTRACAUDAL; PERINEURAL AS NEEDED
Status: DISCONTINUED | OUTPATIENT
Start: 2023-10-07 | End: 2023-10-07 | Stop reason: SURG

## 2023-10-07 RX ORDER — ACETAMINOPHEN 160 MG/5ML
650 SOLUTION ORAL EVERY 4 HOURS PRN
Status: DISCONTINUED | OUTPATIENT
Start: 2023-10-07 | End: 2023-10-11 | Stop reason: HOSPADM

## 2023-10-07 RX ORDER — SODIUM CHLORIDE 0.9 % (FLUSH) 0.9 %
10 SYRINGE (ML) INJECTION EVERY 12 HOURS SCHEDULED
Status: DISCONTINUED | OUTPATIENT
Start: 2023-10-07 | End: 2023-10-11 | Stop reason: HOSPADM

## 2023-10-07 RX ORDER — ONDANSETRON 2 MG/ML
4 INJECTION INTRAMUSCULAR; INTRAVENOUS EVERY 4 HOURS PRN
Status: DISCONTINUED | OUTPATIENT
Start: 2023-10-07 | End: 2023-10-11 | Stop reason: HOSPADM

## 2023-10-07 RX ORDER — LATANOPROST 50 UG/ML
1 SOLUTION/ DROPS OPHTHALMIC NIGHTLY
Status: DISCONTINUED | OUTPATIENT
Start: 2023-10-07 | End: 2023-10-11 | Stop reason: HOSPADM

## 2023-10-07 RX ORDER — SODIUM CHLORIDE 9 MG/ML
40 INJECTION, SOLUTION INTRAVENOUS AS NEEDED
Status: DISCONTINUED | OUTPATIENT
Start: 2023-10-07 | End: 2023-10-11 | Stop reason: HOSPADM

## 2023-10-07 RX ORDER — SODIUM CHLORIDE 9 MG/ML
50 INJECTION, SOLUTION INTRAVENOUS CONTINUOUS
Status: DISCONTINUED | OUTPATIENT
Start: 2023-10-07 | End: 2023-10-09

## 2023-10-07 RX ORDER — TIMOLOL MALEATE 5 MG/ML
1 SOLUTION/ DROPS OPHTHALMIC DAILY
Status: DISCONTINUED | OUTPATIENT
Start: 2023-10-07 | End: 2023-10-07

## 2023-10-07 RX ORDER — ACETAMINOPHEN 325 MG/1
650 TABLET ORAL EVERY 4 HOURS PRN
Status: DISCONTINUED | OUTPATIENT
Start: 2023-10-07 | End: 2023-10-11 | Stop reason: HOSPADM

## 2023-10-07 RX ORDER — BISACODYL 5 MG/1
5 TABLET, DELAYED RELEASE ORAL DAILY PRN
Status: DISCONTINUED | OUTPATIENT
Start: 2023-10-07 | End: 2023-10-11 | Stop reason: HOSPADM

## 2023-10-07 RX ORDER — CEFTRIAXONE SODIUM 1 G/50ML
1000 INJECTION, SOLUTION INTRAVENOUS ONCE
Status: COMPLETED | OUTPATIENT
Start: 2023-10-07 | End: 2023-10-07

## 2023-10-07 RX ORDER — ONDANSETRON 2 MG/ML
INJECTION INTRAMUSCULAR; INTRAVENOUS AS NEEDED
Status: DISCONTINUED | OUTPATIENT
Start: 2023-10-07 | End: 2023-10-07 | Stop reason: SURG

## 2023-10-07 RX ADMIN — ROCURONIUM BROMIDE 10 MG: 50 INJECTION INTRAVENOUS at 22:55

## 2023-10-07 RX ADMIN — Medication 100 MCG: at 23:00

## 2023-10-07 RX ADMIN — LIDOCAINE HYDROCHLORIDE 60 MG: 20 INJECTION, SOLUTION EPIDURAL; INFILTRATION; INTRACAUDAL; PERINEURAL at 22:55

## 2023-10-07 RX ADMIN — DEXAMETHASONE SODIUM PHOSPHATE 8 MG: 4 INJECTION, SOLUTION INTRAMUSCULAR; INTRAVENOUS at 23:02

## 2023-10-07 RX ADMIN — PIPERACILLIN AND TAZOBACTAM 3.38 G: 3; .375 INJECTION, POWDER, LYOPHILIZED, FOR SOLUTION INTRAVENOUS at 21:24

## 2023-10-07 RX ADMIN — ONDANSETRON 4 MG: 2 INJECTION INTRAMUSCULAR; INTRAVENOUS at 23:02

## 2023-10-07 RX ADMIN — Medication 100 MG: at 22:56

## 2023-10-07 RX ADMIN — MAGNESIUM SULFATE HEPTAHYDRATE 2 G: 40 INJECTION, SOLUTION INTRAVENOUS at 15:34

## 2023-10-07 RX ADMIN — SODIUM CHLORIDE 100 ML/HR: 9 INJECTION, SOLUTION INTRAVENOUS at 18:59

## 2023-10-07 RX ADMIN — FENTANYL CITRATE 50 MCG: 50 INJECTION, SOLUTION INTRAMUSCULAR; INTRAVENOUS at 23:09

## 2023-10-07 RX ADMIN — SODIUM CHLORIDE 500 ML: 9 INJECTION, SOLUTION INTRAVENOUS at 15:33

## 2023-10-07 RX ADMIN — PROPOFOL 100 MG: 10 INJECTION, EMULSION INTRAVENOUS at 22:55

## 2023-10-07 RX ADMIN — CEFTRIAXONE SODIUM 1000 MG: 1 INJECTION, SOLUTION INTRAVENOUS at 15:37

## 2023-10-07 RX ADMIN — FENTANYL CITRATE 50 MCG: 50 INJECTION, SOLUTION INTRAMUSCULAR; INTRAVENOUS at 23:16

## 2023-10-07 NOTE — H&P
[facility] HISTORY AND PHYSICAL  Date: 10/7/2023   Patient Name: Bing Cruz  : 1931  MRN: [id number]  Primary Care Physician:  Jairo Kramer MD  Date of admission: 10/7/2023    Subjective recent fall, concern for UTI, poor oral intake  Subjective     Chief Complaint: Recent fall, concern for UTI, poor oral intake    HPI: Patient is a 89-year-old female recently broke her left arm.  She has a history of several urinary tract infections.  And when she does have a UTI, she has decreased oral intake and nausea.  She follows with Dr. Chester.      She had a CT abdomen and pelvis on 2023 CT 13 x 6 mm stone right renal pelvis.  With some urothelial thickening and inflammation around this.  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  No hydro-.  No stones on the left.  Postsurgical changes of prior right hemicolectomy.  9 mm pancreatic cyst.  With a recommendation for an MRCP.      An MRCP did show multiple small cystic lesions in the pancreas measuring up to 1 cm.  Many of these appear to communicate with the main pancreatic duct.  Mildly dilated sidebranch of the pancreatic ducts were also noted.  They recommend repeat MRI in 1 year.    In the past, she has had the following types of UTIs:  2023   0.8, GFR 63  2023 E. coli resistant to oral antibiotics other than nitrofurantoin  2023 Citrobacter -resistant to Ancef   E. coli   Citrobacter  3/22   Proteus    During the office visit, Dr. Chester discussed keeping the patient on prophylactic treatment for UTIs.  However, nitrofurantoin is a possibility but at her age this does have a risk.  He encouraged her to drink more liquids.    On arrival to the ED, patient had a temperature of 98, pulse of 101, respiratory 20, blood pressure 104/66, and she saturating 93% on room air.  Patient's UA is consistent with UTI.  Her magnesium is low.  Her creatinine is 1.16.  White blood  cell count is 22.70.  MCV is 97.3.  Urine culture shows E. coli.  Personal History     Past Medical History:  Past Medical History:   Diagnosis Date    Abdominal pain, generalized     Anemia     Aortic regurgitation     Aortic stenosis     Cardiomegaly     Chronic atrial fibrillation 01/01/2019    Atrial fibrillation converted to sinus through cardioversion (March 2019    Chronic fatigue     Colon cancer     Diarrhea     Disease of tricuspid valve     Diverticulosis of colon     DVT (deep venous thrombosis)     LEFT LEG GREATER THAN 5 YRS AGO    Dysphagia     Essential (primary) hypertension     Hiatal hernia     Insomnia, unspecified     LVH (left ventricular hypertrophy)     Major depressive disorder, single episode     Mitral regurgitation     Mitral valve insufficiency and aortic valve insufficiency     Osteopenia     Pain in joint, pelvic region and thigh     TR (tricuspid regurgitation)     UTI (urinary tract infection)     antibx to be completed prior to procedure. ABIOLA Carlos RN (Timmy) notified.       Past Surgical History:  Past Surgical History:   Procedure Laterality Date    ABDOMINAL HYSTERECTOMY      WITH BSO     ANKLE SURGERY      (L) ankle fracture    BLADDER REPAIR      BREAST BIOPSY      (L) breast biopsy    CARDIOVERSION  2019    CATARACT EXTRACTION      OU    CHOLECYSTECTOMY      OPEN    COLON SURGERY      STATES SHE HAD 12 INCHES OF COLON REMOVED IN JULY 2020 FOR COLON CANCER    COLONOSCOPY  2020    COLONOSCOPY N/A 10/29/2021    Procedure: COLONOSCOPY;  Surgeon: Edmar Back MD;  Location: [facility] ENDOSCOPY;  Service: General;  Laterality: N/A;  DIVERTICULOSIS/ANASTOMOSIS    FEMUR OPEN REDUCTION INTERNAL FIXATION Left 07/21/2021    Procedure: FEMORAL NECK OPEN REDUCTION INTERNAL FIXATION;  Surgeon: Bam Warner MD;  Location: [facility] MAIN OR;  Service: Orthopedics;  Laterality: Left;    HIP CLOSED REDUCTION Left 3/20/2022    Procedure: HIP CLOSED REDUCTION;  Surgeon: Nalini  MD Harsha;  Location: [facility] OR;  Service: Orthopedics;  Laterality: Left;    INGUINAL HERNIA REPAIR Left 09/29/2011    ORIF WRIST FRACTURE Left 8/24/2023    Procedure: OPEN REDUCTION AND INTERNAL FIXATION WRIST;  Surgeon: Ben Alegre MD;  Location: [facility];  Service: Orthopedics;  Laterality: Left;    TOTAL HIP ARTHROPLASTY Left 3/16/2022    Procedure: LEFT TOTAL HIP ARTHROPLASTY AND HARDWARE REMOVAL;  Surgeon: Bam Warner MD;  Location: [facility] OR;  Service: Orthopedics;  Laterality: Left;    UPPER GASTROINTESTINAL ENDOSCOPY      WITH DILATATION       Family History:   Family History   Problem Relation Age of Onset    Malig Hyperthermia Neg Hx        Social History:   Social History     Socioeconomic History    Marital status:    Tobacco Use    Smoking status: Never    Smokeless tobacco: Never   Vaping Use    Vaping Use: Never used   Substance and Sexual Activity    Alcohol use: Not Currently    Drug use: Never    Sexual activity: Defer       Home Medications:  FLUoxetine, apixaban, ascorbic acid, bimatoprost, colestipol, furosemide, losartan, metoprolol succinate XL, potassium chloride, and timolol    Allergies:  Allergies   Allergen Reactions    Citalopram Unknown - High Severity    Clonazepam Unknown - High Severity    Levofloxacin Nausea And Vomiting    Lisinopril Unknown - High Severity    Macrobid [Nitrofurantoin Macrocrystal] Rash    Melatonin Unknown - High Severity       Review of Systems   All systems were reviewed and negative except for: Decreased appetite    Objective   Objective     Vitals:   Temp:  [98 øF (36.7 øC)] 98 øF (36.7 øC)  Heart Rate:  [101-116] 116  Resp:  [20] 20  BP: ()/(66-68) 95/68    Physical Exam    Constitutional: Awake, alert, no acute distress; thin   Eyes: Pupils equal, sclerae anicteric, no conjunctival injection   HENT: NCAT, mucous membranes moist   Neck: Supple, no thyromegaly, no lymphadenopathy, trachea midline   Respiratory: Clear  to auscultation bilaterally, nonlabored respirations    Cardiovascular: Irregularly irregular   Gastrointestinal: Positive bowel sounds, soft, nontender, nondistended   Musculoskeletal: No bilateral ankle edema, no clubbing or cyanosis to extremities   Psychiatric: Appropriate affect, cooperative   Neurologic: Oriented x 3, strength symmetric in all extremities, Cranial Nerves grossly intact to confrontation, speech clear   Skin: No rashes     Result Review    Result Review:  I have personally reviewed the results from the time of this admission to 10/7/2023 16:45 EDT and agree with these findings:  [x]  Laboratory  []  Microbiology  [x]  Radiology  []  EKG/Telemetry   []  Cardiology/Vascular   []  Pathology  [x]  Old records  []  Other:      Assessment & Plan   Assessment / Plan   #1 acute complicated cystitis with history of recurrent UTIs  -CT abdomen ordered.  Patient has nephrolithiasis.  -Recent urine culture shows E. coli.  Awaiting sensitivity.  Treating with Rocephin for now.  IV fluids.    #2 BMI 22.22.  Poor oral intake  -Nutrition consulted.  Dietary supplements ordered    #3 low magnesium replete    #4 JUANI baseline creatinine 0.9  -Gentle hydration hold diuretics.    #5 chronic atrial fibrillation continue Eliquis    #6 depression continue Prozac    #7 hypertension we will resume blood pressure medicine tomorrow patient's blood pressure is on the softer side.    #8 recent fall with injury to arm    #9 debility  -PT/OT/case management for possible rehab.      DVT prophylaxis:  Continue DOAC  CODE STATUS:    Level Of Support Discussed With: Patient  Code Status (Patient has no pulse and is not breathing): CPR (Attempt to Resuscitate)  Medical Interventions (Patient has pulse or is breathing): Full Support      Admission Status:  I believe this patient meets inpatient status.    Electronically signed by Samra Burgos DO, 10/07/23, 2:58 PM EDT.

## 2023-10-08 LAB
ANION GAP SERPL CALCULATED.3IONS-SCNC: 10.6 MMOL/L (ref 5–15)
BACTERIA BLD CULT: ABNORMAL
BACTERIA SPEC AEROBE CULT: ABNORMAL
BASOPHILS # BLD AUTO: 0.03 10*3/MM3 (ref 0–0.2)
BASOPHILS NFR BLD AUTO: 0.2 % (ref 0–1.5)
BOTTLE TYPE: ABNORMAL
BUN SERPL-MCNC: 21 MG/DL (ref 8–23)
BUN/CREAT SERPL: 18.9 (ref 7–25)
CALCIUM SPEC-SCNC: 8.4 MG/DL (ref 8.2–9.6)
CHLORIDE SERPL-SCNC: 105 MMOL/L (ref 98–107)
CO2 SERPL-SCNC: 21.4 MMOL/L (ref 22–29)
CREAT SERPL-MCNC: 1.11 MG/DL (ref 0.57–1)
DEPRECATED RDW RBC AUTO: 48.3 FL (ref 37–54)
EGFRCR SERPLBLD CKD-EPI 2021: 46.7 ML/MIN/1.73
EOSINOPHIL # BLD AUTO: 0 10*3/MM3 (ref 0–0.4)
EOSINOPHIL NFR BLD AUTO: 0 % (ref 0.3–6.2)
ERYTHROCYTE [DISTWIDTH] IN BLOOD BY AUTOMATED COUNT: 13.5 % (ref 12.3–15.4)
FERRITIN SERPL-MCNC: 353.4 NG/ML (ref 13–150)
GLUCOSE SERPL-MCNC: 158 MG/DL (ref 65–99)
HCT VFR BLD AUTO: 34.4 % (ref 34–46.6)
HGB BLD-MCNC: 11.2 G/DL (ref 12–15.9)
IMM GRANULOCYTES # BLD AUTO: 0.11 10*3/MM3 (ref 0–0.05)
IMM GRANULOCYTES NFR BLD AUTO: 0.6 % (ref 0–0.5)
IRON 24H UR-MRATE: 31 MCG/DL (ref 37–145)
IRON SATN MFR SERPL: 15 % (ref 20–50)
LYMPHOCYTES # BLD AUTO: 1.13 10*3/MM3 (ref 0.7–3.1)
LYMPHOCYTES NFR BLD AUTO: 6.6 % (ref 19.6–45.3)
MAGNESIUM SERPL-MCNC: 1.8 MG/DL (ref 1.7–2.3)
MCH RBC QN AUTO: 31.9 PG (ref 26.6–33)
MCHC RBC AUTO-ENTMCNC: 32.6 G/DL (ref 31.5–35.7)
MCV RBC AUTO: 98 FL (ref 79–97)
MONOCYTES # BLD AUTO: 0.21 10*3/MM3 (ref 0.1–0.9)
MONOCYTES NFR BLD AUTO: 1.2 % (ref 5–12)
NEUTROPHILS NFR BLD AUTO: 15.69 10*3/MM3 (ref 1.7–7)
NEUTROPHILS NFR BLD AUTO: 91.4 % (ref 42.7–76)
NRBC BLD AUTO-RTO: 0 /100 WBC (ref 0–0.2)
PHOSPHATE SERPL-MCNC: 3.4 MG/DL (ref 2.5–4.5)
PLATELET # BLD AUTO: 199 10*3/MM3 (ref 140–450)
PMV BLD AUTO: 11.8 FL (ref 6–12)
POTASSIUM SERPL-SCNC: 4.1 MMOL/L (ref 3.5–5.2)
RBC # BLD AUTO: 3.51 10*6/MM3 (ref 3.77–5.28)
RETICS # AUTO: 0.04 10*6/MM3 (ref 0.02–0.13)
RETICS/RBC NFR AUTO: 1.27 % (ref 0.7–1.9)
SODIUM SERPL-SCNC: 137 MMOL/L (ref 136–145)
TIBC SERPL-MCNC: 206 MCG/DL (ref 298–536)
TRANSFERRIN SERPL-MCNC: 138 MG/DL (ref 200–360)
WBC NRBC COR # BLD: 17.17 10*3/MM3 (ref 3.4–10.8)

## 2023-10-08 PROCEDURE — 25010000002 PIPERACILLIN SOD-TAZOBACTAM PER 1 G: Performed by: PHYSICIAN ASSISTANT

## 2023-10-08 PROCEDURE — 82746 ASSAY OF FOLIC ACID SERUM: CPT | Performed by: HOSPITALIST

## 2023-10-08 PROCEDURE — 83735 ASSAY OF MAGNESIUM: CPT | Performed by: PHYSICIAN ASSISTANT

## 2023-10-08 PROCEDURE — 82728 ASSAY OF FERRITIN: CPT | Performed by: HOSPITALIST

## 2023-10-08 PROCEDURE — 85045 AUTOMATED RETICULOCYTE COUNT: CPT | Performed by: HOSPITALIST

## 2023-10-08 PROCEDURE — 25010000002 MAGNESIUM SULFATE 2 GM/50ML SOLUTION: Performed by: HOSPITALIST

## 2023-10-08 PROCEDURE — 85025 COMPLETE CBC W/AUTO DIFF WBC: CPT | Performed by: HOSPITALIST

## 2023-10-08 PROCEDURE — 25010000002 CEFTRIAXONE PER 250 MG: Performed by: HOSPITALIST

## 2023-10-08 PROCEDURE — 84100 ASSAY OF PHOSPHORUS: CPT | Performed by: PHYSICIAN ASSISTANT

## 2023-10-08 PROCEDURE — 83540 ASSAY OF IRON: CPT | Performed by: HOSPITALIST

## 2023-10-08 PROCEDURE — 94799 UNLISTED PULMONARY SVC/PX: CPT

## 2023-10-08 PROCEDURE — 25810000003 SODIUM CHLORIDE 0.9 % SOLUTION: Performed by: HOSPITALIST

## 2023-10-08 PROCEDURE — 80048 BASIC METABOLIC PNL TOTAL CA: CPT | Performed by: PHYSICIAN ASSISTANT

## 2023-10-08 PROCEDURE — 94761 N-INVAS EAR/PLS OXIMETRY MLT: CPT

## 2023-10-08 PROCEDURE — 84466 ASSAY OF TRANSFERRIN: CPT | Performed by: HOSPITALIST

## 2023-10-08 PROCEDURE — 87040 BLOOD CULTURE FOR BACTERIA: CPT | Performed by: PHYSICIAN ASSISTANT

## 2023-10-08 PROCEDURE — 82607 VITAMIN B-12: CPT | Performed by: HOSPITALIST

## 2023-10-08 PROCEDURE — 83921 ORGANIC ACID SINGLE QUANT: CPT | Performed by: HOSPITALIST

## 2023-10-08 RX ORDER — OXYCODONE HYDROCHLORIDE 5 MG/1
5 TABLET ORAL
Status: DISCONTINUED | OUTPATIENT
Start: 2023-10-08 | End: 2023-10-08 | Stop reason: HOSPADM

## 2023-10-08 RX ORDER — MEPERIDINE HYDROCHLORIDE 25 MG/ML
12.5 INJECTION INTRAMUSCULAR; INTRAVENOUS; SUBCUTANEOUS
Status: DISCONTINUED | OUTPATIENT
Start: 2023-10-08 | End: 2023-10-08 | Stop reason: HOSPADM

## 2023-10-08 RX ORDER — SODIUM CHLORIDE, SODIUM LACTATE, POTASSIUM CHLORIDE, CALCIUM CHLORIDE 600; 310; 30; 20 MG/100ML; MG/100ML; MG/100ML; MG/100ML
9 INJECTION, SOLUTION INTRAVENOUS CONTINUOUS PRN
Status: DISCONTINUED | OUTPATIENT
Start: 2023-10-08 | End: 2023-10-11 | Stop reason: HOSPADM

## 2023-10-08 RX ORDER — MAGNESIUM SULFATE HEPTAHYDRATE 40 MG/ML
2 INJECTION, SOLUTION INTRAVENOUS ONCE
Status: COMPLETED | OUTPATIENT
Start: 2023-10-08 | End: 2023-10-08

## 2023-10-08 RX ORDER — ONDANSETRON 2 MG/ML
4 INJECTION INTRAMUSCULAR; INTRAVENOUS ONCE AS NEEDED
Status: DISCONTINUED | OUTPATIENT
Start: 2023-10-08 | End: 2023-10-08 | Stop reason: HOSPADM

## 2023-10-08 RX ORDER — MONTELUKAST SODIUM 4 MG/1
2 TABLET, CHEWABLE ORAL EVERY 24 HOURS
Qty: 20 TABLET | Refills: 0 | Status: DISCONTINUED | OUTPATIENT
Start: 2023-10-09 | End: 2023-10-09

## 2023-10-08 RX ORDER — PROMETHAZINE HYDROCHLORIDE 25 MG/1
25 SUPPOSITORY RECTAL ONCE AS NEEDED
Status: DISCONTINUED | OUTPATIENT
Start: 2023-10-08 | End: 2023-10-08 | Stop reason: HOSPADM

## 2023-10-08 RX ORDER — PROMETHAZINE HYDROCHLORIDE 12.5 MG/1
25 TABLET ORAL ONCE AS NEEDED
Status: DISCONTINUED | OUTPATIENT
Start: 2023-10-08 | End: 2023-10-08 | Stop reason: HOSPADM

## 2023-10-08 RX ADMIN — CEFTRIAXONE SODIUM 2000 MG: 2 INJECTION, POWDER, FOR SOLUTION INTRAMUSCULAR; INTRAVENOUS at 14:29

## 2023-10-08 RX ADMIN — TIMOLOL MALEATE 1 DROP: 5 SOLUTION/ DROPS OPHTHALMIC at 09:55

## 2023-10-08 RX ADMIN — PIPERACILLIN AND TAZOBACTAM 3.38 G: 3; .375 INJECTION, POWDER, LYOPHILIZED, FOR SOLUTION INTRAVENOUS at 05:49

## 2023-10-08 RX ADMIN — Medication 10 ML: at 14:30

## 2023-10-08 RX ADMIN — SODIUM CHLORIDE 100 ML/HR: 9 INJECTION, SOLUTION INTRAVENOUS at 01:59

## 2023-10-08 RX ADMIN — TAMSULOSIN HYDROCHLORIDE 0.4 MG: 0.4 CAPSULE ORAL at 09:53

## 2023-10-08 RX ADMIN — FLUOXETINE 20 MG: 20 CAPSULE ORAL at 09:53

## 2023-10-08 RX ADMIN — LORAZEPAM 0.5 MG: 0.5 TABLET ORAL at 22:43

## 2023-10-08 RX ADMIN — LATANOPROST 1 DROP: 50 SOLUTION OPHTHALMIC at 21:51

## 2023-10-08 RX ADMIN — MAGNESIUM SULFATE HEPTAHYDRATE 2 G: 40 INJECTION, SOLUTION INTRAVENOUS at 14:29

## 2023-10-08 NOTE — ANESTHESIA POSTPROCEDURE EVALUATION
Patient: Bing Cruz    Procedure Summary       Date: 10/07/23 Room / Location: [facility] OR 08 / [facility] MAIN OR    Anesthesia Start: 2250 Anesthesia Stop: 2329    Procedure: CYSTOSCOPY URETERAL CATHETER/STENT INSERTION,right retrograde (Right) Diagnosis:       Kidney stone      (Kidney stone [N20.0])    Surgeons: Roberto Luu MD Provider: Efrain Nagel MD    Anesthesia Type: general ASA Status: 4 - Emergent            Anesthesia Type: general    Vitals  Vitals Value Taken Time   /77 10/08/23 0000   Temp 37.4 °C (99.4 °F) 10/08/23 0000   Pulse 100 10/08/23 0004   Resp 14 10/08/23 0000   SpO2 93 % 10/08/23 0004   Vitals shown include unfiled device data.        Post Anesthesia Care and Evaluation    Patient location during evaluation: bedside  Patient participation: complete - patient participated  Level of consciousness: awake  Pain management: adequate    Airway patency: patent  PONV Status: none  Cardiovascular status: acceptable and stable  Respiratory status: acceptable  Hydration status: acceptable    Comments: An Anesthesiologist personally participated in the most demanding procedures (including induction and emergence if applicable) in the anesthesia plan, monitored the course of anesthesia administration at frequent intervals and remained physically present and available for immediate diagnosis and treatment of emergencies.

## 2023-10-08 NOTE — PROGRESS NOTES
[facility]     Progress Note    Patient Name: Bing Cruz  : 1931  MRN: [id number]  Primary Care Physician:  Jairo Kramer MD  Date of admission: 10/7/2023    Subjective   Subjective     Chief Complaint: Kidney stone, acute pyelonephritis right kidney.    Dizziness      Patient Reports no complaints today.    Review of Systems   Neurological:  Positive for dizziness.       Objective   Objective     Vitals:   Temp:  [97.9 øF (36.6 øC)-99.4 øF (37.4 øC)] 98.1 øF (36.7 øC)  Heart Rate:  [] 96  Resp:  [14-20] 18  BP: ()/(66-94) 96/67    Physical Exam   She is awake alert oriented x3  Abdomen is soft nontender nondistended no rebound guarding or rigidity is noted no CVA tenderness noted bladder is not palpable.  Result Review    Result Review:  I have personally reviewed the results from the time of this admission to 10/8/2023 10:51 EDT and agree with these findings:  []  Laboratory list / accordion  []  Microbiology  []  Radiology  []  EKG/Telemetry   []  Cardiology/Vascular   []  Pathology  []  Old records  []  Other:  Most notable findings include: Acute right pyelonephritis and right mid ureteral stone measuring 9 mm      Assessment & Plan   Assessment / Plan     Brief Patient Summary:  Bing Cruz is a 89 y.o. female who kidney stones    Active Hospital Problems:  Active Hospital Problems    Diagnosis     **UTI (urinary tract infection)     Kidney stone      Plan: Patient had a 9 mm stone obstructing the right kidney around the right UPJ she developed hypotensive episode she had a UTI she was taken to the operating room urgently for right ureteral stent was placed and significant purulent urine was seen draining after the stent was placed.  Patient has seen Dr. Morris Chester and he had spoken to them about possibly removing the stones.  I recommend that they do follow-up with Dr. Chester this week.  If possible she needs to stay off of the anticoagulation until the stone  surgery is done.      DVT prophylaxis:  Medical and mechanical DVT prophylaxis orders are present.    CODE STATUS:    Level Of Support Discussed With: Patient  Code Status (Patient has no pulse and is not breathing): CPR (Attempt to Resuscitate)  Medical Interventions (Patient has pulse or is breathing): Full Support    Disposition:  I expect patient to be discharged home.    Roberto Luu MD

## 2023-10-08 NOTE — PROGRESS NOTES
[facility]   Hospitalist Progress Note  Date: 10/8/2023  Patient Name: Bing Cruz  : 1931  MRN: [id number]  Date of admission: 10/7/2023  Room/Bed: 405/1      Subjective Recent fall, concern for UTI, poor oral intake   Subjective     Chief Complaint: Recent fall, concern for UTI, poor oral intake     Summary:    Interval Followup: Patient is a 89-year-old female recently broke her left arm.  She has a history of several urinary tract infections.  And when she does have a UTI, she has decreased oral intake and nausea.  She follows with Dr. Chester.      She had a CT abdomen and pelvis on 2023 CT 13 x 6 mm stone right renal pelvis.  With some urothelial thickening and inflammation around this.  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  No hydro-.  No stones on the left.  Postsurgical changes of prior right hemicolectomy.  9 mm pancreatic cyst.  With a recommendation for an MRCP.       An MRCP did show multiple small cystic lesions in the pancreas measuring up to 1 cm.  Many of these appear to communicate with the main pancreatic duct.  Mildly dilated sidebranch of the pancreatic ducts were also noted.  They recommend repeat MRI in 1 year.     In the past, she has had the following types of UTIs:  2023   0.8, GFR 63  2023 E. coli resistant to oral antibiotics other than nitrofurantoin  2023 Citrobacter -resistant to Ancef   E. coli   Citrobacter  3/22   Proteus     During the office visit, Dr. Chester discussed keeping the patient on prophylactic treatment for UTIs.  However, nitrofurantoin is a possibility but at her age this does have a risk.  He encouraged her to drink more liquids.     On arrival to the ED, patient had a temperature of 98, pulse of 101, respiratory 20, blood pressure 104/66, and she saturating 93% on room air.  Patient's UA is consistent with UTI.  Her magnesium is low.  Her creatinine is 1.16.  White blood cell count  is 22.70.  MCV is 97.3. Urine culture shows E. coli.    Interval follow-up: Patient denied millimeter obstructing stone in the right kidney around the right UPJ she was taken to the OR for right ureteral stent.  There were significant purulent urine seen draining after the stent was placed.    Patient also found to have E. coli bacteremia.    Patient's creatinine has improved.    Patient states that she feels much better.    Review of Systems    All systems reviewed and negative except for what is outlined above.      Objective   Objective     Vitals:   Temp:  [97.9 øF (36.6 øC)-99.4 øF (37.4 øC)] 97.9 øF (36.6 øC)  Heart Rate:  [] 90  Resp:  [14-20] 18  BP: ()/(66-94) 101/68    Physical Exam   General: Awake, alert, NAD  HENT: NCAT, MMM  Eyes: pupils equal, no scleral icterus  Cardiovascular: RRR, no murmurs   Pulmonary: CTA bilaterally; no wheezes; no conversational dyspnea  Gastrointestinal: S/ND/NT, +BS  Musculoskeletal: No gross deformities  Skin: No jaundice, no rash on exposed skin appreciated  Neuro: CN II through XII grossly intact; speech clear; no tremor  Psych: Mood and affect appropriate  : No Cano catheter; no suprapubic tenderness    Result Review    Result Review:  I have personally reviewed these results:  [x]  Laboratory      Lab 10/08/23  0600 10/07/23  1535 10/07/23  1419   WBC 17.17*  --  22.70*   HEMOGLOBIN 11.2*  --  12.8   HEMATOCRIT 34.4  --  39.1   PLATELETS 199  --  235   NEUTROS ABS 15.69*  --  20.10*   IMMATURE GRANS (ABS) 0.11*  --  0.16*   LYMPHS ABS 1.13  --  1.05   MONOS ABS 0.21  --  1.31*   EOS ABS 0.00  --  0.02   MCV 98.0*  --  97.3*   LACTATE  --  1.9  --          Lab 10/08/23  0600 10/07/23  1419   SODIUM 137 141   POTASSIUM 4.1 3.8   CHLORIDE 105 105   CO2 21.4* 24.0   ANION GAP 10.6 12.0   BUN 21 21   CREATININE 1.11* 1.16*   EGFR 46.7* 44.3*   GLUCOSE 158* 125*   CALCIUM 8.4 9.3   MAGNESIUM 1.8 1.5*   PHOSPHORUS 3.4 1.6*         Lab 10/07/23  1419   TOTAL  PROTEIN 7.2   ALBUMIN 3.8   GLOBULIN 3.4   ALT (SGPT) 23   AST (SGOT) 39*   BILIRUBIN 1.9*   ALK PHOS 112         Lab 10/07/23  1419   HSTROP T 29*                 Brief Urine Lab Results  (Last result in the past 365 days)        Color   Clarity   Blood   Leuk Est   Nitrite   Protein   CREAT   Urine HCG        10/07/23 1453 Yellow   Cloudy   Large (3+)   Large (3+)   Negative   Trace                 [x]  Microbiology   Microbiology Results (last 10 days)       Procedure Component Value - Date/Time    Blood Culture - Blood, Arm, Left [[id number]]  (Abnormal) Collected: 10/07/23 1535    Lab Status: Preliminary result Specimen: Blood from Arm, Left Updated: 10/08/23 0443     Blood Culture Abnormal Stain     Gram Stain Aerobic Bottle Gram negative bacilli    Blood Culture ID, PCR - Blood, Arm, Left [[id number]]  (Abnormal) Collected: 10/07/23 1535    Lab Status: Final result Specimen: Blood from Arm, Left Updated: 10/08/23 0628     BCID, PCR Escherichia coli. Identification by BCID2 PCR.     BOTTLE TYPE Aerobic Bottle    Narrative:      No resistance genes detected.    Urine Culture - Urine, Urine, Clean Catch [[id number]]  (Abnormal)  (Susceptibility) Collected: 10/06/23 1506    Lab Status: Final result Specimen: Urine, Clean Catch Updated: 10/08/23 1007     Urine Culture >100,000 CFU/mL Escherichia coli    Narrative:      Colonization of the urinary tract without infection is common. Treatment is discouraged unless the patient is symptomatic, pregnant, or undergoing an invasive urologic procedure.    Susceptibility        Escherichia coli      ASH      Amikacin Susceptible      Ampicillin Resistant      Ampicillin + Sulbactam Resistant      Cefazolin Resistant      Cefepime Susceptible      Ceftazidime Susceptible      Ceftriaxone Susceptible      Gentamicin Resistant      Levofloxacin Resistant      Nitrofurantoin Susceptible      Piperacillin + Tazobactam Resistant      Tobramycin Intermediate      Trimethoprim +  Sulfamethoxazole Resistant                                 [x]  Radiology  CT Abdomen Pelvis Without Contrast    Result Date: 10/7/2023    1. Obstructing 9 millimeter right UPJ stone resulting in moderate hydronephrosis.  Additional nonobstructing calculi in the right inferior pole measures up to 1.2 centimeter. 2. Cardiomegaly with findings of volume overload/3rd spacing including small bilateral pleural effusions, mild body wall edema and trace pelvic fluid. 3. Moderate hiatal hernia. 4. Colonic diverticulosis. 5. Other chronic/ancillary findings detailed above.     NIMA CORREIA MD       Electronically Signed and Approved By: NIMA CORREIA MD on 10/07/2023 at 17:50             XR Chest 1 View    Result Date: 10/7/2023    Cardiomegaly with questionable trace effusions.  No overt edema or lobar infiltrate.       NIMA CORREIA MD       Electronically Signed and Approved By: NIMA CORREIA MD on 10/07/2023 at 15:42            [x]  EKG/Telemetry   []  Cardiology/Vascular   [x]  Pathology  [x]  Old records  []  Other:    Assessment & Plan   Assessment / Plan   Assessment:  #1 acute complicated cystitis with history of recurrent UTIs  -Continue Rocephin.  Resistant to Zosyn.  -Patient had a 9 mm stone obstructing the right kidney around the right UPJ.  Status post stent placement on 10/7/2023.  Urology following.    #2 E. coli bacteremia  -Await sensitivities to further tailor antibiotics.  Patient will need to be on Rocephin.  Resistant to Zosyn.  Discussed with ID pharmacy she is on the radar.    #3 BMI 22.22.  Poor oral intake  -Nutrition consulted.  Dietary supplements ordered     #4 low magnesium replete     #5 JUANI baseline creatinine 0.9  -Gentle hydration hold diuretics.     #6 chronic atrial fibrillation.  We will hold Eliquis until patient's surgery next week.     #7 depression continue Prozac     #8 hypertension we will resume blood pressure medicine tomorrow patient's blood pressure is on the softer  side.     #9 recent fall with injury to arm    #10 anemia anemia panel ordered.       Discussed with RN.    DVT prophylaxis:  Mechanical DVT prophylaxis orders are present.    CODE STATUS:   Level Of Support Discussed With: Patient  Code Status (Patient has no pulse and is not breathing): CPR (Attempt to Resuscitate)  Medical Interventions (Patient has pulse or is breathing): Full Support      Electronically signed by Samra Burgos DO, 10/08/23, 1:41 PM EDT.

## 2023-10-08 NOTE — SIGNIFICANT NOTE
Called [facility] to give report. Was told RN on lunch. RN will call back in 5 mins. Waiting on call to give report.

## 2023-10-08 NOTE — OP NOTE
CYSTOSCOPY URETERAL CATHETER/STENT INSERTION  Procedure Report    Patient Name:  Bing Cruz  YOB: 1931    Date of Surgery:  10/7/2023     Indications: Right ureteral stone, acute right pyelonephritis    Pre-op Diagnosis:   Kidney stone [N20.0]       Post-Op Diagnosis Codes:     * Kidney stone [N20.0]    Procedure/CPTr Codes:      Procedure(s):  CYSTOSCOPY URETERAL CATHETER/STENT INSERTION,right retrograde        Staff:  Surgeon(s):  Roberto Luu MD         Anesthesia: General    Estimated Blood Loss: none    Implants:    Implant Name Type Inv. Item Serial No.  Lot No. LRB No. Used Action   STNT URETRL ASCERTA 6F 26CM - [id number] Stent STNT URETRL ASCERTA 6F 26CM  [employer] [id number] Right 1 Implanted       Specimen:          None        Findings: Obstructing right ureteral stone with right pyelonephritis    Complications: None    Description of Procedure: Patient was taken to the operating room after informed consent was obtained.  General anesthesia was administered.  She was placed in a dorsolithotomy position.  Groin is prepped and draped in a sterile fashion.  Cystoscopy is performed the bladder is visualized and it is normal.  Both ureteral orifice ease are normal.  A ureteral catheter 5 Irish was inserted in the right distal ureter and a sensor tip guidewire was passed through it and the had difficulty initially getting the wire past the stone but the wire did eventually get past the stone and then we had difficulty passing the catheter past the stone and so I went ahead and took the wire out injected contrast and that did dislodge the stone enough with a sensor tip guidewire went in the right kidney.  The catheter was passed over the wire in the right renal pelvis the wire was removed and contrast was injected and the collecting system was confirmed.  Guidewire was passed and the catheter was removed.  At this point we noted that there was significant  amount of purulent urine draining from the right ureteral orifice.  Over the guidewire a 6 [place] by 26 cm stent was passed fluoroscopy showed the proximal end to be in good position in the renal pelvis cystoscopy showed the distal end to be in good position in the bladder.  The bladder was drained the cystoscope was removed.  Patient tolerated the procedure well there were no complications she was extubated stable awake alert in good condition and transported to recovery room.                  Roberto Luu MD     Date: 10/7/2023  Time: 23:25 EDT       Detail Level: Detailed

## 2023-10-08 NOTE — CONSULTS
[facility]   Consult Note    Patient Name: Bing Cruz  : 1931  MRN: [id number]  Primary Care Physician:  Jairo Kramer MD  Referring Physician: No ref. provider found  Date of admission: 10/7/2023    Consults  Subjective   Subjective     Reason for Consult/ Chief Complaint: Right flank pain    History of Present Illness  Bing Cruz is a 89 y.o. female patient admitted to the hospital with right flank pain weakness and history of possible low-grade temperatures.  She was complaining of right flank pain and a CT scan was performed and it showed stones in the right kidney and a 9 mm stone obstructing the right proximal ureter.  Her urine shows white blood cells and red blood cells but what has been of concern was she has been hypotensive intermittently today.  I have been consulted tonight to see her regarding the kidney stone, UTI and the hypotension.    Review of Systems   10 point review of systems performed and is negative.  Personal History     Past Medical History:   Diagnosis Date    Abdominal pain, generalized     Anemia     Aortic regurgitation     Aortic stenosis     Cardiomegaly     Chronic atrial fibrillation 2019    Atrial fibrillation converted to sinus through cardioversion (2019    Chronic fatigue     Colon cancer     Diarrhea     Disease of tricuspid valve     Diverticulosis of colon     DVT (deep venous thrombosis)     LEFT LEG GREATER THAN 5 YRS AGO    Dysphagia     Essential (primary) hypertension     Hiatal hernia     Insomnia, unspecified     LVH (left ventricular hypertrophy)     Major depressive disorder, single episode     Mitral regurgitation     Mitral valve insufficiency and aortic valve insufficiency     Osteopenia     Pain in joint, pelvic region and thigh     TR (tricuspid regurgitation)     UTI (urinary tract infection)     antibx to be completed prior to procedure. ABIOLA Carlos RN ([employer]) notified.       Past Surgical History:   Procedure  Laterality Date    ABDOMINAL HYSTERECTOMY      WITH BSO     ANKLE SURGERY      (L) ankle fracture    BLADDER REPAIR      BREAST BIOPSY      (L) breast biopsy    CARDIOVERSION  2019    CATARACT EXTRACTION      OU    CHOLECYSTECTOMY      OPEN    COLON SURGERY      STATES SHE HAD 12 INCHES OF COLON REMOVED IN JULY 2020 FOR COLON CANCER    COLONOSCOPY  2020    COLONOSCOPY N/A 10/29/2021    Procedure: COLONOSCOPY;  Surgeon: Edmar Back MD;  Location: [facility] ENDOSCOPY;  Service: General;  Laterality: N/A;  DIVERTICULOSIS/ANASTOMOSIS    FEMUR OPEN REDUCTION INTERNAL FIXATION Left 07/21/2021    Procedure: FEMORAL NECK OPEN REDUCTION INTERNAL FIXATION;  Surgeon: Bam Warner MD;  Location: [facility] MAIN OR;  Service: Orthopedics;  Laterality: Left;    HIP CLOSED REDUCTION Left 3/20/2022    Procedure: HIP CLOSED REDUCTION;  Surgeon: Harsha Gayle MD;  Location: [facility] MAIN OR;  Service: Orthopedics;  Laterality: Left;    INGUINAL HERNIA REPAIR Left 09/29/2011    ORIF WRIST FRACTURE Left 8/24/2023    Procedure: OPEN REDUCTION AND INTERNAL FIXATION WRIST;  Surgeon: Ben Alegre MD;  Location: [facility] OR OSC;  Service: Orthopedics;  Laterality: Left;    TOTAL HIP ARTHROPLASTY Left 3/16/2022    Procedure: LEFT TOTAL HIP ARTHROPLASTY AND HARDWARE REMOVAL;  Surgeon: Bam Warner MD;  Location: [facility] MAIN OR;  Service: Orthopedics;  Laterality: Left;    UPPER GASTROINTESTINAL ENDOSCOPY      WITH DILATATION       Family History: family history is not on file. Otherwise pertinent FHx was reviewed and not pertinent to current issue.    Social History:  reports that she has never smoked. She has never used smokeless tobacco. She reports that she does not currently use alcohol. She reports that she does not use drugs.    Home Medications:   FLUoxetine, apixaban, ascorbic acid, bimatoprost, colestipol, furosemide, losartan, metoprolol succinate XL, potassium chloride, and timolol    Allergies:  Allergies    Allergen Reactions    Citalopram Unknown - High Severity    Clonazepam Unknown - High Severity    Levofloxacin Nausea And Vomiting    Lisinopril Unknown - High Severity    Macrobid [Nitrofurantoin Macrocrystal] Rash    Melatonin Unknown - High Severity       Objective    Objective     Vitals:  Temp:  [98 øF (36.7 øC)-98.2 øF (36.8 øC)] 98.2 øF (36.8 øC)  Heart Rate:  [101-116] 102  Resp:  [18-20] 18  BP: ()/(66-86) 121/66    Physical Exam  She is awake alert oriented x3.  HEENT shows normal findings.  Chest normal respiration without any tenderness or cough.  Abdomen soft nontender nondistended no rebound guarding or rigidity is noted.  Right CVA tenderness is present.  Extremities no clubbing cyanosis or edema.  Result Review    Result Review:  I have personally reviewed the results from the time of this admission to 10/7/2023 21:49 EDT and agree with these findings:  []  Laboratory list / accordion  []  Microbiology  []  Radiology  []  EKG/Telemetry   []  Cardiology/Vascular   []  Pathology  []  Old records  []  Other:  Most notable findings include: 9 mm obstructing stone at the right UPJ area.      Assessment & Plan   Assessment / Plan     Brief Patient Summary:  Bing Cruz is a 89 y.o. female who presents with right flank pain has a 9 mm stone in the right kidney with a UTI and hypotension.    Active Hospital Problems:  Active Hospital Problems    Diagnosis     **UTI (urinary tract infection)      Plan:   With patient and family available at length discussed all the risks benefits alternatives all potential complications of watchful waiting versus proceeding with cystoscopy right ureteral stent placement right stone manipulation right retrograde pyelogram.  Patient and family understand all the risks and benefits and voiced her understanding and wished to proceed with the procedure.  Patient is on Eliquis.  She took a dose this morning but has not had her dose this evening.  At length discussed  with patient and her family the risk of bleeding when patient is on Eliquis and undergo procedures.  They understand this risk and wished to proceed with the procedure.  Roberto Luu MD

## 2023-10-08 NOTE — PLAN OF CARE
Goal Outcome Evaluation:  Plan of Care Reviewed With: patient, family      Patient is alert and oriented x 4. Afib. On room air. Stand by assist. Urinating without any issues. No complaints of pain noted. Will continue plan of care.

## 2023-10-08 NOTE — PLAN OF CARE
Goal Outcome Evaluation: BP slightly low, MD notified and continuing IV fluids. No complaints of pain. One episode of emesis. Pt sent to OR and had a R urethral stent placed. No complaints of pain after surgery. Continue plan of care.

## 2023-10-08 NOTE — ED PROVIDER NOTES
Time: 2:36 PM EDT  Date of encounter:  10/7/2023  Independent Historian/Clinical History and Information was obtained by:   Patient and Family    History is limited by:  Patient is hard of hearing    Chief Complaint: UTI, right-sided flank pain      History of Present Illness:  Patient is a 89 y.o. year old female who presents to the emergency department for evaluation of UTI and just had urine culture yesterday that came back positive for E. coli UTI.    She has some tachycardia and tachypnea here and noted to have an elevated white blood cell count today of 22, concerning for sepsis.    She is a patient of Dr. Chester at the urology clinic and has a known right renal pelvis stone.    She is complaining of some worsening right flank pain today.  And had some episodes of nausea and vomiting last night.    HPI    Patient Care Team  Primary Care Provider: Jairo Kramer MD    Past Medical History:     Allergies   Allergen Reactions    Citalopram Unknown - High Severity    Clonazepam Unknown - High Severity    Levofloxacin Nausea And Vomiting    Lisinopril Unknown - High Severity    Macrobid [Nitrofurantoin Macrocrystal] Rash    Melatonin Unknown - High Severity     Past Medical History:   Diagnosis Date    Abdominal pain, generalized     Anemia     Aortic regurgitation     Aortic stenosis     Cardiomegaly     Chronic atrial fibrillation 01/01/2019    Atrial fibrillation converted to sinus through cardioversion (March 2019    Chronic fatigue     Colon cancer     Diarrhea     Disease of tricuspid valve     Diverticulosis of colon     DVT (deep venous thrombosis)     LEFT LEG GREATER THAN 5 YRS AGO    Dysphagia     Essential (primary) hypertension     Hiatal hernia     Insomnia, unspecified     LVH (left ventricular hypertrophy)     Major depressive disorder, single episode     Mitral regurgitation     Mitral valve insufficiency and aortic valve insufficiency     Osteopenia     Pain in joint, pelvic region and  thigh     TR (tricuspid regurgitation)     UTI (urinary tract infection)     antibx to be completed prior to procedure. ABIOLA Carlos RN ([facility]) notified.     Past Surgical History:   Procedure Laterality Date    ABDOMINAL HYSTERECTOMY      WITH BSO     ANKLE SURGERY      (L) ankle fracture    BLADDER REPAIR      BREAST BIOPSY      (L) breast biopsy    CARDIOVERSION  2019    CATARACT EXTRACTION      OU    CHOLECYSTECTOMY      OPEN    COLON SURGERY      STATES SHE HAD 12 INCHES OF COLON REMOVED IN JULY 2020 FOR COLON CANCER    COLONOSCOPY  2020    COLONOSCOPY N/A 10/29/2021    Procedure: COLONOSCOPY;  Surgeon: Edmar Back MD;  Location: [facility] ENDOSCOPY;  Service: General;  Laterality: N/A;  DIVERTICULOSIS/ANASTOMOSIS    FEMUR OPEN REDUCTION INTERNAL FIXATION Left 07/21/2021    Procedure: FEMORAL NECK OPEN REDUCTION INTERNAL FIXATION;  Surgeon: Bam Warner MD;  Location: [facility] MAIN OR;  Service: Orthopedics;  Laterality: Left;    HIP CLOSED REDUCTION Left 3/20/2022    Procedure: HIP CLOSED REDUCTION;  Surgeon: Harsha Gayle MD;  Location: [facility] MAIN OR;  Service: Orthopedics;  Laterality: Left;    INGUINAL HERNIA REPAIR Left 09/29/2011    ORIF WRIST FRACTURE Left 8/24/2023    Procedure: OPEN REDUCTION AND INTERNAL FIXATION WRIST;  Surgeon: Ben Alegre MD;  Location: [facility] OR OSC;  Service: Orthopedics;  Laterality: Left;    TOTAL HIP ARTHROPLASTY Left 3/16/2022    Procedure: LEFT TOTAL HIP ARTHROPLASTY AND HARDWARE REMOVAL;  Surgeon: Bam Warner MD;  Location: [facility] MAIN OR;  Service: Orthopedics;  Laterality: Left;    UPPER GASTROINTESTINAL ENDOSCOPY      WITH DILATATION     Family History   Problem Relation Age of Onset    Malig Hyperthermia Neg Hx        Home Medications:  Prior to Admission medications    Medication Sig Start Date End Date Taking? Authorizing Provider   apixaban (ELIQUIS) 2.5 MG tablet tablet Take 1 tablet by mouth 2 (Two) Times a Day. 7/11/23  Yes [facility]  GABRIELLA Smith   ascorbic acid (VITAMIN C) 500 MG tablet Take 1 tablet by mouth Daily.   Yes Karina Browne MD   colestipol (COLESTID) 1 g tablet Take 2 tablets by mouth Daily. 9/5/23  Yes Leslie Cody APRN   FLUoxetine (PROzac) 20 MG capsule Take 1 capsule by mouth Daily. 10/2/23  Yes Jairo Kramer MD   furosemide (LASIX) 20 MG tablet Take 1 tablet by mouth 2 (Two) Times a Day. 7/11/23  Yes Sheyla Guillermo APRN   losartan (COZAAR) 25 MG tablet TAKE 1 TABLET BY MOUTH EVERY DAY 9/18/23  Yes Sheyla Guillermo APRN   Lumigan 0.01 % ophthalmic drops Administer 1 drop to both eyes Every Night. 8/30/23  Yes Karina Browne MD   metoprolol succinate XL (TOPROL-XL) 100 MG 24 hr tablet Take 1 tablet by mouth 2 (Two) Times a Day. 7/11/23  Yes Sheyla Guillermo APRN   potassium chloride 10 MEQ CR tablet Take 1 tablet by mouth 2 (Two) Times a Day. 7/11/23  Yes Sheyla Guillermo APRN   timolol (TIMOPTIC) 0.5 % ophthalmic solution Administer 1 drop to both eyes Every Morning. 7/12/23  Yes Karina Browne MD   AZO-CRANBERRY PO Take  by mouth.  10/7/23  Karina Browne MD   LORazepam (ATIVAN) 0.5 MG tablet TAKE 1 TABLET BY MOUTH AT NIGHT AS NEEDED FOR ANXIETY.  Patient taking differently: Take 1 tablet by mouth At Night As Needed for Anxiety. PT only takes if needed 9/2/23 10/7/23  Jairo Kramer MD   ondansetron (Zofran) 4 MG tablet Take 1 tablet by mouth Every 8 (Eight) Hours As Needed for Nausea or Vomiting. 9/15/23 10/7/23  Mae Kelly APRN        Social History:   Social History     Tobacco Use    Smoking status: Never    Smokeless tobacco: Never   Vaping Use    Vaping Use: Never used   Substance Use Topics    Alcohol use: Not Currently    Drug use: Never         Review of Systems:  Review of Systems   I performed a 10 point review of systems which was all negative, except for the positives found in the HPI above.  Physical  "Exam:  /89   Pulse 97   Temp 99 øF (37.2 øC) (Temporal)   Resp 14   Ht 168.9 cm (66.5\")   Wt 63.4 kg (139 lb 12.4 oz)   SpO2 93%   BMI 22.22 kg/mý     Physical Exam   General: Awake alert and in no obvious distress    HEENT: Head normocephalic atraumatic, eyes PERRLA EOMI, nose normal, oropharynx normal.    Neck: Supple full range of motion, no meningismus, no lymphadenopathy    Heart: Regular rate and rhythm, no murmurs or rubs, 2+ radial pulses bilaterally    Lungs: Clear to auscultation bilaterally without wheezes or crackles, no respiratory distress    Abdomen: Soft, minimal right lower quadrant tenderness, nondistended, no rebound or guarding    Back exam: Moderate right CVA tenderness    Skin: Warm, dry, no rash    Musculoskeletal: Normal range of motion, no lower extremity edema    Neurologic: Oriented x3, no motor deficits no sensory deficits    Psychiatric: Mood appears stable, no psychosis          Procedures:  Procedures      Medical Decision Making:      Comorbidities that affect care:    UTI, kidney stones    External Notes reviewed:    Previous Labs: I reviewed her most recent blood work including normal white blood cell count most recently as compared to today's elevated white blood cell count of 22; also recent urine culture the other day showed positive E. coli UTI.      The following orders were placed and all results were independently analyzed by me:  Orders Placed This Encounter   Procedures    Blood Culture - Blood,    Blood Culture - Blood,    XR Chest 1 View    CT Abdomen Pelvis Without Contrast    FL Surgery Fluoro    [place] Draw    Comprehensive Metabolic Panel    Single High Sensitivity Troponin T    Magnesium    Urinalysis With Microscopic If Indicated (No Culture) - Urine, Clean Catch    CBC Auto Differential    Lactic Acid, Plasma    Basic Metabolic Panel    Magnesium    Phosphorus    Urinalysis, Microscopic Only - Urine, Clean Catch    CBC Auto Differential    NPO Diet " NPO Type: Strict NPO    Undress & Gown    Continuous Pulse Oximetry    Vital Signs    Orthostatic Blood Pressure    Vital Signs    Intake & Output    Weigh Patient    Oral Care    Saline Lock & Maintain IV Access    Place Sequential Compression Device    Maintain Sequential Compression Device    Code Status and Medical Interventions:    Hospitalist (on-call MD unless specified)    Inpatient Case Management  Consult    Inpatient Nutrition Consult    Inpatient Spiritual Care Consult    Inpatient Urology Consult    OT Consult: Eval & Treat    PT Consult: Eval & Treat As Tolerated; Discharge Placement Assessment    Oxygen Therapy- Nasal Cannula; Titrate 1-6 LPM Per SpO2; 90 - 95%    POC Glucose Once    ECG 12 Lead ED Triage Standing Order; Weak / Dizzy / AMS    Insert Peripheral IV    Insert Peripheral IV    Inpatient Admission    Fall Precautions    CBC & Differential    Green Top (Gel)    Lavender Top    Gold Top - SST    Light Blue Top    CBC & Differential       Medications Given in the Emergency Department:  Medications   sodium chloride 0.9 % flush 10 mL ( Intravenous MAR Hold 10/7/23 2257)   sodium chloride 0.9 % flush 10 mL (10 mL Intravenous Not Given 10/7/23 2230)   sodium chloride 0.9 % flush 10 mL (has no administration in time range)   sodium chloride 0.9 % infusion 40 mL (has no administration in time range)   sennosides-docusate (PERICOLACE) 8.6-50 MG per tablet 2 tablet ( Oral Dose Auto Held 10/15/23 2100)     And   polyethylene glycol (MIRALAX) packet 17 g ( Oral MAR Hold 10/7/23 2257)     And   bisacodyl (DULCOLAX) EC tablet 5 mg ( Oral MAR Hold 10/7/23 2257)     And   bisacodyl (DULCOLAX) suppository 10 mg ( Rectal MAR Hold 10/7/23 2257)   sodium chloride 0.9 % infusion ( Intravenous Currently Infusing 10/7/23 2250)   acetaminophen (TYLENOL) tablet 650 mg ( Oral MAR Hold 10/7/23 2257)     Or   acetaminophen (TYLENOL) 160 MG/5ML oral solution 650 mg ( Oral MAR Hold 10/7/23 2257)     Or    acetaminophen (TYLENOL) suppository 650 mg ( Rectal MAR Hold 10/7/23 2257)   apixaban (ELIQUIS) tablet 2.5 mg (2.5 mg Oral Not Given 10/7/23 2229)   FLUoxetine (PROzac) capsule 20 mg (20 mg Oral Not Given 10/7/23 1836)   LORazepam (ATIVAN) tablet 0.5 mg (has no administration in time range)   latanoprost (XALATAN) 0.005 % ophthalmic solution 1 drop (1 drop Both Eyes Not Given 10/7/23 2230)   !Patient Home Medications Stored in Pharmacy ( Does not apply Dose Auto Held 10/15/23 2100)   timolol (TIMOPTIC) 0.5 % ophthalmic solution 1 drop ( Both Eyes Dose Auto Held 10/15/23 0900)   !Patient Home Medications Stored on Unit ( Does not apply Dose Auto Held 10/15/23 2100)   ondansetron (ZOFRAN) injection 4 mg ( Intravenous MAR Hold 10/7/23 2257)   Potassium Replacement - Follow Nurse / BPA Driven Protocol (has no administration in time range)   Magnesium Standard Dose Replacement - Follow Nurse / BPA Driven Protocol ( Does not apply MAR Hold 10/7/23 2257)   Phosphorus Replacement - Follow Nurse / BPA Driven Protocol ( Does not apply MAR Hold 10/7/23 2257)   Calcium Replacement - Follow Nurse / BPA Driven Protocol ( Does not apply MAR Hold 10/7/23 2257)   tamsulosin (FLOMAX) 24 hr capsule 0.4 mg ( Oral Dose Auto Held 10/16/23 0900)   Pharmacy to Dose Zosyn (has no administration in time range)   piperacillin-tazobactam (ZOSYN) 3.375 g/100 mL 0.9% NS IVPB (mbp) (has no administration in time range)   cefTRIAXone (ROCEPHIN) IVPB 1,000 mg (0 mg Intravenous Stopped 10/7/23 1657)   sodium chloride 0.9 % bolus 500 mL (0 mL Intravenous Stopped 10/7/23 1657)   magnesium sulfate 2g/50 mL (PREMIX) infusion (0 g Intravenous Stopped 10/7/23 1701)   piperacillin-tazobactam (ZOSYN) 3.375 g/100 mL 0.9% NS IVPB (mbp) (3.375 g Intravenous New Bag 10/7/23 2124)        ED Course:         Labs:    Lab Results (last 24 hours)       Procedure Component Value Units Date/Time    CBC & Differential [[id number]]  (Abnormal) Collected: 10/07/23 4356     Specimen: Blood from Arm, Right Updated: 10/07/23 1424    Narrative:      The following orders were created for panel order CBC & Differential.  Procedure                               Abnormality         Status                     ---------                               -----------         ------                     CBC Auto Differential[[id number]]        Abnormal            Final result                 Please view results for these tests on the individual orders.    Comprehensive Metabolic Panel [[id number]]  (Abnormal) Collected: 10/07/23 1419    Specimen: Blood from Arm, Right Updated: 10/07/23 1444     Glucose 125 mg/dL      BUN 21 mg/dL      Creatinine 1.16 mg/dL      Sodium 141 mmol/L      Potassium 3.8 mmol/L      Chloride 105 mmol/L      CO2 24.0 mmol/L      Calcium 9.3 mg/dL      Total Protein 7.2 g/dL      Albumin 3.8 g/dL      ALT (SGPT) 23 U/L      AST (SGOT) 39 U/L      Alkaline Phosphatase 112 U/L      Total Bilirubin 1.9 mg/dL      Globulin 3.4 gm/dL      A/G Ratio 1.1 g/dL      BUN/Creatinine Ratio 18.1     Anion Gap 12.0 mmol/L      eGFR 44.3 mL/min/1.73     Narrative:      GFR Normal >60  Chronic Kidney Disease <60  Kidney Failure <15    The GFR formula is only valid for adults with stable renal function between ages 18 and 70.    Single High Sensitivity Troponin T [[id number]]  (Abnormal) Collected: 10/07/23 1419    Specimen: Blood from Arm, Right Updated: 10/07/23 1444     HS Troponin T 29 ng/L     Narrative:      High Sensitive Troponin T Reference Range:  <10.0 ng/L- Negative Female for AMI  <15.0 ng/L- Negative Male for AMI  >=10 - Abnormal Female indicating possible myocardial injury.  >=15 - Abnormal Male indicating possible myocardial injury.   Clinicians would have to utilize clinical acumen, EKG, Troponin, and serial changes to determine if it is an Acute Myocardial Infarction or myocardial injury due to an underlying chronic condition.         Magnesium [[id number]]  (Abnormal)  Collected: 10/07/23 1419    Specimen: Blood from Arm, Right Updated: 10/07/23 1444     Magnesium 1.5 mg/dL     CBC Auto Differential [[id number]]  (Abnormal) Collected: 10/07/23 1419    Specimen: Blood from Arm, Right Updated: 10/07/23 1424     WBC 22.70 10*3/mm3      RBC 4.02 10*6/mm3      Hemoglobin 12.8 g/dL      Hematocrit 39.1 %      MCV 97.3 fL      MCH 31.8 pg      MCHC 32.7 g/dL      RDW 13.2 %      RDW-SD 47.9 fl      MPV 11.6 fL      Platelets 235 10*3/mm3      Neutrophil % 88.5 %      Lymphocyte % 4.6 %      Monocyte % 5.8 %      Eosinophil % 0.1 %      Basophil % 0.3 %      Immature Grans % 0.7 %      Neutrophils, Absolute 20.10 10*3/mm3      Lymphocytes, Absolute 1.05 10*3/mm3      Monocytes, Absolute 1.31 10*3/mm3      Eosinophils, Absolute 0.02 10*3/mm3      Basophils, Absolute 0.06 10*3/mm3      Immature Grans, Absolute 0.16 10*3/mm3      nRBC 0.0 /100 WBC     Phosphorus [[id number]]  (Abnormal) Collected: 10/07/23 1419    Specimen: Blood from Arm, Right Updated: 10/07/23 1924     Phosphorus 1.6 mg/dL     Urinalysis With Microscopic If Indicated (No Culture) - Urine, Clean Catch [[id number]]  (Abnormal) Collected: 10/07/23 1453    Specimen: Urine, Clean Catch Updated: 10/07/23 1512     Color, UA Yellow     Appearance, UA Cloudy     pH, UA 5.5     Specific Gravity, UA 1.016     Glucose, UA Negative     Ketones, UA Trace     Bilirubin, UA Negative     Blood, UA Large (3+)     Protein, UA Trace     Leuk Esterase, UA Large (3+)     Nitrite, UA Negative     Urobilinogen, UA 0.2 E.U./dL    Urinalysis, Microscopic Only - Urine, Clean Catch [[id number]]  (Abnormal) Collected: 10/07/23 1453    Specimen: Urine, Clean Catch Updated: 10/07/23 1523     RBC, UA 0-2 /HPF      WBC, UA 3-5 /HPF      Bacteria, UA 3+ /HPF      Squamous Epithelial Cells, UA 0-2 /HPF      Hyaline Casts, UA None Seen /LPF      Mucus, UA Trace /HPF      Methodology Manual Light Microscopy    Lactic Acid, Plasma [[id number]]  (Normal) Collected:  10/07/23 1535    Specimen: Blood from Arm, Right Updated: 10/07/23 1606     Lactate 1.9 mmol/L     Blood Culture - Blood, Arm, Right [[id number]] Collected: 10/07/23 1535    Specimen: Blood from Arm, Right Updated: 10/07/23 1542    Blood Culture - Blood, Arm, Left [[id number]] Collected: 10/07/23 1535    Specimen: Blood from Arm, Left Updated: 10/07/23 1542             Imaging:    FL Surgery Fluoro    Result Date: 10/7/2023  This procedure was auto-finalized with no dictation required.    CT Abdomen Pelvis Without Contrast    Result Date: 10/7/2023  PROCEDURE: CT ABDOMEN PELVIS WO CONTRAST  COMPARISON: [facility], CT, CT ABDOMEN PELVIS W WO CONTRAST, 9/18/2023, 12:28.  INDICATIONS: Eval right flank pain, UTI (rule out obstructive uropathy versus pyelonephritis)  TECHNIQUE: CT images were created without intravenous contrast.   PROTOCOL:   Standard imaging protocol performed    RADIATION:   DLP: 396.3 mGy*cm   Automated exposure control was utilized to minimize radiation dose.  FINDINGS:  Cardiomegaly.  Moderate sliding hiatal hernia.  Small low-density bilateral pleural effusions.  Subpleural reticular opacities could represent underlying atelectasis versus scar/fibrosis.  No pericardial effusion.  Unremarkable appearance of the unenhanced liver and spleen.  Low-density hepatic biliary cyst within segment 4 is stable.  Previous cholecystectomy.  No biliary dilation.  Atrophic pancreas without active inflammation.  No concerning adrenal nodule.  There is a 9 millimeter obstructing stone at the right ureteropelvic junction with moderate hydronephrosis.  Few additional nonobstructing calculi in the right inferior pole largest measuring up to 1.2 centimeter coronal image 75 series 202 and additional stone measuring 9 millimeters image 80 series 202..  Smaller fragments noted adjacent to the largest stone..  No left renal calculi.  Tiny fat density lesion in the left renal midpole stable from prior comparison  suggestive of a benign renal angiomyolipoma.  Trace focus of intraluminal gas in the bladder commonly seen in the setting of recent instrumentation.  No bladder wall thickening or surrounding inflammation.  No bowel obstruction or active inflammation.  Colonic diverticulosis.  Changes of right hemicolectomy.  Diffuse aortic atherosclerotic disease without aneurysm.  Trace nonspecific pelvic fluid.  No free air or drainable collection.  No suspicious adenopathy.  Mild body wall edema.  Prior midline laparotomy.  Few small midline fat containing ventral/incisional hernias.  Small fat containing left inguinal hernia.  Venous collaterals in the anterior suprapubic soft tissues grossly unchanged.  Left hip prosthesis.  Osseous demineralization.  Multilevel overall moderate degenerative disc disease with moderate to advanced facet arthropathy.  No acute displaced fracture or aggressive bone lesion.        1. Obstructing 9 millimeter right UPJ stone resulting in moderate hydronephrosis.  Additional nonobstructing calculi in the right inferior pole measures up to 1.2 centimeter. 2. Cardiomegaly with findings of volume overload/3rd spacing including small bilateral pleural effusions, mild body wall edema and trace pelvic fluid. 3. Moderate hiatal hernia. 4. Colonic diverticulosis. 5. Other chronic/ancillary findings detailed above.     NIMA CORREIA MD       Electronically Signed and Approved By: NIMA CORREIA MD on 10/07/2023 at 17:50             XR Chest 1 View    Result Date: 10/7/2023  PROCEDURE: XR CHEST 1 VW  COMPARISON: [facility], [place], XR CHEST 1 VW, 5/20/2022, 23:06.  INDICATIONS: GENERALIZED WEAKNESS; DIZZINESS; HYPOTENSION; SEPSIS  FINDINGS:  Mild cardiomegaly similar to prior exam.  Aortic atherosclerotic disease noted.  Slight blunting of the costophrenic angles which could represent a trace residual effusion, improved from prior comparison.  No new infiltrate, edema or pneumothorax.  Left  humeral fixation hardware.  Osseous structures otherwise grossly unremarkable.        Cardiomegaly with questionable trace effusions.  No overt edema or lobar infiltrate.       NIMA CORREIA MD       Electronically Signed and Approved By: NIMA CORREIA MD on 10/07/2023 at 15:42                Differential Diagnosis and Discussion:    Flank Pain: Differential diagnosis includes but is not limited to kidney stones, pyelonephritis, musculoskeletal disorders, renal infarction, urinary tract infection, hydronephrosis, radiculopathy, aortic aneurysm, renal cell carcinoma.    All labs were reviewed and interpreted by me.  CT scan radiology impression was interpreted by me.    MDM     Amount and/or Complexity of Data Reviewed  Clinical lab tests: reviewed  Tests in the radiology section of CPTr: reviewed  Tests in the medicine section of CP[facility]: reviewed  Decide to obtain previous medical records or to obtain history from someone other than the patient: yes         This patient is a 89-year-old female with a known history of right-sided kidney stone now presenting with signs of sepsis and UTI.    She just had a urine culture that came back positive yesterday for E. coli UTI.    I reviewed her previous sensitivities and it looks like ceftriaxone should be effective.    I gave her some IV fluids and ceftriaxone here and also repleted her low magnesium of 1.5.      CT imaging demonstrates obstructing stone we will plan to admit her and consult with urology.            Patient Care Considerations:          Consultants/Shared Management Plan:    Hospitalist: I have discussed the case with the admitting hospitalist, who agrees to accept the patient for admission.    Social Determinants of Health:    Patient has presented with family members who are responsible, reliable and will ensure follow up care.      Disposition and Care Coordination:    Admit:   Through independent evaluation of the patient's history, physical, and  imperical data, the patient meets criteria for observation/admission to the hospital.        Final diagnoses:   Sepsis without acute organ dysfunction, due to unspecified organism   Acute UTI (urinary tract infection)   Right ureteral stone   Hydronephrosis of right kidney        ED Disposition       ED Disposition   Decision to Admit    Condition   --    Comment   Level of Care: Telemetry [5]   Diagnosis: UTI (urinary tract infection) [974184]   Admitting Physician: AMRITA BROCK [[id number]]   Attending Physician: AMRITA BROCK [[id number]]   Certification: I Certify That Inpatient Hospital Services Are Medically Necessary For Greater Than 2 Midnights                 This medical record created using voice recognition software.             Montana Nagel MD  10/07/23 8275

## 2023-10-09 ENCOUNTER — TELEPHONE (OUTPATIENT)
Dept: UROLOGY | Facility: CLINIC | Age: 88
End: 2023-10-09
Payer: MEDICARE

## 2023-10-09 PROBLEM — E43 SEVERE MALNUTRITION: Status: ACTIVE | Noted: 2023-10-09

## 2023-10-09 LAB
ANION GAP SERPL CALCULATED.3IONS-SCNC: 9 MMOL/L (ref 5–15)
BASOPHILS # BLD AUTO: 0.02 10*3/MM3 (ref 0–0.2)
BASOPHILS NFR BLD AUTO: 0.2 % (ref 0–1.5)
BUN SERPL-MCNC: 23 MG/DL (ref 8–23)
BUN/CREAT SERPL: 23.7 (ref 7–25)
CALCIUM SPEC-SCNC: 8.6 MG/DL (ref 8.2–9.6)
CHLORIDE SERPL-SCNC: 106 MMOL/L (ref 98–107)
CO2 SERPL-SCNC: 20 MMOL/L (ref 22–29)
CREAT SERPL-MCNC: 0.97 MG/DL (ref 0.57–1)
DEPRECATED RDW RBC AUTO: 48.8 FL (ref 37–54)
EGFRCR SERPLBLD CKD-EPI 2021: 54.9 ML/MIN/1.73
EOSINOPHIL # BLD AUTO: 0 10*3/MM3 (ref 0–0.4)
EOSINOPHIL NFR BLD AUTO: 0 % (ref 0.3–6.2)
ERYTHROCYTE [DISTWIDTH] IN BLOOD BY AUTOMATED COUNT: 13.6 % (ref 12.3–15.4)
FOLATE SERPL-MCNC: 5 NG/ML (ref 4.78–24.2)
GLUCOSE BLDC GLUCOMTR-MCNC: 107 MG/DL (ref 70–99)
GLUCOSE SERPL-MCNC: 118 MG/DL (ref 65–99)
HCT VFR BLD AUTO: 31.9 % (ref 34–46.6)
HGB BLD-MCNC: 10.3 G/DL (ref 12–15.9)
IMM GRANULOCYTES # BLD AUTO: 0.09 10*3/MM3 (ref 0–0.05)
IMM GRANULOCYTES NFR BLD AUTO: 0.7 % (ref 0–0.5)
LYMPHOCYTES # BLD AUTO: 1.65 10*3/MM3 (ref 0.7–3.1)
LYMPHOCYTES NFR BLD AUTO: 12.5 % (ref 19.6–45.3)
MAGNESIUM SERPL-MCNC: 2.4 MG/DL (ref 1.7–2.3)
MCH RBC QN AUTO: 31.5 PG (ref 26.6–33)
MCHC RBC AUTO-ENTMCNC: 32.3 G/DL (ref 31.5–35.7)
MCV RBC AUTO: 97.6 FL (ref 79–97)
MONOCYTES # BLD AUTO: 0.71 10*3/MM3 (ref 0.1–0.9)
MONOCYTES NFR BLD AUTO: 5.4 % (ref 5–12)
NEUTROPHILS NFR BLD AUTO: 10.78 10*3/MM3 (ref 1.7–7)
NEUTROPHILS NFR BLD AUTO: 81.2 % (ref 42.7–76)
NRBC BLD AUTO-RTO: 0 /100 WBC (ref 0–0.2)
PHOSPHATE SERPL-MCNC: 2.8 MG/DL (ref 2.5–4.5)
PLATELET # BLD AUTO: 193 10*3/MM3 (ref 140–450)
PMV BLD AUTO: 12.3 FL (ref 6–12)
POTASSIUM SERPL-SCNC: 3.6 MMOL/L (ref 3.5–5.2)
RBC # BLD AUTO: 3.27 10*6/MM3 (ref 3.77–5.28)
SODIUM SERPL-SCNC: 135 MMOL/L (ref 136–145)
VIT B12 BLD-MCNC: 230 PG/ML (ref 211–946)
WBC NRBC COR # BLD: 13.25 10*3/MM3 (ref 3.4–10.8)

## 2023-10-09 PROCEDURE — 97165 OT EVAL LOW COMPLEX 30 MIN: CPT

## 2023-10-09 PROCEDURE — 94761 N-INVAS EAR/PLS OXIMETRY MLT: CPT

## 2023-10-09 PROCEDURE — 25010000002 CEFTRIAXONE PER 250 MG: Performed by: HOSPITALIST

## 2023-10-09 PROCEDURE — 84100 ASSAY OF PHOSPHORUS: CPT | Performed by: PHYSICIAN ASSISTANT

## 2023-10-09 PROCEDURE — 97161 PT EVAL LOW COMPLEX 20 MIN: CPT

## 2023-10-09 PROCEDURE — 85025 COMPLETE CBC W/AUTO DIFF WBC: CPT | Performed by: PHYSICIAN ASSISTANT

## 2023-10-09 PROCEDURE — 82948 REAGENT STRIP/BLOOD GLUCOSE: CPT

## 2023-10-09 PROCEDURE — 94799 UNLISTED PULMONARY SVC/PX: CPT

## 2023-10-09 PROCEDURE — 92610 EVALUATE SWALLOWING FUNCTION: CPT

## 2023-10-09 PROCEDURE — 83735 ASSAY OF MAGNESIUM: CPT | Performed by: PHYSICIAN ASSISTANT

## 2023-10-09 PROCEDURE — 80048 BASIC METABOLIC PNL TOTAL CA: CPT | Performed by: PHYSICIAN ASSISTANT

## 2023-10-09 RX ORDER — METOPROLOL SUCCINATE 50 MG/1
100 TABLET, EXTENDED RELEASE ORAL 2 TIMES DAILY
Status: DISCONTINUED | OUTPATIENT
Start: 2023-10-09 | End: 2023-10-11 | Stop reason: HOSPADM

## 2023-10-09 RX ORDER — FERROUS SULFATE 325(65) MG
325 TABLET ORAL
Status: DISCONTINUED | OUTPATIENT
Start: 2023-10-09 | End: 2023-10-11 | Stop reason: HOSPADM

## 2023-10-09 RX ORDER — LOSARTAN POTASSIUM 25 MG/1
25 TABLET ORAL DAILY
Status: DISCONTINUED | OUTPATIENT
Start: 2023-10-09 | End: 2023-10-11 | Stop reason: HOSPADM

## 2023-10-09 RX ORDER — FUROSEMIDE 20 MG/1
20 TABLET ORAL 2 TIMES DAILY
Status: DISCONTINUED | OUTPATIENT
Start: 2023-10-09 | End: 2023-10-10

## 2023-10-09 RX ORDER — CHOLESTYRAMINE LIGHT 4 G/5.7G
1 POWDER, FOR SUSPENSION ORAL DAILY
Status: DISCONTINUED | OUTPATIENT
Start: 2023-10-09 | End: 2023-10-11 | Stop reason: HOSPADM

## 2023-10-09 RX ADMIN — FLUOXETINE 20 MG: 20 CAPSULE ORAL at 09:47

## 2023-10-09 RX ADMIN — FERROUS SULFATE TAB 325 MG (65 MG ELEMENTAL FE) 325 MG: 325 (65 FE) TAB at 13:20

## 2023-10-09 RX ADMIN — Medication 10 ML: at 09:52

## 2023-10-09 RX ADMIN — Medication: at 21:11

## 2023-10-09 RX ADMIN — METOPROLOL SUCCINATE 100 MG: 50 TABLET, EXTENDED RELEASE ORAL at 13:20

## 2023-10-09 RX ADMIN — LOSARTAN POTASSIUM 25 MG: 25 TABLET, FILM COATED ORAL at 13:20

## 2023-10-09 RX ADMIN — TIMOLOL MALEATE 1 DROP: 5 SOLUTION/ DROPS OPHTHALMIC at 09:48

## 2023-10-09 RX ADMIN — TAMSULOSIN HYDROCHLORIDE 0.4 MG: 0.4 CAPSULE ORAL at 09:47

## 2023-10-09 RX ADMIN — METOPROLOL SUCCINATE 100 MG: 50 TABLET, EXTENDED RELEASE ORAL at 21:10

## 2023-10-09 RX ADMIN — Medication 10 ML: at 21:10

## 2023-10-09 RX ADMIN — CEFTRIAXONE SODIUM 2000 MG: 2 INJECTION, POWDER, FOR SOLUTION INTRAMUSCULAR; INTRAVENOUS at 14:42

## 2023-10-09 RX ADMIN — Medication: at 21:10

## 2023-10-09 RX ADMIN — MONTELUKAST SODIUM 2 G: 4 TABLET, CHEWABLE ORAL at 05:46

## 2023-10-09 RX ADMIN — LORAZEPAM 0.5 MG: 0.5 TABLET ORAL at 21:15

## 2023-10-09 RX ADMIN — CHOLESTYRAMINE 4 G: 4 POWDER, FOR SUSPENSION ORAL at 13:22

## 2023-10-09 RX ADMIN — FUROSEMIDE 20 MG: 20 TABLET ORAL at 21:10

## 2023-10-09 RX ADMIN — DOCUSATE SODIUM 50MG AND SENNOSIDES 8.6MG 2 TABLET: 8.6; 5 TABLET, FILM COATED ORAL at 09:47

## 2023-10-09 RX ADMIN — LATANOPROST 1 DROP: 50 SOLUTION OPHTHALMIC at 21:17

## 2023-10-09 RX ADMIN — FUROSEMIDE 20 MG: 20 TABLET ORAL at 13:20

## 2023-10-09 NOTE — PLAN OF CARE
Goal Outcome Evaluation:  Plan of Care Reviewed With: patient, daughter, grandchild(sada)        Progress: improving vss, fluids dced, abnx infusing per order. No acute events. Will continue POC.

## 2023-10-09 NOTE — THERAPY EVALUATION
Acute Care - Physical Therapy Initial Evaluation  JUNO Mohamud     Patient Name: Bing Cruz  : 1931  MRN: [id number]  Today's Date: 10/9/2023      Visit Dx:     ICD-10-CM ICD-9-CM   1. Sepsis without acute organ dysfunction, due to unspecified organism  A41.9 038.9     995.91   2. Acute UTI (urinary tract infection)  N39.0 599.0   3. Kidney stone  N20.0 592.0   4. Right ureteral stone  N20.1 592.1   5. Hydronephrosis of right kidney  N13.30 591   6. Difficulty in walking  R26.2 719.7     Patient Active Problem List   Diagnosis    Anemia    Closed left hip fracture, initial encounter    Closed fracture of neck of left femur    Closed left hip fracture    Hip fracture requiring operative repair, left, closed, initial encounter    Lung nodule < 6cm on CT    Esophageal dysphagia    Atrial fibrillation, chronic    Malignant neoplasm of ascending colon    Chronic deep vein thrombosis (DVT) of proximal vein of left lower extremity    Major depressive disorder, recurrent, mild    Gastroesophageal reflux disease without esophagitis    Vitamin D deficiency    Hypertension, essential    Nonrheumatic mitral valve regurgitation    Nonrheumatic aortic valve insufficiency    S/P  Left Femoral Neck Open Reduction Internal Fixation    Left hip pain    Acquired cystic kidney disease    Bladder disorder    Diaphragmatic hernia    Cardiomegaly    Cataract    Chronic fatigue syndrome    Diarrhea    Diverticular disease of colon    Hyperlipemia    Disorder of bone    Generalized abdominal pain    Insomnia    Major depression, single episode    Malaise and fatigue    Pain in joint involving pelvic region and thigh    Sprain of hip    Acute cystitis with hematuria    Blood clot in vein    Colon cancer screening    Aftercare following surgery of left femoral neck ORIF 2021    Avascular necrosis of bone of left hip    Arthritis of left hip    Dislocation of hip, posterior, left, closed    At risk for venous thromboembolism  (VTE)    Difficulty walking    Impaired cognition    Hypotension due to drugs    History of total left hip replacement    Major depressive disorder, recurrent, moderate    Anxiety    Left leg swelling    Acute diastolic CHF (congestive heart failure)    Postural dizziness with presyncope    CHF (congestive heart failure), NYHA class I, chronic, diastolic    Dysuria    Wrist fracture, left    Closed fracture of left distal radius    Complicated UTI (urinary tract infection)    Nephrolithiasis    Pancreatic lesion    Medicare annual wellness visit, subsequent    Recurrent urinary tract infection    UTI (urinary tract infection)    Kidney stone     Past Medical History:   Diagnosis Date    Abdominal pain, generalized     Anemia     Aortic regurgitation     Aortic stenosis     Cardiomegaly     Chronic atrial fibrillation 01/01/2019    Atrial fibrillation converted to sinus through cardioversion (March 2019    Chronic fatigue     Colon cancer     Diarrhea     Disease of tricuspid valve     Diverticulosis of colon     DVT (deep venous thrombosis)     LEFT LEG GREATER THAN 5 YRS AGO    Dysphagia     Essential (primary) hypertension     Hiatal hernia     Insomnia, unspecified     LVH (left ventricular hypertrophy)     Major depressive disorder, single episode     Mitral regurgitation     Mitral valve insufficiency and aortic valve insufficiency     Osteopenia     Pain in joint, pelvic region and thigh     TR (tricuspid regurgitation)     UTI (urinary tract infection)     antibx to be completed prior to procedure. ABIOLA Carlos RN ([facility]) notified.     Past Surgical History:   Procedure Laterality Date    ABDOMINAL HYSTERECTOMY      WITH BSO     ANKLE SURGERY      (L) ankle fracture    BLADDER REPAIR      BREAST BIOPSY      (L) breast biopsy    CARDIOVERSION  2019    CATARACT EXTRACTION      OU    CHOLECYSTECTOMY      OPEN    COLON SURGERY      STATES SHE HAD 12 INCHES OF COLON REMOVED IN JULY 2020 FOR COLON CANCER     COLONOSCOPY  2020    COLONOSCOPY N/A 10/29/2021    Procedure: COLONOSCOPY;  Surgeon: Edmar Back MD;  Location: [facility] ENDOSCOPY;  Service: General;  Laterality: N/A;  DIVERTICULOSIS/ANASTOMOSIS    CYSTOSCOPY W/ URETERAL STENT PLACEMENT Right 10/7/2023    Procedure: CYSTOSCOPY URETERAL CATHETER/STENT INSERTION,right retrograde;  Surgeon: Roberto Luu MD;  Location: [facility] MAIN OR;  Service: Urology;  Laterality: Right;    FEMUR OPEN REDUCTION INTERNAL FIXATION Left 07/21/2021    Procedure: FEMORAL NECK OPEN REDUCTION INTERNAL FIXATION;  Surgeon: Bam Warner MD;  Location: [facility] MAIN OR;  Service: Orthopedics;  Laterality: Left;    HIP CLOSED REDUCTION Left 3/20/2022    Procedure: HIP CLOSED REDUCTION;  Surgeon: Harsha Gayle MD;  Location: [facility] MAIN OR;  Service: Orthopedics;  Laterality: Left;    INGUINAL HERNIA REPAIR Left 09/29/2011    ORIF WRIST FRACTURE Left 8/24/2023    Procedure: OPEN REDUCTION AND INTERNAL FIXATION WRIST;  Surgeon: Ben Alegre MD;  Location: [facility] OR OSC;  Service: Orthopedics;  Laterality: Left;    TOTAL HIP ARTHROPLASTY Left 3/16/2022    Procedure: LEFT TOTAL HIP ARTHROPLASTY AND HARDWARE REMOVAL;  Surgeon: Bam Warner MD;  Location: [facility] MAIN OR;  Service: Orthopedics;  Laterality: Left;    UPPER GASTROINTESTINAL ENDOSCOPY      WITH DILATATION     PT Assessment (last 12 hours)       PT Evaluation and Treatment       Row Name 10/09/23 1100          Physical Therapy Time and Intention    Subjective Information no complaints (P)   -AM     Document Type evaluation (P)   -AM     Mode of Treatment individual therapy;physical therapy (P)   -AM     Patient Effort good (P)   -AM     Symptoms Noted During/After Treatment none (P)   -AM       Row Name 10/09/23 1100          General Information    Patient Profile Reviewed yes (P)   -AM     Patient Observations alert;cooperative;agree to therapy (P)   -AM     Prior Level of Function  independent:;gait;transfer;bed mobility (P)   -AM     Equipment Currently Used at Home walker, rolling (P)   -AM     Existing Precautions/Restrictions fall (P)   -AM     Barriers to Rehab none identified (P)   -AM       Row Name 10/09/23 1100          Living Environment    Current Living Arrangements home (P)   -AM     Home Accessibility stairs to enter home (P)   -AM     People in Home alone (P)   -AM     Primary Care Provided by self;child(sada) (P)   -AM       Row Name 10/09/23 1100          Home Main Entrance    Number of Stairs, Main Entrance three (P)   -AM     Landing, Stairs, Main Entrance railings present (P)   -AM       Row Name 10/09/23 1100          Home Use of Assistive/Adaptive Equipment    Equipment Currently Used at Home walker, rolling (P)   -AM       Row Name 10/09/23 1100          Cognition    Orientation Status (Cognition) oriented x 4 (P)   -AM       Row Name 10/09/23 1100          Range of Motion (ROM)    Range of Motion bilateral lower extremities;ROM is WFL (P)   -AM       Row Name 10/09/23 1100          Strength (Manual Muscle Testing)    Strength (Manual Muscle Testing) bilateral lower extremities (P)   4-/5  -AM       Row Name 10/09/23 1100          Bed Mobility    Bed Mobility supine-sit;sit-supine (P)   -AM     Supine-Sit [place] (Bed Mobility) standby assist (P)   -AM     Sit-Supine [place] (Bed Mobility) standby assist (P)   -AM     Assistive Device (Bed Mobility) head of bed elevated (P)   -AM       Row Name 10/09/23 1100          Transfers    Transfers sit-stand transfer;stand-sit transfer (P)   -AM       Row Name 10/09/23 1100          Sit-Stand Transfer    Sit-Stand [place] (Transfers) contact guard (P)   -AM     Assistive Device (Sit-Stand Transfers) walker, front-wheeled (P)   -AM       Row Name 10/09/23 1100          Stand-Sit Transfer    Stand-Sit [place] (Transfers) contact guard (P)   -AM     Assistive Device (Stand-Sit Transfers) walker, front-wheeled (P)    -AM       Row Name 10/09/23 1100          Gait/Stairs (Locomotion)    Gait/Stairs Locomotion gait/ambulation assistive device (P)   -AM     [place] Level (Gait) contact guard (P)   -AM     Assistive Device (Gait) walker, front-wheeled (P)   -AM     Patient was able to Ambulate yes (P)   -AM     Distance in Feet (Gait) 60 (P)   -AM     Pattern (Gait) step-through (P)   -AM     Deviations/Abnormal Patterns (Gait) scissoring;stride length decreased;gait speed decreased (P)   -AM       Row Name 10/09/23 1100          Safety Issues, Functional Mobility    Impairments Affecting Function (Mobility) balance;endurance/activity tolerance;strength (P)   -AM       Row Name 10/09/23 1100          Balance    Balance Assessment standing dynamic balance (P)   -AM     Dynamic Standing Balance contact guard (P)   -AM     Position/Device Used, Standing Balance walker, front-wheeled (P)   -AM       Row Name             Wound 10/07/23 2307 urethral meatus Incision    Wound - Properties Group Placement Date: 10/07/23  -AN Placement Time: 2307  -AN Present on Original Admission: N  -AN Location: urethral meatus  -AN Primary Wound Type: Incision  -AN Additional Comments: no wound,point of entry for cystopscopy  -AN    Retired Wound - Properties Group Placement Date: 10/07/23  -AN Placement Time: 2307  -AN Present on Original Admission: N  -AN Location: urethral meatus  -AN Primary Wound Type: Incision  -AN Additional Comments: no wound,point of entry for cystopscopy  -AN    Retired Wound - Properties Group Date first assessed: 10/07/23  -AN Time first assessed: 2307  -AN Present on Original Admission: N  -AN Location: urethral meatus  -AN Primary Wound Type: Incision  -AN Additional Comments: no wound,point of entry for cystopscopy  -AN      Row Name 10/09/23 1100          Plan of Care Review    Plan of Care Reviewed With patient;daughter (P)   -AM     Progress no change (P)   -AM     Outcome Evaluation Pt presents with decreased  standing balance, decreased activity tolerance, and decreased strength limiting pt's ability to independently ambulate and transfer safely as she was at home. Pt will benefit from PT services to address these impairments to restore maximal level of functional mobility. (P)   -AM       Row Name 10/09/23 1100          Therapy Assessment/Plan (PT)    Rehab Potential (PT) good, to achieve stated therapy goals (P)   -AM     Criteria for Skilled Interventions Met (PT) yes;meets criteria (P)   -AM     Therapy Frequency (PT) daily (P)   -AM     Problem List (PT) problems related to;balance;mobility;strength (P)   -AM     Activity Limitations Related to Problem List (PT) unable to ambulate safely;unable to transfer safely (P)   -AM       Row Name 10/09/23 1100          PT Evaluation Complexity    History, PT Evaluation Complexity no personal factors and/or comorbidities (P)   -AM     Examination of Body Systems (PT Eval Complexity) total of 4 or more elements (P)   -AM     Clinical Presentation (PT Evaluation Complexity) stable (P)   -AM     Clinical Decision Making (PT Evaluation Complexity) low complexity (P)   -AM     Overall Complexity (PT Evaluation Complexity) low complexity (P)   -AM       Row Name 10/09/23 1100          Physical Therapy Goals    Transfer Goal Selection (PT) transfer, PT goal 1 (P)   -AM     Gait Training Goal Selection (PT) gait training, PT goal 1 (P)   -AM     Balance Goal Selection (PT) balance, PT goal 1 (P)   -AM       Row Name 10/09/23 1100          Transfer Goal 1 (PT)    Activity/Assistive Device (Transfer Goal 1, PT) sit-to-stand/stand-to-sit;walker, rolling (P)   -AM     [place] Level/Cues Needed (Transfer Goal 1, PT) standby assist (P)   -AM     Time Frame (Transfer Goal 1, PT) 10 days (P)   -AM       Row Name 10/09/23 1100          Gait Training Goal 1 (PT)    Activity/Assistive Device (Gait Training Goal 1, PT) gait (walking locomotion);assistive device use;improve balance and  speed;increase endurance/gait distance;walker, rolling (P)   -AM     [place] Level (Gait Training Goal 1, PT) standby assist (P)   -AM     Distance (Gait Training Goal 1, PT) 200 (P)   -AM     Time Frame (Gait Training Goal 1, PT) 10 days (P)   -AM       Row Name 10/09/23 1100          Balance Goal 1 (PT)    Activity/Assistive Device (Balance Goal 1, PT) assistive device use;standing, dynamic;walker, rolling (P)   -AM     [place] Level/Cues Needed (Balance Goal 1, PT) standby assist (P)   -AM     Time Frame (Balance Goal 1, PT) 10 days (P)   -AM               User Key  (r) = Recorded By, (t) = Taken By, (c) = Cosigned By      Initials Name Provider Type    Mihaela Knox RN Registered Nurse    Nicole Gil, PT Student PT Student                      PT Recommendation and Plan  Anticipated Discharge Disposition (PT): (P) inpatient rehabilitation facility  Planned Therapy Interventions (PT): (P) balance training, bed mobility training, gait training, neuromuscular re-education, stair training, strengthening, transfer training  Therapy Frequency (PT): (P) daily  Plan of Care Reviewed With: (P) patient, daughter  Progress: (P) no change  Outcome Evaluation: (P) Pt presents with decreased standing balance, decreased activity tolerance, and decreased strength limiting pt's ability to independently ambulate and transfer safely as she was at home. Pt will benefit from PT services to address these impairments to restore maximal level of functional mobility.   Outcome Measures       Row Name 10/09/23 1100             How much help from another person do you currently need...    Turning from your back to your side while in flat bed without using bedrails? 4 (P)   -AM      Moving from lying on back to sitting on the side of a flat bed without bedrails? 3 (P)   -AM      Moving to and from a bed to a chair (including a wheelchair)? 3 (P)   -AM      Standing up from a chair using your arms (e.g., wheelchair,  bedside chair)? 3 (P)   -AM      Climbing 3-5 steps with a railing? 3 (P)   -AM      To walk in hospital room? 3 (P)   -AM      AM-PAC 6 Clicks Score (PT) 19 (P)   -AM      Highest level of mobility 6 --> Walked 10 steps or more (P)   -AM         Functional Assessment    Outcome Measure Options AM-PAC 6 Clicks Basic Mobility (PT) (P)   -AM                User Key  (r) = Recorded By, (t) = Taken By, (c) = Cosigned By      Initials Name Provider Type    AM Nicole Williamson PT Student PT Student                     Time Calculation:    PT Charges       Row Name 10/09/23 1124             Time Calculation    PT Received On 10/09/23 (P)   -AM      PT Goal Re-Cert Due Date 10/18/23 (P)   -AM         Untimed Charges    PT Eval/Re-eval Minutes 30 (P)   -AM         Total Minutes    Untimed Charges Total Minutes 30 (P)   -AM       Total Minutes 30 (P)   -AM                User Key  (r) = Recorded By, (t) = Taken By, (c) = Cosigned By      Initials Name Provider Type    AM Nicole Williamson PT Student PT Student                  Therapy Charges for Today       Code Description Service Date Service Provider Modifiers Qty    [id number] HC PT EVAL LOW COMPLEXITY 2 10/9/2023 Nicole Williamson PT Student GP 1            PT G-Codes  Outcome Measure Options: (P) AM-PAC 6 Clicks Basic Mobility (PT)  AM-PAC 6 Clicks Score (PT): (P) 19    LACEY Collier  10/9/2023

## 2023-10-09 NOTE — PLAN OF CARE
Goal Outcome Evaluation:      ASSESSMENT/ PLAN OF CARE:  Pt presents with limitations, noted below, that impede patient's ability to tolerate least restrictive diet safely and independently. The skills of a therapist will be required to safely and effectively implement the following treatment plan to restore maximal level of function.    PROBLEMS:  1.  Risk of aspiration, swallow delay, suspicion of esophageal dysphagia   TREATMENT: Speech therapy for dysphagia, education of strategies and tolerance of least restrictive diet.        FREQUENCY/DURATION: Daily, 5 days a week    REHAB POTENTIAL:  Pt has good rehab potential.  The following limitations may influence improvement/ length of tx medical status.    RECOMMENDATIONS:   1.   DIET: Regular soft solids, thin liquids, extra moisture    2.  POSITION: Fully upright for all p.o. intake, 30 minutes following    3.  COMPENSATORY STRATEGIES: No straw, single controlled sips of liquids via cup, alternate solids and liquids, meds in applesauce

## 2023-10-09 NOTE — PLAN OF CARE
Goal Outcome Evaluation:  Plan of Care Reviewed With: (P) patient, daughter        Progress: (P) no change  Outcome Evaluation: (P) Pt presents with decreased standing balance, decreased activity tolerance, and decreased strength limiting pt's ability to independently ambulate and transfer safely as she was at home. Pt will benefit from PT services to address these impairments to restore maximal level of functional mobility.      Anticipated Discharge Disposition (PT): (P) inpatient rehabilitation facility

## 2023-10-09 NOTE — THERAPY EVALUATION
Acute Care - Speech Language Pathology   Swallow Initial Evaluation JUNO [place]     Patient Name: Bing Cruz  : 1931  MRN: [id number]  Today's Date: 10/9/2023               Admit Date: 10/7/2023    Visit Dx:     ICD-10-CM ICD-9-CM   1. Sepsis without acute organ dysfunction, due to unspecified organism  A41.9 038.9     995.91   2. Acute UTI (urinary tract infection)  N39.0 599.0   3. Kidney stone  N20.0 592.0   4. Right ureteral stone  N20.1 592.1   5. Hydronephrosis of right kidney  N13.30 591   6. Difficulty in walking  R26.2 719.7   7. Oropharyngeal dysphagia  R13.12 787.22     Patient Active Problem List   Diagnosis    Anemia    Closed left hip fracture, initial encounter    Closed fracture of neck of left femur    Closed left hip fracture    Hip fracture requiring operative repair, left, closed, initial encounter    Lung nodule < 6cm on CT    Esophageal dysphagia    Atrial fibrillation, chronic    Malignant neoplasm of ascending colon    Chronic deep vein thrombosis (DVT) of proximal vein of left lower extremity    Major depressive disorder, recurrent, mild    Gastroesophageal reflux disease without esophagitis    Vitamin D deficiency    Hypertension, essential    Nonrheumatic mitral valve regurgitation    Nonrheumatic aortic valve insufficiency    S/P  Left Femoral Neck Open Reduction Internal Fixation    Left hip pain    Acquired cystic kidney disease    Bladder disorder    Diaphragmatic hernia    Cardiomegaly    Cataract    Chronic fatigue syndrome    Diarrhea    Diverticular disease of colon    Hyperlipemia    Disorder of bone    Generalized abdominal pain    Insomnia    Major depression, single episode    Malaise and fatigue    Pain in joint involving pelvic region and thigh    Sprain of hip    Acute cystitis with hematuria    Blood clot in vein    Colon cancer screening    Aftercare following surgery of left femoral neck ORIF 2021    Avascular necrosis of bone of left hip    Arthritis of  left hip    Dislocation of hip, posterior, left, closed    At risk for venous thromboembolism (VTE)    Difficulty walking    Impaired cognition    Hypotension due to drugs    History of total left hip replacement    Major depressive disorder, recurrent, moderate    Anxiety    Left leg swelling    Acute diastolic CHF (congestive heart failure)    Postural dizziness with presyncope    CHF (congestive heart failure), NYHA class I, chronic, diastolic    Dysuria    Wrist fracture, left    Closed fracture of left distal radius    Complicated UTI (urinary tract infection)    Nephrolithiasis    Pancreatic lesion    Medicare annual wellness visit, subsequent    Recurrent urinary tract infection    UTI (urinary tract infection)    Kidney stone     Past Medical History:   Diagnosis Date    Abdominal pain, generalized     Anemia     Aortic regurgitation     Aortic stenosis     Cardiomegaly     Chronic atrial fibrillation 01/01/2019    Atrial fibrillation converted to sinus through cardioversion (March 2019    Chronic fatigue     Colon cancer     Diarrhea     Disease of tricuspid valve     Diverticulosis of colon     DVT (deep venous thrombosis)     LEFT LEG GREATER THAN 5 YRS AGO    Dysphagia     Essential (primary) hypertension     Hiatal hernia     Insomnia, unspecified     LVH (left ventricular hypertrophy)     Major depressive disorder, single episode     Mitral regurgitation     Mitral valve insufficiency and aortic valve insufficiency     Osteopenia     Pain in joint, pelvic region and thigh     TR (tricuspid regurgitation)     UTI (urinary tract infection)     antibx to be completed prior to procedure. ABIOLA Carlos RN ([facility]) notified.     Past Surgical History:   Procedure Laterality Date    ABDOMINAL HYSTERECTOMY      WITH BSO     ANKLE SURGERY      (L) ankle fracture    BLADDER REPAIR      BREAST BIOPSY      (L) breast biopsy    CARDIOVERSION  2019    CATARACT EXTRACTION      OU    CHOLECYSTECTOMY      OPEN     COLON SURGERY      STATES SHE HAD 12 INCHES OF COLON REMOVED IN JULY 2020 FOR COLON CANCER    COLONOSCOPY  2020    COLONOSCOPY N/A 10/29/2021    Procedure: COLONOSCOPY;  Surgeon: Edmar Back MD;  Location: [facility] ENDOSCOPY;  Service: General;  Laterality: N/A;  DIVERTICULOSIS/ANASTOMOSIS    CYSTOSCOPY W/ URETERAL STENT PLACEMENT Right 10/7/2023    Procedure: CYSTOSCOPY URETERAL CATHETER/STENT INSERTION,right retrograde;  Surgeon: Roberto Luu MD;  Location: [facility] MAIN OR;  Service: Urology;  Laterality: Right;    FEMUR OPEN REDUCTION INTERNAL FIXATION Left 07/21/2021    Procedure: FEMORAL NECK OPEN REDUCTION INTERNAL FIXATION;  Surgeon: Bam Warner MD;  Location: [facility] MAIN OR;  Service: Orthopedics;  Laterality: Left;    HIP CLOSED REDUCTION Left 3/20/2022    Procedure: HIP CLOSED REDUCTION;  Surgeon: Harsha Gayle MD;  Location: [facility] MAIN OR;  Service: Orthopedics;  Laterality: Left;    INGUINAL HERNIA REPAIR Left 09/29/2011    ORIF WRIST FRACTURE Left 8/24/2023    Procedure: OPEN REDUCTION AND INTERNAL FIXATION WRIST;  Surgeon: Ben Alegre MD;  Location: [facility] OR OSC;  Service: Orthopedics;  Laterality: Left;    TOTAL HIP ARTHROPLASTY Left 3/16/2022    Procedure: LEFT TOTAL HIP ARTHROPLASTY AND HARDWARE REMOVAL;  Surgeon: Bam Warner MD;  Location: [facility] OR;  Service: Orthopedics;  Laterality: Left;    UPPER GASTROINTESTINAL ENDOSCOPY      WITH DILATATION       SLP Recommendation and Plan          Inpatient Speech Pathology Dysphagia Evaluation        PAIN SCALE: None indicated    PRECAUTIONS/CONTRAINDICATIONS: Standard, fall    SUSPECTED ABUSE/NEGLECT/EXPLOITATION: None identified    SOCIAL/PSYCHOLOGICAL NEEDS/BARRIERS: None identified    PAST SOCIAL HISTORY: 89-year-old female, lives at home alone    PRIOR FUNCTION: Patient with history of dysphagia    PATIENT GOALS/EXPECTATIONS: To feel better and return home    HISTORY: Patient is a 89-year-old female admitted  to [facility] on 10/7/2023 secondary to UTI.  Speech pathology services were consulted due to swallowing difficulties.  Patient underwent a modified barium swallow study at [facility] in September 2023.  Diet of soft solids and thin liquids were recommended.  Controlled, small sips of liquids.  Patient also states history of esophageal dysphagia and hiatal hernia.  Family reports esophageal dilatation several years ago.    CURRENT DIET LEVEL: Regular    OBJECTIVE:    TEST ADMINISTERED: Clinical dysphagia evaluation    COGNITION/SAFETY AWARENESS: Appears appropriate for environment    BEHAVIORAL OBSERVATIONS: Awake and cooperative, pleasant    ORAL MOTOR EXAM: Natural dentition    VOICE QUALITY: Mild hoarseness    REFLEX EXAM: Nonproductive    POSTURE: Sitting fully upright in bed    FEEDING/SWALLOWING FUNCTION: Assessed with thin liquids, nectar thick liquids, pur‚e solids, regular solids    CLINICAL OBSERVATIONS: Patient exhibiting throat clearing and vocal change with trials of thin liquid via straw drink.  Single controlled sips thin liquid via cup, nectar thick via cup and straw with mild delayed swallows completed.  Vocal quality and laryngeal sounds clear to cervical auscultation.  Pur‚e solids with swallow completed.  Regular solids with adequate chewing.  Swallow completed.  Double swallow observed.  Patient complaining of sensation low throat.  Soft moistened solids with no complaint of sensation low throat.  Laryngeal elevation on palpation.  Patient exhibiting signs and symptoms of possible aspiration with thin liquids via straw.  Silent aspiration cannot be ruled out.    DYSPHAGIA CRITERIA: Risk of aspiration    FUNCTIONAL ASSESSMENT INSTRUMENT: Patient currently scored a level 5 of 7 on Functional Communication Measures for swallowing indicating a 20-39% limitation in function.    ASSESSMENT/ PLAN OF CARE:  Pt presents with limitations, noted below, that impede patient's ability to  tolerate least restrictive diet safely and independently. The skills of a therapist will be required to safely and effectively implement the following treatment plan to restore maximal level of function.    PROBLEMS:  1.  Risk of aspiration, swallow delay, suspicion of esophageal dysphagia    LTG 1: 30 days.  Patient will increase functional communication measures for swallowing to level 6 of 7, indicating 1-19% limitation in function.   STG 1a: 14 days.  Patient will tolerate least restrictive diet of regular soft solids, thin liquids with minimal to no signs or symptoms of aspiration.   STG 1b: 14 days.  Patient will tolerate least restrictive diet of regular soft solids, thin liquids utilizing strategies independently.   STG 1c: 14 days.  Patient will tolerate least restrictive diet of regular soft solids, thin liquids utilizing strategies with minimal cueing.   TREATMENT: Speech therapy for dysphagia, education of strategies and tolerance of least restrictive diet.        FREQUENCY/DURATION: Daily, 5 days a week    REHAB POTENTIAL:  Pt has good rehab potential.  The following limitations may influence improvement/ length of tx medical status.    RECOMMENDATIONS:   1.   DIET: Regular soft solids, thin liquids, extra moisture    2.  POSITION: Fully upright for all p.o. intake, 30 minutes following    3.  COMPENSATORY STRATEGIES: No straw, single controlled sips of liquids via cup, alternate solids and liquids, meds in applesauce    Pt/responsible party agrees with plan of care and has been informed of all alternatives, risks and benefits.                    Anticipated Discharge Disposition (SLP): anticipate therapy at next level of care (10/09/23 2720)                                                                  EDUCATION  The patient has been educated in the following areas:   Dysphagia (Swallowing Impairment).              Time Calculation:    Time Calculation- SLP       Row Name 10/09/23 5209              Time Calculation- SLP    SLP Start Time 1000  -SN      SLP Stop Time 1100  -SN      SLP Time Calculation (min) 60 min  -SN      SLP Received On 10/09/23  -SN         Untimed Charges    42061-IS Eval Oral Pharyng Swallow Minutes 60  -SN         Total Minutes    Untimed Charges Total Minutes 60  -SN       Total Minutes 60  -SN                User Key  (r) = Recorded By, (t) = Taken By, (c) = Cosigned By      Initials Name Provider Type    SN Sadia Pennington MS-CCC/SLP, CAMILO Speech and Language Pathologist                    Therapy Charges for Today       Code Description Service Date Service Provider Modifiers Qty    [id number]  ST EVAL ORAL PHARYNG SWALLOW 4 10/9/2023 Sadia Pennington MS-CCC/SLP, CAMILO GN 1                 JD Sena/MINE, CAMILO  10/9/2023

## 2023-10-09 NOTE — PROGRESS NOTES
[facility]   Hospitalist Progress Note  Date: 10/9/2023  Patient Name: Bing Cruz  : 1931  MRN: [id number]  Date of admission: 10/7/2023  Room/Bed: 405/1      Subjective Recent fall, concern for UTI, poor oral intake   Subjective     Chief Complaint: Recent fall, concern for UTI, poor oral intake     Summary:    Interval Followup: Patient is a 89-year-old female recently broke her left arm.  She has a history of several urinary tract infections.  And when she does have a UTI, she has decreased oral intake and nausea.  She follows with Dr. Chester.      She had a CT abdomen and pelvis on 2023 CT 13 x 6 mm stone right renal pelvis.  With some urothelial thickening and inflammation around this.  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  No hydro-.  No stones on the left.  Postsurgical changes of prior right hemicolectomy.  9 mm pancreatic cyst.  With a recommendation for an MRCP.       An MRCP did show multiple small cystic lesions in the pancreas measuring up to 1 cm.  Many of these appear to communicate with the main pancreatic duct.  Mildly dilated sidebranch of the pancreatic ducts were also noted.  They recommend repeat MRI in 1 year.     In the past, she has had the following types of UTIs:  2023   0.8, GFR 63  2023 E. coli resistant to oral antibiotics other than nitrofurantoin  2023 Citrobacter -resistant to Ancef   E. coli   Citrobacter  3/22   Proteus     During the office visit, Dr. Chester discussed keeping the patient on prophylactic treatment for UTIs.  However, nitrofurantoin is a possibility but at her age this does have a risk.  He encouraged her to drink more liquids.     On arrival to the ED, patient had a temperature of 98, pulse of 101, respiratory 20, blood pressure 104/66, and she saturating 93% on room air.  Patient's UA is consistent with UTI.  Her magnesium is low.  Her creatinine is 1.16.  White blood cell count  is 22.70.  MCV is 97.3. Urine culture shows E. coli.    Interval follow-up: Patient continues to do well.  No complaints.  Creatinine back to baseline.    Review of Systems    All systems reviewed and negative except for what is outlined above.      Objective   Objective     Vitals:   Temp:  [97.2 øF (36.2 øC)-98.1 øF (36.7 øC)] 97.5 øF (36.4 øC)  Heart Rate:  [74-97] 74  Resp:  [20] 20  BP: (107-143)/(52-89) 130/83    Physical Exam   General: Awake, alert, NAD  HENT: NCAT, MMM  Eyes: pupils equal, no scleral icterus  Cardiovascular: RRR, no murmurs   Pulmonary: CTA bilaterally; no wheezes; no conversational dyspnea  Gastrointestinal: S/ND/NT, +BS  Musculoskeletal: No gross deformities  Skin: No jaundice, no rash on exposed skin appreciated  Neuro: CN II through XII grossly intact; speech clear; no tremor  Psych: Mood and affect appropriate  : No Cano catheter; no suprapubic tenderness    Result Review    Result Review:  I have personally reviewed these results:  [x]  Laboratory      Lab 10/09/23  0504 10/08/23  0600 10/07/23  1535 10/07/23  1419   WBC 13.25* 17.17*  --  22.70*   HEMOGLOBIN 10.3* 11.2*  --  12.8   HEMATOCRIT 31.9* 34.4  --  39.1   PLATELETS 193 199  --  235   NEUTROS ABS 10.78* 15.69*  --  20.10*   IMMATURE GRANS (ABS) 0.09* 0.11*  --  0.16*   LYMPHS ABS 1.65 1.13  --  1.05   MONOS ABS 0.71 0.21  --  1.31*   EOS ABS 0.00 0.00  --  0.02   MCV 97.6* 98.0*  --  97.3*   LACTATE  --   --  1.9  --          Lab 10/09/23  0504 10/08/23  0600 10/07/23  1419   SODIUM 135* 137 141   POTASSIUM 3.6 4.1 3.8   CHLORIDE 106 105 105   CO2 20.0* 21.4* 24.0   ANION GAP 9.0 10.6 12.0   BUN 23 21 21   CREATININE 0.97 1.11* 1.16*   EGFR 54.9* 46.7* 44.3*   GLUCOSE 118* 158* 125*   CALCIUM 8.6 8.4 9.3   MAGNESIUM 2.4* 1.8 1.5*   PHOSPHORUS 2.8 3.4 1.6*         Lab 10/07/23  1419   TOTAL PROTEIN 7.2   ALBUMIN 3.8   GLOBULIN 3.4   ALT (SGPT) 23   AST (SGOT) 39*   BILIRUBIN 1.9*   ALK PHOS 112         Lab 10/07/23  1419    HSTROP T 29*             Lab 10/08/23  1537   IRON 31*   IRON SATURATION (TSAT) 15*   TIBC 206*   TRANSFERRIN 138*   FERRITIN 353.40*   FOLATE 5.00   VITAMIN B 12 230         Brief Urine Lab Results  (Last result in the past 365 days)        Color   Clarity   Blood   Leuk Est   Nitrite   Protein   CREAT   Urine HCG        10/07/23 1453 Yellow   Cloudy   Large (3+)   Large (3+)   Negative   Trace                 [x]  Microbiology   Microbiology Results (last 10 days)       Procedure Component Value - Date/Time    Blood Culture - Blood, Arm, Left [[id number]]  (Normal) Collected: 10/08/23 0600    Lab Status: Preliminary result Specimen: Blood from Arm, Left Updated: 10/09/23 0615     Blood Culture No growth at 24 hours    Blood Culture - Blood, Arm, Left [[id number]]  (Normal) Collected: 10/08/23 0532    Lab Status: Preliminary result Specimen: Blood from Arm, Left Updated: 10/09/23 0545     Blood Culture No growth at 24 hours    Blood Culture - Blood, Arm, Right [[id number]]  (Normal) Collected: 10/07/23 1535    Lab Status: Preliminary result Specimen: Blood from Arm, Right Updated: 10/08/23 1545     Blood Culture No growth at 24 hours    Blood Culture - Blood, Arm, Left [[id number]]  (Abnormal) Collected: 10/07/23 1535    Lab Status: Preliminary result Specimen: Blood from Arm, Left Updated: 10/09/23 0627     Blood Culture Escherichia coli     Isolated from Aerobic Bottle     Gram Stain Aerobic Bottle Gram negative bacilli    Blood Culture ID, PCR - Blood, Arm, Left [[id number]]  (Abnormal) Collected: 10/07/23 1535    Lab Status: Final result Specimen: Blood from Arm, Left Updated: 10/08/23 0628     BCID, PCR Escherichia coli. Identification by BCID2 PCR.     BOTTLE TYPE Aerobic Bottle    Narrative:      No resistance genes detected.    Urine Culture - Urine, Urine, Clean Catch [[id number]]  (Abnormal)  (Susceptibility) Collected: 10/06/23 1506    Lab Status: Final result Specimen: Urine, Clean Catch Updated: 10/08/23  1007     Urine Culture >100,000 CFU/mL Escherichia coli    Narrative:      Colonization of the urinary tract without infection is common. Treatment is discouraged unless the patient is symptomatic, pregnant, or undergoing an invasive urologic procedure.    Susceptibility        Escherichia coli      ASH      Amikacin Susceptible      Ampicillin Resistant      Ampicillin + Sulbactam Resistant      Cefazolin Resistant      Cefepime Susceptible      Ceftazidime Susceptible      Ceftriaxone Susceptible      Gentamicin Resistant      Levofloxacin Resistant      Nitrofurantoin Susceptible      Piperacillin + Tazobactam Resistant      Tobramycin Intermediate      Trimethoprim + Sulfamethoxazole Resistant                                 [x]  Radiology  CT Abdomen Pelvis Without Contrast    Result Date: 10/7/2023    1. Obstructing 9 millimeter right UPJ stone resulting in moderate hydronephrosis.  Additional nonobstructing calculi in the right inferior pole measures up to 1.2 centimeter. 2. Cardiomegaly with findings of volume overload/3rd spacing including small bilateral pleural effusions, mild body wall edema and trace pelvic fluid. 3. Moderate hiatal hernia. 4. Colonic diverticulosis. 5. Other chronic/ancillary findings detailed above.     NIMA CORREIA MD       Electronically Signed and Approved By: NIMA CORREIA MD on 10/07/2023 at 17:50             XR Chest 1 View    Result Date: 10/7/2023    Cardiomegaly with questionable trace effusions.  No overt edema or lobar infiltrate.       NIMA CORREIA MD       Electronically Signed and Approved By: NIMA CORREIA MD on 10/07/2023 at 15:42            [x]  EKG/Telemetry   []  Cardiology/Vascular   [x]  Pathology  [x]  Old records  []  Other:    Assessment & Plan   Assessment / Plan   Assessment:  #1 acute complicated cystitis with history of recurrent UTIs  -Continue Rocephin.  Resistant to Zosyn.  -Patient had a 9 mm stone obstructing the right kidney around the right  HENRYR.  Status post stent placement on 10/7/2023.  Urology following.    #2 E. coli bacteremia  -Await sensitivities to further tailor antibiotics.  Patient will need to be on Rocephin.  Resistant to Zosyn.  Discussed with ID pharmacy she is on the radar.    #3 BMI 22.22.  Poor oral intake  -Nutrition consulted.  Dietary supplements ordered     #4 low magnesium replete     #5 JUANI baseline creatinine 0.9  -resume diuretics      #6 chronic atrial fibrillation.  We will hold Eliquis until patient's surgery next week.     #7 depression continue Prozac     #8 hypertension -resumed BP meds,      #9 recent fall with injury to arm    #10 anemia-mixed picture.  Will start the patient on oral iron.       Dispo planning:  Patient will need 5 days more of IV antibiotics.  Discussed with Nicolette who discussed the case with Dr. Rivera.       Discussed with RN.    DVT prophylaxis:  Mechanical DVT prophylaxis orders are present.    CODE STATUS:   Level Of Support Discussed With: Patient  Code Status (Patient has no pulse and is not breathing): CPR (Attempt to Resuscitate)  Medical Interventions (Patient has pulse or is breathing): Full Support  Electronically signed by Samra Burgos DO, 10/09/23, 3:34 PM EDT.

## 2023-10-09 NOTE — PROGRESS NOTES
[facility]     Progress Note    Patient Name: Bing Cruz  : 1931  MRN: [id number]  Primary Care Physician:  Jairo Kramer MD  Date of admission: 10/7/2023    Subjective   Subjective     Chief Complaint: Patient has no complaints today    Dizziness      Patient Reports patient does not report any nausea vomiting or hematuria.    Review of Systems   Neurological:  Positive for dizziness.       Objective   Objective     Vitals:   Temp:  [97.2 øF (36.2 øC)-98.1 øF (36.7 øC)] 97.7 øF (36.5 øC)  Heart Rate:  [90-97] 95  Resp:  [18-20] 20  BP: (101-143)/(52-89) 143/81    Physical Exam   Awake alert oriented x3  Result Review    Result Review:  I have personally reviewed the results from the time of this admission to 10/9/2023 10:45 EDT and agree with these findings:  []  Laboratory list / accordion  []  Microbiology  []  Radiology  []  EKG/Telemetry   []  Cardiology/Vascular   []  Pathology  []  Old records  []  Other:  Most notable findings include: Right ureteral stone      Assessment & Plan   Assessment / Plan     Brief Patient Summary:  Bing Cruz is a 89 y.o. female who patient has right ureteral stone had an infection had a stent placed.    Active Hospital Problems:  Active Hospital Problems    Diagnosis     **UTI (urinary tract infection)     Kidney stone      Plan:   Patient has a stent in the right ureter it is draining well she is doing well my recommendation is discharge home or rehab anytime deemed appropriate by Dr. Burgos and she will follow-up with Dr. Morris Ojeda for definitive treatment of the right ureteral stone.  Please call me for any questions.    DVT prophylaxis:  Mechanical DVT prophylaxis orders are present.    CODE STATUS:    Level Of Support Discussed With: Patient  Code Status (Patient has no pulse and is not breathing): CPR (Attempt to Resuscitate)  Medical Interventions (Patient has pulse or is breathing): Full Support    Disposition:  I expect patient to be  discharged home..    Roberto Luu MD

## 2023-10-09 NOTE — TELEPHONE ENCOUNTER
Patient's daughter Lorrie called stating her mother had to have a stent put in on 10/7/23 because she was septic from the stone. They were told that they needed to contact Dr. Chester to see about having the stone removed.

## 2023-10-09 NOTE — THERAPY EVALUATION
Patient Name: Bing Cruz  : 1931    MRN: [id number]                              Today's Date: 10/9/2023       Admit Date: 10/7/2023    Visit Dx:     ICD-10-CM ICD-9-CM   1. Sepsis without acute organ dysfunction, due to unspecified organism  A41.9 038.9     995.91   2. Acute UTI (urinary tract infection)  N39.0 599.0   3. Kidney stone  N20.0 592.0   4. Right ureteral stone  N20.1 592.1   5. Hydronephrosis of right kidney  N13.30 591   6. Difficulty in walking  R26.2 719.7   7. Oropharyngeal dysphagia  R13.12 787.22   8. Decreased activities of daily living (ADL)  Z78.9 V49.89     Patient Active Problem List   Diagnosis    Anemia    Closed left hip fracture, initial encounter    Closed fracture of neck of left femur    Closed left hip fracture    Hip fracture requiring operative repair, left, closed, initial encounter    Lung nodule < 6cm on CT    Esophageal dysphagia    Atrial fibrillation, chronic    Malignant neoplasm of ascending colon    Chronic deep vein thrombosis (DVT) of proximal vein of left lower extremity    Major depressive disorder, recurrent, mild    Gastroesophageal reflux disease without esophagitis    Vitamin D deficiency    Hypertension, essential    Nonrheumatic mitral valve regurgitation    Nonrheumatic aortic valve insufficiency    S/P  Left Femoral Neck Open Reduction Internal Fixation    Left hip pain    Acquired cystic kidney disease    Bladder disorder    Diaphragmatic hernia    Cardiomegaly    Cataract    Chronic fatigue syndrome    Diarrhea    Diverticular disease of colon    Hyperlipemia    Disorder of bone    Generalized abdominal pain    Insomnia    Major depression, single episode    Malaise and fatigue    Pain in joint involving pelvic region and thigh    Sprain of hip    Acute cystitis with hematuria    Blood clot in vein    Colon cancer screening    Aftercare following surgery of left femoral neck ORIF 2021    Avascular necrosis of bone of left hip    Arthritis of  left hip    Dislocation of hip, posterior, left, closed    At risk for venous thromboembolism (VTE)    Difficulty walking    Impaired cognition    Hypotension due to drugs    History of total left hip replacement    Major depressive disorder, recurrent, moderate    Anxiety    Left leg swelling    Acute diastolic CHF (congestive heart failure)    Postural dizziness with presyncope    CHF (congestive heart failure), NYHA class I, chronic, diastolic    Dysuria    Wrist fracture, left    Closed fracture of left distal radius    Complicated UTI (urinary tract infection)    Nephrolithiasis    Pancreatic lesion    Medicare annual wellness visit, subsequent    Recurrent urinary tract infection    UTI (urinary tract infection)    Kidney stone     Past Medical History:   Diagnosis Date    Abdominal pain, generalized     Anemia     Aortic regurgitation     Aortic stenosis     Cardiomegaly     Chronic atrial fibrillation 01/01/2019    Atrial fibrillation converted to sinus through cardioversion (March 2019    Chronic fatigue     Colon cancer     Diarrhea     Disease of tricuspid valve     Diverticulosis of colon     DVT (deep venous thrombosis)     LEFT LEG GREATER THAN 5 YRS AGO    Dysphagia     Essential (primary) hypertension     Hiatal hernia     Insomnia, unspecified     LVH (left ventricular hypertrophy)     Major depressive disorder, single episode     Mitral regurgitation     Mitral valve insufficiency and aortic valve insufficiency     Osteopenia     Pain in joint, pelvic region and thigh     TR (tricuspid regurgitation)     UTI (urinary tract infection)     antibx to be completed prior to procedure. ABIOLA Carlos RN ([facility]) notified.     Past Surgical History:   Procedure Laterality Date    ABDOMINAL HYSTERECTOMY      WITH BSO     ANKLE SURGERY      (L) ankle fracture    BLADDER REPAIR      BREAST BIOPSY      (L) breast biopsy    CARDIOVERSION  2019    CATARACT EXTRACTION      OU    CHOLECYSTECTOMY      OPEN     COLON SURGERY      STATES SHE HAD 12 INCHES OF COLON REMOVED IN JULY 2020 FOR COLON CANCER    COLONOSCOPY  2020    COLONOSCOPY N/A 10/29/2021    Procedure: COLONOSCOPY;  Surgeon: Edmar Back MD;  Location: [facility] ENDOSCOPY;  Service: General;  Laterality: N/A;  DIVERTICULOSIS/ANASTOMOSIS    CYSTOSCOPY W/ URETERAL STENT PLACEMENT Right 10/7/2023    Procedure: CYSTOSCOPY URETERAL CATHETER/STENT INSERTION,right retrograde;  Surgeon: Roberto Luu MD;  Location: [facility] MAIN OR;  Service: Urology;  Laterality: Right;    FEMUR OPEN REDUCTION INTERNAL FIXATION Left 07/21/2021    Procedure: FEMORAL NECK OPEN REDUCTION INTERNAL FIXATION;  Surgeon: Bam Warner MD;  Location: [facility] MAIN OR;  Service: Orthopedics;  Laterality: Left;    HIP CLOSED REDUCTION Left 3/20/2022    Procedure: HIP CLOSED REDUCTION;  Surgeon: Harsha Gayle MD;  Location: [facility] MAIN OR;  Service: Orthopedics;  Laterality: Left;    INGUINAL HERNIA REPAIR Left 09/29/2011    ORIF WRIST FRACTURE Left 8/24/2023    Procedure: OPEN REDUCTION AND INTERNAL FIXATION WRIST;  Surgeon: Bne Alegre MD;  Location: [facility] OR OSC;  Service: Orthopedics;  Laterality: Left;    TOTAL HIP ARTHROPLASTY Left 3/16/2022    Procedure: LEFT TOTAL HIP ARTHROPLASTY AND HARDWARE REMOVAL;  Surgeon: Bam Warner MD;  Location: [facility] OR;  Service: Orthopedics;  Laterality: Left;    UPPER GASTROINTESTINAL ENDOSCOPY      WITH DILATATION      General Information       Row Name 10/09/23 1442          OT Time and Intention    Document Type evaluation  -LF     Mode of Treatment individual therapy;occupational therapy  -LF       Row Name 10/09/23 1442          General Information    Patient Profile Reviewed yes  -LF     Prior Level of Function --  Fairly (I) with ADLs, requiring assist PRN from daughter, ambulating with a STC but recently transitioned to RW after fall, and L wrist fx. Has a step over tub with tub transfer bench/grab bars/handheld  shower head, BSC, stands to groom, and no home O2.  -     Existing Precautions/Restrictions fall;non-weight bearing  LUE s/p wrist fx and repair  -     Barriers to Rehab none identified  -       Row Name 10/09/23 1442          Occupational Profile    Reason for Services/Referral (Occupational Profile) Patient is a 89 year old female status post cystoscopy and right ureteral catheter/stent insertion on October 7th, 2023. Occupational therapy consulted due to recent decline in ADLs/functional transfers. No previous occupational therapy services for current condition.  -       Row Name 10/09/23 1442          Living Environment    People in Home alone  Daughter recently began staying with patient  -       Row Name 10/09/23 1442          Home Main Entrance    Number of Stairs, Main Entrance four  -     Stair Railings, Main Entrance railings on both sides of stairs  -       Row Name 10/09/23 1442          Cognition    Orientation Status (Cognition) oriented x 3  -       Row Name 10/09/23 1442          Safety Issues, Functional Mobility    Impairments Affecting Function (Mobility) balance;endurance/activity tolerance;strength  -               User Key  (r) = Recorded By, (t) = Taken By, (c) = Cosigned By      Initials Name Provider Type     Cassandra Alcaraz OT Occupational Therapist                     Mobility/ADL's       Row Name 10/09/23 1445          Bed Mobility    Bed Mobility supine-sit;sit-supine  -     Supine-Sit [place] (Bed Mobility) standby assist  -     Sit-Supine [place] (Bed Mobility) standby assist  -     Assistive Device (Bed Mobility) head of bed elevated;bed rails  -       Row Name 10/09/23 1445          Transfers    Transfers sit-stand transfer;stand-sit transfer  -       Row Name 10/09/23 1445          Sit-Stand Transfer    Sit-Stand [place] (Transfers) contact guard  -     Assistive Device (Sit-Stand Transfers) other (see comments)  handheld  -        Row Name 10/09/23 1445          Stand-Sit Transfer    Stand-Sit [place] (Transfers) contact guard  -     Assistive Device (Stand-Sit Transfers) other (see comments)  handheld  -       Row Name 10/09/23 1445          Functional Mobility    Functional Mobility- Ind. Level contact guard assist  -     Functional Mobility- Device other (see comments)  handheld  -     Functional Mobility- Comment 2-3 steps to HOB with mild LOB  -       Row Name 10/09/23 1445          Activities of Daily Living    BADL Assessment/Intervention bathing;upper body dressing;lower body dressing;grooming;feeding;toileting  -       Row Name 10/09/23 1445          Bathing Assessment/Intervention    [place] Level (Bathing) bathing skills;upper body;lower body;moderate assist (50% patient effort)  -       Row Name 10/09/23 1445          Upper Body Dressing Assessment/Training    [place] Level (Upper Body Dressing) upper body dressing skills;moderate assist (50% patient effort)  -       Row Name 10/09/23 1445          Lower Body Dressing Assessment/Training    [place] Level (Lower Body Dressing) lower body dressing skills;moderate assist (50% patient effort)  -[facility] Name 10/09/23 1445          Grooming Assessment/Training    [place] Level (Grooming) grooming skills;standby assist  -       Row Name 10/09/23 1445          Self-Feeding Assessment/Training    [place] Level (Feeding) feeding skills;set up  -[facility] Name 10/09/23 1445          Toileting Assessment/Training    [place] Level (Toileting) toileting skills;moderate assist (50% patient effort)  -               User Key  (r) = Recorded By, (t) = Taken By, (c) = Cosigned By      Initials Name Provider Type     Cassandra Alcaraz OT Occupational Therapist                   Obj/Interventions       Row Name 10/09/23 1446          Sensory Assessment (Somatosensory)    Sensory Assessment (Somatosensory) UE sensation intact  -        Row Name 10/09/23 1446          Vision Assessment/Intervention    Visual Impairment/Limitations WFL  -LF       Row Name 10/09/23 1446          Range of Motion Comprehensive    General Range of Motion bilateral upper extremity ROM WFL  -LF       Row Name 10/09/23 1446          Strength Comprehensive (MMT)    Comment, General Manual Muscle Testing (MMT) Assessment 4-/5 BUEs  -LF       Row Name 10/09/23 1446          Motor Skills    Motor Skills coordination;functional endurance  -LF     Coordination right;upper extremity;WFL;left;minimal impairment  Left wrist currently immobilized in splint d/t recent fx with repair  -LF     Functional Endurance Fair-  -LF       Row Name 10/09/23 1446          Balance    Balance Assessment sitting dynamic balance;standing dynamic balance  -LF     Dynamic Sitting Balance supervision  -LF     Position, Sitting Balance unsupported;sitting edge of bed  -LF     Dynamic Standing Balance contact guard  -LF     Position/Device Used, Standing Balance supported;other (see comments)  handheld  -LF     Comment, Balance One mild LOB with left lateral side steps to HOB with handheld assist, requiring CGA/min assist from OT to regain dynamic standing balance.  -LF               User Key  (r) = Recorded By, (t) = Taken By, (c) = Cosigned By      Initials Name Provider Type     Cassandra Alcaraz OT Occupational Therapist                   Goals/Plan       Row Name 10/09/23 1448          Bed Mobility Goal 1 (OT)    Activity/Assistive Device (Bed Mobility Goal 1, OT) bed mobility activities, all  -LF     [place] Level/Cues Needed (Bed Mobility Goal 1, OT) modified independence  -LF     Time Frame (Bed Mobility Goal 1, OT) long term goal (LTG);10 days  -LF       Row Name 10/09/23 1448          Transfer Goal 1 (OT)    Activity/Assistive Device (Transfer Goal 1, OT) transfers, all;walker, rolling  -LF     [place] Level/Cues Needed (Transfer Goal 1, OT) modified independence  -LF     Time Frame  (Transfer Goal 1, OT) long term goal (LTG);10 days  -LF       Row Name 10/09/23 1448          Bathing Goal 1 (OT)    Activity/Device (Bathing Goal 1, OT) bathing skills, all  -LF     [place] Level/Cues Needed (Bathing Goal 1, OT) modified independence  -LF     Time Frame (Bathing Goal 1, OT) long term goal (LTG);10 days  -LF       Row Name 10/09/23 1448          Dressing Goal 1 (OT)    Activity/Device (Dressing Goal 1, OT) dressing skills, all  -LF     [place]/Cues Needed (Dressing Goal 1, OT) modified independence  -LF     Time Frame (Dressing Goal 1, OT) long term goal (LTG);10 days  -LF       Row Name 10/09/23 1448          Toileting Goal 1 (OT)    Activity/Device (Toileting Goal 1, OT) toileting skills, all  -LF     [place] Level/Cues Needed (Toileting Goal 1, OT) modified independence  -LF     Time Frame (Toileting Goal 1, OT) long term goal (LTG);10 days  -LF       Row Name 10/09/23 1448          Grooming Goal 1 (OT)    Activity/Device (Grooming Goal 1, OT) grooming skills, all  -LF     [place] (Grooming Goal 1, OT) set-up required  -LF     Time Frame (Grooming Goal 1, OT) long term goal (LTG);10 days  -LF       Row Name 10/09/23 1448          Strength Goal 1 (OT)    Strength Goal 1 (OT) Patient will demonstrate 4/5 BUE strength to support ADLs/functional transfers.  -LF     Time Frame (Strength Goal 1, OT) long term goal (LTG);10 days  -LF       Row Name 10/09/23 1448          Problem Specific Goal 1 (OT)    Problem Specific Goal 1 (OT) Patient will demonstrate fair+ endurance to support ADLs/functional transfers.  -LF     Time Frame (Problem Specific Goal 1, OT) long term goal (LTG);10 days  -LF       Row Name 10/09/23 1448          Therapy Assessment/Plan (OT)    Planned Therapy Interventions (OT) activity tolerance training;patient/caregiver education/training;BADL retraining;functional balance retraining;occupation/activity based interventions;strengthening exercise;transfer/mobility  retraining  -               User Key  (r) = Recorded By, (t) = Taken By, (c) = Cosigned By      Initials Name Provider Type    Cassandra oCta OT Occupational Therapist                   Clinical Impression       Row Name 10/09/23 1448          Pain Assessment    Additional Documentation Pain Scale: FACES Pre/Post-Treatment (Group)  -LF       Row Name 10/09/23 1448          Pain Scale: FACES Pre/Post-Treatment    Pain: FACES Scale, Pretreatment 2-->hurts little bit  -LF     Posttreatment Pain Rating 2-->hurts little bit  -LF     Pain Location generalized  -LF       Row Name 10/09/23 1448          Plan of Care Review    Plan of Care Reviewed With patient;daughter  -     Progress no change  -     Outcome Evaluation Patient presents with limitations in self-care, functional transfers, balance, endurance, and BUE strength. She would benefit from continued skilled occupational therapy services to maximize independence with ADLs/functional transfers.  -       Row Name 10/09/23 1448          Therapy Assessment/Plan (OT)    Patient/Family Therapy Goal Statement (OT) To maximize independence.  -     Rehab Potential (OT) good, to achieve stated therapy goals  -     Criteria for Skilled Therapeutic Interventions Met (OT) yes;meets criteria;skilled treatment is necessary  -     Therapy Frequency (OT) 5 times/wk  -       Row Name 10/09/23 1448          Therapy Plan Review/Discharge Plan (OT)    Anticipated Discharge Disposition (OT) skilled nursing facility  -       Row Name 10/09/23 1448          Vital Signs    O2 Delivery Pre Treatment room air  -LF     O2 Delivery Intra Treatment room air  -LF     O2 Delivery Post Treatment room air  -               User Key  (r) = Recorded By, (t) = Taken By, (c) = Cosigned By      Initials Name Provider Type    Cassandra Cota OT Occupational Therapist                   Outcome Measures       Row Name 10/09/23 1449          How much help from another is  currently needed...    Putting on and taking off regular lower body clothing? 2  -LF     Bathing (including washing, rinsing, and drying) 2  -LF     Toileting (which includes using toilet bed pan or urinal) 2  -LF     Putting on and taking off regular upper body clothing 2  -LF     Taking care of personal grooming (such as brushing teeth) 3  -LF     Eating meals 3  -LF     AM-PAC 6 Clicks Score (OT) 14  -LF       Row Name 10/09/23 1100 10/09/23 1000       How much help from another person do you currently need...    Turning from your back to your side while in flat bed without using bedrails? 4  -SELENE (r) AM (t) SELENE (c) 4  -PD    Moving from lying on back to sitting on the side of a flat bed without bedrails? 3  -SELENE (r) AM (t) SELENE (c) 3  -PD    Moving to and from a bed to a chair (including a wheelchair)? 3  -SELENE (r) AM (t) SELENE (c) 3  -PD    Standing up from a chair using your arms (e.g., wheelchair, bedside chair)? 3  -SELENE (r) AM (t) SELENE (c) 3  -PD    Climbing 3-5 steps with a railing? 3  -SELENE (r) AM (t) SELENE (c) 3  -PD    To walk in hospital room? 3  -SELENE (r) AM (t) SELENE (c) 3  -PD    AM-PAC 6 Clicks Score (PT) 19  -SELENE (r) AM (t) 19  -PD    Highest level of mobility 6 --> Walked 10 steps or more  -SELENE (r) AM (t) 6 --> Walked 10 steps or more  -PD      Row Name 10/09/23 1449 10/09/23 1100       Functional Assessment    Outcome Measure Options AM-PAC 6 Clicks Daily Activity (OT);Optimal Instrument  -LF AM-PAC 6 Clicks Basic Mobility (PT)  -SEELNE (r) AM (t) SELENE (c)      Row Name 10/09/23 1449          Optimal Instrument    Optimal Instrument Optimal - 3  -LF     Bending/Stooping 2  -LF     Standing 2  -LF     Reaching 1  -LF     From the list, choose the 3 activities you would most like to be able to do without any difficulty Bending/stooping;Standing;Reaching  -LF     Total Score Optimal - 3 5  -LF               User Key  (r) = Recorded By, (t) = Taken By, (c) = Cosigned By      Initials Name Provider Type    LF Cassandra Alcaraz, OT  Occupational Therapist    Caio Ribeiro, PT Physical Therapist    Soco Salazar RN Registered Nurse    Nicole Gil, PT Student PT Student                    Occupational Therapy Education       Title: PT OT SLP Therapies (Done)       Topic: Occupational Therapy (Done)       Point: ADL training (Done)       Description:   Instruct learner(s) on proper safety adaptation and remediation techniques during self care or transfers.   Instruct in proper use of assistive devices.                  Learning Progress Summary             Patient Acceptance, E,TB, VU by  at 10/9/2023 1450   Family Acceptance, E,TB, VU by  at 10/9/2023 1450                         Point: Precautions (Done)       Description:   Instruct learner(s) on prescribed precautions during self-care and functional transfers.                  Learning Progress Summary             Patient Acceptance, E,TB, VU by  at 10/9/2023 1450   Family Acceptance, E,TB, VU by  at 10/9/2023 1450                         Point: Body mechanics (Done)       Description:   Instruct learner(s) on proper positioning and spine alignment during self-care, functional mobility activities and/or exercises.                  Learning Progress Summary             Patient Acceptance, E,TB, VU by  at 10/9/2023 1450   Family Acceptance, E,TB, VU by  at 10/9/2023 1450                                         User Key       Initials Effective Dates Name Provider Type Discipline     06/16/21 -  Cassandra Alcaraz OT Occupational Therapist OT                  OT Recommendation and Plan  Planned Therapy Interventions (OT): activity tolerance training, patient/caregiver education/training, BADL retraining, functional balance retraining, occupation/activity based interventions, strengthening exercise, transfer/mobility retraining  Therapy Frequency (OT): 5 times/wk  Plan of Care Review  Plan of Care Reviewed With: patient, daughter  Progress: no change  Outcome  Evaluation: Patient presents with limitations in self-care, functional transfers, balance, endurance, and BUE strength. She would benefit from continued skilled occupational therapy services to maximize independence with ADLs/functional transfers.     Time Calculation:   Evaluation Complexity (OT)  Review Occupational Profile/Medical/Therapy History Complexity: brief/low complexity  Assessment, Occupational Performance/Identification of Deficit Complexity: 3-5 performance deficits  Clinical Decision Making Complexity (OT): problem focused assessment/low complexity  Overall Complexity of Evaluation (OT): low complexity     Time Calculation- OT       Row Name 10/09/23 1450             Time Calculation- OT    OT Received On 10/09/23  -LF      OT Goal Re-Cert Due Date 10/18/23  -LF         Untimed Charges    OT Eval/Re-eval Minutes 34  -LF         Total Minutes    Untimed Charges Total Minutes 34  -LF       Total Minutes 34  -LF                User Key  (r) = Recorded By, (t) = Taken By, (c) = Cosigned By      Initials Name Provider Type    LF Cassandra Alcaraz OT Occupational Therapist                  Therapy Charges for Today       Code Description Service Date Service Provider Modifiers Qty    [id number]  OT EVAL LOW COMPLEXITY 3 10/9/2023 Cassandra Alcaraz OT GO 1                 Cassandra Alcaraz OT  10/9/2023

## 2023-10-09 NOTE — CONSULTS
10/09/23 1132   Spiritual Care   Spiritual Care Source nurse referral   Response to Spiritual Care visit timing not optimal (follow-up planned)   Spiritual Care Visit Type initial   Receptivity to Spiritual Care busy, visit another time  (pt asleep at time of visit, introductions made with pt's daughter at bedside. No needs at this time.)

## 2023-10-09 NOTE — CONSULTS
Nutrition Services    Patient Name: Bing Cruz  YOB: 1931  MRN: [id number]  Admission date: 10/7/2023      CLINICAL NUTRITION ASSESSMENT      Reason for Assessment  Physician consult   H&P:    Past Medical History:   Diagnosis Date    Abdominal pain, generalized     Anemia     Aortic regurgitation     Aortic stenosis     Cardiomegaly     Chronic atrial fibrillation 01/01/2019    Atrial fibrillation converted to sinus through cardioversion (March 2019    Chronic fatigue     Colon cancer     Diarrhea     Disease of tricuspid valve     Diverticulosis of colon     DVT (deep venous thrombosis)     LEFT LEG GREATER THAN 5 YRS AGO    Dysphagia     Essential (primary) hypertension     Hiatal hernia     Insomnia, unspecified     LVH (left ventricular hypertrophy)     Major depressive disorder, single episode     Mitral regurgitation     Mitral valve insufficiency and aortic valve insufficiency     Osteopenia     Pain in joint, pelvic region and thigh     TR (tricuspid regurgitation)     UTI (urinary tract infection)     antibx to be completed prior to procedure. ABIOLA Carlos RN ([facility]) notified.        Current Problems:   Active Hospital Problems    Diagnosis     **UTI (urinary tract infection)     Kidney stone         Nutrition/Diet History         Narrative     RD consulted due to chronic poor intake.  Patient admitted with UTI.  RD visited patient after lunch.  Multiple family members at bedside.  Family reports patient eating about 50% of her usual intake.  Patient reports to RD that she feels hungry but after eating a couple bites of her food she feels full.    NFPE conducted with losses noted in upper body.  Lower body with unremarkable findings.  Patient meets criteria for malnutrition based off NFPE findings and decreased intake over 5 days.    Patient does not like traditional ONS.  Currently receiving Magic cup 3 times daily.  Prefers chocolate instead of orange flavor.     Anthropometrics   "      Current Height, Weight Height: 168.9 cm (66.5\")  Weight: 63.4 kg (139 lb 12.4 oz)   Current BMI Body mass index is 22.22 kg/mý.       Weight Hx  Wt Readings from Last 30 Encounters:   10/07/23 1402 63.4 kg (139 lb 12.4 oz)   10/06/23 1135 61.7 kg (136 lb)   10/02/23 1242 61.7 kg (136 lb)   09/27/23 1424 61.7 kg (136 lb)   09/15/23 1526 61.9 kg (136 lb 6.4 oz)   09/12/23 1109 64 kg (141 lb 1.5 oz)   09/06/23 1347 65.9 kg (145 lb 3.2 oz)   09/06/23 0909 64.4 kg (142 lb)   08/24/23 1109 64.7 kg (142 lb 10.2 oz)   08/23/23 1339 69.4 kg (153 lb)   08/23/23 0940 69.4 kg (153 lb)   08/21/23 1654 69.4 kg (153 lb)   08/08/23 1114 65.4 kg (144 lb 3.2 oz)   07/20/23 0851 65.5 kg (144 lb 6.4 oz)   07/11/23 0929 66.7 kg (147 lb)   06/15/23 1438 64 kg (141 lb)   06/13/23 1512 64.3 kg (141 lb 12.8 oz)   06/09/23 1416 66 kg (145 lb 9.6 oz)   05/17/23 1116 66.3 kg (146 lb 3.2 oz)   04/20/23 0812 65.8 kg (145 lb)   03/13/23 1005 62.6 kg (138 lb 0.1 oz)   02/16/23 0939 62.6 kg (138 lb 0.1 oz)   01/16/23 1344 64.6 kg (142 lb 6.4 oz)   09/13/22 1252 64.6 kg (142 lb 6.4 oz)   09/09/22 1005 65.2 kg (143 lb 12.8 oz)   08/16/22 0945 64.2 kg (141 lb 8.6 oz)   08/12/22 1111 64.4 kg (142 lb)   06/09/22 1309 63.5 kg (140 lb)   05/25/22 1050 63.9 kg (140 lb 12.8 oz)   05/20/22 1951 65 kg (143 lb 4.8 oz)   05/13/22 1516 66.3 kg (146 lb 3.2 oz)            Wt Change Observation -4.1% x 1 month  -5.4% x 3 months     Estimated/Assessed Needs       Energy Requirements 25-30 kcal/kg   EST Needs (kcal/day) 0309-1361 kcal       Protein Requirements 1.0-1.1 g/kg   EST Daily Needs (g/day) 63-70 g       Fluid Requirements 1 ml/kcal    Estimated Needs (mL/day) 5048-0689 ml     Labs/Medications         Pertinent Labs Reviewed.   Results from last 7 days   Lab Units 10/09/23  0504 10/08/23  0600 10/07/23  1419   SODIUM mmol/L 135* 137 141   POTASSIUM mmol/L 3.6 4.1 3.8   CHLORIDE mmol/L 106 105 105   CO2 mmol/L 20.0* 21.4* 24.0   BUN mg/dL 23 21 21 "   CREATININE mg/dL 0.97 1.11* 1.16*   CALCIUM mg/dL 8.6 8.4 9.3   BILIRUBIN mg/dL  --   --  1.9*   ALK PHOS U/L  --   --  112   ALT (SGPT) U/L  --   --  23   AST (SGOT) U/L  --   --  39*   GLUCOSE mg/dL 118* 158* 125*     Results from last 7 days   Lab Units 10/09/23  0504 10/08/23  0600 10/07/23  1419   MAGNESIUM mg/dL 2.4* 1.8 1.5*   PHOSPHORUS mg/dL 2.8 3.4 1.6*   HEMOGLOBIN g/dL 10.3* 11.2* 12.8   HEMATOCRIT % 31.9* 34.4 39.1     COVID19   Date Value Ref Range Status   05/21/2022 Not Detected Not Detected - Ref. Range Final     Lab Results   Component Value Date    HGBA1C 5.30 03/08/2022         Pertinent Medications Reviewed.     Current Nutrition Orders & Evaluation of Intake       Oral Nutrition     Current PO Diet Diet: Regular/House Diet; No Straw; Texture: Soft to Chew (NDD 3); Soft to Chew: Whole Meat; Fluid Consistency: Thin (IDDSI 0)   Supplement Orders Placed This Encounter      Dietary Nutrition Supplements Magic Cup; orange       Malnutrition Severity Assessment      Patient meets criteria for : Severe Malnutrition  Malnutrition Type (last 8 hours)       Malnutrition Severity Assessment       Row Name 10/09/23 1413       Malnutrition Severity Assessment    Malnutrition Type Acute Disease or Injury - Related Malnutrition      Row Name 10/09/23 1413       Insufficient Energy Intake     Insufficient Energy Intake Findings Severe    Insufficient Energy Intake  < or equal to 50% of est. energy requirement for > or equal to 5d)  Family reports about 50% normal intake over last 5 days      Row Name 10/09/23 1413       Muscle Loss    Loss of Muscle Mass Findings Severe    [place] Severe - deep hollowing/scooping, lack of muscle to touch, facial bones well defined    Clavicle Bone Region Severe - protruding prominent bone    Acromion Bone Region Severe - squared shoulders, bones, and acromion process protrusion prominent      Row Name 10/09/23 1413       Fat Loss    Subcutaneous Fat Loss Findings  Moderate    Orbital Region  Moderate -  somewhat hollowness, slightly dark circles      Row Name 10/09/23 1413       Criteria Met (Must meet criteria for severity in at least 2 of these categories: M Wasting, Fat Loss, Fluid, Secondary Signs, Wt. Status, Intake)    Patient meets criteria for  Severe Malnutrition                       Nutrition Diagnosis         Nutrition Dx Problem 1 Severe malnutrition related to decreased ability to consume sufficient energy as evidenced by inadequate energy intake., decreased appetite., unintended wt change., body composition changes., patient report., and family report.     Nutrition Intervention         Magic Cup TID chocolate  =870 kcal, 27 g protein     Medical Nutrition Therapy/Nutrition Education          Learner     Readiness Patient and Family  Acceptance     Method     Response Explanation  Verbalizes understanding     Monitor/Evaluation        Monitor Per protocol, PO intake, Supplement intake, Weight, Symptoms, POC/GOC     Nutrition Discharge Plan         To be determined     Electronically signed by:  Jeanne Dunn RD  10/09/23 14:16 EDT

## 2023-10-09 NOTE — SIGNIFICANT NOTE
10/09/23 1333   Plan   Plan [facility] notified SW that they are able to accept patient. MD stating patient likely will be ready in about 2 days. Mary states bed is available on Wedensday. SW to follow at this time.

## 2023-10-09 NOTE — PLAN OF CARE
Goal Outcome Evaluation:  Plan of Care Reviewed With: patient, family        Progress: no change

## 2023-10-09 NOTE — PLAN OF CARE
Goal Outcome Evaluation:  Plan of Care Reviewed With: patient, daughter        Progress: no change  Outcome Evaluation: Patient presents with limitations in self-care, functional transfers, balance, endurance, and BUE strength. She would benefit from continued skilled occupational therapy services to maximize independence with ADLs/functional transfers.      Anticipated Discharge Disposition (OT): skilled nursing facility

## 2023-10-10 LAB
ANION GAP SERPL CALCULATED.3IONS-SCNC: 10.9 MMOL/L (ref 5–15)
BACTERIA SPEC AEROBE CULT: ABNORMAL
BASOPHILS # BLD AUTO: 0.01 10*3/MM3 (ref 0–0.2)
BASOPHILS NFR BLD AUTO: 0.1 % (ref 0–1.5)
BUN SERPL-MCNC: 18 MG/DL (ref 8–23)
BUN/CREAT SERPL: 23.4 (ref 7–25)
CALCIUM SPEC-SCNC: 8.3 MG/DL (ref 8.2–9.6)
CHLORIDE SERPL-SCNC: 106 MMOL/L (ref 98–107)
CO2 SERPL-SCNC: 23.1 MMOL/L (ref 22–29)
CREAT SERPL-MCNC: 0.77 MG/DL (ref 0.57–1)
DEPRECATED RDW RBC AUTO: 48.4 FL (ref 37–54)
EGFRCR SERPLBLD CKD-EPI 2021: 72.5 ML/MIN/1.73
EOSINOPHIL # BLD AUTO: 0.03 10*3/MM3 (ref 0–0.4)
EOSINOPHIL NFR BLD AUTO: 0.3 % (ref 0.3–6.2)
ERYTHROCYTE [DISTWIDTH] IN BLOOD BY AUTOMATED COUNT: 13.5 % (ref 12.3–15.4)
GLUCOSE SERPL-MCNC: 90 MG/DL (ref 65–99)
GRAM STN SPEC: ABNORMAL
HCT VFR BLD AUTO: 31.3 % (ref 34–46.6)
HGB BLD-MCNC: 10.1 G/DL (ref 12–15.9)
IMM GRANULOCYTES # BLD AUTO: 0.07 10*3/MM3 (ref 0–0.05)
IMM GRANULOCYTES NFR BLD AUTO: 0.7 % (ref 0–0.5)
ISOLATED FROM: ABNORMAL
LYMPHOCYTES # BLD AUTO: 2.04 10*3/MM3 (ref 0.7–3.1)
LYMPHOCYTES NFR BLD AUTO: 19.7 % (ref 19.6–45.3)
MCH RBC QN AUTO: 31.7 PG (ref 26.6–33)
MCHC RBC AUTO-ENTMCNC: 32.3 G/DL (ref 31.5–35.7)
MCV RBC AUTO: 98.1 FL (ref 79–97)
MONOCYTES # BLD AUTO: 0.78 10*3/MM3 (ref 0.1–0.9)
MONOCYTES NFR BLD AUTO: 7.5 % (ref 5–12)
NEUTROPHILS NFR BLD AUTO: 7.42 10*3/MM3 (ref 1.7–7)
NEUTROPHILS NFR BLD AUTO: 71.7 % (ref 42.7–76)
NRBC BLD AUTO-RTO: 0 /100 WBC (ref 0–0.2)
PLATELET # BLD AUTO: 189 10*3/MM3 (ref 140–450)
PMV BLD AUTO: 12.3 FL (ref 6–12)
POTASSIUM SERPL-SCNC: 3.5 MMOL/L (ref 3.5–5.2)
RBC # BLD AUTO: 3.19 10*6/MM3 (ref 3.77–5.28)
SODIUM SERPL-SCNC: 140 MMOL/L (ref 136–145)
WBC NRBC COR # BLD: 10.35 10*3/MM3 (ref 3.4–10.8)

## 2023-10-10 PROCEDURE — 85025 COMPLETE CBC W/AUTO DIFF WBC: CPT | Performed by: HOSPITALIST

## 2023-10-10 PROCEDURE — 94761 N-INVAS EAR/PLS OXIMETRY MLT: CPT

## 2023-10-10 PROCEDURE — 80048 BASIC METABOLIC PNL TOTAL CA: CPT | Performed by: HOSPITALIST

## 2023-10-10 PROCEDURE — 92526 ORAL FUNCTION THERAPY: CPT

## 2023-10-10 PROCEDURE — 94799 UNLISTED PULMONARY SVC/PX: CPT

## 2023-10-10 PROCEDURE — 25010000002 CEFTRIAXONE PER 250 MG: Performed by: HOSPITALIST

## 2023-10-10 RX ORDER — FUROSEMIDE 20 MG/1
20 TABLET ORAL 2 TIMES DAILY
Status: DISCONTINUED | OUTPATIENT
Start: 2023-10-10 | End: 2023-10-11 | Stop reason: HOSPADM

## 2023-10-10 RX ADMIN — FERROUS SULFATE TAB 325 MG (65 MG ELEMENTAL FE) 325 MG: 325 (65 FE) TAB at 08:07

## 2023-10-10 RX ADMIN — FLUOXETINE 20 MG: 20 CAPSULE ORAL at 08:07

## 2023-10-10 RX ADMIN — LATANOPROST 1 DROP: 50 SOLUTION OPHTHALMIC at 20:19

## 2023-10-10 RX ADMIN — CEFTRIAXONE SODIUM 2000 MG: 2 INJECTION, POWDER, FOR SOLUTION INTRAMUSCULAR; INTRAVENOUS at 15:25

## 2023-10-10 RX ADMIN — Medication: at 20:16

## 2023-10-10 RX ADMIN — Medication: at 20:17

## 2023-10-10 RX ADMIN — Medication 10 ML: at 08:09

## 2023-10-10 RX ADMIN — METOPROLOL SUCCINATE 100 MG: 50 TABLET, EXTENDED RELEASE ORAL at 08:07

## 2023-10-10 RX ADMIN — TAMSULOSIN HYDROCHLORIDE 0.4 MG: 0.4 CAPSULE ORAL at 08:07

## 2023-10-10 RX ADMIN — ACETAMINOPHEN 650 MG: 325 TABLET ORAL at 15:25

## 2023-10-10 RX ADMIN — LORAZEPAM 0.5 MG: 0.5 TABLET ORAL at 20:15

## 2023-10-10 RX ADMIN — METOPROLOL SUCCINATE 100 MG: 50 TABLET, EXTENDED RELEASE ORAL at 20:15

## 2023-10-10 RX ADMIN — TIMOLOL MALEATE 1 DROP: 5 SOLUTION/ DROPS OPHTHALMIC at 08:18

## 2023-10-10 RX ADMIN — FUROSEMIDE 20 MG: 20 TABLET ORAL at 08:18

## 2023-10-10 RX ADMIN — LOSARTAN POTASSIUM 25 MG: 25 TABLET, FILM COATED ORAL at 08:08

## 2023-10-10 RX ADMIN — Medication 10 ML: at 20:15

## 2023-10-10 RX ADMIN — FUROSEMIDE 20 MG: 20 TABLET ORAL at 17:14

## 2023-10-10 RX ADMIN — APIXABAN 2.5 MG: 2.5 TABLET, FILM COATED ORAL at 20:15

## 2023-10-10 NOTE — TELEPHONE ENCOUNTER
CALLED PT DAUGHTER BACK AND BOOKED APPT    Future Appointments         Provider Department [place]    10/17/2023 12:45 PM Morris Chester MD [facility] UROLOGY [facility]    10/25/2023 11:15 AM Mae Woods PA-C [facility] ORTHOPEDICS [facility]    1/17/2024 10:00 AM Naomi Plata MD [facility] CARDIOLOGY [facility]    1/23/2024 9:30 AM Leslie Cody APRN [facility] GASTROENTEROLOGY [facility]    2/6/2024 11:00 AM Jairo Kramer MD [facility] INTERNAL MEDICINE [facility]    2/14/2024 11:15 AM Estela Hurtado APRN [facility] UROLOGY [facility]    3/18/2024 10:30 AM Weston Bender PA [facility] ORTHOPEDICS [facility]

## 2023-10-10 NOTE — PROGRESS NOTES
[facility]     Progress Note    Patient Name: Bing Cruz  : 1931  MRN: [id number]  Primary Care Physician:  Jairo Kramer MD  Date of admission: 10/7/2023    Subjective   Subjective     Chief Complaint: Right flank pain    Dizziness      Patient Reports some right flank pain    Review of Systems   Neurological:  Positive for dizziness.   Review of systems is negative    Objective   Objective     Vitals:   Temp:  [97.5 øF (36.4 øC)-98.3 øF (36.8 øC)] 97.9 øF (36.6 øC)  Heart Rate:  [74-98] 96  Resp:  [18-20] 18  BP: (130-153)/(76-97) 148/81    Physical Exam   Is awake alert oriented x3  Afebrile vital signs are stable  Result Review    Result Review:  I have personally reviewed the results from the time of this admission to 10/10/2023 10:58 EDT and agree with these findings:  []  Laboratory list / accordion  []  Microbiology  []  Radiology  []  EKG/Telemetry   []  Cardiology/Vascular   []  Pathology  []  Old records  []  Other:  Most notable findings include: Right ureteral stone      Assessment & Plan   Assessment / Plan     Brief Patient Summary:  Bing Cruz is a 89 y.o. female who came to the emergency room with significant right flank pain and acute pyelonephritis and CT shows a 9 mm stone in the right UPJ.  She was taken to the operating room urgently and a right ureteral stent was placed.  Patient continues to do well today    Active Hospital Problems:  Active Hospital Problems    Diagnosis     **UTI (urinary tract infection)     Severe malnutrition     Kidney stone      Plan:   Plan is to discharge patient home when medically stable and then follow-up with the urology clinic for definitive treatment of right kidney stone.    DVT prophylaxis:  Mechanical DVT prophylaxis orders are present.    CODE STATUS:    Level Of Support Discussed With: Patient  Code Status (Patient has no pulse and is not breathing): CPR (Attempt to Resuscitate)  Medical Interventions (Patient has pulse or  is breathing): Full Support    Disposition:  I expect patient to be discharged when medically stable.    Roberto Luu MD

## 2023-10-10 NOTE — PLAN OF CARE
Goal Outcome Evaluation:  Plan of Care Reviewed With: patient      Cont. IV abx today. Pt to d/c tomorrow to rehab on IV abx. Lasix rescheduled w/ pharmacy to earlier pm dose. Complaint of flank pain and right arm pain relieved w/ PRN Tylenol. Barrier cream applied to coccyx, blanchable redness noted, all skin intact. Family at bedside. Bed alarms in place. Will cont POC.

## 2023-10-10 NOTE — PLAN OF CARE
Goal Outcome Evaluation:  Plan of Care Reviewed With: patient        Progress: no change       Alert and oriented X4. Vitals stable. Denies pain at this time. No overnight concerns or events to report.

## 2023-10-10 NOTE — THERAPY TREATMENT NOTE
Acute Care - Speech Language Pathology   Swallow Treatment Note JUNO Mohamud     Patient Name: Bing Cruz  : 1931  MRN: [id number]  Today's Date: 10/10/2023               Admit Date: 10/7/2023    Visit Dx:     ICD-10-CM ICD-9-CM   1. Sepsis without acute organ dysfunction, due to unspecified organism  A41.9 038.9     995.91   2. Acute UTI (urinary tract infection)  N39.0 599.0   3. Kidney stone  N20.0 592.0   4. Right ureteral stone  N20.1 592.1   5. Hydronephrosis of right kidney  N13.30 591   6. Difficulty in walking  R26.2 719.7   7. Oropharyngeal dysphagia  R13.12 787.22   8. Decreased activities of daily living (ADL)  Z78.9 V49.89     Patient Active Problem List   Diagnosis    Anemia    Closed left hip fracture, initial encounter    Closed fracture of neck of left femur    Closed left hip fracture    Hip fracture requiring operative repair, left, closed, initial encounter    Lung nodule < 6cm on CT    Esophageal dysphagia    Atrial fibrillation, chronic    Malignant neoplasm of ascending colon    Chronic deep vein thrombosis (DVT) of proximal vein of left lower extremity    Major depressive disorder, recurrent, mild    Gastroesophageal reflux disease without esophagitis    Vitamin D deficiency    Hypertension, essential    Nonrheumatic mitral valve regurgitation    Nonrheumatic aortic valve insufficiency    S/P  Left Femoral Neck Open Reduction Internal Fixation    Left hip pain    Acquired cystic kidney disease    Bladder disorder    Diaphragmatic hernia    Cardiomegaly    Cataract    Chronic fatigue syndrome    Diarrhea    Diverticular disease of colon    Hyperlipemia    Disorder of bone    Generalized abdominal pain    Insomnia    Major depression, single episode    Malaise and fatigue    Pain in joint involving pelvic region and thigh    Sprain of hip    Acute cystitis with hematuria    Blood clot in vein    Colon cancer screening    Aftercare following surgery of left femoral neck ORIF 2021     Avascular necrosis of bone of left hip    Arthritis of left hip    Dislocation of hip, posterior, left, closed    At risk for venous thromboembolism (VTE)    Difficulty walking    Impaired cognition    Hypotension due to drugs    History of total left hip replacement    Major depressive disorder, recurrent, moderate    Anxiety    Left leg swelling    Acute diastolic CHF (congestive heart failure)    Postural dizziness with presyncope    CHF (congestive heart failure), NYHA class I, chronic, diastolic    Dysuria    Wrist fracture, left    Closed fracture of left distal radius    Complicated UTI (urinary tract infection)    Nephrolithiasis    Pancreatic lesion    Medicare annual wellness visit, subsequent    Recurrent urinary tract infection    UTI (urinary tract infection)    Kidney stone    Severe malnutrition     Past Medical History:   Diagnosis Date    Abdominal pain, generalized     Anemia     Aortic regurgitation     Aortic stenosis     Cardiomegaly     Chronic atrial fibrillation 01/01/2019    Atrial fibrillation converted to sinus through cardioversion (March 2019    Chronic fatigue     Colon cancer     Diarrhea     Disease of tricuspid valve     Diverticulosis of colon     DVT (deep venous thrombosis)     LEFT LEG GREATER THAN 5 YRS AGO    Dysphagia     Essential (primary) hypertension     Hiatal hernia     Insomnia, unspecified     LVH (left ventricular hypertrophy)     Major depressive disorder, single episode     Mitral regurgitation     Mitral valve insufficiency and aortic valve insufficiency     Osteopenia     Pain in joint, pelvic region and thigh     TR (tricuspid regurgitation)     UTI (urinary tract infection)     antibx to be completed prior to procedure. ABIOLA Carlos RN ([facility]) notified.     Past Surgical History:   Procedure Laterality Date    ABDOMINAL HYSTERECTOMY      WITH BSO     ANKLE SURGERY      (L) ankle fracture    BLADDER REPAIR      BREAST BIOPSY      (L) breast biopsy     "CARDIOVERSION  2019    CATARACT EXTRACTION      OU    CHOLECYSTECTOMY      OPEN    COLON SURGERY      STATES SHE HAD 12 INCHES OF COLON REMOVED IN JULY 2020 FOR COLON CANCER    COLONOSCOPY  2020    COLONOSCOPY N/A 10/29/2021    Procedure: COLONOSCOPY;  Surgeon: Edmar Back MD;  Location: [facility] ENDOSCOPY;  Service: General;  Laterality: N/A;  DIVERTICULOSIS/ANASTOMOSIS    CYSTOSCOPY W/ URETERAL STENT PLACEMENT Right 10/7/2023    Procedure: CYSTOSCOPY URETERAL CATHETER/STENT INSERTION,right retrograde;  Surgeon: Roberto Luu MD;  Location: [facility] MAIN OR;  Service: Urology;  Laterality: Right;    FEMUR OPEN REDUCTION INTERNAL FIXATION Left 07/21/2021    Procedure: FEMORAL NECK OPEN REDUCTION INTERNAL FIXATION;  Surgeon: Bam Warner MD;  Location: [facility] MAIN OR;  Service: Orthopedics;  Laterality: Left;    HIP CLOSED REDUCTION Left 3/20/2022    Procedure: HIP CLOSED REDUCTION;  Surgeon: Harsha Gayle MD;  Location: [facility] MAIN OR;  Service: Orthopedics;  Laterality: Left;    INGUINAL HERNIA REPAIR Left 09/29/2011    ORIF WRIST FRACTURE Left 8/24/2023    Procedure: OPEN REDUCTION AND INTERNAL FIXATION WRIST;  Surgeon: Ben Alegre MD;  Location: [facility] OR OSC;  Service: Orthopedics;  Laterality: Left;    TOTAL HIP ARTHROPLASTY Left 3/16/2022    Procedure: LEFT TOTAL HIP ARTHROPLASTY AND HARDWARE REMOVAL;  Surgeon: Bam Warner MD;  Location: [facility] OR;  Service: Orthopedics;  Laterality: Left;    UPPER GASTROINTESTINAL ENDOSCOPY      WITH DILATATION       SLP Recommendation and Plan         SPEECH PATHOLOGY DYSPHAGIA TREATMENT    Subjective/Behavioral Observations: Awake and cooperative, family at bedside.  Patient reports getting \"choked\" during breakfast.  Patient reporting coughing up large amount of stringy phlegm.        Day/time of Treatment: 10/10/2023        Current Diet: Soft to chew solids, extra gravies and sauces, thin liquids        Current Strategies: No straw, " alternate solids and liquids, small bites, controlled drink        Treatment received: Treatment focused on tolerance of current diet recommendations and use of compensatory strategies to decrease risk of aspiration        Results of treatment: Patient required consistent cues to alternate solids and liquids.  Moisten softened solids with patient exhibiting adequate chewing.  No complaint of globus sensation.  Patient taking single sips of thin liquids via open cup.  Swallows completed with no overt signs or symptoms of aspiration.        Progress toward goals: Progressing        Barriers to Achieving goals: Consistent use of compensatory strategies        Plan of care:/changes in plan: Continue current diet recommendations, compensatory strategies and positioning.                                                                                                EDUCATION  The patient has been educated in the following areas:   Dysphagia (Swallowing Impairment).              Time Calculation:    Time Calculation- SLP       Row Name 10/10/23 1324             Time Calculation- SLP    SLP Stop Time 1300  -SN      SLP Received On 10/10/23  -SN         Untimed Charges    22576-CG Treatment Swallow Minutes 45  -SN         Total Minutes    Untimed Charges Total Minutes 45  -SN       Total Minutes 45  -SN                User Key  (r) = Recorded By, (t) = Taken By, (c) = Cosigned By      Initials Name Provider Type    SN Sadia Pennington MS-CCC/SLP, CAMILO Speech and Language Pathologist                    Therapy Charges for Today       Code Description Service Date Service Provider Modifiers Qty    34449960556 HC ST EVAL ORAL PHARYNG SWALLOW 4 10/9/2023 Sadia Pennington MS-CCC/SLP, CNT GN 1    [id number] HC ST TREATMENT SWALLOW 3 10/10/2023 Sadia Pennington MS-CCC/SLP, CAMILO GN 1                 JD Sena/CAMILO BLOUNT  10/10/2023

## 2023-10-10 NOTE — PROGRESS NOTES
[facility]   Hospitalist Progress Note  Date: 10/10/2023  Patient Name: Bing Cruz  : 1931  MRN: [id number]  Date of admission: 10/7/2023  Room/Bed: 405/1      Subjective Recent fall, concern for UTI, poor oral intake   Subjective     Chief Complaint: Recent fall, concern for UTI, poor oral intake     Summary:    Interval Followup: Patient is a 89-year-old female recently broke her left arm.  She has a history of several urinary tract infections.  And when she does have a UTI, she has decreased oral intake and nausea.  She follows with Dr. Chester.      She had a CT abdomen and pelvis on 2023 CT 13 x 6 mm stone right renal pelvis.  With some urothelial thickening and inflammation around this.  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  No hydro-.  No stones on the left.  Postsurgical changes of prior right hemicolectomy.  9 mm pancreatic cyst.  With a recommendation for an MRCP.       An MRCP did show multiple small cystic lesions in the pancreas measuring up to 1 cm.  Many of these appear to communicate with the main pancreatic duct.  Mildly dilated sidebranch of the pancreatic ducts were also noted.  They recommend repeat MRI in 1 year.     In the past, she has had the following types of UTIs:  2023   0.8, GFR 63  2023 E. coli resistant to oral antibiotics other than nitrofurantoin  2023 Citrobacter -resistant to Ancef   E. coli   Citrobacter  3/22   Proteus     During the office visit, Dr. Chester discussed keeping the patient on prophylactic treatment for UTIs.  However, nitrofurantoin is a possibility but at her age this does have a risk.  He encouraged her to drink more liquids.     On arrival to the ED, patient had a temperature of 98, pulse of 101, respiratory 20, blood pressure 104/66, and she saturating 93% on room air.  Patient's UA is consistent with UTI.  Her magnesium is low.  Her creatinine is 1.16.  White blood cell  count is 22.70.  MCV is 97.3. Urine culture shows E. coli.    Interval follow-up: Patient continues to do well.  No complaints.  Creatinine back to baseline.  Does have occasional urinary discomfort.  Mild right flank pain.  No more blood in the urine.  Overall feeling better.  Patient is hard of hearing.      Review of Systems    All systems reviewed and negative except for what is outlined above.      Objective   Objective     Vitals:   Temp:  [97.7 øF (36.5 øC)-98.3 øF (36.8 øC)] 97.7 øF (36.5 øC)  Heart Rate:  [89-98] 89  Resp:  [18-20] 18  BP: (123-153)/(68-97) 123/68    Physical Exam   General: Awake, alert, NAD  HENT: NCAT, MMM  Eyes: pupils equal, no scleral icterus  Cardiovascular: RRR, no murmurs   Pulmonary: CTA bilaterally; no wheezes; no conversational dyspnea  Gastrointestinal: S/ND/NT, +BS  Musculoskeletal: No gross deformities.  Left wrist in brace  Skin: No jaundice, no rash on exposed skin appreciated  Neuro: CN II through XII grossly intact; speech clear; no tremor  Psych: Mood and affect appropriate  : No Cano catheter; no suprapubic tenderness    Result Review    Result Review:  I have personally reviewed these results:  [x]  Laboratory      Lab 10/10/23  0514 10/09/23  0504 10/08/23  0600 10/07/23  1535   WBC 10.35 13.25* 17.17*  --    HEMOGLOBIN 10.1* 10.3* 11.2*  --    HEMATOCRIT 31.3* 31.9* 34.4  --    PLATELETS 189 193 199  --    NEUTROS ABS 7.42* 10.78* 15.69*  --    IMMATURE GRANS (ABS) 0.07* 0.09* 0.11*  --    LYMPHS ABS 2.04 1.65 1.13  --    MONOS ABS 0.78 0.71 0.21  --    EOS ABS 0.03 0.00 0.00  --    MCV 98.1* 97.6* 98.0*  --    LACTATE  --   --   --  1.9         Lab 10/10/23  0514 10/09/23  0504 10/08/23  0600 10/07/23  1419   SODIUM 140 135* 137 141   POTASSIUM 3.5 3.6 4.1 3.8   CHLORIDE 106 106 105 105   CO2 23.1 20.0* 21.4* 24.0   ANION GAP 10.9 9.0 10.6 12.0   BUN 18 23 21 21   CREATININE 0.77 0.97 1.11* 1.16*   EGFR 72.5 54.9* 46.7* 44.3*   GLUCOSE 90 118* 158* 125*   CALCIUM  8.3 8.6 8.4 9.3   MAGNESIUM  --  2.4* 1.8 1.5*   PHOSPHORUS  --  2.8 3.4 1.6*         Lab 10/07/23  1419   TOTAL PROTEIN 7.2   ALBUMIN 3.8   GLOBULIN 3.4   ALT (SGPT) 23   AST (SGOT) 39*   BILIRUBIN 1.9*   ALK PHOS 112         Lab 10/07/23  1419   HSTROP T 29*             Lab 10/08/23  1537   IRON 31*   IRON SATURATION (TSAT) 15*   TIBC 206*   TRANSFERRIN 138*   FERRITIN 353.40*   FOLATE 5.00   VITAMIN B 12 230         Brief Urine Lab Results  (Last result in the past 365 days)        Color   Clarity   Blood   Leuk Est   Nitrite   Protein   CREAT   Urine HCG        10/07/23 1453 Yellow   Cloudy   Large (3+)   Large (3+)   Negative   Trace                 [x]  Microbiology   Microbiology Results (last 10 days)       Procedure Component Value - Date/Time    Blood Culture - Blood, Arm, Left [[id number]]  (Normal) Collected: 10/08/23 0600    Lab Status: Preliminary result Specimen: Blood from Arm, Left Updated: 10/10/23 0615     Blood Culture No growth at 2 days    Blood Culture - Blood, Arm, Left [[id number]]  (Normal) Collected: 10/08/23 0532    Lab Status: Preliminary result Specimen: Blood from Arm, Left Updated: 10/10/23 0545     Blood Culture No growth at 2 days    Blood Culture - Blood, Arm, Right [[id number]]  (Normal) Collected: 10/07/23 1535    Lab Status: Preliminary result Specimen: Blood from Arm, Right Updated: 10/10/23 1545     Blood Culture No growth at 3 days    Blood Culture - Blood, Arm, Left [[id number]]  (Abnormal)  (Susceptibility) Collected: 10/07/23 1535    Lab Status: Final result Specimen: Blood from Arm, Left Updated: 10/10/23 0613     Blood Culture Escherichia coli     Isolated from Aerobic Bottle     Gram Stain Aerobic Bottle Gram negative bacilli    Susceptibility        Escherichia coli      ASH      Ampicillin Resistant      Ampicillin + Sulbactam Resistant      Cefepime Susceptible      Ceftazidime Susceptible      Ceftriaxone Susceptible      Gentamicin Susceptible      Levofloxacin  Resistant      Piperacillin + Tazobactam Susceptible      Trimethoprim + Sulfamethoxazole Resistant                       Susceptibility Comments       Escherichia coli    Cefazolin sensitivity will not be reported for Enterobacteriaceae in non-urine isolates. If cefazolin is preferred, please call the microbiology lab to request an E-test.  With the exception of urinary-sourced infections, aminoglycosides should not be used as monotherapy.               Blood Culture ID, PCR - Blood, Arm, Left [[id number]]  (Abnormal) Collected: 10/07/23 1535    Lab Status: Final result Specimen: Blood from Arm, Left Updated: 10/08/23 0628     BCID, PCR Escherichia coli. Identification by BCID2 PCR.     BOTTLE TYPE Aerobic Bottle    Narrative:      No resistance genes detected.    Urine Culture - Urine, Urine, Clean Catch [[id number]]  (Abnormal)  (Susceptibility) Collected: 10/06/23 1506    Lab Status: Final result Specimen: Urine, Clean Catch Updated: 10/08/23 1007     Urine Culture >100,000 CFU/mL Escherichia coli    Narrative:      Colonization of the urinary tract without infection is common. Treatment is discouraged unless the patient is symptomatic, pregnant, or undergoing an invasive urologic procedure.    Susceptibility        Escherichia coli      ASH      Amikacin Susceptible      Ampicillin Resistant      Ampicillin + Sulbactam Resistant      Cefazolin Resistant      Cefepime Susceptible      Ceftazidime Susceptible      Ceftriaxone Susceptible      Gentamicin Resistant      Levofloxacin Resistant      Nitrofurantoin Susceptible      Piperacillin + Tazobactam Resistant      Tobramycin Intermediate      Trimethoprim + Sulfamethoxazole Resistant                                 [x]  Radiology  CT Abdomen Pelvis Without Contrast    Result Date: 10/7/2023    1. Obstructing 9 millimeter right UPJ stone resulting in moderate hydronephrosis.  Additional nonobstructing calculi in the right inferior pole measures up to 1.2  centimeter. 2. Cardiomegaly with findings of volume overload/3rd spacing including small bilateral pleural effusions, mild body wall edema and trace pelvic fluid. 3. Moderate hiatal hernia. 4. Colonic diverticulosis. 5. Other chronic/ancillary findings detailed above.     NIMA CORREIA MD       Electronically Signed and Approved By: NMIA CORREIA MD on 10/07/2023 at 17:50             XR Chest 1 View    Result Date: 10/7/2023    Cardiomegaly with questionable trace effusions.  No overt edema or lobar infiltrate.       NIMA CORREIA MD       Electronically Signed and Approved By: NIMA CORREIA MD on 10/07/2023 at 15:42            [x]  EKG/Telemetry   []  Cardiology/Vascular   [x]  Pathology  [x]  Old records  []  Other:    Assessment & Plan   Assessment / Plan   Assessment:  #1 acute complicated cystitis with history of recurrent UTIs.  E. coli MDR  -Continue Rocephin.  -Patient had a 9 mm stone obstructing the right kidney around the right UPJ.  Status post stent placement on 10/7/2023.  Urology following.  Need definitive treatment for stone in next couple of weeks by Dr. Chester.    #2 E. coli bacteremia.  Subsequent blood culture October 8 negative  -Continue Rocephin for 4 more days.  No need for midline.  Leukocytosis resolved    #3 BMI 22.22.  Poor oral intake  -Nutrition consulted.  Dietary supplements ordered     #4 low magnesium replete     #5 JUANI.  Resolved.  Baseline creatinine 0.9  -resumed diuretics      #6 chronic atrial fibrillation.  Resume Eliquis.      #7 depression continue Prozac     #8 hypertension -resumed BP meds, losartan and metoprolol     #9 recent fall with injury to left wrist.  S/p brace     #10 anemia-mixed picture.  Stable.  Oral iron.   Pancreatic cyst.  Need repeat MRCP in 1 year.  PT OT.      Dispo planning: DC to [facility] rehab in a.m. and finish rest of the IV Rocephin there.  Discussed with Dr. Burgos and agreeable for getting IV antibiotics at [facility].  Discussed with  RN and .  Discussed with family members at the bedside.    DVT prophylaxis:  Medical and mechanical DVT prophylaxis orders are present.    CODE STATUS:   Level Of Support Discussed With: Patient  Code Status (Patient has no pulse and is not breathing): CPR (Attempt to Resuscitate)  Medical Interventions (Patient has pulse or is breathing): Full Support      Electronically signed by Darryn Lopez MD, 10/10/23, 3:58 PM EDT.

## 2023-10-11 VITALS
BODY MASS INDEX: 21.94 KG/M2 | HEART RATE: 61 BPM | TEMPERATURE: 98.4 F | WEIGHT: 139.77 LBS | HEIGHT: 67 IN | OXYGEN SATURATION: 99 % | DIASTOLIC BLOOD PRESSURE: 71 MMHG | RESPIRATION RATE: 18 BRPM | SYSTOLIC BLOOD PRESSURE: 139 MMHG

## 2023-10-11 PROBLEM — N39.0 UTI (URINARY TRACT INFECTION): Status: RESOLVED | Noted: 2023-10-07 | Resolved: 2023-10-11

## 2023-10-11 LAB
ANION GAP SERPL CALCULATED.3IONS-SCNC: 10.1 MMOL/L (ref 5–15)
BUN SERPL-MCNC: 16 MG/DL (ref 8–23)
BUN/CREAT SERPL: 22.2 (ref 7–25)
CALCIUM SPEC-SCNC: 8.3 MG/DL (ref 8.2–9.6)
CHLORIDE SERPL-SCNC: 104 MMOL/L (ref 98–107)
CO2 SERPL-SCNC: 23.9 MMOL/L (ref 22–29)
CREAT SERPL-MCNC: 0.72 MG/DL (ref 0.57–1)
EGFRCR SERPLBLD CKD-EPI 2021: 78.6 ML/MIN/1.73
GLUCOSE SERPL-MCNC: 87 MG/DL (ref 65–99)
METHYLMALONATE SERPL-SCNC: 333 NMOL/L (ref 0–378)
POTASSIUM SERPL-SCNC: 3.1 MMOL/L (ref 3.5–5.2)
SODIUM SERPL-SCNC: 138 MMOL/L (ref 136–145)
WHOLE BLOOD HOLD SPECIMEN: NORMAL

## 2023-10-11 PROCEDURE — 97116 GAIT TRAINING THERAPY: CPT

## 2023-10-11 PROCEDURE — 92526 ORAL FUNCTION THERAPY: CPT

## 2023-10-11 PROCEDURE — 80048 BASIC METABOLIC PNL TOTAL CA: CPT | Performed by: HOSPITALIST

## 2023-10-11 PROCEDURE — 25010000002 CEFTRIAXONE PER 250 MG: Performed by: HOSPITALIST

## 2023-10-11 RX ORDER — LORAZEPAM 0.5 MG/1
0.5 TABLET ORAL NIGHTLY PRN
Start: 2023-10-11 | End: 2023-10-14

## 2023-10-11 RX ORDER — FERROUS SULFATE 325(65) MG
325 TABLET ORAL
Qty: 30 TABLET | Refills: 0 | Status: SHIPPED | OUTPATIENT
Start: 2023-10-12 | End: 2023-11-11

## 2023-10-11 RX ORDER — TAMSULOSIN HYDROCHLORIDE 0.4 MG/1
0.4 CAPSULE ORAL DAILY
Qty: 30 CAPSULE | Refills: 0 | Status: SHIPPED | OUTPATIENT
Start: 2023-10-12 | End: 2023-11-11

## 2023-10-11 RX ADMIN — TAMSULOSIN HYDROCHLORIDE 0.4 MG: 0.4 CAPSULE ORAL at 08:05

## 2023-10-11 RX ADMIN — METOPROLOL SUCCINATE 100 MG: 50 TABLET, EXTENDED RELEASE ORAL at 08:05

## 2023-10-11 RX ADMIN — FERROUS SULFATE TAB 325 MG (65 MG ELEMENTAL FE) 325 MG: 325 (65 FE) TAB at 08:05

## 2023-10-11 RX ADMIN — LOSARTAN POTASSIUM 25 MG: 25 TABLET, FILM COATED ORAL at 08:45

## 2023-10-11 RX ADMIN — FUROSEMIDE 20 MG: 20 TABLET ORAL at 06:45

## 2023-10-11 RX ADMIN — FLUOXETINE 20 MG: 20 CAPSULE ORAL at 08:45

## 2023-10-11 RX ADMIN — CHOLESTYRAMINE 4 G: 4 POWDER, FOR SUSPENSION ORAL at 08:45

## 2023-10-11 RX ADMIN — CEFTRIAXONE SODIUM 2000 MG: 2 INJECTION, POWDER, FOR SOLUTION INTRAMUSCULAR; INTRAVENOUS at 14:00

## 2023-10-11 RX ADMIN — Medication 10 ML: at 08:05

## 2023-10-11 RX ADMIN — TIMOLOL MALEATE 1 DROP: 5 SOLUTION/ DROPS OPHTHALMIC at 08:45

## 2023-10-11 RX ADMIN — APIXABAN 2.5 MG: 2.5 TABLET, FILM COATED ORAL at 08:05

## 2023-10-11 NOTE — DISCHARGE SUMMARY
[facility]        HOSPITALIST  DISCHARGE SUMMARY    Patient Name: Bing Cruz  : 1931  MRN: [id number]    Date of Admission: 10/7/2023  Date of Discharge:  10/11/2023  Primary Care Physician: Jairo Kramer MD    Consults       Date and Time Order Name Status Description    10/7/2023  9:34 PM Inpatient Urology Consult      10/7/2023  2:48 PM Hospitalist (on-call MD unless specified)              Active and Resolved Hospital Problems:  Active Hospital Problems    Diagnosis POA    Severe malnutrition [E43] Yes    Kidney stone [N20.0] Yes      Resolved Hospital Problems    Diagnosis POA    **UTI (urinary tract infection) [N39.0] Yes   E. coli bacteremia.  Resolved.  Acute kidney injury.  Resolved.  Right ureteric stone.  S/p ureteric stent.  Chronic A-fib rate controlled on Eliquis.  Depression.  Hypertension.  Recent fall with injury to left wrist.  S/p brace.  Anemia.  Stable.  Iron supplement  Pancreatic cyst need repeat MRCP in 6 months to 1 year.  Hard of hearing  Leukocytosis.  Resolved  Hospital Course     Hospital Course:  Bing Cruz is a 89 y.o. female  recently broke her left arm.  She has a history of several urinary tract infections.  And when she does have a UTI, she has decreased oral intake and nausea.  She follows with Dr. Chester.      She had a CT abdomen and pelvis on 2023 CT 13 x 6 mm stone right renal pelvis.  With some urothelial thickening and inflammation around this.  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  No hydro-.  No stones on the left.  Postsurgical changes of prior right hemicolectomy.  9 mm pancreatic cyst.  With a recommendation for an MRCP.       An MRCP did show multiple small cystic lesions in the pancreas measuring up to 1 cm.  Many of these appear to communicate with the main pancreatic duct.  Mildly dilated sidebranch of the pancreatic ducts were also noted.  They recommend repeat MRI in 1  year.     In the past, she has had the following types of UTIs:  9/27/2023   0.8, GFR 63  9/19/2023 E. coli resistant to oral antibiotics other than nitrofurantoin  9/8/2023 Citrobacter -resistant to Ancef  8/23 E. coli  6/22 Citrobacter  3/22   Proteus     During the office visit, Dr. Chester discussed keeping the patient on prophylactic treatment for UTIs.  However, nitrofurantoin is a possibility but at her age this does have a risk.  He encouraged her to drink more liquids.     On arrival to the ED, patient had a temperature of 98, pulse of 101, respiratory 20, blood pressure 104/66, and she saturating 93% on room air.  Patient's UA is consistent with UTI.  Her magnesium is low.  Her creatinine is 1.16.  White blood cell count is 22.70.  MCV is 97.3. Urine culture shows E. coli.     Interval follow-up: Patient continues to do well.  No complaints.  Creatinine back to baseline.  Does have occasional urinary discomfort.  Mild right flank pain.  No more blood in the urine.  Overall feeling better.  Patient is hard of hearing.  Stable for discharge to rehab           DISCHARGE Follow Up Recommendations for labs and diagnostics: Finished 2 days of IV Rocephin at [facility] rehab.  Follow-up with urology for right ureteric stone and stent treatment.  Follow-up with PCP.  Have MRCP repeated for pancreatic cyst follow-up.      Day of Discharge     Vital Signs:  Temp:  [97.3 øF (36.3 øC)-98.6 øF (37 øC)] 98.6 øF (37 øC)  Heart Rate:  [] 91  Resp:  [18-19] 18  BP: (113-148)/(61-84) 143/75    Physical Exam:   General: Awake, alert, NAD  HENT: NCAT, MMM  Eyes: pupils equal, no scleral icterus  Cardiovascular: RRR, no murmurs   Pulmonary: CTA bilaterally; no wheezes; no conversational dyspnea  Gastrointestinal: S/ND/NT, +BS  Musculoskeletal: No gross deformities.  Left wrist in brace  Skin: No jaundice, no rash on exposed skin appreciated  Neuro: CN II through XII grossly intact; speech clear; no tremor  Psych: Mood  and affect appropriate  : No Cano catheter; no suprapubic tenderness    Discharge Details        Discharge Medications        New Medications        Instructions Start Date   cefTRIAXone  Commonly known as: ROCEPHIN   2,000 mg, Intravenous, Every 24 Hours      ferrous sulfate 325 (65 FE) MG tablet   325 mg, Oral, Daily With Breakfast   Start Date: October 12, 2023     tamsulosin 0.4 MG capsule 24 hr capsule  Commonly known as: FLOMAX   0.4 mg, Oral, Daily   Start Date: October 12, 2023            Continue These Medications        Instructions Start Date   apixaban 2.5 MG tablet tablet  Commonly known as: ELIQUIS   2.5 mg, Oral, 2 Times Daily      ascorbic acid 500 MG tablet  Commonly known as: VITAMIN C   500 mg, Oral, Daily      colestipol 1 g tablet  Commonly known as: COLESTID   2 g, Oral, Daily      FLUoxetine 20 MG capsule  Commonly known as: PROzac   20 mg, Oral, Daily      furosemide 20 MG tablet  Commonly known as: LASIX   20 mg, Oral, 2 Times Daily      LORazepam 0.5 MG tablet  Commonly known as: ATIVAN   0.5 mg, Oral, Nightly PRN, PT only takes if needed      losartan 25 MG tablet  Commonly known as: COZAAR   TAKE 1 TABLET BY MOUTH EVERY DAY      Lumigan 0.01 % ophthalmic drops  Generic drug: bimatoprost   Administer 1 drop to both eyes Every Night.      metoprolol succinate  MG 24 hr tablet  Commonly known as: TOPROL-XL   100 mg, Oral, 2 Times Daily      potassium chloride 10 MEQ CR tablet   10 mEq, Oral, 2 Times Daily      timolol 0.5 % ophthalmic solution  Commonly known as: TIMOPTIC   Administer 1 drop to both eyes Every Morning.               Allergies   Allergen Reactions    Citalopram Unknown - High Severity    Clonazepam Unknown - High Severity    Levofloxacin Nausea And Vomiting    Lisinopril Unknown - High Severity    Macrobid [Nitrofurantoin Macrocrystal] Rash    Melatonin Unknown - High Severity       Discharge Disposition:  Rehab Facility or Unit (DC - External).  Via  EMS    Diet:  Diet Instructions       Diet: Regular/House Diet; Regular Texture (IDDSI 7); Thin (IDDSI 0)      Discharge Diet: Regular/House Diet    Texture: Regular Texture (IDDSI 7)    Fluid Consistency: Thin (IDDSI 0)            Discharge Activity:   Activity Instructions       Activity as Tolerated              CODE STATUS:  Code Status and Medical Interventions:   Ordered at: 10/07/23 0991     Level Of Support Discussed With:    Patient     Code Status (Patient has no pulse and is not breathing):    CPR (Attempt to Resuscitate)     Medical Interventions (Patient has pulse or is breathing):    Full Support         Future Appointments   Date Time Provider Department Center   10/17/2023 12:45 PM Morris Chester MD [facility]   10/25/2023 11:15 AM Mae Woods PA-C [facility]   1/17/2024 10:00 AM Naomi Plata [place] [facility]   1/23/2024  9:30 AM Leslie Cody APRGEOVANNI [facility]   2/6/2024 11:00 AM Jairo Kramer [place] [facility]   2/14/2024 11:15 AM Estela Hurtado APRN [facility]   3/18/2024 10:30 AM Weston Bender PA [facility]       Additional Instructions for the Follow-ups that You Need to Schedule       Discharge Follow-up with PCP   As directed       Currently Documented PCP:    Jairo Kramer MD    PCP Phone Number:    [phone]     Follow Up Details: 2 weeks        Discharge Follow-up with Specified Provider: Dr. Chester; 1 Week   As directed      To: Dr. Chester   Follow Up: 1 Week   Follow Up Details: Right ureteric stent and stone                Pertinent  and/or Most Recent Results     PROCEDURES:   Procedure:    CYSTOSCOPY URETERAL CATHETER/STENT INSERTION,right retrograde  CPT(R) Code:  71543 - MT CYSTO W/INSERT URETERAL STENT       LAB RESULTS:      Lab 10/10/23  0514 10/09/23  0504 10/08/23  0600 10/07/23  1535 10/07/23  1419   WBC 10.35 13.25* 17.17*  --  22.70*   HEMOGLOBIN 10.1* 10.3* 11.2*  --  12.8    HEMATOCRIT 31.3* 31.9* 34.4  --  39.1   PLATELETS 189 193 199  --  235   NEUTROS ABS 7.42* 10.78* 15.69*  --  20.10*   IMMATURE GRANS (ABS) 0.07* 0.09* 0.11*  --  0.16*   LYMPHS ABS 2.04 1.65 1.13  --  1.05   MONOS ABS 0.78 0.71 0.21  --  1.31*   EOS ABS 0.03 0.00 0.00  --  0.02   MCV 98.1* 97.6* 98.0*  --  97.3*   LACTATE  --   --   --  1.9  --          Lab 10/11/23  0515 10/10/23  0514 10/09/23  0504 10/08/23  0600 10/07/23  1419   SODIUM 138 140 135* 137 141   POTASSIUM 3.1* 3.5 3.6 4.1 3.8   CHLORIDE 104 106 106 105 105   CO2 23.9 23.1 20.0* 21.4* 24.0   ANION GAP 10.1 10.9 9.0 10.6 12.0   BUN 16 18 23 21 21   CREATININE 0.72 0.77 0.97 1.11* 1.16*   EGFR 78.6 72.5 54.9* 46.7* 44.3*   GLUCOSE 87 90 118* 158* 125*   CALCIUM 8.3 8.3 8.6 8.4 9.3   MAGNESIUM  --   --  2.4* 1.8 1.5*   PHOSPHORUS  --   --  2.8 3.4 1.6*         Lab 10/07/23  1419   TOTAL PROTEIN 7.2   ALBUMIN 3.8   GLOBULIN 3.4   ALT (SGPT) 23   AST (SGOT) 39*   BILIRUBIN 1.9*   ALK PHOS 112         Lab 10/07/23  1419   HSTROP T 29*             Lab 10/08/23  1537   IRON 31*   IRON SATURATION (TSAT) 15*   TIBC 206*   TRANSFERRIN 138*   FERRITIN 353.40*   FOLATE 5.00   VITAMIN B 12 230         Brief Urine Lab Results  (Last result in the past 365 days)        Color   Clarity   Blood   Leuk Est   Nitrite   Protein   CREAT   Urine HCG        10/07/23 1453 Yellow   Cloudy   Large (3+)   Large (3+)   Negative   Trace                 Microbiology Results (last 10 days)       Procedure Component Value - Date/Time    Blood Culture - Blood, Arm, Left [[id number]]  (Normal) Collected: 10/08/23 0600    Lab Status: Preliminary result Specimen: Blood from Arm, Left Updated: 10/11/23 0615     Blood Culture No growth at 3 days    Blood Culture - Blood, Arm, Left [[id number]]  (Normal) Collected: 10/08/23 0532    Lab Status: Preliminary result Specimen: Blood from Arm, Left Updated: 10/11/23 0545     Blood Culture No growth at 3 days    Blood Culture - Blood, Arm, Right  [[id number]]  (Normal) Collected: 10/07/23 1535    Lab Status: Preliminary result Specimen: Blood from Arm, Right Updated: 10/10/23 1545     Blood Culture No growth at 3 days    Blood Culture - Blood, Arm, Left [[id number]]  (Abnormal)  (Susceptibility) Collected: 10/07/23 1535    Lab Status: Final result Specimen: Blood from Arm, Left Updated: 10/10/23 0613     Blood Culture Escherichia coli     Isolated from Aerobic Bottle     Gram Stain Aerobic Bottle Gram negative bacilli    Susceptibility        Escherichia coli      ASH      Ampicillin Resistant      Ampicillin + Sulbactam Resistant      Cefepime Susceptible      Ceftazidime Susceptible      Ceftriaxone Susceptible      Gentamicin Susceptible      Levofloxacin Resistant      Piperacillin + Tazobactam Susceptible      Trimethoprim + Sulfamethoxazole Resistant                       Susceptibility Comments       Escherichia coli    Cefazolin sensitivity will not be reported for Enterobacteriaceae in non-urine isolates. If cefazolin is preferred, please call the microbiology lab to request an E-test.  With the exception of urinary-sourced infections, aminoglycosides should not be used as monotherapy.               Blood Culture ID, PCR - Blood, Arm, Left [[id number]]  (Abnormal) Collected: 10/07/23 1535    Lab Status: Final result Specimen: Blood from Arm, Left Updated: 10/08/23 0628     BCID, PCR Escherichia coli. Identification by BCID2 PCR.     BOTTLE TYPE Aerobic Bottle    Narrative:      No resistance genes detected.    Urine Culture - Urine, Urine, Clean Catch [[id number]]  (Abnormal)  (Susceptibility) Collected: 10/06/23 1506    Lab Status: Final result Specimen: Urine, Clean Catch Updated: 10/08/23 1007     Urine Culture >100,000 CFU/mL Escherichia coli    Narrative:      Colonization of the urinary tract without infection is common. Treatment is discouraged unless the patient is symptomatic, pregnant, or undergoing an invasive urologic procedure.     Susceptibility        Escherichia coli      ASH      Amikacin Susceptible      Ampicillin Resistant      Ampicillin + Sulbactam Resistant      Cefazolin Resistant      Cefepime Susceptible      Ceftazidime Susceptible      Ceftriaxone Susceptible      Gentamicin Resistant      Levofloxacin Resistant      Nitrofurantoin Susceptible      Piperacillin + Tazobactam Resistant      Tobramycin Intermediate      Trimethoprim + Sulfamethoxazole Resistant                                   CT Abdomen Pelvis Without Contrast    Result Date: 10/7/2023  Impression:   1. Obstructing 9 millimeter right UPJ stone resulting in moderate hydronephrosis.  Additional nonobstructing calculi in the right inferior pole measures up to 1.2 centimeter. 2. Cardiomegaly with findings of volume overload/3rd spacing including small bilateral pleural effusions, mild body wall edema and trace pelvic fluid. 3. Moderate hiatal hernia. 4. Colonic diverticulosis. 5. Other chronic/ancillary findings detailed above.     NIMA CORREIA MD       Electronically Signed and Approved By: NIMA CORREIA MD on 10/07/2023 at 17:50             XR Chest 1 View    Result Date: 10/7/2023  Impression:   Cardiomegaly with questionable trace effusions.  No overt edema or lobar infiltrate.       NIMA CORREIA MD       Electronically Signed and Approved By: NIMA CORREIA MD on 10/07/2023 at 15:42             MRI abdomen w wo contrast mrcp    Result Date: 9/29/2023  Impression:   1. Multiple small cystic lesions are seen in the pancreas measuring up to about 1 centimeter.  Many of these appear to communicate with the main pancreatic duct.  Mildly dilated side branch pancreatic ducts also noted.  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  Recommend follow-up MRI in 1 year to reassess. 2. Redemonstration of stone in the right renal pelvis measuring up to at least 0.7 centimeters as seen on recent CT.  Other right-sided nephroliths seen on recent CT not well seen.  3.  Other findings as above similar to recent CT scan dated 9/18/2023.     NIMA PALAFOX MD       Electronically Signed and Approved By: NIMA PALAFOX MD on 9/29/2023 at 17:42             FL Video Swallow With Speech Single Contrast    Result Date: 9/21/2023  Impression:    1. Deep laryngeal penetration with large boluses of thin liquid barium, improved with smaller boluses of thin liquid barium  Please consult speech pathology report for further details regarding exam and dietary recommendations   IAN QUIROGA       Electronically Signed and Approved By: Sergei Cisneros M.D. on 9/21/2023 at 15:05             CT Abdomen Pelvis With & Without Contrast    Result Date: 9/18/2023  Impression:   1. Oval 10 mm x 6 mm stone within the right renal pelvis with associated urothelial thickening and inflammation.  No associated obstruction. 2. Multiple additional nonobstructing right-sided renal stones. 3. Postsurgical changes of prior right hemicolectomy.  Extensive colonic diverticulosis without acute diverticulitis. 4. Small 9 mm pancreatic cystic lesion along the ventral aspect of the pancreatic body.  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  This is present within an otherwise atrophic pancreas.  Follow-up imaging with MRCP would be recommended.      OLIVIA MAN MD       Electronically Signed and Approved By: OLIVIA MAN MD on 9/18/2023 at 13:09              Results for orders placed during the hospital encounter of 05/11/22    Duplex Venous Lower Extremity - Left CAR    Interpretation Summary  ú Normal left lower extremity venous duplex scan.      Results for orders placed during the hospital encounter of 05/11/22    Duplex Venous Lower Extremity - Left CAR    Interpretation Summary  ú Normal left lower extremity venous duplex scan.      Results for orders placed in visit on 06/15/23    Adult Transthoracic Echo Complete W/ Cont if Necessary Per Protocol    Interpretation Summary    Left  ventricular systolic function is normal. Left ventricular ejection fraction appears to be 51 - 55%.    Left ventricular diastolic function was indeterminate due to presence of atrial fibrillation.    There is mild to moderate biatrial enlargement.    Mild to moderate mitral regurgitation noted, with multiple jets.    There is mild to moderate tricuspid regurgitation.  Estimated right ventricular systolic pressure from tricuspid regurgitation is moderately elevated (45-55 mmHg).      Imaging Results (All)       Procedure Component Value Units Date/Time    FL Surgery Fluoro [[id number]] Resulted: 10/07/23 2326     Updated: 10/07/23 2326    Narrative:      This procedure was auto-finalized with no dictation required.    CT Abdomen Pelvis Without Contrast [[id number]] Collected: 10/07/23 1750     Updated: 10/07/23 1753    Narrative:      PROCEDURE: CT ABDOMEN PELVIS WO CONTRAST     COMPARISON: [facility], CT, CT ABDOMEN PELVIS W WO CONTRAST, 9/18/2023, 12:28.     INDICATIONS: Eval right flank pain, UTI (rule out obstructive uropathy versus pyelonephritis)     TECHNIQUE: CT images were created without intravenous contrast.       PROTOCOL:   Standard imaging protocol performed      RADIATION:   DLP: 396.3 mGy*cm    Automated exposure control was utilized to minimize radiation dose.      FINDINGS:   Cardiomegaly.  Moderate sliding hiatal hernia.  Small low-density bilateral pleural effusions.    Subpleural reticular opacities could represent underlying atelectasis versus scar/fibrosis.  No   pericardial effusion.     Unremarkable appearance of the unenhanced liver and spleen.  Low-density hepatic biliary cyst   within segment 4 is stable.  Previous cholecystectomy.  No biliary dilation.  Atrophic pancreas   without active inflammation.  No concerning adrenal nodule.     There is a 9 millimeter obstructing stone at the right ureteropelvic junction with moderate   hydronephrosis.  Few additional nonobstructing  calculi in the right inferior pole largest measuring   up to 1.2 centimeter coronal image 75 series 202 and additional stone measuring 9 millimeters image   80 series 202..  Smaller fragments noted adjacent to the largest stone..  No left renal calculi.    Tiny fat density lesion in the left renal midpole stable from prior comparison suggestive of a   benign renal angiomyolipoma.  Trace focus of intraluminal gas in the bladder commonly seen in the   setting of recent instrumentation.  No bladder wall thickening or surrounding inflammation.     No bowel obstruction or active inflammation.  Colonic diverticulosis.  Changes of right   hemicolectomy.  Diffuse aortic atherosclerotic disease without aneurysm.  Trace nonspecific pelvic   fluid.  No free air or drainable collection.  No suspicious adenopathy.     Mild body wall edema.  Prior midline laparotomy.  Few small midline fat containing   ventral/incisional hernias.  Small fat containing left inguinal hernia.  Venous collaterals in the   anterior suprapubic soft tissues grossly unchanged.  Left hip prosthesis.  Osseous   demineralization.  Multilevel overall moderate degenerative disc disease with moderate to advanced   facet arthropathy.  No acute displaced fracture or aggressive bone lesion.       Impression:         1. Obstructing 9 millimeter right UPJ stone resulting in moderate hydronephrosis.  Additional   nonobstructing calculi in the right inferior pole measures up to 1.2 centimeter.  2. Cardiomegaly with findings of volume overload/3rd spacing including small bilateral pleural   effusions, mild body wall edema and trace pelvic fluid.  3. Moderate hiatal hernia.  4. Colonic diverticulosis.  5. Other chronic/ancillary findings detailed above.            NIMA CORREIA MD         Electronically Signed and Approved By: NIMA CORREIA MD on 10/07/2023 at 17:50                     XR Chest 1 View [[id number]] Collected: 10/07/23 1542     Updated: 10/07/23 1545     Narrative:      PROCEDURE: XR CHEST 1 VW     COMPARISON: [facility], CR, XR CHEST 1 VW, 5/20/2022, 23:06.     INDICATIONS: GENERALIZED WEAKNESS; DIZZINESS; HYPOTENSION; SEPSIS     FINDINGS:   Mild cardiomegaly similar to prior exam.  Aortic atherosclerotic disease noted.  Slight blunting of   the costophrenic angles which could represent a trace residual effusion, improved from prior   comparison.  No new infiltrate, edema or pneumothorax.  Left humeral fixation hardware.  Osseous   structures otherwise grossly unremarkable.       Impression:         Cardiomegaly with questionable trace effusions.  No overt edema or lobar infiltrate.                  NIMA CORREIA MD         Electronically Signed and Approved By: NIMA CORREIA MD on 10/07/2023 at 15:42                              Labs Pending at Discharge:  Pending Labs       Order Current Status    Methylmalonic Acid, Serum In process    Blood Culture - Blood, Arm, Left Preliminary result    Blood Culture - Blood, Arm, Left Preliminary result    Blood Culture - Blood, Arm, Right Preliminary result                Time spent on Discharge including face to face service: 35 minutes  Part of this note may be an electronic transcription/translation of spoken language to printed text using the Dragon Dictation System.     TElectronically signed by Darryn Lopez MD, 10/11/23, 2:59 PM EDT.

## 2023-10-11 NOTE — THERAPY TREATMENT NOTE
Acute Care - Speech Language Pathology   Swallow Treatment Note JUNO Mohamud     Patient Name: Bing Cruz  : 1931  MRN: [id number]  Today's Date: 10/11/2023               Admit Date: 10/7/2023    Visit Dx:     ICD-10-CM ICD-9-CM   1. Sepsis without acute organ dysfunction, due to unspecified organism  A41.9 038.9     995.91   2. Acute UTI (urinary tract infection)  N39.0 599.0   3. Kidney stone  N20.0 592.0   4. Right ureteral stone  N20.1 592.1   5. Hydronephrosis of right kidney  N13.30 591   6. Difficulty in walking  R26.2 719.7   7. Oropharyngeal dysphagia  R13.12 787.22   8. Decreased activities of daily living (ADL)  Z78.9 V49.89     Patient Active Problem List   Diagnosis    Anemia    Closed left hip fracture, initial encounter    Closed fracture of neck of left femur    Closed left hip fracture    Hip fracture requiring operative repair, left, closed, initial encounter    Lung nodule < 6cm on CT    Esophageal dysphagia    Atrial fibrillation, chronic    Malignant neoplasm of ascending colon    Chronic deep vein thrombosis (DVT) of proximal vein of left lower extremity    Major depressive disorder, recurrent, mild    Gastroesophageal reflux disease without esophagitis    Vitamin D deficiency    Hypertension, essential    Nonrheumatic mitral valve regurgitation    Nonrheumatic aortic valve insufficiency    S/P  Left Femoral Neck Open Reduction Internal Fixation    Left hip pain    Acquired cystic kidney disease    Bladder disorder    Diaphragmatic hernia    Cardiomegaly    Cataract    Chronic fatigue syndrome    Diarrhea    Diverticular disease of colon    Hyperlipemia    Disorder of bone    Generalized abdominal pain    Insomnia    Major depression, single episode    Malaise and fatigue    Pain in joint involving pelvic region and thigh    Sprain of hip    Acute cystitis with hematuria    Blood clot in vein    Colon cancer screening    Aftercare following surgery of left femoral neck ORIF 2021     Avascular necrosis of bone of left hip    Arthritis of left hip    Dislocation of hip, posterior, left, closed    At risk for venous thromboembolism (VTE)    Difficulty walking    Impaired cognition    Hypotension due to drugs    History of total left hip replacement    Major depressive disorder, recurrent, moderate    Anxiety    Left leg swelling    Acute diastolic CHF (congestive heart failure)    Postural dizziness with presyncope    CHF (congestive heart failure), NYHA class I, chronic, diastolic    Dysuria    Wrist fracture, left    Closed fracture of left distal radius    Complicated UTI (urinary tract infection)    Nephrolithiasis    Pancreatic lesion    Medicare annual wellness visit, subsequent    Recurrent urinary tract infection    UTI (urinary tract infection)    Kidney stone    Severe malnutrition     Past Medical History:   Diagnosis Date    Abdominal pain, generalized     Anemia     Aortic regurgitation     Aortic stenosis     Cardiomegaly     Chronic atrial fibrillation 01/01/2019    Atrial fibrillation converted to sinus through cardioversion (March 2019    Chronic fatigue     Colon cancer     Diarrhea     Disease of tricuspid valve     Diverticulosis of colon     DVT (deep venous thrombosis)     LEFT LEG GREATER THAN 5 YRS AGO    Dysphagia     Essential (primary) hypertension     Hiatal hernia     Insomnia, unspecified     LVH (left ventricular hypertrophy)     Major depressive disorder, single episode     Mitral regurgitation     Mitral valve insufficiency and aortic valve insufficiency     Osteopenia     Pain in joint, pelvic region and thigh     TR (tricuspid regurgitation)     UTI (urinary tract infection)     antibx to be completed prior to procedure. ABIOLA Carlos RN ([facility]) notified.     Past Surgical History:   Procedure Laterality Date    ABDOMINAL HYSTERECTOMY      WITH BSO     ANKLE SURGERY      (L) ankle fracture    BLADDER REPAIR      BREAST BIOPSY      (L) breast biopsy     CARDIOVERSION  2019    CATARACT EXTRACTION      OU    CHOLECYSTECTOMY      OPEN    COLON SURGERY      STATES SHE HAD 12 INCHES OF COLON REMOVED IN JULY 2020 FOR COLON CANCER    COLONOSCOPY  2020    COLONOSCOPY N/A 10/29/2021    Procedure: COLONOSCOPY;  Surgeon: Edmar Back MD;  Location: [facility] ENDOSCOPY;  Service: General;  Laterality: N/A;  DIVERTICULOSIS/ANASTOMOSIS    CYSTOSCOPY W/ URETERAL STENT PLACEMENT Right 10/7/2023    Procedure: CYSTOSCOPY URETERAL CATHETER/STENT INSERTION,right retrograde;  Surgeon: Roberto Luu MD;  Location: [facility] MAIN OR;  Service: Urology;  Laterality: Right;    FEMUR OPEN REDUCTION INTERNAL FIXATION Left 07/21/2021    Procedure: FEMORAL NECK OPEN REDUCTION INTERNAL FIXATION;  Surgeon: Bam Warner MD;  Location: [facility] MAIN OR;  Service: Orthopedics;  Laterality: Left;    HIP CLOSED REDUCTION Left 3/20/2022    Procedure: HIP CLOSED REDUCTION;  Surgeon: Harsha Gayle MD;  Location: [facility] OR;  Service: Orthopedics;  Laterality: Left;    INGUINAL HERNIA REPAIR Left 09/29/2011    ORIF WRIST FRACTURE Left 8/24/2023    Procedure: OPEN REDUCTION AND INTERNAL FIXATION WRIST;  Surgeon: Ben Alegre MD;  Location: [facility] OR OSC;  Service: Orthopedics;  Laterality: Left;    TOTAL HIP ARTHROPLASTY Left 3/16/2022    Procedure: LEFT TOTAL HIP ARTHROPLASTY AND HARDWARE REMOVAL;  Surgeon: Bam Warner MD;  Location: [facility] OR;  Service: Orthopedics;  Laterality: Left;    UPPER GASTROINTESTINAL ENDOSCOPY      WITH DILATATION       SLP Recommendation and Plan         SPEECH PATHOLOGY DYSPHAGIA TREATMENT    Subjective/Behavioral Observations: Awake and cooperative.  Family at bedside.  Anticipating discharge to rehab soon.        Day/time of Treatment: 10/11/2023        Current Diet: Mechanical soft, thin liquids        Current Strategies: No straw, single controlled sips via cup, alternate solids and liquids        Results of treatment: Patient requiring  encouragement for p.o. intake.  Intermittent globus sensation observed with throat clearing.  Single sips of thin liquids via cup with no overt signs or symptoms of aspiration.  Patient however taking large drinks of thin liquids resulting in cough.  Patient reports she was able to tolerate a soft egg salad sandwich and soup for lunch on 10/10/2023 without any difficulty.  Requesting soup and sandwich again for lunch today.        Progress toward goals: Progressing, requires cues for consistent use of compensatory strategies.        Barriers to Achieving goals: Medical status        Plan of care:/changes in plan: Continue current diet recommendations, compensatory strategies and positioning to decrease risk of aspiration.                                                                                                EDUCATION  The patient has been educated in the following areas:   Dysphagia (Swallowing Impairment).              Time Calculation:    Time Calculation- SLP       Row Name 10/11/23 1048             Time Calculation- SLP    SLP Stop Time 1045  -SN      SLP Received On 10/11/23  -SN         Untimed Charges    48419-NU Treatment Swallow Minutes 45  -SN         Total Minutes    Untimed Charges Total Minutes 45  -SN       Total Minutes 45  -SN                User Key  (r) = Recorded By, (t) = Taken By, (c) = Cosigned By      Initials Name Provider Type    SN Sadia Pennington MS-CCC/SLP, CAMILO Speech and Language Pathologist                    Therapy Charges for Today       Code Description Service Date Service Provider Modifiers Qty    45758262799 HC ST TREATMENT SWALLOW 3 10/10/2023 Sadia Pennington MS-CCC/SLP, CAMILO GN 1    [id number] HC ST TREATMENT SWALLOW 3 10/11/2023 Sadia Pennington MS-CCC/SLP, CAMILO GN 1                 TELMA Sena CNT  10/11/2023

## 2023-10-11 NOTE — THERAPY TREATMENT NOTE
Acute Care - Physical Therapy Treatment Note  JUNO Mohamud     Patient Name: Bing Cruz  : 1931  MRN: [id number]  Today's Date: 10/11/2023      Visit Dx:     ICD-10-CM ICD-9-CM   1. Sepsis without acute organ dysfunction, due to unspecified organism  A41.9 038.9     995.91   2. Acute UTI (urinary tract infection)  N39.0 599.0   3. Kidney stone  N20.0 592.0   4. Right ureteral stone  N20.1 592.1   5. Hydronephrosis of right kidney  N13.30 591   6. Difficulty in walking  R26.2 719.7   7. Oropharyngeal dysphagia  R13.12 787.22   8. Decreased activities of daily living (ADL)  Z78.9 V49.89     Patient Active Problem List   Diagnosis    Anemia    Closed left hip fracture, initial encounter    Closed fracture of neck of left femur    Closed left hip fracture    Hip fracture requiring operative repair, left, closed, initial encounter    Lung nodule < 6cm on CT    Esophageal dysphagia    Atrial fibrillation, chronic    Malignant neoplasm of ascending colon    Chronic deep vein thrombosis (DVT) of proximal vein of left lower extremity    Major depressive disorder, recurrent, mild    Gastroesophageal reflux disease without esophagitis    Vitamin D deficiency    Hypertension, essential    Nonrheumatic mitral valve regurgitation    Nonrheumatic aortic valve insufficiency    S/P  Left Femoral Neck Open Reduction Internal Fixation    Left hip pain    Acquired cystic kidney disease    Bladder disorder    Diaphragmatic hernia    Cardiomegaly    Cataract    Chronic fatigue syndrome    Diarrhea    Diverticular disease of colon    Hyperlipemia    Disorder of bone    Generalized abdominal pain    Insomnia    Major depression, single episode    Malaise and fatigue    Pain in joint involving pelvic region and thigh    Sprain of hip    Acute cystitis with hematuria    Blood clot in vein    Colon cancer screening    Aftercare following surgery of left femoral neck ORIF 2021    Avascular necrosis of bone of left hip     Arthritis of left hip    Dislocation of hip, posterior, left, closed    At risk for venous thromboembolism (VTE)    Difficulty walking    Impaired cognition    Hypotension due to drugs    History of total left hip replacement    Major depressive disorder, recurrent, moderate    Anxiety    Left leg swelling    Acute diastolic CHF (congestive heart failure)    Postural dizziness with presyncope    CHF (congestive heart failure), NYHA class I, chronic, diastolic    Dysuria    Wrist fracture, left    Closed fracture of left distal radius    Complicated UTI (urinary tract infection)    Nephrolithiasis    Pancreatic lesion    Medicare annual wellness visit, subsequent    Recurrent urinary tract infection    UTI (urinary tract infection)    Kidney stone    Severe malnutrition     Past Medical History:   Diagnosis Date    Abdominal pain, generalized     Anemia     Aortic regurgitation     Aortic stenosis     Cardiomegaly     Chronic atrial fibrillation 01/01/2019    Atrial fibrillation converted to sinus through cardioversion (March 2019    Chronic fatigue     Colon cancer     Diarrhea     Disease of tricuspid valve     Diverticulosis of colon     DVT (deep venous thrombosis)     LEFT LEG GREATER THAN 5 YRS AGO    Dysphagia     Essential (primary) hypertension     Hiatal hernia     Insomnia, unspecified     LVH (left ventricular hypertrophy)     Major depressive disorder, single episode     Mitral regurgitation     Mitral valve insufficiency and aortic valve insufficiency     Osteopenia     Pain in joint, pelvic region and thigh     TR (tricuspid regurgitation)     UTI (urinary tract infection)     antibx to be completed prior to procedure. ABIOLA Carlos RN ([facility]) notified.     Past Surgical History:   Procedure Laterality Date    ABDOMINAL HYSTERECTOMY      WITH BSO     ANKLE SURGERY      (L) ankle fracture    BLADDER REPAIR      BREAST BIOPSY      (L) breast biopsy    CARDIOVERSION  2019    CATARACT EXTRACTION      OU     CHOLECYSTECTOMY      OPEN    COLON SURGERY      STATES SHE HAD 12 INCHES OF COLON REMOVED IN JULY 2020 FOR COLON CANCER    COLONOSCOPY  2020    COLONOSCOPY N/A 10/29/2021    Procedure: COLONOSCOPY;  Surgeon: Edmar Back MD;  Location: [facility] ENDOSCOPY;  Service: General;  Laterality: N/A;  DIVERTICULOSIS/ANASTOMOSIS    CYSTOSCOPY W/ URETERAL STENT PLACEMENT Right 10/7/2023    Procedure: CYSTOSCOPY URETERAL CATHETER/STENT INSERTION,right retrograde;  Surgeon: Roberto Luu MD;  Location: [facility] MAIN OR;  Service: Urology;  Laterality: Right;    FEMUR OPEN REDUCTION INTERNAL FIXATION Left 07/21/2021    Procedure: FEMORAL NECK OPEN REDUCTION INTERNAL FIXATION;  Surgeon: Bam Warner MD;  Location: [facility] MAIN OR;  Service: Orthopedics;  Laterality: Left;    HIP CLOSED REDUCTION Left 3/20/2022    Procedure: HIP CLOSED REDUCTION;  Surgeon: Harsha Gayle MD;  Location: [facility] MAIN OR;  Service: Orthopedics;  Laterality: Left;    INGUINAL HERNIA REPAIR Left 09/29/2011    ORIF WRIST FRACTURE Left 8/24/2023    Procedure: OPEN REDUCTION AND INTERNAL FIXATION WRIST;  Surgeon: Ben Alegre MD;  Location: [facility] OR OSC;  Service: Orthopedics;  Laterality: Left;    TOTAL HIP ARTHROPLASTY Left 3/16/2022    Procedure: LEFT TOTAL HIP ARTHROPLASTY AND HARDWARE REMOVAL;  Surgeon: Bam Warner MD;  Location: [facility] MAIN OR;  Service: Orthopedics;  Laterality: Left;    UPPER GASTROINTESTINAL ENDOSCOPY      WITH DILATATION     PT Assessment (last 12 hours)       PT Evaluation and Treatment       Row Name 10/11/23 1400          Physical Therapy Time and Intention    Subjective Information complains of;weakness (P)   -ZT     Document Type therapy note (daily note) (P)   -ZT     Mode of Treatment individual therapy;physical therapy (P)   -ZT     Patient Effort good (P)   -ZT       Row Name 10/11/23 1400          General Information    Patient Profile Reviewed yes (P)   -ZT     Patient Observations  alert;cooperative;agree to therapy (P)   -ZT     Existing Precautions/Restrictions fall (P)   -ZT       Row Name 10/11/23 1400          Bed Mobility    Bed Mobility bed mobility (all) activities (P)   -ZT     All Activities, [place] (Bed Mobility) standby assist (P)   -ZT       Row Name 10/11/23 1400          Transfers    Transfers sit-stand transfer;stand-sit transfer (P)   -ZT       Row Name 10/11/23 1400          Sit-Stand Transfer    Sit-Stand [place] (Transfers) contact guard (P)   -ZT     Assistive Device (Sit-Stand Transfers) walker, front-wheeled (P)   -ZT       Row Name 10/11/23 1400          Stand-Sit Transfer    Stand-Sit [place] (Transfers) contact guard (P)   -ZT     Assistive Device (Stand-Sit Transfers) walker, front-wheeled (P)   -ZT       Row Name 10/11/23 1400          Gait/Stairs (Locomotion)    Gait/Stairs Locomotion gait/ambulation assistive device (P)   -ZT     [place] Level (Gait) contact guard (P)   -ZT     Assistive Device (Gait) walker, front-wheeled (P)   -ZT     Distance in Feet (Gait) 80 (P)   -ZT     Pattern (Gait) step-through (P)   -ZT       Row Name 10/11/23 1400          Safety Issues, Functional Mobility    Impairments Affecting Function (Mobility) balance;endurance/activity tolerance;strength (P)   -ZT       Row Name 10/11/23 1400          Balance    Balance Assessment standing dynamic balance (P)   -ZT     Dynamic Standing Balance contact guard (P)   -ZT     Position/Device Used, Standing Balance walker, front-wheeled (P)   -ZT       Row Name             Wound 10/07/23 2307 urethral meatus Incision    Wound - Properties Group Placement Date: 10/07/23  -AN Placement Time: 2307 -AN Present on Original Admission: N  -AN Location: urethral meatus  -AN Primary Wound Type: Incision  -AN Additional Comments: no wound,point of entry for cystopscopy  -AN    Retired Wound - Properties Group Placement Date: 10/07/23  -AN Placement Time: 2307 -AN Present on Original  Admission: N  -AN Location: urethral meatus  -AN Primary Wound Type: Incision  -AN Additional Comments: no wound,point of entry for cystopscopy  -AN    Retired Wound - Properties Group Date first assessed: 10/07/23  -AN Time first assessed: 2307  -AN Present on Original Admission: N  -AN Location: urethral meatus  -AN Primary Wound Type: Incision  -AN Additional Comments: no wound,point of entry for cystopscopy  -AN      Row Name 10/11/23 1400          Plan of Care Review    Plan of Care Reviewed With patient (P)   -ZT       Row Name 10/11/23 1400          Therapy Assessment/Plan (PT)    Rehab Potential (PT) good, to achieve stated therapy goals (P)   -ZT     Criteria for Skilled Interventions Met (PT) yes;skilled treatment is necessary (P)   -ZT     Therapy Frequency (PT) daily (P)   -ZT     Predicted Duration of Therapy Intervention (PT) 10 days (P)   -ZT     Problem List (PT) problems related to;balance;mobility;strength (P)   -ZT     Activity Limitations Related to Problem List (PT) unable to ambulate safely;unable to transfer safely (P)   -ZT       Row Name 10/11/23 1400          Progress Summary (PT)    Progress Toward Functional Goals (PT) progress toward functional goals is good (P)   -ZT     Daily Progress Summary (PT) Pt presents with the ability to tolerate moderate amounts of functional activity. She walked 80' today with no rest breaks required, but she was very unsteady on her feet. She continues to require skilled PT services to address her balance and endurance deficits. (P)   -ZT       Row Name 10/11/23 1400          Therapy Plan Review/Discharge Plan (PT)    Therapy Plan Review (PT) evaluation/treatment results reviewed;care plan/treatment goals reviewed;participants included;patient (P)   -ZT               User Key  (r) = Recorded By, (t) = Taken By, (c) = Cosigned By      Initials Name Provider Type    AN Mihaela Yeh, RN Registered Nurse    ZT Wellington Mendoza, PT Student PT Student                       PT Recommendation and Plan  Anticipated Discharge Disposition (PT): (P) inpatient rehabilitation facility  Planned Therapy Interventions (PT): (P) balance training, bed mobility training, gait training, strengthening, stair training, transfer training  Therapy Frequency (PT): (P) daily  Progress Summary (PT)  Progress Toward Functional Goals (PT): (P) progress toward functional goals is good  Daily Progress Summary (PT): (P) Pt presents with the ability to tolerate moderate amounts of functional activity. She walked 80' today with no rest breaks required, but she was very unsteady on her feet. She continues to require skilled PT services to address her balance and endurance deficits.  Plan of Care Reviewed With: (P) patient   Outcome Measures       Row Name 10/11/23 1400 10/09/23 1100          How much help from another person do you currently need...    Turning from your back to your side while in flat bed without using bedrails? 4 (P)   -ZT 4  -SELENE (r) AM (t) SELENE (c)     Moving from lying on back to sitting on the side of a flat bed without bedrails? 3 (P)   -ZT 3  -SELENE (r) AM (t) SELENE (c)     Moving to and from a bed to a chair (including a wheelchair)? 3 (P)   -ZT 3  -SELENE (r) AM (t) SELENE (c)     Standing up from a chair using your arms (e.g., wheelchair, bedside chair)? 3 (P)   -ZT 3  -SELENE (r) AM (t) SELENE (c)     Climbing 3-5 steps with a railing? 3 (P)   -ZT 3  -SELENE (r) AM (t) SELENE (c)     To walk in hospital room? 3 (P)   -ZT 3  -SELENE (r) AM (t) SELENE (c)     AM-PAC 6 Clicks Score (PT) 19 (P)   -ZT 19  -SELENE (r) AM (t)     Highest level of mobility 6 --> Walked 10 steps or more (P)   -ZT 6 --> Walked 10 steps or more  -SELENE (r) AM (t)        Functional Assessment    Outcome Measure Options -- AM-PAC 6 Clicks Basic Mobility (PT)  -SELENE (r) AM (t) SELENE (c)               User Key  (r) = Recorded By, (t) = Taken By, (c) = Cosigned By      Initials Name Provider Type    Caio Ribeiro, PT Physical Therapist    BLADE Mendoza,  Wellington PT Student PT Student    Nicole Gil PT Student PT Student                     Time Calculation:    PT Charges       Row Name 10/11/23 1426             Time Calculation    PT Received On 10/11/23 (P)   -ZT         Timed Charges    26135 - Gait Training Minutes  15 (P)   -ZT         Total Minutes    Timed Charges Total Minutes 15 (P)   -ZT       Total Minutes 15 (P)   -ZT                User Key  (r) = Recorded By, (t) = Taken By, (c) = Cosigned By      Initials Name Provider Type    ZT Wellington Mendoza, PT Student PT Student                      PT G-Codes  Outcome Measure Options: AM-PAC 6 Clicks Daily Activity (OT), Optimal Instrument  AM-PAC 6 Clicks Score (PT): (P) 19  AM-PAC 6 Clicks Score (OT): 14    Wellington Mendoza PT Student  10/11/2023

## 2023-10-11 NOTE — NURSING NOTE
At 0815, I was advised by management at [facility] to inform Bing Cruz's family in [place], that if they are in the room with the patient, they can turn the bed alarm off. Also, advised to tell them that if they decide to leave for any reason, they need to let the staff know so that the bed alarm can be turned back on before they leave. Family verbalized understanding.

## 2023-10-11 NOTE — PLAN OF CARE
Goal Outcome Evaluation:  Plan of Care Reviewed With: patient, family   Patient is alert and oriented x 4. VSS. On room air. Patient has generalized weakness and is anticipating discharge to [facility]. Will continue plan of care.

## 2023-10-11 NOTE — PLAN OF CARE
Goal Outcome Evaluation:  Plan of Care Reviewed With: patient        Progress: improving       Alert and oriented x4 this shift. Slept well throughout the night. Denies pain at this time. Vitals stable. No overnight concerns or events to report. Patient anticipating d/c to rehab soon.

## 2023-10-12 LAB — BACTERIA SPEC AEROBE CULT: NORMAL

## 2023-10-13 LAB
BACTERIA SPEC AEROBE CULT: NORMAL
BACTERIA SPEC AEROBE CULT: NORMAL

## 2023-10-15 NOTE — PROGRESS NOTES
Chief Complaint: Urologic complaint    Subjective         History of Present Illness  Bing Cruz is a 89 y.o. female         Nephrolithiasis  Ureteral stone  Recurrent UTI      Patient was living independently until she broke her left arm recently.      Recent admission for urinary sepsis with stent placement      No history of kidney  stone.  No CAD, patient has A-fib on Eliquis    10/6/2023 urine culture-E. coli    10/7/2023 right ureteral stent placed - Lohrasbi  10/7/2023 CT abdomen/pelvis without - obstructing 9 mm right UPJ stone moderate hydronephrosis.  13 x 6 mm and 9 mm lower pole right renal stones.  No stones in the left.  Images reviewed  10/23 0.6, GFR 82      Previous      History of colon cancer that is caused diarrhea at times, sometimes constipation      9/27/2023   0.8, GFR 63  9/19/2023 E. coli resistant to oral antibiotics other than nitrofurantoin  9/8/2023 Citrobacter -resistant to Ancef  8/23 E. coli  6/22 Citrobacter  3/22   Proteus    9/18/2023 CT abdomen/pelvis with and without - 13 x 6 mm stone right renal pelvis.  With some urothelial thickening and inflammation around this.  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  No hydro-.  No stones on the left.  Postsurgical changes of prior right hemicolectomy.  9 mm pancreatic cyst.  MRCP follow-up would be recommended.  Images reviewed.         No pulmonary issues    History of urethral stenosis sling     Non-smoker.         Objective     Past Medical History:   Diagnosis Date    Abdominal pain, generalized     Anemia     Aortic regurgitation     Aortic stenosis     Cardiomegaly     Chronic atrial fibrillation 01/01/2019    Atrial fibrillation converted to sinus through cardioversion (March 2019    Chronic fatigue     Colon cancer     Diarrhea     Disease of tricuspid valve     Diverticulosis of colon     DVT (deep venous thrombosis)     LEFT LEG GREATER THAN 5 YRS AGO    Dysphagia     Essential (primary)  hypertension     Hiatal hernia     Insomnia, unspecified     LVH (left ventricular hypertrophy)     Major depressive disorder, single episode     Mitral regurgitation     Mitral valve insufficiency and aortic valve insufficiency     Osteopenia     Pain in joint, pelvic region and thigh     TR (tricuspid regurgitation)     UTI (urinary tract infection)     antibx to be completed prior to procedure. ABIOLA Carlos RN ([facility]) notified.       Past Surgical History:   Procedure Laterality Date    ABDOMINAL HYSTERECTOMY      WITH BSO     ANKLE SURGERY      (L) ankle fracture    BLADDER REPAIR      BREAST BIOPSY      (L) breast biopsy    CARDIOVERSION  2019    CATARACT EXTRACTION      OU    CHOLECYSTECTOMY      OPEN    COLON SURGERY      STATES SHE HAD 12 INCHES OF COLON REMOVED IN JULY 2020 FOR COLON CANCER    COLONOSCOPY  2020    COLONOSCOPY N/A 10/29/2021    Procedure: COLONOSCOPY;  Surgeon: Edmar Back MD;  Location: [facility] ENDOSCOPY;  Service: General;  Laterality: N/A;  DIVERTICULOSIS/ANASTOMOSIS    CYSTOSCOPY W/ URETERAL STENT PLACEMENT Right 10/7/2023    Procedure: CYSTOSCOPY URETERAL CATHETER/STENT INSERTION,right retrograde;  Surgeon: Roberto Luu MD;  Location: [facility] MAIN OR;  Service: Urology;  Laterality: Right;    FEMUR OPEN REDUCTION INTERNAL FIXATION Left 07/21/2021    Procedure: FEMORAL NECK OPEN REDUCTION INTERNAL FIXATION;  Surgeon: Bam Warner MD;  Location: [facility] MAIN OR;  Service: Orthopedics;  Laterality: Left;    HIP CLOSED REDUCTION Left 3/20/2022    Procedure: HIP CLOSED REDUCTION;  Surgeon: Harsha Gayle MD;  Location: [facility] MAIN OR;  Service: Orthopedics;  Laterality: Left;    INGUINAL HERNIA REPAIR Left 09/29/2011    ORIF WRIST FRACTURE Left 8/24/2023    Procedure: OPEN REDUCTION AND INTERNAL FIXATION WRIST;  Surgeon: Ben Alegre MD;  Location: [facility] OR OSC;  Service: Orthopedics;  Laterality: Left;    TOTAL HIP ARTHROPLASTY Left 3/16/2022    Procedure: LEFT  TOTAL HIP ARTHROPLASTY AND HARDWARE REMOVAL;  Surgeon: Bam Warner MD;  Location: [facility] MAIN OR;  Service: Orthopedics;  Laterality: Left;    UPPER GASTROINTESTINAL ENDOSCOPY      WITH DILATATION         Current Outpatient Medications:     apixaban (ELIQUIS) 2.5 MG tablet tablet, Take 1 tablet by mouth 2 (Two) Times a Day., Disp: 180 tablet, Rfl: 3    ascorbic acid (VITAMIN C) 500 MG tablet, Take 1 tablet by mouth Daily., Disp: , Rfl:     colestipol (COLESTID) 1 g tablet, Take 2 tablets by mouth Daily., Disp: 180 tablet, Rfl: 3    ferrous sulfate 325 (65 FE) MG tablet, Take 1 tablet by mouth Daily With Breakfast for 30 days., Disp: 30 tablet, Rfl: 0    FLUoxetine (PROzac) 20 MG capsule, Take 1 capsule by mouth Daily., Disp: 90 capsule, Rfl: 3    furosemide (LASIX) 20 MG tablet, Take 1 tablet by mouth 2 (Two) Times a Day., Disp: 180 tablet, Rfl: 3    losartan (COZAAR) 25 MG tablet, TAKE 1 TABLET BY MOUTH EVERY DAY, Disp: 90 tablet, Rfl: 1    Lumigan 0.01 % ophthalmic drops, Administer 1 drop to both eyes Every Night., Disp: , Rfl:     metoprolol succinate XL (TOPROL-XL) 100 MG 24 hr tablet, Take 1 tablet by mouth 2 (Two) Times a Day., Disp: 180 tablet, Rfl: 3    potassium chloride 10 MEQ CR tablet, Take 1 tablet by mouth 2 (Two) Times a Day., Disp: 180 tablet, Rfl: 3    tamsulosin (FLOMAX) 0.4 MG capsule 24 hr capsule, Take 1 capsule by mouth Daily for 30 days., Disp: 30 capsule, Rfl: 0    timolol (TIMOPTIC) 0.5 % ophthalmic solution, Administer 1 drop to both eyes Every Morning., Disp: , Rfl:     Allergies   Allergen Reactions    Citalopram Unknown - High Severity    Clonazepam Unknown - High Severity    Levofloxacin Nausea And Vomiting    Lisinopril Unknown - High Severity    Macrobid [Nitrofurantoin Macrocrystal] Rash    Melatonin Unknown - High Severity        Family History   Problem Relation Age of Onset    Malig Hyperthermia Neg Hx        Social History     Socioeconomic History    Marital status:     Tobacco Use    Smoking status: Never    Smokeless tobacco: Never   Vaping Use    Vaping Use: Never used   Substance and Sexual Activity    Alcohol use: Not Currently    Drug use: Never    Sexual activity: Defer       Vital Signs:   There were no vitals taken for this visit.     Physical exam    Alert and orient x3  Well appearing, well developed, in no acute distress   Unlabored respirations  Nontender/nondistended      Grossly oriented to person, place and time, judgment is intact, normal mood and affect              Assessment and Plan    Diagnoses and all orders for this visit:    1. Nephrolithiasis (Primary)          Nephrolithiasis       CT images and read reviewed discussed with patient      We had previously discussed conservative monitoring of her stones with at this point she has an obstructive stone with a stent in place we discussed continue to proceed with cystoscopy with right ureteroscopy with laser and right ureteral stent placement.    Risks and benefits were discussed including bleeding, infection and damage to the urinary system.  We also discussed the risk of anesthesia up to and including death.  Patient voiced understanding and would like to proceed.    Urine culture before      We will plan on doing ceftriaxone 1 g IV or IM daily x5 days leading up to surgery.  Risk and benefits discussed      Hold Eliquis x3 full days before surgery

## 2023-10-16 LAB
QT INTERVAL: 290 MS
QTC INTERVAL: 414 MS

## 2023-10-17 ENCOUNTER — PREP FOR SURGERY (OUTPATIENT)
Dept: OTHER | Facility: HOSPITAL | Age: 88
End: 2023-10-17
Payer: MEDICARE

## 2023-10-17 ENCOUNTER — OFFICE VISIT (OUTPATIENT)
Dept: UROLOGY | Facility: CLINIC | Age: 88
End: 2023-10-17
Payer: MEDICARE

## 2023-10-17 ENCOUNTER — TELEPHONE (OUTPATIENT)
Dept: CARDIOLOGY | Facility: CLINIC | Age: 88
End: 2023-10-17
Payer: MEDICARE

## 2023-10-17 VITALS — WEIGHT: 139 LBS | BODY MASS INDEX: 21.82 KG/M2 | HEIGHT: 67 IN

## 2023-10-17 DIAGNOSIS — N20.1 URETERAL STONE: Primary | ICD-10-CM

## 2023-10-17 DIAGNOSIS — N20.0 NEPHROLITHIASIS: Primary | ICD-10-CM

## 2023-10-17 PROCEDURE — 1160F RVW MEDS BY RX/DR IN RCRD: CPT | Performed by: UROLOGY

## 2023-10-17 PROCEDURE — 1159F MED LIST DOCD IN RCRD: CPT | Performed by: UROLOGY

## 2023-10-17 PROCEDURE — 99213 OFFICE O/P EST LOW 20 MIN: CPT | Performed by: UROLOGY

## 2023-10-17 RX ORDER — SODIUM CHLORIDE 0.9 % (FLUSH) 0.9 %
10 SYRINGE (ML) INJECTION AS NEEDED
OUTPATIENT
Start: 2023-10-17

## 2023-10-17 RX ORDER — SODIUM CHLORIDE 9 MG/ML
40 INJECTION, SOLUTION INTRAVENOUS AS NEEDED
OUTPATIENT
Start: 2023-10-17

## 2023-10-17 RX ORDER — SODIUM CHLORIDE 9 MG/ML
100 INJECTION, SOLUTION INTRAVENOUS CONTINUOUS
OUTPATIENT
Start: 2023-10-17

## 2023-10-17 RX ORDER — SODIUM CHLORIDE 0.9 % (FLUSH) 0.9 %
3 SYRINGE (ML) INJECTION EVERY 12 HOURS SCHEDULED
OUTPATIENT
Start: 2023-10-17

## 2023-10-17 NOTE — TELEPHONE ENCOUNTER
----- Message from GABRIELLA Mast sent at 10/17/2023  1:27 PM EDT -----    ----- Message -----  From: Obi Boone RN  Sent: 10/17/2023   1:18 PM EDT  To: GABRIELLA Mast

## 2023-10-17 NOTE — TELEPHONE ENCOUNTER
Procedure: Cystoscopy with right ureteroscopy with laser and right ureteral stent placement     Medication Directive: Eliquis 3 days prior    PMH: AFIB, HTN,     Last Seen: 07/11/23    ECHO: 06/15/23    Left ventricular systolic function is normal. Left ventricular ejection fraction appears to be 51 - 55%.    Left ventricular diastolic function was indeterminate due to presence of atrial fibrillation.    There is mild to moderate biatrial enlargement.    Mild to moderate mitral regurgitation noted, with multiple jets.    There is mild to moderate tricuspid regurgitation.  Estimated right ventricular systolic pressure from tricuspid regurgitation is moderately elevated (45-55 mmHg).

## 2023-10-18 NOTE — TELEPHONE ENCOUNTER
Patient is low risk for perioperative complications and is okay to hold Eliquis for 3 days prior to the procedure.

## 2023-10-20 ENCOUNTER — TELEPHONE (OUTPATIENT)
Dept: UROLOGY | Facility: CLINIC | Age: 88
End: 2023-10-20
Payer: MEDICARE

## 2023-10-20 NOTE — TELEPHONE ENCOUNTER
Caller: RONAL    Relationship: Provider    Best call back number: [phone]    What form or medical record are you requesting: LAST OFFICE NOTE    Who is requesting this form or medical record from you: [facility]    How would you like to receive the form or medical records (pick-up, mail, fax): FAX  If fax, what is the fax number: [phone]      Timeframe paperwork needed: ASAP    Additional notes: PLEASE FAX PATIENT'S LAST OFFICE NOTE (10/17/23GEORGINA) TO [facility].

## 2023-10-20 NOTE — TELEPHONE ENCOUNTER
Notified pt daughter Lorrie that per Dr Chester, he reviewed labs and will proceed with surgery. There is a very minimal, to no risk, of her losing a major amount of blood during the surgery. Lorrie verbalized understanding.

## 2023-10-20 NOTE — TELEPHONE ENCOUNTER
Patient daughter Lorrie called, patient is suppose to have surgery on 11/8/23 with Dr Chester, she says that her mom had some labwork done and her RBC,WBC, Hematocrit came back low. She wants to know if the surgery can still be done, she asked if it is possible if the levels can return to nomral before the surgery and if not how it would impact the surgery. She said it is okay to call her or her sister

## 2023-10-22 ENCOUNTER — OUTSIDE FACILITY SERVICE (OUTPATIENT)
Dept: INTERNAL MEDICINE | Facility: CLINIC | Age: 88
End: 2023-10-22
Payer: MEDICARE

## 2023-10-22 ENCOUNTER — READMISSION MANAGEMENT (OUTPATIENT)
Dept: CALL CENTER | Facility: HOSPITAL | Age: 88
End: 2023-10-22
Payer: MEDICARE

## 2023-10-22 NOTE — OUTREACH NOTE
Prep Survey      Flowsheet Row Responses   [facility]od[facility] facility patient discharged from? Non-BH   Is LACE score < 7 ? Non-BH Discharge   Eligibility [facility] - Inpatient   Date of Discharge 10/21/23   Discharge Disposition Home-Health Care Svc   Discharge diagnosis weakness   Does the patient have one of the following disease processes/diagnoses(primary or secondary)? Other   Does the patient have Home health ordered? Yes   What is the Home health agency?  Home - with Home Health Services   Prep survey completed? Yes            Charisse MCFARLANE - Registered Nurse

## 2023-10-23 ENCOUNTER — TELEPHONE (OUTPATIENT)
Dept: INTERNAL MEDICINE | Facility: CLINIC | Age: 88
End: 2023-10-23
Payer: MEDICARE

## 2023-10-23 ENCOUNTER — TRANSITIONAL CARE MANAGEMENT TELEPHONE ENCOUNTER (OUTPATIENT)
Dept: CALL CENTER | Facility: HOSPITAL | Age: 88
End: 2023-10-23
Payer: MEDICARE

## 2023-10-23 NOTE — OUTREACH NOTE
Call Center TCM Note      Flowsheet Row Responses   [facility] patient discharged from? Non-BH  [[facility]]   Does the patient have one of the following disease processes/diagnoses(primary or secondary)? Other   TCM attempt successful? Yes  [no verbal relese for PCP office]   Call start time 1229   Call end time 1236   Discharge diagnosis weakness   List who call center can speak with verbal permission from pt to speak with dtr   Person spoke with today (if not patient) and relationship nando Rivera   Medication alerts for this patient Pt to start IV ATBS 5 days pre-op stone removal--surgery on 11/8/23   Meds reviewed with patient/caregiver? Yes   Is the patient having any side effects they believe may be caused by any medication additions or changes? No   Does the patient have all medications ordered at discharge? Yes   Prescription comments [facility] added Diltiazem 30mg bid, iron supplement 325mg BID (for 30 days), Flomax 0.4mg qd,.   Is the patient taking all medications as directed (includes completed medication regime)? Yes   Comments HOSP DC FU appt 10/26/23@200pm   Does the patient have an appointment with their PCP within 7-14 days of discharge? Yes   What is the Home health agency?  Home - with Home Health Services   Has home health visited the patient within 72 hours of discharge? Yes   Psychosocial issues? No   Did the patient receive a copy of their discharge instructions? Yes   Nursing interventions Reviewed instructions with patient   What is the patient's perception of their health status since discharge? Same  [Pt has stent in presently with plans for stone removal on 11/8/23, denies hematuria but reports urine is dark but has not been drinking much ( will increase her intake).  Appetite is better, no fever or chills noted. HH is following pt. Med changes noted]   Is the patient/caregiver able to teach back signs and symptoms related to disease process for when to call PCP? Yes   Is the  patient/caregiver able to teach back signs and symptoms related to disease process for when to call 911? Yes   TCM call completed? Yes   Call end time 1236            Shannon Mclaughlin RN    10/23/2023, 12:39 EDT

## 2023-10-25 ENCOUNTER — TELEPHONE (OUTPATIENT)
Dept: UROLOGY | Facility: CLINIC | Age: 88
End: 2023-10-25
Payer: MEDICARE

## 2023-10-25 ENCOUNTER — OFFICE VISIT (OUTPATIENT)
Dept: ORTHOPEDIC SURGERY | Facility: CLINIC | Age: 88
End: 2023-10-25
Payer: MEDICARE

## 2023-10-25 VITALS — WEIGHT: 139 LBS | HEIGHT: 67 IN | BODY MASS INDEX: 21.82 KG/M2

## 2023-10-25 DIAGNOSIS — S62.102A CLOSED FRACTURE OF LEFT WRIST, INITIAL ENCOUNTER: Primary | ICD-10-CM

## 2023-10-25 DIAGNOSIS — Z47.89 AFTERCARE FOLLOWING SURGERY OF THE MUSCULOSKELETAL SYSTEM: ICD-10-CM

## 2023-10-25 PROCEDURE — 99024 POSTOP FOLLOW-UP VISIT: CPT | Performed by: PHYSICIAN ASSISTANT

## 2023-10-25 PROCEDURE — 1159F MED LIST DOCD IN RCRD: CPT | Performed by: PHYSICIAN ASSISTANT

## 2023-10-25 PROCEDURE — 1160F RVW MEDS BY RX/DR IN RCRD: CPT | Performed by: PHYSICIAN ASSISTANT

## 2023-10-25 RX ORDER — LORAZEPAM 0.5 MG/1
0.5 TABLET ORAL NIGHTLY
COMMUNITY

## 2023-10-25 NOTE — PAT
Per Sophie RN in [facility] patient daughter states she was unaware of appointment when she called.   I called office and spoke with Ayla who is working with Dr. Chester.  Explained that I don't see any lab orders and patient had recent blood work done with caretenders.  Patient has cardiac clearance and recent EKG's.  Per Ayla would be ok to have phone screen instead of visit.

## 2023-10-25 NOTE — PRE-PROCEDURE INSTRUCTIONS
PATIENT INSTRUCTED TO BE:    - NPO AFTER MIDNIGHT EXCEPT CAN HAVE CLEAR LIQUIDS 2 HOURS PRIOR TO SURGERY ARRIVAL TIME     - TO HOLD ALL VITAMINS, SUPPLEMENTS, NSAIDS FOR ONE WEEK PRIOR TO THEIR SURGICAL PROCEDURE    - INSTRUCTED PT TO USE SURGICAL SOAP 1 TIME THE NIGHT PRIOR TO SURGERY OR THE AM OF SURGERY.   USE SOAP FROM NECK TO TOES AVOID THEIR FACE, HAIR, AND PRIVATE PARTS. INSTRUCTED NO LOTIONS, JEWELRY, PIERCINGS, OR DEODORANT DAY OF SURGERY        - INSTRUCTED TO TAKE THE FOLLOWING MEDICATIONS THE DAY OF SURGERY:   Metoprolol, Cardizem, Prozac, Colestid, Ferrous, Potassium, Tamsulosin    PATIENT VERBALIZED UNDERSTANDING

## 2023-10-25 NOTE — TELEPHONE ENCOUNTER
Spoke to [facility] giving verbal for them to give anaphylaxis kit along with the Rocephin. They repeated order via teach back. They also said this medication should be ready for pickup today. I did call pt's daughter to let her know that home health has already told me they will administer the medication and that the pharmacy said they will have it ready today. I did advise her that the medication may need to be refrigerated so to make sure she inquires about that when she goes so that the medication is stored correctly. She verbalized understanding via teach back.

## 2023-10-25 NOTE — PATIENT INSTRUCTIONS
X-rays taken and reviewed. Patient can wean out of brace and gradually return to activity as tolerated. Continue icing and elevation as needed for pain or swelling.     Follow up as needed. Call with changes or concerns.

## 2023-10-25 NOTE — PROGRESS NOTES
"Chief Complaint  Pain and Follow-up of the Left Wrist    Subjective      Bing Cruz presents to [facility] for follow-up of ORIF of three-part intra-articular left distal radius fracture performed on 8/20/2023 by Dr. Alegre.  Patient presents today with left wrist brace in place, stating she has tolerated this well.  She states that her wrist \"just hurts sometimes, but is getting better\".  Pain is primarily when she uses her left wrist to push up from a seated position.    Objective   Allergies   Allergen Reactions    Citalopram Unknown - High Severity    Clonazepam Unknown - High Severity    Levofloxacin Nausea And Vomiting    Lisinopril Unknown - High Severity    Macrobid [Nitrofurantoin Macrocrystal] Rash    Melatonin Unknown - High Severity       Vital Signs:   Ht 168.9 cm (66.5\")   Wt 63 kg (139 lb)   BMI 22.10 kg/m²       Physical Exam    Constitutional: Awake, alert. Well nourished appearance.    Integumentary: Warm, dry, intact. No obvious rashes.    HENT: Atraumatic, normocephalic.   Respiratory: Non labored respirations .   Cardiovascular: Intact peripheral pulses.    Psychiatric: Normal mood and affect. A&O X3    Ortho Exam  Left wrist: Skin is warm, dry, and intact.  Well-healed surgical scar noted.  Mildly decreased wrist ROM in all planes.  She is able to form a full fist.  Good thumb opposition.  Sensation intact light touch.  Distal neurovascular intact.    Imaging Results (Most Recent)       Procedure Component Value Units Date/Time    XR Wrist 2 View Left [[id number]] Resulted: 10/25/23 1359     Updated: 10/25/23 1400    Narrative:      X-Ray Report:  Study: X-rays ordered, taken in the office, and reviewed today.   Site: Left wrist Xray  Indication: ORIF  View: AP/Lateral view(s)  Findings: Intact left distal radius ORIF fixation hardware. No evidence of   hardware malfunction.   Prior studies available for comparison: yes                       Assessment and Plan "   Problem List Items Addressed This Visit          Musculoskeletal and Injuries    Aftercare following surgery of left femoral neck ORIF 7/21/2021    Wrist fracture, left - Primary    Relevant Orders    XR Wrist 2 View Left (Completed)       Follow Up   Return if symptoms worsen or fail to improve.    Tobacco Use: Low Risk  (10/25/2023)    Patient History     Smoking Tobacco Use: Never     Smokeless Tobacco Use: Never     Passive Exposure: Not on file     Patient is a non-smoker.  Did not discuss tobacco cessation options.    Patient Instructions   X-rays taken and reviewed. Patient can wean out of brace and gradually return to activity as tolerated. Continue icing and elevation as needed for pain or swelling.     Follow up as needed. Call with changes or concerns.   Patient was given instructions and counseling regarding her condition or for health maintenance advice. Please see specific information pulled into the AVS if appropriate.

## 2023-10-25 NOTE — TELEPHONE ENCOUNTER
Received call from Mandy with [facility]. They received my faxed orders for the Rocephin injections but said in order for them to give her these in the home, they require an anaphylaxis kit to be present as well. I advised her that I sent this Rx to [facility] but did not include the anaphylaxis kit so I will call them and give them the verbal for that.

## 2023-10-26 ENCOUNTER — OFFICE VISIT (OUTPATIENT)
Dept: INTERNAL MEDICINE | Facility: CLINIC | Age: 88
End: 2023-10-26
Payer: MEDICARE

## 2023-10-26 VITALS
DIASTOLIC BLOOD PRESSURE: 78 MMHG | OXYGEN SATURATION: 97 % | HEIGHT: 67 IN | BODY MASS INDEX: 20.47 KG/M2 | HEART RATE: 69 BPM | TEMPERATURE: 98.2 F | RESPIRATION RATE: 16 BRPM | WEIGHT: 130.4 LBS | SYSTOLIC BLOOD PRESSURE: 125 MMHG

## 2023-10-26 DIAGNOSIS — Z09 HOSPITAL DISCHARGE FOLLOW-UP: Primary | ICD-10-CM

## 2023-10-26 DIAGNOSIS — N20.1 URETERAL STONE: ICD-10-CM

## 2023-10-26 NOTE — PROGRESS NOTES
Chief Complaint  Follow-up (D/C from [facility] this past Saturday. Had a stent in for a kidney stone. Will have removed on the 8th of November. Difficulty gaining weight. Home Health will start seeing her on the 3rd of November to give her IV antibiotics. )    History of Present Illness  SUKUMAR Cruz presents to [facility]ICINE Transitional Care Follow Up Visit  Sukumar Cruz is a 89 y.o. female who presents for a transitional care management visit.    Within 48 business hours after discharge our office contacted her via telephone to coordinate her care and needs.      I reviewed and discussed the details of that call along with the discharge summary, hospital problems, inpatient lab results, inpatient diagnostic studies, and consultation reports with Bing.     Current outpatient and discharge medications have been reconciled for the patient.  Reviewed by: GABRIELLA Mayen          10/22/2023     7:15 AM   Date of TCM Phone Call   [facility]   Date of Discharge 10/21/2023   Discharge Disposition Home-Health Care [facility] [place]     Risk for Readmission (LACE) Score: 12 (10/11/2023  6:00 AM)    Patient went to [facility] on 10/7/2023-10/11/2023, then transferred to [facility] through Saturday 10/21/2023.  She has been seen for multiple urinary tract infections.  CT of abdomen pelvis on 9/18/2023 showed a 13 x 6 mm stone in the right renal pelvis with some urothelial thickening and inflammation.  Right side showed 7 mm lower pole, 7 x 15 mm lower pole partial staghorn.  No hydronephrosis or stones on the left side.  She went to the emergency department at [facility] with complaints of decreased oral intake and weakness.  She was found to have a UTI with E. coli resulting in sepsis.  She ended up with an acute kidney injury as well.   Patient had a stent placed and was on IV abx throughout her  hospitalization. She did well and was discharged to rehab. She continued abx and her strength improved. And she was discharged home.  Patient has [facility] and she is having IV antibiotics for 5 days prior to her procedure on 11/8. She is having PT and OT.    Patient states that she is feeling better. She is getting stronger.  She denies dizziness, weakness, confusion, or lethargy.     Past Medical History:   Diagnosis Date    Abdominal pain, generalized     Anemia     Aortic regurgitation     Aortic stenosis     PER ECHO 6/15/23  NO STENOSIS    Broken arm     left upper, josseline placed    Cardiomegaly     Chronic atrial fibrillation 01/01/2019    Atrial fibrillation converted to sinus through cardioversion (March 2019    Chronic fatigue     Colon cancer     Diarrhea     Disease of tricuspid valve     Diverticulosis of colon     DVT (deep venous thrombosis)     LEFT LEG GREATER THAN 5 YRS AGO    Dysphagia     Essential (primary) hypertension     Frequent UTI     IV antibiotic prior to surgery for 5 days    H/O Hiatal hernia     H/O Sepsis 10/07/2023    Insomnia, unspecified     LVH (left ventricular hypertrophy)     Major depressive disorder, single episode     Mitral regurgitation     Mitral valve insufficiency and aortic valve insufficiency     Osteopenia     Pain in joint, pelvic region and thigh     TR (tricuspid regurgitation)       Family History   Problem Relation Age of Onset    Malig Hyperthermia Neg Hx       Past Surgical History:   Procedure Laterality Date    ABDOMINAL HYSTERECTOMY      WITH BSO     ANKLE SURGERY      (L) ankle fracture    BLADDER REPAIR      BREAST BIOPSY      (L) breast biopsy    CARDIOVERSION  2019    CATARACT EXTRACTION      OU    CHOLECYSTECTOMY      OPEN    COLON SURGERY      STATES SHE HAD 12 INCHES OF COLON REMOVED IN JULY 2020 FOR COLON CANCER    COLONOSCOPY  2020    COLONOSCOPY N/A 10/29/2021    Procedure: COLONOSCOPY;  Surgeon: Edmar Back MD;  Location: [facility]  ENDOSCOPY;  Service: General;  Laterality: N/A;  DIVERTICULOSIS/ANASTOMOSIS    CYSTOSCOPY W/ URETERAL STENT PLACEMENT Right 10/07/2023    Procedure: CYSTOSCOPY URETERAL CATHETER/STENT INSERTION,right retrograde;  Surgeon: Roberto Luu MD;  Location: [facility] MAIN OR;  Service: Urology;  Laterality: Right;    FEMUR OPEN REDUCTION INTERNAL FIXATION Left 07/21/2021    Procedure: FEMORAL NECK OPEN REDUCTION INTERNAL FIXATION;  Surgeon: Bam Warner MD;  Location: [facility] MAIN OR;  Service: Orthopedics;  Laterality: Left;    FOOT SURGERY Left     HIP CLOSED REDUCTION Left 03/20/2022    Procedure: HIP CLOSED REDUCTION;  Surgeon: Harsha Gayle MD;  Location: [facility] MAIN OR;  Service: Orthopedics;  Laterality: Left;    INGUINAL HERNIA REPAIR Left 09/29/2011    ORIF WRIST FRACTURE Left 08/24/2023    Procedure: OPEN REDUCTION AND INTERNAL FIXATION WRIST;  Surgeon: Ben Alegre MD;  Location: [facility] OR [facility];  Service: Orthopedics;  Laterality: Left;    TOTAL HIP ARTHROPLASTY Left 03/16/2022    Procedure: LEFT TOTAL HIP ARTHROPLASTY AND HARDWARE REMOVAL;  Surgeon: Bam Warner MD;  Location: [facility] OR;  Service: Orthopedics;  Laterality: Left;    UPPER GASTROINTESTINAL ENDOSCOPY      WITH DILATATION        Current Outpatient Medications:     apixaban (ELIQUIS) 2.5 MG tablet tablet, Take 1 tablet by mouth 2 (Two) Times a Day. (Patient taking differently: Take 1 tablet by mouth 2 (Two) Times a Day. Last dose per Dr. Plata 11-4-23), Disp: 180 tablet, Rfl: 3    ascorbic acid (VITAMIN C) 500 MG tablet, Take 1 tablet by mouth Daily., Disp: , Rfl:     colestipol (COLESTID) 1 g tablet, Take 2 tablets by mouth Daily., Disp: 180 tablet, Rfl: 3    dilTIAZem (CARDIZEM) 30 MG tablet, Take 1 tablet by mouth Every 12 (Twelve) Hours., Disp: , Rfl:     ferrous sulfate 325 (65 FE) MG tablet, Take 1 tablet by mouth Daily With Breakfast for 30 days., Disp: 30 tablet, Rfl: 0    FLUoxetine (PROzac) 20 MG capsule,  "Take 1 capsule by mouth Daily., Disp: 90 capsule, Rfl: 3    furosemide (LASIX) 20 MG tablet, Take 1 tablet by mouth 2 (Two) Times a Day., Disp: 180 tablet, Rfl: 3    LORazepam (ATIVAN) 0.5 MG tablet, Take 1 tablet by mouth Every Night., Disp: , Rfl:     losartan (COZAAR) 25 MG tablet, TAKE 1 TABLET BY MOUTH EVERY DAY, Disp: 90 tablet, Rfl: 1    Lumigan 0.01 % ophthalmic drops, Administer 1 drop to both eyes Every Night., Disp: , Rfl:     metoprolol succinate XL (TOPROL-XL) 100 MG 24 hr tablet, Take 1 tablet by mouth 2 (Two) Times a Day., Disp: 180 tablet, Rfl: 3    potassium chloride 10 MEQ CR tablet, Take 1 tablet by mouth 2 (Two) Times a Day., Disp: 180 tablet, Rfl: 3    tamsulosin (FLOMAX) 0.4 MG capsule 24 hr capsule, Take 1 capsule by mouth Daily for 30 days., Disp: 30 capsule, Rfl: 0    timolol (TIMOPTIC) 0.5 % ophthalmic solution, Administer 1 drop to both eyes Every Morning., Disp: , Rfl:     OBJECTIVE  Vital Signs:   /78 (BP Location: Right arm, Patient Position: Sitting, Cuff Size: Large Adult)   Pulse 69   Temp 98.2 °F (36.8 °C) (Temporal)   Resp 16   Ht 170.2 cm (67\")   Wt 59.1 kg (130 lb 6.4 oz)   SpO2 97%   BMI 20.42 kg/m²    Estimated body mass index is 20.42 kg/m² as calculated from the following:    Height as of this encounter: 170.2 cm (67\").    Weight as of this encounter: 59.1 kg (130 lb 6.4 oz).     Wt Readings from Last 3 Encounters:   10/26/23 59.1 kg (130 lb 6.4 oz)   10/25/23 63 kg (139 lb)   10/17/23 63 kg (139 lb)     BP Readings from Last 3 Encounters:   10/26/23 125/78   10/11/23 139/71   10/06/23 144/88       Physical Exam  Vitals and nursing note reviewed.   Constitutional:       Appearance: Normal appearance.   HENT:      Head: Normocephalic.   Eyes:      Extraocular Movements: Extraocular movements intact.      Conjunctiva/sclera: Conjunctivae normal.   Cardiovascular:      Rate and Rhythm: Normal rate and regular rhythm.      Heart sounds: Normal heart sounds. No murmur " heard.  Pulmonary:      Effort: Pulmonary effort is normal.      Breath sounds: Normal breath sounds. No wheezing or rales.   Abdominal:      General: Bowel sounds are normal.      Palpations: Abdomen is soft.      Tenderness: There is no abdominal tenderness. There is no right CVA tenderness, left CVA tenderness or guarding.   Musculoskeletal:         General: No swelling. Normal range of motion.   Skin:     General: Skin is warm and dry.   Neurological:      Mental Status: She is alert. Mental status is at baseline.      Motor: Weakness present.      Gait: Gait abnormal.   Psychiatric:         Mood and Affect: Mood normal.         Behavior: Behavior normal.         Thought Content: Thought content normal.         Judgment: Judgment normal.          Result Review        CT Abdomen Pelvis Without Contrast    Result Date: 10/7/2023    1. Obstructing 9 millimeter right UPJ stone resulting in moderate hydronephrosis.  Additional nonobstructing calculi in the right inferior pole measures up to 1.2 centimeter. 2. Cardiomegaly with findings of volume overload/3rd spacing including small bilateral pleural effusions, mild body wall edema and trace pelvic fluid. 3. Moderate hiatal hernia. 4. Colonic diverticulosis. 5. Other chronic/ancillary findings detailed above.     NIMA CORREIA MD       Electronically Signed and Approved By: NIMA CORREIA MD on 10/07/2023 at 17:50             XR Chest 1 View    Result Date: 10/7/2023    Cardiomegaly with questionable trace effusions.  No overt edema or lobar infiltrate.       NIMA CORREIA MD       Electronically Signed and Approved By: NIMA CORREIA MD on 10/07/2023 at 15:42             MRI abdomen w wo contrast mrcp    Result Date: 9/29/2023    1. Multiple small cystic lesions are seen in the pancreas measuring up to about 1 centimeter.  Many of these appear to communicate with the main pancreatic duct.  Mildly dilated side branch pancreatic ducts also noted.  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  Recommend follow-up MRI in 1 year to reassess. 2. Redemonstration of stone in the right renal pelvis measuring up to at least 0.7 centimeters as seen on recent CT.  Other right-sided nephroliths seen on recent CT not well seen.  3. Other findings as above similar to recent CT scan dated 9/18/2023.     NIMA PALAFOX MD       Electronically Signed and Approved By: NIMA PALAFOX MD on 9/29/2023 at 17:42             FL Video Swallow With Speech Single Contrast    Result Date: 9/21/2023     1. Deep laryngeal penetration with large boluses of thin liquid barium, improved with smaller boluses of thin liquid barium  Please consult speech pathology report for further details regarding exam and dietary recommendations   IAN QUIROGA       Electronically Signed and Approved By: Sergei Cisneros M.D. on 9/21/2023 at 15:05             CT Abdomen Pelvis With & Without Contrast    Result Date: 9/18/2023    1. Oval 10 mm x 6 mm stone within the right renal pelvis with associated urothelial thickening and inflammation.  No associated obstruction. 2. Multiple additional nonobstructing right-sided renal stones. 3. Postsurgical changes of prior right hemicolectomy.  Extensive colonic diverticulosis without acute diverticulitis. 4. Small 9 mm pancreatic cystic lesion along the ventral aspect of the pancreatic body.  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  This is present within an otherwise atrophic pancreas.  Follow-up imaging with MRCP would be recommended.      OLIVIA MAN MD       Electronically Signed and Approved By: OLIVIA MAN MD on 9/18/2023 at 13:09             XR Forearm 2 View Left    Result Date: 8/21/2023   Extensive soft tissue swelling in the left wrist.  Fractures of the distal left radius and left ulnar styloid process.      HUY LOPEZ MD       Electronically Signed and Approved By: HUY LOPEZ MD on 8/21/2023 at 17:06                The above  data has been reviewed by GABRIELLA Mayen 10/26/2023 14:15 EDT.          Patient Care Team:  Jairo Kramer MD as PCP - General (Internal Medicine)  Jairo Kramer MD (Internal Medicine)  Matthew Lewis MD as Consulting Physician (Hematology and Oncology)  Rose Boykin APRN as Nurse Practitioner (Nurse Practitioner)  Griselda Blount MD as Consulting Physician (Gastroenterology)  Leslie Cody APRN as Nurse Practitioner (Nurse Practitioner)  Morris Chester MD as Consulting Physician (Urology)    BMI is within normal parameters. No other follow-up for BMI required.       ASSESSMENT & PLAN    Diagnoses and all orders for this visit:    1. Hospital discharge follow-up (Primary)  Assessment & Plan:  Patient went to [facility] on 10/7/2023-10/11/2023, then transferred to [facility] through Saturday 10/21/2023.  She has been seen for multiple urinary tract infections.  CT of abdomen pelvis on 9/18/2023 showed a 13 x 6 mm stone in the right renal pelvis with some urothelial thickening and inflammation.  Right side showed 7 mm lower pole, 7 x 15 mm lower pole partial staghorn.  No hydronephrosis or stones on the left side.  She went to the emergency department at [facility] with complaints of decreased oral intake and weakness.  She was found to have a UTI with E. coli resulting in sepsis.  She ended up with an acute kidney injury as well.   Patient had a stent placed and was on IV abx throughout her hospitalization. She did well and was discharged to rehab. She continued abx and her strength improved. And she was discharged home.  Patient has [facility] and she is having IV antibiotics for 5 days prior to her procedure on 11/8. She is having PT and OT.  Patient states that she is feeling better. She is getting stronger.  She denies dizziness, weakness, confusion, or lethargy.   Will f/u on cbc and cmp.  Continue working with home health.  Recommend  she f/u with urology as planned  If she develops and confusion, abdominal pain, weakness, or symptoms, patient should seek medical attn.      2. Ureteral stone  -     CBC & Differential; Future  -     Comprehensive Metabolic Panel; Future         Tobacco Use: Low Risk  (10/26/2023)    Patient History     Smoking Tobacco Use: Never     Smokeless Tobacco Use: Never     Passive Exposure: Not on file       Follow Up     Return if symptoms worsen or fail to improve.    Please note that portions of this note were completed with a voice recognition program.    Patient was given instructions and counseling regarding her condition or for health maintenance advice. Please see specific information pulled into the AVS if appropriate.   I have reviewed information obtained and documented by others and I have confirmed the accuracy of this documented note.    GABRIELLA Mayen

## 2023-10-26 NOTE — ASSESSMENT & PLAN NOTE
Patient went to [facility] on 10/7/2023-10/11/2023, then transferred to [facility] through Saturday 10/21/2023.  She has been seen for multiple urinary tract infections.  CT of abdomen pelvis on 9/18/2023 showed a 13 x 6 mm stone in the right renal pelvis with some urothelial thickening and inflammation.  Right side showed 7 mm lower pole, 7 x 15 mm lower pole partial staghorn.  No hydronephrosis or stones on the left side.  She went to the emergency department at [facility] with complaints of decreased oral intake and weakness.  She was found to have a UTI with E. coli resulting in sepsis.  She ended up with an acute kidney injury as well.   Patient had a stent placed and was on IV abx throughout her hospitalization. She did well and was discharged to rehab. She continued abx and her strength improved. And she was discharged home.  [facility] she is having IV antibiotics for 5 days prior to her procedure on 11/8. She is having PT and OT.  Patient states that she is feeling better. She is getting stronger.  She denies dizziness, weakness, confusion, or lethargy.   Will f/u on cbc and cmp.  Continue working with home health.  Recommend she f/u with urology as planned  If she develops and confusion, abdominal pain, weakness, or symptoms, patient should seek medical attn.

## 2023-11-02 ENCOUNTER — TELEPHONE (OUTPATIENT)
Dept: UROLOGY | Facility: CLINIC | Age: 88
End: 2023-11-02
Payer: MEDICARE

## 2023-11-02 NOTE — TELEPHONE ENCOUNTER
Spoke to Mandy with [employer] to let her know the medication has been sent to [facility]. I advised her that I previously told the pt daughter this and she verbalized all of the information via teach back as well as wrote it all down. I called pt daughter who said she knows the medication is going to [facility] and she is going to pick it up within the hour. She will call back with any further issues. Spoke to [facility] to ensure the medication is still ready for , Gabriela verified that it is ready for pickup.

## 2023-11-02 NOTE — TELEPHONE ENCOUNTER
LIDYA CALLED FROM [facility].  THEY RECEIVED AND ORDER ON 10/25/23 FROM DR. ERICKSON TO START IM ANTIBIOTICS 11/03/23.    SHE CALLED THE PATIENT AND THEY DIDN'T  THE MEDICATION.    SHE CALLED [facility] AND THEY DID NOT GET AN ORDER.  THEY WOULD NOT BE ABLE TO GET THE ROCEPHIN AND KIT BY TOMORROW.      PLEASE CALL LIDYA.

## 2023-11-04 ENCOUNTER — HOSPITAL ENCOUNTER (EMERGENCY)
Facility: HOSPITAL | Age: 88
Discharge: HOME | End: 2023-11-04
Attending: EMERGENCY MEDICINE
Payer: MEDICARE

## 2023-11-04 VITALS
BODY MASS INDEX: 21.97 KG/M2 | HEART RATE: 70 BPM | WEIGHT: 139.99 LBS | DIASTOLIC BLOOD PRESSURE: 70 MMHG | RESPIRATION RATE: 20 BRPM | TEMPERATURE: 97.8 F | SYSTOLIC BLOOD PRESSURE: 149 MMHG | OXYGEN SATURATION: 98 % | HEIGHT: 67 IN

## 2023-11-04 DIAGNOSIS — E86.0 DEHYDRATION: Primary | ICD-10-CM

## 2023-11-04 LAB
ALBUMIN SERPL-MCNC: 4 G/DL (ref 3.5–5.2)
ALBUMIN/GLOB SERPL: 1.2 G/DL
ALP SERPL-CCNC: 86 U/L (ref 39–117)
ALT SERPL W P-5'-P-CCNC: 18 U/L (ref 1–33)
ANION GAP SERPL CALCULATED.3IONS-SCNC: 10.1 MMOL/L (ref 5–15)
AST SERPL-CCNC: 22 U/L (ref 1–32)
BACTERIA UR QL AUTO: ABNORMAL /HPF
BASOPHILS # BLD AUTO: 0.03 10*3/MM3 (ref 0–0.2)
BASOPHILS NFR BLD AUTO: 0.4 % (ref 0–1.5)
BILIRUB SERPL-MCNC: 0.8 MG/DL (ref 0–1.2)
BILIRUB UR QL STRIP: NEGATIVE
BUN SERPL-MCNC: 19 MG/DL (ref 8–23)
BUN/CREAT SERPL: 26.4 (ref 7–25)
CALCIUM SPEC-SCNC: 9.1 MG/DL (ref 8.2–9.6)
CHLORIDE SERPL-SCNC: 104 MMOL/L (ref 98–107)
CLARITY UR: ABNORMAL
CO2 SERPL-SCNC: 25.9 MMOL/L (ref 22–29)
COLOR UR: YELLOW
CREAT SERPL-MCNC: 0.72 MG/DL (ref 0.57–1)
D-LACTATE SERPL-SCNC: 1.1 MMOL/L (ref 0.5–2)
DEPRECATED RDW RBC AUTO: 52 FL (ref 37–54)
EGFRCR SERPLBLD CKD-EPI 2021: 78.6 ML/MIN/1.73
EOSINOPHIL # BLD AUTO: 0.1 10*3/MM3 (ref 0–0.4)
EOSINOPHIL NFR BLD AUTO: 1.5 % (ref 0.3–6.2)
ERYTHROCYTE [DISTWIDTH] IN BLOOD BY AUTOMATED COUNT: 14.3 % (ref 12.3–15.4)
GLOBULIN UR ELPH-MCNC: 3.3 GM/DL
GLUCOSE SERPL-MCNC: 99 MG/DL (ref 65–99)
GLUCOSE UR STRIP-MCNC: NEGATIVE MG/DL
HCT VFR BLD AUTO: 39.5 % (ref 34–46.6)
HGB BLD-MCNC: 12.8 G/DL (ref 12–15.9)
HGB UR QL STRIP.AUTO: ABNORMAL
HOLD SPECIMEN: NORMAL
HOLD SPECIMEN: NORMAL
HYALINE CASTS UR QL AUTO: ABNORMAL /LPF
IMM GRANULOCYTES # BLD AUTO: 0.02 10*3/MM3 (ref 0–0.05)
IMM GRANULOCYTES NFR BLD AUTO: 0.3 % (ref 0–0.5)
KETONES UR QL STRIP: NEGATIVE
LEUKOCYTE ESTERASE UR QL STRIP.AUTO: ABNORMAL
LIPASE SERPL-CCNC: 22 U/L (ref 13–60)
LYMPHOCYTES # BLD AUTO: 2.09 10*3/MM3 (ref 0.7–3.1)
LYMPHOCYTES NFR BLD AUTO: 31.1 % (ref 19.6–45.3)
MCH RBC QN AUTO: 31.9 PG (ref 26.6–33)
MCHC RBC AUTO-ENTMCNC: 32.4 G/DL (ref 31.5–35.7)
MCV RBC AUTO: 98.5 FL (ref 79–97)
MONOCYTES # BLD AUTO: 0.48 10*3/MM3 (ref 0.1–0.9)
MONOCYTES NFR BLD AUTO: 7.1 % (ref 5–12)
NEUTROPHILS NFR BLD AUTO: 4 10*3/MM3 (ref 1.7–7)
NEUTROPHILS NFR BLD AUTO: 59.6 % (ref 42.7–76)
NITRITE UR QL STRIP: NEGATIVE
NRBC BLD AUTO-RTO: 0 /100 WBC (ref 0–0.2)
PH UR STRIP.AUTO: 5.5 [PH] (ref 5–8)
PLATELET # BLD AUTO: 231 10*3/MM3 (ref 140–450)
PMV BLD AUTO: 11.4 FL (ref 6–12)
POTASSIUM SERPL-SCNC: 3.9 MMOL/L (ref 3.5–5.2)
PROT SERPL-MCNC: 7.3 G/DL (ref 6–8.5)
PROT UR QL STRIP: ABNORMAL
RBC # BLD AUTO: 4.01 10*6/MM3 (ref 3.77–5.28)
RBC # UR STRIP: ABNORMAL /HPF
REF LAB TEST METHOD: ABNORMAL
SODIUM SERPL-SCNC: 140 MMOL/L (ref 136–145)
SP GR UR STRIP: 1.02 (ref 1–1.03)
SQUAMOUS #/AREA URNS HPF: ABNORMAL /HPF
UROBILINOGEN UR QL STRIP: ABNORMAL
WBC # UR STRIP: ABNORMAL /HPF
WBC NRBC COR # BLD: 6.72 10*3/MM3 (ref 3.4–10.8)
WHOLE BLOOD HOLD COAG: NORMAL
WHOLE BLOOD HOLD SPECIMEN: NORMAL

## 2023-11-04 PROCEDURE — 83690 ASSAY OF LIPASE: CPT | Performed by: EMERGENCY MEDICINE

## 2023-11-04 PROCEDURE — 80053 COMPREHEN METABOLIC PANEL: CPT | Performed by: EMERGENCY MEDICINE

## 2023-11-04 PROCEDURE — 25810000003 SODIUM CHLORIDE 0.9 % SOLUTION: Performed by: EMERGENCY MEDICINE

## 2023-11-04 PROCEDURE — 83605 ASSAY OF LACTIC ACID: CPT | Performed by: EMERGENCY MEDICINE

## 2023-11-04 PROCEDURE — 99283 EMERGENCY DEPT VISIT LOW MDM: CPT

## 2023-11-04 PROCEDURE — 81001 URINALYSIS AUTO W/SCOPE: CPT | Performed by: EMERGENCY MEDICINE

## 2023-11-04 PROCEDURE — 85025 COMPLETE CBC W/AUTO DIFF WBC: CPT | Performed by: EMERGENCY MEDICINE

## 2023-11-04 PROCEDURE — 25010000002 CEFTRIAXONE PER 250 MG: Performed by: EMERGENCY MEDICINE

## 2023-11-04 PROCEDURE — 96365 THER/PROPH/DIAG IV INF INIT: CPT

## 2023-11-04 RX ORDER — CEFTRIAXONE 1 G/1
1 INJECTION, POWDER, FOR SOLUTION INTRAMUSCULAR; INTRAVENOUS EVERY 24 HOURS
COMMUNITY
Start: 2023-11-03 | End: 2023-11-08

## 2023-11-04 RX ORDER — CEFTRIAXONE SODIUM 1 G/50ML
1000 INJECTION, SOLUTION INTRAVENOUS ONCE
Status: COMPLETED | OUTPATIENT
Start: 2023-11-04 | End: 2023-11-04

## 2023-11-04 RX ORDER — SODIUM CHLORIDE 0.9 % (FLUSH) 0.9 %
10 SYRINGE (ML) INJECTION AS NEEDED
Status: DISCONTINUED | OUTPATIENT
Start: 2023-11-04 | End: 2023-11-04 | Stop reason: HOSPADM

## 2023-11-04 RX ADMIN — SODIUM CHLORIDE 1000 ML: 9 INJECTION, SOLUTION INTRAVENOUS at 15:22

## 2023-11-04 RX ADMIN — CEFTRIAXONE SODIUM 1000 MG: 1 INJECTION, SOLUTION INTRAVENOUS at 16:28

## 2023-11-04 NOTE — ED PROVIDER NOTES
Time: 1:48 PM EDT  Date of encounter:  11/4/2023  Independent Historian/Clinical History and Information was obtained by:   Patient and Family    History is limited by: N/A    Chief Complaint: Low blood pressure      History of Present Illness:  Patient is a 89 y.o. year old female who presents to the emergency department for evaluation of low blood pressure.  On arrival patient's blood pressure is 119/94 with a heart rate of 65.  patient is currently being treated for urinary tract infection with daily Rocephin injections    HPI    Patient Care Team  Primary Care Provider: Jairo Kramer MD    Past Medical History:     Allergies   Allergen Reactions    Citalopram Unknown - High Severity    Clonazepam Unknown - High Severity    Levofloxacin Nausea And Vomiting    Lisinopril Unknown - High Severity    Macrobid [Nitrofurantoin Macrocrystal] Rash    Melatonin Unknown - High Severity     Past Medical History:   Diagnosis Date    Abdominal pain, generalized     Anemia     Aortic regurgitation     Aortic stenosis     PER ECHO 6/15/23  NO STENOSIS    Broken arm     left upper, josseline placed    Cardiomegaly     Chronic atrial fibrillation 01/01/2019    Atrial fibrillation converted to sinus through cardioversion (March 2019    Chronic fatigue     Colon cancer     Diarrhea     Disease of tricuspid valve     Diverticulosis of colon     DVT (deep venous thrombosis)     LEFT LEG GREATER THAN 5 YRS AGO    Dysphagia     Essential (primary) hypertension     Frequent UTI     IV antibiotic prior to surgery for 5 days    H/O Hiatal hernia     H/O Sepsis 10/07/2023    Insomnia, unspecified     LVH (left ventricular hypertrophy)     Major depressive disorder, single episode     Mitral regurgitation     Mitral valve insufficiency and aortic valve insufficiency     Osteopenia     Pain in joint, pelvic region and thigh     TR (tricuspid regurgitation)      Past Surgical History:   Procedure Laterality Date    ABDOMINAL HYSTERECTOMY       WITH BSO     ANKLE SURGERY      (L) ankle fracture    BLADDER REPAIR      BREAST BIOPSY      (L) breast biopsy    CARDIOVERSION  2019    CATARACT EXTRACTION      OU    CHOLECYSTECTOMY      OPEN    COLON SURGERY      STATES SHE HAD 12 INCHES OF COLON REMOVED IN JULY 2020 FOR COLON CANCER    COLONOSCOPY  2020    COLONOSCOPY N/A 10/29/2021    Procedure: COLONOSCOPY;  Surgeon: Edmar Back MD;  Location: [facility] ENDOSCOPY;  Service: General;  Laterality: N/A;  DIVERTICULOSIS/ANASTOMOSIS    CYSTOSCOPY W/ URETERAL STENT PLACEMENT Right 10/07/2023    Procedure: CYSTOSCOPY URETERAL CATHETER/STENT INSERTION,right retrograde;  Surgeon: Roberto Luu MD;  Location: [facility] MAIN OR;  Service: Urology;  Laterality: Right;    FEMUR OPEN REDUCTION INTERNAL FIXATION Left 07/21/2021    Procedure: FEMORAL NECK OPEN REDUCTION INTERNAL FIXATION;  Surgeon: Bam Warner MD;  Location: [facility] MAIN OR;  Service: Orthopedics;  Laterality: Left;    FOOT SURGERY Left     HIP CLOSED REDUCTION Left 03/20/2022    Procedure: HIP CLOSED REDUCTION;  Surgeon: Harsha Gayle MD;  Location: [facility] MAIN OR;  Service: Orthopedics;  Laterality: Left;    INGUINAL HERNIA REPAIR Left 09/29/2011    ORIF WRIST FRACTURE Left 08/24/2023    Procedure: OPEN REDUCTION AND INTERNAL FIXATION WRIST;  Surgeon: Ben Alegre MD;  Location: [facility] OR OSC;  Service: Orthopedics;  Laterality: Left;    TOTAL HIP ARTHROPLASTY Left 03/16/2022    Procedure: LEFT TOTAL HIP ARTHROPLASTY AND HARDWARE REMOVAL;  Surgeon: Bam Warner MD;  Location: [facility] OR;  Service: Orthopedics;  Laterality: Left;    UPPER GASTROINTESTINAL ENDOSCOPY      WITH DILATATION    URETEROSCOPY LASER LITHOTRIPSY WITH STENT INSERTION Right 11/8/2023    Procedure: Cystoscopy with right ureteroscopy with laser and right ureteral stent exchange;  Surgeon: Morris Chester MD;  Location: [facility] MAIN OR;  Service: Urology;  Laterality: Right;     Family History   Problem  Relation Age of Onset    Malig Hyperthermia Neg Hx        Home Medications:  Prior to Admission medications    Medication Sig Start Date End Date Taking? Authorizing Provider   cefTRIAXone (ROCEPHIN) 1 g injection Inject 1 g into the appropriate muscle as directed by prescriber Daily. 11/3/23 11/8/23 Yes Karina Browne MD   apixaban (ELIQUIS) 2.5 MG tablet tablet Take 1 tablet by mouth 2 (Two) Times a Day.  Patient taking differently: Take 1 tablet by mouth 2 (Two) Times a Day. Last dose per Dr. Plata 11-4-23 7/11/23   Sheyla Guillermo APRN   ascorbic acid (VITAMIN C) 500 MG tablet Take 1 tablet by mouth Daily.    Karina Browne MD   colestipol (COLESTID) 1 g tablet Take 2 tablets by mouth Daily. 9/5/23   Leslie Cody APRN   dilTIAZem (CARDIZEM) 30 MG tablet Take 1 tablet by mouth Every 12 (Twelve) Hours. 10/19/23   Karina Browne MD   ferrous sulfate 325 (65 FE) MG tablet Take 1 tablet by mouth Daily With Breakfast for 30 days. 10/12/23 11/11/23  Darryn Lopez MD   FLUoxetine (PROzac) 20 MG capsule Take 1 capsule by mouth Daily. 10/2/23   Jairo Kramer MD   furosemide (LASIX) 20 MG tablet Take 1 tablet by mouth 2 (Two) Times a Day. 7/11/23   Sheyla Guillermo APRN   LORazepam (ATIVAN) 0.5 MG tablet Take 1 tablet by mouth Every Night.    Karina Browne MD   losartan (COZAAR) 25 MG tablet TAKE 1 TABLET BY MOUTH EVERY DAY 9/18/23   Sheyla Guillermo APRN   Lumigan 0.01 % ophthalmic drops Administer 1 drop to both eyes Every Night. 8/30/23   Karina Browne MD   metoprolol succinate XL (TOPROL-XL) 100 MG 24 hr tablet Take 1 tablet by mouth 2 (Two) Times a Day. 7/11/23   Sheyla Guillermo APRN   potassium chloride 10 MEQ CR tablet Take 1 tablet by mouth 2 (Two) Times a Day. 7/11/23   Guillermo, Sheyla Megan, APRN   tamsulosin (FLOMAX) 0.4 MG capsule 24 hr capsule Take 1 capsule by mouth Daily for 30 days. 10/12/23 11/11/23   "Darryn Lopez MD   timolol (TIMOPTIC) 0.5 % ophthalmic solution Administer 1 drop to both eyes Every Morning. 7/12/23   Provider, MD Karina        Social History:   Social History     Tobacco Use    Smoking status: Never    Smokeless tobacco: Never   Vaping Use    Vaping Use: Never used   Substance Use Topics    Alcohol use: Not Currently    Drug use: Never         Review of Systems:  Review of Systems   Gastrointestinal:  Positive for diarrhea.        Physical Exam:  /70   Pulse 70   Temp 97.8 °F (36.6 °C) (Oral)   Resp 20   Ht 170.2 cm (67\")   Wt 63.5 kg (139 lb 15.9 oz)   SpO2 98%   BMI 21.93 kg/m²     Physical Exam  Vitals and nursing note reviewed.   Constitutional:       General: She is not in acute distress.  HENT:      Head: Normocephalic.      Mouth/Throat:      Mouth: Mucous membranes are dry.   Eyes:      Extraocular Movements: Extraocular movements intact.   Cardiovascular:      Rate and Rhythm: Normal rate and regular rhythm.      Heart sounds: Normal heart sounds.   Pulmonary:      Effort: Pulmonary effort is normal. No respiratory distress.      Breath sounds: Normal breath sounds.   Abdominal:      Palpations: Abdomen is soft.      Tenderness: There is no abdominal tenderness.   Musculoskeletal:         General: Normal range of motion.      Cervical back: Normal range of motion.   Skin:     General: Skin is warm and dry.      Capillary Refill: Capillary refill takes less than 2 seconds.   Neurological:      Mental Status: She is alert and oriented to person, place, and time.                  Procedures:  Procedures      Medical Decision Making:      Comorbidities that affect care:    Hypertension    External Notes reviewed:    Previous Clinic Note: Patient seen 10/26/2023 for follow-up after ureteral stent was placed for kidney stone      The following orders were placed and all results were independently analyzed by me:  Orders Placed This Encounter   Procedures    [place] Draw "    Comprehensive Metabolic Panel    Lipase    Urinalysis With Microscopic If Indicated (No Culture) - Urine, Clean Catch    Lactic Acid, Plasma    CBC Auto Differential    Urinalysis, Microscopic Only - Urine, Clean Catch    Undress & Gown    CBC & Differential    Green Top (Gel)    Lavender Top    Gold Top - SST    Light Blue Top       Medications Given in the Emergency Department:  Medications   sodium chloride 0.9 % bolus 1,000 mL (0 mL Intravenous Stopped 11/4/23 1552)   cefTRIAXone (ROCEPHIN) IVPB 1,000 mg (0 mg Intravenous Stopped 11/4/23 1658)        ED Course:         Labs:    Lab Results (last 24 hours)       ** No results found for the last 24 hours. **             Imaging:    No Radiology Exams Resulted Within Past 24 Hours      Differential Diagnosis and Discussion:    Weakness: Based on the patient's history, signs, and symptoms, the diffential diagnosis includes but is not limited to meningitis, stroke, sepsis, subarachnoid hemorrhage, intracranial bleeding, encephalitis, acute uti, dehydration, MS, myasthenia gravis, Guillan Breeden, migraine variant, neuromuscular disorders vertigo, electrolyte imbalance, and metabolic disorders.    All labs were reviewed and interpreted by me.    MDM     Patient has been getting daily Rocephin injections for UTI, she missed her dose today so was given 1 dose in the emergency department.      Patient Care Considerations:      Consultants/Shared Management Plan:    None    Social Determinants of Health:    Patient is a nursing home/assisted living resident and has reliable access to care.      Disposition and Care Coordination:    Discharged: I considered escalation of care by admitting this patient for observation, however the patient has improved and is suitable and  stable for discharge.    I have explained the patient´s condition, diagnoses and treatment plan based on the information available to me at this time. I have answered questions and addressed any concerns.  The patient has a good  understanding of the patient´s diagnosis, condition, and treatment plan as can be expected at this point. The vital signs have been stable. The patient´s condition is stable and appropriate for discharge from the emergency department.      The patient will pursue further outpatient evaluation with the primary care physician or other designated or consulting physician as outlined in the discharge instructions. They are agreeable to this plan of care and follow-up instructions have been explained in detail. The patient has received these instructions in written format and have expressed an understanding of the discharge instructions. The patient is aware that any significant change in condition or worsening of symptoms should prompt an immediate return to this or the closest emergency department or call to 911.    Final diagnoses:   Dehydration        ED Disposition       ED Disposition   Discharge    Condition   Stable    Comment   --               This medical record created using voice recognition software.             Poncho Monsivais DO  11/11/23 1471

## 2023-11-06 ENCOUNTER — TELEPHONE (OUTPATIENT)
Dept: INTERNAL MEDICINE | Facility: CLINIC | Age: 88
End: 2023-11-06
Payer: MEDICARE

## 2023-11-06 NOTE — TELEPHONE ENCOUNTER
Is this an FYI message or am I supposed to do something with this, otherwise if she is still that sick she needs to go back to the emergency room and be admitted

## 2023-11-06 NOTE — TELEPHONE ENCOUNTER
Rose patterson called office stating patient had a ER visit on Saturday patient was having diarrhea she spoke with Dr Guzmán and she recommend for pt to go to ER she was Dx with Dehydration, patient bp was 79/40 HR 48-50, patient is very weak no appetite, there is a lose vowel stool, and it has a strong smell, patient did not have any yesterday, she has to do 5 days IM rocephin pre op for kidney removal that is this week.

## 2023-11-08 ENCOUNTER — ANESTHESIA EVENT (OUTPATIENT)
Dept: PERIOP | Facility: HOSPITAL | Age: 88
End: 2023-11-08
Payer: MEDICARE

## 2023-11-08 ENCOUNTER — HOSPITAL ENCOUNTER (OUTPATIENT)
Facility: HOSPITAL | Age: 88
Discharge: HOME | End: 2023-11-08
Attending: UROLOGY | Admitting: UROLOGY
Payer: MEDICARE

## 2023-11-08 ENCOUNTER — ANESTHESIA (OUTPATIENT)
Dept: PERIOP | Facility: HOSPITAL | Age: 88
End: 2023-11-08
Payer: MEDICARE

## 2023-11-08 ENCOUNTER — APPOINTMENT (OUTPATIENT)
Dept: GENERAL RADIOLOGY | Facility: HOSPITAL | Age: 88
End: 2023-11-08
Payer: MEDICARE

## 2023-11-08 VITALS
SYSTOLIC BLOOD PRESSURE: 152 MMHG | TEMPERATURE: 98 F | OXYGEN SATURATION: 100 % | RESPIRATION RATE: 16 BRPM | HEART RATE: 74 BPM | BODY MASS INDEX: 21.56 KG/M2 | DIASTOLIC BLOOD PRESSURE: 69 MMHG | HEIGHT: 67 IN | WEIGHT: 137.35 LBS

## 2023-11-08 DIAGNOSIS — N20.1 URETERAL STONE: ICD-10-CM

## 2023-11-08 PROCEDURE — 25010000002 CEFOXITIN PER 1 G: Performed by: UROLOGY

## 2023-11-08 PROCEDURE — C2617 STENT, NON-COR, TEM W/O DEL: HCPCS | Performed by: UROLOGY

## 2023-11-08 PROCEDURE — 88300 SURGICAL PATH GROSS: CPT | Performed by: UROLOGY

## 2023-11-08 PROCEDURE — 76000 FLUOROSCOPY <1 HR PHYS/QHP: CPT

## 2023-11-08 PROCEDURE — 25810000003 LACTATED RINGERS PER 1000 ML: Performed by: ANESTHESIOLOGY

## 2023-11-08 PROCEDURE — C1758 CATHETER, URETERAL: HCPCS | Performed by: UROLOGY

## 2023-11-08 PROCEDURE — 25010000002 DEXAMETHASONE PER 1 MG: Performed by: NURSE ANESTHETIST, CERTIFIED REGISTERED

## 2023-11-08 PROCEDURE — 25010000002 ONDANSETRON PER 1 MG: Performed by: NURSE ANESTHETIST, CERTIFIED REGISTERED

## 2023-11-08 PROCEDURE — C1769 GUIDE WIRE: HCPCS | Performed by: UROLOGY

## 2023-11-08 PROCEDURE — 82365 CALCULUS SPECTROSCOPY: CPT | Performed by: UROLOGY

## 2023-11-08 PROCEDURE — 25010000002 MIDAZOLAM PER 1MG: Performed by: ANESTHESIOLOGY

## 2023-11-08 PROCEDURE — C1894 INTRO/SHEATH, NON-LASER: HCPCS | Performed by: UROLOGY

## 2023-11-08 PROCEDURE — C1747: HCPCS | Performed by: UROLOGY

## 2023-11-08 PROCEDURE — 25010000002 PROPOFOL 10 MG/ML EMULSION: Performed by: NURSE ANESTHETIST, CERTIFIED REGISTERED

## 2023-11-08 DEVICE — URETERAL STENT
Type: IMPLANTABLE DEVICE | Site: URETER | Status: FUNCTIONAL
Brand: ASCERTA™

## 2023-11-08 RX ORDER — PROMETHAZINE HYDROCHLORIDE 12.5 MG/1
25 TABLET ORAL ONCE AS NEEDED
Status: DISCONTINUED | OUTPATIENT
Start: 2023-11-08 | End: 2023-11-08 | Stop reason: HOSPADM

## 2023-11-08 RX ORDER — DEXAMETHASONE SODIUM PHOSPHATE 4 MG/ML
INJECTION, SOLUTION INTRA-ARTICULAR; INTRALESIONAL; INTRAMUSCULAR; INTRAVENOUS; SOFT TISSUE AS NEEDED
Status: DISCONTINUED | OUTPATIENT
Start: 2023-11-08 | End: 2023-11-08 | Stop reason: SURG

## 2023-11-08 RX ORDER — SODIUM CHLORIDE, SODIUM LACTATE, POTASSIUM CHLORIDE, CALCIUM CHLORIDE 600; 310; 30; 20 MG/100ML; MG/100ML; MG/100ML; MG/100ML
9 INJECTION, SOLUTION INTRAVENOUS CONTINUOUS PRN
Status: DISCONTINUED | OUTPATIENT
Start: 2023-11-08 | End: 2023-11-08 | Stop reason: HOSPADM

## 2023-11-08 RX ORDER — SODIUM CHLORIDE 9 MG/ML
100 INJECTION, SOLUTION INTRAVENOUS CONTINUOUS
Status: DISCONTINUED | OUTPATIENT
Start: 2023-11-08 | End: 2023-11-08 | Stop reason: HOSPADM

## 2023-11-08 RX ORDER — LIDOCAINE HYDROCHLORIDE 20 MG/ML
INJECTION, SOLUTION EPIDURAL; INFILTRATION; INTRACAUDAL; PERINEURAL AS NEEDED
Status: DISCONTINUED | OUTPATIENT
Start: 2023-11-08 | End: 2023-11-08 | Stop reason: SURG

## 2023-11-08 RX ORDER — ONDANSETRON 4 MG/1
4 TABLET, FILM COATED ORAL ONCE AS NEEDED
Status: DISCONTINUED | OUTPATIENT
Start: 2023-11-08 | End: 2023-11-08 | Stop reason: HOSPADM

## 2023-11-08 RX ORDER — ACETAMINOPHEN 325 MG/1
650 TABLET ORAL ONCE
Status: DISCONTINUED | OUTPATIENT
Start: 2023-11-08 | End: 2023-11-08 | Stop reason: HOSPADM

## 2023-11-08 RX ORDER — ONDANSETRON 2 MG/ML
INJECTION INTRAMUSCULAR; INTRAVENOUS AS NEEDED
Status: DISCONTINUED | OUTPATIENT
Start: 2023-11-08 | End: 2023-11-08 | Stop reason: SURG

## 2023-11-08 RX ORDER — SODIUM CHLORIDE 0.9 % (FLUSH) 0.9 %
3 SYRINGE (ML) INJECTION EVERY 12 HOURS SCHEDULED
Status: DISCONTINUED | OUTPATIENT
Start: 2023-11-08 | End: 2023-11-08 | Stop reason: HOSPADM

## 2023-11-08 RX ORDER — MAGNESIUM HYDROXIDE 1200 MG/15ML
LIQUID ORAL AS NEEDED
Status: DISCONTINUED | OUTPATIENT
Start: 2023-11-08 | End: 2023-11-08 | Stop reason: HOSPADM

## 2023-11-08 RX ORDER — HYDROCODONE BITARTRATE AND ACETAMINOPHEN 5; 325 MG/1; MG/1
1 TABLET ORAL ONCE AS NEEDED
Status: DISCONTINUED | OUTPATIENT
Start: 2023-11-08 | End: 2023-11-08 | Stop reason: HOSPADM

## 2023-11-08 RX ORDER — ONDANSETRON 2 MG/ML
4 INJECTION INTRAMUSCULAR; INTRAVENOUS ONCE AS NEEDED
Status: DISCONTINUED | OUTPATIENT
Start: 2023-11-08 | End: 2023-11-08 | Stop reason: HOSPADM

## 2023-11-08 RX ORDER — HYDROCODONE BITARTRATE AND ACETAMINOPHEN 5; 325 MG/1; MG/1
1 TABLET ORAL EVERY 12 HOURS PRN
Qty: 10 TABLET | Refills: 0 | Status: SHIPPED | OUTPATIENT
Start: 2023-11-08

## 2023-11-08 RX ORDER — PROMETHAZINE HYDROCHLORIDE 25 MG/1
25 SUPPOSITORY RECTAL ONCE AS NEEDED
Status: DISCONTINUED | OUTPATIENT
Start: 2023-11-08 | End: 2023-11-08 | Stop reason: HOSPADM

## 2023-11-08 RX ORDER — OXYCODONE HYDROCHLORIDE 5 MG/1
5 TABLET ORAL
Status: DISCONTINUED | OUTPATIENT
Start: 2023-11-08 | End: 2023-11-08 | Stop reason: HOSPADM

## 2023-11-08 RX ORDER — MIDAZOLAM HYDROCHLORIDE 2 MG/2ML
0.5 INJECTION, SOLUTION INTRAMUSCULAR; INTRAVENOUS ONCE
Status: COMPLETED | OUTPATIENT
Start: 2023-11-08 | End: 2023-11-08

## 2023-11-08 RX ORDER — PHENYLEPHRINE HCL IN 0.9% NACL 1 MG/10 ML
SYRINGE (ML) INTRAVENOUS AS NEEDED
Status: DISCONTINUED | OUTPATIENT
Start: 2023-11-08 | End: 2023-11-08 | Stop reason: SURG

## 2023-11-08 RX ORDER — PROMETHAZINE HYDROCHLORIDE 12.5 MG/1
12.5 TABLET ORAL ONCE AS NEEDED
Status: DISCONTINUED | OUTPATIENT
Start: 2023-11-08 | End: 2023-11-08 | Stop reason: HOSPADM

## 2023-11-08 RX ORDER — SODIUM CHLORIDE 9 MG/ML
40 INJECTION, SOLUTION INTRAVENOUS AS NEEDED
Status: DISCONTINUED | OUTPATIENT
Start: 2023-11-08 | End: 2023-11-08 | Stop reason: HOSPADM

## 2023-11-08 RX ORDER — ACETAMINOPHEN 500 MG
1000 TABLET ORAL ONCE
Status: DISCONTINUED | OUTPATIENT
Start: 2023-11-08 | End: 2023-11-08

## 2023-11-08 RX ORDER — PROPOFOL 10 MG/ML
VIAL (ML) INTRAVENOUS AS NEEDED
Status: DISCONTINUED | OUTPATIENT
Start: 2023-11-08 | End: 2023-11-08 | Stop reason: SURG

## 2023-11-08 RX ORDER — SODIUM CHLORIDE 0.9 % (FLUSH) 0.9 %
10 SYRINGE (ML) INJECTION AS NEEDED
Status: DISCONTINUED | OUTPATIENT
Start: 2023-11-08 | End: 2023-11-08 | Stop reason: HOSPADM

## 2023-11-08 RX ADMIN — MIDAZOLAM HYDROCHLORIDE 0.5 MG: 1 INJECTION, SOLUTION INTRAMUSCULAR; INTRAVENOUS at 12:42

## 2023-11-08 RX ADMIN — DEXAMETHASONE SODIUM PHOSPHATE 4 MG: 4 INJECTION, SOLUTION INTRAMUSCULAR; INTRAVENOUS at 12:55

## 2023-11-08 RX ADMIN — LIDOCAINE HYDROCHLORIDE 60 MG: 20 INJECTION, SOLUTION EPIDURAL; INFILTRATION; INTRACAUDAL; PERINEURAL at 12:55

## 2023-11-08 RX ADMIN — Medication 100 MCG: at 13:33

## 2023-11-08 RX ADMIN — SODIUM CHLORIDE, POTASSIUM CHLORIDE, SODIUM LACTATE AND CALCIUM CHLORIDE: 600; 310; 30; 20 INJECTION, SOLUTION INTRAVENOUS at 12:55

## 2023-11-08 RX ADMIN — PROPOFOL 60 MG: 10 INJECTION, EMULSION INTRAVENOUS at 12:55

## 2023-11-08 RX ADMIN — ONDANSETRON 4 MG: 2 INJECTION INTRAMUSCULAR; INTRAVENOUS at 12:55

## 2023-11-08 NOTE — ANESTHESIA POSTPROCEDURE EVALUATION
Patient: Bing Cruz    Procedure Summary       Date: 11/08/23 Room / Location: [facility] OR 01 / [facility] MAIN OR    Anesthesia Start: 1249 Anesthesia Stop: 1346    Procedure: Cystoscopy with right ureteroscopy with laser and right ureteral stent exchange (Right) Diagnosis:       Ureteral stone      (Ureteral stone [N20.1])    Surgeons: Morris Chester MD Provider: Shae Nguyen MD    Anesthesia Type: general ASA Status: 4            Anesthesia Type: general    Vitals  Vitals Value Taken Time   /75 11/08/23 1406   Temp 36.1 °C (96.9 °F) 11/08/23 1341   Pulse 72 11/08/23 1410   Resp 15 11/08/23 1410   SpO2 97 % 11/08/23 1410   Vitals shown include unfiled device data.        Post Anesthesia Care and Evaluation    Patient location during evaluation: bedside  Patient participation: complete - patient participated  Level of consciousness: awake  Pain management: adequate    Airway patency: patent  PONV Status: none  Cardiovascular status: acceptable and stable  Respiratory status: acceptable  Hydration status: acceptable    Comments: An Anesthesiologist personally participated in the most demanding procedures (including induction and emergence if applicable) in the anesthesia plan, monitored the course of anesthesia administration at frequent intervals and remained physically present and available for immediate diagnosis and treatment of emergencies.

## 2023-11-08 NOTE — H&P
[facility]   UROLOGY HISTORY AND PHYSICAL    Patient Name: Bing Cruz  : 1931  MRN: [id number]  Primary Care Physician:  Jairo Kramer MD  Date of admission: 2023    Subjective   Subjective     Chief Complaint:   Right nephrolithiasis    HPI:    Bing Cruz is a 89 y.o. female right nephrolithiasis    No change in H&P    Review of Systems     10 systems reviewed and are negative other than what is listed in HPI    Personal History     Past Medical History:   Diagnosis Date    Abdominal pain, generalized     Anemia     Aortic regurgitation     Aortic stenosis     PER ECHO 6/15/23  NO STENOSIS    Broken arm     left upper, josseline placed    Cardiomegaly     Chronic atrial fibrillation 2019    Atrial fibrillation converted to sinus through cardioversion (2019    Chronic fatigue     Colon cancer     Diarrhea     Disease of tricuspid valve     Diverticulosis of colon     DVT (deep venous thrombosis)     LEFT LEG GREATER THAN 5 YRS AGO    Dysphagia     Essential (primary) hypertension     Frequent UTI     IV antibiotic prior to surgery for 5 days    H/O Hiatal hernia     H/O Sepsis 10/07/2023    Insomnia, unspecified     LVH (left ventricular hypertrophy)     Major depressive disorder, single episode     Mitral regurgitation     Mitral valve insufficiency and aortic valve insufficiency     Osteopenia     Pain in joint, pelvic region and thigh     TR (tricuspid regurgitation)        Past Surgical History:   Procedure Laterality Date    ABDOMINAL HYSTERECTOMY      WITH BSO     ANKLE SURGERY      (L) ankle fracture    BLADDER REPAIR      BREAST BIOPSY      (L) breast biopsy    CARDIOVERSION      CATARACT EXTRACTION      OU    CHOLECYSTECTOMY      OPEN    COLON SURGERY      STATES SHE HAD 12 INCHES OF COLON REMOVED IN 2020 FOR COLON CANCER    COLONOSCOPY      COLONOSCOPY N/A 10/29/2021    Procedure: COLONOSCOPY;  Surgeon: Edmar Back MD;  Location: [facility]  ENDOSCOPY;  Service: General;  Laterality: N/A;  DIVERTICULOSIS/ANASTOMOSIS    CYSTOSCOPY W/ URETERAL STENT PLACEMENT Right 10/07/2023    Procedure: CYSTOSCOPY URETERAL CATHETER/STENT INSERTION,right retrograde;  Surgeon: Roberto Luu MD;  Location: [facility] OR;  Service: Urology;  Laterality: Right;    FEMUR OPEN REDUCTION INTERNAL FIXATION Left 07/21/2021    Procedure: FEMORAL NECK OPEN REDUCTION INTERNAL FIXATION;  Surgeon: Bam Warner MD;  Location: [facility] OR;  Service: Orthopedics;  Laterality: Left;    FOOT SURGERY Left     HIP CLOSED REDUCTION Left 03/20/2022    Procedure: HIP CLOSED REDUCTION;  Surgeon: Harsha Gayle MD;  Location: [facility] OR;  Service: Orthopedics;  Laterality: Left;    INGUINAL HERNIA REPAIR Left 09/29/2011    ORIF WRIST FRACTURE Left 08/24/2023    Procedure: OPEN REDUCTION AND INTERNAL FIXATION WRIST;  Surgeon: Ben Alegre MD;  Location: [facility];  Service: Orthopedics;  Laterality: Left;    TOTAL HIP ARTHROPLASTY Left 03/16/2022    Procedure: LEFT TOTAL HIP ARTHROPLASTY AND HARDWARE REMOVAL;  Surgeon: Bam Warner MD;  Location: [facility] OR;  Service: Orthopedics;  Laterality: Left;    UPPER GASTROINTESTINAL ENDOSCOPY      WITH DILATATION       Family History: family history is not on file. Otherwise pertinent FHx was reviewed and not pertinent to current issue.    Social History:  reports that she has never smoked. She has never used smokeless tobacco. She reports that she does not currently use alcohol. She reports that she does not use drugs.    Home Medications:  FLUoxetine, LORazepam, apixaban, ascorbic acid, bimatoprost, cefTRIAXone, colestipol, dilTIAZem, ferrous sulfate, furosemide, losartan, metoprolol succinate XL, potassium chloride, tamsulosin, and timolol      Allergies:  Allergies   Allergen Reactions    Citalopram Unknown - High Severity    Clonazepam Unknown - High Severity    Levofloxacin Nausea And Vomiting    Lisinopril  Unknown - High Severity    Macrobid [Nitrofurantoin Macrocrystal] Rash    Melatonin Unknown - High Severity       Objective   Objective     Vitals:   Temp:  [97.7 °F (36.5 °C)] 97.7 °F (36.5 °C)  Heart Rate:  [63] 63  Resp:  [16] 16  BP: (132)/(85) 132/85  Physical Exam    Constitutional: Awake, alert    Respiratory: Clear to auscultation bilaterally, nonlabored respirations    Cardiovascular: RRR, no murmurs, rubs, or gallops, palpable pedal pulses bilaterally   Gastrointestinal: Positive bowel sounds, soft, nontender, nondistended     Result Review    Result Review:  I have personally reviewed the results from the time of this admission to 11/8/2023 11:16 EST and agree with these findings:  []  Laboratory  []  Microbiology  []  Radiology  []  EKG/Telemetry   []  Cardiology/Vascular   []  Pathology  []  Old records  []  Other:    Assessment & Plan   Assessment / Plan     Brief Patient Summary:  Bing Cruz is a 89 y.o. female     Active Hospital Problems:  Active Hospital Problems    Diagnosis     **Ureteral stone     Nephrolithiasis        Plan:     Cystoscopy with right ureteroscopy with laser and right ureteral stent placement risks and benefits were discussed including bleeding, infection and damage to the urinary system.  We also discussed the risk of anesthesia up to and including death.  Patient voiced understanding and would like to proceed.      Electronically signed by Morris Chester MD, 11/08/23, 11:16 AM EST.

## 2023-11-08 NOTE — DISCHARGE INSTRUCTIONS
DISCHARGE INSTRUCTIONS  Extracorporeal Shock Wave Lithotripsy (ESWL)/ Ureteroscopy Lasertripsy      For your surgery you had:  Monitored anesthesia care  You may experience dizziness, drowsiness, or lightheadedness for several hours following surgery.  Do not stay alone today or tonight.  Limit your activity for 24 hours.  You should not drive or operate machinery, drink alcohol, or sign legally binding documents for 24 hours or while you are taking pain medication.  Resume your diet slowly.  Follow any special dietary instructions you may have been given by your doctor.     NOTIFY YOUR DOCTOR IF YOU EXPERIENCE ANY OF THE FOLLOWING:  Temperature greater than 101 degrees Fahrenheit  Shaking Chills  Redness or excessive drainage from incision  Nausea, vomiting and/or pain that is not controlled by prescribed medications  Increase in bleeding or bleeding that is excessive  Unable to urinate in 6 hours after surgery  If unable to reach your doctor, please go to the closest Emergency Room  Strain urine if instructed by physician.  Collect any fragments and take with you on your scheduled appointment. You may pass stone pieces or small blood clots.  Blood in your urine is normal.  It could be light pink to cherry color.  Drink 6-8 glasses of fluid each day to assist with passing of stone fragments.  Back pain is common.  It may feel like a dull ache or back spasm.  Urine will be bloody for several days.  Slight redness or bruising may be noticed on treated side.  If you have difficulty urinating, try sitting in a bathtub of warm water.    If you have a stent, it must be managed by your urologist.  Do NOT forget.      SPECIAL INSTRUCTIONS:  Restart Eliquis on Monday.  Stent will be removed in the office at your post op appointment.    Last dose of pain medication was given at:   May take norco next at anytime if needed.

## 2023-11-08 NOTE — ANESTHESIA PREPROCEDURE EVALUATION
Anesthesia Evaluation     Patient summary reviewed and Nursing notes reviewed   no history of anesthetic complications:   NPO Solid Status: > 8 hours  NPO Liquid Status: > 2 hours           Airway   Mallampati: II  TM distance: >3 FB  Neck ROM: full  No difficulty expected  Dental    (+) lower dentures and upper dentures    Pulmonary - negative pulmonary ROS and normal exam    breath sounds clear to auscultation  Cardiovascular - normal exam  Exercise tolerance: good (4-7 METS)    ECG reviewed  Rhythm: regular  Rate: normal    (+) hypertension, valvular problems/murmurs, dysrhythmias Atrial Fib, CHF , DVT, hyperlipidemia      Neuro/Psych  (+) dizziness/light headedness, psychiatric history  GI/Hepatic/Renal/Endo    (+) hiatal hernia, GERD, renal disease-    Musculoskeletal     Abdominal    Substance History - negative use     OB/GYN negative ob/gyn ROS         Other   arthritis,   history of cancer    ROS/Med Hx Other: HX A-FIB,MVP,. LAST O.V. DR. GALARZA 7/11/23.  ECHO 6/15/23 EF 51-55% MILD TO MOD MR & TR. MILD TO MOD BIATRIAL ENLARGEMENT, NO AORTIC STENOSIS.  CARDIAC CLEARED BY DR. GALARZA. METS<4. NO CP/SOA. ELM             ABNORMAL ECG - 10/7/23  Atrial fibrillation  Ventricular premature complex  Repolarization abnormality, prob rate related    Interpretation Summary 6/15/23         Left ventricular systolic function is normal. Left ventricular ejection fraction appears to be 51 - 55%.    Left ventricular diastolic function was indeterminate due to presence of atrial fibrillation.    There is mild to moderate biatrial enlargement.    Mild to moderate mitral regurgitation noted, with multiple jets.    There is mild to moderate tricuspid regurgitation.  Estimated right ventricular systolic pressure from tricuspid regurgitation is moderately elevated (45-55 mmHg).    nterpretation Summary 6/15/23    Patient was monitored for 48 hours.  Predominant rhythm was atrial fibrillation.  Maximum heart rate is 132.   Minimum heart rate of 60.  Average heart rate of 90.  There are occasional PVCs.  There is no significant bradycardia arrhythmias.       Anesthesia Plan    ASA 4     general     (Patient understands anesthesia not responsible for dental damage.    Pt get confused easily post op, limit any precedex/bdz/ketamine/etc)  intravenous induction     Anesthetic plan, risks, benefits, and alternatives have been provided, discussed and informed consent has been obtained with: patient.    Use of blood products discussed with patient .    Plan discussed with CRNA.        CODE STATUS:

## 2023-11-08 NOTE — OP NOTE
URETEROSCOPY LASER LITHOTRIPSY WITH STENT INSERTION  Procedure Report    Patient Name:  Bing Cruz  YOB: 1931    Date of Surgery:  11/8/2023      Pre-op Diagnosis:   Ureteral stone [N20.1]       Postop diagnosis:    Same    Procedure/CPT® Codes:      Procedure(s):    Cystoscopy    right ureteroscopy with laser and basket stone extraction  Right ureteral stent exchange, 6 x 26 no string    Staff:  Surgeon(s):  Morris Chester MD         Anesthesia: General    Estimated Blood Loss: minimal    Implants:    Implant Name Type Inv. Item Serial No.  Lot No. LRB No. Used Action   STNT URETRL ASCERTA 6F 26CM - [id number] Stent STNT URETRL ASCERTA 6F 26CM  [employer] [id number] Right 1 Implanted       Specimen:          Specimens       ID Source Type Tests Collected By Collected At Frozen?    A Kidney, Right Calculus TISSUE PATHOLOGY EXAM  STONE ANALYSIS   Morris Chester MD 11/8/23 1315 No    Description: Right renal stone                Findings:     none    Complications: none    Description of Procedure:     After informed consent patient taken to the operating room.  Patient was laid supine and placed under general anesthesia by the anesthesia team.  At this point patient was placed in dorsal lithotomy position and prepped and draped in normal sterile fashion.  A multidisciplinary timeout was undertaken documenting the correct patient site and procedure.  At this point a 22 rigid cystoscope was placed into the urethra . Bladder was normal.     Stent was emanating from the right ureter.     This was grasped and brought to urethral meatus.  Glidewire placed up the stent under fluoroscopic guidance.  Old stent was removed and passed off the field  I then placed a dual-lumen catheter and a stiff wire alongside the Glidewire under fluoroscopic guidance.  The dual-lumen was removed and a ureteral access sheath was placed into the distal ureter without any problem.  I removed the  obturator and wire and placed a flexible ureteroscope up the ueter.    Multiple stones identified in the right collecting system.  1 cm and 12 mm stones identified also 4 mm stone all stones were lasered and basketed out.        Stone was lasered into multiple small fragments with a 365 µm laser fiber and then these pieces were basketed out with a no tip nitinol basket.  I then took the flexible ureteroscope up and check the rest of the ureter and the upper collecting system.  There were no further stones.  Brought the actual sheath out under direct vision and there was no further stones.      Right   side was free of stones.  A 6 x 26 ureteral stent was then placed over the Glidewire through a rigid cystoscope without issue and had a good curl in the bladder under direct vision and a good curl in the right     renal pelvis under fluoroscopy.  Bladder was drained.  Patient tolerated the procedure well, he was taken to the postanesthesia care unit without issue.    No string placed on stent        Morris Chester MD     Date: 11/8/2023  Time: 13:32 EST

## 2023-11-09 ENCOUNTER — TELEPHONE (OUTPATIENT)
Dept: UROLOGY | Facility: CLINIC | Age: 88
End: 2023-11-09
Payer: MEDICARE

## 2023-11-09 LAB
LAB AP CASE REPORT: NORMAL
LAB AP CLINICAL INFORMATION: NORMAL
PATH REPORT.FINAL DX SPEC: NORMAL
PATH REPORT.GROSS SPEC: NORMAL

## 2023-11-09 NOTE — TELEPHONE ENCOUNTER
CORY, NURSE FROM [facility], CALLED.  DR. ERICKSON ORDERED ROCEPHIN IM  FOR 5 DAYS PRIOR TO SURGERY 10/09/23.    PATIENT WENT TO THE ER FOR LOW BLOOD PRESSURE ON 11/04/23.  THEY GAVE HER A DOSE OF ROCEPHIN THERE.    SHE NOW HAS AN EXTRA DOSE OF ROCEPHIN AT HOME, AND SHE WANTS TO KNOW WHETHER TO GIVE THE DOSE, OR THROW AWAY.    I CALLED CORY AND TOLD HER, PER BOZENA, GIVE THE EXTRA DOSE.

## 2023-11-13 NOTE — PROGRESS NOTES
Cytoscopy with Stent Removal     Pre-Procedure Diagnosis:     Nephrolithiasis    Post-Procedural Diagnosis: Same    Procedure Performed: Cystoscopy with Ureteral Stent Removal     Description of the Procedure:      was appropriately identified and brought to the cytoscopy suite. A timeout was undertaken documenting the correct patient, site, and procedure. The patient was prepped in a normal sterile fashion. A flexible cytoscope was then introduced into the bladder. The stent was identified and grasped with endoscopic graspers. The entire stent was removed and passed off the field. Patient tolerated the procedure well. There were no intraprocedural complications.        Assessment and Plan   Diagnoses and all orders for this visit:    1. Nephrolithiasis (Primary)          Current Outpatient Medications:     apixaban (ELIQUIS) 2.5 MG tablet tablet, Take 1 tablet by mouth 2 (Two) Times a Day., Disp: 180 tablet, Rfl: 3    ascorbic acid (VITAMIN C) 500 MG tablet, Take 1 tablet by mouth Daily., Disp: , Rfl:     colestipol (COLESTID) 1 g tablet, Take 2 tablets by mouth Daily., Disp: 180 tablet, Rfl: 3    dilTIAZem (CARDIZEM) 30 MG tablet, Take 1 tablet by mouth Every 12 (Twelve) Hours., Disp: , Rfl:     FLUoxetine (PROzac) 20 MG capsule, Take 1 capsule by mouth Daily., Disp: 90 capsule, Rfl: 3    furosemide (LASIX) 20 MG tablet, Take 1 tablet by mouth 2 (Two) Times a Day., Disp: 180 tablet, Rfl: 3    HYDROcodone-acetaminophen (NORCO) 5-325 MG per tablet, Take 1 tablet by mouth Every 12 (Twelve) Hours As Needed (Pain)., Disp: 10 tablet, Rfl: 0    LORazepam (ATIVAN) 0.5 MG tablet, Take 1 tablet by mouth Every Night., Disp: , Rfl:     losartan (COZAAR) 25 MG tablet, TAKE 1 TABLET BY MOUTH EVERY DAY, Disp: 90 tablet, Rfl: 1    Lumigan 0.01 % ophthalmic drops, Administer 1 drop to both eyes Every Night., Disp: , Rfl:     metoprolol succinate XL (TOPROL-XL) 100 MG 24 hr tablet, Take 1 tablet by mouth 2 (Two) Times a Day., Disp:  180 tablet, Rfl: 3    potassium chloride 10 MEQ CR tablet, Take 1 tablet by mouth 2 (Two) Times a Day., Disp: 180 tablet, Rfl: 3    timolol (TIMOPTIC) 0.5 % ophthalmic solution, Administer 1 drop to both eyes Every Morning., Disp: , Rfl:       Electronically Signed by: Morris Chester MD on 11/14/2023

## 2023-11-14 ENCOUNTER — PROCEDURE VISIT (OUTPATIENT)
Dept: UROLOGY | Facility: CLINIC | Age: 88
End: 2023-11-14
Payer: MEDICARE

## 2023-11-14 DIAGNOSIS — N20.0 NEPHROLITHIASIS: Primary | ICD-10-CM

## 2023-11-14 PROCEDURE — 52310 CYSTOSCOPY AND TREATMENT: CPT | Performed by: UROLOGY

## 2023-11-16 RX ORDER — TAMSULOSIN HYDROCHLORIDE 0.4 MG/1
0.4 CAPSULE ORAL DAILY
Qty: 30 CAPSULE | Refills: 0 | Status: SHIPPED | OUTPATIENT
Start: 2023-11-16 | End: 2023-12-16

## 2023-11-16 NOTE — TELEPHONE ENCOUNTER
Caller: STEPHANIE BENITES    Relationship: Emergency Contact    Best call back number:     [phone]       Requested Prescriptions:   Requested Prescriptions     Pending Prescriptions Disp Refills    dilTIAZem (CARDIZEM) 30 MG tablet       Sig: Take 1 tablet by mouth Every 12 (Twelve) Hours.    tamsulosin (FLOMAX) 0.4 MG capsule 24 hr capsule 30 capsule 0     Sig: Take 1 capsule by mouth Daily for 30 days.        Pharmacy where request should be sent: [facility]/PHARMACY #[id number] - [place], [place] - [address] [facility] [phone] [facility] [phone] FX     Last office visit with prescribing clinician: 10/2/2023   Last telemedicine visit with prescribing clinician: Visit date not found   Next office visit with prescribing clinician: 2/6/2024     Additional details provided by patient: PATIENT WILL RUN OUT OF MEDICATION ON SUNDAY.    Does the patient have less than a 3 day supply:  [] Yes  [] No    Would you like a call back once the refill request has been completed: [] Yes [] No    If the office needs to give you a call back, can they leave a voicemail: [] Yes [] No    Renata Collins Rep   11/16/23 10:43 EST

## 2023-11-17 LAB
CA CARBONATE CRY STONE QL IR: 20 %
COLOR STONE: NORMAL
COM MFR STONE: 10 %
COMPN STONE: NORMAL
LABORATORY COMMENT REPORT: NORMAL
Lab: NORMAL
Lab: NORMAL
PHOTO: NORMAL
SIZE STONE: NORMAL MM
SPEC SOURCE SUBJ: NORMAL
STONE ANALYSIS-IMP: NORMAL
TRI-PHOS MFR STONE: 70 %
WT STONE: 51 MG

## 2023-12-06 ENCOUNTER — HOSPITAL ENCOUNTER (OUTPATIENT)
Dept: ULTRASOUND IMAGING | Facility: HOSPITAL | Age: 88
Discharge: HOME | End: 2023-12-06
Admitting: UROLOGY
Payer: MEDICARE

## 2023-12-06 DIAGNOSIS — N20.0 NEPHROLITHIASIS: ICD-10-CM

## 2023-12-06 PROCEDURE — 76775 US EXAM ABDO BACK WALL LIM: CPT

## 2023-12-07 ENCOUNTER — HOSPITAL ENCOUNTER (OUTPATIENT)
Dept: GENERAL RADIOLOGY | Facility: HOSPITAL | Age: 88
Discharge: HOME | End: 2023-12-07
Admitting: INTERNAL MEDICINE
Payer: MEDICARE

## 2023-12-07 ENCOUNTER — TELEPHONE (OUTPATIENT)
Dept: INTERNAL MEDICINE | Facility: CLINIC | Age: 88
End: 2023-12-07
Payer: MEDICARE

## 2023-12-07 DIAGNOSIS — M79.671 RIGHT FOOT PAIN: Primary | ICD-10-CM

## 2023-12-07 DIAGNOSIS — M79.671 RIGHT FOOT PAIN: ICD-10-CM

## 2023-12-07 PROCEDURE — 73630 X-RAY EXAM OF FOOT: CPT

## 2023-12-07 NOTE — TELEPHONE ENCOUNTER
PT has redness, bruising and sore right great toe- denies any known injury states she woke up like this.     [facility] is currently at PT house     Please advise     Nicole, PT daughter - [phone]

## 2023-12-15 RX ORDER — TAMSULOSIN HYDROCHLORIDE 0.4 MG/1
1 CAPSULE ORAL DAILY
Qty: 30 CAPSULE | Refills: 0 | Status: SHIPPED | OUTPATIENT
Start: 2023-12-15

## 2023-12-15 NOTE — PROGRESS NOTES
Chief Complaint: Urologic complaint    Subjective         History of Present Illness  Bing Cruz is a 89 y.o. female         Nephrolithiasis  Ureteral stone  Recurrent UTI      No pain, no gross hematuria      12/6/2023 renal ultrasound - no hydronephrosis.    11/23 calcium oxalate monohydrate 10, struvite 70, calcium phosphate 20      Stent removed in office      11/8/2023 right ureteroscopy-stent no string - all stones removed from the right    10/19/2023 acceptable cardiac risk, okay to stop Eliquis 3 days before    10/6/2023 urine culture-E. coli    10/7/2023 right ureteral stent placed - [facility]  10/7/2023 CT abdomen/pelvis without - obstructing 9 mm right UPJ stone moderate hydronephrosis.  13 x 6 mm and 9 mm lower pole right renal stones.  No stones in the left.  Images reviewed  10/23 0.6, GFR 82      No history of kidney  stone.  No CAD, patient has A-fib on Eliquis          Previous      10/6/2023 urine culture-E. coli    10/7/2023 right ureteral stent placed - [facility]  10/7/2023 CT abdomen/pelvis without - obstructing 9 mm right UPJ stone moderate hydronephrosis.  13 x 6 mm and 9 mm lower pole right renal stones.  No stones in the left.  Images reviewed  10/23 0.6, GFR 82      History of colon cancer that is caused diarrhea at times, sometimes constipation      9/27/2023   0.8, GFR 63  9/19/2023 E. coli resistant to oral antibiotics other than nitrofurantoin  9/8/2023 Citrobacter -resistant to Ancef  8/23 E. coli  6/22 Citrobacter  3/22   Proteus    9/18/2023 CT abdomen/pelvis with and without - 13 x 6 mm stone right renal pelvis.  With some urothelial thickening and inflammation around this.  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  No hydro-.  No stones on the left.  Postsurgical changes of prior right hemicolectomy.  9 mm pancreatic cyst.  MRCP follow-up would be recommended.  Images reviewed.         No pulmonary issues    History of urethral stenosis  sling     Non-smoker.         Objective     Past Medical History:   Diagnosis Date    Abdominal pain, generalized     Anemia     Aortic regurgitation     Aortic stenosis     PER ECHO 6/15/23  NO STENOSIS    Broken arm     left upper, josseline placed    Cardiomegaly     Chronic atrial fibrillation 01/01/2019    Atrial fibrillation converted to sinus through cardioversion (March 2019    Chronic fatigue     Colon cancer     Diarrhea     Disease of tricuspid valve     Diverticulosis of colon     DVT (deep venous thrombosis)     LEFT LEG GREATER THAN 5 YRS AGO    Dysphagia     Essential (primary) hypertension     Frequent UTI     IV antibiotic prior to surgery for 5 days    H/O Hiatal hernia     H/O Sepsis 10/07/2023    Insomnia, unspecified     LVH (left ventricular hypertrophy)     Major depressive disorder, single episode     Mitral regurgitation     Mitral valve insufficiency and aortic valve insufficiency     Osteopenia     Pain in joint, pelvic region and thigh     TR (tricuspid regurgitation)        Past Surgical History:   Procedure Laterality Date    ABDOMINAL HYSTERECTOMY      WITH BSO     ANKLE SURGERY      (L) ankle fracture    BLADDER REPAIR      BREAST BIOPSY      (L) breast biopsy    CARDIOVERSION  2019    CATARACT EXTRACTION      OU    CHOLECYSTECTOMY      OPEN    COLON SURGERY      STATES SHE HAD 12 INCHES OF COLON REMOVED IN JULY 2020 FOR COLON CANCER    COLONOSCOPY  2020    COLONOSCOPY N/A 10/29/2021    Procedure: COLONOSCOPY;  Surgeon: Edmar Back MD;  Location: [facility] ENDOSCOPY;  Service: General;  Laterality: N/A;  DIVERTICULOSIS/ANASTOMOSIS    CYSTOSCOPY W/ URETERAL STENT PLACEMENT Right 10/07/2023    Procedure: CYSTOSCOPY URETERAL CATHETER/STENT INSERTION,right retrograde;  Surgeon: Roberto Luu MD;  Location: [facility] MAIN OR;  Service: Urology;  Laterality: Right;    FEMUR OPEN REDUCTION INTERNAL FIXATION Left 07/21/2021    Procedure: FEMORAL NECK OPEN REDUCTION INTERNAL FIXATION;   Surgeon: Bam Warner MD;  Location: [facility] OR;  Service: Orthopedics;  Laterality: Left;    FOOT SURGERY Left     HIP CLOSED REDUCTION Left 03/20/2022    Procedure: HIP CLOSED REDUCTION;  Surgeon: Harsha Gayle MD;  Location: [facility] OR;  Service: Orthopedics;  Laterality: Left;    INGUINAL HERNIA REPAIR Left 09/29/2011    ORIF WRIST FRACTURE Left 08/24/2023    Procedure: OPEN REDUCTION AND INTERNAL FIXATION WRIST;  Surgeon: Ben Alegre MD;  [facility];  Service: Orthopedics;  Laterality: Left;    TOTAL HIP ARTHROPLASTY Left 03/16/2022    Procedure: LEFT TOTAL HIP ARTHROPLASTY AND HARDWARE REMOVAL;  Surgeon: Bam Warner MD;  Location: [facility] OR;  Service: Orthopedics;  Laterality: Left;    UPPER GASTROINTESTINAL ENDOSCOPY      WITH DILATATION    URETEROSCOPY LASER LITHOTRIPSY WITH STENT INSERTION Right 11/8/2023    Procedure: Cystoscopy with right ureteroscopy with laser and right ureteral stent exchange;  Surgeon: Morris Chester MD;  Location: [facility] OR;  Service: Urology;  Laterality: Right;         Current Outpatient Medications:     apixaban (ELIQUIS) 2.5 MG tablet tablet, Take 1 tablet by mouth 2 (Two) Times a Day., Disp: 180 tablet, Rfl: 3    ascorbic acid (VITAMIN C) 500 MG tablet, Take 1 tablet by mouth Daily., Disp: , Rfl:     colestipol (COLESTID) 1 g tablet, Take 2 tablets by mouth Daily., Disp: 180 tablet, Rfl: 3    dilTIAZem (CARDIZEM) 30 MG tablet, Take 1 tablet by mouth Every 12 (Twelve) Hours., Disp: 60 tablet, Rfl: 0    FLUoxetine (PROzac) 20 MG capsule, Take 1 capsule by mouth Daily., Disp: 90 capsule, Rfl: 3    furosemide (LASIX) 20 MG tablet, Take 1 tablet by mouth 2 (Two) Times a Day., Disp: 180 tablet, Rfl: 3    HYDROcodone-acetaminophen (NORCO) 5-325 MG per tablet, Take 1 tablet by mouth Every 12 (Twelve) Hours As Needed (Pain)., Disp: 10 tablet, Rfl: 0    LORazepam (ATIVAN) 0.5 MG tablet, Take 1 tablet by mouth Every Night., Disp: ,  Rfl:     losartan (COZAAR) 25 MG tablet, TAKE 1 TABLET BY MOUTH EVERY DAY, Disp: 90 tablet, Rfl: 1    Lumigan 0.01 % ophthalmic drops, Administer 1 drop to both eyes Every Night., Disp: , Rfl:     metoprolol succinate XL (TOPROL-XL) 100 MG 24 hr tablet, Take 1 tablet by mouth 2 (Two) Times a Day., Disp: 180 tablet, Rfl: 3    potassium chloride 10 MEQ CR tablet, Take 1 tablet by mouth 2 (Two) Times a Day., Disp: 180 tablet, Rfl: 3    tamsulosin (FLOMAX) 0.4 MG capsule 24 hr capsule, Take 1 capsule by mouth Daily for 30 days., Disp: 30 capsule, Rfl: 0    timolol (TIMOPTIC) 0.5 % ophthalmic solution, Administer 1 drop to both eyes Every Morning., Disp: , Rfl:     Allergies   Allergen Reactions    Citalopram Unknown - High Severity    Clonazepam Unknown - High Severity    Levofloxacin Nausea And Vomiting    Lisinopril Unknown - High Severity    Macrobid [Nitrofurantoin Macrocrystal] Rash    Melatonin Unknown - High Severity        Family History   Problem Relation Age of Onset    Malig Hyperthermia Neg Hx        Social History     Socioeconomic History    Marital status:    Tobacco Use    Smoking status: Never    Smokeless tobacco: Never   Vaping Use    Vaping Use: Never used   Substance and Sexual Activity    Alcohol use: Not Currently    Drug use: Never    Sexual activity: Defer       Vital Signs:   There were no vitals taken for this visit.                 Assessment and Plan    Diagnoses and all orders for this visit:    1. Nephrolithiasis (Primary)          Nephrolithiasis/ Recurrent UTI      The patient was counseled on the preventative measures of kidney stones today.  This included increasing fluid intake to make at least 1.5 ml daily, decreasing meat intake, decreasing salt intake and taking in a normal amount of calcium (1000 mg daily).  Information handout given on this today.        We also discussed the DASH diet today for stone prevention and handout was given      Patient counseled to continue  drink plenty of fluids as she is having recurrent UTIs and also nephrolithiasis    No real choices for low-dose prophylactic antibiotics at this time secondary to her resistant culture we discussed this today.    F/u as needed.

## 2023-12-18 ENCOUNTER — OFFICE VISIT (OUTPATIENT)
Dept: UROLOGY | Facility: CLINIC | Age: 88
End: 2023-12-18
Payer: MEDICARE

## 2023-12-18 VITALS — HEIGHT: 67 IN | WEIGHT: 137 LBS | BODY MASS INDEX: 21.5 KG/M2 | RESPIRATION RATE: 16 BRPM

## 2023-12-18 DIAGNOSIS — N20.0 NEPHROLITHIASIS: Primary | ICD-10-CM

## 2023-12-18 DIAGNOSIS — N39.0 RECURRENT URINARY TRACT INFECTION: ICD-10-CM

## 2024-01-10 ENCOUNTER — OFFICE VISIT (OUTPATIENT)
Dept: INTERNAL MEDICINE | Facility: CLINIC | Age: 89
End: 2024-01-10
Payer: MEDICARE

## 2024-01-10 VITALS
SYSTOLIC BLOOD PRESSURE: 118 MMHG | HEART RATE: 85 BPM | HEIGHT: 67 IN | BODY MASS INDEX: 21.5 KG/M2 | DIASTOLIC BLOOD PRESSURE: 80 MMHG | OXYGEN SATURATION: 97 % | WEIGHT: 137 LBS | TEMPERATURE: 96.8 F

## 2024-01-10 DIAGNOSIS — N39.0 URINARY TRACT INFECTION WITHOUT HEMATURIA, SITE UNSPECIFIED: ICD-10-CM

## 2024-01-10 DIAGNOSIS — I48.20 ATRIAL FIBRILLATION, CHRONIC: ICD-10-CM

## 2024-01-10 DIAGNOSIS — I10 HYPERTENSION, ESSENTIAL: ICD-10-CM

## 2024-01-10 DIAGNOSIS — R53.1 SPELL OF GENERALIZED WEAKNESS: ICD-10-CM

## 2024-01-10 DIAGNOSIS — R35.0 URINE FREQUENCY: ICD-10-CM

## 2024-01-10 DIAGNOSIS — N39.0 COMPLICATED UTI (URINARY TRACT INFECTION): Primary | ICD-10-CM

## 2024-01-10 PROBLEM — N30.01 ACUTE CYSTITIS WITH HEMATURIA: Status: RESOLVED | Noted: 2021-08-11 | Resolved: 2024-01-10

## 2024-01-10 LAB
BILIRUB BLD-MCNC: NEGATIVE MG/DL
CLARITY, POC: CLEAR
COLOR UR: YELLOW
GLUCOSE UR STRIP-MCNC: NEGATIVE MG/DL
KETONES UR QL: NEGATIVE
LEUKOCYTE EST, POC: ABNORMAL
NITRITE UR-MCNC: NEGATIVE MG/ML
PH UR: 6 [PH] (ref 5–8)
PROT UR STRIP-MCNC: NEGATIVE MG/DL
RBC # UR STRIP: NEGATIVE /UL
SP GR UR: 1.01 (ref 1–1.03)
UROBILINOGEN UR QL: ABNORMAL

## 2024-01-10 PROCEDURE — 87086 URINE CULTURE/COLONY COUNT: CPT | Performed by: INTERNAL MEDICINE

## 2024-01-10 RX ORDER — CEFDINIR 300 MG/1
300 CAPSULE ORAL 2 TIMES DAILY
Qty: 20 CAPSULE | Refills: 0 | Status: SHIPPED | OUTPATIENT
Start: 2024-01-10

## 2024-01-10 RX ORDER — CEFTRIAXONE 1 G/1
1 INJECTION, POWDER, FOR SOLUTION INTRAMUSCULAR; INTRAVENOUS EVERY 24 HOURS
Status: COMPLETED | OUTPATIENT
Start: 2024-01-10 | End: 2024-01-10

## 2024-01-10 RX ADMIN — CEFTRIAXONE 1 G: 1 INJECTION, POWDER, FOR SOLUTION INTRAMUSCULAR; INTRAVENOUS at 15:22

## 2024-01-10 NOTE — ASSESSMENT & PLAN NOTE
Patient is an chronic but controlled A-fib.  Pulse is in the mid 80s presently.  She has high-dose metoprolol on board along with just very low-dose diltiazem.  She is on renally adjusted Eliquis, so certainly appropriate to continue same plan of care.

## 2024-01-10 NOTE — ASSESSMENT & PLAN NOTE
Patient has history of this, was hospitalized in 10/23 with sepsis secondary to UTI, and cared for by Dr. Chester in 11/23 with ureteral stone.    Her urinalysis today has 1+ leukocytes, so given her age and comorbidities, we will start her on oral cephalosporin based on her last culture revealing resistant E. coli.      Follow-up culture in 48 hours.

## 2024-01-10 NOTE — ASSESSMENT & PLAN NOTE
Blood pressure is stable and her pulse is in the mid 80s as of her 1/24 urgent visit.  Patient is stable to continue with just low-dose losartan, high-dose metoprolol, and low doses of diltiazem and Lasix.

## 2024-01-10 NOTE — ASSESSMENT & PLAN NOTE
This is indication for the patient's 1/24 office visit.  Her vitals are stable, she is afebrile.  Her weight is unchanged, but not certain where actually weighing her, it has been the same weight the past 3-4 office visits.    No true ischemic symptoms, no fall with head trauma, do have a concern for possible UTI.    Given age and comorbidities, I think we should evaluate for this initially and if no evidence of one, explore other possibilities.

## 2024-01-10 NOTE — PROGRESS NOTES
"Chief Complaint  Fatigue (Pt states that she is weak and she has been dizzy. She thought she was going to fall and she sit on the floor to keep from calling. She states that she doesn't feel well. She states that she is frequently urinating, she feels she has UTI)    Subjective      Bing Cruz presents to [facility] INTER[facility]    History of Present Illness  Patient is a 89 yo female, patient of Dr Pierre, with underlying AFIB on Eliquis, HTN, diastolic HF on ARB/b-blocker, SOMMER, colon ca in 6/20, among others, who is being seen 1/24 for urgent issues as per the chief complaint above.    Review of Systems   Constitutional:  Negative for appetite change, fatigue and fever.   HENT:  Negative for congestion and ear pain.    Eyes:  Negative for blurred vision.   Respiratory:  Negative for cough, chest tightness, shortness of breath and wheezing.    Cardiovascular:  Negative for chest pain, palpitations and leg swelling.   Gastrointestinal:  Negative for abdominal pain.   Genitourinary:  Negative for difficulty urinating, dysuria and hematuria.   Musculoskeletal:  Negative for arthralgias and gait problem.   Skin:  Negative for skin lesions.   Neurological:  Negative for syncope, memory problem and confusion.   Psychiatric/Behavioral:  Negative for self-injury and depressed mood.        Objective   Vital Signs:   /80   Pulse 85   Temp 96.8 °F (36 °C) (Skin)   Ht 170.2 cm (67.01\")   Wt 62.1 kg (137 lb)   SpO2 97%   BMI 21.45 kg/m²           Physical Exam  Vitals and nursing note reviewed.   Constitutional:       General: She is not in acute distress.     Appearance: Normal appearance. She is not toxic-appearing.   HENT:      Head: Atraumatic.      Right Ear: External ear normal.      Left Ear: External ear normal.      Nose: Nose normal.      Mouth/Throat:      Mouth: Mucous membranes are moist.   Eyes:      General:         Right eye: No discharge.         Left eye: No " discharge.      Extraocular Movements: Extraocular movements intact.      Pupils: Pupils are equal, round, and reactive to light.   Cardiovascular:      Rate and Rhythm: Normal rate and regular rhythm.      Pulses: Normal pulses.      Heart sounds: Normal heart sounds. No murmur heard.     No gallop.      Comments: Heart tones irregularly irregular, no S3.  Pulmonary:      Effort: Pulmonary effort is normal. No respiratory distress.      Breath sounds: No wheezing, rhonchi or rales.      Comments: Lung fields clear bilaterally.  Abdominal:      General: There is no distension.      Palpations: Abdomen is soft. There is no mass.      Tenderness: There is no abdominal tenderness. There is no guarding.   Musculoskeletal:         General: No swelling or tenderness.      Cervical back: No tenderness.      Right lower leg: No edema.      Left lower leg: No edema.      Comments: No peripheral edema.   Skin:     General: Skin is warm and dry.      Findings: No rash.   Neurological:      General: No focal deficit present.      Mental Status: She is alert and oriented to person, place, and time. Mental status is at baseline.      Motor: No weakness.      Gait: Gait normal.   Psychiatric:         Mood and Affect: Mood normal.         Thought Content: Thought content normal.          Result Review   The following data was reviewed by: Richar Baeza MD on 09/06/2023:  [x] Laboratory  [] Microbiology  [] Radiology  [] EKG/telemetry  [] Cardiology/Vascular  [] Pathology  [x] Old records             Assessment and Plan   Diagnoses and all orders for this visit:    1. Complicated UTI (urinary tract infection) (Primary)  Assessment & Plan:  Patient has history of this, was hospitalized in 10/23 with sepsis secondary to UTI, and cared for by Dr. Chester in 11/23 with ureteral stone.    Her urinalysis today has 1+ leukocytes, so given her age and comorbidities, we will start her on oral cephalosporin based on her last culture  revealing resistant E. coli.      Follow-up culture in 48 hours.    Orders:  -     Urine Culture - Urine, Urine, Clean Catch; Future    2. Urine frequency  -     POCT urinalysis dipstick, manual    3. Spell of generalized weakness  Assessment & Plan:  This is indication for the patient's 1/24 office visit.  Her vitals are stable, she is afebrile.  Her weight is unchanged, but not certain where actually weighing her, it has been the same weight the past 3-4 office visits.    No true ischemic symptoms, no fall with head trauma, do have a concern for possible UTI.    Given age and comorbidities, I think we should evaluate for this initially and if no evidence of one, explore other possibilities.      4. Hypertension, essential  Assessment & Plan:  Blood pressure is stable and her pulse is in the mid 80s as of her 1/24 urgent visit.  Patient is stable to continue with just low-dose losartan, high-dose metoprolol, and low doses of diltiazem and Lasix.      5. Atrial fibrillation, chronic  Assessment & Plan:  Patient is an chronic but controlled A-fib.  Pulse is in the mid 80s presently.  She has high-dose metoprolol on board along with just very low-dose diltiazem.  She is on renally adjusted Eliquis, so certainly appropriate to continue same plan of care.      Other orders  -     cefdinir (OMNICEF) 300 MG capsule; Take 1 capsule by mouth 2 (Two) Times a Day.  Dispense: 20 capsule; Refill: 0        BMI is within normal parameters. No other follow-up for BMI required.            Follow Up   Return for Next scheduled follow up.  Patient was given instructions and counseling regarding her condition or for health maintenance advice. Please see specific information pulled into the AVS if appropriate.     Total Time Spent:   minutes     This time includes time spent by me in the following activities: preparing for the visit, reviewing extensive past medical history and tests, performing a medically appropriate examination  and/or evaluation, counseling and educating the patient and/or caregivers, ordering medications, tests, or procedures, referring and/or communicating with other health care professionals and documenting information in the medical record all on this date of service.

## 2024-01-11 LAB — BACTERIA SPEC AEROBE CULT: NORMAL

## 2024-01-12 LAB
AMORPH URATE CRY URNS QL MICRO: ABNORMAL /HPF
BACTERIA UR QL AUTO: ABNORMAL /HPF
BILIRUB UR QL STRIP: NEGATIVE
CLARITY UR: CLEAR
COLOR UR: YELLOW
GLUCOSE UR STRIP-MCNC: NEGATIVE MG/DL
HGB UR QL STRIP.AUTO: NEGATIVE
HOLD SPECIMEN: NORMAL
HYALINE CASTS UR QL AUTO: ABNORMAL /LPF
KETONES UR QL STRIP: NEGATIVE
LEUKOCYTE ESTERASE UR QL STRIP.AUTO: ABNORMAL
NITRITE UR QL STRIP: NEGATIVE
PH UR STRIP.AUTO: 7 [PH] (ref 5–8)
PROT UR QL STRIP: NEGATIVE
RBC # UR STRIP: ABNORMAL /HPF
REF LAB TEST METHOD: ABNORMAL
SP GR UR STRIP: 1.01 (ref 1–1.03)
SQUAMOUS #/AREA URNS HPF: ABNORMAL /HPF
TRANS CELLS #/AREA URNS HPF: ABNORMAL /HPF
UROBILINOGEN UR QL STRIP: ABNORMAL
WBC # UR STRIP: ABNORMAL /HPF

## 2024-01-12 PROCEDURE — 87086 URINE CULTURE/COLONY COUNT: CPT | Performed by: INTERNAL MEDICINE

## 2024-01-12 PROCEDURE — 81001 URINALYSIS AUTO W/SCOPE: CPT | Performed by: INTERNAL MEDICINE

## 2024-01-13 LAB — BACTERIA SPEC AEROBE CULT: NO GROWTH

## 2024-01-18 ENCOUNTER — OFFICE VISIT (OUTPATIENT)
Dept: CARDIOLOGY | Facility: CLINIC | Age: 89
End: 2024-01-18
Payer: MEDICARE

## 2024-01-18 VITALS
WEIGHT: 131 LBS | HEIGHT: 67 IN | BODY MASS INDEX: 20.56 KG/M2 | DIASTOLIC BLOOD PRESSURE: 79 MMHG | HEART RATE: 91 BPM | SYSTOLIC BLOOD PRESSURE: 119 MMHG

## 2024-01-18 DIAGNOSIS — I48.21 PERMANENT ATRIAL FIBRILLATION: Primary | ICD-10-CM

## 2024-01-18 DIAGNOSIS — I10 ESSENTIAL HYPERTENSION: ICD-10-CM

## 2024-01-18 DIAGNOSIS — E78.2 MIXED HYPERLIPIDEMIA: ICD-10-CM

## 2024-01-18 PROCEDURE — 99214 OFFICE O/P EST MOD 30 MIN: CPT | Performed by: INTERNAL MEDICINE

## 2024-01-18 NOTE — PROGRESS NOTES
Chief Complaint  Permanent atrial fibrillation and Extremity Weakness    Subjective      Patient is here for follow-up on atrial fibrillation and hypertension.  She is being treated for UTI which is making her fatigued and tired.  Apart from that, she has no other complaints.  She is compliant with her medications including Eliquis without bleeding or other side effects.  Her blood pressure has been running in a good range.    Past Medical History:   Diagnosis Date    Abdominal pain, generalized     Anemia     Aortic regurgitation     Aortic stenosis     PER ECHO 6/15/23  NO STENOSIS    Broken arm     left upper, josseline placed    Cardiomegaly     Chronic atrial fibrillation 01/01/2019    Atrial fibrillation converted to sinus through cardioversion (March 2019    Chronic fatigue     Colon cancer     Diarrhea     Disease of tricuspid valve     Diverticulosis of colon     DVT (deep venous thrombosis)     LEFT LEG GREATER THAN 5 YRS AGO    Dysphagia     Essential (primary) hypertension     Frequent UTI     IV antibiotic prior to surgery for 5 days    H/O Hiatal hernia     H/O Sepsis 10/07/2023    Insomnia, unspecified     LVH (left ventricular hypertrophy)     Major depressive disorder, single episode     Mitral regurgitation     Mitral valve insufficiency and aortic valve insufficiency     Osteopenia     Pain in joint, pelvic region and thigh     TR (tricuspid regurgitation)          Current Outpatient Medications:     apixaban (ELIQUIS) 2.5 MG tablet tablet, Take 1 tablet by mouth 2 (Two) Times a Day., Disp: 180 tablet, Rfl: 3    ascorbic acid (VITAMIN C) 500 MG tablet, Take 1 tablet by mouth Daily., Disp: , Rfl:     cefdinir (OMNICEF) 300 MG capsule, Take 1 capsule by mouth 2 (Two) Times a Day., Disp: 20 capsule, Rfl: 0    colestipol (COLESTID) 1 g tablet, Take 2 tablets by mouth Daily., Disp: 180 tablet, Rfl: 3    dilTIAZem (CARDIZEM) 30 MG tablet, TAKE 1 TABLET BY MOUTH EVERY 12 HOURS, Disp: 60 tablet, Rfl: 0     "FLUoxetine (PROzac) 20 MG capsule, Take 1 capsule by mouth Daily., Disp: 90 capsule, Rfl: 3    furosemide (LASIX) 20 MG tablet, Take 1 tablet by mouth 2 (Two) Times a Day., Disp: 180 tablet, Rfl: 3    HYDROcodone-acetaminophen (NORCO) 5-325 MG per tablet, Take 1 tablet by mouth Every 12 (Twelve) Hours As Needed (Pain)., Disp: 10 tablet, Rfl: 0    LORazepam (ATIVAN) 0.5 MG tablet, Take 1 tablet by mouth Every Night., Disp: , Rfl:     losartan (COZAAR) 25 MG tablet, TAKE 1 TABLET BY MOUTH EVERY DAY, Disp: 90 tablet, Rfl: 1    Lumigan 0.01 % ophthalmic drops, Administer 1 drop to both eyes Every Night., Disp: , Rfl:     metoprolol succinate XL (TOPROL-XL) 100 MG 24 hr tablet, Take 1 tablet by mouth 2 (Two) Times a Day., Disp: 180 tablet, Rfl: 3    potassium chloride 10 MEQ CR tablet, Take 1 tablet by mouth 2 (Two) Times a Day., Disp: 180 tablet, Rfl: 3    tamsulosin (FLOMAX) 0.4 MG capsule 24 hr capsule, TAKE 1 CAPSULE BY MOUTH EVERY DAY, Disp: 30 capsule, Rfl: 0    timolol (TIMOPTIC) 0.5 % ophthalmic solution, Administer 1 drop to both eyes Every Morning., Disp: , Rfl:     There are no discontinued medications.  Allergies   Allergen Reactions    Citalopram Unknown - High Severity    Clonazepam Unknown - High Severity    Levofloxacin Nausea And Vomiting    Lisinopril Unknown - High Severity    Macrobid [Nitrofurantoin Macrocrystal] Rash    Melatonin Unknown - High Severity        Social History     Tobacco Use    Smoking status: Never    Smokeless tobacco: Never   Vaping Use    Vaping Use: Never used   Substance Use Topics    Alcohol use: Not Currently    Drug use: Never       Family History   Problem Relation Age of Onset    Malig Hyperthermia Neg Hx         Objective     /79   Pulse 91   Ht 170.2 cm (67.01\")   Wt 59.4 kg (131 lb)   BMI 20.51 kg/m²       Physical Exam    General Appearance:   no acute distress  Alert and oriented x3  HENT:   lips not cyanotic  Atraumatic  Neck:  No jvd " "  supple  Respiratory:  no respiratory distress  normal breath sounds  no rales  Cardiovascular:  no S3, no S4   no murmur  no rub  Extremities  No cyanosis  lower extremity edema: none    Skin:   warm, dry  No rashes      Result Review :     proBNP   Date Value Ref Range Status   10/12/2023 5,312.0 (H) 0.0 - 1,800.0 pg/mL Final     CMP          10/15/2023    03:30 10/19/2023    05:00 11/4/2023    13:33   CMP   Glucose 92  88  99    BUN 15  12  19    Creatinine 0.66  0.66  0.72    EGFR 82.4  82.4  78.6    Sodium 139  138  140    Potassium 3.8  3.8  3.9    Chloride 105  105  104    Calcium 8.7  8.4  9.1    Total Protein 6.2   7.3    Albumin 3.1   4.0    Globulin 3.1   3.3    Total Bilirubin 0.4   0.8    Alkaline Phosphatase 92   86    AST (SGOT) 14   22    ALT (SGPT) 10   18    Albumin/Globulin Ratio 1.0   1.2    BUN/Creatinine Ratio 22.7  18.2  26.4    Anion Gap 9.2  9.6  10.1      CBC w/diff          10/12/2023    03:00 10/19/2023    05:00 11/4/2023    13:33   CBC w/Diff   WBC 10.17  7.61  6.72    RBC 3.49  3.13  4.01    Hemoglobin 11.0  10.0  12.8    Hematocrit 32.9  31.6  39.5    MCV 94.3  101.0  98.5    MCH 31.5  31.9  31.9    MCHC 33.4  31.6  32.4    RDW 13.4  14.4  14.3    Platelets 220  220  231    Neutrophil Rel % 66.2  62.4  59.6    Immature Granulocyte Rel % 1.5  0.8  0.3    Lymphocyte Rel % 23.6  28.3  31.1    Monocyte Rel % 6.8  6.4  7.1    Eosinophil Rel % 1.6  1.4  1.5    Basophil Rel % 0.3  0.7  0.4       Lab Results   Component Value Date    TSH 2.470 01/10/2023      Lab Results   Component Value Date    FREET4 1.13 01/10/2023      No results found for: \"DDIMERQUANT\"  Magnesium   Date Value Ref Range Status   10/19/2023 1.9 1.7 - 2.3 mg/dL Final      No results found for: \"DIGOXIN\"   Lab Results   Component Value Date    TROPONINT 29 (H) 10/07/2023           Lipid Panel          9/27/2023    15:36   Lipid Panel   Total Cholesterol 144    Triglycerides 232    HDL Cholesterol 57    VLDL Cholesterol 36  "   LDL Cholesterol  51    LDL/HDL Ratio 0.71      Lab Results   Component Value Date    POCTROP 0.01 05/20/2022       Results for orders placed in visit on 06/15/23    Adult Transthoracic Echo Complete W/ Cont if Necessary Per Protocol    Interpretation Summary    Left ventricular systolic function is normal. Left ventricular ejection fraction appears to be 51 - 55%.    Left ventricular diastolic function was indeterminate due to presence of atrial fibrillation.    There is mild to moderate biatrial enlargement.    Mild to moderate mitral regurgitation noted, with multiple jets.    There is mild to moderate tricuspid regurgitation.  Estimated right ventricular systolic pressure from tricuspid regurgitation is moderately elevated (45-55 mmHg).                 Diagnoses and all orders for this visit:    1. Permanent atrial fibrillation (Primary)    2. Essential hypertension    3. Mixed hyperlipidemia      Assessment:     -Permanent atrial fibrillation: Heart rate is well-controlled.  Continue Eliquis 2.5 mg twice daily and metoprolol.  Recent Holter monitor shows predominant rhythm is atrial fibrillation with an average ventricular rate of 90 bpm.     -Hypertension: Well-controlled on current regimen.  Continue the same.     -Mixed dyslipidemia: Diet controlled.      Follow Up     No follow-ups on file.        Patient was given instructions and counseling regarding her condition or for health maintenance advice. Please see specific information pulled into the AVS if appropriate.

## 2024-01-22 RX ORDER — TAMSULOSIN HYDROCHLORIDE 0.4 MG/1
1 CAPSULE ORAL DAILY
Qty: 30 CAPSULE | Refills: 0 | Status: SHIPPED | OUTPATIENT
Start: 2024-01-22

## 2024-01-22 RX ORDER — TAMSULOSIN HYDROCHLORIDE 0.4 MG/1
1 CAPSULE ORAL DAILY
Qty: 30 CAPSULE | Refills: 0 | Status: SHIPPED | OUTPATIENT
Start: 2024-01-22 | End: 2024-01-22 | Stop reason: SDUPTHER

## 2024-01-22 NOTE — TELEPHONE ENCOUNTER
Caller: STEPHANIE BENITES    Relationship: Emergency Contact    Best call back number: [phone]     Requested Prescriptions:   Requested Prescriptions     Pending Prescriptions Disp Refills    tamsulosin (FLOMAX) 0.4 MG capsule 24 hr capsule 30 capsule 0     Sig: Take 1 capsule by mouth Daily.    dilTIAZem (CARDIZEM) 30 MG tablet 60 tablet 0     Sig: Take 1 tablet by mouth Every 12 (Twelve) Hours.        Pharmacy where request should be sent: [facility]ACY #[id number] - [place], [place] - [address] [facility] [phone] [facility] [phone] FX     Last office visit with prescribing clinician: 10/2/2023   Last telemedicine visit with prescribing clinician: Visit date not found   Next office visit with prescribing clinician: 2/6/2024     Does the patient have less than a 3 day supply:  [] Yes  [x] No    Renata Frank Rep   01/22/24 12:58 EST

## 2024-01-23 ENCOUNTER — OFFICE VISIT (OUTPATIENT)
Dept: GASTROENTEROLOGY | Facility: CLINIC | Age: 89
End: 2024-01-23
Payer: MEDICARE

## 2024-01-23 ENCOUNTER — TELEPHONE (OUTPATIENT)
Dept: INTERNAL MEDICINE | Facility: CLINIC | Age: 89
End: 2024-01-23

## 2024-01-23 VITALS
WEIGHT: 131 LBS | HEIGHT: 67 IN | BODY MASS INDEX: 20.56 KG/M2 | SYSTOLIC BLOOD PRESSURE: 100 MMHG | DIASTOLIC BLOOD PRESSURE: 64 MMHG | HEART RATE: 77 BPM

## 2024-01-23 DIAGNOSIS — K21.9 GASTROESOPHAGEAL REFLUX DISEASE, UNSPECIFIED WHETHER ESOPHAGITIS PRESENT: ICD-10-CM

## 2024-01-23 DIAGNOSIS — R15.9 INCONTINENCE OF FECES WITH FECAL URGENCY: ICD-10-CM

## 2024-01-23 DIAGNOSIS — K59.1 FUNCTIONAL DIARRHEA: Primary | ICD-10-CM

## 2024-01-23 DIAGNOSIS — R15.2 INCONTINENCE OF FECES WITH FECAL URGENCY: ICD-10-CM

## 2024-01-23 DIAGNOSIS — R13.10 DYSPHAGIA, UNSPECIFIED TYPE: ICD-10-CM

## 2024-01-23 PROCEDURE — 99214 OFFICE O/P EST MOD 30 MIN: CPT | Performed by: NURSE PRACTITIONER

## 2024-01-23 PROCEDURE — 1160F RVW MEDS BY RX/DR IN RCRD: CPT | Performed by: NURSE PRACTITIONER

## 2024-01-23 PROCEDURE — 1159F MED LIST DOCD IN RCRD: CPT | Performed by: NURSE PRACTITIONER

## 2024-01-23 NOTE — PROGRESS NOTES
Chief Complaint   Diarrhea    History of Present Illness       Bing Cruz is a 89 y.o. female who presents today accompanied by her daughter to [facility] for follow-up for diarrhea.     She has a history of GERD with trouble swallowing and admits she is doing really well on Protonix 40 mg daily.  Last EGD was done by Dr. Blount on 2/2021.Occasionally still has some dysphagia but admits that's rare. Reports good appetite. Has gained 3 lbs since January.      Last colonoscopy was done by Dr. Back on 10/21 and it showed diverticulosis.  She has a personal history of colon cancer. S/p colon resection in 7/2020.     She denies any significant GI family history at this time.     She has a history of DVT with A-fib and tricuspid regurgitation and is on Eliquis.       Has history of cholecystectomy.        Has been hospitalized recently for sepsis secondary to UTI. Is on ABX and is not feeling better. Is still very weak and uncomfortable with urinating. Reports diarrhea is better on colestid. Reports reflux is better on pepcid PRN. She admits she has been careful with eating. No longer having any issues with swallowing.      Results       Result Review :       CMP          10/15/2023    03:30 10/19/2023    05:00 11/4/2023    13:33   CMP   Glucose 92  88  99    BUN 15  12  19    Creatinine 0.66  0.66  0.72    EGFR 82.4  82.4  78.6    Sodium 139  138  140    Potassium 3.8  3.8  3.9    Chloride 105  105  104    Calcium 8.7  8.4  9.1    Total Protein 6.2   7.3    Albumin 3.1   4.0    Globulin 3.1   3.3    Total Bilirubin 0.4   0.8    Alkaline Phosphatase 92   86    AST (SGOT) 14   22    ALT (SGPT) 10   18    Albumin/Globulin Ratio 1.0   1.2    BUN/Creatinine Ratio 22.7  18.2  26.4    Anion Gap 9.2  9.6  10.1      CBC          10/12/2023    03:00 10/19/2023    05:00 11/4/2023    13:33   CBC   WBC 10.17  7.61  6.72    RBC 3.49  3.13  4.01    Hemoglobin 11.0  10.0  12.8    Hematocrit  "32.9  31.6  39.5    MCV 94.3  101.0  98.5    MCH 31.5  31.9  31.9    MCHC 33.4  31.6  32.4    RDW 13.4  14.4  14.3    Platelets 220  220  231      Lipid Panel          9/27/2023    15:36   Lipid Panel   Total Cholesterol 144    Triglycerides 232    HDL Cholesterol 57    VLDL Cholesterol 36    LDL Cholesterol  51    LDL/HDL Ratio 0.71          Lipase   Lipase   Date Value Ref Range Status   11/04/2023 22 13 - 60 U/L Final     Amylase No results found for: \"AMYLASE\"  Iron Profile   Iron   Date Value Ref Range Status   10/08/2023 31 (L) 37 - 145 mcg/dL Final     TIBC   Date Value Ref Range Status   10/08/2023 206 (L) 298 - 536 mcg/dL Final     Iron Saturation (TSAT)   Date Value Ref Range Status   10/08/2023 15 (L) 20 - 50 % Final     Transferrin   Date Value Ref Range Status   10/08/2023 138 (L) 200 - 360 mg/dL Final     Ferritin   Ferritin   Date Value Ref Range Status   10/08/2023 353.40 (H) 13.00 - 150.00 ng/mL Final               Past Medical History       Past Medical History:   Diagnosis Date    Abdominal pain, generalized     Anemia     Aortic regurgitation     Aortic stenosis     PER ECHO 6/15/23  NO STENOSIS    Broken arm     left upper, josseline placed    Cardiomegaly     Chronic atrial fibrillation 01/01/2019    Atrial fibrillation converted to sinus through cardioversion (March 2019    Chronic fatigue     Colon cancer     Diarrhea     Disease of tricuspid valve     Diverticulosis of colon     DVT (deep venous thrombosis)     LEFT LEG GREATER THAN 5 YRS AGO    Dysphagia     Essential (primary) hypertension     Frequent UTI     IV antibiotic prior to surgery for 5 days    H/O Hiatal hernia     H/O Sepsis 10/07/2023    Insomnia, unspecified     LVH (left ventricular hypertrophy)     Major depressive disorder, single episode     Mitral regurgitation     Mitral valve insufficiency and aortic valve insufficiency     Osteopenia     Pain in joint, pelvic region and thigh     TR (tricuspid regurgitation)        Past " Surgical History:   Procedure Laterality Date    ABDOMINAL HYSTERECTOMY      WITH BSO     ANKLE SURGERY      (L) ankle fracture    BLADDER REPAIR      BREAST BIOPSY      (L) breast biopsy    CARDIOVERSION  2019    CATARACT EXTRACTION      OU    CHOLECYSTECTOMY      OPEN    COLON SURGERY      STATES SHE HAD 12 INCHES OF COLON REMOVED IN JULY 2020 FOR COLON CANCER    COLONOSCOPY  2020    COLONOSCOPY N/A 10/29/2021    Procedure: COLONOSCOPY;  Surgeon: Edmar Back MD;  Location: [facility] ENDOSCOPY;  Service: General;  Laterality: N/A;  DIVERTICULOSIS/ANASTOMOSIS    CYSTOSCOPY W/ URETERAL STENT PLACEMENT Right 10/07/2023    Procedure: CYSTOSCOPY URETERAL CATHETER/STENT INSERTION,right retrograde;  Surgeon: Roberto Luu MD;  Location: [facility] MAIN OR;  Service: Urology;  Laterality: Right;    FEMUR OPEN REDUCTION INTERNAL FIXATION Left 07/21/2021    Procedure: FEMORAL NECK OPEN REDUCTION INTERNAL FIXATION;  Surgeon: Bam Warner MD;  Location: [facility] MAIN OR;  Service: Orthopedics;  Laterality: Left;    FOOT SURGERY Left     HIP CLOSED REDUCTION Left 03/20/2022    Procedure: HIP CLOSED REDUCTION;  Surgeon: Harsha Gayle MD;  Location: [facility] MAIN OR;  Service: Orthopedics;  Laterality: Left;    INGUINAL HERNIA REPAIR Left 09/29/2011    ORIF WRIST FRACTURE Left 08/24/2023    Procedure: OPEN REDUCTION AND INTERNAL FIXATION WRIST;  Surgeon: Ben Alegre MD;  Location: [facility] OR OSC;  Service: Orthopedics;  Laterality: Left;    TOTAL HIP ARTHROPLASTY Left 03/16/2022    Procedure: LEFT TOTAL HIP ARTHROPLASTY AND HARDWARE REMOVAL;  Surgeon: Bam Warner MD;  Location: [facility] MAIN OR;  Service: Orthopedics;  Laterality: Left;    UPPER GASTROINTESTINAL ENDOSCOPY      WITH DILATATION    URETEROSCOPY LASER LITHOTRIPSY WITH STENT INSERTION Right 11/8/2023    Procedure: Cystoscopy with right ureteroscopy with laser and right ureteral stent exchange;  Surgeon: Morris Chester MD;  Location: [facility]  MAIN OR;  Service: Urology;  Laterality: Right;         Current Outpatient Medications:     apixaban (ELIQUIS) 2.5 MG tablet tablet, Take 1 tablet by mouth 2 (Two) Times a Day., Disp: 180 tablet, Rfl: 3    ascorbic acid (VITAMIN C) 500 MG tablet, Take 1 tablet by mouth Daily., Disp: , Rfl:     colestipol (COLESTID) 1 g tablet, Take 2 tablets by mouth Daily., Disp: 180 tablet, Rfl: 3    dilTIAZem (CARDIZEM) 30 MG tablet, Take 1 tablet by mouth Every 12 (Twelve) Hours., Disp: 60 tablet, Rfl: 0    FLUoxetine (PROzac) 20 MG capsule, Take 1 capsule by mouth Daily., Disp: 90 capsule, Rfl: 3    furosemide (LASIX) 20 MG tablet, Take 1 tablet by mouth 2 (Two) Times a Day., Disp: 180 tablet, Rfl: 3    HYDROcodone-acetaminophen (NORCO) 5-325 MG per tablet, Take 1 tablet by mouth Every 12 (Twelve) Hours As Needed (Pain)., Disp: 10 tablet, Rfl: 0    LORazepam (ATIVAN) 0.5 MG tablet, Take 1 tablet by mouth Every Night., Disp: , Rfl:     losartan (COZAAR) 25 MG tablet, TAKE 1 TABLET BY MOUTH EVERY DAY, Disp: 90 tablet, Rfl: 1    Lumigan 0.01 % ophthalmic drops, Administer 1 drop to both eyes Every Night., Disp: , Rfl:     metoprolol succinate XL (TOPROL-XL) 100 MG 24 hr tablet, Take 1 tablet by mouth 2 (Two) Times a Day., Disp: 180 tablet, Rfl: 3    potassium chloride 10 MEQ CR tablet, Take 1 tablet by mouth 2 (Two) Times a Day., Disp: 180 tablet, Rfl: 3    tamsulosin (FLOMAX) 0.4 MG capsule 24 hr capsule, Take 1 capsule by mouth Daily., Disp: 30 capsule, Rfl: 0    timolol (TIMOPTIC) 0.5 % ophthalmic solution, Administer 1 drop to both eyes Every Morning., Disp: , Rfl:      Allergies   Allergen Reactions    Citalopram Unknown - High Severity    Clonazepam Unknown - High Severity    Levofloxacin Nausea And Vomiting    Lisinopril Unknown - High Severity    Macrobid [Nitrofurantoin Macrocrystal] Rash    Melatonin Unknown - High Severity       Family History   Problem Relation Age of Onset    Malig Hyperthermia Neg Hx         Social  "History     Social History Narrative    Not on file       Objective       Review of Systems   Constitutional:  Positive for appetite change and fatigue. Negative for fever, unexpected weight gain and unexpected weight loss.   HENT:  Negative for trouble swallowing.    Respiratory:  Negative for cough, choking, chest tightness, shortness of breath, wheezing and stridor.    Cardiovascular:  Negative for chest pain, palpitations and leg swelling.   Gastrointestinal:  Negative for abdominal distention, abdominal pain, anal bleeding, blood in stool, constipation, diarrhea, nausea, rectal pain, vomiting, GERD and indigestion.   Genitourinary:  Positive for dysuria and pelvic pain.        Vital Signs:   /64 (BP Location: Right arm, Patient Position: Sitting, Cuff Size: Adult)   Pulse 77   Ht 170.2 cm (67\")   Wt 59.4 kg (131 lb)   BMI 20.52 kg/m²       Physical Exam  Constitutional:       General: She is not in acute distress.     Appearance: She is well-developed. She is not ill-appearing.   HENT:      Head: Normocephalic.   Eyes:      Pupils: Pupils are equal, round, and reactive to light.   Cardiovascular:      Rate and Rhythm: Normal rate and regular rhythm.      Heart sounds: Normal heart sounds.   Pulmonary:      Effort: Pulmonary effort is normal.      Breath sounds: Normal breath sounds.   Abdominal:      General: Bowel sounds are normal. There is no distension.      Palpations: Abdomen is soft. There is no mass.      Tenderness: There is no abdominal tenderness. There is no guarding or rebound.      Hernia: No hernia is present.   Musculoskeletal:         General: Normal range of motion.   Skin:     General: Skin is warm and dry.   Neurological:      Mental Status: She is alert and oriented to person, place, and time.   Psychiatric:         Speech: Speech normal.         Behavior: Behavior normal.         Judgment: Judgment normal.           Assessment & Plan          Assessment and Plan    Diagnoses and " all orders for this visit:    1. Functional diarrhea (Primary)    2. Incontinence of feces with fecal urgency    3. Gastroesophageal reflux disease, unspecified whether esophagitis present    4. Dysphagia, unspecified type    Reviewed most recent hospital records with the patient and her daughter today.  No longer having any diarrhea on the colestipol.  Bowels moving well currently.  Patient still on antibiotics for suspected UTI.  Recommend she continue probiotics.  I did tell the patient and her daughter if the patient still feeling really bad will follow-up with PCP about possibly getting IV antibiotics in the hospital if these are not working.  Since patient is still having symptoms.  GERD seems well-controlled on Pepcid as needed.  No longer having the trouble swallowing either.  Patient to call the office with any issues.  Patient to follow-up with me in 6 months.  Patient and daughter are agreeable to the plan.            Follow Up       Follow Up   No follow-ups on file.  Patient was given instructions and counseling regarding her condition or for health maintenance advice. Please see specific information pulled into the AVS if appropriate.

## 2024-01-23 NOTE — TELEPHONE ENCOUNTER
Patient was treated empirically for UTI since she was leaving a specimen on a Friday.  The culture came back negative.  Patient needs an appointment with one of our female providers for further evaluation of her current symptoms.

## 2024-01-23 NOTE — TELEPHONE ENCOUNTER
Caller: STEPHANIE BENITES    Relationship: Emergency Contact    Best call back number: [phone]     What is the best time to reach you: ANYTIME PRIOR TO 3  PM IS BEST OR CALL AFTER 3 PM     Who are you requesting to speak with (clinical staff, provider,  specific staff member): CLINICAL     What was the call regarding: CALLER STATED  PATIENT HAS COMPLETED ANTIBIOTICS AS OF 1/19/24, AND SHE IS STILL FEELING WEAK AND STILL HAS BURNING SENSATION WHEN URINATING.  REQUESTING CALL BACK TO BE ADVISED.     Is it okay if the provider responds through MyChart: NO

## 2024-01-24 ENCOUNTER — OFFICE VISIT (OUTPATIENT)
Dept: INTERNAL MEDICINE | Facility: CLINIC | Age: 89
End: 2024-01-24
Payer: MEDICARE

## 2024-01-24 VITALS
BODY MASS INDEX: 20.52 KG/M2 | TEMPERATURE: 97 F | SYSTOLIC BLOOD PRESSURE: 115 MMHG | HEIGHT: 67 IN | HEART RATE: 52 BPM | OXYGEN SATURATION: 97 % | DIASTOLIC BLOOD PRESSURE: 80 MMHG

## 2024-01-24 DIAGNOSIS — R00.1 BRADYCARDIA WITH 51-60 BEATS PER MINUTE: ICD-10-CM

## 2024-01-24 DIAGNOSIS — R30.0 DYSURIA: Primary | ICD-10-CM

## 2024-01-24 DIAGNOSIS — Z87.442 HISTORY OF RENAL STONE: ICD-10-CM

## 2024-01-24 DIAGNOSIS — N39.0 COMPLICATED UTI (URINARY TRACT INFECTION): ICD-10-CM

## 2024-01-24 LAB
BILIRUB BLD-MCNC: NEGATIVE MG/DL
CLARITY, POC: ABNORMAL
COLOR UR: YELLOW
EXPIRATION DATE: ABNORMAL
GLUCOSE UR STRIP-MCNC: NEGATIVE MG/DL
KETONES UR QL: NEGATIVE
LEUKOCYTE EST, POC: NEGATIVE
Lab: ABNORMAL
NITRITE UR-MCNC: NEGATIVE MG/ML
PH UR: 6 [PH] (ref 5–8)
PROT UR STRIP-MCNC: NEGATIVE MG/DL
RBC # UR STRIP: NEGATIVE /UL
SP GR UR: 1.02 (ref 1–1.03)
UROBILINOGEN UR QL: NORMAL

## 2024-01-24 RX ORDER — CEFTRIAXONE 1 G/1
1 INJECTION, POWDER, FOR SOLUTION INTRAMUSCULAR; INTRAVENOUS ONCE
Status: COMPLETED | OUTPATIENT
Start: 2024-01-24 | End: 2024-01-24

## 2024-01-24 RX ORDER — FLUCONAZOLE 150 MG/1
TABLET ORAL
Qty: 2 TABLET | Refills: 0 | Status: SHIPPED | OUTPATIENT
Start: 2024-01-24

## 2024-01-24 RX ORDER — AMOXICILLIN AND CLAVULANATE POTASSIUM 875; 125 MG/1; MG/1
1 TABLET, FILM COATED ORAL 2 TIMES DAILY
Qty: 20 TABLET | Refills: 0 | Status: SHIPPED | OUTPATIENT
Start: 2024-01-24 | End: 2024-02-03

## 2024-01-24 RX ADMIN — CEFTRIAXONE 1 G: 1 INJECTION, POWDER, FOR SOLUTION INTRAMUSCULAR; INTRAVENOUS at 12:42

## 2024-01-24 NOTE — PROGRESS NOTES
Chief Complaint  Urinary Tract Infection (The patient is complaining of burning with urination. Patient was seen by Dr. Baeza, she was given antibiotics. )    Subjective    History of Present Illness:  Bing Cruz presents to [facility] for an acute visit for complaint of dysuria.  The patient was seen by Dr. Baeza on 1/10/2024 and was treated with Rocephin 1 g IM and cefdinir 300 mg twice a day.  The patient reports that her symptoms did improve especially with the injection.  Completed treatment for UTI on 1/19/24. Culture was negative.  The patient has been off her antibiotic for a few days.  Patient has history of this, was hospitalized in 10/23 with sepsis secondary to UTI, and cared for by Dr. Chester in 11/23 with ureteral stone.     Review of Systems  Constitutional: Positive for generalized weakness and fatigue.  Negative for fever and loss of appetite.   Gastrointestinal: Negative for nausea and vomiting.  Musculoskeletal: Negative for back pain.  Genitourinary: Negative for vaginal discharge or itching.    Past Medical History:   Diagnosis Date    Abdominal pain, generalized     Anemia     Aortic regurgitation     Aortic stenosis     PER ECHO 6/15/23  NO STENOSIS    Broken arm     left upper, josseline placed    Cardiomegaly     Chronic atrial fibrillation 01/01/2019    Atrial fibrillation converted to sinus through cardioversion (March 2019    Chronic fatigue     Colon cancer     Diarrhea     Disease of tricuspid valve     Diverticulosis of colon     DVT (deep venous thrombosis)     LEFT LEG GREATER THAN 5 YRS AGO    Dysphagia     Essential (primary) hypertension     Frequent UTI     IV antibiotic prior to surgery for 5 days    H/O Hiatal hernia     H/O Sepsis 10/07/2023    Insomnia, unspecified     LVH (left ventricular hypertrophy)     Major depressive disorder, single episode     Mitral regurgitation     Mitral valve insufficiency and aortic valve insufficiency      Osteopenia     Pain in joint, pelvic region and thigh     TR (tricuspid regurgitation)         Past Surgical History:   Procedure Laterality Date    ABDOMINAL HYSTERECTOMY      WITH BSO     ANKLE SURGERY      (L) ankle fracture    BLADDER REPAIR      BREAST BIOPSY      (L) breast biopsy    CARDIOVERSION  2019    CATARACT EXTRACTION      OU    CHOLECYSTECTOMY      OPEN    COLON SURGERY      STATES SHE HAD 12 INCHES OF COLON REMOVED IN JULY 2020 FOR COLON CANCER    COLONOSCOPY  2020    COLONOSCOPY N/A 10/29/2021    Procedure: COLONOSCOPY;  Surgeon: Edmar Back MD;  Location: [facility] ENDOSCOPY;  Service: General;  Laterality: N/A;  DIVERTICULOSIS/ANASTOMOSIS    CYSTOSCOPY W/ URETERAL STENT PLACEMENT Right 10/07/2023    Procedure: CYSTOSCOPY URETERAL CATHETER/STENT INSERTION,right retrograde;  Surgeon: Roberto Luu MD;  Location: [facility] MAIN OR;  Service: Urology;  Laterality: Right;    FEMUR OPEN REDUCTION INTERNAL FIXATION Left 07/21/2021    Procedure: FEMORAL NECK OPEN REDUCTION INTERNAL FIXATION;  Surgeon: Bam Warner MD;  Location: [facility] MAIN OR;  Service: Orthopedics;  Laterality: Left;    FOOT SURGERY Left     HIP CLOSED REDUCTION Left 03/20/2022    Procedure: HIP CLOSED REDUCTION;  Surgeon: Harsha Gayle MD;  Location: [facility] MAIN OR;  Service: Orthopedics;  Laterality: Left;    INGUINAL HERNIA REPAIR Left 09/29/2011    ORIF WRIST FRACTURE Left 08/24/2023    Procedure: OPEN REDUCTION AND INTERNAL FIXATION WRIST;  Surgeon: Ben Alegre MD;  Location: [facility] OR OSC;  Service: Orthopedics;  Laterality: Left;    TOTAL HIP ARTHROPLASTY Left 03/16/2022    Procedure: LEFT TOTAL HIP ARTHROPLASTY AND HARDWARE REMOVAL;  Surgeon: Bam Warner MD;  Location: [facility] MAIN OR;  Service: Orthopedics;  Laterality: Left;    UPPER GASTROINTESTINAL ENDOSCOPY      WITH DILATATION    URETEROSCOPY LASER LITHOTRIPSY WITH STENT INSERTION Right 11/8/2023    Procedure: Cystoscopy with right ureteroscopy  with laser and right ureteral stent exchange;  Surgeon: Morris Chester MD;  Location: [facility] MAIN OR;  Service: Urology;  Laterality: Right;        Allergies   Allergen Reactions    Citalopram Unknown - High Severity    Clonazepam Unknown - High Severity    Levofloxacin Nausea And Vomiting    Lisinopril Unknown - High Severity    Macrobid [Nitrofurantoin Macrocrystal] Rash    Melatonin Unknown - High Severity          Current Outpatient Medications:     apixaban (ELIQUIS) 2.5 MG tablet tablet, Take 1 tablet by mouth 2 (Two) Times a Day., Disp: 180 tablet, Rfl: 3    ascorbic acid (VITAMIN C) 500 MG tablet, Take 1 tablet by mouth Daily., Disp: , Rfl:     colestipol (COLESTID) 1 g tablet, Take 2 tablets by mouth Daily., Disp: 180 tablet, Rfl: 3    dilTIAZem (CARDIZEM) 30 MG tablet, Take 1 tablet by mouth Every 12 (Twelve) Hours., Disp: 60 tablet, Rfl: 0    FLUoxetine (PROzac) 20 MG capsule, Take 1 capsule by mouth Daily., Disp: 90 capsule, Rfl: 3    furosemide (LASIX) 20 MG tablet, Take 1 tablet by mouth 2 (Two) Times a Day., Disp: 180 tablet, Rfl: 3    HYDROcodone-acetaminophen (NORCO) 5-325 MG per tablet, Take 1 tablet by mouth Every 12 (Twelve) Hours As Needed (Pain)., Disp: 10 tablet, Rfl: 0    LORazepam (ATIVAN) 0.5 MG tablet, Take 1 tablet by mouth Every Night., Disp: , Rfl:     losartan (COZAAR) 25 MG tablet, TAKE 1 TABLET BY MOUTH EVERY DAY, Disp: 90 tablet, Rfl: 1    Lumigan 0.01 % ophthalmic drops, Administer 1 drop to both eyes Every Night., Disp: , Rfl:     metoprolol succinate XL (TOPROL-XL) 100 MG 24 hr tablet, Take 1 tablet by mouth 2 (Two) Times a Day., Disp: 180 tablet, Rfl: 3    potassium chloride 10 MEQ CR tablet, Take 1 tablet by mouth 2 (Two) Times a Day., Disp: 180 tablet, Rfl: 3    tamsulosin (FLOMAX) 0.4 MG capsule 24 hr capsule, Take 1 capsule by mouth Daily., Disp: 30 capsule, Rfl: 0    timolol (TIMOPTIC) 0.5 % ophthalmic solution, Administer 1 drop to both eyes Every Morning., Disp: ,  "Rfl:     amoxicillin-clavulanate (AUGMENTIN) 875-125 MG per tablet, Take 1 tablet by mouth 2 (Two) Times a Day for 10 days. Take with food.  Start first dose tomorrow., Disp: 20 tablet, Rfl: 0    fluconazole (Diflucan) 150 MG tablet, Take one tablet once. Repeat dose after completing antibiotic., Disp: 2 tablet, Rfl: 0  No current facility-administered medications for this visit.    Objective   Vital Signs:   /80 (BP Location: Left arm, Patient Position: Sitting, Cuff Size: Adult)   Pulse 52   Temp 97 °F (36.1 °C) (Temporal)   Ht 170.2 cm (67\")   SpO2 97%   BMI 20.52 kg/m²       Physical Exam  Vitals reviewed.   Constitutional:       General: She is not in acute distress.  HENT:      Head: Normocephalic and atraumatic.   Cardiovascular:      Rate and Rhythm: Bradycardia present.      Heart sounds: Normal heart sounds.   Pulmonary:      Effort: Pulmonary effort is normal.   Abdominal:      General: There is no distension.      Palpations: Abdomen is soft. There is no mass.      Tenderness: There is abdominal tenderness in the suprapubic area, left upper quadrant and left lower quadrant. There is no right CVA tenderness or left CVA tenderness.   Skin:     General: Skin is warm and dry.   Neurological:      General: No focal deficit present.      Mental Status: She is alert.      Motor: No weakness.   Psychiatric:         Mood and Affect: Mood normal.         Thought Content: Thought content normal.             Assessment and Plan:  Diagnoses and all orders for this visit:    1. Dysuria (Primary)  -     POC Urinalysis Dipstick, Automated  -     cefTRIAXone (ROCEPHIN) injection 1 g    2. Complicated UTI (urinary tract infection)  -     cefTRIAXone (ROCEPHIN) injection 1 g    3. History of renal stone    4. Bradycardia with 51-60 beats per minute    Other orders  -     fluconazole (Diflucan) 150 MG tablet; Take one tablet once. Repeat dose after completing antibiotic.  Dispense: 2 tablet; Refill: 0  -     " amoxicillin-clavulanate (AUGMENTIN) 875-125 MG per tablet; Take 1 tablet by mouth 2 (Two) Times a Day for 10 days. Take with food.  Start first dose tomorrow.  Dispense: 20 tablet; Refill: 0      Dysuria: POCT: Urinalysis turbid colored.      UTI: Rocephin 1 g IM today.  Use Diflucan 150 mg x 1 dose and repeat dose after a course of antibiotics.  Start Augmentin 875 mg twice daily for 10 days; start first dose tomorrow.  Urine to be sent to [place] diagnostic laboratory for further analysis.    History of renal stone: The patient does report mild pain on her left side.  If the patient's symptoms of pain and tenderness on her left side do not improve with treatment consider CT to rule out stone.    Bradycardia: I recommended to monitor pulse and blood pressure and report to cardiology.    Education on diagnosis, medication and treatment plan.      Patient was given instructions and counseling regarding condition.     Follow Up:  If not improving or worsening the patient was instructed to follow-up.    Dictated Utilizing Dragon Dictation.  Please note that portions of this note were completed with a voice recognition program.  Part of this note may be an electronic transcription/translation of spoken language to printed text using the Dragon Dictation System.    GABRIELLA Elizondo

## 2024-01-31 ENCOUNTER — HOSPITAL ENCOUNTER (EMERGENCY)
Facility: HOSPITAL | Age: 89
Discharge: HOME | End: 2024-01-31
Attending: EMERGENCY MEDICINE | Admitting: EMERGENCY MEDICINE
Payer: MEDICARE

## 2024-01-31 VITALS
SYSTOLIC BLOOD PRESSURE: 149 MMHG | TEMPERATURE: 97.7 F | BODY MASS INDEX: 21.44 KG/M2 | HEIGHT: 66 IN | RESPIRATION RATE: 18 BRPM | WEIGHT: 133.38 LBS | HEART RATE: 74 BPM | DIASTOLIC BLOOD PRESSURE: 97 MMHG | OXYGEN SATURATION: 98 %

## 2024-01-31 DIAGNOSIS — N39.0 ACUTE UTI: Primary | ICD-10-CM

## 2024-01-31 LAB
ALBUMIN SERPL-MCNC: 3.8 G/DL (ref 3.5–5.2)
ALBUMIN/GLOB SERPL: 1.1 G/DL
ALP SERPL-CCNC: 84 U/L (ref 39–117)
ALT SERPL W P-5'-P-CCNC: 10 U/L (ref 1–33)
ANION GAP SERPL CALCULATED.3IONS-SCNC: 12 MMOL/L (ref 5–15)
AST SERPL-CCNC: 18 U/L (ref 1–32)
BACTERIA UR QL AUTO: ABNORMAL /HPF
BASOPHILS # BLD AUTO: 0.03 10*3/MM3 (ref 0–0.2)
BASOPHILS NFR BLD AUTO: 0.4 % (ref 0–1.5)
BILIRUB SERPL-MCNC: 0.8 MG/DL (ref 0–1.2)
BILIRUB UR QL STRIP: NEGATIVE
BUN SERPL-MCNC: 11 MG/DL (ref 8–23)
BUN/CREAT SERPL: 14.7 (ref 7–25)
CALCIUM SPEC-SCNC: 9.2 MG/DL (ref 8.2–9.6)
CHLORIDE SERPL-SCNC: 101 MMOL/L (ref 98–107)
CLARITY UR: CLEAR
CO2 SERPL-SCNC: 24 MMOL/L (ref 22–29)
COLOR UR: YELLOW
CREAT SERPL-MCNC: 0.75 MG/DL (ref 0.57–1)
D-LACTATE SERPL-SCNC: 1.1 MMOL/L (ref 0.5–2)
DEPRECATED RDW RBC AUTO: 44 FL (ref 37–54)
EGFRCR SERPLBLD CKD-EPI 2021: 74.8 ML/MIN/1.73
EOSINOPHIL # BLD AUTO: 0.25 10*3/MM3 (ref 0–0.4)
EOSINOPHIL NFR BLD AUTO: 3.5 % (ref 0.3–6.2)
ERYTHROCYTE [DISTWIDTH] IN BLOOD BY AUTOMATED COUNT: 12.8 % (ref 12.3–15.4)
GLOBULIN UR ELPH-MCNC: 3.5 GM/DL
GLUCOSE SERPL-MCNC: 107 MG/DL (ref 65–99)
GLUCOSE UR STRIP-MCNC: NEGATIVE MG/DL
HCT VFR BLD AUTO: 38.1 % (ref 34–46.6)
HGB BLD-MCNC: 12.9 G/DL (ref 12–15.9)
HGB UR QL STRIP.AUTO: ABNORMAL
HOLD SPECIMEN: NORMAL
HOLD SPECIMEN: NORMAL
HYALINE CASTS UR QL AUTO: ABNORMAL /LPF
IMM GRANULOCYTES # BLD AUTO: 0.01 10*3/MM3 (ref 0–0.05)
IMM GRANULOCYTES NFR BLD AUTO: 0.1 % (ref 0–0.5)
KETONES UR QL STRIP: ABNORMAL
LEUKOCYTE ESTERASE UR QL STRIP.AUTO: NEGATIVE
LIPASE SERPL-CCNC: 17 U/L (ref 13–60)
LYMPHOCYTES # BLD AUTO: 2.17 10*3/MM3 (ref 0.7–3.1)
LYMPHOCYTES NFR BLD AUTO: 30.6 % (ref 19.6–45.3)
MCH RBC QN AUTO: 31.6 PG (ref 26.6–33)
MCHC RBC AUTO-ENTMCNC: 33.9 G/DL (ref 31.5–35.7)
MCV RBC AUTO: 93.4 FL (ref 79–97)
MONOCYTES # BLD AUTO: 0.46 10*3/MM3 (ref 0.1–0.9)
MONOCYTES NFR BLD AUTO: 6.5 % (ref 5–12)
NEUTROPHILS NFR BLD AUTO: 4.17 10*3/MM3 (ref 1.7–7)
NEUTROPHILS NFR BLD AUTO: 58.9 % (ref 42.7–76)
NITRITE UR QL STRIP: NEGATIVE
NRBC BLD AUTO-RTO: 0 /100 WBC (ref 0–0.2)
PH UR STRIP.AUTO: 7.5 [PH] (ref 5–8)
PLATELET # BLD AUTO: 232 10*3/MM3 (ref 140–450)
PMV BLD AUTO: 11.1 FL (ref 6–12)
POTASSIUM SERPL-SCNC: 3.9 MMOL/L (ref 3.5–5.2)
PROT SERPL-MCNC: 7.3 G/DL (ref 6–8.5)
PROT UR QL STRIP: NEGATIVE
RBC # BLD AUTO: 4.08 10*6/MM3 (ref 3.77–5.28)
RBC # UR STRIP: ABNORMAL /HPF
REF LAB TEST METHOD: ABNORMAL
SODIUM SERPL-SCNC: 137 MMOL/L (ref 136–145)
SP GR UR STRIP: 1.01 (ref 1–1.03)
SQUAMOUS #/AREA URNS HPF: ABNORMAL /HPF
UROBILINOGEN UR QL STRIP: ABNORMAL
WBC # UR STRIP: ABNORMAL /HPF
WBC NRBC COR # BLD AUTO: 7.09 10*3/MM3 (ref 3.4–10.8)
WHOLE BLOOD HOLD COAG: NORMAL
WHOLE BLOOD HOLD SPECIMEN: NORMAL

## 2024-01-31 PROCEDURE — 96365 THER/PROPH/DIAG IV INF INIT: CPT

## 2024-01-31 PROCEDURE — 99283 EMERGENCY DEPT VISIT LOW MDM: CPT

## 2024-01-31 PROCEDURE — 83605 ASSAY OF LACTIC ACID: CPT | Performed by: EMERGENCY MEDICINE

## 2024-01-31 PROCEDURE — 85025 COMPLETE CBC W/AUTO DIFF WBC: CPT | Performed by: EMERGENCY MEDICINE

## 2024-01-31 PROCEDURE — 81001 URINALYSIS AUTO W/SCOPE: CPT | Performed by: EMERGENCY MEDICINE

## 2024-01-31 PROCEDURE — 25010000002 CEFTRIAXONE PER 250 MG: Performed by: EMERGENCY MEDICINE

## 2024-01-31 PROCEDURE — 83690 ASSAY OF LIPASE: CPT | Performed by: EMERGENCY MEDICINE

## 2024-01-31 PROCEDURE — 80053 COMPREHEN METABOLIC PANEL: CPT | Performed by: EMERGENCY MEDICINE

## 2024-01-31 RX ORDER — CEPHALEXIN 500 MG/1
500 CAPSULE ORAL 3 TIMES DAILY
Qty: 21 CAPSULE | Refills: 0 | Status: SHIPPED | OUTPATIENT
Start: 2024-01-31

## 2024-01-31 RX ORDER — SODIUM CHLORIDE 0.9 % (FLUSH) 0.9 %
10 SYRINGE (ML) INJECTION AS NEEDED
Status: DISCONTINUED | OUTPATIENT
Start: 2024-01-31 | End: 2024-02-01 | Stop reason: HOSPADM

## 2024-01-31 RX ORDER — APIXABAN 5 MG/1
TABLET, FILM COATED ORAL
Qty: 60 TABLET | Refills: 5 | OUTPATIENT
Start: 2024-01-31

## 2024-01-31 RX ADMIN — CEFTRIAXONE SODIUM 1000 MG: 1 INJECTION, POWDER, FOR SOLUTION INTRAMUSCULAR; INTRAVENOUS at 21:23

## 2024-02-01 ENCOUNTER — TELEPHONE (OUTPATIENT)
Dept: SURGERY | Facility: CLINIC | Age: 89
End: 2024-02-01
Payer: MEDICARE

## 2024-02-01 DIAGNOSIS — N20.0 NEPHROLITHIASIS: Primary | ICD-10-CM

## 2024-02-01 NOTE — ED PROVIDER NOTES
Time: 9:22 PM EST  Date of encounter:  1/31/2024  Independent Historian/Clinical History and Information was obtained by:   Patient    History is limited by: N/A    Chief Complaint: Dysuria      History of Present Illness:  Patient is a 89 y.o. year old female who presents to the emergency department for evaluation of dysuria that is gotten worse over the past day.  Patient reports that she has been taking amoxicillin.  Patient denies fever and chills.  Patient has no chest pain or shortness of breath.  Patient has no cough hemoptysis.    HPI    Patient Care Team  Primary Care Provider: Jairo Kramer MD    Past Medical History:     Allergies   Allergen Reactions    Citalopram Unknown - High Severity    Clonazepam Unknown - High Severity    Levofloxacin Nausea And Vomiting    Lisinopril Unknown - High Severity    Macrobid [Nitrofurantoin Macrocrystal] Rash    Melatonin Unknown - High Severity     Past Medical History:   Diagnosis Date    Abdominal pain, generalized     Anemia     Aortic regurgitation     Aortic stenosis     PER ECHO 6/15/23  NO STENOSIS    Broken arm     left upper, josseline placed    Cardiomegaly     Chronic atrial fibrillation 01/01/2019    Atrial fibrillation converted to sinus through cardioversion (March 2019    Chronic fatigue     Colon cancer     Diarrhea     Disease of tricuspid valve     Diverticulosis of colon     DVT (deep venous thrombosis)     LEFT LEG GREATER THAN 5 YRS AGO    Dysphagia     Essential (primary) hypertension     Frequent UTI     IV antibiotic prior to surgery for 5 days    H/O Hiatal hernia     H/O Sepsis 10/07/2023    Insomnia, unspecified     LVH (left ventricular hypertrophy)     Major depressive disorder, single episode     Mitral regurgitation     Mitral valve insufficiency and aortic valve insufficiency     Osteopenia     Pain in joint, pelvic region and thigh     TR (tricuspid regurgitation)      Past Surgical History:   Procedure Laterality Date    ABDOMINAL  HYSTERECTOMY      WITH BSO     ANKLE SURGERY      (L) ankle fracture    BLADDER REPAIR      BREAST BIOPSY      (L) breast biopsy    CARDIOVERSION  2019    CATARACT EXTRACTION      OU    CHOLECYSTECTOMY      OPEN    COLON SURGERY      STATES SHE HAD 12 INCHES OF COLON REMOVED IN JULY 2020 FOR COLON CANCER    COLONOSCOPY  2020    COLONOSCOPY N/A 10/29/2021    Procedure: COLONOSCOPY;  Surgeon: Edmar Back MD;  Location: [facility] ENDOSCOPY;  Service: General;  Laterality: N/A;  DIVERTICULOSIS/ANASTOMOSIS    CYSTOSCOPY W/ URETERAL STENT PLACEMENT Right 10/07/2023    Procedure: CYSTOSCOPY URETERAL CATHETER/STENT INSERTION,right retrograde;  Surgeon: Roberto Luu MD;  Location: [facility] MAIN OR;  Service: Urology;  Laterality: Right;    FEMUR OPEN REDUCTION INTERNAL FIXATION Left 07/21/2021    Procedure: FEMORAL NECK OPEN REDUCTION INTERNAL FIXATION;  Surgeon: Bam Warner MD;  Location: [facility] MAIN OR;  Service: Orthopedics;  Laterality: Left;    FOOT SURGERY Left     HIP CLOSED REDUCTION Left 03/20/2022    Procedure: HIP CLOSED REDUCTION;  Surgeon: Harsha Gayle MD;  Location: [facility] MAIN OR;  Service: Orthopedics;  Laterality: Left;    INGUINAL HERNIA REPAIR Left 09/29/2011    ORIF WRIST FRACTURE Left 08/24/2023    Procedure: OPEN REDUCTION AND INTERNAL FIXATION WRIST;  Surgeon: Ben Alegre MD;  Location: [facility] OR OSC;  Service: Orthopedics;  Laterality: Left;    TOTAL HIP ARTHROPLASTY Left 03/16/2022    Procedure: LEFT TOTAL HIP ARTHROPLASTY AND HARDWARE REMOVAL;  Surgeon: Bam Warner MD;  Location: [facility] MAIN OR;  Service: Orthopedics;  Laterality: Left;    UPPER GASTROINTESTINAL ENDOSCOPY      WITH DILATATION    URETEROSCOPY LASER LITHOTRIPSY WITH STENT INSERTION Right 11/8/2023    Procedure: Cystoscopy with right ureteroscopy with laser and right ureteral stent exchange;  Surgeon: Morris Chester MD;  Location: [facility] MAIN OR;  Service: Urology;  Laterality: Right;     Family  History   Problem Relation Age of Onset    Malig Hyperthermia Neg Hx        Home Medications:  Prior to Admission medications    Medication Sig Start Date End Date Taking? Authorizing Provider   amoxicillin-clavulanate (AUGMENTIN) 875-125 MG per tablet Take 1 tablet by mouth 2 (Two) Times a Day for 10 days. Take with food.  Start first dose tomorrow. 1/24/24 2/3/24  Carmen Brock APRN   apixaban (ELIQUIS) 2.5 MG tablet tablet Take 1 tablet by mouth 2 (Two) Times a Day. 7/11/23   Sheyla Guillermo APRN   ascorbic acid (VITAMIN C) 500 MG tablet Take 1 tablet by mouth Daily.    ProviderKarina MD   cephalexin (KEFLEX) 500 MG capsule Take 1 capsule by mouth 3 (Three) Times a Day. 1/31/24   Rochelle Levine MD   colestipol (COLESTID) 1 g tablet Take 2 tablets by mouth Daily. 9/5/23   Leslie Cody APRN   dilTIAZem (CARDIZEM) 30 MG tablet Take 1 tablet by mouth Every 12 (Twelve) Hours. 1/22/24   Jairo Kramer MD   fluconazole (Diflucan) 150 MG tablet Take one tablet once. Repeat dose after completing antibiotic. 1/24/24   Carmen Brock APRN   FLUoxetine (PROzac) 20 MG capsule Take 1 capsule by mouth Daily. 10/2/23   Jairo Kramer MD   furosemide (LASIX) 20 MG tablet Take 1 tablet by mouth 2 (Two) Times a Day. 7/11/23   Sheyla Guillermo APRN   HYDROcodone-acetaminophen (NORCO) 5-325 MG per tablet Take 1 tablet by mouth Every 12 (Twelve) Hours As Needed (Pain). 11/8/23   Morris Chester MD   LORazepam (ATIVAN) 0.5 MG tablet Take 1 tablet by mouth Every Night.    Karina Browne MD   losartan (COZAAR) 25 MG tablet TAKE 1 TABLET BY MOUTH EVERY DAY 9/18/23   Sheyla Guillermo APRN   Lumigan 0.01 % ophthalmic drops Administer 1 drop to both eyes Every Night. 8/30/23   Karina Browne MD   metoprolol succinate XL (TOPROL-XL) 100 MG 24 hr tablet Take 1 tablet by mouth 2 (Two) Times a Day. 7/11/23   Sheyla Guillermo APRN potassium  "chloride 10 MEQ CR tablet Take 1 tablet by mouth 2 (Two) Times a Day. 7/11/23   Sheyla Guillermo APRN   tamsulosin (FLOMAX) 0.4 MG capsule 24 hr capsule Take 1 capsule by mouth Daily. 1/22/24   Jairo Kramer MD   timolol (TIMOPTIC) 0.5 % ophthalmic solution Administer 1 drop to both eyes Every Morning. 7/12/23   Provider, MD Karina        Social History:   Social History     Tobacco Use    Smoking status: Never    Smokeless tobacco: Never   Vaping Use    Vaping Use: Never used   Substance Use Topics    Alcohol use: Not Currently    Drug use: Never         Review of Systems:  Review of Systems   Constitutional:  Negative for chills and fever.   HENT:  Negative for congestion, rhinorrhea and sore throat.    Eyes:  Negative for pain and visual disturbance.   Respiratory:  Negative for apnea, cough, chest tightness and shortness of breath.    Cardiovascular:  Negative for chest pain and palpitations.   Gastrointestinal:  Positive for abdominal pain. Negative for diarrhea, nausea and vomiting.   Genitourinary:  Positive for dysuria. Negative for difficulty urinating.   Musculoskeletal:  Negative for joint swelling and myalgias.   Skin:  Negative for color change.   Neurological:  Negative for seizures and headaches.   Psychiatric/Behavioral: Negative.     All other systems reviewed and are negative.       Physical Exam:  /95   Pulse 74   Temp 97.7 °F (36.5 °C) (Oral)   Resp 18   Ht 167.6 cm (66\")   Wt 60.5 kg (133 lb 6.1 oz)   SpO2 96%   BMI 21.53 kg/m²     Physical Exam  Vitals and nursing note reviewed.   Constitutional:       General: She is not in acute distress.     Appearance: Normal appearance. She is not toxic-appearing.   HENT:      Head: Normocephalic and atraumatic.      Jaw: There is normal jaw occlusion.   Eyes:      General: Lids are normal.      Extraocular Movements: Extraocular movements intact.      Conjunctiva/sclera: Conjunctivae normal.      Pupils: Pupils are " equal, round, and reactive to light.   Cardiovascular:      Rate and Rhythm: Normal rate and regular rhythm.      Pulses: Normal pulses.      Heart sounds: Normal heart sounds.   Pulmonary:      Effort: Pulmonary effort is normal. No respiratory distress.      Breath sounds: Normal breath sounds. No wheezing or rhonchi.   Abdominal:      General: Abdomen is flat.      Palpations: Abdomen is soft.      Tenderness: There is abdominal tenderness. There is no guarding or rebound.      Comments: (+) Suprapubic tenderness   Musculoskeletal:         General: Normal range of motion.      Cervical back: Normal range of motion and neck supple.      Right lower leg: No edema.      Left lower leg: No edema.   Skin:     General: Skin is warm and dry.   Neurological:      Mental Status: She is alert and oriented to person, place, and time. Mental status is at baseline.   Psychiatric:         Mood and Affect: Mood normal.                  Procedures:  Procedures      Medical Decision Making:      Comorbidities that affect care:    Hypertension    External Notes reviewed:    Previous Clinic Note: Patient was seen in clinic for right-sided nephrolithiasis.      The following orders were placed and all results were independently analyzed by me:  Orders Placed This Encounter   Procedures    [place] Draw    Comprehensive Metabolic Panel    Lipase    Urinalysis With Microscopic If Indicated (No Culture) - Urine, Clean Catch    Lactic Acid, Plasma    CBC Auto Differential    Urinalysis, Microscopic Only - Urine, Clean Catch    NPO Diet NPO Type: Strict NPO    Undress & Gown    Straight Cath    Insert Peripheral IV    CBC & Differential    Green Top (Gel)    Lavender Top    Gold Top - SST    Light Blue Top       Medications Given in the Emergency Department:  Medications   sodium chloride 0.9 % flush 10 mL (has no administration in time range)   cefTRIAXone (ROCEPHIN) 1000 mg/50 mL 0.9% sodium chloride MBP (has no administration in time  range)        ED Course:         Labs:    Lab Results (last 24 hours)       Procedure Component Value Units Date/Time    CBC & Differential [[id number]]  (Normal) Collected: 01/31/24 1705    Specimen: Blood from Arm, Right Updated: 01/31/24 1713    Narrative:      The following orders were created for panel order CBC & Differential.  Procedure                               Abnormality         Status                     ---------                               -----------         ------                     CBC Auto Differential[[id number]]        Normal              Final result                 Please view results for these tests on the individual orders.    Comprehensive Metabolic Panel [[id number]]  (Abnormal) Collected: 01/31/24 1705    Specimen: Blood from Arm, Right Updated: 01/31/24 1731     Glucose 107 mg/dL      BUN 11 mg/dL      Creatinine 0.75 mg/dL      Sodium 137 mmol/L      Potassium 3.9 mmol/L      Chloride 101 mmol/L      CO2 24.0 mmol/L      Calcium 9.2 mg/dL      Total Protein 7.3 g/dL      Albumin 3.8 g/dL      ALT (SGPT) 10 U/L      AST (SGOT) 18 U/L      Alkaline Phosphatase 84 U/L      Total Bilirubin 0.8 mg/dL      Globulin 3.5 gm/dL      A/G Ratio 1.1 g/dL      BUN/Creatinine Ratio 14.7     Anion Gap 12.0 mmol/L      eGFR 74.8 mL/min/1.73     Narrative:      GFR Normal >60  Chronic Kidney Disease <60  Kidney Failure <15    The GFR formula is only valid for adults with stable renal function between ages 18 and 70.    Lipase [[id number]]  (Normal) Collected: 01/31/24 1705    Specimen: Blood from Arm, Right Updated: 01/31/24 1731     Lipase 17 U/L     Lactic Acid, Plasma [[id number]]  (Normal) Collected: 01/31/24 1705    Specimen: Blood from Arm, Right Updated: 01/31/24 1731     Lactate 1.1 mmol/L     CBC Auto Differential [[id number]]  (Normal) Collected: 01/31/24 1705    Specimen: Blood from Arm, Right Updated: 01/31/24 1713     WBC 7.09 10*3/mm3      RBC 4.08 10*6/mm3      Hemoglobin 12.9 g/dL       Hematocrit 38.1 %      MCV 93.4 fL      MCH 31.6 pg      MCHC 33.9 g/dL      RDW 12.8 %      RDW-SD 44.0 fl      MPV 11.1 fL      Platelets 232 10*3/mm3      Neutrophil % 58.9 %      Lymphocyte % 30.6 %      Monocyte % 6.5 %      Eosinophil % 3.5 %      Basophil % 0.4 %      Immature Grans % 0.1 %      Neutrophils, Absolute 4.17 10*3/mm3      Lymphocytes, Absolute 2.17 10*3/mm3      Monocytes, Absolute 0.46 10*3/mm3      Eosinophils, Absolute 0.25 10*3/mm3      Basophils, Absolute 0.03 10*3/mm3      Immature Grans, Absolute 0.01 10*3/mm3      nRBC 0.0 /100 WBC     Urinalysis With Microscopic If Indicated (No Culture) - Urine, Clean Catch [[id number]]  (Abnormal) Collected: 01/31/24 1910    Specimen: Urine, Clean Catch Updated: 01/31/24 2014     Color, UA Yellow     Appearance, UA Clear     pH, UA 7.5     Specific Gravity, UA 1.011     Glucose, UA Negative     Ketones, UA Trace     Bilirubin, UA Negative     Blood, UA Small (1+)     Protein, UA Negative     Leuk Esterase, UA Negative     Nitrite, UA Negative     Urobilinogen, UA 0.2 E.U./dL    Urinalysis, Microscopic Only - Urine, Clean Catch [[id number]]  (Abnormal) Collected: 01/31/24 1910    Specimen: Urine, Clean Catch Updated: 01/31/24 2039     RBC, UA 21-50 /HPF      WBC, UA 3-5 /HPF      Bacteria, UA Trace /HPF      Squamous Epithelial Cells, UA 13-20 /HPF      Hyaline Casts, UA None Seen /LPF      Methodology Manual Light Microscopy             Imaging:    No Radiology Exams Resulted Within Past 24 Hours      Differential Diagnosis and Discussion:    Abdominal Pain: Based on the patient's signs and symptoms, I considered abdominal aortic aneurysm, small bowel obstruction, pancreatitis, acute cholecystitis, acute appendecitis, peptic ulcer disease, gastritis, colitis, endocrine disorders, irritable bowel syndrome and other differential diagnosis an etiology of the patient's abdominal pain.    All labs were reviewed and interpreted by me.    MDM     Amount and/or  Complexity of Data Reviewed  Clinical lab tests: reviewed    Based on the results of the patient´s urinalysis and urinary complaints, signs, symptoms, and diagnostic testing is consistent with a urinary tract infection.  The patient´s CBC that was reviewed and interpreted by me shows no abnormalities of critical concern. Of note, there is no anemia requiring a blood transfusion and the platelet count is acceptable.  The patient´s CMP that was reviewed and interpretted by me shows no abnormalities of critical concern. Of note, the patient´s sodium and potassium are acceptable. The patient´s liver enzymes are unremarkable. The patient´s renal function (creatinine) is preserved. The patient has a normal anion gap.  Urinalysis shows bacteriuria.  Patient is resting comfortably, is alert, and is in no distress. The repeat examination is unremarkable and benign. The patient has no signs of urosepsis. The patient was started on antibiotics in the emergency department and will be discharged with antibiotics as an outpatient. The patient was counseled to return to the ER for fever >100.5, intractable pain or vomiting, or any other concerns that the may have. The patient has expressed a clear and thorough understanding and agreed to follow up as instructed.            Patient Care Considerations:    CT ABDOMEN AND PELVIS: I considered ordering a CT scan of the abdomen and pelvis however abdomen is soft and nontender.      Consultants/Shared Management Plan:    None    Social Determinants of Health:    Patient is independent, reliable, and has access to care.       Disposition and Care Coordination:    Discharged: I considered escalation of care by admitting this patient to the hospital, however the patient has improved and is suitable and  stable for discharge.    I have explained the patient´s condition, diagnoses and treatment plan based on the information available to me at this time. I have answered questions and  addressed any concerns. The patient has a good  understanding of the patient´s diagnosis, condition, and treatment plan as can be expected at this point. The vital signs have been stable. The patient´s condition is stable and appropriate for discharge from the emergency department.      The patient will pursue further outpatient evaluation with the primary care physician or other designated or consulting physician as outlined in the discharge instructions. They are agreeable to this plan of care and follow-up instructions have been explained in detail. The patient has received these instructions in written format and have expressed an understanding of the discharge instructions. The patient is aware that any significant change in condition or worsening of symptoms should prompt an immediate return to this or the closest emergency department or call to 911.  I have explained discharge medications and the need for follow up with the patient/caretakers. This was also printed in the discharge instructions. Patient was discharged with the following medications and follow up:      Medication List        New Prescriptions      cephalexin 500 MG capsule  Commonly known as: KEFLEX  Take 1 capsule by mouth 3 (Three) Times a Day.               Where to Get Your Medications        These medications were sent to [facility]/pharmacy #[id number] - [place], [place] - [address] - [phone] [facility] [phone]  [address] [place] [address] [place] [address]      Hours: 24-hours Phone: [phone]   cephalexin 500 MG capsule      Jairo Kramer MD  [address]  [address]  [place] [address]  [phone]    In 2 days         Final diagnoses:   Acute UTI        ED Disposition       ED Disposition   Discharge    Condition   Stable    Comment   --               This medical record created using voice recognition software.             Rochelle Levine MD  01/31/24 3144

## 2024-02-01 NOTE — TELEPHONE ENCOUNTER
Patient was seen in the ER yesterday. Patient is very weak. Was given keflex, has not picked it up yet. Was given a Rocephin. Patient daughter, Lorrie (lives in [place]) wants to know if dr segovia will ordering any imaging. They suspect a kidney stone.  CB# [phone] which is daughter Nicole, who lives locally and would be transporting pt.

## 2024-02-01 NOTE — TELEPHONE ENCOUNTER
Spoke with patient daughter, they will have her start keflex. They would really like a CT scan to be ordered. I made her aware hospital would call her to get this scheduled. She v/u

## 2024-02-02 NOTE — PLAN OF CARE
Home Care is calling regarding an established patient with [facility].       Requesting orders from: Carolina Pulliam  Provider is following patient: Yes  Is this a 60-day recertification request?  No    Orders Requested    Occupational Therapy  Request for initial certification (first set of orders)   Frequency:  1x/wk for 4 wks      Information was gathered and will be sent to provider for review.  RN will contact Home Care with information after provider review.  Confirmed ok to leave a detailed message with call back.  Contact information confirmed and updated as needed.    Dhara MORENO with [facility] @ [phone]    Noy Cristobal RN     Goal Outcome Evaluation:  Plan of Care Reviewed With: patient        Progress: improving  Outcome Summary: NO SIGNIFICANT CHANGE NOTED, NO COMPLAINTS, VSS.

## 2024-02-06 ENCOUNTER — OFFICE VISIT (OUTPATIENT)
Dept: INTERNAL MEDICINE | Facility: CLINIC | Age: 89
End: 2024-02-06
Payer: MEDICARE

## 2024-02-06 VITALS
BODY MASS INDEX: 20.09 KG/M2 | SYSTOLIC BLOOD PRESSURE: 141 MMHG | WEIGHT: 125 LBS | OXYGEN SATURATION: 98 % | DIASTOLIC BLOOD PRESSURE: 87 MMHG | TEMPERATURE: 98.2 F | HEIGHT: 66 IN | HEART RATE: 74 BPM

## 2024-02-06 DIAGNOSIS — I82.5Y2 CHRONIC DEEP VEIN THROMBOSIS (DVT) OF PROXIMAL VEIN OF LEFT LOWER EXTREMITY: ICD-10-CM

## 2024-02-06 DIAGNOSIS — N30.01 ACUTE CYSTITIS WITH HEMATURIA: ICD-10-CM

## 2024-02-06 DIAGNOSIS — I50.32 CHF (CONGESTIVE HEART FAILURE), NYHA CLASS I, CHRONIC, DIASTOLIC: ICD-10-CM

## 2024-02-06 DIAGNOSIS — R53.83 MALAISE AND FATIGUE: ICD-10-CM

## 2024-02-06 DIAGNOSIS — I34.0 NONRHEUMATIC MITRAL VALVE REGURGITATION: Primary | Chronic | ICD-10-CM

## 2024-02-06 DIAGNOSIS — I95.2 HYPOTENSION DUE TO DRUGS: ICD-10-CM

## 2024-02-06 DIAGNOSIS — E55.9 VITAMIN D DEFICIENCY: ICD-10-CM

## 2024-02-06 DIAGNOSIS — G93.32 CHRONIC FATIGUE SYNDROME: ICD-10-CM

## 2024-02-06 DIAGNOSIS — R53.81 MALAISE AND FATIGUE: ICD-10-CM

## 2024-02-06 DIAGNOSIS — I48.20 ATRIAL FIBRILLATION, CHRONIC: ICD-10-CM

## 2024-02-06 DIAGNOSIS — R26.2 DIFFICULTY WALKING: ICD-10-CM

## 2024-02-06 RX ORDER — LORAZEPAM 0.5 MG/1
0.5 TABLET ORAL NIGHTLY
Qty: 30 TABLET | Refills: 5 | Status: SHIPPED | OUTPATIENT
Start: 2024-02-06

## 2024-02-06 RX ORDER — LOSARTAN POTASSIUM 25 MG/1
25 TABLET ORAL DAILY
Qty: 90 TABLET | Refills: 3 | Status: SHIPPED | OUTPATIENT
Start: 2024-02-06

## 2024-02-06 NOTE — PROGRESS NOTES
"CHIEF COMPLAINT/ HPI:  Fatigue (Pt states fatigue and chronic UTI - Pt has pending CT scan with Dr Chester. )    Patient was sent to the emergency room by herself called the ambulance, she was found to have a UTI, they switched her antibiotics to Keflex and gave her an IV dose of Rocephin, she is here for follow-up and regular visit, not feeling well here with her daughter, in a wheelchair, medications reviewed,    Lab work from the emergency room January 31, 2024 reviewed, urinalysis showed a few white blood cells and red blood cells,      Objective   Vital Signs  Vitals:    02/06/24 1053   BP: 141/87   Pulse: 74   Temp: 98.2 °F (36.8 °C)   SpO2: 98%   Weight: 56.7 kg (125 lb)   Height: 167.6 cm (65.98\")      Body mass index is 20.19 kg/m².  Review of Systems   Constitutional:  Positive for fatigue and unexpected weight loss.   Neurological:  Positive for weakness.    generalized weakness, not feeling well in general,  Physical Exam  Constitutional:       General: She is not in acute distress.     Appearance: Normal appearance.   HENT:      Head: Normocephalic.      Mouth/Throat:      Mouth: Mucous membranes are moist.   Eyes:      Conjunctiva/sclera: Conjunctivae normal.      Pupils: Pupils are equal, round, and reactive to light.   Cardiovascular:      Rate and Rhythm: Normal rate. Rhythm irregular.      Pulses: Normal pulses.      Heart sounds: Normal heart sounds.   Pulmonary:      Effort: Pulmonary effort is normal.      Breath sounds: Normal breath sounds.   Abdominal:      General: Abdomen is flat. Bowel sounds are normal.      Palpations: Abdomen is soft.   Musculoskeletal:         General: No swelling. Normal range of motion.      Cervical back: Neck supple.   Skin:     General: Skin is warm and dry.      Coloration: Skin is not jaundiced.   Neurological:      General: No focal deficit present.      Mental Status: She is alert and oriented to person, place, and time. Mental status is at baseline. "   Psychiatric:         Mood and Affect: Mood normal.         Behavior: Behavior normal.         Thought Content: Thought content normal.         Judgment: Judgment normal.      generalized weakness,  Result Review :   Lab Results   Component Value Date    PROBNP 5,312.0 (H) 10/12/2023    PROBNP 1,846.0 (H) 09/27/2023    PROBNP 3,067.0 (H) 05/15/2023    BNP 1385 12/05/2020    BNP 4183 (H) 10/12/2020    BNP 5163 (H) 09/09/2020     CMP          10/19/2023    05:00 11/4/2023    13:33 1/31/2024    17:05   CMP   Glucose 88  99  107    BUN 12  19  11    Creatinine 0.66  0.72  0.75    EGFR 82.4  78.6  74.8    Sodium 138  140  137    Potassium 3.8  3.9  3.9    Chloride 105  104  101    Calcium 8.4  9.1  9.2    Total Protein  7.3  7.3    Albumin  4.0  3.8    Globulin  3.3  3.5    Total Bilirubin  0.8  0.8    Alkaline Phosphatase  86  84    AST (SGOT)  22  18    ALT (SGPT)  18  10    Albumin/Globulin Ratio  1.2  1.1    BUN/Creatinine Ratio 18.2  26.4  14.7    Anion Gap 9.6  10.1  12.0      CBC w/diff          10/19/2023    05:00 11/4/2023    13:33 1/31/2024    17:05   CBC w/Diff   WBC 7.61  6.72  7.09    RBC 3.13  4.01  4.08    Hemoglobin 10.0  12.8  12.9    Hematocrit 31.6  39.5  38.1    .0  98.5  93.4    MCH 31.9  31.9  31.6    MCHC 31.6  32.4  33.9    RDW 14.4  14.3  12.8    Platelets 220  231  232    Neutrophil Rel % 62.4  59.6  58.9    Immature Granulocyte Rel % 0.8  0.3  0.1    Lymphocyte Rel % 28.3  31.1  30.6    Monocyte Rel % 6.4  7.1  6.5    Eosinophil Rel % 1.4  1.5  3.5    Basophil Rel % 0.7  0.4  0.4       Lipid Panel          9/27/2023    15:36   Lipid Panel   Total Cholesterol 144    Triglycerides 232    HDL Cholesterol 57    VLDL Cholesterol 36    LDL Cholesterol  51    LDL/HDL Ratio 0.71       Lab Results   Component Value Date    TSH 2.470 01/10/2023    TSH 1.850 03/18/2022    TSH 3.710 03/14/2022      Lab Results   Component Value Date    FREET4 1.13 01/10/2023    FREET4 1.42 03/14/2022    FREET4 1.02  07/15/2021                          Visit Diagnoses:    ICD-10-CM ICD-9-CM   1. Nonrheumatic mitral valve regurgitation  I34.0 424.0   2. Atrial fibrillation, chronic  I48.20 427.31   3. CHF (congestive heart failure), NYHA class I, chronic, diastolic  I50.32 428.32   4. Chronic deep vein thrombosis (DVT) of proximal vein of left lower extremity  I82.5Y2 453.51   5. Hypotension due to drugs  I95.2 458.8     E947.9   6. Vitamin D deficiency  E55.9 268.9   7. Difficulty walking  R26.2 719.7   8. Malaise and fatigue  R53.81 780.79    R53.83    9. Chronic fatigue syndrome  G93.32 780.71   10. Acute cystitis with hematuria  N30.01 595.0       Assessment and Plan   Diagnoses and all orders for this visit:    1. Nonrheumatic mitral valve regurgitation (Primary)  -     CBC & Differential; Future  -     Comprehensive Metabolic Panel; Future  -     Lipid Panel; Future  -     proBNP; Future  -     LORazepam (ATIVAN) 0.5 MG tablet; Take 1 tablet by mouth Every Night.  Dispense: 30 tablet; Refill: 5    2. Atrial fibrillation, chronic  -     CBC & Differential; Future  -     Comprehensive Metabolic Panel; Future  -     Lipid Panel; Future  -     proBNP; Future  -     LORazepam (ATIVAN) 0.5 MG tablet; Take 1 tablet by mouth Every Night.  Dispense: 30 tablet; Refill: 5    3. CHF (congestive heart failure), NYHA class I, chronic, diastolic  -     CBC & Differential; Future  -     Comprehensive Metabolic Panel; Future  -     Lipid Panel; Future  -     proBNP; Future  -     LORazepam (ATIVAN) 0.5 MG tablet; Take 1 tablet by mouth Every Night.  Dispense: 30 tablet; Refill: 5    4. Chronic deep vein thrombosis (DVT) of proximal vein of left lower extremity  -     CBC & Differential; Future  -     Comprehensive Metabolic Panel; Future  -     Lipid Panel; Future  -     proBNP; Future  -     LORazepam (ATIVAN) 0.5 MG tablet; Take 1 tablet by mouth Every Night.  Dispense: 30 tablet; Refill: 5    5. Hypotension due to drugs  -     CBC &  Differential; Future  -     Comprehensive Metabolic Panel; Future  -     Lipid Panel; Future  -     proBNP; Future  -     LORazepam (ATIVAN) 0.5 MG tablet; Take 1 tablet by mouth Every Night.  Dispense: 30 tablet; Refill: 5    6. Vitamin D deficiency  -     CBC & Differential; Future  -     Comprehensive Metabolic Panel; Future  -     Lipid Panel; Future  -     proBNP; Future  -     LORazepam (ATIVAN) 0.5 MG tablet; Take 1 tablet by mouth Every Night.  Dispense: 30 tablet; Refill: 5    7. Difficulty walking  -     CBC & Differential; Future  -     Comprehensive Metabolic Panel; Future  -     Lipid Panel; Future  -     proBNP; Future  -     LORazepam (ATIVAN) 0.5 MG tablet; Take 1 tablet by mouth Every Night.  Dispense: 30 tablet; Refill: 5    8. Malaise and fatigue  -     CBC & Differential; Future  -     Comprehensive Metabolic Panel; Future  -     Lipid Panel; Future  -     proBNP; Future  -     LORazepam (ATIVAN) 0.5 MG tablet; Take 1 tablet by mouth Every Night.  Dispense: 30 tablet; Refill: 5    9. Chronic fatigue syndrome  -     CBC & Differential; Future  -     Comprehensive Metabolic Panel; Future  -     Lipid Panel; Future  -     proBNP; Future  -     LORazepam (ATIVAN) 0.5 MG tablet; Take 1 tablet by mouth Every Night.  Dispense: 30 tablet; Refill: 5    10. Acute cystitis with hematuria  -     CBC & Differential; Future  -     Comprehensive Metabolic Panel; Future  -     Lipid Panel; Future  -     proBNP; Future  -     LORazepam (ATIVAN) 0.5 MG tablet; Take 1 tablet by mouth Every Night.  Dispense: 30 tablet; Refill: 5    Other orders  -     dilTIAZem (CARDIZEM) 30 MG tablet; Take 1 tablet by mouth Every 12 (Twelve) Hours.  Dispense: 180 tablet; Refill: 3  -     apixaban (ELIQUIS) 5 MG tablet tablet; Take 1 tablet by mouth 2 (Two) Times a Day.  Dispense: 180 tablet; Refill: 3  -     losartan (COZAAR) 25 MG tablet; Take 1 tablet by mouth Daily.  Dispense: 90 tablet; Refill: 3    Possible urinary tract  infection again, January 2024 got a prescription for cephalexin was in the emergency room for visit, urine does not look overly impressive, discussed with family, patient, would stop antibiotics,, Keflex    Generalized weakness fatigue malaise, etiology is unclear heart rates are currently controlled and not bradycardic on current medications, patient is to monitor blood pressure closely at home,    Left wrist fracture repair, August 24, 2023    Recurrent UTIs, CT abdomen and pelvis September 18, 2023 shows 10 x 6 mm stone right renal pelvis with urothelial thickening and inflammation, multiple additional nonobstructing right-sided renal stones postsurgical changes of right hemicolectomy extensive diverticulosis without evidence of infection and a 9 mm pancreatic cystic lesion along the body of the pancreas 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, pancreas was otherwise atrophic, recommended a follow-up MRCP which was performed September 28, 2023, showing multiple cystic lesions within the pancreas measuring up to 1 cm many of these appear to communicate with the main pancreatic duct mild dilated sidebranch pancreatic ducts are also noted, follow-up in 1 year with another MRI recommended redemonstration of right renal pelvis stones measuring 0.7 cm-----discussed follow-up again, could consider repeating MRCP in 4 to 6 months or repeating the CAT scan patient says she does not want to do the MRI again it was too long of the test and I concur, discussed October 2, 2023 patient was started on Flomax possibly by urology for kidney stones, will discuss with Dr. Chester at next appointment,    presyncopal syncopal spell -- cardiology Friday is getting an echo and a Holter monitor placed, Elina 15, 2023,-echocardiogram showed normal ejection fraction, diastolic indeterminate, mild to moderate biatrial enlargement mild to moderate mitral regurgitation mild to moderate tricuspid  regurgitation, Holter showed predominant rhythm A-fib, heart rate average of 90 occasional PVCs no significant bradycardia arrhythmias June 2023    Hypertension, --- CONT METOPROLOL  MG BID --    diastolic heart failure may 2022,   Lasix 20 mg bid,  potassium 10 mEq twice a day, --- BR NP  down to 1700 January 2023- --- previous CT scan as below may 17, 2022 showed bilateral pleural effusions and symptoms consistent with congestive heart failure clinically better September 2022    Left leg swelling  VE LE,, -neg  May 2022     Urinary hesitancy retention, started on Flomax 0.4 mg daily,    Depression- lost son to stomach ca, nov 2018, and daughter had AVR nov 2018, -currently on treatment as below,, with Prozac,    ANXIETY  --- continues LORAZEPAM 0.5 HS AND 1/2= 0.25 MG QD PRN ----continues Prozac,    L NILO, MARCH 16, 2022, POST OP DISLOCATION MARCH 20, 2022 AND THEN TO [facility],,    Dysphagia, Status post barium swallow October 2020, showing narrowing at the distal esophagus, for EGD by Dr. Blount February February 25, 2021,--stopped PPIs-- September 2022    Colon cancer diagnosed June 2020 initially found to be Anemia, was referred to hematology oncology,--- sent to gastroenterology---Dr. Blount--FOUND COLON CA JUNE 2020, REFERRED TO DR QUISPE--subsequent exploratory laparotomy and partial colectomy with 14 out of 14 lymph nodes negative, -------patient had prolonged hospital course, subsequent sent to rehab, now home--HAD LOW NA , DEVELOPED C DIFF COLITIS, IN HOSP, FTT WITH WGT LOSS IN REHAB --GAIT DYS. AND GEN WEAKNESS --Slowly improving, current medications reviewed----Patient had delirium in the hospital also, improved---Patient has follow-up for a 5 mm lung nodule found on CT scan in the left lower lobe and surgical changes ----- CT scan in February 2021, no change mild to moderate cardiomegaly stable nodular density lung fields bilateral, CT scan May 17, 2022 showed stable small  subcentimeter pulmonary nodules that have not changed over 1 year, new small moderate bilateral pleural effusions were noted--- patient continues Colestid 1 g, 2 tablets once a day    Status post colonoscopy October 2021 Dr. Back--- recent CT scan May 21, 2022 abdomen and pelvis shows no acute abdominal or pelvic abnormality colonic diverticulosis, blood pressure proximal colon resection noted small bilateral pleural effusions cardiomegaly coronary disease moderate sized hiatal hernia and osteopenia and thoracodegenerative changes left hip prosthesis and previous cholecystectomy, -----CT scan of the chest May 17, 2022 shows stable small subcentimeter pulmonary nodules over the past year new small bilateral pleural effusions with adjacent compressive atelectasis consistent with congestive heart failure clinically improved    LUNG NODULE --Found June 2020, 5 millimeter left lower lobe--- on CT scan for abdominal pains---- FOUND PER HEM/ONC AND F/U CT scan of the chest February 4, 2021, showing stable pulmonary nodules, no change on CT scan May 2022,-- and CT abdomen and pelvis for follow-up of colon cancer,    Chronic atrial fibrillation---- MARCH 23, 2017, --continues  ELIQUIS 2.5 mg twice a day ,Metoprolol 100 mg twice a day,---Cardioverted April 2019 Dr. Jolly , NOW BACK IN AFIB MAY 2023, October 2023    L HUMERAL FX DEC 2016, ORIF WITH BENSON --at [facility]    adverse reaction with rash to nitrofurantoin,, SEPT. 2016.,      EXTENSIVE LLE DVT 11/15, , Continues Eliquis 2.5 mg twice a day         Follow Up   Return in about 3 months (around 5/6/2024).  Patient was given instructions and counseling regarding her condition or for health maintenance advice. Please see specific information pulled into the AVS if appropriate.

## 2024-02-14 ENCOUNTER — OFFICE VISIT (OUTPATIENT)
Dept: UROLOGY | Facility: CLINIC | Age: 89
End: 2024-02-14
Payer: MEDICARE

## 2024-02-14 ENCOUNTER — HOSPITAL ENCOUNTER (OUTPATIENT)
Dept: CT IMAGING | Facility: HOSPITAL | Age: 89
Discharge: HOME | End: 2024-02-14
Admitting: UROLOGY
Payer: MEDICARE

## 2024-02-14 VITALS
SYSTOLIC BLOOD PRESSURE: 133 MMHG | HEIGHT: 66 IN | WEIGHT: 125 LBS | DIASTOLIC BLOOD PRESSURE: 102 MMHG | HEART RATE: 82 BPM | BODY MASS INDEX: 20.09 KG/M2

## 2024-02-14 DIAGNOSIS — N20.0 NEPHROLITHIASIS: ICD-10-CM

## 2024-02-14 DIAGNOSIS — N39.0 RECURRENT URINARY TRACT INFECTION: Primary | ICD-10-CM

## 2024-02-14 DIAGNOSIS — R30.0 DYSURIA: ICD-10-CM

## 2024-02-14 LAB
BILIRUB BLD-MCNC: NEGATIVE MG/DL
CLARITY, POC: ABNORMAL
COLOR UR: YELLOW
EXPIRATION DATE: 1224
GLUCOSE UR STRIP-MCNC: NEGATIVE MG/DL
KETONES UR QL: NEGATIVE
LEUKOCYTE EST, POC: ABNORMAL
Lab: ABNORMAL
NITRITE UR-MCNC: NEGATIVE MG/ML
PH UR: 7 [PH] (ref 5–8)
PROT UR STRIP-MCNC: NEGATIVE MG/DL
RBC # UR STRIP: NEGATIVE /UL
SP GR UR: 1.01 (ref 1–1.03)
URINE VOLUME: 0
UROBILINOGEN UR QL: ABNORMAL

## 2024-02-14 PROCEDURE — 74176 CT ABD & PELVIS W/O CONTRAST: CPT

## 2024-02-14 PROCEDURE — 87086 URINE CULTURE/COLONY COUNT: CPT | Performed by: NURSE PRACTITIONER

## 2024-02-15 ENCOUNTER — TELEPHONE (OUTPATIENT)
Dept: UROLOGY | Facility: CLINIC | Age: 89
End: 2024-02-15
Payer: MEDICARE

## 2024-02-15 LAB — BACTERIA SPEC AEROBE CULT: NORMAL

## 2024-02-19 ENCOUNTER — TELEPHONE (OUTPATIENT)
Dept: UROLOGY | Facility: CLINIC | Age: 89
End: 2024-02-19
Payer: MEDICARE

## 2024-02-19 NOTE — TELEPHONE ENCOUNTER
PATIENT'S DAUGHTER, DORIAN, CALLED.  SHE SAID DR. ERICKSON DID SURGERY FOR A STONE A COUPLE MONTHS AGO, AND HER PUT HER MOTHER ON TAMSULOSIN.    PATIENT SAW DR. STUART RECENTLY AND HE TOOK HER OFF THE TAMSULOSIN, AS HE SAID IT WAS FOR JUST WHEN PASSING A STONE.    DOES SHE NEED TO TAKE THE TAMSULOSIN OR STAY OFF OF IT.    PATIENT JUST HAD THE CT SCAN DR. ERICKSON ORDERED AND SHE SAID THAT SHE HAS A STONE THAT IS 1.2 CM.  SHE IS AFRAID THE MEDICATION WILL MAKE THE STONE MOVE.    PLEASE CALL NU BENITES (PATRICIA) WITH RESPONSE.  SHE IS PATIENT'S OTHER DAUGHTER -[phone].

## 2024-02-20 NOTE — TELEPHONE ENCOUNTER
Spoke to patients daughter Jovana to let her know that per Dr Chester, she does not need to be on the Flomax. They also asked about the large stone in the kidney. I advised them that based on a previous note, it was decided to monitor the pt for evidence of pain or frequent UTI's, as these would warrant a need to go in and remove the stone. However, the procedure itself would be a lot to put the pt through at her age. Pt daughter said she is doing okay, is still just weak and can't seem to get her strength back. They will call with any further questions or concerns.

## 2024-04-02 ENCOUNTER — TELEPHONE (OUTPATIENT)
Dept: INTERNAL MEDICINE | Age: 89
End: 2024-04-02
Payer: MEDICARE

## 2024-04-02 DIAGNOSIS — R53.81 MALAISE AND FATIGUE: ICD-10-CM

## 2024-04-02 DIAGNOSIS — I50.32 CHF (CONGESTIVE HEART FAILURE), NYHA CLASS I, CHRONIC, DIASTOLIC: ICD-10-CM

## 2024-04-02 DIAGNOSIS — R53.83 MALAISE AND FATIGUE: ICD-10-CM

## 2024-04-02 DIAGNOSIS — I48.20 ATRIAL FIBRILLATION, CHRONIC: ICD-10-CM

## 2024-04-02 DIAGNOSIS — I95.2 HYPOTENSION DUE TO DRUGS: ICD-10-CM

## 2024-04-02 DIAGNOSIS — G93.32 CHRONIC FATIGUE SYNDROME: ICD-10-CM

## 2024-04-02 DIAGNOSIS — R26.2 DIFFICULTY WALKING: ICD-10-CM

## 2024-04-02 DIAGNOSIS — I82.5Y2 CHRONIC DEEP VEIN THROMBOSIS (DVT) OF PROXIMAL VEIN OF LEFT LOWER EXTREMITY: ICD-10-CM

## 2024-04-02 DIAGNOSIS — N30.01 ACUTE CYSTITIS WITH HEMATURIA: ICD-10-CM

## 2024-04-02 DIAGNOSIS — E55.9 VITAMIN D DEFICIENCY: ICD-10-CM

## 2024-04-02 DIAGNOSIS — I34.0 NONRHEUMATIC MITRAL VALVE REGURGITATION: Chronic | ICD-10-CM

## 2024-04-02 RX ORDER — LORAZEPAM 0.5 MG/1
0.5 TABLET ORAL NIGHTLY
Qty: 30 TABLET | Refills: 5 | Status: SHIPPED | OUTPATIENT
Start: 2024-04-02

## 2024-04-02 NOTE — TELEPHONE ENCOUNTER
Spoke with Lorrie regarding Ativan request, She has 3 days left of med, Will this med need to go to [facility] and then transferred to [facility]? Please advise.

## 2024-04-02 NOTE — TELEPHONE ENCOUNTER
Caller: DORIAN PEREZ    Relationship: Emergency Contact    Best call back number:    [phone] (Mobile)     Requested Prescriptions:     LORazepam (ATIVAN) 0.5 MG tablet          Pharmacy where request should be sent: [facility] [address] [address] [phone] [facility] [phone]      Last office visit with prescribing clinician: 2/6/2024   Last telemedicine visit with prescribing clinician: Visit date not found   Next office visit with prescribing clinician: 4/30/2024     Additional details provided by patient: PHARMACY CHANGE TO [place]    Does the patient have less than a 3 day supply:  [x] Yes  [] No    Would you like a call back once the refill request has been completed: [] Yes [] No    If the office needs to give you a call back, can they leave a voicemail: [x] Yes [] No    Renata Judd Rep   04/02/24 09:51 EDT

## 2024-04-05 ENCOUNTER — READMISSION MANAGEMENT (OUTPATIENT)
Dept: CALL CENTER | Facility: HOSPITAL | Age: 89
End: 2024-04-05
Payer: MEDICARE

## 2024-04-06 NOTE — OUTREACH NOTE
Prep Survey      Flowsheet Row Responses   [facility] facility patient discharged from? Non-BH   Is LACE score < 7 ? Non-BH Discharge   Eligibility Not Eligible   What are the reasons patient is not eligible? Other  [no office hours posted]   Does the patient have one of the following disease processes/diagnoses(primary or secondary)? Other   Prep survey completed? Yes            NIALL MATHIS - Registered Nurse

## 2024-05-31 ENCOUNTER — NEW PATIENT (OUTPATIENT)
Dept: URBAN - METROPOLITAN AREA CLINIC 2 | Facility: CLINIC | Age: 89
End: 2024-05-31

## 2024-05-31 DIAGNOSIS — H40.1192: ICD-10-CM

## 2024-05-31 DIAGNOSIS — H00.021: ICD-10-CM

## 2024-05-31 DIAGNOSIS — Z96.1: ICD-10-CM

## 2024-05-31 PROCEDURE — 92004 COMPRE OPH EXAM NEW PT 1/>: CPT

## 2024-05-31 ASSESSMENT — TONOMETRY
OD_IOP_MMHG: 15
OS_IOP_MMHG: 15

## 2024-05-31 ASSESSMENT — VISUAL ACUITY
OD_SC: 20/40
OS_CC: 20/60
OD_CC: 20/40
OS_SC: 20/40

## 2024-06-19 NOTE — ANESTHESIA PREPROCEDURE EVALUATION
Problem: Physical Therapy - Adult  Goal: By Discharge: Performs mobility at highest level of function for planned discharge setting.  See evaluation for individualized goals.  Description: Initiated  6/18/24  to be met within 7-10 days.    1.  Patient will move from supine to sit and sit to supine , scoot up and down, and roll side to side in bed with modified independence.    2.  Patient will transfer from bed to chair and chair to bed with modified independence using the least restrictive device.  3.  Patient will perform sit to stand with modified independence.  4.  Patient will ambulate with modified independence for 150 feet with the least restrictive device.   5.  Patient will ascend/descend 1 stairs with 0 handrail(s) with supervision/set-up.    PLOF: pt lives in 2  with stair lift to second floor, 1 [place] home, lives with daughter, has RW for post surgery    Outcome: Progressing     PHYSICAL THERAPY EVALUATION    Patient: Mode Griffin (70 y.o. male)  Date: 6/18/2024  Primary Diagnosis: Osteoarthritis of right knee, unspecified osteoarthritis type [M17.11]  S/P total knee arthroplasty, right [Z96.651]  Procedure(s) (LRB):  RIGHT TOTAL KNEE REPLACEMENT (Right) Day of Surgery   Precautions: General Precautions, Weight Bearing, Right Lower Extremity Weight Bearing: Weight Bearing As Tolerated     ASSESSMENT :  Pt received via stretcher on floor from PACU, reports 10/10 pain to R knee, POD 1 R TKA. Pt unable to straight leg raise in supine, no quad contraction noted. Min A to sit on EOB. Min A to stand to RW. Instructed in SPT stretcher to recliner, given max support to R knee 2/2 buckling in R stance, unable to  RLE to take steps, shuffling foot across floor. Pt positioned for comfort in recliner, less than 50% of full knee extension noted with RLE resting at approx 45* on floor. Pt left in recliner with BLE elevated, towel roll under R ankle and ice pack donned to knee. Pt educated in quad sets, glute    Problem: Safety - Adult  Goal: Free from fall injury  Outcome: Progressing     Problem: Pain  Goal: Verbalizes/displays adequate comfort level or baseline comfort level  Outcome: Progressing     Problem: Discharge Planning  Goal: Discharge to home or other facility with appropriate resources  Outcome: Progressing     Problem: Physical Therapy - Adult  Goal: By Discharge: Performs mobility at highest level of function for planned discharge setting.  See evaluation for individualized goals.  Description: Initiated  6/18/24  to be met within 7-10 days.    1.  Patient will move from supine to sit and sit to supine , scoot up and down, and roll side to side in bed with modified independence.    2.  Patient will transfer from bed to chair and chair to bed with modified independence using the least restrictive device.  3.  Patient will perform sit to stand with modified independence.  4.  Patient will ambulate with modified independence for 150 feet with the least restrictive device.   5.  Patient will ascend/descend 1 stairs with 0 handrail(s) with supervision/set-up.    PLOF: pt lives in 2  with stair lift to second floor, 1 [facility] home, lives with daughter, has RW for post surgery    6/18/2024 1714 by Madison Prieto, PT  Outcome: Progressing       Anesthesia Evaluation     Patient summary reviewed and Nursing notes reviewed   no history of anesthetic complications:   NPO Solid Status: < 2 hours  NPO Liquid Status: < 2 hours           Airway   Mallampati: II  Dental    (+) upper dentures and partials    Pulmonary - negative pulmonary ROS   Cardiovascular - negative cardio ROS  Exercise tolerance: good (4-7 METS)    ECG reviewed  Rhythm: irregular  Rate: normal    (+) hypertension, valvular problems/murmurs MR, dysrhythmias Atrial Fib, CHF     ROS comment: ·  Left ventricular systolic function is normal. Left ventricular ejection fraction appears to be 51 - 55%.  ·  Left ventricular diastolic function was indeterminate due to presence of atrial fibrillation.  ·  There is mild to moderate biatrial enlargement.  ·  Mild to moderate mitral regurgitation noted, with multiple jets.  ·  There is mild to moderate tricuspid regurgitation.  Estimated right ventricular systolic pressure from tricuspid regurgitation is moderately elevated (45-55 mmHg).      Neuro/Psych- negative ROS  (+) dementia  GI/Hepatic/Renal/Endo - negative ROS   (+) GERD well controlled, renal disease stones    Musculoskeletal (-) negative ROS    Abdominal    Substance History - negative use     OB/GYN negative ob/gyn ROS         Other - negative ROS       ROS/Med Hx Other: PAT Nursing Notes unavailable.                 Anesthesia Plan    ASA 4 - emergent     general   Rapid sequence  (Full stomach, crackers and sprite around 2130, stress pt at increased risks with anesthesia)    Anesthetic plan, risks, benefits, and alternatives have been provided, discussed and informed consent has been obtained with: patient, child and other.    CODE STATUS:    Level Of Support Discussed With: Patient  Code Status (Patient has no pulse and is not breathing): CPR (Attempt to Resuscitate)  Medical Interventions (Patient has pulse or is breathing): Full Support       Bearing: As tolerated  Right Side Weight Bearing: As tolerated  Overall Level of Assistance: Contact-guard assistance  Distance (ft): 150 Feet  Assistive Device: Walker, rolling  Interventions: Safety awareness training;Verbal cues  Base of Support: Shift to left  Speed/Jaki: Slow;Pace decreased (< 100 feet/min)  Step Length: Right shortened;Left shortened  Stance: Right decreased  Gait Abnormalities: Antalgic;Circumduction;Decreased step clearance;Step to gait  Rail Use: Both  Stairs - Level of Assistance: Contact-guard assistance  Number of Stairs Trained: 1          Therapeutic Exercises:   Seated heel slides: x 4  Quad set: x 5    Pain:  Intensity Pre-treatment: 10/10   Intensity Post-treatment: 10/10  Scale: Numeric Rating Scale  Location: Right Knee  Quality: Aching  Intervention(s): Nurse notified, Repositioning , and Rest      Activity Tolerance:   Activity Tolerance: Patient tolerated treatment well  Please refer to the flowsheet for vital signs taken during this treatment.  After treatment:   [x] Patient left in no apparent distress sitting up in chair  [] Patient left in no apparent distress in bed  [x] Call bell left within reach  [x] Nursing notified  [x] Caregiver present  [] Bed alarm activated  [] SCDs applied      COMMUNICATION/EDUCATION:   Patient Education  Education Given To: Patient;Family  Education Provided: Role of Therapy;Plan of Care;Home Exercise Program;Transfer Training;Energy Conservation;Equipment;Family Education  Education Method: Demonstration;Verbal;Teach Back  Barriers to Learning: None  Education Outcome: Verbalized understanding;Demonstrated understanding      Edelmira Pino, PT  Minutes: 27

## 2024-06-24 RX ORDER — METOPROLOL SUCCINATE 100 MG/1
100 TABLET, EXTENDED RELEASE ORAL 2 TIMES DAILY
Qty: 180 TABLET | Refills: 1 | Status: SHIPPED | OUTPATIENT
Start: 2024-06-24

## 2024-06-24 NOTE — TELEPHONE ENCOUNTER
Rx Refill Note  Requested Prescriptions     Pending Prescriptions Disp Refills    metoprolol succinate XL (TOPROL-XL) 100 MG 24 hr tablet [Pharmacy Med Name: METOPROLOL SUCC  MG TAB] 180 tablet 3     Sig: TAKE 1 TABLET BY MOUTH TWICE A DAY        LAST OFFICE VISIT:  1/18/2024 MEDICATION MATCHES LAST OFFICE NOTE    NEXT OFFICE VISIT:  07/18/2024                          Does this refill request meet protocol details for MA to approve:   [x] Yes   [] No

## 2024-08-28 RX ORDER — FUROSEMIDE 20 MG
20 TABLET ORAL 2 TIMES DAILY
Qty: 180 TABLET | Refills: 3 | OUTPATIENT
Start: 2024-08-28

## 2024-08-28 NOTE — TELEPHONE ENCOUNTER
Rx Refill Note  Requested Prescriptions     Pending Prescriptions Disp Refills    furosemide (LASIX) 20 MG tablet [Pharmacy Med Name: FUROSEMIDE 20 MG TABLET] 180 tablet 3     Sig: TAKE 1 TABLET BY MOUTH TWICE A DAY        LAST OFFICE VISIT:  7/11/2023     NEXT OFFICE VISIT:  Visit date not found     Does the medication requests match the last office note:    [x] Yes   [] No    Does this refill request meet protocol details for MA to approve:     [] Yes   [x] No    Pt needs a f/u appt.  She was supposed to have returned in 6 mos from 01/18/24.

## 2024-09-13 RX ORDER — FUROSEMIDE 20 MG
20 TABLET ORAL 2 TIMES DAILY
Qty: 180 TABLET | Refills: 3 | Status: SHIPPED | OUTPATIENT
Start: 2024-09-13

## 2024-09-18 ENCOUNTER — READMISSION MANAGEMENT (OUTPATIENT)
Dept: CALL CENTER | Facility: HOSPITAL | Age: 89
End: 2024-09-18
Payer: COMMERCIAL

## 2024-09-19 ENCOUNTER — TRANSITIONAL CARE MANAGEMENT TELEPHONE ENCOUNTER (OUTPATIENT)
Dept: CALL CENTER | Facility: HOSPITAL | Age: 89
End: 2024-09-19
Payer: COMMERCIAL

## 2024-10-25 NOTE — TELEPHONE ENCOUNTER
Medication Requested Colestipol 1gm    Last Refill Unknown    Last OV 1/23/2024    Next OV None    Medication pended for approval and correct pharmacy verified Yes

## 2024-10-27 RX ORDER — MONTELUKAST SODIUM 4 MG/1
2 TABLET, CHEWABLE ORAL DAILY
Qty: 180 TABLET | Refills: 2 | Status: SHIPPED | OUTPATIENT
Start: 2024-10-27

## 2024-12-23 RX ORDER — LOSARTAN POTASSIUM 25 MG/1
25 TABLET ORAL DAILY
Qty: 30 TABLET | Refills: 0 | Status: SHIPPED | OUTPATIENT
Start: 2024-12-23

## 2025-01-03 RX ORDER — DILTIAZEM HYDROCHLORIDE 30 MG/1
30 TABLET, FILM COATED ORAL EVERY 12 HOURS
Qty: 180 TABLET | Refills: 2 | OUTPATIENT
Start: 2025-01-03

## 2025-02-19 RX ORDER — METOPROLOL SUCCINATE 100 MG/1
100 TABLET, EXTENDED RELEASE ORAL 2 TIMES DAILY
Qty: 180 TABLET | Refills: 1 | Status: SHIPPED | OUTPATIENT
Start: 2025-02-19

## 2025-02-20 RX ORDER — DILTIAZEM HYDROCHLORIDE 30 MG/1
30 TABLET, FILM COATED ORAL EVERY 12 HOURS
Qty: 180 TABLET | Refills: 2 | OUTPATIENT
Start: 2025-02-20

## 2025-03-31 ENCOUNTER — TELEPHONE (OUTPATIENT)
Dept: INTERNAL MEDICINE | Age: OVER 89
End: 2025-03-31
Payer: COMMERCIAL

## 2025-03-31 DIAGNOSIS — I10 HYPERTENSION, ESSENTIAL: ICD-10-CM

## 2025-03-31 DIAGNOSIS — F33.0 MAJOR DEPRESSIVE DISORDER, RECURRENT, MILD: Primary | ICD-10-CM

## 2025-03-31 RX ORDER — LOSARTAN POTASSIUM 25 MG/1
25 TABLET ORAL DAILY
Qty: 90 TABLET | Refills: 3 | Status: SHIPPED | OUTPATIENT
Start: 2025-03-31

## 2025-04-01 RX ORDER — LOSARTAN POTASSIUM 25 MG/1
25 TABLET ORAL DAILY
Qty: 30 TABLET | Refills: 0 | Status: SHIPPED | OUTPATIENT
Start: 2025-04-01

## 2025-04-08 NOTE — PROGRESS NOTES
"Chief Complaint/ HPI: Follow-up with recent low blood pressures medication changes, patient did see her orthopedic doctor today,  Here to follow-up with stress anxiety, recent left total hip arthroplasty March 2022,--did do inpatient rehab,, had a hip dislocation postop, had it replaced in the OR by Dr. Gayle,    Swelling of the left lower leg intermittently,    Objective   Vital Signs  Vitals:    05/05/22 1248   BP: 133/82   Pulse: 96   Temp: 98.2 °F (36.8 °C)   SpO2: 96%   Weight: 65.6 kg (144 lb 9.6 oz)   Height: 163.8 cm (64.49\")      Body mass index is 24.45 kg/m².  Review of Systems   Constitutional: Negative.    HENT: Negative.    Eyes: Negative.    Respiratory: Negative.    Cardiovascular: Negative.    Gastrointestinal: Negative.    Endocrine: Negative.    Genitourinary: Negative.    Musculoskeletal: Negative.    Allergic/Immunologic: Negative.    Neurological: Negative.    Hematological: Negative.    Psychiatric/Behavioral: Negative.       Physical Exam  Constitutional:       General: She is not in acute distress.     Appearance: Normal appearance.   HENT:      Head: Normocephalic.      Mouth/Throat:      Mouth: Mucous membranes are moist.   Eyes:      Conjunctiva/sclera: Conjunctivae normal.      Pupils: Pupils are equal, round, and reactive to light.   Cardiovascular:      Rate and Rhythm: Normal rate. Rhythm irregular.      Pulses: Normal pulses.      Heart sounds: Murmur heard.   Pulmonary:      Effort: Pulmonary effort is normal.      Breath sounds: Normal breath sounds.   Abdominal:      General: Abdomen is flat. Bowel sounds are normal.      Palpations: Abdomen is soft.   Musculoskeletal:         General: No swelling. Normal range of motion.      Cervical back: Neck supple.   Skin:     General: Skin is warm and dry.      Coloration: Skin is not jaundiced.   Neurological:      General: No focal deficit present.      Mental Status: She is alert and oriented to person, place, and time. Mental status " Physical Therapy Evaluation    Visit Type: Initial Evaluation  Visit: 1  Referring Provider: Dorian Orr MD  Medical Diagnosis (from order): M17.12 - Unilateral primary osteoarthritis, left knee   Treatment Diagnosis: left knee - increased pain/symptoms, impaired posture, impaired range of motion, impaired muscle length/flexibility, impaired tissue/wound healing, impaired joint play/mobility, impaired mobility, impaired motor function/performance/coordination, impaired gait, impaired strength, impaired tissue mobility, impaired coordination, impaired body mechanics, impaired activity tolerance and increased swelling.  Onset  - Date of onset: 3/12/2025  Chart reviewed at time of initial evaluation (relevant co-morbidities, allergies, tests and medications listed):   - Diagnostic tests reviewed: X-Ray  Past Medical History:  No date: ADD (attention deficit disorder)  No date: Allergy  No date: Anemia  No date: Anxiety  No date: Arthritis  No date: Back pain  No date: Bipolar disorder (CMD)  06/26/2019: Cataract      Comment:  nuclear and cortical cataract, right eye -                [facility],   05/22/2019: Cataract      Comment:  Nuclear and cortical cataract of the left eye,                [facility] Dariusz Enrique, DO  No date: Depressive disorder  No date: FH: memory loss  No date: Frequent headaches  No date: Hepatitis      Comment:  Probably hepatitis A (got it from food)  No date: Herpes genitalia  10/16/2019: Herpes genitalis  No date: History of pneumonia  No date: History of seizure  No date: Hypothyroidism  No date: Jaundice      Comment:  With hepatitis  No date: Lumbar disc disease  No date: Other chronic pain  1984: Shoulder separation, left, subsequent encounter      Comment:  With subsequent adhesive capsulitis  No date: Substance abuse  (CMD)      Comment:  cocaine and methamphetamine      SUBJECTIVE                                                                                                                  Elizabeth is a 67 year old female presenting to therapy with left knee pain that began one month ago. She states that she woke up and her knee had increased pain along the anterior medial aspect of the knee. She states that she has difficulty with ambulation, stair navigation, squatting, lifting, and kneeling. She expressed that she takes care of her mother that is 92. She has a past medical history of both cervical and lumbar spinal stenosis that does produce radicular symptoms bilaterally R>L.     Pain / Symptoms  - Pain/symptom is: constant  - Pain rating (out of 10): Current: 2 ; Best: 2; Worst: 10  - Location: Anterior knee, patellar pain  - Quality / Description: ache, radiating, tight, sore, stiff, pressure, discomfort, numbness, burning, sharp  - Alleviating Factors: prescription medications  - Progression since onset: improved    Function:   Limitations / Exacerbation Factors:   - Patient reports pain, difficulty and increased time with function reported below.  - bed mobility, lower body dressing, meal/food prep, driving/riding in a vehicle, standing tasks, sitting tasks, house/yard work, bending/squatting/lifting, kneeling, walking, squatting/lifting, lifting/carrying and standing, all types of transfers, community distances, stairs, walking quickly as required to cross a street/exit a building rapidly  Prior Level of Function: declining function, therefore referred to therapy,    Patient Goals: decreased pain, increased strength, increased motion, improved balance, independence with ADLs/IADLs and return to sport/leisure activities.    Prior treatment  - chiropractic services and no therapies  - Discharged from hospital, home health, or skilled nursing facility in last 30 days: no  Home Environment   - Patient lives with: parent or legal guardian  - Type of home: apartment  - Assistance available: consistent  - Denies 2 or more falls or an  is at baseline.   Psychiatric:         Mood and Affect: Mood normal.         Behavior: Behavior normal.         Thought Content: Thought content normal.         Judgment: Judgment normal.        Result Review :   Lab Results   Component Value Date    PROBNP 1,648.0 07/20/2021    BNP 1385 12/05/2020    BNP 4183 (H) 10/12/2020    BNP 5163 (H) 09/09/2020     CMP    CMP 3/23/22 3/26/22 4/20/22   Glucose 91 87 92   BUN 16 11 9   Creatinine 0.63 0.56 (A) 0.65   Sodium 134 (A) 136 138   Potassium 4.4 4.0 3.7   Chloride 102 103 103   Calcium 8.2 8.3 9.0   Albumin 2.30 (A) 2.40 (A) 3.50   Total Bilirubin 1.0 0.6 0.7   Alkaline Phosphatase 87 80 94   AST (SGOT) 21 17 16   ALT (SGPT) 7 7 5   (A) Abnormal value       Comments are available for some flowsheets but are not being displayed.           CBC w/diff    CBC w/Diff 3/23/22 3/26/22 4/20/22   WBC 10.59 11.45 (A) 7.55   RBC 3.13 (A) 3.06 (A) 3.45 (A)   Hemoglobin 10.1 (A) 9.6 (A) 11.0 (A)   Hematocrit 29.7 (A) 29.3 (A) 33.5 (A)   MCV 94.9 95.8 97.1 (A)   MCH 32.3 31.4 31.9   MCHC 34.0 32.8 32.8   RDW 13.5 14.3 14.6   Platelets 300 380 292   Neutrophil Rel % 59.0 62.6 64.1   Immature Granulocyte Rel % 3.3 (A) 2.4 (A) 0.4   Lymphocyte Rel % 23.0 23.4 27.2   Monocyte Rel % 8.2 6.0 6.8   Eosinophil Rel % 5.9 5.1 1.1   Basophil Rel % 0.6 0.5 0.4   (A) Abnormal value             Lipid Panel    Lipid Panel 7/15/21 2/14/22   Total Cholesterol 187 170   Triglycerides 136 164 (A)   HDL Cholesterol 79 (A) 78 (A)   VLDL Cholesterol 23 27   LDL Cholesterol  85 65   LDL/HDL Ratio 1.02 0.76   (A) Abnormal value             Lab Results   Component Value Date    TSH 1.850 03/18/2022    TSH 3.710 03/14/2022    TSH 2.790 07/21/2021      Lab Results   Component Value Date    FREET4 1.42 03/14/2022    FREET4 1.02 07/15/2021    FREET4 1.2 07/18/2020      A1C Last 3 Results    HGBA1C Last 3 Results 3/8/22   Hemoglobin A1C 5.30                             Visit Diagnoses:    ICD-10-CM ICD-9-CM    1. Functional diarrhea  K59.1 564.5   2. Primary insomnia  F51.01 307.42   3. S/P  Left Femoral Neck Open Reduction Internal Fixation  Z98.890 V45.89   4. Hypertension, essential  I10 401.9   5. Major depressive disorder, recurrent, mild (HCC)  F33.0 296.31   6. Atrial fibrillation, chronic (HCC)  I48.20 427.31   7. Esophageal dysphagia  R13.19 787.29   8. Iron deficiency anemia, unspecified iron deficiency anemia type  D50.9 280.9   9. Malignant neoplasm of colon, unspecified part of colon (HCC)  C18.9 153.9   10. Chronic fatigue syndrome  R53.82 780.71   11. Vitamin D deficiency  E55.9 268.9   12. Chronic deep vein thrombosis (DVT) of proximal vein of left lower extremity (HCC)  I82.5Y2 453.51   13. Nonrheumatic mitral valve regurgitation  I34.0 424.0   14. Closed fracture of left hip with delayed healing, subsequent encounter  S72.002G V54.13   15. Left leg swelling  M79.89 729.81       Assessment and Plan   Diagnoses and all orders for this visit:    1. Functional diarrhea (Primary)  -     CBC & Differential; Future  -     Comprehensive Metabolic Panel; Future  -     Iron Profile; Future  -     Duplex Venous Lower Extremity - Left CAR; Future  -     CEA; Future  -     CT Chest Without Contrast Diagnostic; Future    2. Primary insomnia  -     CBC & Differential; Future  -     Comprehensive Metabolic Panel; Future  -     Iron Profile; Future  -     Duplex Venous Lower Extremity - Left CAR; Future  -     CEA; Future  -     CT Chest Without Contrast Diagnostic; Future    3. S/P  Left Femoral Neck Open Reduction Internal Fixation  -     CBC & Differential; Future  -     Comprehensive Metabolic Panel; Future  -     Iron Profile; Future  -     Duplex Venous Lower Extremity - Left CAR; Future  -     CEA; Future  -     CT Chest Without Contrast Diagnostic; Future    4. Hypertension, essential  -     CBC & Differential; Future  -     Comprehensive Metabolic Panel; Future  -     Iron Profile; Future  -     Duplex Venous  unexplained fall with injury in the last year.  - Feel safe at home / work / school: yes      OBJECTIVE                                                                                                                     Range of Motion (ROM)   (degrees unless noted; active unless noted; norms in ( ); negative=lacking to 0, positive=beyond 0)  Hip:   - Flexion (100-120):      • Left:  WNL       • Right:  WNL    - ABDuction (40):      • Left:   WNL      • Right:   WNL   -  ADDuction (20):      • Left:   WNL      • Right:   WNL   - Internal Rotation (45-50):     - in supine:        • Left: 15        • Right: 25   - External Rotation (45-50):     • Left: symptom reaction, pain     - in supine:        • Left: 45        • Right: 50  Knee:   - Flexion (150):      • Left:  127  Symptom reaction, pain      • Right:  145    - Extension (0-10):      • Left:  -2       • Right:  0   Ankle:   - Dorsiflexion (20):      • Left:  10       • Right:  10     - Plantar Flexion (45-50):      • Left:   WNL      • Right:   WNL    Strength  (out of 5 unless noted, standard test position unless noted)   Hip:    - Flexion:        • Left: 3+, pain        • Right: 4    - Extension:        • Left: 3+        • Right: 4-    - Abduction:        • Left: 3+        • Right: 4-    - Adduction:        • Left: 5        • Right: 5    - Internal Rotation:        • Left: 4        • Right: 4+    - External Rotation:        • Left: 3+, pain        • Right: 4  Knee:    - Flexion:        • Left: 4-, pain        • Right: 4+    - Extension:        • Left: 4-, pain        • Right: 4+  Ankle:    - Dorsiflexion:        • Left: 4+        • Right: 4+    - Plantar Flexion:        • Left: 4+        • Right: 4+       Joint Play   Knee / Proximal Fibular Joint: Left   - Anterior Tibia on Femur: WFL  - Posterior Tibia on Femur: WFL  - Rotation Tibia on Femur: hypomobile and pain  Patella: Left  - Medial: hypomobile and pain  - Lateral: hypomobile and pain  - Superior:  Lower Extremity - Left CAR; Future  -     CEA; Future  -     CT Chest Without Contrast Diagnostic; Future    5. Major depressive disorder, recurrent, mild (HCC)  -     CBC & Differential; Future  -     Comprehensive Metabolic Panel; Future  -     Iron Profile; Future  -     Duplex Venous Lower Extremity - Left CAR; Future  -     CEA; Future  -     CT Chest Without Contrast Diagnostic; Future    6. Atrial fibrillation, chronic (HCC)  -     CBC & Differential; Future  -     Comprehensive Metabolic Panel; Future  -     Iron Profile; Future  -     Duplex Venous Lower Extremity - Left CAR; Future  -     CEA; Future  -     CT Chest Without Contrast Diagnostic; Future    7. Esophageal dysphagia  -     CBC & Differential; Future  -     Comprehensive Metabolic Panel; Future  -     Iron Profile; Future  -     Duplex Venous Lower Extremity - Left CAR; Future  -     CEA; Future  -     CT Chest Without Contrast Diagnostic; Future    8. Iron deficiency anemia, unspecified iron deficiency anemia type  -     CBC & Differential; Future  -     Comprehensive Metabolic Panel; Future  -     Iron Profile; Future  -     Duplex Venous Lower Extremity - Left CAR; Future  -     CEA; Future  -     CT Chest Without Contrast Diagnostic; Future    9. Malignant neoplasm of colon, unspecified part of colon (HCC)  -     CBC & Differential; Future  -     Comprehensive Metabolic Panel; Future  -     Iron Profile; Future  -     Duplex Venous Lower Extremity - Left CAR; Future  -     CEA; Future  -     CT Chest Without Contrast Diagnostic; Future    10. Chronic fatigue syndrome  -     CBC & Differential; Future  -     Comprehensive Metabolic Panel; Future  -     Iron Profile; Future  -     Duplex Venous Lower Extremity - Left CAR; Future  -     CEA; Future  -     CT Chest Without Contrast Diagnostic; Future    11. Vitamin D deficiency  -     CBC & Differential; Future  -     Comprehensive Metabolic Panel; Future  -     Iron Profile; Future  -      hypomobile and pain  - Inferior: hypomobile and pain     Special Tests  Knee: Ligament  - Valgus Stress Test at 0 Degrees:  Left: positive  - Valgus Stress Test at 30 Degrees:  Left: positive  Knee: Meniscus   - Bounce Home Test:  Left: positive  - Medial Christelle:  Left: positive  - Forced Flexion:  Left: positive  Knee: Patella  - Patellar Apprehension Test:  Left: positive  - Patellar Compression:  Left: positive    Deep Tendon Reflexes  - Patellar (L3/4): • Left: 2+ (normal) • Right: 2+ (normal)  - Achilles (L5/S1): • Left: 2+ (normal) • Right: 2+ (normal)               Outcome/Assessments  Outcome Measures:   Lower Extremity Functional Scale: LEFS Calculated Total: 27 (0=extreme difficulty; 80=no difficulty) see flowsheet for additional documentation    Treatment     Therapeutic Exercise  Educated patient on proper muscle length tension relationship for proper performance for optimal movement. Educated on the importance of completion of HEP program for continued dynamic stabilization. Educated on the importance of maintaining proper joint nutrition for decreased irritation in the knee with prolonged activities.      HEP Review: - see below         Skilled input: verbal instruction/cues, demonstration, posture correction, facilitation, tactile instruction/cues and inhibition    Writer verbally educated and received verbal consent for hand placement, positioning of patient, and techniques to be performed today from patient for clothing adjustments for techniques, therapist position for techniques and hand placement and palpation for techniques as described above and how they are pertinent to the patient's plan of care.  Home Exercise Program  Access Code: [email or web address]  URL: [email or web address]  Date: 04/08/2025  Prepared by: Mata Dickye    Exercises  - Supine Bridge  - 1 x daily - 7 x weekly - 3 sets - 10 reps  - Clamshell  - 1 x daily - 7 x weekly - 3 sets - 10 reps  - Active Straight Leg  Duplex Venous Lower Extremity - Left CAR; Future  -     CEA; Future  -     CT Chest Without Contrast Diagnostic; Future    12. Chronic deep vein thrombosis (DVT) of proximal vein of left lower extremity (HCC)  -     CBC & Differential; Future  -     Comprehensive Metabolic Panel; Future  -     Iron Profile; Future  -     Duplex Venous Lower Extremity - Left CAR; Future  -     CEA; Future  -     CT Chest Without Contrast Diagnostic; Future    13. Nonrheumatic mitral valve regurgitation  -     CBC & Differential; Future  -     Comprehensive Metabolic Panel; Future  -     Iron Profile; Future  -     Duplex Venous Lower Extremity - Left CAR; Future  -     CEA; Future  -     CT Chest Without Contrast Diagnostic; Future    14. Closed fracture of left hip with delayed healing, subsequent encounter  -     Duplex Venous Lower Extremity - Left CAR; Future  -     CEA; Future  -     CT Chest Without Contrast Diagnostic; Future    15. Left leg swelling  -     Duplex Venous Lower Extremity - Left CAR; Future  -     CEA; Future  -     CT Chest Without Contrast Diagnostic; Future    Left leg swelling new over the past couple weeks, will check VE LE, will add Lasix family already has on an as-needed basis, May 5, 2022    Anemia, mild improving since March 26 through April 20, up to 11.0 consider over-the-counter multivitamin with iron Geritol or just iron supplements, discussed with patient daughter, will follow-up again at next visit, discussed May 2022    Depression- lost son to stomach ca, nov 2018, and daughter had AVR nov 2018, -currently on treatment as below,     ANXIETY SINCE surgery, MARCH 16, 2022,, improved with and, cont lexapro  5 MG QD AND CONT LORAZEPAM 0.5 HS AND 1/2= 0.25 MG QD PRN April 14, 2022,--,     Hypertension, --- too low at this time April 2022,, reviewed blood pressure medications with patient daughter by telehealth visit April 14, 2022, will decrease medication to avoid hypotension,--WILL CONT METOPROLOL  Raise with Quad Set  - 1 x daily - 7 x weekly - 3 sets - 10 reps      ASSESSMENT                                                                                                          67 year old patient has reported functional limitations listed above impacted by signs and symptoms consistent with treatment diagnosis below.  Treatment Diagnosis:   - Involved: left knee.  - Symptoms/impairments: increased pain/symptoms, impaired posture, impaired range of motion, impaired muscle length/flexibility, impaired tissue/wound healing, impaired joint play/mobility, impaired mobility, impaired motor function/performance/coordination, impaired gait, impaired strength, impaired tissue mobility, impaired coordination, impaired body mechanics, impaired activity tolerance and increased swelling.    Elizabeth is a 67 year old female, presenting to therapy with decreased joint mobility, decreased quad strength, decreased hip external rotation strength, and pain along the medial joint line. Symptoms are consistent with decreased proximal stability resulting in adverse torque on the tibiofemoral joint.  These deficits have resulted in decreased ability to complete activities of daily living without difficulty. The patient presents to therapy with a proactive curiosity to return to their prior level of function.   Pain/symptoms after session (out of 10): 5    Prognosis: patient will benefit from skilled therapy  Rehabilitative potential is: good.  Predicted patient presentation: Low (stable) - Patient comorbidities and complexities, as defined above, will have little effect on progress for prescribed plan of care.  Education:   - Present and ready to learn: patient  - Results of above outlined education: Verbalizes understanding and Demonstrates understanding    PLAN                                                                                                                         The following skilled interventions to be   MG BID ,, stopped LOSARTAN 50 MG BID , and DILTIAZEM  MG QD , -, doing much better, May 5, 2022    L NILO, MARCH 16, 2022, POST OP DISLOCATION MARCH 20, 2022 AND THEN TO [facility],,    Transitional care visit for closed l hip fx and orif, July 20, 2021, ---     Dysphagia, Status post barium swallow October 2020, showing narrowing at the distal esophagus, for EGD by Dr. Blount February February 25, 2021,--CONT PROTONIX daily    Colon cancer diagnosed June 2020 initially found to be Anemia, was referred to hematology oncology,--- sent to gastroenterology---Dr. Blount--FOUND COLON CA JUNE 2020, REFERRED TO DR QUISPE--subsequent exploratory laparotomy and partial colectomy with 14 out of 14 lymph nodes negative, -------patient had prolonged hospital course, subsequent sent to rehab, now home--HAD LOW NA , DEVELOPED C DIFF COLITIS, IN HOSP, FTT WITH WGT LOSS IN REHAB --GAIT DYS. AND GEN WEAKNESS --Slowly improving, current medications reviewed----Patient had delirium in the hospital also, improved---Patient has follow-up for a 5 mm lung nodule found on CT scan in the left lower lobe and surgical changes -----With repeat CT scan in February 2021, no change mild to moderate cardiomegaly stable nodular density lung fields bilateral follow-up CT 1 year recommended stable right hilar lymph node,--- Dr. Solorio------ continues on colestipol half a pill every other day, November 2021  Status post colonoscopy October 2021 Dr. Quispe    LUNG NODULE --Found June 2020, 5 millimeter left lower lobe--- on CT scan for abdominal pains---- FOUND PER HEM/ONC AND F/U CT scan of the chest February 4, 2021, showing stable pulmonary nodules recommended a follow-up in 1 year we will reschedule,--- and CT abdomen and pelvis for follow-up of colon cancer,    Chronic atrial fibrillation---- MARCH 23, 2017, --continues ON ELIQUIS 2.5 mg twice a day ,Metoprolol 100 mg twice a day,--, will stop Cardizem extended release 120  implemented to achieve goals listed below:  Neuromuscular Re-Education (63566)  Therapeutic Activity (74493)  Therapeutic Exercise (15423)  Manual Therapy (62934)  Gait Training (04347)  Activities of Daily Living/Self Care (11444)    Frequency / Duration  2 times per week tapering as patient progresses for 12 weeks for an estimated total of 20 visits    Patient involved in and agreed to plan of care and goals.  Patient given attendance policy at time of initial evaluation.    Suggestions for next session as indicated: Progress per plan of care joint nutrition training, lateral hip strengthening, cycling, and joint mobilization PRN.     Goals  Long Term Goals: to be met by end of plan of care  1. Patient will independently complete their HEP to be able to complete a self maintenance program following discharge.  2. 2. Patient will increase there Knee flexion ROM to 135 degrees to demonstrate ability to navigate stairs into her home with 1/10 pain.    3. Patient will improve their LEFS score to > 36 (MCID ~9) to demonstrate improvement in community ambulation with 1/10 pain.       4. Patiet will improve their knee extension strength to 4+/5 to demonstrate improvement with their ability to complete sit to stand transfers with 1/10 pain.       Therapy procedure time and total treatment time can be found documented on the Time Entry flowsheet     mg daily, due to low blood pressures weakness, failure to thrive April 20, 2022, as above ---------Cardioverted April 2019 Dr. Kay , NOW BACK IN [facility]     L HUMERAL FX DEC 2016, ORIF WITH BENSON --at [facility]    adverse reaction with rash to nitrofurantoin,, SEPT. 2016.    EXTENSIVE LLE DVT 11/15, , Continues on Eliquis 2.5 mg twice a day discussed potentially decreasing dose given age GFR, April 2020    MVR-MOD-, ON ECHO,--APRIL 2017, -- PER DR KAY.               Follow Up   Return in about 4 months (around 9/5/2022).  Patient was given instructions and counseling regarding her condition or for health maintenance advice. Please see specific information pulled into the AVS if appropriate.

## 2025-04-25 ENCOUNTER — COMPREHENSIVE EXAM (OUTPATIENT)
Age: OVER 89
End: 2025-04-25

## 2025-04-25 DIAGNOSIS — H16.143: ICD-10-CM

## 2025-04-25 DIAGNOSIS — H40.1132: ICD-10-CM

## 2025-04-25 DIAGNOSIS — H04.123: ICD-10-CM

## 2025-04-25 DIAGNOSIS — H26.491: ICD-10-CM

## 2025-04-25 DIAGNOSIS — H43.813: ICD-10-CM

## 2025-04-25 PROCEDURE — 99214 OFFICE O/P EST MOD 30 MIN: CPT

## 2025-04-25 PROCEDURE — 92133 CPTRZD OPH DX IMG PST SGM ON: CPT

## 2025-06-26 RX ORDER — APIXABAN 5 MG/1
5 TABLET, FILM COATED ORAL EVERY 12 HOURS SCHEDULED
Qty: 180 TABLET | Refills: 2 | OUTPATIENT
Start: 2025-06-26

## 2025-08-15 RX ORDER — FUROSEMIDE 20 MG/1
20 TABLET ORAL 2 TIMES DAILY
Qty: 60 TABLET | Refills: 0 | Status: SHIPPED | OUTPATIENT
Start: 2025-08-15

## (undated) DEVICE — PENCL E/S SMOKEEVAC W/TELESCP CANN

## (undated) DEVICE — DISPOSABLE TOURNIQUET CUFF SINGLE BLADDER, SINGLE PORT AND QUICK CONNECT CONNECTOR: Brand: COLOR CUFF

## (undated) DEVICE — SLV SCD KN SFT MD PR

## (undated) DEVICE — CYSTO PACK: Brand: MEDLINE INDUSTRIES, INC.

## (undated) DEVICE — 3M™ STERI-DRAPE™ U-DRAPE 1015: Brand: STERI-DRAPE™

## (undated) DEVICE — Device

## (undated) DEVICE — GLV SURG SENSICARE SLT PF LF 7.5 STRL

## (undated) DEVICE — SOL IRR NACL 0.9PCT BT 1000ML

## (undated) DEVICE — BIPOLAR SEALER 23-112-1 AQM 6.0: Brand: AQUAMANTYS™

## (undated) DEVICE — GLV SURG SENSICARE SLT PF LF 7 STRL

## (undated) DEVICE — URETERAL ACCESS SHEATH SET: Brand: NAVIGATOR HD

## (undated) DEVICE — GAUZE,SPONGE,4"X4",16PLY,STRL,LF,10/TRAY: Brand: MEDLINE

## (undated) DEVICE — SOL IRRG H2O BG 3000ML STRL

## (undated) DEVICE — SUT ETHLN 4/0 FS2 18IN 662H

## (undated) DEVICE — NITINOL WIRE WITH HYDROPHILIC TIP: Brand: SENSOR

## (undated) DEVICE — 6617 IOBAN II PATIENT ISOLATION DRAPE 5/BX,4BX/CS: Brand: STERI-DRAPE™ IOBAN™ 2

## (undated) DEVICE — GLV SURG SENSICARE PI ORTHO PF SZ7 LF STRL

## (undated) DEVICE — GLV SURG SENSICARE SLT PF LF 8 STRL

## (undated) DEVICE — COMFORT ARM SLING: Brand: DEROYAL

## (undated) DEVICE — COLON KIT: Brand: MEDLINE INDUSTRIES, INC.

## (undated) DEVICE — SUT POLY FORCEFIBER W/CUT/NDL NO5 38IN

## (undated) DEVICE — BLANKT WARM PACU MISTRAL/AIR PREM REFL A/ 85.8X50IN

## (undated) DEVICE — STERILE POLYISOPRENE POWDER-FREE SURGICAL GLOVES: Brand: PROTEXIS

## (undated) DEVICE — SYS IRR PUMP SGL ACTN VAC SYR 10CC

## (undated) DEVICE — GOWN,REINFORCE,POLY,SIRUS,BREATH SLV,XLG: Brand: MEDLINE

## (undated) DEVICE — APPL DURAPREP IODOPHOR APL 26ML

## (undated) DEVICE — ELECTRD BLD EXT EDGE 1P COAT 6.5IN STRL

## (undated) DEVICE — ENDOSCOPIC VALVE WITH ADAPTER.: Brand: SURSEAL® II

## (undated) DEVICE — GW PTFE/COAT STR/TP STFF/BDY .038 150CM STRL EA/5

## (undated) DEVICE — GLV SURG SENSICARE PI PF LF 8.5 GRN STRL

## (undated) DEVICE — UNDYED BRAIDED (POLYGLACTIN 910), SYNTHETIC ABSORBABLE SUTURE: Brand: COATED VICRYL

## (undated) DEVICE — DRILL BIT, AO, SCALED: Brand: VARIAX

## (undated) DEVICE — SINGLE-USE DIGITAL FLEXIBLE URETEROSCOPE: Brand: LITHOVUE

## (undated) DEVICE — STRYKER PERFORMANCE SERIES SAGITTAL BLADE: Brand: STRYKER PERFORMANCE SERIES

## (undated) DEVICE — GLV SURG SENSICARE PI ORTHO SZ8 LF STRL

## (undated) DEVICE — SKIN PREP TRAY W/CHG: Brand: MEDLINE INDUSTRIES, INC.

## (undated) DEVICE — 1010 S-DRAPE TOWEL DRAPE 10/BX: Brand: STERI-DRAPE™

## (undated) DEVICE — BNDG ESMARK 4IN 12FT LF STRL BLU

## (undated) DEVICE — 3M™ IOBAN™ 2 ANTIMICROBIAL INCISE DRAPE 6650EZ: Brand: IOBAN™ 2

## (undated) DEVICE — GW SUREGLIDE FLXTIP ANG/TP .038 3X150CM EA/5

## (undated) DEVICE — CVR LEG BOOTLEG F/R NOSKID 33IN

## (undated) DEVICE — APPL CHLORAPREP HI/LITE 26ML ORNG

## (undated) DEVICE — GLV SURG SENSICARE PI LF PF 7.5 GRN STRL

## (undated) DEVICE — GLV SURG ULTRAFREE MAX LTX PF 8

## (undated) DEVICE — PROXIMATE RH ROTATING HEAD SKIN STAPLERS (35 WIDE) CONTAINS 35 STAINLESS STEEL STAPLES: Brand: PROXIMATE

## (undated) DEVICE — Device: Brand: POWER-FLO®

## (undated) DEVICE — NITINOL STONE RETRIEVAL BASKET: Brand: ESCAPE

## (undated) DEVICE — BNDG ELAS CO-FLEX SLF ADHR 6IN 5YD LF STRL

## (undated) DEVICE — BANDAGE,GAUZE,BULKEE II,4.5"X4.1YD,STRL: Brand: MEDLINE

## (undated) DEVICE — SUT VIC 2/0 CT1 36IN

## (undated) DEVICE — MEDI-VAC NON-CONDUCTIVE SUCTION TUBING: Brand: CARDINAL HEALTH

## (undated) DEVICE — SUT VIC UD BR COAT 0 CP2 27IN

## (undated) DEVICE — HIP PILLOW, ABDUCTION: Brand: DEROYAL

## (undated) DEVICE — GLV SURG SENSICARE ORTHO PF LF 7 STRL

## (undated) DEVICE — UNDERCAST PADDING: Brand: DEROYAL

## (undated) DEVICE — Y-TYPE TUR/BLADDER IRRIGATION SET, REGULATING CLAMP

## (undated) DEVICE — CATH 2L URETRL HC 6F 50CM

## (undated) DEVICE — BASIC SINGLE BASIN-LF: Brand: MEDLINE INDUSTRIES, INC.

## (undated) DEVICE — TOWEL,OR,DSP,ST,BLUE,STD,4/PK,20PK/CS: Brand: MEDLINE

## (undated) DEVICE — MAT FLR ABS W/BLU/LINER 56X72IN WHT

## (undated) DEVICE — GW PTFE FIX/CORE STFF STR .038 3X150CM

## (undated) DEVICE — BNDG ELAS DELUXE REINF 10CM 5MTR STRL

## (undated) DEVICE — SOL IRR NACL 0.9PCT 3000ML

## (undated) DEVICE — 3M™ STERI-DRAPE™ X-RAY IMAGE INTENSIFIER DRAPE, 10 PER CARTON / 4 CARTONS PER CASE, 1013: Brand: STERI-DRAPE™

## (undated) DEVICE — EXTREMITY-LF: Brand: MEDLINE INDUSTRIES, INC.

## (undated) DEVICE — DRSNG GZ PETROLTM XEROFORM CURAD 1X8IN STRL

## (undated) DEVICE — KNEE IMMOBILIZER: Brand: DEROYAL

## (undated) DEVICE — TOTAL ANTERIOR HIP-LF: Brand: MEDLINE INDUSTRIES, INC.

## (undated) DEVICE — BNDG ELAS ECON W/CLIP 4IN 5YD LF STRL

## (undated) DEVICE — PAD GRND REM POLYHESIVE A/ DISP

## (undated) DEVICE — SOL IRRG H2O PL/BG 1000ML STRL

## (undated) DEVICE — FIBR LASR HOLMIUM COMPAT 272MH DISP

## (undated) DEVICE — INTENDED FOR TISSUE SEPARATION, AND OTHER PROCEDURES THAT REQUIRE A SHARP SURGICAL BLADE TO PUNCTURE OR CUT.: Brand: BARD-PARKER ® CARBON RIB-BACK BLADES

## (undated) DEVICE — Device: Brand: DEFENDO AIR/WATER/SUCTION AND BIOPSY VALVE

## (undated) DEVICE — CATH URETRL OPEN END W/CONNECT 5F 70CM

## (undated) DEVICE — IMPLANTABLE DEVICE
Type: IMPLANTABLE DEVICE | Site: FEMUR | Status: NON-FUNCTIONAL
Removed: 2021-07-21

## (undated) DEVICE — FAN SPRAY KIT: Brand: PULSAVAC®

## (undated) DEVICE — 450 ML BOTTLE OF 0.05% CHLORHEXIDINE GLUCONATE IN 99.95% STERILE WATER FOR IRRIGATION, USP AND APPLICATOR.: Brand: IRRISEPT ANTIMICROBIAL WOUND LAVAGE

## (undated) DEVICE — ANTIBACTERIAL VIOLET BRAIDED (POLYGLACTIN 910), SYNTHETIC ABSORBABLE SURGICAL SUTURE: Brand: COATED VICRYL

## (undated) DEVICE — KT PT POSITION SUPINE HANA/PROFX TABL

## (undated) DEVICE — SCREWDRIVER BLADE T8 AO, SELF RETAINING: Brand: VARIAX

## (undated) DEVICE — PULLOVER TOGA, 2X LARGE: Brand: FLYTE, SURGICOOL

## (undated) DEVICE — STERILE POLYISOPRENE POWDER-FREE SURGICAL GLOVES WITH EMOLLIENT COATING: Brand: PROTEXIS

## (undated) DEVICE — SLV SCD KN/LEN ADJ EXPRSS BLENDED MD 1P/U

## (undated) DEVICE — CYSTO/BLADDER IRRIGATION SET, REGULATING CLAMP

## (undated) DEVICE — DRSNG PAD ABD 8X10IN STRL

## (undated) DEVICE — LIGHT SLEEVE: Brand: DEVON